# Patient Record
Sex: MALE | Race: WHITE | NOT HISPANIC OR LATINO | Employment: UNEMPLOYED | URBAN - METROPOLITAN AREA
[De-identification: names, ages, dates, MRNs, and addresses within clinical notes are randomized per-mention and may not be internally consistent; named-entity substitution may affect disease eponyms.]

---

## 2017-01-01 ENCOUNTER — TELEPHONE (OUTPATIENT)
Dept: PHARMACY | Facility: CLINIC | Age: 57
End: 2017-01-01

## 2017-01-01 ENCOUNTER — ANESTHESIA EVENT (OUTPATIENT)
Dept: SURGERY | Facility: HOSPITAL | Age: 57
DRG: 823 | End: 2017-01-01
Payer: COMMERCIAL

## 2017-01-01 ENCOUNTER — DOCUMENTATION ONLY (OUTPATIENT)
Dept: HEMATOLOGY/ONCOLOGY | Facility: CLINIC | Age: 57
End: 2017-01-01

## 2017-01-01 ENCOUNTER — ANESTHESIA (OUTPATIENT)
Dept: SURGERY | Facility: HOSPITAL | Age: 57
DRG: 823 | End: 2017-01-01
Payer: COMMERCIAL

## 2017-01-01 ENCOUNTER — TELEPHONE (OUTPATIENT)
Dept: HEMATOLOGY/ONCOLOGY | Facility: CLINIC | Age: 57
End: 2017-01-01

## 2017-01-01 ENCOUNTER — OFFICE VISIT (OUTPATIENT)
Dept: HEMATOLOGY/ONCOLOGY | Facility: CLINIC | Age: 57
End: 2017-01-01
Payer: COMMERCIAL

## 2017-01-01 ENCOUNTER — TELEPHONE (OUTPATIENT)
Dept: ELECTROPHYSIOLOGY | Facility: CLINIC | Age: 57
End: 2017-01-01

## 2017-01-01 ENCOUNTER — INFUSION (OUTPATIENT)
Dept: INFUSION THERAPY | Facility: HOSPITAL | Age: 57
End: 2017-01-01
Attending: INTERNAL MEDICINE
Payer: COMMERCIAL

## 2017-01-01 ENCOUNTER — CLINICAL SUPPORT (OUTPATIENT)
Dept: INFECTIOUS DISEASES | Facility: CLINIC | Age: 57
End: 2017-01-01
Payer: COMMERCIAL

## 2017-01-01 ENCOUNTER — LAB VISIT (OUTPATIENT)
Dept: LAB | Facility: HOSPITAL | Age: 57
End: 2017-01-01
Attending: INTERNAL MEDICINE
Payer: COMMERCIAL

## 2017-01-01 ENCOUNTER — ANESTHESIA EVENT (OUTPATIENT)
Dept: ENDOSCOPY | Facility: HOSPITAL | Age: 57
DRG: 808 | End: 2017-01-01
Payer: COMMERCIAL

## 2017-01-01 ENCOUNTER — NURSE TRIAGE (OUTPATIENT)
Dept: ADMINISTRATIVE | Facility: CLINIC | Age: 57
End: 2017-01-01

## 2017-01-01 ENCOUNTER — OFFICE VISIT (OUTPATIENT)
Dept: ELECTROPHYSIOLOGY | Facility: CLINIC | Age: 57
End: 2017-01-01
Payer: COMMERCIAL

## 2017-01-01 ENCOUNTER — TELEPHONE (OUTPATIENT)
Dept: GASTROENTEROLOGY | Facility: CLINIC | Age: 57
End: 2017-01-01

## 2017-01-01 ENCOUNTER — ANESTHESIA (OUTPATIENT)
Dept: ENDOSCOPY | Facility: HOSPITAL | Age: 57
DRG: 808 | End: 2017-01-01
Payer: COMMERCIAL

## 2017-01-01 ENCOUNTER — RESEARCH ENCOUNTER (OUTPATIENT)
Dept: RESEARCH | Facility: HOSPITAL | Age: 57
End: 2017-01-01

## 2017-01-01 ENCOUNTER — INFUSION (OUTPATIENT)
Dept: INFUSION THERAPY | Facility: HOSPITAL | Age: 57
End: 2017-01-01
Attending: NURSE PRACTITIONER
Payer: COMMERCIAL

## 2017-01-01 ENCOUNTER — ANESTHESIA EVENT (OUTPATIENT)
Dept: MEDSURG UNIT | Facility: HOSPITAL | Age: 57
DRG: 823 | End: 2017-01-01
Payer: COMMERCIAL

## 2017-01-01 ENCOUNTER — HOSPITAL ENCOUNTER (INPATIENT)
Facility: HOSPITAL | Age: 57
LOS: 43 days | Discharge: HOME | DRG: 823 | End: 2017-08-02
Attending: INTERNAL MEDICINE | Admitting: INTERNAL MEDICINE
Payer: COMMERCIAL

## 2017-01-01 ENCOUNTER — HOSPITAL ENCOUNTER (INPATIENT)
Facility: HOSPITAL | Age: 57
LOS: 20 days | DRG: 808 | End: 2017-10-03
Attending: EMERGENCY MEDICINE | Admitting: EMERGENCY MEDICINE
Payer: COMMERCIAL

## 2017-01-01 ENCOUNTER — DOCUMENTATION ONLY (OUTPATIENT)
Dept: INFUSION THERAPY | Facility: HOSPITAL | Age: 57
End: 2017-01-01

## 2017-01-01 ENCOUNTER — HOSPITAL ENCOUNTER (OUTPATIENT)
Dept: CARDIOLOGY | Facility: CLINIC | Age: 57
Discharge: HOME | End: 2017-08-07
Payer: COMMERCIAL

## 2017-01-01 ENCOUNTER — ANESTHESIA (OUTPATIENT)
Dept: MEDSURG UNIT | Facility: HOSPITAL | Age: 57
DRG: 823 | End: 2017-01-01
Payer: COMMERCIAL

## 2017-01-01 VITALS
TEMPERATURE: 98 F | HEART RATE: 84 BPM | OXYGEN SATURATION: 98 % | RESPIRATION RATE: 14 BRPM | HEIGHT: 73 IN | WEIGHT: 165.38 LBS | SYSTOLIC BLOOD PRESSURE: 117 MMHG | BODY MASS INDEX: 21.92 KG/M2 | DIASTOLIC BLOOD PRESSURE: 80 MMHG

## 2017-01-01 VITALS
HEART RATE: 117 BPM | TEMPERATURE: 99 F | SYSTOLIC BLOOD PRESSURE: 108 MMHG | RESPIRATION RATE: 16 BRPM | WEIGHT: 168.63 LBS | BODY MASS INDEX: 22.35 KG/M2 | DIASTOLIC BLOOD PRESSURE: 69 MMHG | HEIGHT: 73 IN

## 2017-01-01 VITALS
BODY MASS INDEX: 21.98 KG/M2 | SYSTOLIC BLOOD PRESSURE: 108 MMHG | HEART RATE: 72 BPM | WEIGHT: 165.81 LBS | DIASTOLIC BLOOD PRESSURE: 62 MMHG | HEIGHT: 73 IN

## 2017-01-01 VITALS
BODY MASS INDEX: 23.55 KG/M2 | HEART RATE: 98 BPM | SYSTOLIC BLOOD PRESSURE: 92 MMHG | OXYGEN SATURATION: 97 % | RESPIRATION RATE: 16 BRPM | WEIGHT: 177.69 LBS | TEMPERATURE: 98 F | DIASTOLIC BLOOD PRESSURE: 59 MMHG | HEIGHT: 73 IN

## 2017-01-01 VITALS
SYSTOLIC BLOOD PRESSURE: 142 MMHG | BODY MASS INDEX: 26.09 KG/M2 | WEIGHT: 196.88 LBS | HEIGHT: 73 IN | TEMPERATURE: 98 F | HEART RATE: 86 BPM | DIASTOLIC BLOOD PRESSURE: 88 MMHG

## 2017-01-01 VITALS
BODY MASS INDEX: 22.35 KG/M2 | HEART RATE: 118 BPM | DIASTOLIC BLOOD PRESSURE: 74 MMHG | TEMPERATURE: 99 F | SYSTOLIC BLOOD PRESSURE: 97 MMHG | WEIGHT: 168.63 LBS | HEIGHT: 73 IN

## 2017-01-01 VITALS
BODY MASS INDEX: 25.71 KG/M2 | HEART RATE: 93 BPM | DIASTOLIC BLOOD PRESSURE: 79 MMHG | TEMPERATURE: 98 F | SYSTOLIC BLOOD PRESSURE: 121 MMHG | WEIGHT: 194 LBS | HEIGHT: 73 IN

## 2017-01-01 VITALS
WEIGHT: 182.44 LBS | HEIGHT: 72 IN | SYSTOLIC BLOOD PRESSURE: 125 MMHG | OXYGEN SATURATION: 94 % | TEMPERATURE: 98 F | BODY MASS INDEX: 24.71 KG/M2 | HEART RATE: 94 BPM | RESPIRATION RATE: 18 BRPM | DIASTOLIC BLOOD PRESSURE: 70 MMHG

## 2017-01-01 VITALS
BODY MASS INDEX: 26.76 KG/M2 | DIASTOLIC BLOOD PRESSURE: 75 MMHG | HEIGHT: 73 IN | SYSTOLIC BLOOD PRESSURE: 136 MMHG | WEIGHT: 201.94 LBS | TEMPERATURE: 99 F

## 2017-01-01 VITALS
HEIGHT: 73 IN | BODY MASS INDEX: 22.57 KG/M2 | TEMPERATURE: 98 F | OXYGEN SATURATION: 99 % | WEIGHT: 170.31 LBS | HEART RATE: 96 BPM | RESPIRATION RATE: 16 BRPM | DIASTOLIC BLOOD PRESSURE: 61 MMHG | SYSTOLIC BLOOD PRESSURE: 91 MMHG

## 2017-01-01 VITALS
BODY MASS INDEX: 26.21 KG/M2 | SYSTOLIC BLOOD PRESSURE: 136 MMHG | HEART RATE: 96 BPM | DIASTOLIC BLOOD PRESSURE: 94 MMHG | WEIGHT: 198.63 LBS | TEMPERATURE: 98 F

## 2017-01-01 VITALS
WEIGHT: 177.69 LBS | SYSTOLIC BLOOD PRESSURE: 115 MMHG | HEART RATE: 104 BPM | SYSTOLIC BLOOD PRESSURE: 127 MMHG | BODY MASS INDEX: 22.19 KG/M2 | BODY MASS INDEX: 23.44 KG/M2 | DIASTOLIC BLOOD PRESSURE: 80 MMHG | HEART RATE: 80 BPM | RESPIRATION RATE: 15 BRPM | RESPIRATION RATE: 17 BRPM | TEMPERATURE: 98 F | WEIGHT: 168.19 LBS | TEMPERATURE: 99 F | DIASTOLIC BLOOD PRESSURE: 68 MMHG

## 2017-01-01 VITALS
HEIGHT: 73 IN | TEMPERATURE: 99 F | DIASTOLIC BLOOD PRESSURE: 84 MMHG | SYSTOLIC BLOOD PRESSURE: 124 MMHG | WEIGHT: 190.69 LBS | BODY MASS INDEX: 25.27 KG/M2 | HEART RATE: 99 BPM

## 2017-01-01 VITALS
HEIGHT: 73 IN | BODY MASS INDEX: 24.43 KG/M2 | HEART RATE: 88 BPM | WEIGHT: 184.31 LBS | RESPIRATION RATE: 19 BRPM | OXYGEN SATURATION: 88 % | SYSTOLIC BLOOD PRESSURE: 127 MMHG | DIASTOLIC BLOOD PRESSURE: 60 MMHG | TEMPERATURE: 99 F

## 2017-01-01 VITALS
DIASTOLIC BLOOD PRESSURE: 79 MMHG | TEMPERATURE: 98 F | SYSTOLIC BLOOD PRESSURE: 140 MMHG | RESPIRATION RATE: 16 BRPM | WEIGHT: 190.69 LBS | BODY MASS INDEX: 25.27 KG/M2 | OXYGEN SATURATION: 97 % | HEIGHT: 73 IN | HEART RATE: 93 BPM

## 2017-01-01 VITALS
SYSTOLIC BLOOD PRESSURE: 119 MMHG | WEIGHT: 194.44 LBS | HEIGHT: 73 IN | DIASTOLIC BLOOD PRESSURE: 86 MMHG | HEIGHT: 73 IN | HEART RATE: 104 BPM | WEIGHT: 192 LBS | BODY MASS INDEX: 25.45 KG/M2 | TEMPERATURE: 98 F | HEART RATE: 101 BPM | SYSTOLIC BLOOD PRESSURE: 106 MMHG | TEMPERATURE: 98 F | BODY MASS INDEX: 25.77 KG/M2 | DIASTOLIC BLOOD PRESSURE: 67 MMHG

## 2017-01-01 VITALS
DIASTOLIC BLOOD PRESSURE: 79 MMHG | HEART RATE: 109 BPM | WEIGHT: 195.56 LBS | TEMPERATURE: 98 F | SYSTOLIC BLOOD PRESSURE: 111 MMHG | BODY MASS INDEX: 25.8 KG/M2

## 2017-01-01 VITALS
HEART RATE: 84 BPM | DIASTOLIC BLOOD PRESSURE: 61 MMHG | RESPIRATION RATE: 16 BRPM | OXYGEN SATURATION: 100 % | SYSTOLIC BLOOD PRESSURE: 118 MMHG | TEMPERATURE: 97 F

## 2017-01-01 VITALS
RESPIRATION RATE: 18 BRPM | HEART RATE: 75 BPM | WEIGHT: 194.44 LBS | HEIGHT: 73 IN | SYSTOLIC BLOOD PRESSURE: 134 MMHG | OXYGEN SATURATION: 98 % | DIASTOLIC BLOOD PRESSURE: 75 MMHG | TEMPERATURE: 98 F | BODY MASS INDEX: 25.77 KG/M2

## 2017-01-01 VITALS
BODY MASS INDEX: 26.12 KG/M2 | TEMPERATURE: 98 F | HEIGHT: 73 IN | RESPIRATION RATE: 18 BRPM | DIASTOLIC BLOOD PRESSURE: 74 MMHG | OXYGEN SATURATION: 98 % | WEIGHT: 197.06 LBS | HEART RATE: 85 BPM | SYSTOLIC BLOOD PRESSURE: 129 MMHG

## 2017-01-01 VITALS
RESPIRATION RATE: 18 BRPM | WEIGHT: 197 LBS | TEMPERATURE: 98 F | DIASTOLIC BLOOD PRESSURE: 75 MMHG | BODY MASS INDEX: 26.11 KG/M2 | HEIGHT: 73 IN | SYSTOLIC BLOOD PRESSURE: 118 MMHG | HEART RATE: 95 BPM

## 2017-01-01 VITALS — HEIGHT: 73 IN | TEMPERATURE: 99 F | WEIGHT: 168.63 LBS | BODY MASS INDEX: 22.35 KG/M2

## 2017-01-01 DIAGNOSIS — B20 HIV DISEASE: ICD-10-CM

## 2017-01-01 DIAGNOSIS — C92.00 AML (ACUTE MYELOBLASTIC LEUKEMIA): ICD-10-CM

## 2017-01-01 DIAGNOSIS — C92.02 AML (ACUTE MYELOID LEUKEMIA) IN RELAPSE: Primary | ICD-10-CM

## 2017-01-01 DIAGNOSIS — C92.01 AML (ACUTE MYELOID LEUKEMIA) IN REMISSION: ICD-10-CM

## 2017-01-01 DIAGNOSIS — Z94.81 S/P ALLOGENEIC BONE MARROW TRANSPLANT: ICD-10-CM

## 2017-01-01 DIAGNOSIS — B39.4 HISTOPLASMA CAPSULATUM INFECTION: ICD-10-CM

## 2017-01-01 DIAGNOSIS — I46.9 CARDIAC ARREST: ICD-10-CM

## 2017-01-01 DIAGNOSIS — D89.810 ACUTE GVHD: ICD-10-CM

## 2017-01-01 DIAGNOSIS — Z94.81 BONE MARROW REPLACED BY TRANSPLANT: ICD-10-CM

## 2017-01-01 DIAGNOSIS — B39.2 HISTOPLASMOSIS PNEUMONIA: ICD-10-CM

## 2017-01-01 DIAGNOSIS — D64.81 ANEMIA DUE TO CHEMOTHERAPY: ICD-10-CM

## 2017-01-01 DIAGNOSIS — C95.90 LEUKEMIA, UNSPECIFIED LEUKEMIA TYPE: Primary | ICD-10-CM

## 2017-01-01 DIAGNOSIS — N40.0 PROSTATISM: Primary | ICD-10-CM

## 2017-01-01 DIAGNOSIS — I48.91 ATRIAL FIBRILLATION: ICD-10-CM

## 2017-01-01 DIAGNOSIS — F41.9 ANXIETY: Primary | ICD-10-CM

## 2017-01-01 DIAGNOSIS — I47.10 SUPRAVENTRICULAR TACHYCARDIA: Primary | ICD-10-CM

## 2017-01-01 DIAGNOSIS — D70.9 NEUTROPENIA: ICD-10-CM

## 2017-01-01 DIAGNOSIS — I49.9 CARDIAC RHYTHM DISORDER OR DISTURBANCE OR CHANGE: ICD-10-CM

## 2017-01-01 DIAGNOSIS — I48.19 PERSISTENT ATRIAL FIBRILLATION: ICD-10-CM

## 2017-01-01 DIAGNOSIS — T45.1X5A ANEMIA DUE TO CHEMOTHERAPY: ICD-10-CM

## 2017-01-01 DIAGNOSIS — C92.00 ACUTE MYELOID LEUKEMIA NOT HAVING ACHIEVED REMISSION: Primary | ICD-10-CM

## 2017-01-01 DIAGNOSIS — Z94.81 S/P ALLOGENEIC BONE MARROW TRANSPLANT: Primary | ICD-10-CM

## 2017-01-01 DIAGNOSIS — E83.42 HYPOMAGNESEMIA: ICD-10-CM

## 2017-01-01 DIAGNOSIS — R00.0 TACHYCARDIA: ICD-10-CM

## 2017-01-01 DIAGNOSIS — C92.01 ACUTE MYELOID LEUKEMIA IN REMISSION: ICD-10-CM

## 2017-01-01 DIAGNOSIS — B20 HIV (HUMAN IMMUNODEFICIENCY VIRUS INFECTION): Primary | ICD-10-CM

## 2017-01-01 DIAGNOSIS — K64.9 HEMORRHOIDS, UNSPECIFIED HEMORRHOID TYPE: Primary | ICD-10-CM

## 2017-01-01 DIAGNOSIS — I49.9 ARRHYTHMIA: ICD-10-CM

## 2017-01-01 DIAGNOSIS — I48.91 ATRIAL FIBRILLATION WITH RVR: ICD-10-CM

## 2017-01-01 DIAGNOSIS — B20 HIV INFECTION: ICD-10-CM

## 2017-01-01 DIAGNOSIS — C92.00 AML (ACUTE MYELOGENOUS LEUKEMIA): Primary | ICD-10-CM

## 2017-01-01 DIAGNOSIS — D64.9 ANEMIA: ICD-10-CM

## 2017-01-01 DIAGNOSIS — R11.2 CINV (CHEMOTHERAPY-INDUCED NAUSEA AND VOMITING): ICD-10-CM

## 2017-01-01 DIAGNOSIS — G89.3 NEOPLASM RELATED PAIN: ICD-10-CM

## 2017-01-01 DIAGNOSIS — C92.00 ACUTE MYELOID LEUKEMIA NOT HAVING ACHIEVED REMISSION: ICD-10-CM

## 2017-01-01 DIAGNOSIS — D89.813 GRAFT-VERSUS-HOST DISEASE: ICD-10-CM

## 2017-01-01 DIAGNOSIS — K59.1 FUNCTIONAL DIARRHEA: ICD-10-CM

## 2017-01-01 DIAGNOSIS — J96.01 ACUTE RESPIRATORY FAILURE WITH HYPOXIA: ICD-10-CM

## 2017-01-01 DIAGNOSIS — J18.9 HCAP (HEALTHCARE-ASSOCIATED PNEUMONIA): ICD-10-CM

## 2017-01-01 DIAGNOSIS — B25.9 CYTOMEGALOVIRUS (CMV) VIREMIA: ICD-10-CM

## 2017-01-01 DIAGNOSIS — C92.02 AML (ACUTE MYELOID LEUKEMIA) IN RELAPSE: ICD-10-CM

## 2017-01-01 DIAGNOSIS — D70.8 OTHER NEUTROPENIA: ICD-10-CM

## 2017-01-01 DIAGNOSIS — N18.4 CKD (CHRONIC KIDNEY DISEASE), STAGE IV: Chronic | ICD-10-CM

## 2017-01-01 DIAGNOSIS — T45.1X5A CINV (CHEMOTHERAPY-INDUCED NAUSEA AND VOMITING): ICD-10-CM

## 2017-01-01 DIAGNOSIS — N40.0 PROSTATE HYPERTROPHY: ICD-10-CM

## 2017-01-01 DIAGNOSIS — R91.8 PULMONARY INFILTRATES ON CXR: ICD-10-CM

## 2017-01-01 DIAGNOSIS — Z29.89 NEED FOR PNEUMOCYSTIS PROPHYLAXIS: Primary | ICD-10-CM

## 2017-01-01 DIAGNOSIS — T82.518A VASCULAR CATHETER DYSFUNCTION, INITIAL ENCOUNTER: Primary | ICD-10-CM

## 2017-01-01 DIAGNOSIS — E87.6 HYPOKALEMIA: ICD-10-CM

## 2017-01-01 DIAGNOSIS — D89.813 GRAFT-VERSUS-HOST DISEASE: Primary | ICD-10-CM

## 2017-01-01 DIAGNOSIS — D69.6 THROMBOCYTOPENIA: ICD-10-CM

## 2017-01-01 DIAGNOSIS — G47.00 INSOMNIA, UNSPECIFIED TYPE: ICD-10-CM

## 2017-01-01 DIAGNOSIS — A41.9 SEPSIS, DUE TO UNSPECIFIED ORGANISM: Primary | ICD-10-CM

## 2017-01-01 DIAGNOSIS — I48.91 ATRIAL FIBRILLATION, UNSPECIFIED TYPE: Primary | ICD-10-CM

## 2017-01-01 DIAGNOSIS — D64.9 ANEMIA, UNSPECIFIED TYPE: ICD-10-CM

## 2017-01-01 DIAGNOSIS — A41.9 SEPSIS: ICD-10-CM

## 2017-01-01 DIAGNOSIS — G89.3 CANCER ASSOCIATED PAIN: ICD-10-CM

## 2017-01-01 DIAGNOSIS — K64.9 HEMORRHOIDS, UNSPECIFIED HEMORRHOID TYPE: ICD-10-CM

## 2017-01-01 DIAGNOSIS — F41.9 ANXIETY: ICD-10-CM

## 2017-01-01 DIAGNOSIS — T45.1X5A PANCYTOPENIA DUE TO ANTINEOPLASTIC CHEMOTHERAPY: ICD-10-CM

## 2017-01-01 DIAGNOSIS — C92.00 AML (ACUTE MYELOGENOUS LEUKEMIA): ICD-10-CM

## 2017-01-01 DIAGNOSIS — R50.9 FEVER, UNSPECIFIED FEVER CAUSE: ICD-10-CM

## 2017-01-01 DIAGNOSIS — N52.9 ERECTILE DYSFUNCTION, UNSPECIFIED ERECTILE DYSFUNCTION TYPE: ICD-10-CM

## 2017-01-01 DIAGNOSIS — R31.9 HEMATURIA: ICD-10-CM

## 2017-01-01 DIAGNOSIS — B20 HIV DISEASE: Primary | ICD-10-CM

## 2017-01-01 DIAGNOSIS — N28.9 RENAL INSUFFICIENCY: ICD-10-CM

## 2017-01-01 DIAGNOSIS — R07.9 CHEST PAIN: ICD-10-CM

## 2017-01-01 DIAGNOSIS — D61.810 PANCYTOPENIA DUE TO ANTINEOPLASTIC CHEMOTHERAPY: ICD-10-CM

## 2017-01-01 DIAGNOSIS — B20 HIV (HUMAN IMMUNODEFICIENCY VIRUS INFECTION): ICD-10-CM

## 2017-01-01 DIAGNOSIS — R50.81 NEUTROPENIC FEVER: ICD-10-CM

## 2017-01-01 DIAGNOSIS — I48.92 ATRIAL FLUTTER, UNSPECIFIED TYPE: ICD-10-CM

## 2017-01-01 DIAGNOSIS — R94.31 ST SEGMENT CHANGES ON ELECTROCARDIOGRAM: ICD-10-CM

## 2017-01-01 DIAGNOSIS — I47.20 V-TACH: ICD-10-CM

## 2017-01-01 DIAGNOSIS — I44.7 LBBB (LEFT BUNDLE BRANCH BLOCK): ICD-10-CM

## 2017-01-01 DIAGNOSIS — J90 PLEURAL EFFUSION: ICD-10-CM

## 2017-01-01 DIAGNOSIS — I44.7 LEFT BUNDLE BRANCH BLOCK: ICD-10-CM

## 2017-01-01 DIAGNOSIS — Z94.81 STATUS POST BONE MARROW TRANSPLANT: ICD-10-CM

## 2017-01-01 DIAGNOSIS — C92.00 AML (ACUTE MYELOBLASTIC LEUKEMIA): Primary | ICD-10-CM

## 2017-01-01 DIAGNOSIS — I95.9 HYPOTENSION, UNSPECIFIED HYPOTENSION TYPE: ICD-10-CM

## 2017-01-01 DIAGNOSIS — I47.20 V TACH: ICD-10-CM

## 2017-01-01 DIAGNOSIS — Z92.89 HISTORY OF CARDIOVERSION: ICD-10-CM

## 2017-01-01 DIAGNOSIS — D70.9 NEUTROPENIC FEVER: ICD-10-CM

## 2017-01-01 DIAGNOSIS — N17.9 AKI (ACUTE KIDNEY INJURY): ICD-10-CM

## 2017-01-01 DIAGNOSIS — C92.02 ACUTE MYELOID LEUKEMIA IN RELAPSE: Primary | ICD-10-CM

## 2017-01-01 DIAGNOSIS — J01.00 ACUTE MAXILLARY SINUSITIS, RECURRENCE NOT SPECIFIED: ICD-10-CM

## 2017-01-01 DIAGNOSIS — R19.7 DIARRHEA, UNSPECIFIED TYPE: ICD-10-CM

## 2017-01-01 DIAGNOSIS — E83.39 HYPOPHOSPHATEMIA: ICD-10-CM

## 2017-01-01 DIAGNOSIS — D68.9 COAGULOPATHY: ICD-10-CM

## 2017-01-01 DIAGNOSIS — I63.9 STROKE OF UNKNOWN ETIOLOGY: ICD-10-CM

## 2017-01-01 DIAGNOSIS — F06.4 ANXIETY DISORDER DUE TO MEDICAL CONDITION: ICD-10-CM

## 2017-01-01 DIAGNOSIS — I48.91 ATRIAL FIBRILLATION, UNSPECIFIED TYPE: ICD-10-CM

## 2017-01-01 DIAGNOSIS — C95.90 LEUKEMIA: ICD-10-CM

## 2017-01-01 DIAGNOSIS — E78.5 HYPERLIPIDEMIA, UNSPECIFIED HYPERLIPIDEMIA TYPE: ICD-10-CM

## 2017-01-01 LAB
1,3 BETA GLUCAN SPEC-MCNC: <31 PG/ML
ABO + RH BLD: NORMAL
ACID FAST MOD KINY STN SPEC: NORMAL
ALBUMIN FLD-MCNC: 1 G/DL
ALBUMIN FLD-MCNC: 1.3 G/DL
ALBUMIN SERPL BCP-MCNC: 1 G/DL
ALBUMIN SERPL BCP-MCNC: 1.1 G/DL
ALBUMIN SERPL BCP-MCNC: 1.1 G/DL
ALBUMIN SERPL BCP-MCNC: 1.2 G/DL
ALBUMIN SERPL BCP-MCNC: 1.2 G/DL
ALBUMIN SERPL BCP-MCNC: 1.3 G/DL
ALBUMIN SERPL BCP-MCNC: 1.4 G/DL
ALBUMIN SERPL BCP-MCNC: 1.5 G/DL
ALBUMIN SERPL BCP-MCNC: 1.6 G/DL
ALBUMIN SERPL BCP-MCNC: 1.7 G/DL
ALBUMIN SERPL BCP-MCNC: 1.8 G/DL
ALBUMIN SERPL BCP-MCNC: 1.9 G/DL
ALBUMIN SERPL BCP-MCNC: 2 G/DL
ALBUMIN SERPL BCP-MCNC: 2.1 G/DL
ALBUMIN SERPL BCP-MCNC: 2.2 G/DL
ALBUMIN SERPL BCP-MCNC: 2.3 G/DL
ALBUMIN SERPL BCP-MCNC: 2.4 G/DL
ALBUMIN SERPL BCP-MCNC: 2.5 G/DL
ALBUMIN SERPL BCP-MCNC: 2.6 G/DL
ALBUMIN SERPL BCP-MCNC: 2.7 G/DL
ALBUMIN SERPL BCP-MCNC: 2.8 G/DL
ALBUMIN SERPL BCP-MCNC: 2.9 G/DL
ALBUMIN SERPL BCP-MCNC: 3 G/DL
ALBUMIN SERPL BCP-MCNC: 3.1 G/DL
ALBUMIN SERPL BCP-MCNC: 3.2 G/DL
ALBUMIN SERPL BCP-MCNC: 3.2 G/DL
ALBUMIN SERPL BCP-MCNC: 3.3 G/DL
ALLENS TEST: ABNORMAL
ALP SERPL-CCNC: 101 U/L
ALP SERPL-CCNC: 104 U/L
ALP SERPL-CCNC: 107 U/L
ALP SERPL-CCNC: 108 U/L
ALP SERPL-CCNC: 111 U/L
ALP SERPL-CCNC: 114 U/L
ALP SERPL-CCNC: 115 U/L
ALP SERPL-CCNC: 116 U/L
ALP SERPL-CCNC: 118 U/L
ALP SERPL-CCNC: 119 U/L
ALP SERPL-CCNC: 121 U/L
ALP SERPL-CCNC: 137 U/L
ALP SERPL-CCNC: 137 U/L
ALP SERPL-CCNC: 141 U/L
ALP SERPL-CCNC: 147 U/L
ALP SERPL-CCNC: 149 U/L
ALP SERPL-CCNC: 150 U/L
ALP SERPL-CCNC: 153 U/L
ALP SERPL-CCNC: 153 U/L
ALP SERPL-CCNC: 155 U/L
ALP SERPL-CCNC: 159 U/L
ALP SERPL-CCNC: 161 U/L
ALP SERPL-CCNC: 162 U/L
ALP SERPL-CCNC: 163 U/L
ALP SERPL-CCNC: 167 U/L
ALP SERPL-CCNC: 167 U/L
ALP SERPL-CCNC: 168 U/L
ALP SERPL-CCNC: 172 U/L
ALP SERPL-CCNC: 175 U/L
ALP SERPL-CCNC: 175 U/L
ALP SERPL-CCNC: 176 U/L
ALP SERPL-CCNC: 178 U/L
ALP SERPL-CCNC: 185 U/L
ALP SERPL-CCNC: 185 U/L
ALP SERPL-CCNC: 189 U/L
ALP SERPL-CCNC: 192 U/L
ALP SERPL-CCNC: 194 U/L
ALP SERPL-CCNC: 196 U/L
ALP SERPL-CCNC: 202 U/L
ALP SERPL-CCNC: 202 U/L
ALP SERPL-CCNC: 204 U/L
ALP SERPL-CCNC: 205 U/L
ALP SERPL-CCNC: 207 U/L
ALP SERPL-CCNC: 231 U/L
ALP SERPL-CCNC: 234 U/L
ALP SERPL-CCNC: 237 U/L
ALP SERPL-CCNC: 238 U/L
ALP SERPL-CCNC: 241 U/L
ALP SERPL-CCNC: 245 U/L
ALP SERPL-CCNC: 249 U/L
ALP SERPL-CCNC: 249 U/L
ALP SERPL-CCNC: 255 U/L
ALP SERPL-CCNC: 258 U/L
ALP SERPL-CCNC: 258 U/L
ALP SERPL-CCNC: 260 U/L
ALP SERPL-CCNC: 263 U/L
ALP SERPL-CCNC: 271 U/L
ALP SERPL-CCNC: 272 U/L
ALP SERPL-CCNC: 272 U/L
ALP SERPL-CCNC: 273 U/L
ALP SERPL-CCNC: 276 U/L
ALP SERPL-CCNC: 282 U/L
ALP SERPL-CCNC: 286 U/L
ALP SERPL-CCNC: 290 U/L
ALP SERPL-CCNC: 291 U/L
ALP SERPL-CCNC: 292 U/L
ALP SERPL-CCNC: 294 U/L
ALP SERPL-CCNC: 295 U/L
ALP SERPL-CCNC: 296 U/L
ALP SERPL-CCNC: 303 U/L
ALP SERPL-CCNC: 304 U/L
ALP SERPL-CCNC: 327 U/L
ALP SERPL-CCNC: 334 U/L
ALP SERPL-CCNC: 351 U/L
ALP SERPL-CCNC: 62 U/L
ALP SERPL-CCNC: 69 U/L
ALP SERPL-CCNC: 71 U/L
ALP SERPL-CCNC: 77 U/L
ALP SERPL-CCNC: 78 U/L
ALP SERPL-CCNC: 79 U/L
ALP SERPL-CCNC: 82 U/L
ALP SERPL-CCNC: 88 U/L
ALP SERPL-CCNC: 95 U/L
ALP SERPL-CCNC: 96 U/L
ALP SERPL-CCNC: 97 U/L
ALP SERPL-CCNC: 98 U/L
ALT SERPL W/O P-5'-P-CCNC: 10 U/L
ALT SERPL W/O P-5'-P-CCNC: 100 U/L
ALT SERPL W/O P-5'-P-CCNC: 11 U/L
ALT SERPL W/O P-5'-P-CCNC: 12 U/L
ALT SERPL W/O P-5'-P-CCNC: 13 U/L
ALT SERPL W/O P-5'-P-CCNC: 14 U/L
ALT SERPL W/O P-5'-P-CCNC: 15 U/L
ALT SERPL W/O P-5'-P-CCNC: 16 U/L
ALT SERPL W/O P-5'-P-CCNC: 17 U/L
ALT SERPL W/O P-5'-P-CCNC: 18 U/L
ALT SERPL W/O P-5'-P-CCNC: 18 U/L
ALT SERPL W/O P-5'-P-CCNC: 19 U/L
ALT SERPL W/O P-5'-P-CCNC: 19 U/L
ALT SERPL W/O P-5'-P-CCNC: 20 U/L
ALT SERPL W/O P-5'-P-CCNC: 21 U/L
ALT SERPL W/O P-5'-P-CCNC: 23 U/L
ALT SERPL W/O P-5'-P-CCNC: 23 U/L
ALT SERPL W/O P-5'-P-CCNC: 24 U/L
ALT SERPL W/O P-5'-P-CCNC: 25 U/L
ALT SERPL W/O P-5'-P-CCNC: 26 U/L
ALT SERPL W/O P-5'-P-CCNC: 28 U/L
ALT SERPL W/O P-5'-P-CCNC: 28 U/L
ALT SERPL W/O P-5'-P-CCNC: 30 U/L
ALT SERPL W/O P-5'-P-CCNC: 31 U/L
ALT SERPL W/O P-5'-P-CCNC: 32 U/L
ALT SERPL W/O P-5'-P-CCNC: 34 U/L
ALT SERPL W/O P-5'-P-CCNC: 35 U/L
ALT SERPL W/O P-5'-P-CCNC: 35 U/L
ALT SERPL W/O P-5'-P-CCNC: 36 U/L
ALT SERPL W/O P-5'-P-CCNC: 36 U/L
ALT SERPL W/O P-5'-P-CCNC: 37 U/L
ALT SERPL W/O P-5'-P-CCNC: 38 U/L
ALT SERPL W/O P-5'-P-CCNC: 39 U/L
ALT SERPL W/O P-5'-P-CCNC: 40 U/L
ALT SERPL W/O P-5'-P-CCNC: 41 U/L
ALT SERPL W/O P-5'-P-CCNC: 42 U/L
ALT SERPL W/O P-5'-P-CCNC: 42 U/L
ALT SERPL W/O P-5'-P-CCNC: 43 U/L
ALT SERPL W/O P-5'-P-CCNC: 44 U/L
ALT SERPL W/O P-5'-P-CCNC: 45 U/L
ALT SERPL W/O P-5'-P-CCNC: 45 U/L
ALT SERPL W/O P-5'-P-CCNC: 46 U/L
ALT SERPL W/O P-5'-P-CCNC: 46 U/L
ALT SERPL W/O P-5'-P-CCNC: 47 U/L
ALT SERPL W/O P-5'-P-CCNC: 47 U/L
ALT SERPL W/O P-5'-P-CCNC: 49 U/L
ALT SERPL W/O P-5'-P-CCNC: 5 U/L
ALT SERPL W/O P-5'-P-CCNC: 51 U/L
ALT SERPL W/O P-5'-P-CCNC: 51 U/L
ALT SERPL W/O P-5'-P-CCNC: 52 U/L
ALT SERPL W/O P-5'-P-CCNC: 53 U/L
ALT SERPL W/O P-5'-P-CCNC: 55 U/L
ALT SERPL W/O P-5'-P-CCNC: 6 U/L
ALT SERPL W/O P-5'-P-CCNC: 60 U/L
ALT SERPL W/O P-5'-P-CCNC: 61 U/L
ALT SERPL W/O P-5'-P-CCNC: 7 U/L
ALT SERPL W/O P-5'-P-CCNC: 7 U/L
ALT SERPL W/O P-5'-P-CCNC: 8 U/L
ALT SERPL W/O P-5'-P-CCNC: 8 U/L
ALT SERPL W/O P-5'-P-CCNC: 9 U/L
ALT SERPL W/O P-5'-P-CCNC: 9 U/L
AML FISH ADDITIONAL INFORMATION (BM): NORMAL
AML FISH DISCLAIMER (BM): NORMAL
AML FISH REASON FOR REFERRAL (BM): NORMAL
AML FISH RELEASED BY (BM): NORMAL
AML FISH RESULT (BM): NORMAL
AML FISH RESULT SUMMARY (BM): NORMAL
AML FISH RESULT TABLE (BM): NORMAL
AMORPH CRY UR QL COMP ASSIST: ABNORMAL
AMORPH CRY UR QL COMP ASSIST: ABNORMAL
AMYLASE, BODY FLUID: 12 U/L
AMYLASE, BODY FLUID: 14 U/L
ANION GAP SERPL CALC-SCNC: 10 MMOL/L
ANION GAP SERPL CALC-SCNC: 11 MMOL/L
ANION GAP SERPL CALC-SCNC: 12 MMOL/L
ANION GAP SERPL CALC-SCNC: 12 MMOL/L
ANION GAP SERPL CALC-SCNC: 14 MMOL/L
ANION GAP SERPL CALC-SCNC: 17 MMOL/L
ANION GAP SERPL CALC-SCNC: 3 MMOL/L
ANION GAP SERPL CALC-SCNC: 4 MMOL/L
ANION GAP SERPL CALC-SCNC: 5 MMOL/L
ANION GAP SERPL CALC-SCNC: 6 MMOL/L
ANION GAP SERPL CALC-SCNC: 7 MMOL/L
ANION GAP SERPL CALC-SCNC: 8 MMOL/L
ANION GAP SERPL CALC-SCNC: 9 MMOL/L
ANISOCYTOSIS BLD QL SMEAR: ABNORMAL
ANISOCYTOSIS BLD QL SMEAR: SLIGHT
AORTIC VALVE REGURGITATION: NORMAL
APPEARANCE FLD: NORMAL
APTT BLDCRRT: 21.4 SEC
APTT BLDCRRT: 23.8 SEC
APTT BLDCRRT: 24 SEC
APTT BLDCRRT: 24.2 SEC
APTT BLDCRRT: 24.5 SEC
APTT BLDCRRT: 24.6 SEC
APTT BLDCRRT: 25.2 SEC
APTT BLDCRRT: 25.4 SEC
APTT BLDCRRT: 25.9 SEC
APTT BLDCRRT: 26.3 SEC
APTT BLDCRRT: 26.8 SEC
APTT BLDCRRT: 27.7 SEC
APTT BLDCRRT: 28.3 SEC
APTT BLDCRRT: 28.3 SEC
APTT BLDCRRT: 29 SEC
APTT BLDCRRT: 31.2 SEC
APTT BLDCRRT: 46.4 SEC
APTT BLDCRRT: >150 SEC
AST SERPL-CCNC: 10 U/L
AST SERPL-CCNC: 10 U/L
AST SERPL-CCNC: 104 U/L
AST SERPL-CCNC: 11 U/L
AST SERPL-CCNC: 12 U/L
AST SERPL-CCNC: 13 U/L
AST SERPL-CCNC: 14 U/L
AST SERPL-CCNC: 15 U/L
AST SERPL-CCNC: 17 U/L
AST SERPL-CCNC: 18 U/L
AST SERPL-CCNC: 18 U/L
AST SERPL-CCNC: 19 U/L
AST SERPL-CCNC: 20 U/L
AST SERPL-CCNC: 20 U/L
AST SERPL-CCNC: 21 U/L
AST SERPL-CCNC: 23 U/L
AST SERPL-CCNC: 23 U/L
AST SERPL-CCNC: 24 U/L
AST SERPL-CCNC: 25 U/L
AST SERPL-CCNC: 26 U/L
AST SERPL-CCNC: 26 U/L
AST SERPL-CCNC: 28 U/L
AST SERPL-CCNC: 28 U/L
AST SERPL-CCNC: 30 U/L
AST SERPL-CCNC: 31 U/L
AST SERPL-CCNC: 33 U/L
AST SERPL-CCNC: 34 U/L
AST SERPL-CCNC: 35 U/L
AST SERPL-CCNC: 35 U/L
AST SERPL-CCNC: 37 U/L
AST SERPL-CCNC: 38 U/L
AST SERPL-CCNC: 39 U/L
AST SERPL-CCNC: 40 U/L
AST SERPL-CCNC: 41 U/L
AST SERPL-CCNC: 45 U/L
AST SERPL-CCNC: 45 U/L
AST SERPL-CCNC: 46 U/L
AST SERPL-CCNC: 46 U/L
AST SERPL-CCNC: 48 U/L
AST SERPL-CCNC: 51 U/L
AST SERPL-CCNC: 53 U/L
AST SERPL-CCNC: 53 U/L
AST SERPL-CCNC: 55 U/L
AST SERPL-CCNC: 56 U/L
AST SERPL-CCNC: 56 U/L
AST SERPL-CCNC: 57 U/L
AST SERPL-CCNC: 59 U/L
AST SERPL-CCNC: 60 U/L
AST SERPL-CCNC: 60 U/L
AST SERPL-CCNC: 61 U/L
AST SERPL-CCNC: 61 U/L
AST SERPL-CCNC: 62 U/L
AST SERPL-CCNC: 66 U/L
AST SERPL-CCNC: 66 U/L
AST SERPL-CCNC: 67 U/L
AST SERPL-CCNC: 73 U/L
AST SERPL-CCNC: 8 U/L
AST SERPL-CCNC: 81 U/L
BACTERIA #/AREA URNS AUTO: ABNORMAL /HPF
BACTERIA #/AREA URNS AUTO: ABNORMAL /HPF
BACTERIA #/AREA URNS AUTO: NORMAL /HPF
BACTERIA BLD CULT: NORMAL
BACTERIA FLD CULT: NORMAL
BACTERIA FLD CULT: NORMAL
BACTERIA SPEC AEROBE CULT: NO GROWTH
BACTERIA SPEC AEROBE CULT: NORMAL
BACTERIA STL CULT: NORMAL
BACTERIA UR CULT: NO GROWTH
BASO STIPL BLD QL SMEAR: ABNORMAL
BASO STIPL BLD QL SMEAR: ABNORMAL
BASOPHILS # BLD AUTO: 0 K/UL
BASOPHILS # BLD AUTO: 0.01 K/UL
BASOPHILS # BLD AUTO: 0.02 K/UL
BASOPHILS # BLD AUTO: 0.03 K/UL
BASOPHILS # BLD AUTO: 0.03 K/UL
BASOPHILS # BLD AUTO: ABNORMAL K/UL
BASOPHILS NFR BLD: 0 %
BASOPHILS NFR BLD: 0.1 %
BASOPHILS NFR BLD: 0.2 %
BASOPHILS NFR BLD: 0.2 %
BASOPHILS NFR BLD: 0.3 %
BASOPHILS NFR BLD: 0.4 %
BASOPHILS NFR BLD: 0.5 %
BASOPHILS NFR BLD: 0.8 %
BASOPHILS NFR BLD: 1 %
BASOPHILS NFR BLD: 1.6 %
BASOPHILS NFR BLD: 2 %
BASOPHILS NFR BLD: 2.1 %
BASOPHILS NFR BLD: 2.9 %
BASOPHILS NFR BLD: 3.2 %
BASOPHILS NFR BLD: 3.6 %
BILIRUB SERPL-MCNC: 0.2 MG/DL
BILIRUB SERPL-MCNC: 0.3 MG/DL
BILIRUB SERPL-MCNC: 0.4 MG/DL
BILIRUB SERPL-MCNC: 0.5 MG/DL
BILIRUB SERPL-MCNC: 0.6 MG/DL
BILIRUB SERPL-MCNC: 0.7 MG/DL
BILIRUB SERPL-MCNC: 0.8 MG/DL
BILIRUB SERPL-MCNC: 0.9 MG/DL
BILIRUB SERPL-MCNC: 1 MG/DL
BILIRUB SERPL-MCNC: 1.1 MG/DL
BILIRUB SERPL-MCNC: 1.1 MG/DL
BILIRUB SERPL-MCNC: 1.2 MG/DL
BILIRUB SERPL-MCNC: 1.2 MG/DL
BILIRUB SERPL-MCNC: 1.4 MG/DL
BILIRUB SERPL-MCNC: 1.4 MG/DL
BILIRUB SERPL-MCNC: 1.5 MG/DL
BILIRUB SERPL-MCNC: 1.6 MG/DL
BILIRUB SERPL-MCNC: 1.6 MG/DL
BILIRUB SERPL-MCNC: 1.7 MG/DL
BILIRUB SERPL-MCNC: 1.8 MG/DL
BILIRUB SERPL-MCNC: 1.9 MG/DL
BILIRUB SERPL-MCNC: 2 MG/DL
BILIRUB SERPL-MCNC: 2.1 MG/DL
BILIRUB SERPL-MCNC: 2.1 MG/DL
BILIRUB SERPL-MCNC: 2.3 MG/DL
BILIRUB SERPL-MCNC: 2.4 MG/DL
BILIRUB SERPL-MCNC: 2.4 MG/DL
BILIRUB SERPL-MCNC: 2.5 MG/DL
BILIRUB SERPL-MCNC: 2.5 MG/DL
BILIRUB SERPL-MCNC: 2.8 MG/DL
BILIRUB SERPL-MCNC: 3 MG/DL
BILIRUB SERPL-MCNC: 3.1 MG/DL
BILIRUB SERPL-MCNC: 3.1 MG/DL
BILIRUB SERPL-MCNC: 3.2 MG/DL
BILIRUB SERPL-MCNC: 3.3 MG/DL
BILIRUB UR QL STRIP: NEGATIVE
BLASTOMYCES AG INTERP: NEGATIVE
BLASTOMYCES AG RESULT: NORMAL NG/ML
BLASTOMYCES AG SPECIMEN: NORMAL
BLASTS NFR BLD MANUAL: 11 %
BLASTS NFR BLD MANUAL: 14 %
BLASTS NFR BLD MANUAL: 15 %
BLASTS NFR BLD MANUAL: 24 %
BLASTS NFR BLD MANUAL: 29.5 %
BLASTS NFR BLD MANUAL: 30 %
BLASTS NFR BLD MANUAL: 33 %
BLASTS NFR BLD MANUAL: 4 %
BLASTS NFR BLD MANUAL: 43 %
BLASTS NFR BLD MANUAL: 44 %
BLASTS NFR BLD MANUAL: 46 %
BLASTS NFR BLD MANUAL: 7 %
BLD GP AB SCN CELLS X3 SERPL QL: NORMAL
BLD PROD TYP BPU: NORMAL
BLOOD UNIT EXPIRATION DATE: NORMAL
BLOOD UNIT TYPE CODE: 1700
BLOOD UNIT TYPE CODE: 5100
BLOOD UNIT TYPE CODE: 600
BLOOD UNIT TYPE CODE: 600
BLOOD UNIT TYPE CODE: 6200
BLOOD UNIT TYPE CODE: 7300
BLOOD UNIT TYPE CODE: 9500
BLOOD UNIT TYPE: NORMAL
BNP SERPL-MCNC: 60 PG/ML
BODY FLD TYPE: NORMAL
BODY FLUID SOURCE AMYLASE: NORMAL
BODY FLUID SOURCE AMYLASE: NORMAL
BODY FLUID SOURCE, LDH: NORMAL
BODY FLUID SOURCE, LDH: NORMAL
BODY SITE - BONE MARROW: NORMAL
BODY SITE - BONE MARROW: NORMAL
BONE MARROW IRON STAIN COMMENT: NORMAL
BONE MARROW IRON STAIN COMMENT: NORMAL
BONE MARROW WRIGHT STAIN COMMENT: NORMAL
BONE MARROW WRIGHT STAIN COMMENT: NORMAL
BUN SERPL-MCNC: 10 MG/DL
BUN SERPL-MCNC: 12 MG/DL
BUN SERPL-MCNC: 12 MG/DL
BUN SERPL-MCNC: 13 MG/DL
BUN SERPL-MCNC: 14 MG/DL
BUN SERPL-MCNC: 15 MG/DL
BUN SERPL-MCNC: 16 MG/DL
BUN SERPL-MCNC: 17 MG/DL
BUN SERPL-MCNC: 18 MG/DL
BUN SERPL-MCNC: 19 MG/DL
BUN SERPL-MCNC: 20 MG/DL
BUN SERPL-MCNC: 21 MG/DL
BUN SERPL-MCNC: 22 MG/DL
BUN SERPL-MCNC: 23 MG/DL
BUN SERPL-MCNC: 24 MG/DL
BUN SERPL-MCNC: 25 MG/DL
BUN SERPL-MCNC: 26 MG/DL
BUN SERPL-MCNC: 27 MG/DL
BUN SERPL-MCNC: 27 MG/DL
BUN SERPL-MCNC: 28 MG/DL
BUN SERPL-MCNC: 32 MG/DL
BUN SERPL-MCNC: 33 MG/DL
BUN SERPL-MCNC: 34 MG/DL
BUN SERPL-MCNC: 35 MG/DL
BUN SERPL-MCNC: 35 MG/DL
BUN SERPL-MCNC: 36 MG/DL
BUN SERPL-MCNC: 36 MG/DL
BUN SERPL-MCNC: 37 MG/DL
BUN SERPL-MCNC: 39 MG/DL
BUN SERPL-MCNC: 39 MG/DL
BUN SERPL-MCNC: 40 MG/DL
BUN SERPL-MCNC: 41 MG/DL
BUN SERPL-MCNC: 42 MG/DL
BUN SERPL-MCNC: 43 MG/DL
BUN SERPL-MCNC: 44 MG/DL
BUN SERPL-MCNC: 45 MG/DL
BUN SERPL-MCNC: 46 MG/DL
BUN SERPL-MCNC: 48 MG/DL
BUN SERPL-MCNC: 48 MG/DL
BUN SERPL-MCNC: 49 MG/DL
BUN SERPL-MCNC: 51 MG/DL
BUN SERPL-MCNC: 51 MG/DL
BUN SERPL-MCNC: 53 MG/DL
BUN SERPL-MCNC: 54 MG/DL
BUN SERPL-MCNC: 55 MG/DL
BUN SERPL-MCNC: 59 MG/DL
BUN SERPL-MCNC: 66 MG/DL
BUN SERPL-MCNC: 9 MG/DL
BUN SERPL-MCNC: 9 MG/DL
BURR CELLS BLD QL SMEAR: ABNORMAL
C DIFF GDH STL QL: NEGATIVE
C DIFF TOX A+B STL QL IA: NEGATIVE
CALCIUM SERPL-MCNC: 6.9 MG/DL
CALCIUM SERPL-MCNC: 7 MG/DL
CALCIUM SERPL-MCNC: 7 MG/DL
CALCIUM SERPL-MCNC: 7.1 MG/DL
CALCIUM SERPL-MCNC: 7.2 MG/DL
CALCIUM SERPL-MCNC: 7.3 MG/DL
CALCIUM SERPL-MCNC: 7.4 MG/DL
CALCIUM SERPL-MCNC: 7.5 MG/DL
CALCIUM SERPL-MCNC: 7.6 MG/DL
CALCIUM SERPL-MCNC: 7.7 MG/DL
CALCIUM SERPL-MCNC: 7.8 MG/DL
CALCIUM SERPL-MCNC: 7.9 MG/DL
CALCIUM SERPL-MCNC: 7.9 MG/DL
CALCIUM SERPL-MCNC: 8 MG/DL
CALCIUM SERPL-MCNC: 8 MG/DL
CALCIUM SERPL-MCNC: 8.1 MG/DL
CALCIUM SERPL-MCNC: 8.2 MG/DL
CALCIUM SERPL-MCNC: 8.3 MG/DL
CALCIUM SERPL-MCNC: 8.4 MG/DL
CALCIUM SERPL-MCNC: 8.4 MG/DL
CALCIUM SERPL-MCNC: 8.5 MG/DL
CALCIUM SERPL-MCNC: 8.6 MG/DL
CALCIUM SERPL-MCNC: 8.7 MG/DL
CALCIUM SERPL-MCNC: 8.8 MG/DL
CALCIUM SERPL-MCNC: 8.9 MG/DL
CALCIUM SERPL-MCNC: 9 MG/DL
CALCIUM SERPL-MCNC: 9 MG/DL
CALCIUM SERPL-MCNC: 9.1 MG/DL
CALCIUM SERPL-MCNC: 9.2 MG/DL
CALCIUM SERPL-MCNC: 9.2 MG/DL
CD3+CD4+ CELLS # BLD: 469 CELLS/UL (ref 300–1400)
CD3+CD4+ CELLS NFR BLD: 11.4 % (ref 28–57)
CHLORIDE SERPL-SCNC: 101 MMOL/L
CHLORIDE SERPL-SCNC: 102 MMOL/L
CHLORIDE SERPL-SCNC: 102 MMOL/L
CHLORIDE SERPL-SCNC: 103 MMOL/L
CHLORIDE SERPL-SCNC: 104 MMOL/L
CHLORIDE SERPL-SCNC: 105 MMOL/L
CHLORIDE SERPL-SCNC: 106 MMOL/L
CHLORIDE SERPL-SCNC: 107 MMOL/L
CHLORIDE SERPL-SCNC: 108 MMOL/L
CHLORIDE SERPL-SCNC: 109 MMOL/L
CHLORIDE SERPL-SCNC: 110 MMOL/L
CHLORIDE SERPL-SCNC: 111 MMOL/L
CHLORIDE SERPL-SCNC: 112 MMOL/L
CHLORIDE SERPL-SCNC: 113 MMOL/L
CHLORIDE SERPL-SCNC: 113 MMOL/L
CHLORIDE SERPL-SCNC: 114 MMOL/L
CHLORIDE SERPL-SCNC: 114 MMOL/L
CHLORIDE SERPL-SCNC: 117 MMOL/L
CHLORIDE SERPL-SCNC: 118 MMOL/L
CHLORIDE SERPL-SCNC: 118 MMOL/L
CHLORIDE SERPL-SCNC: 99 MMOL/L
CHOLEST FLD-MCNC: 26 MG/DL
CHROM BANDING METHOD: NORMAL
CHROM BANDING METHOD: NORMAL
CHROMOSOME ANALYSIS BM ADDITIONAL INFORMATION: NORMAL
CHROMOSOME ANALYSIS BM ADDITIONAL INFORMATION: NORMAL
CHROMOSOME ANALYSIS BM RELEASED BY: NORMAL
CHROMOSOME ANALYSIS BM RELEASED BY: NORMAL
CHROMOSOME ANALYSIS BM RESULT SUMMARY: NORMAL
CHROMOSOME ANALYSIS BM RESULT SUMMARY: NORMAL
CLARITY CSF: CLEAR
CLARITY UR REFRACT.AUTO: ABNORMAL
CLARITY UR REFRACT.AUTO: CLEAR
CLARITY UR REFRACT.AUTO: CLEAR
CLINICAL CYTOGENETICIST REVIEW: NORMAL
CLINICAL DIAGNOSIS - BONE MARROW: NORMAL
CLINICAL DIAGNOSIS - BONE MARROW: NORMAL
CMV DNA SERPL NAA+PROBE-ACNC: NORMAL IU/ML
CO2 SERPL-SCNC: 11 MMOL/L
CO2 SERPL-SCNC: 11 MMOL/L
CO2 SERPL-SCNC: 12 MMOL/L
CO2 SERPL-SCNC: 13 MMOL/L
CO2 SERPL-SCNC: 14 MMOL/L
CO2 SERPL-SCNC: 15 MMOL/L
CO2 SERPL-SCNC: 17 MMOL/L
CO2 SERPL-SCNC: 18 MMOL/L
CO2 SERPL-SCNC: 18 MMOL/L
CO2 SERPL-SCNC: 19 MMOL/L
CO2 SERPL-SCNC: 19 MMOL/L
CO2 SERPL-SCNC: 20 MMOL/L
CO2 SERPL-SCNC: 21 MMOL/L
CO2 SERPL-SCNC: 22 MMOL/L
CO2 SERPL-SCNC: 23 MMOL/L
CO2 SERPL-SCNC: 24 MMOL/L
CO2 SERPL-SCNC: 25 MMOL/L
CO2 SERPL-SCNC: 26 MMOL/L
CO2 SERPL-SCNC: 27 MMOL/L
CO2 SERPL-SCNC: 27 MMOL/L
CO2 SERPL-SCNC: 28 MMOL/L
CO2 SERPL-SCNC: 28 MMOL/L
CO2 SERPL-SCNC: 29 MMOL/L
CO2 SERPL-SCNC: 29 MMOL/L
CO2 SERPL-SCNC: 30 MMOL/L
CO2 SERPL-SCNC: 31 MMOL/L
CO2 SERPL-SCNC: 9 MMOL/L
CO2 SERPL-SCNC: 9 MMOL/L
CODING SYSTEM: NORMAL
COLOR CSF: COLORLESS
COLOR FLD: NORMAL
COLOR UR AUTO: ABNORMAL
COLOR UR AUTO: ABNORMAL
COLOR UR AUTO: YELLOW
CREAT SERPL-MCNC: 0.7 MG/DL
CREAT SERPL-MCNC: 0.8 MG/DL
CREAT SERPL-MCNC: 0.9 MG/DL
CREAT SERPL-MCNC: 1 MG/DL
CREAT SERPL-MCNC: 1.1 MG/DL
CREAT SERPL-MCNC: 1.2 MG/DL
CREAT SERPL-MCNC: 1.3 MG/DL
CREAT SERPL-MCNC: 1.4 MG/DL
CREAT SERPL-MCNC: 1.5 MG/DL
CREAT SERPL-MCNC: 1.6 MG/DL
CREAT SERPL-MCNC: 1.8 MG/DL
CREAT UR-MCNC: 170 MG/DL
CRYPTOC AG SER QL LA: NEGATIVE
CYTOMEGALOVIRUS DNA: NOT DETECTED
CYTOMEGALOVIRUS LOG (IU/ML): <2.4 LOG (10) COPIES/ML
CYTOMEGALOVIRUS PCR, QUANT: <250 COPIES/ML
CYTOMEGALOVIRUS SOURCE: NORMAL
D DIMER PPP IA.FEU-MCNC: 3.22 MG/L FEU
DACRYOCYTES BLD QL SMEAR: ABNORMAL
DELSYS: ABNORMAL
DIASTOLIC DYSFUNCTION: NO
DIASTOLIC DYSFUNCTION: NO
DIFFERENTIAL METHOD: ABNORMAL
DISPENSE STATUS: NORMAL
DNA/RNA EXTRACT AND HOLD RESULT: NORMAL
DNA/RNA EXTRACTION: NORMAL
DOHLE BOD BLD QL SMEAR: PRESENT
E COLI SXT1 STL QL IA: NEGATIVE
E COLI SXT2 STL QL IA: NEGATIVE
ENTEROVIRUS: NOT DETECTED
EOSINOPHIL # BLD AUTO: 0 K/UL
EOSINOPHIL # BLD AUTO: 0.1 K/UL
EOSINOPHIL # BLD AUTO: ABNORMAL K/UL
EOSINOPHIL NFR BLD: 0 %
EOSINOPHIL NFR BLD: 0.2 %
EOSINOPHIL NFR BLD: 0.2 %
EOSINOPHIL NFR BLD: 0.5 %
EOSINOPHIL NFR BLD: 0.6 %
EOSINOPHIL NFR BLD: 0.7 %
EOSINOPHIL NFR BLD: 1 %
EOSINOPHIL NFR BLD: 1.5 %
EOSINOPHIL NFR BLD: 2 %
EOSINOPHIL NFR BLD: 3 %
EOSINOPHIL NFR BLD: 6 %
EOSINOPHIL NFR BLD: 6 %
EOSINOPHIL URNS QL WRIGHT STN: NORMAL
EP: 5
ERYTHROCYTE [DISTWIDTH] IN BLOOD BY AUTOMATED COUNT: 14.1 %
ERYTHROCYTE [DISTWIDTH] IN BLOOD BY AUTOMATED COUNT: 14.5 %
ERYTHROCYTE [DISTWIDTH] IN BLOOD BY AUTOMATED COUNT: 14.6 %
ERYTHROCYTE [DISTWIDTH] IN BLOOD BY AUTOMATED COUNT: 14.8 %
ERYTHROCYTE [DISTWIDTH] IN BLOOD BY AUTOMATED COUNT: 14.9 %
ERYTHROCYTE [DISTWIDTH] IN BLOOD BY AUTOMATED COUNT: 15 %
ERYTHROCYTE [DISTWIDTH] IN BLOOD BY AUTOMATED COUNT: 15.1 %
ERYTHROCYTE [DISTWIDTH] IN BLOOD BY AUTOMATED COUNT: 15.2 %
ERYTHROCYTE [DISTWIDTH] IN BLOOD BY AUTOMATED COUNT: 15.3 %
ERYTHROCYTE [DISTWIDTH] IN BLOOD BY AUTOMATED COUNT: 15.4 %
ERYTHROCYTE [DISTWIDTH] IN BLOOD BY AUTOMATED COUNT: 15.4 %
ERYTHROCYTE [DISTWIDTH] IN BLOOD BY AUTOMATED COUNT: 15.5 %
ERYTHROCYTE [DISTWIDTH] IN BLOOD BY AUTOMATED COUNT: 15.6 %
ERYTHROCYTE [DISTWIDTH] IN BLOOD BY AUTOMATED COUNT: 15.7 %
ERYTHROCYTE [DISTWIDTH] IN BLOOD BY AUTOMATED COUNT: 15.7 %
ERYTHROCYTE [DISTWIDTH] IN BLOOD BY AUTOMATED COUNT: 15.8 %
ERYTHROCYTE [DISTWIDTH] IN BLOOD BY AUTOMATED COUNT: 16 %
ERYTHROCYTE [DISTWIDTH] IN BLOOD BY AUTOMATED COUNT: 16.2 %
ERYTHROCYTE [DISTWIDTH] IN BLOOD BY AUTOMATED COUNT: 16.3 %
ERYTHROCYTE [DISTWIDTH] IN BLOOD BY AUTOMATED COUNT: 16.4 %
ERYTHROCYTE [DISTWIDTH] IN BLOOD BY AUTOMATED COUNT: 16.5 %
ERYTHROCYTE [DISTWIDTH] IN BLOOD BY AUTOMATED COUNT: 16.6 %
ERYTHROCYTE [DISTWIDTH] IN BLOOD BY AUTOMATED COUNT: 16.6 %
ERYTHROCYTE [DISTWIDTH] IN BLOOD BY AUTOMATED COUNT: 16.7 %
ERYTHROCYTE [DISTWIDTH] IN BLOOD BY AUTOMATED COUNT: 16.8 %
ERYTHROCYTE [DISTWIDTH] IN BLOOD BY AUTOMATED COUNT: 16.9 %
ERYTHROCYTE [DISTWIDTH] IN BLOOD BY AUTOMATED COUNT: 17.1 %
ERYTHROCYTE [DISTWIDTH] IN BLOOD BY AUTOMATED COUNT: 17.1 %
ERYTHROCYTE [DISTWIDTH] IN BLOOD BY AUTOMATED COUNT: 17.3 %
ERYTHROCYTE [DISTWIDTH] IN BLOOD BY AUTOMATED COUNT: 17.4 %
ERYTHROCYTE [DISTWIDTH] IN BLOOD BY AUTOMATED COUNT: 18.3 %
ERYTHROCYTE [DISTWIDTH] IN BLOOD BY AUTOMATED COUNT: 18.4 %
ERYTHROCYTE [DISTWIDTH] IN BLOOD BY AUTOMATED COUNT: 18.5 %
ERYTHROCYTE [DISTWIDTH] IN BLOOD BY AUTOMATED COUNT: 18.6 %
ERYTHROCYTE [DISTWIDTH] IN BLOOD BY AUTOMATED COUNT: 18.7 %
ERYTHROCYTE [DISTWIDTH] IN BLOOD BY AUTOMATED COUNT: 18.8 %
ERYTHROCYTE [DISTWIDTH] IN BLOOD BY AUTOMATED COUNT: 18.9 %
ERYTHROCYTE [DISTWIDTH] IN BLOOD BY AUTOMATED COUNT: 18.9 %
ERYTHROCYTE [DISTWIDTH] IN BLOOD BY AUTOMATED COUNT: 19 %
ERYTHROCYTE [DISTWIDTH] IN BLOOD BY AUTOMATED COUNT: 19.1 %
ERYTHROCYTE [DISTWIDTH] IN BLOOD BY AUTOMATED COUNT: 19.1 %
ERYTHROCYTE [DISTWIDTH] IN BLOOD BY AUTOMATED COUNT: 19.2 %
ERYTHROCYTE [DISTWIDTH] IN BLOOD BY AUTOMATED COUNT: 19.2 %
ERYTHROCYTE [DISTWIDTH] IN BLOOD BY AUTOMATED COUNT: 19.3 %
ERYTHROCYTE [DISTWIDTH] IN BLOOD BY AUTOMATED COUNT: 20.1 %
ERYTHROCYTE [DISTWIDTH] IN BLOOD BY AUTOMATED COUNT: 20.5 %
ERYTHROCYTE [DISTWIDTH] IN BLOOD BY AUTOMATED COUNT: 20.7 %
ERYTHROCYTE [DISTWIDTH] IN BLOOD BY AUTOMATED COUNT: 21 %
ERYTHROCYTE [DISTWIDTH] IN BLOOD BY AUTOMATED COUNT: 21.3 %
ERYTHROCYTE [DISTWIDTH] IN BLOOD BY AUTOMATED COUNT: 21.5 %
ERYTHROCYTE [DISTWIDTH] IN BLOOD BY AUTOMATED COUNT: 21.7 %
ERYTHROCYTE [DISTWIDTH] IN BLOOD BY AUTOMATED COUNT: 22 %
ERYTHROCYTE [DISTWIDTH] IN BLOOD BY AUTOMATED COUNT: 22.3 %
ERYTHROCYTE [DISTWIDTH] IN BLOOD BY AUTOMATED COUNT: 22.9 %
ERYTHROCYTE [DISTWIDTH] IN BLOOD BY AUTOMATED COUNT: 23.9 %
ERYTHROCYTE [DISTWIDTH] IN BLOOD BY AUTOMATED COUNT: 25.9 %
ERYTHROCYTE [SEDIMENTATION RATE] IN BLOOD BY WESTERGREN METHOD: 12 MM/H
ERYTHROCYTE [SEDIMENTATION RATE] IN BLOOD BY WESTERGREN METHOD: 17 MM/H
EST. GFR  (AFRICAN AMERICAN): 48.3 ML/MIN/1.73 M^2
EST. GFR  (AFRICAN AMERICAN): 55.2 ML/MIN/1.73 M^2
EST. GFR  (AFRICAN AMERICAN): 59.7 ML/MIN/1.73 M^2
EST. GFR  (AFRICAN AMERICAN): >60 ML/MIN/1.73 M^2
EST. GFR  (NON AFRICAN AMERICAN): 41.7 ML/MIN/1.73 M^2
EST. GFR  (NON AFRICAN AMERICAN): 47.8 ML/MIN/1.73 M^2
EST. GFR  (NON AFRICAN AMERICAN): 51.7 ML/MIN/1.73 M^2
EST. GFR  (NON AFRICAN AMERICAN): 52 ML/MIN/1.73 M^2
EST. GFR  (NON AFRICAN AMERICAN): 52 ML/MIN/1.73 M^2
EST. GFR  (NON AFRICAN AMERICAN): 56.2 ML/MIN/1.73 M^2
EST. GFR  (NON AFRICAN AMERICAN): 56.2 ML/MIN/1.73 M^2
EST. GFR  (NON AFRICAN AMERICAN): 56.6 ML/MIN/1.73 M^2
EST. GFR  (NON AFRICAN AMERICAN): >60 ML/MIN/1.73 M^2
ESTIMATED PA SYSTOLIC PRESSURE: 23.25
EXHR SPECIMEN TYPE: NORMAL
FACT X PPP CHRO-ACNC: <0.1 IU/ML
FACT X PPP CHRO-ACNC: <0.1 IU/ML
FAMLB SPECIMEN: NORMAL
FIBRINOGEN PPP-MCNC: 210 MG/DL
FIBRINOGEN PPP-MCNC: 284 MG/DL
FIBRINOGEN PPP-MCNC: 361 MG/DL
FINAL DIAGNOSIS - CHIMERISM  NO SORT: NORMAL
FINAL DIAGNOSIS - CHIMERISM SORT: NORMAL
FINAL DIAGNOSIS - CHIMERISM SORT: NORMAL
FIO2: 60
FLOW CYTOMETRY ANTIBODIES ANALYZED - BLOOD: NORMAL
FLOW CYTOMETRY ANTIBODIES ANALYZED - BONE MARROW: NORMAL
FLOW CYTOMETRY ANTIBODIES ANALYZED - BONE MARROW: NORMAL
FLOW CYTOMETRY ANTIBODIES ANALYZED - CSF: NORMAL
FLOW CYTOMETRY COMMENT - BLOOD: NORMAL
FLOW CYTOMETRY COMMENT - BONE MARROW: NORMAL
FLOW CYTOMETRY COMMENT - BONE MARROW: NORMAL
FLOW CYTOMETRY COMMENT - CSF: NORMAL
FLOW CYTOMETRY INTERPRETATION - BLOOD: NORMAL
FLOW CYTOMETRY INTERPRETATION - BONE MARROW: NORMAL
FLOW CYTOMETRY INTERPRETATION - BONE MARROW: NORMAL
FLOW CYTOMETRY INTERPRETATION - CSF: NORMAL
FLOW: 2
FLOW: 4
FLT3 RESULT: NORMAL
FLUAV AG SPEC QL IA: NEGATIVE
FLUBV AG SPEC QL IA: NEGATIVE
FUNGUS SPEC CULT: NORMAL
GALACTOMANNAN AG SERPL IA-ACNC: <0.5 INDEX
GALACTOMANNAN AG SPEC-ACNC: <0.5 INDEX
GIANT PLATELETS BLD QL SMEAR: PRESENT
GLUCOSE CSF-MCNC: 62 MG/DL
GLUCOSE FLD-MCNC: 102 MG/DL
GLUCOSE SERPL-MCNC: 100 MG/DL
GLUCOSE SERPL-MCNC: 100 MG/DL
GLUCOSE SERPL-MCNC: 101 MG/DL
GLUCOSE SERPL-MCNC: 101 MG/DL
GLUCOSE SERPL-MCNC: 102 MG/DL
GLUCOSE SERPL-MCNC: 103 MG/DL
GLUCOSE SERPL-MCNC: 105 MG/DL
GLUCOSE SERPL-MCNC: 105 MG/DL
GLUCOSE SERPL-MCNC: 106 MG/DL
GLUCOSE SERPL-MCNC: 106 MG/DL
GLUCOSE SERPL-MCNC: 107 MG/DL
GLUCOSE SERPL-MCNC: 108 MG/DL
GLUCOSE SERPL-MCNC: 108 MG/DL
GLUCOSE SERPL-MCNC: 109 MG/DL
GLUCOSE SERPL-MCNC: 109 MG/DL
GLUCOSE SERPL-MCNC: 110 MG/DL
GLUCOSE SERPL-MCNC: 111 MG/DL
GLUCOSE SERPL-MCNC: 112 MG/DL
GLUCOSE SERPL-MCNC: 113 MG/DL
GLUCOSE SERPL-MCNC: 113 MG/DL
GLUCOSE SERPL-MCNC: 114 MG/DL
GLUCOSE SERPL-MCNC: 115 MG/DL
GLUCOSE SERPL-MCNC: 118 MG/DL
GLUCOSE SERPL-MCNC: 119 MG/DL
GLUCOSE SERPL-MCNC: 120 MG/DL
GLUCOSE SERPL-MCNC: 120 MG/DL
GLUCOSE SERPL-MCNC: 123 MG/DL
GLUCOSE SERPL-MCNC: 123 MG/DL
GLUCOSE SERPL-MCNC: 129 MG/DL
GLUCOSE SERPL-MCNC: 131 MG/DL
GLUCOSE SERPL-MCNC: 131 MG/DL
GLUCOSE SERPL-MCNC: 132 MG/DL
GLUCOSE SERPL-MCNC: 133 MG/DL
GLUCOSE SERPL-MCNC: 136 MG/DL
GLUCOSE SERPL-MCNC: 137 MG/DL
GLUCOSE SERPL-MCNC: 143 MG/DL
GLUCOSE SERPL-MCNC: 149 MG/DL
GLUCOSE SERPL-MCNC: 154 MG/DL
GLUCOSE SERPL-MCNC: 155 MG/DL
GLUCOSE SERPL-MCNC: 155 MG/DL
GLUCOSE SERPL-MCNC: 162 MG/DL
GLUCOSE SERPL-MCNC: 162 MG/DL
GLUCOSE SERPL-MCNC: 165 MG/DL
GLUCOSE SERPL-MCNC: 165 MG/DL
GLUCOSE SERPL-MCNC: 175 MG/DL
GLUCOSE SERPL-MCNC: 175 MG/DL
GLUCOSE SERPL-MCNC: 177 MG/DL
GLUCOSE SERPL-MCNC: 180 MG/DL
GLUCOSE SERPL-MCNC: 190 MG/DL
GLUCOSE SERPL-MCNC: 226 MG/DL
GLUCOSE SERPL-MCNC: 256 MG/DL
GLUCOSE SERPL-MCNC: 269 MG/DL
GLUCOSE SERPL-MCNC: 290 MG/DL
GLUCOSE SERPL-MCNC: 310 MG/DL
GLUCOSE SERPL-MCNC: 312 MG/DL
GLUCOSE SERPL-MCNC: 417 MG/DL
GLUCOSE SERPL-MCNC: 81 MG/DL
GLUCOSE SERPL-MCNC: 82 MG/DL
GLUCOSE SERPL-MCNC: 82 MG/DL
GLUCOSE SERPL-MCNC: 84 MG/DL
GLUCOSE SERPL-MCNC: 844 MG/DL
GLUCOSE SERPL-MCNC: 863 MG/DL
GLUCOSE SERPL-MCNC: 88 MG/DL
GLUCOSE SERPL-MCNC: 89 MG/DL
GLUCOSE SERPL-MCNC: 89 MG/DL
GLUCOSE SERPL-MCNC: 91 MG/DL
GLUCOSE SERPL-MCNC: 92 MG/DL
GLUCOSE SERPL-MCNC: 94 MG/DL
GLUCOSE SERPL-MCNC: 95 MG/DL
GLUCOSE SERPL-MCNC: 96 MG/DL
GLUCOSE SERPL-MCNC: 96 MG/DL
GLUCOSE SERPL-MCNC: 97 MG/DL
GLUCOSE SERPL-MCNC: 98 MG/DL
GLUCOSE SERPL-MCNC: 99 MG/DL
GLUCOSE SERPL-MCNC: 99 MG/DL
GLUCOSE UR QL STRIP: ABNORMAL
GLUCOSE UR QL STRIP: NEGATIVE
GRAM STN SPEC: NORMAL
GRAN CASTS UR QL COMP ASSIST: 1 /LPF
HAPTOGLOB SERPL-MCNC: 244 MG/DL
HCO3 UR-SCNC: 18.1 MMOL/L (ref 24–28)
HCO3 UR-SCNC: 18.3 MMOL/L (ref 24–28)
HCO3 UR-SCNC: 22.9 MMOL/L (ref 24–28)
HCO3 UR-SCNC: 9.4 MMOL/L (ref 24–28)
HCT VFR BLD AUTO: 16.1 %
HCT VFR BLD AUTO: 16.6 %
HCT VFR BLD AUTO: 17.8 %
HCT VFR BLD AUTO: 18 %
HCT VFR BLD AUTO: 18 %
HCT VFR BLD AUTO: 18.1 %
HCT VFR BLD AUTO: 18.2 %
HCT VFR BLD AUTO: 18.3 %
HCT VFR BLD AUTO: 18.3 %
HCT VFR BLD AUTO: 18.7 %
HCT VFR BLD AUTO: 19.3 %
HCT VFR BLD AUTO: 19.4 %
HCT VFR BLD AUTO: 19.5 %
HCT VFR BLD AUTO: 19.5 %
HCT VFR BLD AUTO: 19.6 %
HCT VFR BLD AUTO: 19.7 %
HCT VFR BLD AUTO: 19.7 %
HCT VFR BLD AUTO: 20.2 %
HCT VFR BLD AUTO: 20.3 %
HCT VFR BLD AUTO: 20.4 %
HCT VFR BLD AUTO: 20.6 %
HCT VFR BLD AUTO: 20.8 %
HCT VFR BLD AUTO: 21 %
HCT VFR BLD AUTO: 21.1 %
HCT VFR BLD AUTO: 21.1 %
HCT VFR BLD AUTO: 21.3 %
HCT VFR BLD AUTO: 21.4 %
HCT VFR BLD AUTO: 21.4 %
HCT VFR BLD AUTO: 21.5 %
HCT VFR BLD AUTO: 21.5 %
HCT VFR BLD AUTO: 21.6 %
HCT VFR BLD AUTO: 21.7 %
HCT VFR BLD AUTO: 22 %
HCT VFR BLD AUTO: 22.1 %
HCT VFR BLD AUTO: 22.2 %
HCT VFR BLD AUTO: 22.7 %
HCT VFR BLD AUTO: 22.8 %
HCT VFR BLD AUTO: 23.1 %
HCT VFR BLD AUTO: 23.1 %
HCT VFR BLD AUTO: 23.3 %
HCT VFR BLD AUTO: 23.3 %
HCT VFR BLD AUTO: 23.7 %
HCT VFR BLD AUTO: 23.9 %
HCT VFR BLD AUTO: 24.1 %
HCT VFR BLD AUTO: 24.2 %
HCT VFR BLD AUTO: 24.5 %
HCT VFR BLD AUTO: 24.7 %
HCT VFR BLD AUTO: 25 %
HCT VFR BLD AUTO: 25.1 %
HCT VFR BLD AUTO: 25.2 %
HCT VFR BLD AUTO: 25.3 %
HCT VFR BLD AUTO: 25.8 %
HCT VFR BLD AUTO: 25.9 %
HCT VFR BLD AUTO: 25.9 %
HCT VFR BLD AUTO: 26 %
HCT VFR BLD AUTO: 26 %
HCT VFR BLD AUTO: 26.1 %
HCT VFR BLD AUTO: 26.5 %
HCT VFR BLD AUTO: 26.8 %
HCT VFR BLD AUTO: 28.1 %
HCT VFR BLD AUTO: 28.3 %
HCT VFR BLD AUTO: 28.5 %
HCT VFR BLD AUTO: 29.4 %
HCT VFR BLD AUTO: 30 %
HCT VFR BLD AUTO: 30.1 %
HCT VFR BLD AUTO: 30.9 %
HCT VFR BLD AUTO: 30.9 %
HCT VFR BLD AUTO: 31.1 %
HCT VFR BLD AUTO: 31.3 %
HCT VFR BLD AUTO: 31.6 %
HCT VFR BLD AUTO: 31.8 %
HCT VFR BLD AUTO: 31.9 %
HCT VFR BLD AUTO: 32.2 %
HCT VFR BLD AUTO: 34.2 %
HCT VFR BLD AUTO: 34.7 %
HCT VFR BLD AUTO: 35.1 %
HCT VFR BLD AUTO: 35.5 %
HCT VFR BLD AUTO: 36.2 %
HCT VFR BLD AUTO: 36.3 %
HCT VFR BLD AUTO: 36.6 %
HCT VFR BLD AUTO: 36.8 %
HCT VFR BLD AUTO: 36.9 %
HCT VFR BLD AUTO: 36.9 %
HCT VFR BLD AUTO: 38 %
HCT VFR BLD AUTO: 39.2 %
HCT VFR BLD AUTO: 39.8 %
HCT VFR BLD AUTO: 40.6 %
HCT VFR BLD AUTO: 42.3 %
HGB BLD-MCNC: 10 G/DL
HGB BLD-MCNC: 10.1 G/DL
HGB BLD-MCNC: 10.3 G/DL
HGB BLD-MCNC: 10.4 G/DL
HGB BLD-MCNC: 10.6 G/DL
HGB BLD-MCNC: 11.1 G/DL
HGB BLD-MCNC: 11.3 G/DL
HGB BLD-MCNC: 11.7 G/DL
HGB BLD-MCNC: 11.8 G/DL
HGB BLD-MCNC: 12.1 G/DL
HGB BLD-MCNC: 12.1 G/DL
HGB BLD-MCNC: 12.2 G/DL
HGB BLD-MCNC: 12.8 G/DL
HGB BLD-MCNC: 12.8 G/DL
HGB BLD-MCNC: 12.9 G/DL
HGB BLD-MCNC: 13.2 G/DL
HGB BLD-MCNC: 13.9 G/DL
HGB BLD-MCNC: 14.1 G/DL
HGB BLD-MCNC: 5.6 G/DL
HGB BLD-MCNC: 5.7 G/DL
HGB BLD-MCNC: 6.1 G/DL
HGB BLD-MCNC: 6.1 G/DL
HGB BLD-MCNC: 6.2 G/DL
HGB BLD-MCNC: 6.2 G/DL
HGB BLD-MCNC: 6.3 G/DL
HGB BLD-MCNC: 6.3 G/DL
HGB BLD-MCNC: 6.4 G/DL
HGB BLD-MCNC: 6.5 G/DL
HGB BLD-MCNC: 6.5 G/DL
HGB BLD-MCNC: 6.6 G/DL
HGB BLD-MCNC: 6.7 G/DL
HGB BLD-MCNC: 6.8 G/DL
HGB BLD-MCNC: 6.8 G/DL
HGB BLD-MCNC: 6.9 G/DL
HGB BLD-MCNC: 7 G/DL
HGB BLD-MCNC: 7.1 G/DL
HGB BLD-MCNC: 7.3 G/DL
HGB BLD-MCNC: 7.4 G/DL
HGB BLD-MCNC: 7.5 G/DL
HGB BLD-MCNC: 7.6 G/DL
HGB BLD-MCNC: 7.6 G/DL
HGB BLD-MCNC: 7.7 G/DL
HGB BLD-MCNC: 7.8 G/DL
HGB BLD-MCNC: 7.8 G/DL
HGB BLD-MCNC: 7.9 G/DL
HGB BLD-MCNC: 7.9 G/DL
HGB BLD-MCNC: 8 G/DL
HGB BLD-MCNC: 8 G/DL
HGB BLD-MCNC: 8.1 G/DL
HGB BLD-MCNC: 8.2 G/DL
HGB BLD-MCNC: 8.2 G/DL
HGB BLD-MCNC: 8.3 G/DL
HGB BLD-MCNC: 8.4 G/DL
HGB BLD-MCNC: 8.4 G/DL
HGB BLD-MCNC: 8.5 G/DL
HGB BLD-MCNC: 8.6 G/DL
HGB BLD-MCNC: 8.7 G/DL
HGB BLD-MCNC: 8.8 G/DL
HGB BLD-MCNC: 8.8 G/DL
HGB BLD-MCNC: 8.9 G/DL
HGB BLD-MCNC: 9.1 G/DL
HGB BLD-MCNC: 9.1 G/DL
HGB BLD-MCNC: 9.3 G/DL
HGB BLD-MCNC: 9.5 G/DL
HGB BLD-MCNC: 9.9 G/DL
HGB BLD-MCNC: 9.9 G/DL
HGB C CRY RBC QL MICRO: ABNORMAL
HGB UR QL STRIP: ABNORMAL
HISTOPLASMA AG VALUE: 0 NG/ML
HISTOPLASMA AG VALUE: 0 NG/ML
HISTOPLASMA ANTIGEN URINE: NEGATIVE
HISTOPLASMA ANTIGEN URINE: NEGATIVE
HIV UQ DATE RECEIVED: NORMAL
HIV UQ DATE REPORTED: NORMAL
HIV1 RNA # SERPL NAA+PROBE: <40 COPIES/ML
HIV1 RNA SERPL NAA+PROBE-LOG#: <1.6 LOG (10) COPIES/ML
HIV1 RNA SERPL QL NAA+PROBE: NOT DETECTED
HUMAN BOCAVIRUS: NOT DETECTED
HUMAN CORONAVIRUS, COMMON COLD VIRUS: NOT DETECTED
HYALINE CASTS UR QL AUTO: 0 /LPF
HYALINE CASTS UR QL AUTO: 1 /LPF
HYALINE CASTS UR QL AUTO: 3 /LPF
HYPOCHROMIA BLD QL SMEAR: ABNORMAL
INFLUENZA A - H1N1-09: NOT DETECTED
INR PPP: 1
INR PPP: 1.1
INR PPP: 1.2
INR PPP: 1.3
INR PPP: 1.4
INR PPP: 4.8
INR PPP: 5.1
INR PPP: 5.7
INR PPP: 7.3
INR PPP: 8.2
IP: 12
ITRACONAZ SERPL-MCNC: 0.6 MCG/ML
ITRACONAZ SERPL-MCNC: 0.6 MCG/ML
KARYOTYP MAR: NORMAL
KARYOTYP MAR: NORMAL
KETONES UR QL STRIP: ABNORMAL
KETONES UR QL STRIP: ABNORMAL
KETONES UR QL STRIP: NEGATIVE
KOH PREP SPEC: NORMAL
L PNEUMO AG UR QL IA: NOT DETECTED
LACTATE SERPL-SCNC: 0.6 MMOL/L
LACTATE SERPL-SCNC: 0.9 MMOL/L
LACTATE SERPL-SCNC: 1.4 MMOL/L
LACTATE SERPL-SCNC: 1.4 MMOL/L
LACTATE SERPL-SCNC: 1.5 MMOL/L
LACTATE SERPL-SCNC: 2.2 MMOL/L
LACTATE SERPL-SCNC: 3.4 MMOL/L
LDH FLD L TO P-CCNC: 142 U/L
LDH FLD L TO P-CCNC: 170 U/L
LDH FLD L TO P-CCNC: 29 U/L
LDH SERPL L TO P-CCNC: 1027 U/L
LDH SERPL L TO P-CCNC: 1176 U/L
LDH SERPL L TO P-CCNC: 139 U/L
LDH SERPL L TO P-CCNC: 142 U/L
LDH SERPL L TO P-CCNC: 1434 U/L
LDH SERPL L TO P-CCNC: 156 U/L
LDH SERPL L TO P-CCNC: 157 U/L
LDH SERPL L TO P-CCNC: 174 U/L
LDH SERPL L TO P-CCNC: 178 U/L
LDH SERPL L TO P-CCNC: 178 U/L
LDH SERPL L TO P-CCNC: 2.96 MMOL/L (ref 0.36–1.25)
LDH SERPL L TO P-CCNC: 2000 U/L
LDH SERPL L TO P-CCNC: 2151 U/L
LDH SERPL L TO P-CCNC: 236 U/L
LDH SERPL L TO P-CCNC: 307 U/L
LDH SERPL L TO P-CCNC: 355 U/L
LDH SERPL L TO P-CCNC: 355 U/L
LDH SERPL L TO P-CCNC: 3553 U/L
LDH SERPL L TO P-CCNC: 405 U/L
LDH SERPL L TO P-CCNC: 4291 U/L
LDH SERPL L TO P-CCNC: 462 U/L
LDH SERPL L TO P-CCNC: 514 U/L
LDH SERPL L TO P-CCNC: 684 U/L
LEUKOCYTE ESTERASE UR QL STRIP: NEGATIVE
LYMPHOCYTES # BLD AUTO: 0 K/UL
LYMPHOCYTES # BLD AUTO: 0.1 K/UL
LYMPHOCYTES # BLD AUTO: 0.2 K/UL
LYMPHOCYTES # BLD AUTO: 0.2 K/UL
LYMPHOCYTES # BLD AUTO: 0.3 K/UL
LYMPHOCYTES # BLD AUTO: 0.4 K/UL
LYMPHOCYTES # BLD AUTO: 0.4 K/UL
LYMPHOCYTES # BLD AUTO: 0.5 K/UL
LYMPHOCYTES # BLD AUTO: 0.6 K/UL
LYMPHOCYTES # BLD AUTO: 1 K/UL
LYMPHOCYTES # BLD AUTO: 1.2 K/UL
LYMPHOCYTES # BLD AUTO: 1.3 K/UL
LYMPHOCYTES # BLD AUTO: 1.7 K/UL
LYMPHOCYTES # BLD AUTO: 1.7 K/UL
LYMPHOCYTES # BLD AUTO: 1.8 K/UL
LYMPHOCYTES # BLD AUTO: 1.9 K/UL
LYMPHOCYTES # BLD AUTO: ABNORMAL K/UL
LYMPHOCYTES NFR BLD: 0 %
LYMPHOCYTES NFR BLD: 1 %
LYMPHOCYTES NFR BLD: 10 %
LYMPHOCYTES NFR BLD: 100 %
LYMPHOCYTES NFR BLD: 11.8 %
LYMPHOCYTES NFR BLD: 12 %
LYMPHOCYTES NFR BLD: 12 %
LYMPHOCYTES NFR BLD: 12.1 %
LYMPHOCYTES NFR BLD: 13 %
LYMPHOCYTES NFR BLD: 13 %
LYMPHOCYTES NFR BLD: 13.2 %
LYMPHOCYTES NFR BLD: 15.5 %
LYMPHOCYTES NFR BLD: 15.6 %
LYMPHOCYTES NFR BLD: 16.7 %
LYMPHOCYTES NFR BLD: 16.8 %
LYMPHOCYTES NFR BLD: 19.7 %
LYMPHOCYTES NFR BLD: 2 %
LYMPHOCYTES NFR BLD: 20 %
LYMPHOCYTES NFR BLD: 21.3 %
LYMPHOCYTES NFR BLD: 21.8 %
LYMPHOCYTES NFR BLD: 25 %
LYMPHOCYTES NFR BLD: 26.3 %
LYMPHOCYTES NFR BLD: 28.6 %
LYMPHOCYTES NFR BLD: 3 %
LYMPHOCYTES NFR BLD: 30 %
LYMPHOCYTES NFR BLD: 30.5 %
LYMPHOCYTES NFR BLD: 31.7 %
LYMPHOCYTES NFR BLD: 32.3 %
LYMPHOCYTES NFR BLD: 33 %
LYMPHOCYTES NFR BLD: 33.3 %
LYMPHOCYTES NFR BLD: 36 %
LYMPHOCYTES NFR BLD: 37 %
LYMPHOCYTES NFR BLD: 39 %
LYMPHOCYTES NFR BLD: 39 %
LYMPHOCYTES NFR BLD: 4 %
LYMPHOCYTES NFR BLD: 43 %
LYMPHOCYTES NFR BLD: 44 %
LYMPHOCYTES NFR BLD: 47 %
LYMPHOCYTES NFR BLD: 5 %
LYMPHOCYTES NFR BLD: 5 %
LYMPHOCYTES NFR BLD: 5.8 %
LYMPHOCYTES NFR BLD: 50 %
LYMPHOCYTES NFR BLD: 53 %
LYMPHOCYTES NFR BLD: 54 %
LYMPHOCYTES NFR BLD: 57.1 %
LYMPHOCYTES NFR BLD: 57.1 %
LYMPHOCYTES NFR BLD: 58 %
LYMPHOCYTES NFR BLD: 6 %
LYMPHOCYTES NFR BLD: 60 %
LYMPHOCYTES NFR BLD: 66 %
LYMPHOCYTES NFR BLD: 67.6 %
LYMPHOCYTES NFR BLD: 69 %
LYMPHOCYTES NFR BLD: 7 %
LYMPHOCYTES NFR BLD: 72 %
LYMPHOCYTES NFR BLD: 75 %
LYMPHOCYTES NFR BLD: 75.9 %
LYMPHOCYTES NFR BLD: 77.8 %
LYMPHOCYTES NFR BLD: 8 %
LYMPHOCYTES NFR BLD: 8.6 %
LYMPHOCYTES NFR BLD: 80 %
LYMPHOCYTES NFR BLD: 80.6 %
LYMPHOCYTES NFR BLD: 85 %
LYMPHOCYTES NFR BLD: 85 %
LYMPHOCYTES NFR BLD: 87.5 %
LYMPHOCYTES NFR BLD: 9 %
LYMPHOCYTES NFR BLD: 92.2 %
LYMPHOCYTES NFR BLD: 93.9 %
LYMPHOCYTES NFR BLD: 94.2 %
LYMPHOCYTES NFR BLD: 96.4 %
LYMPHOCYTES NFR CSF MANUAL: 96 %
LYMPHOCYTES NFR FLD MANUAL: 5 %
LYMPHOCYTES NFR FLD MANUAL: 7 %
LYMPHOCYTES NFR FLD MANUAL: 7 %
MAGNESIUM SERPL-MCNC: 1.2 MG/DL
MAGNESIUM SERPL-MCNC: 1.3 MG/DL
MAGNESIUM SERPL-MCNC: 1.4 MG/DL
MAGNESIUM SERPL-MCNC: 1.4 MG/DL
MAGNESIUM SERPL-MCNC: 1.5 MG/DL
MAGNESIUM SERPL-MCNC: 1.6 MG/DL
MAGNESIUM SERPL-MCNC: 1.7 MG/DL
MAGNESIUM SERPL-MCNC: 1.8 MG/DL
MAGNESIUM SERPL-MCNC: 1.9 MG/DL
MAGNESIUM SERPL-MCNC: 2 MG/DL
MAGNESIUM SERPL-MCNC: 2.1 MG/DL
MAGNESIUM SERPL-MCNC: 2.2 MG/DL
MAGNESIUM SERPL-MCNC: 2.5 MG/DL
MAGNESIUM SERPL-MCNC: 2.6 MG/DL
MAGNESIUM SERPL-MCNC: 2.6 MG/DL
MAGNESIUM SERPL-MCNC: 2.9 MG/DL
MAGNESIUM SERPL-MCNC: 3.2 MG/DL
MCH RBC QN AUTO: 28.8 PG
MCH RBC QN AUTO: 29 PG
MCH RBC QN AUTO: 29.2 PG
MCH RBC QN AUTO: 29.2 PG
MCH RBC QN AUTO: 29.3 PG
MCH RBC QN AUTO: 29.4 PG
MCH RBC QN AUTO: 29.4 PG
MCH RBC QN AUTO: 29.6 PG
MCH RBC QN AUTO: 29.6 PG
MCH RBC QN AUTO: 29.7 PG
MCH RBC QN AUTO: 29.8 PG
MCH RBC QN AUTO: 29.9 PG
MCH RBC QN AUTO: 29.9 PG
MCH RBC QN AUTO: 30 PG
MCH RBC QN AUTO: 30 PG
MCH RBC QN AUTO: 30.1 PG
MCH RBC QN AUTO: 30.2 PG
MCH RBC QN AUTO: 30.3 PG
MCH RBC QN AUTO: 30.3 PG
MCH RBC QN AUTO: 30.4 PG
MCH RBC QN AUTO: 30.5 PG
MCH RBC QN AUTO: 30.6 PG
MCH RBC QN AUTO: 30.7 PG
MCH RBC QN AUTO: 30.8 PG
MCH RBC QN AUTO: 30.9 PG
MCH RBC QN AUTO: 31 PG
MCH RBC QN AUTO: 31 PG
MCH RBC QN AUTO: 31.1 PG
MCH RBC QN AUTO: 31.2 PG
MCH RBC QN AUTO: 31.3 PG
MCH RBC QN AUTO: 31.4 PG
MCH RBC QN AUTO: 31.5 PG
MCH RBC QN AUTO: 31.5 PG
MCH RBC QN AUTO: 31.6 PG
MCH RBC QN AUTO: 31.7 PG
MCH RBC QN AUTO: 31.7 PG
MCH RBC QN AUTO: 31.8 PG
MCH RBC QN AUTO: 31.8 PG
MCH RBC QN AUTO: 31.9 PG
MCH RBC QN AUTO: 32 PG
MCH RBC QN AUTO: 32.1 PG
MCH RBC QN AUTO: 32.2 PG
MCH RBC QN AUTO: 32.3 PG
MCH RBC QN AUTO: 32.3 PG
MCH RBC QN AUTO: 32.4 PG
MCH RBC QN AUTO: 32.5 PG
MCH RBC QN AUTO: 32.5 PG
MCH RBC QN AUTO: 32.6 PG
MCH RBC QN AUTO: 32.7 PG
MCH RBC QN AUTO: 33.3 PG
MCHC RBC AUTO-ENTMCNC: 31.7 G/DL
MCHC RBC AUTO-ENTMCNC: 32 G/DL
MCHC RBC AUTO-ENTMCNC: 32.2 %
MCHC RBC AUTO-ENTMCNC: 32.2 G/DL
MCHC RBC AUTO-ENTMCNC: 32.3 G/DL
MCHC RBC AUTO-ENTMCNC: 32.4 G/DL
MCHC RBC AUTO-ENTMCNC: 32.4 G/DL
MCHC RBC AUTO-ENTMCNC: 32.6 G/DL
MCHC RBC AUTO-ENTMCNC: 32.6 G/DL
MCHC RBC AUTO-ENTMCNC: 32.7 %
MCHC RBC AUTO-ENTMCNC: 32.7 G/DL
MCHC RBC AUTO-ENTMCNC: 32.7 G/DL
MCHC RBC AUTO-ENTMCNC: 32.8 G/DL
MCHC RBC AUTO-ENTMCNC: 33 G/DL
MCHC RBC AUTO-ENTMCNC: 33.2 %
MCHC RBC AUTO-ENTMCNC: 33.3 %
MCHC RBC AUTO-ENTMCNC: 33.3 G/DL
MCHC RBC AUTO-ENTMCNC: 33.4 G/DL
MCHC RBC AUTO-ENTMCNC: 33.5 G/DL
MCHC RBC AUTO-ENTMCNC: 33.5 G/DL
MCHC RBC AUTO-ENTMCNC: 33.6 %
MCHC RBC AUTO-ENTMCNC: 33.6 G/DL
MCHC RBC AUTO-ENTMCNC: 33.6 G/DL
MCHC RBC AUTO-ENTMCNC: 33.7 %
MCHC RBC AUTO-ENTMCNC: 33.7 %
MCHC RBC AUTO-ENTMCNC: 33.7 G/DL
MCHC RBC AUTO-ENTMCNC: 33.7 G/DL
MCHC RBC AUTO-ENTMCNC: 33.8 %
MCHC RBC AUTO-ENTMCNC: 33.8 %
MCHC RBC AUTO-ENTMCNC: 33.8 G/DL
MCHC RBC AUTO-ENTMCNC: 33.8 G/DL
MCHC RBC AUTO-ENTMCNC: 33.9 %
MCHC RBC AUTO-ENTMCNC: 33.9 G/DL
MCHC RBC AUTO-ENTMCNC: 33.9 G/DL
MCHC RBC AUTO-ENTMCNC: 34 %
MCHC RBC AUTO-ENTMCNC: 34 G/DL
MCHC RBC AUTO-ENTMCNC: 34.1 %
MCHC RBC AUTO-ENTMCNC: 34.1 G/DL
MCHC RBC AUTO-ENTMCNC: 34.2 %
MCHC RBC AUTO-ENTMCNC: 34.3 %
MCHC RBC AUTO-ENTMCNC: 34.3 G/DL
MCHC RBC AUTO-ENTMCNC: 34.4 %
MCHC RBC AUTO-ENTMCNC: 34.4 %
MCHC RBC AUTO-ENTMCNC: 34.4 G/DL
MCHC RBC AUTO-ENTMCNC: 34.4 G/DL
MCHC RBC AUTO-ENTMCNC: 34.5 %
MCHC RBC AUTO-ENTMCNC: 34.5 G/DL
MCHC RBC AUTO-ENTMCNC: 34.6 %
MCHC RBC AUTO-ENTMCNC: 34.6 %
MCHC RBC AUTO-ENTMCNC: 34.6 G/DL
MCHC RBC AUTO-ENTMCNC: 34.7 G/DL
MCHC RBC AUTO-ENTMCNC: 34.8 %
MCHC RBC AUTO-ENTMCNC: 34.8 G/DL
MCHC RBC AUTO-ENTMCNC: 34.9 %
MCHC RBC AUTO-ENTMCNC: 35 %
MCHC RBC AUTO-ENTMCNC: 35 G/DL
MCHC RBC AUTO-ENTMCNC: 35.1 %
MCHC RBC AUTO-ENTMCNC: 35.1 %
MCHC RBC AUTO-ENTMCNC: 35.1 G/DL
MCHC RBC AUTO-ENTMCNC: 35.3 %
MCHC RBC AUTO-ENTMCNC: 35.3 G/DL
MCHC RBC AUTO-ENTMCNC: 35.5 G/DL
MCHC RBC AUTO-ENTMCNC: 35.6 G/DL
MCHC RBC AUTO-ENTMCNC: 35.8 %
MCHC RBC AUTO-ENTMCNC: 35.8 G/DL
MCHC RBC AUTO-ENTMCNC: 35.9 %
MCHC RBC AUTO-ENTMCNC: 36.3 G/DL
MCHC RBC AUTO-ENTMCNC: 37.1 G/DL
MCV RBC AUTO: 101 FL
MCV RBC AUTO: 85 FL
MCV RBC AUTO: 86 FL
MCV RBC AUTO: 87 FL
MCV RBC AUTO: 88 FL
MCV RBC AUTO: 89 FL
MCV RBC AUTO: 90 FL
MCV RBC AUTO: 91 FL
MCV RBC AUTO: 92 FL
MCV RBC AUTO: 93 FL
MCV RBC AUTO: 94 FL
MCV RBC AUTO: 95 FL
MCV RBC AUTO: 97 FL
MCV RBC AUTO: 97 FL
MCV RBC AUTO: 99 FL
MEGAKARYOCYTIC FRAGMENTS: ABNORMAL
MEGAKARYOCYTIC FRAGMENTS: ABNORMAL
MESOTHL CELL NFR FLD MANUAL: 59 %
MESOTHL CELL NFR FLD MANUAL: 60 %
METAMYELOCYTES NFR BLD MANUAL: 0.5 %
METAMYELOCYTES NFR BLD MANUAL: 1 %
METAMYELOCYTES NFR BLD MANUAL: 10 %
METAMYELOCYTES NFR BLD MANUAL: 12 %
METAMYELOCYTES NFR BLD MANUAL: 2 %
METAMYELOCYTES NFR BLD MANUAL: 22 %
METAMYELOCYTES NFR BLD MANUAL: 3 %
METAMYELOCYTES NFR BLD MANUAL: 3 %
METAMYELOCYTES NFR BLD MANUAL: 31 %
METAMYELOCYTES NFR BLD MANUAL: 4 %
METAMYELOCYTES NFR BLD MANUAL: 5 %
MICROSCOPIC COMMENT: ABNORMAL
MICROSCOPIC COMMENT: ABNORMAL
MICROSCOPIC COMMENT: NORMAL
MISCELLANEOUS TEST NAME: NORMAL
MITOGEN NIL: 0.19 IU/ML
MITRAL VALVE MOBILITY: NORMAL
MODE: ABNORMAL
MONOCYTES # BLD AUTO: 0 K/UL
MONOCYTES # BLD AUTO: 0.1 K/UL
MONOCYTES # BLD AUTO: 0.2 K/UL
MONOCYTES # BLD AUTO: 0.4 K/UL
MONOCYTES # BLD AUTO: 0.4 K/UL
MONOCYTES # BLD AUTO: 0.5 K/UL
MONOCYTES # BLD AUTO: 0.7 K/UL
MONOCYTES # BLD AUTO: 0.7 K/UL
MONOCYTES # BLD AUTO: 0.9 K/UL
MONOCYTES # BLD AUTO: 0.9 K/UL
MONOCYTES # BLD AUTO: 1.2 K/UL
MONOCYTES # BLD AUTO: 1.3 K/UL
MONOCYTES # BLD AUTO: ABNORMAL K/UL
MONOCYTES NFR BLD: 0 %
MONOCYTES NFR BLD: 1 %
MONOCYTES NFR BLD: 1.9 %
MONOCYTES NFR BLD: 10 %
MONOCYTES NFR BLD: 10.3 %
MONOCYTES NFR BLD: 10.5 %
MONOCYTES NFR BLD: 10.8 %
MONOCYTES NFR BLD: 11 %
MONOCYTES NFR BLD: 12.7 %
MONOCYTES NFR BLD: 14 %
MONOCYTES NFR BLD: 14.7 %
MONOCYTES NFR BLD: 15 %
MONOCYTES NFR BLD: 15.9 %
MONOCYTES NFR BLD: 16.7 %
MONOCYTES NFR BLD: 17 %
MONOCYTES NFR BLD: 18 %
MONOCYTES NFR BLD: 2 %
MONOCYTES NFR BLD: 2.4 %
MONOCYTES NFR BLD: 2.9 %
MONOCYTES NFR BLD: 20 %
MONOCYTES NFR BLD: 20 %
MONOCYTES NFR BLD: 22.2 %
MONOCYTES NFR BLD: 25 %
MONOCYTES NFR BLD: 3 %
MONOCYTES NFR BLD: 3.1 %
MONOCYTES NFR BLD: 33 %
MONOCYTES NFR BLD: 33.3 %
MONOCYTES NFR BLD: 4 %
MONOCYTES NFR BLD: 5 %
MONOCYTES NFR BLD: 50 %
MONOCYTES NFR BLD: 50 %
MONOCYTES NFR BLD: 6 %
MONOCYTES NFR BLD: 6.5 %
MONOCYTES NFR BLD: 6.5 %
MONOCYTES NFR BLD: 6.7 %
MONOCYTES NFR BLD: 7.4 %
MONOCYTES NFR BLD: 7.4 %
MONOCYTES NFR BLD: 7.5 %
MONOCYTES NFR BLD: 8 %
MONOCYTES NFR BLD: 8 %
MONOCYTES NFR BLD: 8.3 %
MONOCYTES NFR BLD: 8.6 %
MONOCYTES NFR BLD: 9 %
MONOCYTES NFR BLD: 9.5 %
MONOS+MACROS NFR CSF MANUAL: 1 %
MONOS+MACROS NFR FLD MANUAL: 33 %
MONOS+MACROS NFR FLD MANUAL: 35 %
MONOS+MACROS NFR FLD MANUAL: 92 %
MYCOBACTERIUM SPEC QL CULT: NORMAL
MYELOCYTES NFR BLD MANUAL: 1 %
MYELOCYTES NFR BLD MANUAL: 1.4 %
MYELOCYTES NFR BLD MANUAL: 10 %
MYELOCYTES NFR BLD MANUAL: 10 %
MYELOCYTES NFR BLD MANUAL: 15 %
MYELOCYTES NFR BLD MANUAL: 18 %
MYELOCYTES NFR BLD MANUAL: 21 %
MYELOCYTES NFR BLD MANUAL: 3 %
MYELOCYTES NFR BLD MANUAL: 4 %
MYELOCYTES NFR BLD MANUAL: 4 %
MYELOCYTES NFR BLD MANUAL: 4.5 %
MYELOCYTES NFR BLD MANUAL: 5 %
MYELOCYTES NFR BLD MANUAL: 5.7 %
MYELOCYTES NFR BLD MANUAL: 7 %
NEUTROPHILS # BLD AUTO: 0 K/UL
NEUTROPHILS # BLD AUTO: 0.1 K/UL
NEUTROPHILS # BLD AUTO: 0.9 K/UL
NEUTROPHILS # BLD AUTO: 1.6 K/UL
NEUTROPHILS # BLD AUTO: 2.4 K/UL
NEUTROPHILS # BLD AUTO: 3.6 K/UL
NEUTROPHILS # BLD AUTO: 4.1 K/UL
NEUTROPHILS # BLD AUTO: 4.4 K/UL
NEUTROPHILS # BLD AUTO: 4.8 K/UL
NEUTROPHILS # BLD AUTO: 5 K/UL
NEUTROPHILS # BLD AUTO: 7.4 K/UL
NEUTROPHILS # BLD AUTO: 9.6 K/UL
NEUTROPHILS # BLD AUTO: 9.9 K/UL
NEUTROPHILS # BLD AUTO: ABNORMAL K/UL
NEUTROPHILS NFR BLD: 0 %
NEUTROPHILS NFR BLD: 100 %
NEUTROPHILS NFR BLD: 11 %
NEUTROPHILS NFR BLD: 12 %
NEUTROPHILS NFR BLD: 13.8 %
NEUTROPHILS NFR BLD: 14 %
NEUTROPHILS NFR BLD: 14 %
NEUTROPHILS NFR BLD: 15 %
NEUTROPHILS NFR BLD: 16.7 %
NEUTROPHILS NFR BLD: 17 %
NEUTROPHILS NFR BLD: 17.1 %
NEUTROPHILS NFR BLD: 17.7 %
NEUTROPHILS NFR BLD: 18 %
NEUTROPHILS NFR BLD: 2 %
NEUTROPHILS NFR BLD: 2.1 %
NEUTROPHILS NFR BLD: 20 %
NEUTROPHILS NFR BLD: 24 %
NEUTROPHILS NFR BLD: 3 %
NEUTROPHILS NFR BLD: 3.1 %
NEUTROPHILS NFR BLD: 3.1 %
NEUTROPHILS NFR BLD: 30 %
NEUTROPHILS NFR BLD: 30 %
NEUTROPHILS NFR BLD: 33 %
NEUTROPHILS NFR BLD: 33.3 %
NEUTROPHILS NFR BLD: 34 %
NEUTROPHILS NFR BLD: 36 %
NEUTROPHILS NFR BLD: 38.5 %
NEUTROPHILS NFR BLD: 40 %
NEUTROPHILS NFR BLD: 41 %
NEUTROPHILS NFR BLD: 46 %
NEUTROPHILS NFR BLD: 50 %
NEUTROPHILS NFR BLD: 54 %
NEUTROPHILS NFR BLD: 58 %
NEUTROPHILS NFR BLD: 58.5 %
NEUTROPHILS NFR BLD: 58.8 %
NEUTROPHILS NFR BLD: 61.4 %
NEUTROPHILS NFR BLD: 61.7 %
NEUTROPHILS NFR BLD: 65 %
NEUTROPHILS NFR BLD: 71 %
NEUTROPHILS NFR BLD: 71.3 %
NEUTROPHILS NFR BLD: 71.4 %
NEUTROPHILS NFR BLD: 71.7 %
NEUTROPHILS NFR BLD: 74.4 %
NEUTROPHILS NFR BLD: 75.5 %
NEUTROPHILS NFR BLD: 76 %
NEUTROPHILS NFR BLD: 76.8 %
NEUTROPHILS NFR BLD: 77 %
NEUTROPHILS NFR BLD: 78.3 %
NEUTROPHILS NFR BLD: 79 %
NEUTROPHILS NFR BLD: 81 %
NEUTROPHILS NFR BLD: 81.9 %
NEUTROPHILS NFR BLD: 83.3 %
NEUTROPHILS NFR BLD: 85 %
NEUTROPHILS NFR BLD: 85 %
NEUTROPHILS NFR BLD: 86 %
NEUTROPHILS NFR BLD: 86 %
NEUTROPHILS NFR BLD: 86.3 %
NEUTROPHILS NFR BLD: 89 %
NEUTROPHILS NFR BLD: 89 %
NEUTROPHILS NFR BLD: 9.6 %
NEUTROPHILS NFR BLD: 9.7 %
NEUTROPHILS NFR BLD: 90 %
NEUTROPHILS NFR BLD: 90 %
NEUTROPHILS NFR BLD: 91 %
NEUTROPHILS NFR BLD: 92 %
NEUTROPHILS NFR BLD: 92.8 %
NEUTROPHILS NFR BLD: 93 %
NEUTROPHILS NFR BLD: 94.5 %
NEUTROPHILS NFR BLD: 96 %
NEUTROPHILS NFR BLD: 96 %
NEUTROPHILS NFR BLD: 97 %
NEUTROPHILS NFR BLD: 98 %
NEUTROPHILS NFR FLD MANUAL: 1 %
NEUTROPHILS NFR FLD MANUAL: 1 %
NEUTS BAND NFR BLD MANUAL: 1 %
NEUTS BAND NFR BLD MANUAL: 11 %
NEUTS BAND NFR BLD MANUAL: 2 %
NEUTS BAND NFR BLD MANUAL: 23 %
NEUTS BAND NFR BLD MANUAL: 3 %
NEUTS BAND NFR BLD MANUAL: 4 %
NEUTS BAND NFR BLD MANUAL: 5 %
NEUTS BAND NFR BLD MANUAL: 5.5 %
NEUTS BAND NFR BLD MANUAL: 6 %
NEUTS BAND NFR BLD MANUAL: 6 %
NEUTS BAND NFR BLD MANUAL: 8 %
NIL: 0.06 IU/ML
NITRITE UR QL STRIP: NEGATIVE
NON-SQ EPI CELLS #/AREA URNS AUTO: 2 /HPF
NRBC BLD-RTO: 3 /100 WBC
NRBC BLD-RTO: ABNORMAL /100 WBC
NUM UNITS TRANS PACKED RBC: NORMAL
NUM UNITS TRANS WBC-POOR PLATPHERESIS: NORMAL
O+P STL TRI STN: NORMAL
OH-ITRACONAZ SERPL-MCNC: 0.9 MCG/ML
OH-ITRACONAZ SERPL-MCNC: 1 MCG/ML
OTHER CELLS CSF: 3 %
OVALOCYTES BLD QL SMEAR: ABNORMAL
PAPPENHEIMER BOD BLD QL SMEAR: PRESENT
PAPPENHEIMER BOD BLD QL SMEAR: PRESENT
PARAINFLUENZA: NOT DETECTED
PATH INTERP FLD-IMP: NORMAL
PATH REPORT.FINAL DX SPEC: NORMAL
PATH REV BLD -IMP: NORMAL
PCO2 BLDA: 20 MMHG (ref 35–45)
PCO2 BLDA: 29.3 MMHG (ref 35–45)
PCO2 BLDA: 30.3 MMHG (ref 35–45)
PCO2 BLDA: 32.4 MMHG (ref 35–45)
PH SMN: 7.28 [PH] (ref 7.35–7.45)
PH SMN: 7.39 [PH] (ref 7.35–7.45)
PH SMN: 7.4 [PH] (ref 7.35–7.45)
PH SMN: 7.46 [PH] (ref 7.35–7.45)
PH UR STRIP: 5 [PH] (ref 5–8)
PH UR STRIP: 7 [PH] (ref 5–8)
PHOSPHATE SERPL-MCNC: 1.6 MG/DL
PHOSPHATE SERPL-MCNC: 1.7 MG/DL
PHOSPHATE SERPL-MCNC: 1.8 MG/DL
PHOSPHATE SERPL-MCNC: 1.9 MG/DL
PHOSPHATE SERPL-MCNC: 2 MG/DL
PHOSPHATE SERPL-MCNC: 2.1 MG/DL
PHOSPHATE SERPL-MCNC: 2.2 MG/DL
PHOSPHATE SERPL-MCNC: 2.3 MG/DL
PHOSPHATE SERPL-MCNC: 2.4 MG/DL
PHOSPHATE SERPL-MCNC: 2.4 MG/DL
PHOSPHATE SERPL-MCNC: 2.5 MG/DL
PHOSPHATE SERPL-MCNC: 2.6 MG/DL
PHOSPHATE SERPL-MCNC: 2.8 MG/DL
PHOSPHATE SERPL-MCNC: 2.9 MG/DL
PHOSPHATE SERPL-MCNC: 3 MG/DL
PHOSPHATE SERPL-MCNC: 3.1 MG/DL
PHOSPHATE SERPL-MCNC: 3.2 MG/DL
PHOSPHATE SERPL-MCNC: 3.3 MG/DL
PHOSPHATE SERPL-MCNC: 3.3 MG/DL
PHOSPHATE SERPL-MCNC: 3.4 MG/DL
PHOSPHATE SERPL-MCNC: 3.4 MG/DL
PHOSPHATE SERPL-MCNC: 3.5 MG/DL
PHOSPHATE SERPL-MCNC: 3.6 MG/DL
PHOSPHATE SERPL-MCNC: 3.7 MG/DL
PHOSPHATE SERPL-MCNC: 3.8 MG/DL
PHOSPHATE SERPL-MCNC: 3.8 MG/DL
PHOSPHATE SERPL-MCNC: 4 MG/DL
PHOSPHATE SERPL-MCNC: 4.1 MG/DL
PHOSPHATE SERPL-MCNC: 4.1 MG/DL
PHOSPHATE SERPL-MCNC: 4.2 MG/DL
PHOSPHATE SERPL-MCNC: 4.2 MG/DL
PHOSPHATE SERPL-MCNC: 4.4 MG/DL
PHOSPHATE SERPL-MCNC: 4.4 MG/DL
PHOSPHATE SERPL-MCNC: 4.7 MG/DL
PHOSPHATE SERPL-MCNC: 4.8 MG/DL
PHOSPHATE SERPL-MCNC: 4.9 MG/DL
PLATELET # BLD AUTO: 10 K/UL
PLATELET # BLD AUTO: 11 K/UL
PLATELET # BLD AUTO: 12 K/UL
PLATELET # BLD AUTO: 12 K/UL
PLATELET # BLD AUTO: 125 K/UL
PLATELET # BLD AUTO: 13 K/UL
PLATELET # BLD AUTO: 13 K/UL
PLATELET # BLD AUTO: 148 K/UL
PLATELET # BLD AUTO: 15 K/UL
PLATELET # BLD AUTO: 16 K/UL
PLATELET # BLD AUTO: 160 K/UL
PLATELET # BLD AUTO: 169 K/UL
PLATELET # BLD AUTO: 17 K/UL
PLATELET # BLD AUTO: 18 K/UL
PLATELET # BLD AUTO: 182 K/UL
PLATELET # BLD AUTO: 183 K/UL
PLATELET # BLD AUTO: 184 K/UL
PLATELET # BLD AUTO: 186 K/UL
PLATELET # BLD AUTO: 188 K/UL
PLATELET # BLD AUTO: 191 K/UL
PLATELET # BLD AUTO: 197 K/UL
PLATELET # BLD AUTO: 2 K/UL
PLATELET # BLD AUTO: 207 K/UL
PLATELET # BLD AUTO: 21 K/UL
PLATELET # BLD AUTO: 22 K/UL
PLATELET # BLD AUTO: 22 K/UL
PLATELET # BLD AUTO: 23 K/UL
PLATELET # BLD AUTO: 25 K/UL
PLATELET # BLD AUTO: 25 K/UL
PLATELET # BLD AUTO: 252 K/UL
PLATELET # BLD AUTO: 26 K/UL
PLATELET # BLD AUTO: 26 K/UL
PLATELET # BLD AUTO: 266 K/UL
PLATELET # BLD AUTO: 27 K/UL
PLATELET # BLD AUTO: 27 K/UL
PLATELET # BLD AUTO: 3 K/UL
PLATELET # BLD AUTO: 30 K/UL
PLATELET # BLD AUTO: 31 K/UL
PLATELET # BLD AUTO: 32 K/UL
PLATELET # BLD AUTO: 33 K/UL
PLATELET # BLD AUTO: 33 K/UL
PLATELET # BLD AUTO: 37 K/UL
PLATELET # BLD AUTO: 4 K/UL
PLATELET # BLD AUTO: 40 K/UL
PLATELET # BLD AUTO: 40 K/UL
PLATELET # BLD AUTO: 41 K/UL
PLATELET # BLD AUTO: 41 K/UL
PLATELET # BLD AUTO: 42 K/UL
PLATELET # BLD AUTO: 44 K/UL
PLATELET # BLD AUTO: 45 K/UL
PLATELET # BLD AUTO: 46 K/UL
PLATELET # BLD AUTO: 48 K/UL
PLATELET # BLD AUTO: 49 K/UL
PLATELET # BLD AUTO: 5 K/UL
PLATELET # BLD AUTO: 51 K/UL
PLATELET # BLD AUTO: 52 K/UL
PLATELET # BLD AUTO: 52 K/UL
PLATELET # BLD AUTO: 53 K/UL
PLATELET # BLD AUTO: 53 K/UL
PLATELET # BLD AUTO: 56 K/UL
PLATELET # BLD AUTO: 58 K/UL
PLATELET # BLD AUTO: 59 K/UL
PLATELET # BLD AUTO: 60 K/UL
PLATELET # BLD AUTO: 60 K/UL
PLATELET # BLD AUTO: 64 K/UL
PLATELET # BLD AUTO: 66 K/UL
PLATELET # BLD AUTO: 66 K/UL
PLATELET # BLD AUTO: 68 K/UL
PLATELET # BLD AUTO: 7 K/UL
PLATELET # BLD AUTO: 7 K/UL
PLATELET # BLD AUTO: 73 K/UL
PLATELET # BLD AUTO: 74 K/UL
PLATELET # BLD AUTO: 77 K/UL
PLATELET # BLD AUTO: 88 K/UL
PLATELET # BLD AUTO: 9 K/UL
PLATELET # BLD AUTO: ABNORMAL K/UL
PLATELET BLD QL SMEAR: ABNORMAL
PMV BLD AUTO: 10 FL
PMV BLD AUTO: 10.1 FL
PMV BLD AUTO: 10.2 FL
PMV BLD AUTO: 10.3 FL
PMV BLD AUTO: 10.3 FL
PMV BLD AUTO: 10.4 FL
PMV BLD AUTO: 10.5 FL
PMV BLD AUTO: 10.7 FL
PMV BLD AUTO: 10.9 FL
PMV BLD AUTO: 11 FL
PMV BLD AUTO: 11.1 FL
PMV BLD AUTO: 11.2 FL
PMV BLD AUTO: 11.2 FL
PMV BLD AUTO: 11.3 FL
PMV BLD AUTO: 11.4 FL
PMV BLD AUTO: 11.6 FL
PMV BLD AUTO: 11.7 FL
PMV BLD AUTO: 11.7 FL
PMV BLD AUTO: 11.8 FL
PMV BLD AUTO: 11.8 FL
PMV BLD AUTO: 12 FL
PMV BLD AUTO: 12.1 FL
PMV BLD AUTO: 12.5 FL
PMV BLD AUTO: 8.3 FL
PMV BLD AUTO: 8.7 FL
PMV BLD AUTO: 8.9 FL
PMV BLD AUTO: 9.1 FL
PMV BLD AUTO: 9.2 FL
PMV BLD AUTO: 9.2 FL
PMV BLD AUTO: 9.3 FL
PMV BLD AUTO: 9.4 FL
PMV BLD AUTO: 9.4 FL
PMV BLD AUTO: 9.5 FL
PMV BLD AUTO: 9.6 FL
PMV BLD AUTO: 9.6 FL
PMV BLD AUTO: 9.7 FL
PMV BLD AUTO: 9.8 FL
PMV BLD AUTO: ABNORMAL FL
PO2 BLDA: 101 MMHG (ref 80–100)
PO2 BLDA: 58 MMHG (ref 80–100)
PO2 BLDA: 82 MMHG (ref 80–100)
PO2 BLDA: 99 MMHG (ref 80–100)
POC BE: -1 MMOL/L
POC BE: -17 MMOL/L
POC BE: -6 MMOL/L
POC BE: -7 MMOL/L
POC SATURATED O2: 90 % (ref 95–100)
POC SATURATED O2: 96 % (ref 95–100)
POC SATURATED O2: 97 % (ref 95–100)
POC SATURATED O2: 98 % (ref 95–100)
POC TCO2: 10 MMOL/L (ref 23–27)
POC TCO2: 19 MMOL/L (ref 23–27)
POC TCO2: 19 MMOL/L (ref 23–27)
POC TCO2: 24 MMOL/L (ref 23–27)
POCT GLUCOSE: 101 MG/DL (ref 70–110)
POIKILOCYTOSIS BLD QL SMEAR: ABNORMAL
POIKILOCYTOSIS BLD QL SMEAR: ABNORMAL
POIKILOCYTOSIS BLD QL SMEAR: SLIGHT
POLYCHROMASIA BLD QL SMEAR: ABNORMAL
POTASSIUM SERPL-SCNC: 2.7 MMOL/L
POTASSIUM SERPL-SCNC: 3 MMOL/L
POTASSIUM SERPL-SCNC: 3.2 MMOL/L
POTASSIUM SERPL-SCNC: 3.3 MMOL/L
POTASSIUM SERPL-SCNC: 3.4 MMOL/L
POTASSIUM SERPL-SCNC: 3.5 MMOL/L
POTASSIUM SERPL-SCNC: 3.6 MMOL/L
POTASSIUM SERPL-SCNC: 3.7 MMOL/L
POTASSIUM SERPL-SCNC: 3.8 MMOL/L
POTASSIUM SERPL-SCNC: 3.9 MMOL/L
POTASSIUM SERPL-SCNC: 4 MMOL/L
POTASSIUM SERPL-SCNC: 4.1 MMOL/L
POTASSIUM SERPL-SCNC: 4.2 MMOL/L
POTASSIUM SERPL-SCNC: 4.3 MMOL/L
POTASSIUM SERPL-SCNC: 4.4 MMOL/L
POTASSIUM SERPL-SCNC: 4.5 MMOL/L
POTASSIUM SERPL-SCNC: 4.6 MMOL/L
POTASSIUM SERPL-SCNC: 4.7 MMOL/L
POTASSIUM SERPL-SCNC: 4.8 MMOL/L
POTASSIUM SERPL-SCNC: 4.9 MMOL/L
POTASSIUM SERPL-SCNC: 4.9 MMOL/L
POTASSIUM SERPL-SCNC: 5 MMOL/L
POTASSIUM SERPL-SCNC: 5.2 MMOL/L
POTASSIUM SERPL-SCNC: 5.3 MMOL/L
POTASSIUM SERPL-SCNC: 5.5 MMOL/L
PROCALCITONIN SERPL IA-MCNC: 1.02 NG/ML
PROCALCITONIN SERPL IA-MCNC: 1.09 NG/ML
PROMYELOCYTES NFR BLD MANUAL: 0.5 %
PROMYELOCYTES NFR BLD MANUAL: 1 %
PROMYELOCYTES NFR BLD MANUAL: 1 %
PROMYELOCYTES NFR BLD MANUAL: 10 %
PROMYELOCYTES NFR BLD MANUAL: 2 %
PROMYELOCYTES NFR BLD MANUAL: 3 %
PROMYELOCYTES NFR BLD MANUAL: 3 %
PROMYELOCYTES NFR BLD MANUAL: 6 %
PROT CSF-MCNC: 46 MG/DL
PROT FLD-MCNC: 1.8 G/DL
PROT FLD-MCNC: 2.1 G/DL
PROT SERPL-MCNC: 3.2 G/DL
PROT SERPL-MCNC: 3.3 G/DL
PROT SERPL-MCNC: 3.4 G/DL
PROT SERPL-MCNC: 3.5 G/DL
PROT SERPL-MCNC: 3.5 G/DL
PROT SERPL-MCNC: 3.6 G/DL
PROT SERPL-MCNC: 3.7 G/DL
PROT SERPL-MCNC: 3.9 G/DL
PROT SERPL-MCNC: 4 G/DL
PROT SERPL-MCNC: 4 G/DL
PROT SERPL-MCNC: 4.1 G/DL
PROT SERPL-MCNC: 4.2 G/DL
PROT SERPL-MCNC: 4.3 G/DL
PROT SERPL-MCNC: 4.4 G/DL
PROT SERPL-MCNC: 4.5 G/DL
PROT SERPL-MCNC: 4.6 G/DL
PROT SERPL-MCNC: 4.7 G/DL
PROT SERPL-MCNC: 4.8 G/DL
PROT SERPL-MCNC: 5 G/DL
PROT SERPL-MCNC: 5 G/DL
PROT SERPL-MCNC: 5.1 G/DL
PROT SERPL-MCNC: 5.1 G/DL
PROT SERPL-MCNC: 5.2 G/DL
PROT SERPL-MCNC: 5.3 G/DL
PROT SERPL-MCNC: 5.4 G/DL
PROT SERPL-MCNC: 5.5 G/DL
PROT SERPL-MCNC: 5.6 G/DL
PROT SERPL-MCNC: 5.7 G/DL
PROT SERPL-MCNC: 5.7 G/DL
PROT SERPL-MCNC: 5.8 G/DL
PROT SERPL-MCNC: 5.9 G/DL
PROT SERPL-MCNC: 6 G/DL
PROT SERPL-MCNC: 6.1 G/DL
PROT SERPL-MCNC: 6.2 G/DL
PROT SERPL-MCNC: 6.4 G/DL
PROT SERPL-MCNC: 6.6 G/DL
PROT SERPL-MCNC: 6.6 G/DL
PROT SERPL-MCNC: 6.7 G/DL
PROT SERPL-MCNC: 6.8 G/DL
PROT UR QL STRIP: ABNORMAL
PROT UR QL STRIP: NEGATIVE
PROTHROMBIN TIME: 10.6 SEC
PROTHROMBIN TIME: 10.7 SEC
PROTHROMBIN TIME: 11.1 SEC
PROTHROMBIN TIME: 11.1 SEC
PROTHROMBIN TIME: 11.2 SEC
PROTHROMBIN TIME: 11.5 SEC
PROTHROMBIN TIME: 11.6 SEC
PROTHROMBIN TIME: 11.7 SEC
PROTHROMBIN TIME: 11.7 SEC
PROTHROMBIN TIME: 11.8 SEC
PROTHROMBIN TIME: 12 SEC
PROTHROMBIN TIME: 12.1 SEC
PROTHROMBIN TIME: 12.2 SEC
PROTHROMBIN TIME: 12.4 SEC
PROTHROMBIN TIME: 12.5 SEC
PROTHROMBIN TIME: 12.8 SEC
PROTHROMBIN TIME: 13.1 SEC
PROTHROMBIN TIME: 13.4 SEC
PROTHROMBIN TIME: 13.7 SEC
PROTHROMBIN TIME: 14 SEC
PROTHROMBIN TIME: 49.4 SEC
PROTHROMBIN TIME: 52.4 SEC
PROTHROMBIN TIME: 59.6 SEC
PROTHROMBIN TIME: 77.2 SEC
PROTHROMBIN TIME: 87.7 SEC
PROVIDER CREDENTIALS: ABNORMAL
PROVIDER NOTIFIED: ABNORMAL
RBC # BLD AUTO: 1.82 M/UL
RBC # BLD AUTO: 1.87 M/UL
RBC # BLD AUTO: 2.03 M/UL
RBC # BLD AUTO: 2.05 M/UL
RBC # BLD AUTO: 2.07 M/UL
RBC # BLD AUTO: 2.08 M/UL
RBC # BLD AUTO: 2.09 M/UL
RBC # BLD AUTO: 2.09 M/UL
RBC # BLD AUTO: 2.1 M/UL
RBC # BLD AUTO: 2.11 M/UL
RBC # BLD AUTO: 2.13 M/UL
RBC # BLD AUTO: 2.17 M/UL
RBC # BLD AUTO: 2.19 M/UL
RBC # BLD AUTO: 2.2 M/UL
RBC # BLD AUTO: 2.22 M/UL
RBC # BLD AUTO: 2.23 M/UL
RBC # BLD AUTO: 2.24 M/UL
RBC # BLD AUTO: 2.3 M/UL
RBC # BLD AUTO: 2.3 M/UL
RBC # BLD AUTO: 2.31 M/UL
RBC # BLD AUTO: 2.32 M/UL
RBC # BLD AUTO: 2.32 M/UL
RBC # BLD AUTO: 2.34 M/UL
RBC # BLD AUTO: 2.35 M/UL
RBC # BLD AUTO: 2.36 M/UL
RBC # BLD AUTO: 2.36 M/UL
RBC # BLD AUTO: 2.38 M/UL
RBC # BLD AUTO: 2.39 M/UL
RBC # BLD AUTO: 2.39 M/UL
RBC # BLD AUTO: 2.42 M/UL
RBC # BLD AUTO: 2.43 M/UL
RBC # BLD AUTO: 2.48 M/UL
RBC # BLD AUTO: 2.49 M/UL
RBC # BLD AUTO: 2.52 M/UL
RBC # BLD AUTO: 2.52 M/UL
RBC # BLD AUTO: 2.53 M/UL
RBC # BLD AUTO: 2.54 M/UL
RBC # BLD AUTO: 2.55 M/UL
RBC # BLD AUTO: 2.56 M/UL
RBC # BLD AUTO: 2.57 M/UL
RBC # BLD AUTO: 2.57 M/UL
RBC # BLD AUTO: 2.58 M/UL
RBC # BLD AUTO: 2.6 M/UL
RBC # BLD AUTO: 2.62 M/UL
RBC # BLD AUTO: 2.63 M/UL
RBC # BLD AUTO: 2.64 M/UL
RBC # BLD AUTO: 2.65 M/UL
RBC # BLD AUTO: 2.66 M/UL
RBC # BLD AUTO: 2.66 M/UL
RBC # BLD AUTO: 2.68 M/UL
RBC # BLD AUTO: 2.71 M/UL
RBC # BLD AUTO: 2.72 M/UL
RBC # BLD AUTO: 2.73 M/UL
RBC # BLD AUTO: 2.74 M/UL
RBC # BLD AUTO: 2.75 M/UL
RBC # BLD AUTO: 2.79 M/UL
RBC # BLD AUTO: 2.79 M/UL
RBC # BLD AUTO: 2.8 M/UL
RBC # BLD AUTO: 2.81 M/UL
RBC # BLD AUTO: 2.83 M/UL
RBC # BLD AUTO: 2.89 M/UL
RBC # BLD AUTO: 2.9 M/UL
RBC # BLD AUTO: 2.92 M/UL
RBC # BLD AUTO: 2.93 M/UL
RBC # BLD AUTO: 3.06 M/UL
RBC # BLD AUTO: 3.07 M/UL
RBC # BLD AUTO: 3.1 M/UL
RBC # BLD AUTO: 3.2 M/UL
RBC # BLD AUTO: 3.23 M/UL
RBC # BLD AUTO: 3.25 M/UL
RBC # BLD AUTO: 3.26 M/UL
RBC # BLD AUTO: 3.26 M/UL
RBC # BLD AUTO: 3.27 M/UL
RBC # BLD AUTO: 3.27 M/UL
RBC # BLD AUTO: 3.36 M/UL
RBC # BLD AUTO: 3.4 M/UL
RBC # BLD AUTO: 3.45 M/UL
RBC # BLD AUTO: 3.46 M/UL
RBC # BLD AUTO: 3.55 M/UL
RBC # BLD AUTO: 3.65 M/UL
RBC # BLD AUTO: 3.67 M/UL
RBC # BLD AUTO: 3.68 M/UL
RBC # BLD AUTO: 3.82 M/UL
RBC # BLD AUTO: 3.88 M/UL
RBC # BLD AUTO: 3.9 M/UL
RBC # BLD AUTO: 3.91 M/UL
RBC # BLD AUTO: 4.01 M/UL
RBC # BLD AUTO: 4.02 M/UL
RBC # BLD AUTO: 4.15 M/UL
RBC # BLD AUTO: 4.2 M/UL
RBC # BLD AUTO: 4.22 M/UL
RBC # BLD AUTO: 4.36 M/UL
RBC # BLD AUTO: 4.49 M/UL
RBC # CSF: 20 /CU MM
RBC #/AREA URNS AUTO: 0 /HPF (ref 0–4)
RBC #/AREA URNS AUTO: 0 /HPF (ref 0–4)
RBC #/AREA URNS AUTO: 1 /HPF (ref 0–4)
RBC #/AREA URNS AUTO: 2 /HPF (ref 0–4)
RBC #/AREA URNS AUTO: 3 /HPF (ref 0–4)
REASON FOR REFERRAL (NARRATIVE): NORMAL
REASON FOR REFERRAL (NARRATIVE): NORMAL
REF LAB TEST METHOD: NORMAL
RETIRED EF AND QEF - SEE NOTES: 55 (ref 55–65)
RETIRED EF AND QEF - SEE NOTES: 60 (ref 55–65)
RVP - ADENOVIRUS: NOT DETECTED
RVP - HUMAN METAPNEUMOVIRUS (HMPV): NOT DETECTED
RVP - INFLUENZA A: NOT DETECTED
RVP - INFLUENZA B: NOT DETECTED
RVP - RESPIRATORY SYNCTIAL VIRUS (RSV) A: NOT DETECTED
RVP - RESPIRATORY VIRAL PANEL, SOURCE: NORMAL
RVP - RHINOVIRUS: NOT DETECTED
SAMPLE: ABNORMAL
SCHISTOCYTES BLD QL SMEAR: ABNORMAL
SCHISTOCYTES BLD QL SMEAR: PRESENT
SITE: ABNORMAL
SMUDGE CELLS BLD QL SMEAR: PRESENT
SODIUM SERPL-SCNC: 128 MMOL/L
SODIUM SERPL-SCNC: 129 MMOL/L
SODIUM SERPL-SCNC: 130 MMOL/L
SODIUM SERPL-SCNC: 131 MMOL/L
SODIUM SERPL-SCNC: 132 MMOL/L
SODIUM SERPL-SCNC: 132 MMOL/L
SODIUM SERPL-SCNC: 133 MMOL/L
SODIUM SERPL-SCNC: 134 MMOL/L
SODIUM SERPL-SCNC: 135 MMOL/L
SODIUM SERPL-SCNC: 136 MMOL/L
SODIUM SERPL-SCNC: 137 MMOL/L
SODIUM SERPL-SCNC: 138 MMOL/L
SODIUM SERPL-SCNC: 139 MMOL/L
SODIUM SERPL-SCNC: 140 MMOL/L
SODIUM SERPL-SCNC: 141 MMOL/L
SODIUM SERPL-SCNC: 142 MMOL/L
SODIUM UR-SCNC: 66 MMOL/L
SP GR UR STRIP: 1.01 (ref 1–1.03)
SP GR UR STRIP: >=1.03 (ref 1–1.03)
SP02: 100
SP02: 95
SP02: 96
SP02: 96
SPECIMEN SOURCE: 19 MG/DL
SPECIMEN SOURCE: NORMAL
SPECIMEN TYPE -CHIMERISM  NO SORT: NORMAL
SPECIMEN TYPE -CHIMERISM SORT: NORMAL
SPECIMEN TYPE -CHIMERISM SORT: NORMAL
SPECIMEN TYPE: NORMAL
SPECIMEN VOL CSF: 2 ML
SPECIMEN: NORMAL
SPECIMEN: NORMAL
SPHEROCYTES BLD QL SMEAR: ABNORMAL
SQUAMOUS #/AREA URNS AUTO: 0 /HPF
SQUAMOUS #/AREA URNS AUTO: 1 /HPF
T-SPOT TB SCREENING TEST: NORMAL
TACROLIMUS BLD-MCNC: 12 NG/ML
TACROLIMUS BLD-MCNC: 12.5 NG/ML
TACROLIMUS BLD-MCNC: 14 NG/ML
TACROLIMUS BLD-MCNC: 3.3 NG/ML
TACROLIMUS BLD-MCNC: 3.7 NG/ML
TACROLIMUS BLD-MCNC: 4.4 NG/ML
TACROLIMUS BLD-MCNC: 5.8 NG/ML
TACROLIMUS BLD-MCNC: 7.6 NG/ML
TARGETS BLD QL SMEAR: ABNORMAL
TB ANTIGEN NIL: 0.01 IU/ML
TB ANTIGEN: 0.07 IU/ML
TB GOLD: ABNORMAL
TIME NOTIFIED: 813
TRICUSPID VALVE REGURGITATION: NORMAL
TROPONIN I SERPL DL<=0.01 NG/ML-MCNC: 0.02 NG/ML
TROPONIN I SERPL DL<=0.01 NG/ML-MCNC: 0.04 NG/ML
TROPONIN I SERPL DL<=0.01 NG/ML-MCNC: 0.04 NG/ML
TROPONIN I SERPL DL<=0.01 NG/ML-MCNC: 0.05 NG/ML
TROPONIN I SERPL DL<=0.01 NG/ML-MCNC: 0.11 NG/ML
URATE SERPL-MCNC: 1.6 MG/DL
URATE SERPL-MCNC: 11.2 MG/DL
URATE SERPL-MCNC: 11.6 MG/DL
URATE SERPL-MCNC: 2 MG/DL
URATE SERPL-MCNC: 2.1 MG/DL
URATE SERPL-MCNC: 2.1 MG/DL
URATE SERPL-MCNC: 2.3 MG/DL
URATE SERPL-MCNC: 2.4 MG/DL
URATE SERPL-MCNC: 2.5 MG/DL
URATE SERPL-MCNC: 2.5 MG/DL
URATE SERPL-MCNC: 2.6 MG/DL
URATE SERPL-MCNC: 2.8 MG/DL
URATE SERPL-MCNC: 3.1 MG/DL
URATE SERPL-MCNC: 3.4 MG/DL
URATE SERPL-MCNC: 3.4 MG/DL
URATE SERPL-MCNC: 3.8 MG/DL
URATE SERPL-MCNC: 4 MG/DL
URATE SERPL-MCNC: 4.3 MG/DL
URATE SERPL-MCNC: 4.6 MG/DL
URATE SERPL-MCNC: 5.4 MG/DL
URATE SERPL-MCNC: 5.5 MG/DL
URATE SERPL-MCNC: 5.8 MG/DL
URATE SERPL-MCNC: 5.8 MG/DL
URATE SERPL-MCNC: 6.4 MG/DL
URATE SERPL-MCNC: 7 MG/DL
URATE SERPL-MCNC: 8 MG/DL
URATE SERPL-MCNC: 8.4 MG/DL
URN SPEC COLLECT METH UR: ABNORMAL
UROBILINOGEN UR STRIP-ACNC: NEGATIVE EU/DL
VANCOMYCIN SERPL-MCNC: 31.4 UG/ML
VANCOMYCIN TROUGH SERPL-MCNC: 12.7 UG/ML
VANCOMYCIN TROUGH SERPL-MCNC: 17.9 UG/ML
VANCOMYCIN TROUGH SERPL-MCNC: 19 UG/ML
VANCOMYCIN TROUGH SERPL-MCNC: 19.7 UG/ML
VANCOMYCIN TROUGH SERPL-MCNC: 19.8 UG/ML
VANCOMYCIN TROUGH SERPL-MCNC: 21.9 UG/ML
VANCOMYCIN TROUGH SERPL-MCNC: 34.3 UG/ML
VORICONAZOLE SERPL-MCNC: 1.7 MCG/ML
VORICONAZOLE SERPL-MCNC: <0.1 MCG/ML
WBC # BLD AUTO: 0.03 K/UL
WBC # BLD AUTO: 0.04 K/UL
WBC # BLD AUTO: 0.05 K/UL
WBC # BLD AUTO: 0.06 K/UL
WBC # BLD AUTO: 0.06 K/UL
WBC # BLD AUTO: 0.07 K/UL
WBC # BLD AUTO: 0.07 K/UL
WBC # BLD AUTO: 0.12 K/UL
WBC # BLD AUTO: 0.2 K/UL
WBC # BLD AUTO: 0.21 K/UL
WBC # BLD AUTO: 0.28 K/UL
WBC # BLD AUTO: 0.29 K/UL
WBC # BLD AUTO: 0.29 K/UL
WBC # BLD AUTO: 0.31 K/UL
WBC # BLD AUTO: 0.33 K/UL
WBC # BLD AUTO: 0.33 K/UL
WBC # BLD AUTO: 0.34 K/UL
WBC # BLD AUTO: 0.35 K/UL
WBC # BLD AUTO: 0.35 K/UL
WBC # BLD AUTO: 0.39 K/UL
WBC # BLD AUTO: 0.48 K/UL
WBC # BLD AUTO: 0.64 K/UL
WBC # BLD AUTO: 0.66 K/UL
WBC # BLD AUTO: 0.82 K/UL
WBC # BLD AUTO: 1.27 K/UL
WBC # BLD AUTO: 1.63 K/UL
WBC # BLD AUTO: 1.88 K/UL
WBC # BLD AUTO: 1.96 K/UL
WBC # BLD AUTO: 11.66 K/UL
WBC # BLD AUTO: 11.76 K/UL
WBC # BLD AUTO: 11.8 K/UL
WBC # BLD AUTO: 11.91 K/UL
WBC # BLD AUTO: 12.68 K/UL
WBC # BLD AUTO: 13.7 K/UL
WBC # BLD AUTO: 14.21 K/UL
WBC # BLD AUTO: 17.03 K/UL
WBC # BLD AUTO: 18.31 K/UL
WBC # BLD AUTO: 19.45 K/UL
WBC # BLD AUTO: 2.01 K/UL
WBC # BLD AUTO: 2.07 K/UL
WBC # BLD AUTO: 2.13 K/UL
WBC # BLD AUTO: 2.32 K/UL
WBC # BLD AUTO: 2.33 K/UL
WBC # BLD AUTO: 2.46 K/UL
WBC # BLD AUTO: 2.51 K/UL
WBC # BLD AUTO: 2.6 K/UL
WBC # BLD AUTO: 2.6 K/UL
WBC # BLD AUTO: 2.65 K/UL
WBC # BLD AUTO: 2.76 K/UL
WBC # BLD AUTO: 2.98 K/UL
WBC # BLD AUTO: 20.9 K/UL
WBC # BLD AUTO: 21.33 K/UL
WBC # BLD AUTO: 22.11 K/UL
WBC # BLD AUTO: 25.46 K/UL
WBC # BLD AUTO: 3.16 K/UL
WBC # BLD AUTO: 3.26 K/UL
WBC # BLD AUTO: 3.29 K/UL
WBC # BLD AUTO: 3.36 K/UL
WBC # BLD AUTO: 3.4 K/UL
WBC # BLD AUTO: 3.4 K/UL
WBC # BLD AUTO: 3.5 K/UL
WBC # BLD AUTO: 3.57 K/UL
WBC # BLD AUTO: 3.73 K/UL
WBC # BLD AUTO: 3.92 K/UL
WBC # BLD AUTO: 3.92 K/UL
WBC # BLD AUTO: 4.03 K/UL
WBC # BLD AUTO: 4.08 K/UL
WBC # BLD AUTO: 4.09 K/UL
WBC # BLD AUTO: 4.13 K/UL
WBC # BLD AUTO: 4.22 K/UL
WBC # BLD AUTO: 4.26 K/UL
WBC # BLD AUTO: 4.27 K/UL
WBC # BLD AUTO: 4.36 K/UL
WBC # BLD AUTO: 4.98 K/UL
WBC # BLD AUTO: 6.01 K/UL
WBC # BLD AUTO: 6.08 K/UL
WBC # BLD AUTO: 6.09 K/UL
WBC # BLD AUTO: 6.2 K/UL
WBC # BLD AUTO: 6.57 K/UL
WBC # BLD AUTO: 6.57 K/UL
WBC # BLD AUTO: 6.65 K/UL
WBC # BLD AUTO: 6.74 K/UL
WBC # BLD AUTO: 7.58 K/UL
WBC # BLD AUTO: 7.64 K/UL
WBC # BLD AUTO: 7.72 K/UL
WBC # BLD AUTO: 8.23 K/UL
WBC # BLD AUTO: 8.81 K/UL
WBC # BLD AUTO: 9.39 K/UL
WBC # BLD AUTO: 9.5 K/UL
WBC # BLD AUTO: 9.87 K/UL
WBC # CSF: 10 /CU MM
WBC # FLD: 105 /CU MM
WBC # FLD: 33 /CU MM
WBC # FLD: 58 /CU MM
WBC #/AREA URNS AUTO: 1 /HPF (ref 0–5)
WBC #/AREA URNS AUTO: 2 /HPF (ref 0–5)
WBC NRBC COR # BLD: 1.76 K/UL
WBC NRBC COR # BLD: 1.83 K/UL
WBC NRBC COR # BLD: 1.97 K/UL
WBC NRBC COR # BLD: 1.99 K/UL
WBC NRBC COR # BLD: 2.06 K/UL
WBC NRBC COR # BLD: 2.28 K/UL
WBC NRBC COR # BLD: 2.36 K/UL
WBC NRBC COR # BLD: 2.36 K/UL
WBC NRBC COR # BLD: 4.61 K/UL
WBC NRBC COR # BLD: 5.87 K/UL
WBC OTHER NFR BLD MANUAL: 1 %
WBC OTHER NFR BLD MANUAL: 12 %
WBC OTHER NFR BLD MANUAL: 19 %
WBC OTHER NFR BLD MANUAL: 3 %
WBC OTHER NFR BLD MANUAL: 30 %
WBC OTHER NFR BLD MANUAL: 36 %
WBC OTHER NFR BLD MANUAL: 54 %
WBC OTHER NFR BLD MANUAL: 8 %
YEAST UR QL AUTO: ABNORMAL

## 2017-01-01 PROCEDURE — 83735 ASSAY OF MAGNESIUM: CPT

## 2017-01-01 PROCEDURE — 84100 ASSAY OF PHOSPHORUS: CPT

## 2017-01-01 PROCEDURE — 99900035 HC TECH TIME PER 15 MIN (STAT)

## 2017-01-01 PROCEDURE — P9040 RBC LEUKOREDUCED IRRADIATED: HCPCS

## 2017-01-01 PROCEDURE — 84550 ASSAY OF BLOOD/URIC ACID: CPT

## 2017-01-01 PROCEDURE — 80053 COMPREHEN METABOLIC PANEL: CPT

## 2017-01-01 PROCEDURE — 25000003 PHARM REV CODE 250: Performed by: NURSE PRACTITIONER

## 2017-01-01 PROCEDURE — 63600175 PHARM REV CODE 636 W HCPCS: Performed by: STUDENT IN AN ORGANIZED HEALTH CARE EDUCATION/TRAINING PROGRAM

## 2017-01-01 PROCEDURE — 71000015 HC POSTOP RECOV 1ST HR: Performed by: SURGERY

## 2017-01-01 PROCEDURE — 88185 FLOWCYTOMETRY/TC ADD-ON: CPT | Performed by: PATHOLOGY

## 2017-01-01 PROCEDURE — 99233 SBSQ HOSP IP/OBS HIGH 50: CPT | Mod: ,,, | Performed by: INTERNAL MEDICINE

## 2017-01-01 PROCEDURE — P9037 PLATE PHERES LEUKOREDU IRRAD: HCPCS

## 2017-01-01 PROCEDURE — 25000003 PHARM REV CODE 250: Performed by: STUDENT IN AN ORGANIZED HEALTH CARE EDUCATION/TRAINING PROGRAM

## 2017-01-01 PROCEDURE — 80053 COMPREHEN METABOLIC PANEL: CPT | Mod: 91

## 2017-01-01 PROCEDURE — 25000003 PHARM REV CODE 250: Performed by: INTERNAL MEDICINE

## 2017-01-01 PROCEDURE — 85007 BL SMEAR W/DIFF WBC COUNT: CPT

## 2017-01-01 PROCEDURE — 80197 ASSAY OF TACROLIMUS: CPT

## 2017-01-01 PROCEDURE — 99999 PR PBB SHADOW E&M-EST. PATIENT-LVL III: CPT | Mod: PBBFAC,,, | Performed by: INTERNAL MEDICINE

## 2017-01-01 PROCEDURE — 89051 BODY FLUID CELL COUNT: CPT

## 2017-01-01 PROCEDURE — 93010 ELECTROCARDIOGRAM REPORT: CPT | Mod: 76,,, | Performed by: INTERNAL MEDICINE

## 2017-01-01 PROCEDURE — 99232 SBSQ HOSP IP/OBS MODERATE 35: CPT | Mod: ,,, | Performed by: INTERNAL MEDICINE

## 2017-01-01 PROCEDURE — 99214 OFFICE O/P EST MOD 30 MIN: CPT | Mod: S$GLB,,, | Performed by: INTERNAL MEDICINE

## 2017-01-01 PROCEDURE — 97110 THERAPEUTIC EXERCISES: CPT

## 2017-01-01 PROCEDURE — 86900 BLOOD TYPING SEROLOGIC ABO: CPT

## 2017-01-01 PROCEDURE — 83615 LACTATE (LD) (LDH) ENZYME: CPT

## 2017-01-01 PROCEDURE — 85027 COMPLETE CBC AUTOMATED: CPT

## 2017-01-01 PROCEDURE — 63600175 PHARM REV CODE 636 W HCPCS

## 2017-01-01 PROCEDURE — 36415 COLL VENOUS BLD VENIPUNCTURE: CPT

## 2017-01-01 PROCEDURE — 5A2204Z RESTORATION OF CARDIAC RHYTHM, SINGLE: ICD-10-PCS | Performed by: INTERNAL MEDICINE

## 2017-01-01 PROCEDURE — 63600175 PHARM REV CODE 636 W HCPCS: Performed by: NURSE PRACTITIONER

## 2017-01-01 PROCEDURE — 63600175 PHARM REV CODE 636 W HCPCS: Performed by: INTERNAL MEDICINE

## 2017-01-01 PROCEDURE — D9220A PRA ANESTHESIA: Mod: ANES,,, | Performed by: ANESTHESIOLOGY

## 2017-01-01 PROCEDURE — 85730 THROMBOPLASTIN TIME PARTIAL: CPT

## 2017-01-01 PROCEDURE — 85025 COMPLETE CBC W/AUTO DIFF WBC: CPT

## 2017-01-01 PROCEDURE — A9155 ARTIFICIAL SALIVA: HCPCS | Performed by: NURSE PRACTITIONER

## 2017-01-01 PROCEDURE — D9220A PRA ANESTHESIA: Mod: CRNA,,, | Performed by: NURSE ANESTHETIST, CERTIFIED REGISTERED

## 2017-01-01 PROCEDURE — 63600175 PHARM REV CODE 636 W HCPCS: Performed by: NURSE ANESTHETIST, CERTIFIED REGISTERED

## 2017-01-01 PROCEDURE — 88189 FLOWCYTOMETRY/READ 16 & >: CPT | Mod: ,,, | Performed by: PATHOLOGY

## 2017-01-01 PROCEDURE — 87205 SMEAR GRAM STAIN: CPT

## 2017-01-01 PROCEDURE — 86901 BLOOD TYPING SEROLOGIC RH(D): CPT

## 2017-01-01 PROCEDURE — 87102 FUNGUS ISOLATION CULTURE: CPT

## 2017-01-01 PROCEDURE — 87040 BLOOD CULTURE FOR BACTERIA: CPT

## 2017-01-01 PROCEDURE — 82945 GLUCOSE OTHER FLUID: CPT

## 2017-01-01 PROCEDURE — 0B9J8ZX DRAINAGE OF LEFT LOWER LUNG LOBE, VIA NATURAL OR ARTIFICIAL OPENING ENDOSCOPIC, DIAGNOSTIC: ICD-10-PCS | Performed by: INTERNAL MEDICINE

## 2017-01-01 PROCEDURE — 80048 BASIC METABOLIC PNL TOTAL CA: CPT

## 2017-01-01 PROCEDURE — 37000009 HC ANESTHESIA EA ADD 15 MINS: Performed by: SURGERY

## 2017-01-01 PROCEDURE — 94760 N-INVAS EAR/PLS OXIMETRY 1: CPT

## 2017-01-01 PROCEDURE — 99292 CRITICAL CARE ADDL 30 MIN: CPT | Mod: 25,,, | Performed by: INTERNAL MEDICINE

## 2017-01-01 PROCEDURE — 99291 CRITICAL CARE FIRST HOUR: CPT | Mod: 25,,, | Performed by: INTERNAL MEDICINE

## 2017-01-01 PROCEDURE — 36558 INSERT TUNNELED CV CATH: CPT | Mod: ,,, | Performed by: SURGERY

## 2017-01-01 PROCEDURE — 86644 CMV ANTIBODY: CPT

## 2017-01-01 PROCEDURE — 85060 BLOOD SMEAR INTERPRETATION: CPT | Mod: ,,, | Performed by: PATHOLOGY

## 2017-01-01 PROCEDURE — 93005 ELECTROCARDIOGRAM TRACING: CPT

## 2017-01-01 PROCEDURE — 88184 FLOWCYTOMETRY/ TC 1 MARKER: CPT | Mod: 59 | Performed by: PATHOLOGY

## 2017-01-01 PROCEDURE — 97803 MED NUTRITION INDIV SUBSEQ: CPT

## 2017-01-01 PROCEDURE — 87040 BLOOD CULTURE FOR BACTERIA: CPT | Mod: 59

## 2017-01-01 PROCEDURE — 85520 HEPARIN ASSAY: CPT

## 2017-01-01 PROCEDURE — 97803 MED NUTRITION INDIV SUBSEQ: CPT | Performed by: NUTRITIONIST

## 2017-01-01 PROCEDURE — 20600001 HC STEP DOWN PRIVATE ROOM

## 2017-01-01 PROCEDURE — 86850 RBC ANTIBODY SCREEN: CPT

## 2017-01-01 PROCEDURE — 85610 PROTHROMBIN TIME: CPT

## 2017-01-01 PROCEDURE — 81001 URINALYSIS AUTO W/SCOPE: CPT

## 2017-01-01 PROCEDURE — 97530 THERAPEUTIC ACTIVITIES: CPT

## 2017-01-01 PROCEDURE — 71000015 HC POSTOP RECOV 1ST HR: Performed by: INTERNAL MEDICINE

## 2017-01-01 PROCEDURE — 88342 IMHCHEM/IMCYTCHM 1ST ANTB: CPT | Mod: 26,59,, | Performed by: PATHOLOGY

## 2017-01-01 PROCEDURE — 88305 TISSUE EXAM BY PATHOLOGIST: CPT | Mod: 26,,, | Performed by: PATHOLOGY

## 2017-01-01 PROCEDURE — 99999 PR PBB SHADOW E&M-EST. PATIENT-LVL III: CPT | Mod: PBBFAC,,, | Performed by: NURSE PRACTITIONER

## 2017-01-01 PROCEDURE — 93307 TTE W/O DOPPLER COMPLETE: CPT | Mod: 26,,, | Performed by: INTERNAL MEDICINE

## 2017-01-01 PROCEDURE — 83605 ASSAY OF LACTIC ACID: CPT

## 2017-01-01 PROCEDURE — 85610 PROTHROMBIN TIME: CPT | Mod: 91

## 2017-01-01 PROCEDURE — 3E0R305 INTRODUCTION OF OTHER ANTINEOPLASTIC INTO SPINAL CANAL, PERCUTANEOUS APPROACH: ICD-10-PCS | Performed by: INTERNAL MEDICINE

## 2017-01-01 PROCEDURE — B4185 PARENTERAL SOL 10 GM LIPIDS: HCPCS | Performed by: INTERNAL MEDICINE

## 2017-01-01 PROCEDURE — 96401 CHEMO ANTI-NEOPL SQ/IM: CPT | Mod: PN

## 2017-01-01 PROCEDURE — 88312 SPECIAL STAINS GROUP 1: CPT | Mod: 26,,, | Performed by: PATHOLOGY

## 2017-01-01 PROCEDURE — 87209 SMEAR COMPLEX STAIN: CPT

## 2017-01-01 PROCEDURE — 93010 ELECTROCARDIOGRAM REPORT: CPT | Mod: ,,, | Performed by: INTERNAL MEDICINE

## 2017-01-01 PROCEDURE — 84478 ASSAY OF TRIGLYCERIDES: CPT

## 2017-01-01 PROCEDURE — 3008F BODY MASS INDEX DOCD: CPT | Mod: S$GLB,,, | Performed by: INTERNAL MEDICINE

## 2017-01-01 PROCEDURE — 94002 VENT MGMT INPAT INIT DAY: CPT

## 2017-01-01 PROCEDURE — 84100 ASSAY OF PHOSPHORUS: CPT | Mod: 91

## 2017-01-01 PROCEDURE — 85007 BL SMEAR W/DIFF WBC COUNT: CPT | Mod: PO

## 2017-01-01 PROCEDURE — A9585 GADOBUTROL INJECTION: HCPCS | Performed by: INTERNAL MEDICINE

## 2017-01-01 PROCEDURE — 36592 COLLECT BLOOD FROM PICC: CPT

## 2017-01-01 PROCEDURE — 1160F RVW MEDS BY RX/DR IN RCRD: CPT | Mod: S$GLB,,, | Performed by: INTERNAL MEDICINE

## 2017-01-01 PROCEDURE — 36430 TRANSFUSION BLD/BLD COMPNT: CPT

## 2017-01-01 PROCEDURE — 20000000 HC ICU ROOM

## 2017-01-01 PROCEDURE — A4217 STERILE WATER/SALINE, 500 ML: HCPCS | Performed by: INTERNAL MEDICINE

## 2017-01-01 PROCEDURE — 97802 MEDICAL NUTRITION INDIV IN: CPT

## 2017-01-01 PROCEDURE — 36593 DECLOT VASCULAR DEVICE: CPT

## 2017-01-01 PROCEDURE — 25000242 PHARM REV CODE 250 ALT 637 W/ HCPCS: Performed by: INTERNAL MEDICINE

## 2017-01-01 PROCEDURE — 3008F BODY MASS INDEX DOCD: CPT | Mod: S$GLB,,, | Performed by: NURSE PRACTITIONER

## 2017-01-01 PROCEDURE — 87086 URINE CULTURE/COLONY COUNT: CPT

## 2017-01-01 PROCEDURE — 84484 ASSAY OF TROPONIN QUANT: CPT

## 2017-01-01 PROCEDURE — 25500020 PHARM REV CODE 255: Performed by: STUDENT IN AN ORGANIZED HEALTH CARE EDUCATION/TRAINING PROGRAM

## 2017-01-01 PROCEDURE — 80202 ASSAY OF VANCOMYCIN: CPT

## 2017-01-01 PROCEDURE — 88291 CYTO/MOLECULAR REPORT: CPT

## 2017-01-01 PROCEDURE — 80299 QUANTITATIVE ASSAY DRUG: CPT

## 2017-01-01 PROCEDURE — P9047 ALBUMIN (HUMAN), 25%, 50ML: HCPCS | Performed by: INTERNAL MEDICINE

## 2017-01-01 PROCEDURE — 88313 SPECIAL STAINS GROUP 2: CPT | Mod: 26,,, | Performed by: PATHOLOGY

## 2017-01-01 PROCEDURE — 99223 1ST HOSP IP/OBS HIGH 75: CPT | Mod: ,,, | Performed by: PSYCHIATRY & NEUROLOGY

## 2017-01-01 PROCEDURE — 99252 IP/OBS CONSLTJ NEW/EST SF 35: CPT | Mod: ,,, | Performed by: INTERNAL MEDICINE

## 2017-01-01 PROCEDURE — A4216 STERILE WATER/SALINE, 10 ML: HCPCS | Mod: PN | Performed by: INTERNAL MEDICINE

## 2017-01-01 PROCEDURE — P9038 RBC IRRADIATED: HCPCS

## 2017-01-01 PROCEDURE — 97163 PT EVAL HIGH COMPLEX 45 MIN: CPT

## 2017-01-01 PROCEDURE — 87046 STOOL CULTR AEROBIC BACT EA: CPT

## 2017-01-01 PROCEDURE — 88271 CYTOGENETICS DNA PROBE: CPT | Mod: 59

## 2017-01-01 PROCEDURE — 99223 1ST HOSP IP/OBS HIGH 75: CPT | Mod: ,,, | Performed by: INTERNAL MEDICINE

## 2017-01-01 PROCEDURE — 85097 BONE MARROW INTERPRETATION: CPT | Mod: ,,, | Performed by: PATHOLOGY

## 2017-01-01 PROCEDURE — 27000221 HC OXYGEN, UP TO 24 HOURS

## 2017-01-01 PROCEDURE — 84157 ASSAY OF PROTEIN OTHER: CPT

## 2017-01-01 PROCEDURE — 43752 NASAL/OROGASTRIC W/TUBE PLMT: CPT

## 2017-01-01 PROCEDURE — 87070 CULTURE OTHR SPECIMN AEROBIC: CPT

## 2017-01-01 PROCEDURE — 87205 SMEAR GRAM STAIN: CPT | Mod: 59

## 2017-01-01 PROCEDURE — 88185 FLOWCYTOMETRY/TC ADD-ON: CPT | Mod: 59 | Performed by: PATHOLOGY

## 2017-01-01 PROCEDURE — 88305 TISSUE EXAM BY PATHOLOGIST: CPT | Performed by: PATHOLOGY

## 2017-01-01 PROCEDURE — 87116 MYCOBACTERIA CULTURE: CPT

## 2017-01-01 PROCEDURE — 87045 FECES CULTURE AEROBIC BACT: CPT

## 2017-01-01 PROCEDURE — 45331 SIGMOIDOSCOPY AND BIOPSY: CPT | Mod: ,,, | Performed by: INTERNAL MEDICINE

## 2017-01-01 PROCEDURE — 86361 T CELL ABSOLUTE COUNT: CPT

## 2017-01-01 PROCEDURE — 83735 ASSAY OF MAGNESIUM: CPT | Mod: 91

## 2017-01-01 PROCEDURE — 96366 THER/PROPH/DIAG IV INF ADDON: CPT

## 2017-01-01 PROCEDURE — 84550 ASSAY OF BLOOD/URIC ACID: CPT | Mod: 91

## 2017-01-01 PROCEDURE — 82570 ASSAY OF URINE CREATININE: CPT

## 2017-01-01 PROCEDURE — 0W993ZZ DRAINAGE OF RIGHT PLEURAL CAVITY, PERCUTANEOUS APPROACH: ICD-10-PCS | Performed by: INTERNAL MEDICINE

## 2017-01-01 PROCEDURE — 85007 BL SMEAR W/DIFF WBC COUNT: CPT | Mod: 91

## 2017-01-01 PROCEDURE — 0W9B3ZZ DRAINAGE OF LEFT PLEURAL CAVITY, PERCUTANEOUS APPROACH: ICD-10-PCS | Performed by: INTERNAL MEDICINE

## 2017-01-01 PROCEDURE — 87449 NOS EACH ORGANISM AG IA: CPT

## 2017-01-01 PROCEDURE — 32554 ASPIRATE PLEURA W/O IMAGING: CPT | Mod: RT,,, | Performed by: INTERNAL MEDICINE

## 2017-01-01 PROCEDURE — 71000044 HC DOSC ROUTINE RECOVERY FIRST HOUR: Performed by: SURGERY

## 2017-01-01 PROCEDURE — 84311 SPECTROPHOTOMETRY: CPT

## 2017-01-01 PROCEDURE — 25500020 PHARM REV CODE 255: Performed by: INTERNAL MEDICINE

## 2017-01-01 PROCEDURE — 93041 RHYTHM ECG TRACING: CPT

## 2017-01-01 PROCEDURE — 87015 SPECIMEN INFECT AGNT CONCNTJ: CPT

## 2017-01-01 PROCEDURE — 85384 FIBRINOGEN ACTIVITY: CPT

## 2017-01-01 PROCEDURE — 86920 COMPATIBILITY TEST SPIN: CPT

## 2017-01-01 PROCEDURE — 83615 LACTATE (LD) (LDH) ENZYME: CPT | Mod: 91

## 2017-01-01 PROCEDURE — 87385 HISTOPLASMA CAPSUL AG IA: CPT

## 2017-01-01 PROCEDURE — 99231 SBSQ HOSP IP/OBS SF/LOW 25: CPT | Mod: ,,, | Performed by: INTERNAL MEDICINE

## 2017-01-01 PROCEDURE — 84145 PROCALCITONIN (PCT): CPT

## 2017-01-01 PROCEDURE — 87427 SHIGA-LIKE TOXIN AG IA: CPT

## 2017-01-01 PROCEDURE — 25000003 PHARM REV CODE 250: Performed by: SURGERY

## 2017-01-01 PROCEDURE — 93010 ELECTROCARDIOGRAM REPORT: CPT | Mod: S$GLB,,, | Performed by: INTERNAL MEDICINE

## 2017-01-01 PROCEDURE — 88299 UNLISTED CYTOGENETIC STUDY: CPT

## 2017-01-01 PROCEDURE — 25000003 PHARM REV CODE 250

## 2017-01-01 PROCEDURE — 82150 ASSAY OF AMYLASE: CPT

## 2017-01-01 PROCEDURE — 87449 NOS EACH ORGANISM AG IA: CPT | Mod: 91

## 2017-01-01 PROCEDURE — 07DR3ZX EXTRACTION OF ILIAC BONE MARROW, PERCUTANEOUS APPROACH, DIAGNOSTIC: ICD-10-PCS | Performed by: INTERNAL MEDICINE

## 2017-01-01 PROCEDURE — 85049 AUTOMATED PLATELET COUNT: CPT

## 2017-01-01 PROCEDURE — 85007 BL SMEAR W/DIFF WBC COUNT: CPT | Mod: NCS

## 2017-01-01 PROCEDURE — 88184 FLOWCYTOMETRY/ TC 1 MARKER: CPT | Performed by: PATHOLOGY

## 2017-01-01 PROCEDURE — 3074F SYST BP LT 130 MM HG: CPT | Mod: S$GLB,,, | Performed by: INTERNAL MEDICINE

## 2017-01-01 PROCEDURE — 94770 HC EXHALED C02 TEST: CPT

## 2017-01-01 PROCEDURE — 37000008 HC ANESTHESIA 1ST 15 MINUTES: Performed by: INTERNAL MEDICINE

## 2017-01-01 PROCEDURE — 88112 CYTOPATH CELL ENHANCE TECH: CPT | Mod: 26,,, | Performed by: PATHOLOGY

## 2017-01-01 PROCEDURE — 38242 TRANSPLT ALLO LYMPHOCYTES: CPT

## 2017-01-01 PROCEDURE — 96360 HYDRATION IV INFUSION INIT: CPT | Mod: PN

## 2017-01-01 PROCEDURE — 87536 HIV-1 QUANT&REVRSE TRNSCRPJ: CPT

## 2017-01-01 PROCEDURE — 94761 N-INVAS EAR/PLS OXIMETRY MLT: CPT

## 2017-01-01 PROCEDURE — 88341 IMHCHEM/IMCYTCHM EA ADD ANTB: CPT | Mod: 26,,, | Performed by: PATHOLOGY

## 2017-01-01 PROCEDURE — 81268 CHIMERISM ANAL W/CELL SELECT: CPT | Mod: 91

## 2017-01-01 PROCEDURE — 27200188 HC TRANSDUCER, ART ADULT/PEDS

## 2017-01-01 PROCEDURE — 36000706: Performed by: SURGERY

## 2017-01-01 PROCEDURE — 37000009 HC ANESTHESIA EA ADD 15 MINS: Performed by: INTERNAL MEDICINE

## 2017-01-01 PROCEDURE — 83605 ASSAY OF LACTIC ACID: CPT | Mod: 91

## 2017-01-01 PROCEDURE — 25000003 PHARM REV CODE 250: Performed by: NURSE ANESTHETIST, CERTIFIED REGISTERED

## 2017-01-01 PROCEDURE — 99255 IP/OBS CONSLTJ NEW/EST HI 80: CPT | Mod: ,,, | Performed by: INTERNAL MEDICINE

## 2017-01-01 PROCEDURE — 37000008 HC ANESTHESIA 1ST 15 MINUTES: Performed by: SURGERY

## 2017-01-01 PROCEDURE — 81268 CHIMERISM ANAL W/CELL SELECT: CPT

## 2017-01-01 PROCEDURE — 02HV33Z INSERTION OF INFUSION DEVICE INTO SUPERIOR VENA CAVA, PERCUTANEOUS APPROACH: ICD-10-PCS | Performed by: SURGERY

## 2017-01-01 PROCEDURE — 88342 IMHCHEM/IMCYTCHM 1ST ANTB: CPT | Mod: 26,,, | Performed by: PATHOLOGY

## 2017-01-01 PROCEDURE — 82803 BLOOD GASES ANY COMBINATION: CPT

## 2017-01-01 PROCEDURE — 36000704 HC OR TIME LEV I 1ST 15 MIN: Performed by: INTERNAL MEDICINE

## 2017-01-01 PROCEDURE — 85027 COMPLETE CBC AUTOMATED: CPT | Mod: 91

## 2017-01-01 PROCEDURE — 88291 CYTO/MOLECULAR REPORT: CPT | Mod: 59

## 2017-01-01 PROCEDURE — 27201040 HC RC 50 FILTER

## 2017-01-01 PROCEDURE — 93307 TTE W/O DOPPLER COMPLETE: CPT

## 2017-01-01 PROCEDURE — A4216 STERILE WATER/SALINE, 10 ML: HCPCS | Performed by: INTERNAL MEDICINE

## 2017-01-01 PROCEDURE — 25000242 PHARM REV CODE 250 ALT 637 W/ HCPCS: Performed by: HOSPITALIST

## 2017-01-01 PROCEDURE — 45331 SIGMOIDOSCOPY AND BIOPSY: CPT | Performed by: INTERNAL MEDICINE

## 2017-01-01 PROCEDURE — 82042 OTHER SOURCE ALBUMIN QUAN EA: CPT

## 2017-01-01 PROCEDURE — 96365 THER/PROPH/DIAG IV INF INIT: CPT

## 2017-01-01 PROCEDURE — 96361 HYDRATE IV INFUSION ADD-ON: CPT | Mod: PN

## 2017-01-01 PROCEDURE — 99233 SBSQ HOSP IP/OBS HIGH 50: CPT | Mod: ,,, | Performed by: NURSE PRACTITIONER

## 2017-01-01 PROCEDURE — 99285 EMERGENCY DEPT VISIT HI MDM: CPT | Mod: 25

## 2017-01-01 PROCEDURE — 80202 ASSAY OF VANCOMYCIN: CPT | Mod: 91

## 2017-01-01 PROCEDURE — 36000707: Performed by: SURGERY

## 2017-01-01 PROCEDURE — 3078F DIAST BP <80 MM HG: CPT | Mod: S$GLB,,, | Performed by: INTERNAL MEDICINE

## 2017-01-01 PROCEDURE — 86480 TB TEST CELL IMMUN MEASURE: CPT

## 2017-01-01 PROCEDURE — 93306 TTE W/DOPPLER COMPLETE: CPT | Mod: 26,,, | Performed by: INTERNAL MEDICINE

## 2017-01-01 PROCEDURE — 94640 AIRWAY INHALATION TREATMENT: CPT

## 2017-01-01 PROCEDURE — 99152 MOD SED SAME PHYS/QHP 5/>YRS: CPT | Performed by: INTERNAL MEDICINE

## 2017-01-01 PROCEDURE — 96375 TX/PRO/DX INJ NEW DRUG ADDON: CPT

## 2017-01-01 PROCEDURE — 99999 PR PBB SHADOW E&M-EST. PATIENT-LVL IV: CPT | Mod: PBBFAC,,, | Performed by: INTERNAL MEDICINE

## 2017-01-01 PROCEDURE — 99253 IP/OBS CNSLTJ NEW/EST LOW 45: CPT | Mod: ,,, | Performed by: INTERNAL MEDICINE

## 2017-01-01 PROCEDURE — 25000003 PHARM REV CODE 250: Performed by: HOSPITALIST

## 2017-01-01 PROCEDURE — 83010 ASSAY OF HAPTOGLOBIN QUANT: CPT

## 2017-01-01 PROCEDURE — 99233 SBSQ HOSP IP/OBS HIGH 50: CPT | Mod: ,,, | Performed by: PSYCHIATRY & NEUROLOGY

## 2017-01-01 PROCEDURE — 38221 DX BONE MARROW BIOPSIES: CPT

## 2017-01-01 PROCEDURE — 31624 DX BRONCHOSCOPE/LAVAGE: CPT | Performed by: INTERNAL MEDICINE

## 2017-01-01 PROCEDURE — 96367 TX/PROPH/DG ADDL SEQ IV INF: CPT

## 2017-01-01 PROCEDURE — 0DBN8ZX EXCISION OF SIGMOID COLON, VIA NATURAL OR ARTIFICIAL OPENING ENDOSCOPIC, DIAGNOSTIC: ICD-10-PCS | Performed by: INTERNAL MEDICINE

## 2017-01-01 PROCEDURE — 63600175 PHARM REV CODE 636 W HCPCS: Performed by: SURGERY

## 2017-01-01 PROCEDURE — 99153 MOD SED SAME PHYS/QHP EA: CPT | Performed by: INTERNAL MEDICINE

## 2017-01-01 PROCEDURE — 63600175 PHARM REV CODE 636 W HCPCS: Mod: PN | Performed by: INTERNAL MEDICINE

## 2017-01-01 PROCEDURE — 31500 INSERT EMERGENCY AIRWAY: CPT | Mod: ,,, | Performed by: INTERNAL MEDICINE

## 2017-01-01 PROCEDURE — 36600 WITHDRAWAL OF ARTERIAL BLOOD: CPT

## 2017-01-01 PROCEDURE — 99999 PR PBB SHADOW E&M-EST. PATIENT-LVL V: CPT | Mod: PBBFAC,,, | Performed by: INTERNAL MEDICINE

## 2017-01-01 PROCEDURE — 88237 TISSUE CULTURE BONE MARROW: CPT

## 2017-01-01 PROCEDURE — C1751 CATH, INF, PER/CENT/MIDLINE: HCPCS | Performed by: SURGERY

## 2017-01-01 PROCEDURE — 25000003 PHARM REV CODE 250: Mod: PN | Performed by: INTERNAL MEDICINE

## 2017-01-01 PROCEDURE — 36415 COLL VENOUS BLD VENIPUNCTURE: CPT | Mod: PO

## 2017-01-01 PROCEDURE — 87632 RESP VIRUS 6-11 TARGETS: CPT

## 2017-01-01 PROCEDURE — 88275 CYTOGENETICS 100-300: CPT | Mod: 59

## 2017-01-01 PROCEDURE — 88313 SPECIAL STAINS GROUP 2: CPT

## 2017-01-01 PROCEDURE — 97535 SELF CARE MNGMENT TRAINING: CPT

## 2017-01-01 PROCEDURE — 85025 COMPLETE CBC W/AUTO DIFF WBC: CPT | Mod: 91

## 2017-01-01 PROCEDURE — 3079F DIAST BP 80-89 MM HG: CPT | Mod: S$GLB,,, | Performed by: INTERNAL MEDICINE

## 2017-01-01 PROCEDURE — 94642 AEROSOL INHALATION TREATMENT: CPT | Mod: S$GLB,,, | Performed by: INTERNAL MEDICINE

## 2017-01-01 PROCEDURE — 99214 OFFICE O/P EST MOD 30 MIN: CPT | Mod: S$GLB,,, | Performed by: NURSE PRACTITIONER

## 2017-01-01 PROCEDURE — 88311 DECALCIFY TISSUE: CPT | Mod: 26,,, | Performed by: PATHOLOGY

## 2017-01-01 PROCEDURE — C1894 INTRO/SHEATH, NON-LASER: HCPCS | Performed by: SURGERY

## 2017-01-01 PROCEDURE — 97166 OT EVAL MOD COMPLEX 45 MIN: CPT

## 2017-01-01 PROCEDURE — 36000705 HC OR TIME LEV I EA ADD 15 MIN: Performed by: INTERNAL MEDICINE

## 2017-01-01 PROCEDURE — 81267 CHIMERISM ANAL NO CELL SELEC: CPT

## 2017-01-01 PROCEDURE — 31622 DX BRONCHOSCOPE/WASH: CPT | Mod: ,,, | Performed by: INTERNAL MEDICINE

## 2017-01-01 PROCEDURE — 27000190 HC CPAP FULL FACE MASK W/VALVE

## 2017-01-01 PROCEDURE — 38221 DX BONE MARROW BIOPSIES: CPT | Mod: RT,,, | Performed by: NURSE PRACTITIONER

## 2017-01-01 PROCEDURE — 87400 INFLUENZA A/B EACH AG IA: CPT

## 2017-01-01 PROCEDURE — 1159F MED LIST DOCD IN RCRD: CPT | Mod: S$GLB,,, | Performed by: INTERNAL MEDICINE

## 2017-01-01 PROCEDURE — 85027 COMPLETE CBC AUTOMATED: CPT | Mod: PO

## 2017-01-01 PROCEDURE — 25000003 PHARM REV CODE 250: Performed by: CLINICAL NURSE SPECIALIST

## 2017-01-01 PROCEDURE — 90791 PSYCH DIAGNOSTIC EVALUATION: CPT | Mod: ,,, | Performed by: PSYCHOLOGIST

## 2017-01-01 PROCEDURE — 77001 FLUOROGUIDE FOR VEIN DEVICE: CPT | Mod: 26,,, | Performed by: SURGERY

## 2017-01-01 PROCEDURE — A4217 STERILE WATER/SALINE, 500 ML: HCPCS | Performed by: NURSE PRACTITIONER

## 2017-01-01 PROCEDURE — 99222 1ST HOSP IP/OBS MODERATE 55: CPT | Mod: ,,, | Performed by: PSYCHIATRY & NEUROLOGY

## 2017-01-01 PROCEDURE — 009U3ZX DRAINAGE OF SPINAL CANAL, PERCUTANEOUS APPROACH, DIAGNOSTIC: ICD-10-PCS | Performed by: RADIOLOGY

## 2017-01-01 PROCEDURE — 88264 CHROMOSOME ANALYSIS 20-25: CPT

## 2017-01-01 PROCEDURE — 99232 SBSQ HOSP IP/OBS MODERATE 35: CPT | Mod: 25,,, | Performed by: INTERNAL MEDICINE

## 2017-01-01 PROCEDURE — 84300 ASSAY OF URINE SODIUM: CPT

## 2017-01-01 PROCEDURE — 83880 ASSAY OF NATRIURETIC PEPTIDE: CPT

## 2017-01-01 PROCEDURE — 63600175 PHARM REV CODE 636 W HCPCS: Performed by: EMERGENCY MEDICINE

## 2017-01-01 PROCEDURE — 31500 INSERT EMERGENCY AIRWAY: CPT

## 2017-01-01 PROCEDURE — 94660 CPAP INITIATION&MGMT: CPT

## 2017-01-01 PROCEDURE — 27100006 CARDIOVERSION (DCCV)

## 2017-01-01 PROCEDURE — 88275 CYTOGENETICS 100-300: CPT

## 2017-01-01 PROCEDURE — 99222 1ST HOSP IP/OBS MODERATE 55: CPT | Mod: 57,,, | Performed by: SURGERY

## 2017-01-01 PROCEDURE — 63600175 PHARM REV CODE 636 W HCPCS: Performed by: HOSPITALIST

## 2017-01-01 PROCEDURE — 99999 PR PBB SHADOW E&M-EST. PATIENT-LVL II: CPT | Mod: PBBFAC,,, | Performed by: INTERNAL MEDICINE

## 2017-01-01 PROCEDURE — 87305 ASPERGILLUS AG IA: CPT

## 2017-01-01 PROCEDURE — 63600175 PHARM REV CODE 636 W HCPCS: Mod: JW,PN | Performed by: INTERNAL MEDICINE

## 2017-01-01 PROCEDURE — 85379 FIBRIN DEGRADATION QUANT: CPT

## 2017-01-01 PROCEDURE — 86481 TB AG RESPONSE T-CELL SUSP: CPT

## 2017-01-01 PROCEDURE — 93000 ELECTROCARDIOGRAM COMPLETE: CPT | Mod: S$GLB,,, | Performed by: INTERNAL MEDICINE

## 2017-01-01 PROCEDURE — 25000003 PHARM REV CODE 250: Performed by: EMERGENCY MEDICINE

## 2017-01-01 PROCEDURE — 87210 SMEAR WET MOUNT SALINE/INK: CPT

## 2017-01-01 PROCEDURE — 88341 IMHCHEM/IMCYTCHM EA ADD ANTB: CPT | Performed by: PATHOLOGY

## 2017-01-01 PROCEDURE — 88341 IMHCHEM/IMCYTCHM EA ADD ANTB: CPT | Mod: 26,59,, | Performed by: PATHOLOGY

## 2017-01-01 PROCEDURE — 0BH17EZ INSERTION OF ENDOTRACHEAL AIRWAY INTO TRACHEA, VIA NATURAL OR ARTIFICIAL OPENING: ICD-10-PCS | Performed by: INTERNAL MEDICINE

## 2017-01-01 PROCEDURE — 92960 CARDIOVERSION ELECTRIC EXT: CPT | Mod: ,,, | Performed by: INTERNAL MEDICINE

## 2017-01-01 PROCEDURE — 88189 FLOWCYTOMETRY/READ 16 & >: CPT | Mod: 91,,, | Performed by: PATHOLOGY

## 2017-01-01 PROCEDURE — 86403 PARTICLE AGGLUT ANTBDY SCRN: CPT

## 2017-01-01 PROCEDURE — 96372 THER/PROPH/DIAG INJ SC/IM: CPT

## 2017-01-01 PROCEDURE — 71000016 HC POSTOP RECOV ADDL HR: Performed by: SURGERY

## 2017-01-01 PROCEDURE — S0030 INJECTION, METRONIDAZOLE: HCPCS | Performed by: INTERNAL MEDICINE

## 2017-01-01 PROCEDURE — 27201012 HC FORCEPS, HOT/COLD, DISP: Performed by: INTERNAL MEDICINE

## 2017-01-01 PROCEDURE — 99285 EMERGENCY DEPT VISIT HI MDM: CPT | Mod: ,,, | Performed by: EMERGENCY MEDICINE

## 2017-01-01 PROCEDURE — 71000044 HC DOSC ROUTINE RECOVERY FIRST HOUR: Performed by: INTERNAL MEDICINE

## 2017-01-01 PROCEDURE — 93306 TTE W/DOPPLER COMPLETE: CPT

## 2017-01-01 PROCEDURE — 5A1935Z RESPIRATORY VENTILATION, LESS THAN 24 CONSECUTIVE HOURS: ICD-10-PCS | Performed by: INTERNAL MEDICINE

## 2017-01-01 PROCEDURE — 81479 UNLISTED MOLECULAR PATHOLOGY: CPT

## 2017-01-01 DEVICE — CATH POWERHICKMAN 9.5FR 5CM: Type: IMPLANTABLE DEVICE | Site: CHEST  WALL | Status: FUNCTIONAL

## 2017-01-01 RX ORDER — FUROSEMIDE 10 MG/ML
40 INJECTION INTRAMUSCULAR; INTRAVENOUS ONCE
Status: COMPLETED | OUTPATIENT
Start: 2017-01-01 | End: 2017-01-01

## 2017-01-01 RX ORDER — HYDROMORPHONE HYDROCHLORIDE 1 MG/ML
0.5 INJECTION, SOLUTION INTRAMUSCULAR; INTRAVENOUS; SUBCUTANEOUS EVERY 6 HOURS PRN
Status: DISCONTINUED | OUTPATIENT
Start: 2017-01-01 | End: 2017-01-01

## 2017-01-01 RX ORDER — DEXMEDETOMIDINE HYDROCHLORIDE 4 UG/ML
0.2 INJECTION, SOLUTION INTRAVENOUS CONTINUOUS
Status: DISCONTINUED | OUTPATIENT
Start: 2017-01-01 | End: 2017-01-01

## 2017-01-01 RX ORDER — FENTANYL CITRATE 50 UG/ML
INJECTION, SOLUTION INTRAMUSCULAR; INTRAVENOUS CODE/TRAUMA/SEDATION MEDICATION
Status: DISCONTINUED | OUTPATIENT
Start: 2017-01-01 | End: 2017-01-01

## 2017-01-01 RX ORDER — MORPHINE SULFATE 30 MG/1
30 TABLET, FILM COATED, EXTENDED RELEASE ORAL EVERY 12 HOURS
Qty: 60 TABLET | Refills: 0 | Status: CANCELLED | OUTPATIENT
Start: 2017-01-01 | End: 2017-01-01

## 2017-01-01 RX ORDER — POTASSIUM CHLORIDE 7.45 MG/ML
10 INJECTION INTRAVENOUS
Status: COMPLETED | OUTPATIENT
Start: 2017-01-01 | End: 2017-01-01

## 2017-01-01 RX ORDER — ACETAMINOPHEN 325 MG/1
650 TABLET ORAL ONCE
Status: COMPLETED | OUTPATIENT
Start: 2017-01-01 | End: 2017-01-01

## 2017-01-01 RX ORDER — SODIUM CHLORIDE 0.9 % (FLUSH) 0.9 %
10 SYRINGE (ML) INJECTION
Status: CANCELLED | OUTPATIENT
Start: 2017-01-01

## 2017-01-01 RX ORDER — ZOLPIDEM TARTRATE 5 MG/1
5 TABLET ORAL ONCE
Status: COMPLETED | OUTPATIENT
Start: 2017-01-01 | End: 2017-01-01

## 2017-01-01 RX ORDER — HEPARIN 100 UNIT/ML
500 SYRINGE INTRAVENOUS
Status: COMPLETED | OUTPATIENT
Start: 2017-01-01 | End: 2017-01-01

## 2017-01-01 RX ORDER — HYDROCODONE BITARTRATE AND ACETAMINOPHEN 500; 5 MG/1; MG/1
TABLET ORAL
Status: DISCONTINUED | OUTPATIENT
Start: 2017-01-01 | End: 2017-01-01

## 2017-01-01 RX ORDER — MAGNESIUM SULFATE HEPTAHYDRATE 40 MG/ML
2 INJECTION, SOLUTION INTRAVENOUS
Status: COMPLETED | OUTPATIENT
Start: 2017-01-01 | End: 2017-01-01

## 2017-01-01 RX ORDER — PROPOFOL 10 MG/ML
VIAL (ML) INTRAVENOUS CONTINUOUS PRN
Status: DISCONTINUED | OUTPATIENT
Start: 2017-01-01 | End: 2017-01-01

## 2017-01-01 RX ORDER — HYDROMORPHONE HYDROCHLORIDE 2 MG/ML
2 INJECTION, SOLUTION INTRAMUSCULAR; INTRAVENOUS; SUBCUTANEOUS EVERY 6 HOURS PRN
Status: DISCONTINUED | OUTPATIENT
Start: 2017-01-01 | End: 2017-01-01

## 2017-01-01 RX ORDER — PREDNISONE 20 MG/1
40 TABLET ORAL DAILY
Status: DISCONTINUED | OUTPATIENT
Start: 2017-01-01 | End: 2017-01-01 | Stop reason: HOSPADM

## 2017-01-01 RX ORDER — GADOBUTROL 604.72 MG/ML
10 INJECTION INTRAVENOUS
Status: COMPLETED | OUTPATIENT
Start: 2017-01-01 | End: 2017-01-01

## 2017-01-01 RX ORDER — DIPHENHYDRAMINE HYDROCHLORIDE 50 MG/ML
12.5 INJECTION INTRAMUSCULAR; INTRAVENOUS EVERY 6 HOURS PRN
Status: DISCONTINUED | OUTPATIENT
Start: 2017-01-01 | End: 2017-01-01 | Stop reason: HOSPADM

## 2017-01-01 RX ORDER — BENZONATATE 100 MG/1
100 CAPSULE ORAL 3 TIMES DAILY PRN
Status: DISCONTINUED | OUTPATIENT
Start: 2017-01-01 | End: 2017-01-01 | Stop reason: HOSPADM

## 2017-01-01 RX ORDER — ONDANSETRON 2 MG/ML
8 INJECTION INTRAMUSCULAR; INTRAVENOUS EVERY 8 HOURS PRN
Status: DISCONTINUED | OUTPATIENT
Start: 2017-01-01 | End: 2017-01-01

## 2017-01-01 RX ORDER — FINASTERIDE 5 MG/1
5 TABLET, FILM COATED ORAL DAILY
Qty: 30 TABLET | Refills: 1 | Status: ON HOLD | OUTPATIENT
Start: 2017-01-01 | End: 2017-01-01 | Stop reason: HOSPADM

## 2017-01-01 RX ORDER — ATORVASTATIN CALCIUM 20 MG/1
20 TABLET, FILM COATED ORAL DAILY
Qty: 90 TABLET | Refills: 3 | Status: CANCELLED | OUTPATIENT
Start: 2017-01-01 | End: 2018-07-28

## 2017-01-01 RX ORDER — DEXAMETHASONE 4 MG/1
12 TABLET ORAL
Status: COMPLETED | OUTPATIENT
Start: 2017-01-01 | End: 2017-01-01

## 2017-01-01 RX ORDER — PREDNISONE 20 MG/1
40 TABLET ORAL DAILY
Qty: 10 TABLET | Refills: 0 | Status: SHIPPED | OUTPATIENT
Start: 2017-01-01 | End: 2017-01-01 | Stop reason: SDUPTHER

## 2017-01-01 RX ORDER — MAGNESIUM SULFATE HEPTAHYDRATE 40 MG/ML
2 INJECTION, SOLUTION INTRAVENOUS
Status: DISCONTINUED | OUTPATIENT
Start: 2017-01-01 | End: 2017-01-01 | Stop reason: HOSPADM

## 2017-01-01 RX ORDER — PREDNISONE 20 MG/1
40 TABLET ORAL DAILY
Qty: 10 TABLET | Refills: 0 | OUTPATIENT
Start: 2017-01-01 | End: 2017-01-01

## 2017-01-01 RX ORDER — ITRACONAZOLE 10 MG/ML
SOLUTION ORAL
Refills: 0 | COMMUNITY
Start: 2017-01-01 | End: 2017-01-01 | Stop reason: ALTCHOICE

## 2017-01-01 RX ORDER — CIPROFLOXACIN 500 MG/1
500 TABLET ORAL EVERY 12 HOURS
Status: DISCONTINUED | OUTPATIENT
Start: 2017-01-01 | End: 2017-01-01 | Stop reason: SDUPTHER

## 2017-01-01 RX ORDER — METOPROLOL TARTRATE 1 MG/ML
5 INJECTION, SOLUTION INTRAVENOUS ONCE
Status: COMPLETED | OUTPATIENT
Start: 2017-01-01 | End: 2017-01-01

## 2017-01-01 RX ORDER — METHYLPREDNISOLONE 4 MG/1
4 TABLET ORAL ONCE
Status: DISCONTINUED | OUTPATIENT
Start: 2017-01-01 | End: 2017-01-01

## 2017-01-01 RX ORDER — FENTANYL CITRATE 50 UG/ML
50 INJECTION, SOLUTION INTRAMUSCULAR; INTRAVENOUS ONCE
Status: COMPLETED | OUTPATIENT
Start: 2017-01-01 | End: 2017-01-01

## 2017-01-01 RX ORDER — MIDAZOLAM HYDROCHLORIDE 1 MG/ML
INJECTION, SOLUTION INTRAMUSCULAR; INTRAVENOUS
Status: DISCONTINUED | OUTPATIENT
Start: 2017-01-01 | End: 2017-01-01

## 2017-01-01 RX ORDER — POTASSIUM CHLORIDE 29.8 MG/ML
40 INJECTION INTRAVENOUS ONCE
Status: DISCONTINUED | OUTPATIENT
Start: 2017-01-01 | End: 2017-01-01

## 2017-01-01 RX ORDER — NALOXONE HCL 0.4 MG/ML
0.02 VIAL (ML) INJECTION
Status: DISCONTINUED | OUTPATIENT
Start: 2017-01-01 | End: 2017-01-01

## 2017-01-01 RX ORDER — HYDROMORPHONE HYDROCHLORIDE 2 MG/ML
2 INJECTION, SOLUTION INTRAMUSCULAR; INTRAVENOUS; SUBCUTANEOUS ONCE
Status: COMPLETED | OUTPATIENT
Start: 2017-01-01 | End: 2017-01-01

## 2017-01-01 RX ORDER — PROCHLORPERAZINE EDISYLATE 5 MG/ML
10 INJECTION INTRAMUSCULAR; INTRAVENOUS EVERY 6 HOURS PRN
Status: DISCONTINUED | OUTPATIENT
Start: 2017-01-01 | End: 2017-01-01

## 2017-01-01 RX ORDER — MAGNESIUM SULFATE HEPTAHYDRATE 40 MG/ML
4 INJECTION, SOLUTION INTRAVENOUS
Status: DISCONTINUED | OUTPATIENT
Start: 2017-01-01 | End: 2017-01-01 | Stop reason: HOSPADM

## 2017-01-01 RX ORDER — ABACAVIR SULFATE, DOLUTEGRAVIR SODIUM, LAMIVUDINE 600; 50; 300 MG/1; MG/1; MG/1
TABLET, FILM COATED ORAL
Qty: 30 TABLET | Refills: 2 | Status: SHIPPED | OUTPATIENT
Start: 2017-01-01 | End: 2017-01-01 | Stop reason: SDUPTHER

## 2017-01-01 RX ORDER — PENTAMIDINE ISETHIONATE 300 MG/300MG
300 INHALANT RESPIRATORY (INHALATION)
Status: CANCELLED
Start: 2017-01-01

## 2017-01-01 RX ORDER — LIDOCAINE 50 MG/G
1 PATCH TOPICAL
Status: DISCONTINUED | OUTPATIENT
Start: 2017-01-01 | End: 2017-01-01

## 2017-01-01 RX ORDER — RAMELTEON 8 MG/1
8 TABLET ORAL ONCE
Status: COMPLETED | OUTPATIENT
Start: 2017-01-01 | End: 2017-01-01

## 2017-01-01 RX ORDER — AZACITIDINE 100 MG/1
75 INJECTION, POWDER, LYOPHILIZED, FOR SOLUTION INTRAVENOUS; SUBCUTANEOUS
Status: CANCELLED | OUTPATIENT
Start: 2017-01-01

## 2017-01-01 RX ORDER — DIPHENOXYLATE HYDROCHLORIDE AND ATROPINE SULFATE 2.5; .025 MG/1; MG/1
1 TABLET ORAL 4 TIMES DAILY
Status: DISCONTINUED | OUTPATIENT
Start: 2017-01-01 | End: 2017-01-01

## 2017-01-01 RX ORDER — FLECAINIDE ACETATE 100 MG/1
100 TABLET ORAL EVERY 12 HOURS
Qty: 180 TABLET | Refills: 3 | Status: ON HOLD | OUTPATIENT
Start: 2017-01-01 | End: 2017-01-01 | Stop reason: HOSPADM

## 2017-01-01 RX ORDER — MORPHINE SULFATE 30 MG/1
30 TABLET, FILM COATED, EXTENDED RELEASE ORAL EVERY 12 HOURS
Status: DISCONTINUED | OUTPATIENT
Start: 2017-01-01 | End: 2017-01-01 | Stop reason: HOSPADM

## 2017-01-01 RX ORDER — LIDOCAINE HYDROCHLORIDE 10 MG/ML
INJECTION, SOLUTION EPIDURAL; INFILTRATION; INTRACAUDAL; PERINEURAL
Status: DISCONTINUED | OUTPATIENT
Start: 2017-01-01 | End: 2017-01-01 | Stop reason: HOSPADM

## 2017-01-01 RX ORDER — FLECAINIDE ACETATE 50 MG/1
50 TABLET ORAL EVERY 12 HOURS
Status: DISCONTINUED | OUTPATIENT
Start: 2017-01-01 | End: 2017-01-01

## 2017-01-01 RX ORDER — DEXAMETHASONE SODIUM PHOSPHATE 1 MG/ML
1 SOLUTION/ DROPS OPHTHALMIC EVERY 6 HOURS
Status: COMPLETED | OUTPATIENT
Start: 2017-01-01 | End: 2017-01-01

## 2017-01-01 RX ORDER — ITRACONAZOLE 10 MG/ML
10 SOLUTION ORAL
Status: DISCONTINUED | OUTPATIENT
Start: 2017-01-01 | End: 2017-01-01

## 2017-01-01 RX ORDER — BUDESONIDE 3 MG/1
9 CAPSULE, COATED PELLETS ORAL DAILY
Status: DISCONTINUED | OUTPATIENT
Start: 2017-01-01 | End: 2017-01-01

## 2017-01-01 RX ORDER — POTASSIUM CHLORIDE 29.8 MG/ML
40 INJECTION INTRAVENOUS
Status: DISCONTINUED | OUTPATIENT
Start: 2017-01-01 | End: 2017-01-01 | Stop reason: HOSPADM

## 2017-01-01 RX ORDER — METOPROLOL TARTRATE 25 MG/1
25 TABLET, FILM COATED ORAL 2 TIMES DAILY
Qty: 180 TABLET | Refills: 3 | Status: CANCELLED | OUTPATIENT
Start: 2017-01-01 | End: 2018-07-28

## 2017-01-01 RX ORDER — DIPHENHYDRAMINE HYDROCHLORIDE 50 MG/ML
12.5 INJECTION INTRAMUSCULAR; INTRAVENOUS ONCE
Status: COMPLETED | OUTPATIENT
Start: 2017-01-01 | End: 2017-01-01

## 2017-01-01 RX ORDER — LANOLIN ALCOHOL/MO/W.PET/CERES
1200 CREAM (GRAM) TOPICAL 2 TIMES DAILY
Status: DISCONTINUED | OUTPATIENT
Start: 2017-01-01 | End: 2017-01-01

## 2017-01-01 RX ORDER — PREDNISONE 5 MG/1
5 TABLET ORAL
Status: DISCONTINUED | OUTPATIENT
Start: 2017-01-01 | End: 2017-01-01

## 2017-01-01 RX ORDER — SODIUM CHLORIDE 9 MG/ML
INJECTION, SOLUTION INTRAVENOUS CONTINUOUS
Status: DISCONTINUED | OUTPATIENT
Start: 2017-01-01 | End: 2017-01-01

## 2017-01-01 RX ORDER — ACETAMINOPHEN 325 MG/1
650 TABLET ORAL
Status: COMPLETED | OUTPATIENT
Start: 2017-01-01 | End: 2017-01-01

## 2017-01-01 RX ORDER — PROMETHAZINE HYDROCHLORIDE 12.5 MG/1
12.5 TABLET ORAL EVERY 6 HOURS PRN
Status: DISCONTINUED | OUTPATIENT
Start: 2017-01-01 | End: 2017-01-01

## 2017-01-01 RX ORDER — VANCOMYCIN HCL IN 5 % DEXTROSE 1.25 G/25
15 PLASTIC BAG, INJECTION (ML) INTRAVENOUS
Status: DISCONTINUED | OUTPATIENT
Start: 2017-01-01 | End: 2017-01-01

## 2017-01-01 RX ORDER — SODIUM CHLORIDE 0.9 % (FLUSH) 0.9 %
10 SYRINGE (ML) INJECTION
Status: COMPLETED | OUTPATIENT
Start: 2017-01-01 | End: 2017-01-01

## 2017-01-01 RX ORDER — ONDANSETRON 2 MG/ML
4 INJECTION INTRAMUSCULAR; INTRAVENOUS ONCE
Status: COMPLETED | OUTPATIENT
Start: 2017-01-01 | End: 2017-01-01

## 2017-01-01 RX ORDER — SODIUM CHLORIDE 9 MG/ML
INJECTION, SOLUTION INTRAVENOUS CONTINUOUS PRN
Status: DISCONTINUED | OUTPATIENT
Start: 2017-01-01 | End: 2017-01-01

## 2017-01-01 RX ORDER — LANOLIN ALCOHOL/MO/W.PET/CERES
800 CREAM (GRAM) TOPICAL 2 TIMES DAILY
Status: DISCONTINUED | OUTPATIENT
Start: 2017-01-01 | End: 2017-01-01

## 2017-01-01 RX ORDER — MAGNESIUM SULFATE HEPTAHYDRATE 40 MG/ML
2 INJECTION, SOLUTION INTRAVENOUS ONCE
Status: COMPLETED | OUTPATIENT
Start: 2017-01-01 | End: 2017-01-01

## 2017-01-01 RX ORDER — GUAIFENESIN 600 MG/1
600 TABLET, EXTENDED RELEASE ORAL 2 TIMES DAILY
Status: CANCELLED | COMMUNITY
Start: 2017-01-01 | End: 2017-01-01

## 2017-01-01 RX ORDER — HEPARIN 100 UNIT/ML
500 SYRINGE INTRAVENOUS
Status: CANCELLED | OUTPATIENT
Start: 2017-01-01

## 2017-01-01 RX ORDER — DIPHENHYDRAMINE HCL 25 MG
25 CAPSULE ORAL ONCE AS NEEDED
Status: COMPLETED | OUTPATIENT
Start: 2017-01-01 | End: 2017-01-01

## 2017-01-01 RX ORDER — SODIUM CHLORIDE 9 MG/ML
INJECTION, SOLUTION INTRAVENOUS CONTINUOUS
Status: ACTIVE | OUTPATIENT
Start: 2017-01-01 | End: 2017-01-01

## 2017-01-01 RX ORDER — POTASSIUM CHLORIDE 7.45 MG/ML
40 INJECTION INTRAVENOUS
Status: DISCONTINUED | OUTPATIENT
Start: 2017-01-01 | End: 2017-01-01 | Stop reason: HOSPADM

## 2017-01-01 RX ORDER — PHENYLEPHRINE HCL IN 0.9% NACL 1 MG/10 ML
SYRINGE (ML) INTRAVENOUS
Status: DISCONTINUED
Start: 2017-01-01 | End: 2017-01-01 | Stop reason: HOSPADM

## 2017-01-01 RX ORDER — ATORVASTATIN CALCIUM 20 MG/1
20 TABLET, FILM COATED ORAL DAILY
Status: DISCONTINUED | OUTPATIENT
Start: 2017-01-01 | End: 2017-01-01 | Stop reason: HOSPADM

## 2017-01-01 RX ORDER — POTASSIUM CHLORIDE 7.45 MG/ML
60 INJECTION INTRAVENOUS
Status: DISCONTINUED | OUTPATIENT
Start: 2017-01-01 | End: 2017-01-01 | Stop reason: HOSPADM

## 2017-01-01 RX ORDER — HYDROMORPHONE HYDROCHLORIDE 2 MG/ML
2 INJECTION, SOLUTION INTRAMUSCULAR; INTRAVENOUS; SUBCUTANEOUS EVERY 4 HOURS PRN
Status: DISCONTINUED | OUTPATIENT
Start: 2017-01-01 | End: 2017-01-01

## 2017-01-01 RX ORDER — HYDROMORPHONE HYDROCHLORIDE 2 MG/1
2 TABLET ORAL EVERY 6 HOURS PRN
Qty: 60 TABLET | Refills: 0 | Status: CANCELLED | OUTPATIENT
Start: 2017-01-01

## 2017-01-01 RX ORDER — LAMIVUDINE 150 MG/1
300 TABLET, FILM COATED ORAL DAILY
Status: DISCONTINUED | OUTPATIENT
Start: 2017-01-01 | End: 2017-01-01 | Stop reason: HOSPADM

## 2017-01-01 RX ORDER — DIPHENHYDRAMINE HCL 25 MG
25 CAPSULE ORAL ONCE
Status: COMPLETED | OUTPATIENT
Start: 2017-01-01 | End: 2017-01-01

## 2017-01-01 RX ORDER — AZACITIDINE 100 MG/1
75 INJECTION, POWDER, LYOPHILIZED, FOR SOLUTION INTRAVENOUS; SUBCUTANEOUS
Status: COMPLETED | OUTPATIENT
Start: 2017-01-01 | End: 2017-01-01

## 2017-01-01 RX ORDER — METOPROLOL TARTRATE 25 MG/1
25 TABLET ORAL 2 TIMES DAILY
Status: DISCONTINUED | OUTPATIENT
Start: 2017-01-01 | End: 2017-01-01

## 2017-01-01 RX ORDER — SODIUM CHLORIDE 0.9 % (FLUSH) 0.9 %
3 SYRINGE (ML) INJECTION
Status: DISCONTINUED | OUTPATIENT
Start: 2017-01-01 | End: 2017-01-01 | Stop reason: HOSPADM

## 2017-01-01 RX ORDER — METHYLPREDNISOLONE 4 MG/1
8 TABLET ORAL ONCE
Status: DISCONTINUED | OUTPATIENT
Start: 2017-01-01 | End: 2017-01-01

## 2017-01-01 RX ORDER — ALPRAZOLAM 0.5 MG/1
0.5 TABLET ORAL 2 TIMES DAILY PRN
Status: DISCONTINUED | OUTPATIENT
Start: 2017-01-01 | End: 2017-01-01

## 2017-01-01 RX ORDER — LIDOCAINE HCL/PF 100 MG/5ML
SYRINGE (ML) INTRAVENOUS
Status: DISCONTINUED | OUTPATIENT
Start: 2017-01-01 | End: 2017-01-01

## 2017-01-01 RX ORDER — LIDOCAINE HYDROCHLORIDE 10 MG/ML
INJECTION INFILTRATION; PERINEURAL CODE/TRAUMA/SEDATION MEDICATION
Status: DISCONTINUED | OUTPATIENT
Start: 2017-01-01 | End: 2017-01-01

## 2017-01-01 RX ORDER — SULFAMETHOXAZOLE AND TRIMETHOPRIM 800; 160 MG/1; MG/1
1 TABLET ORAL
Status: DISCONTINUED | OUTPATIENT
Start: 2017-01-01 | End: 2017-01-01

## 2017-01-01 RX ORDER — HYDROMORPHONE HYDROCHLORIDE 2 MG/ML
2 INJECTION, SOLUTION INTRAMUSCULAR; INTRAVENOUS; SUBCUTANEOUS
Status: DISCONTINUED | OUTPATIENT
Start: 2017-01-01 | End: 2017-01-01

## 2017-01-01 RX ORDER — EPINEPHRINE 1 MG/ML
INJECTION INTRAMUSCULAR; INTRAVENOUS; SUBCUTANEOUS CODE/TRAUMA/SEDATION MEDICATION
Status: COMPLETED | OUTPATIENT
Start: 2017-01-01 | End: 2017-01-01

## 2017-01-01 RX ORDER — ACETAMINOPHEN 325 MG/1
650 TABLET ORAL ONCE AS NEEDED
Status: ACTIVE | OUTPATIENT
Start: 2017-01-01 | End: 2017-01-01

## 2017-01-01 RX ORDER — FENTANYL CITRAT/DEXTROSE 5%/PF 100 MCG/10
25 PATIENT CONTROLLED ANALGESIA SYRINGE INTRAVENOUS CONTINUOUS
Status: DISCONTINUED | OUTPATIENT
Start: 2017-01-01 | End: 2017-01-01

## 2017-01-01 RX ORDER — LORAZEPAM 0.5 MG/1
TABLET ORAL
Refills: 0 | COMMUNITY
Start: 2017-01-01 | End: 2017-01-01

## 2017-01-01 RX ORDER — BENZONATATE 100 MG/1
100 CAPSULE ORAL 3 TIMES DAILY PRN
Qty: 30 CAPSULE | Refills: 1 | Status: SHIPPED | OUTPATIENT
Start: 2017-01-01 | End: 2017-01-01

## 2017-01-01 RX ORDER — METOPROLOL TARTRATE 25 MG/1
25 TABLET, FILM COATED ORAL 2 TIMES DAILY
Status: DISCONTINUED | OUTPATIENT
Start: 2017-01-01 | End: 2017-01-01

## 2017-01-01 RX ORDER — CEFEPIME HYDROCHLORIDE 2 G/50ML
2 INJECTION, SOLUTION INTRAVENOUS
Status: COMPLETED | OUTPATIENT
Start: 2017-01-01 | End: 2017-01-01

## 2017-01-01 RX ORDER — LIDOCAINE HYDROCHLORIDE AND EPINEPHRINE 10; 10 MG/ML; UG/ML
5 INJECTION, SOLUTION INFILTRATION; PERINEURAL ONCE
Status: COMPLETED | OUTPATIENT
Start: 2017-01-01 | End: 2017-01-01

## 2017-01-01 RX ORDER — SODIUM CHLORIDE FOR INHALATION 3 %
4 VIAL, NEBULIZER (ML) INHALATION EVERY 4 HOURS
Status: DISCONTINUED | OUTPATIENT
Start: 2017-01-01 | End: 2017-01-01

## 2017-01-01 RX ORDER — PREDNISONE 20 MG/1
60 TABLET ORAL DAILY
Status: COMPLETED | OUTPATIENT
Start: 2017-01-01 | End: 2017-01-01

## 2017-01-01 RX ORDER — PREDNISONE 20 MG/1
TABLET ORAL
Qty: 10 TABLET | Refills: 0 | Status: SHIPPED | OUTPATIENT
Start: 2017-01-01 | End: 2017-01-01

## 2017-01-01 RX ORDER — ETOMIDATE 2 MG/ML
INJECTION INTRAVENOUS
Status: COMPLETED
Start: 2017-01-01 | End: 2017-01-01

## 2017-01-01 RX ORDER — CEFEPIME HYDROCHLORIDE 2 G/50ML
2 INJECTION, SOLUTION INTRAVENOUS
Status: DISCONTINUED | OUTPATIENT
Start: 2017-01-01 | End: 2017-01-01

## 2017-01-01 RX ORDER — ATORVASTATIN CALCIUM 20 MG/1
20 TABLET, FILM COATED ORAL DAILY
Qty: 90 TABLET | Refills: 3 | Status: ON HOLD | OUTPATIENT
Start: 2017-01-01 | End: 2017-01-01 | Stop reason: HOSPADM

## 2017-01-01 RX ORDER — PHENYLEPHRINE HYDROCHLORIDE 10 MG/ML
INJECTION INTRAVENOUS
Status: DISCONTINUED | OUTPATIENT
Start: 2017-01-01 | End: 2017-01-01

## 2017-01-01 RX ORDER — BUTALBITAL, ACETAMINOPHEN AND CAFFEINE 50; 325; 40 MG/1; MG/1; MG/1
1 TABLET ORAL EVERY 6 HOURS PRN
Status: DISCONTINUED | OUTPATIENT
Start: 2017-01-01 | End: 2017-01-01 | Stop reason: HOSPADM

## 2017-01-01 RX ORDER — ALLOPURINOL 100 MG/1
100 TABLET ORAL 2 TIMES DAILY
Status: DISCONTINUED | OUTPATIENT
Start: 2017-01-01 | End: 2017-01-01

## 2017-01-01 RX ORDER — ONDANSETRON 2 MG/ML
8 INJECTION INTRAMUSCULAR; INTRAVENOUS EVERY 6 HOURS
Status: DISCONTINUED | OUTPATIENT
Start: 2017-01-01 | End: 2017-01-01

## 2017-01-01 RX ORDER — LIDOCAINE HYDROCHLORIDE 20 MG/ML
10 INJECTION, SOLUTION EPIDURAL; INFILTRATION; INTRACAUDAL; PERINEURAL ONCE
Status: COMPLETED | OUTPATIENT
Start: 2017-01-01 | End: 2017-01-01

## 2017-01-01 RX ORDER — LOPERAMIDE HYDROCHLORIDE 2 MG/1
2 CAPSULE ORAL ONCE
Status: COMPLETED | OUTPATIENT
Start: 2017-01-01 | End: 2017-01-01

## 2017-01-01 RX ORDER — LORAZEPAM 2 MG/ML
0.5 INJECTION INTRAMUSCULAR EVERY 6 HOURS PRN
Status: DISCONTINUED | OUTPATIENT
Start: 2017-01-01 | End: 2017-01-01

## 2017-01-01 RX ORDER — BECLOMETHASONE DIPROPIONATE
2 POWDER (GRAM) MISCELLANEOUS 4 TIMES DAILY
Qty: 1 BOTTLE | Refills: 2 | Status: SHIPPED | OUTPATIENT
Start: 2017-01-01

## 2017-01-01 RX ORDER — TRAMADOL HYDROCHLORIDE 50 MG/1
50 TABLET ORAL EVERY 6 HOURS PRN
Status: DISCONTINUED | OUTPATIENT
Start: 2017-01-01 | End: 2017-01-01

## 2017-01-01 RX ORDER — HYDROCORTISONE 25 MG/G
CREAM TOPICAL 2 TIMES DAILY
Qty: 30 G | Refills: 1 | Status: SHIPPED | OUTPATIENT
Start: 2017-01-01 | End: 2017-01-01

## 2017-01-01 RX ORDER — ALLOPURINOL 300 MG/1
300 TABLET ORAL DAILY
Status: DISCONTINUED | OUTPATIENT
Start: 2017-01-01 | End: 2017-01-01

## 2017-01-01 RX ORDER — ALPRAZOLAM 0.5 MG/1
0.5 TABLET ORAL 2 TIMES DAILY PRN
Qty: 60 TABLET | Refills: 0 | Status: SHIPPED | OUTPATIENT
Start: 2017-01-01 | End: 2017-01-01 | Stop reason: SDUPTHER

## 2017-01-01 RX ORDER — POTASSIUM CHLORIDE 750 MG/1
20 CAPSULE, EXTENDED RELEASE ORAL
Status: DISCONTINUED | OUTPATIENT
Start: 2017-01-01 | End: 2017-01-01

## 2017-01-01 RX ORDER — LORAZEPAM 1 MG/1
1 TABLET ORAL EVERY 8 HOURS PRN
Status: DISCONTINUED | OUTPATIENT
Start: 2017-01-01 | End: 2017-01-01

## 2017-01-01 RX ORDER — ACYCLOVIR 200 MG/1
400 CAPSULE ORAL 2 TIMES DAILY
Status: DISCONTINUED | OUTPATIENT
Start: 2017-01-01 | End: 2017-01-01 | Stop reason: HOSPADM

## 2017-01-01 RX ORDER — SODIUM CHLORIDE 0.9 % (FLUSH) 0.9 %
10 SYRINGE (ML) INJECTION
Status: DISCONTINUED | OUTPATIENT
Start: 2017-01-01 | End: 2017-01-01 | Stop reason: HOSPADM

## 2017-01-01 RX ORDER — OLANZAPINE 5 MG/1
5 TABLET, ORALLY DISINTEGRATING ORAL NIGHTLY
Status: DISCONTINUED | OUTPATIENT
Start: 2017-01-01 | End: 2017-01-01

## 2017-01-01 RX ORDER — METOPROLOL TARTRATE 25 MG/1
50 TABLET, FILM COATED ORAL 2 TIMES DAILY
Status: DISCONTINUED | OUTPATIENT
Start: 2017-01-01 | End: 2017-01-01 | Stop reason: HOSPADM

## 2017-01-01 RX ORDER — ITRACONAZOLE 10 MG/ML
10 SOLUTION ORAL 3 TIMES DAILY
Qty: 1800 ML | Refills: 0 | Status: SHIPPED | OUTPATIENT
Start: 2017-01-01 | End: 2017-01-01 | Stop reason: SDUPTHER

## 2017-01-01 RX ORDER — ACETAMINOPHEN 325 MG/1
650 TABLET ORAL EVERY 6 HOURS PRN
Status: DISCONTINUED | OUTPATIENT
Start: 2017-01-01 | End: 2017-01-01

## 2017-01-01 RX ORDER — TACROLIMUS 0.5 MG/1
0.5 CAPSULE ORAL DAILY
Qty: 120 CAPSULE | Refills: 11 | Status: ON HOLD
Start: 2017-01-01 | End: 2017-01-01

## 2017-01-01 RX ORDER — HEPARIN SODIUM,PORCINE/D5W 25000/250
17 INTRAVENOUS SOLUTION INTRAVENOUS CONTINUOUS
Status: DISCONTINUED | OUTPATIENT
Start: 2017-01-01 | End: 2017-01-01

## 2017-01-01 RX ORDER — FUROSEMIDE 10 MG/ML
20 INJECTION INTRAMUSCULAR; INTRAVENOUS ONCE
Status: COMPLETED | OUTPATIENT
Start: 2017-01-01 | End: 2017-01-01

## 2017-01-01 RX ORDER — LAMIVUDINE 150 MG/1
300 TABLET, FILM COATED ORAL DAILY
Status: DISCONTINUED | OUTPATIENT
Start: 2017-10-04 | End: 2017-01-01

## 2017-01-01 RX ORDER — LIDOCAINE HYDROCHLORIDE 10 MG/ML
INJECTION INFILTRATION; PERINEURAL
Status: COMPLETED
Start: 2017-01-01 | End: 2017-01-01

## 2017-01-01 RX ORDER — CEFAZOLIN SODIUM 1 G/3ML
INJECTION, POWDER, FOR SOLUTION INTRAMUSCULAR; INTRAVENOUS
Status: DISCONTINUED | OUTPATIENT
Start: 2017-01-01 | End: 2017-01-01

## 2017-01-01 RX ORDER — FAMOTIDINE 10 MG/ML
20 INJECTION INTRAVENOUS 2 TIMES DAILY
Status: DISCONTINUED | OUTPATIENT
Start: 2017-01-01 | End: 2017-01-01

## 2017-01-01 RX ORDER — POTASSIUM CHLORIDE 7.45 MG/ML
10 INJECTION INTRAVENOUS 2 TIMES DAILY
Status: DISCONTINUED | OUTPATIENT
Start: 2017-01-01 | End: 2017-01-01

## 2017-01-01 RX ORDER — FLECAINIDE ACETATE 50 MG/1
50 TABLET ORAL EVERY 12 HOURS
Qty: 180 TABLET | Refills: 3 | Status: CANCELLED | OUTPATIENT
Start: 2017-01-01 | End: 2018-07-28

## 2017-01-01 RX ORDER — SULFAMETHOXAZOLE AND TRIMETHOPRIM 800; 160 MG/1; MG/1
1 TABLET ORAL
Qty: 36 TABLET | Refills: 3 | Status: CANCELLED | OUTPATIENT
Start: 2017-01-01 | End: 2018-07-28

## 2017-01-01 RX ORDER — PROPOFOL 10 MG/ML
VIAL (ML) INTRAVENOUS
Status: DISCONTINUED | OUTPATIENT
Start: 2017-01-01 | End: 2017-01-01

## 2017-01-01 RX ORDER — ATORVASTATIN CALCIUM 20 MG/1
20 TABLET, FILM COATED ORAL DAILY
Status: DISCONTINUED | OUTPATIENT
Start: 2017-01-01 | End: 2017-01-01

## 2017-01-01 RX ORDER — AZACITIDINE 100 MG/1
75 INJECTION, POWDER, LYOPHILIZED, FOR SOLUTION INTRAVENOUS; SUBCUTANEOUS
Status: ON HOLD | COMMUNITY
End: 2017-01-01 | Stop reason: HOSPADM

## 2017-01-01 RX ORDER — HYDROMORPHONE HYDROCHLORIDE 2 MG/1
2 TABLET ORAL EVERY 6 HOURS PRN
Status: DISCONTINUED | OUTPATIENT
Start: 2017-01-01 | End: 2017-01-01

## 2017-01-01 RX ORDER — POTASSIUM CHLORIDE 20 MEQ/1
20 TABLET, EXTENDED RELEASE ORAL 2 TIMES DAILY
Status: COMPLETED | OUTPATIENT
Start: 2017-01-01 | End: 2017-01-01

## 2017-01-01 RX ORDER — LANOLIN ALCOHOL/MO/W.PET/CERES
CREAM (GRAM) TOPICAL
Qty: 270 TABLET | Refills: 1 | Status: SHIPPED | OUTPATIENT
Start: 2017-01-01 | End: 2017-01-01 | Stop reason: SDUPTHER

## 2017-01-01 RX ORDER — SODIUM BICARBONATE IN D5W 150/1000ML
PLASTIC BAG, INJECTION (ML) INTRAVENOUS CONTINUOUS
Status: DISCONTINUED | OUTPATIENT
Start: 2017-01-01 | End: 2017-01-01

## 2017-01-01 RX ORDER — EPINEPHRINE 1 MG/ML
INJECTION INTRAMUSCULAR; INTRAVENOUS; SUBCUTANEOUS
Status: DISCONTINUED
Start: 2017-01-01 | End: 2017-01-01 | Stop reason: HOSPADM

## 2017-01-01 RX ORDER — FENTANYL CITRATE 50 UG/ML
25 INJECTION, SOLUTION INTRAMUSCULAR; INTRAVENOUS EVERY 5 MIN PRN
Status: DISCONTINUED | OUTPATIENT
Start: 2017-01-01 | End: 2017-01-01 | Stop reason: HOSPADM

## 2017-01-01 RX ORDER — MIDAZOLAM HYDROCHLORIDE 5 MG/ML
INJECTION INTRAMUSCULAR; INTRAVENOUS CODE/TRAUMA/SEDATION MEDICATION
Status: DISCONTINUED | OUTPATIENT
Start: 2017-01-01 | End: 2017-01-01

## 2017-01-01 RX ORDER — HYDROMORPHONE HYDROCHLORIDE 2 MG/1
2 TABLET ORAL EVERY 6 HOURS PRN
Qty: 60 TABLET | Refills: 0 | Status: SHIPPED | OUTPATIENT
Start: 2017-01-01 | End: 2017-01-01

## 2017-01-01 RX ORDER — HYDROMORPHONE HYDROCHLORIDE 2 MG/ML
INJECTION, SOLUTION INTRAMUSCULAR; INTRAVENOUS; SUBCUTANEOUS
Status: DISPENSED
Start: 2017-01-01 | End: 2017-01-01

## 2017-01-01 RX ORDER — PROCHLORPERAZINE EDISYLATE 5 MG/ML
10 INJECTION INTRAMUSCULAR; INTRAVENOUS EVERY 6 HOURS PRN
Status: DISCONTINUED | OUTPATIENT
Start: 2017-01-01 | End: 2017-01-01 | Stop reason: HOSPADM

## 2017-01-01 RX ORDER — HYDROMORPHONE HYDROCHLORIDE 1 MG/ML
1 INJECTION, SOLUTION INTRAMUSCULAR; INTRAVENOUS; SUBCUTANEOUS EVERY 4 HOURS PRN
Status: DISCONTINUED | OUTPATIENT
Start: 2017-01-01 | End: 2017-01-01

## 2017-01-01 RX ORDER — AMOXICILLIN 250 MG
1 CAPSULE ORAL 2 TIMES DAILY PRN
Status: DISCONTINUED | OUTPATIENT
Start: 2017-01-01 | End: 2017-01-01 | Stop reason: HOSPADM

## 2017-01-01 RX ORDER — POTASSIUM CHLORIDE 14.9 MG/ML
20 INJECTION INTRAVENOUS
Status: COMPLETED | OUTPATIENT
Start: 2017-01-01 | End: 2017-01-01

## 2017-01-01 RX ORDER — ENOXAPARIN SODIUM 100 MG/ML
40 INJECTION SUBCUTANEOUS EVERY 24 HOURS
Status: DISCONTINUED | OUTPATIENT
Start: 2017-01-01 | End: 2017-01-01

## 2017-01-01 RX ORDER — HYDROMORPHONE HYDROCHLORIDE 1 MG/ML
1 INJECTION, SOLUTION INTRAMUSCULAR; INTRAVENOUS; SUBCUTANEOUS EVERY 6 HOURS PRN
Status: DISCONTINUED | OUTPATIENT
Start: 2017-01-01 | End: 2017-01-01 | Stop reason: HOSPADM

## 2017-01-01 RX ORDER — LOPERAMIDE HYDROCHLORIDE 2 MG/1
2 CAPSULE ORAL 4 TIMES DAILY PRN
Status: DISCONTINUED | OUTPATIENT
Start: 2017-01-01 | End: 2017-01-01 | Stop reason: HOSPADM

## 2017-01-01 RX ORDER — METOPROLOL TARTRATE 25 MG/1
12.5 TABLET ORAL 2 TIMES DAILY
Status: DISCONTINUED | OUTPATIENT
Start: 2017-01-01 | End: 2017-01-01

## 2017-01-01 RX ORDER — FLECAINIDE ACETATE 100 MG/1
100 TABLET ORAL EVERY 12 HOURS
Qty: 180 TABLET | Refills: 3 | Status: CANCELLED | OUTPATIENT
Start: 2017-01-01 | End: 2018-08-02

## 2017-01-01 RX ORDER — METOPROLOL TARTRATE 25 MG/1
25 TABLET ORAL ONCE
Status: COMPLETED | OUTPATIENT
Start: 2017-01-01 | End: 2017-01-01

## 2017-01-01 RX ORDER — DIPHENHYDRAMINE HCL 25 MG
25 CAPSULE ORAL
Status: COMPLETED | OUTPATIENT
Start: 2017-01-01 | End: 2017-01-01

## 2017-01-01 RX ORDER — HYDROMORPHONE HYDROCHLORIDE 2 MG/ML
2 INJECTION, SOLUTION INTRAMUSCULAR; INTRAVENOUS; SUBCUTANEOUS
Status: COMPLETED | OUTPATIENT
Start: 2017-01-01 | End: 2017-01-01

## 2017-01-01 RX ORDER — METHYLPREDNISOLONE 4 MG/1
4 TABLET ORAL ONCE
Status: COMPLETED | OUTPATIENT
Start: 2017-01-01 | End: 2017-01-01

## 2017-01-01 RX ORDER — MIDAZOLAM HYDROCHLORIDE 1 MG/ML
INJECTION INTRAMUSCULAR; INTRAVENOUS
Status: DISCONTINUED | OUTPATIENT
Start: 2017-01-01 | End: 2017-01-01

## 2017-01-01 RX ORDER — PROCHLORPERAZINE EDISYLATE 5 MG/ML
5 INJECTION INTRAMUSCULAR; INTRAVENOUS ONCE
Status: COMPLETED | OUTPATIENT
Start: 2017-01-01 | End: 2017-01-01

## 2017-01-01 RX ORDER — HYDROMORPHONE HYDROCHLORIDE 2 MG/1
2 TABLET ORAL EVERY 6 HOURS PRN
Qty: 60 TABLET | Refills: 0 | Status: ON HOLD | OUTPATIENT
Start: 2017-01-01 | End: 2017-01-01 | Stop reason: HOSPADM

## 2017-01-01 RX ORDER — LIDOCAINE HYDROCHLORIDE 10 MG/ML
20 INJECTION INFILTRATION; PERINEURAL ONCE
Status: COMPLETED | OUTPATIENT
Start: 2017-01-01 | End: 2017-01-01

## 2017-01-01 RX ORDER — CETIRIZINE HYDROCHLORIDE 5 MG/1
10 TABLET ORAL DAILY
Status: DISCONTINUED | OUTPATIENT
Start: 2017-01-01 | End: 2017-01-01

## 2017-01-01 RX ORDER — ONDANSETRON HYDROCHLORIDE 2 MG/ML
INJECTION, SOLUTION INTRAMUSCULAR; INTRAVENOUS
Status: DISCONTINUED | OUTPATIENT
Start: 2017-01-01 | End: 2017-01-01

## 2017-01-01 RX ORDER — HYDROMORPHONE HYDROCHLORIDE 1 MG/ML
1 INJECTION, SOLUTION INTRAMUSCULAR; INTRAVENOUS; SUBCUTANEOUS ONCE
Status: DISCONTINUED | OUTPATIENT
Start: 2017-01-01 | End: 2017-01-01

## 2017-01-01 RX ORDER — VALGANCICLOVIR 450 MG/1
900 TABLET, FILM COATED ORAL DAILY
Status: DISCONTINUED | OUTPATIENT
Start: 2017-01-01 | End: 2017-01-01

## 2017-01-01 RX ORDER — PREDNISONE 5 MG/1
10 TABLET ORAL
Status: DISCONTINUED | OUTPATIENT
Start: 2017-01-01 | End: 2017-01-01

## 2017-01-01 RX ORDER — ADENOSINE 3 MG/ML
30 INJECTION, SOLUTION INTRAVENOUS ONCE
Status: COMPLETED | OUTPATIENT
Start: 2017-01-01 | End: 2017-01-01

## 2017-01-01 RX ORDER — LIDOCAINE HYDROCHLORIDE 10 MG/ML
10 INJECTION INFILTRATION; PERINEURAL ONCE
Status: COMPLETED | OUTPATIENT
Start: 2017-01-01 | End: 2017-01-01

## 2017-01-01 RX ORDER — PREDNISONE 20 MG/1
40 TABLET ORAL DAILY
Qty: 60 TABLET | Refills: 1 | Status: SHIPPED | OUTPATIENT
Start: 2017-01-01 | End: 2017-01-01

## 2017-01-01 RX ORDER — OXYCODONE HYDROCHLORIDE 5 MG/1
10 TABLET ORAL EVERY 4 HOURS PRN
Status: DISCONTINUED | OUTPATIENT
Start: 2017-01-01 | End: 2017-01-01

## 2017-01-01 RX ORDER — ONDANSETRON 2 MG/ML
4 INJECTION INTRAMUSCULAR; INTRAVENOUS DAILY PRN
Status: DISCONTINUED | OUTPATIENT
Start: 2017-01-01 | End: 2017-01-01 | Stop reason: HOSPADM

## 2017-01-01 RX ORDER — FENTANYL CITRATE 50 UG/ML
50 INJECTION, SOLUTION INTRAMUSCULAR; INTRAVENOUS
Status: DISCONTINUED | OUTPATIENT
Start: 2017-01-01 | End: 2017-01-01

## 2017-01-01 RX ORDER — PENTAMIDINE ISETHIONATE 300 MG/300MG
300 INHALANT RESPIRATORY (INHALATION)
Status: DISCONTINUED | OUTPATIENT
Start: 2017-01-01 | End: 2017-01-01 | Stop reason: HOSPADM

## 2017-01-01 RX ORDER — PROMETHAZINE HYDROCHLORIDE 12.5 MG/1
12.5 TABLET ORAL NIGHTLY
Status: DISCONTINUED | OUTPATIENT
Start: 2017-01-01 | End: 2017-01-01 | Stop reason: HOSPADM

## 2017-01-01 RX ORDER — ALLOPURINOL 300 MG/1
300 TABLET ORAL 2 TIMES DAILY
Status: DISCONTINUED | OUTPATIENT
Start: 2017-01-01 | End: 2017-01-01

## 2017-01-01 RX ORDER — SODIUM,POTASSIUM PHOSPHATES 280-250MG
2 POWDER IN PACKET (EA) ORAL EVERY 4 HOURS PRN
Status: DISCONTINUED | OUTPATIENT
Start: 2017-01-01 | End: 2017-01-01

## 2017-01-01 RX ORDER — ZOLPIDEM TARTRATE 5 MG/1
5 TABLET ORAL NIGHTLY PRN
Status: DISCONTINUED | OUTPATIENT
Start: 2017-01-01 | End: 2017-01-01

## 2017-01-01 RX ORDER — PANTOPRAZOLE SODIUM 40 MG/1
40 TABLET, DELAYED RELEASE ORAL DAILY
Status: DISCONTINUED | OUTPATIENT
Start: 2017-01-01 | End: 2017-01-01 | Stop reason: HOSPADM

## 2017-01-01 RX ORDER — HEPARIN SODIUM 1000 [USP'U]/ML
INJECTION, SOLUTION INTRAVENOUS; SUBCUTANEOUS
Status: DISCONTINUED | OUTPATIENT
Start: 2017-01-01 | End: 2017-01-01 | Stop reason: HOSPADM

## 2017-01-01 RX ORDER — ACETAMINOPHEN 500 MG
500 TABLET ORAL
Status: DISCONTINUED | OUTPATIENT
Start: 2017-01-01 | End: 2017-01-01

## 2017-01-01 RX ORDER — ROCURONIUM BROMIDE 10 MG/ML
INJECTION, SOLUTION INTRAVENOUS
Status: DISCONTINUED
Start: 2017-01-01 | End: 2017-01-01 | Stop reason: HOSPADM

## 2017-01-01 RX ORDER — SODIUM,POTASSIUM PHOSPHATES 280-250MG
1 POWDER IN PACKET (EA) ORAL
Status: COMPLETED | OUTPATIENT
Start: 2017-01-01 | End: 2017-01-01

## 2017-01-01 RX ORDER — FLECAINIDE ACETATE 100 MG/1
100 TABLET ORAL EVERY 12 HOURS
Status: DISCONTINUED | OUTPATIENT
Start: 2017-01-01 | End: 2017-01-01

## 2017-01-01 RX ORDER — ACETAMINOPHEN 325 MG/1
650 TABLET ORAL EVERY 6 HOURS PRN
Status: DISCONTINUED | OUTPATIENT
Start: 2017-01-01 | End: 2017-01-01 | Stop reason: HOSPADM

## 2017-01-01 RX ORDER — ALPRAZOLAM 0.5 MG/1
0.5 TABLET ORAL 2 TIMES DAILY PRN
Qty: 60 TABLET | Refills: 0 | Status: ON HOLD | OUTPATIENT
Start: 2017-01-01 | End: 2017-01-01

## 2017-01-01 RX ORDER — LANOLIN ALCOHOL/MO/W.PET/CERES
CREAM (GRAM) TOPICAL
Qty: 270 TABLET | Refills: 1 | Status: ON HOLD | OUTPATIENT
Start: 2017-01-01 | End: 2017-01-01 | Stop reason: ALTCHOICE

## 2017-01-01 RX ORDER — GUAIFENESIN 600 MG/1
600 TABLET, EXTENDED RELEASE ORAL 2 TIMES DAILY
Status: DISCONTINUED | OUTPATIENT
Start: 2017-01-01 | End: 2017-01-01

## 2017-01-01 RX ORDER — FINASTERIDE 5 MG/1
5 TABLET, FILM COATED ORAL DAILY
Status: DISCONTINUED | OUTPATIENT
Start: 2017-01-01 | End: 2017-01-01 | Stop reason: HOSPADM

## 2017-01-01 RX ORDER — CIPROFLOXACIN 500 MG/1
500 TABLET ORAL EVERY 12 HOURS
Status: DISCONTINUED | OUTPATIENT
Start: 2017-01-01 | End: 2017-01-01

## 2017-01-01 RX ORDER — HEPARIN 100 UNIT/ML
300 SYRINGE INTRAVENOUS
Status: COMPLETED | OUTPATIENT
Start: 2017-01-01 | End: 2017-01-01

## 2017-01-01 RX ORDER — DIPHENHYDRAMINE HCL 25 MG
CAPSULE ORAL
Status: COMPLETED
Start: 2017-01-01 | End: 2017-01-01

## 2017-01-01 RX ORDER — PROMETHAZINE HYDROCHLORIDE 25 MG/1
25 TABLET ORAL EVERY 6 HOURS PRN
Status: DISCONTINUED | OUTPATIENT
Start: 2017-01-01 | End: 2017-01-01

## 2017-01-01 RX ORDER — ALLOPURINOL 100 MG/1
100 TABLET ORAL 3 TIMES DAILY
Status: DISCONTINUED | OUTPATIENT
Start: 2017-01-01 | End: 2017-01-01

## 2017-01-01 RX ORDER — HYDROMORPHONE HCL IN 0.9% NACL 6 MG/30 ML
PATIENT CONTROLLED ANALGESIA SYRINGE INTRAVENOUS CONTINUOUS
Status: DISCONTINUED | OUTPATIENT
Start: 2017-01-01 | End: 2017-01-01

## 2017-01-01 RX ORDER — METOPROLOL TARTRATE 50 MG/1
50 TABLET ORAL 2 TIMES DAILY
Qty: 180 TABLET | Refills: 3 | Status: SHIPPED | OUTPATIENT
Start: 2017-01-01 | End: 2017-01-01 | Stop reason: SDUPTHER

## 2017-01-01 RX ORDER — LORAZEPAM 1 MG/1
1 TABLET ORAL EVERY 6 HOURS PRN
Status: DISCONTINUED | OUTPATIENT
Start: 2017-01-01 | End: 2017-01-01

## 2017-01-01 RX ORDER — FLECAINIDE ACETATE 50 MG/1
50 TABLET ORAL DAILY
Status: DISCONTINUED | OUTPATIENT
Start: 2017-01-01 | End: 2017-01-01

## 2017-01-01 RX ORDER — FENTANYL CITRATE 50 UG/ML
INJECTION, SOLUTION INTRAMUSCULAR; INTRAVENOUS
Status: DISCONTINUED | OUTPATIENT
Start: 2017-01-01 | End: 2017-01-01

## 2017-01-01 RX ORDER — ITRACONAZOLE 10 MG/ML
20 SOLUTION ORAL 3 TIMES DAILY
Qty: 1800 ML | Refills: 0 | Status: ON HOLD | OUTPATIENT
Start: 2017-01-01 | End: 2017-01-01

## 2017-01-01 RX ORDER — BENZONATATE 100 MG/1
100 CAPSULE ORAL 3 TIMES DAILY PRN
Qty: 30 CAPSULE | Refills: 1 | Status: CANCELLED | OUTPATIENT
Start: 2017-01-01 | End: 2017-01-01

## 2017-01-01 RX ORDER — FUROSEMIDE 10 MG/ML
60 INJECTION INTRAMUSCULAR; INTRAVENOUS ONCE
Status: COMPLETED | OUTPATIENT
Start: 2017-01-01 | End: 2017-01-01

## 2017-01-01 RX ORDER — FUROSEMIDE 10 MG/ML
40 INJECTION INTRAMUSCULAR; INTRAVENOUS 2 TIMES DAILY
Status: COMPLETED | OUTPATIENT
Start: 2017-01-01 | End: 2017-01-01

## 2017-01-01 RX ORDER — VORICONAZOLE 200 MG/1
TABLET, FILM COATED ORAL
Qty: 90 TABLET | Refills: 3 | Status: CANCELLED | OUTPATIENT
Start: 2017-01-01 | End: 2017-10-31

## 2017-01-01 RX ORDER — BUPIVACAINE HYDROCHLORIDE 5 MG/ML
INJECTION, SOLUTION EPIDURAL; INTRACAUDAL
Status: DISCONTINUED | OUTPATIENT
Start: 2017-01-01 | End: 2017-01-01 | Stop reason: HOSPADM

## 2017-01-01 RX ORDER — SULFAMETHOXAZOLE AND TRIMETHOPRIM 800; 160 MG/1; MG/1
1 TABLET ORAL
Status: DISCONTINUED | OUTPATIENT
Start: 2017-01-01 | End: 2017-01-01 | Stop reason: HOSPADM

## 2017-01-01 RX ORDER — MIDAZOLAM HYDROCHLORIDE 1 MG/ML
2 INJECTION INTRAMUSCULAR; INTRAVENOUS ONCE
Status: COMPLETED | OUTPATIENT
Start: 2017-01-01 | End: 2017-01-01

## 2017-01-01 RX ORDER — VORICONAZOLE 200 MG/1
TABLET, FILM COATED ORAL
Qty: 90 TABLET | Refills: 2 | Status: ON HOLD | OUTPATIENT
Start: 2017-01-01 | End: 2017-01-01 | Stop reason: HOSPADM

## 2017-01-01 RX ORDER — TAMSULOSIN HYDROCHLORIDE 0.4 MG/1
CAPSULE ORAL
Qty: 30 CAPSULE | Refills: 11 | Status: ON HOLD | OUTPATIENT
Start: 2017-01-01 | End: 2017-01-01 | Stop reason: HOSPADM

## 2017-01-01 RX ORDER — SODIUM BICARBONATE 1 MEQ/ML
SYRINGE (ML) INTRAVENOUS CODE/TRAUMA/SEDATION MEDICATION
Status: COMPLETED | OUTPATIENT
Start: 2017-01-01 | End: 2017-01-01

## 2017-01-01 RX ORDER — HYDROMORPHONE HYDROCHLORIDE 2 MG/1
2 TABLET ORAL EVERY 4 HOURS PRN
Status: DISCONTINUED | OUTPATIENT
Start: 2017-01-01 | End: 2017-01-01

## 2017-01-01 RX ORDER — BENZONATATE 100 MG/1
100 CAPSULE ORAL 3 TIMES DAILY PRN
Qty: 90 CAPSULE | Refills: 1 | Status: CANCELLED | OUTPATIENT
Start: 2017-01-01 | End: 2017-01-01

## 2017-01-01 RX ORDER — MORPHINE SULFATE 30 MG/1
30 TABLET, FILM COATED, EXTENDED RELEASE ORAL EVERY 12 HOURS
Qty: 60 TABLET | Refills: 0 | Status: SHIPPED | OUTPATIENT
Start: 2017-01-01 | End: 2017-01-01

## 2017-01-01 RX ORDER — PREDNISONE 5 MG/1
15 TABLET ORAL
Status: DISCONTINUED | OUTPATIENT
Start: 2017-01-01 | End: 2017-01-01

## 2017-01-01 RX ORDER — FLECAINIDE ACETATE 50 MG/1
50 TABLET ORAL DAILY
Status: DISCONTINUED | OUTPATIENT
Start: 2017-10-04 | End: 2017-01-01

## 2017-01-01 RX ORDER — LORAZEPAM 1 MG/1
1 TABLET ORAL EVERY 8 HOURS PRN
Qty: 30 TABLET | Refills: 0 | Status: ON HOLD | OUTPATIENT
Start: 2017-01-01 | End: 2017-01-01 | Stop reason: HOSPADM

## 2017-01-01 RX ORDER — PANTOPRAZOLE SODIUM 40 MG/1
40 TABLET, DELAYED RELEASE ORAL DAILY
Status: DISCONTINUED | OUTPATIENT
Start: 2017-01-01 | End: 2017-01-01

## 2017-01-01 RX ORDER — CHLORHEXIDINE GLUCONATE ORAL RINSE 1.2 MG/ML
15 SOLUTION DENTAL 2 TIMES DAILY
Status: DISCONTINUED | OUTPATIENT
Start: 2017-01-01 | End: 2017-01-01

## 2017-01-01 RX ORDER — GUAIFENESIN 600 MG/1
600 TABLET, EXTENDED RELEASE ORAL 2 TIMES DAILY
COMMUNITY
Start: 2017-01-01 | End: 2017-01-01

## 2017-01-01 RX ORDER — LORAZEPAM 0.5 MG/1
1 TABLET ORAL EVERY 8 HOURS PRN
Status: DISCONTINUED | OUTPATIENT
Start: 2017-01-01 | End: 2017-01-01 | Stop reason: HOSPADM

## 2017-01-01 RX ORDER — POTASSIUM CHLORIDE 14.9 MG/ML
20 INJECTION INTRAVENOUS
Status: DISCONTINUED | OUTPATIENT
Start: 2017-01-01 | End: 2017-01-01

## 2017-01-01 RX ORDER — MORPHINE SULFATE 15 MG/1
30 TABLET, FILM COATED, EXTENDED RELEASE ORAL 2 TIMES DAILY
Status: DISCONTINUED | OUTPATIENT
Start: 2017-01-01 | End: 2017-01-01

## 2017-01-01 RX ORDER — FLECAINIDE ACETATE 100 MG/1
100 TABLET ORAL EVERY 12 HOURS
Status: DISCONTINUED | OUTPATIENT
Start: 2017-01-01 | End: 2017-01-01 | Stop reason: HOSPADM

## 2017-01-01 RX ORDER — GUAIFENESIN 600 MG/1
600 TABLET, EXTENDED RELEASE ORAL 2 TIMES DAILY
Status: DISCONTINUED | OUTPATIENT
Start: 2017-01-01 | End: 2017-01-01 | Stop reason: HOSPADM

## 2017-01-01 RX ORDER — ATORVASTATIN CALCIUM 20 MG/1
20 TABLET, FILM COATED ORAL DAILY
Qty: 90 TABLET | Refills: 3 | Status: CANCELLED | OUTPATIENT
Start: 2017-01-01 | End: 2018-08-02

## 2017-01-01 RX ORDER — ABACAVIR 300 MG/1
600 TABLET ORAL DAILY
Status: DISCONTINUED | OUTPATIENT
Start: 2017-01-01 | End: 2017-01-01 | Stop reason: HOSPADM

## 2017-01-01 RX ORDER — SODIUM CHLORIDE 9 MG/ML
INJECTION, SOLUTION INTRAVENOUS CONTINUOUS
Status: DISCONTINUED | OUTPATIENT
Start: 2017-01-01 | End: 2017-01-01 | Stop reason: HOSPADM

## 2017-01-01 RX ORDER — HYDROMORPHONE HYDROCHLORIDE 1 MG/ML
0.5 INJECTION, SOLUTION INTRAMUSCULAR; INTRAVENOUS; SUBCUTANEOUS ONCE AS NEEDED
Status: COMPLETED | OUTPATIENT
Start: 2017-01-01 | End: 2017-01-01

## 2017-01-01 RX ORDER — SODIUM CHLORIDE, SODIUM LACTATE, POTASSIUM CHLORIDE, CALCIUM CHLORIDE 600; 310; 30; 20 MG/100ML; MG/100ML; MG/100ML; MG/100ML
INJECTION, SOLUTION INTRAVENOUS CONTINUOUS
Status: DISCONTINUED | OUTPATIENT
Start: 2017-01-01 | End: 2017-01-01

## 2017-01-01 RX ORDER — PROMETHAZINE HYDROCHLORIDE 12.5 MG/1
12.5 TABLET ORAL NIGHTLY PRN
Status: DISCONTINUED | OUTPATIENT
Start: 2017-01-01 | End: 2017-01-01

## 2017-01-01 RX ORDER — LANOLIN ALCOHOL/MO/W.PET/CERES
400 CREAM (GRAM) TOPICAL EVERY 4 HOURS PRN
Status: DISCONTINUED | OUTPATIENT
Start: 2017-01-01 | End: 2017-01-01

## 2017-01-01 RX ORDER — ABACAVIR SULFATE, DOLUTEGRAVIR SODIUM, LAMIVUDINE 600; 50; 300 MG/1; MG/1; MG/1
TABLET, FILM COATED ORAL
Qty: 30 TABLET | Refills: 0 | Status: CANCELLED | OUTPATIENT
Start: 2017-01-01

## 2017-01-01 RX ORDER — LIDOCAINE HYDROCHLORIDE 10 MG/ML
1 INJECTION INFILTRATION; PERINEURAL ONCE
Status: DISCONTINUED | OUTPATIENT
Start: 2017-01-01 | End: 2017-01-01

## 2017-01-01 RX ORDER — METOPROLOL TARTRATE 1 MG/ML
INJECTION, SOLUTION INTRAVENOUS
Status: COMPLETED
Start: 2017-01-01 | End: 2017-01-01

## 2017-01-01 RX ORDER — POTASSIUM CHLORIDE 7.45 MG/ML
80 INJECTION INTRAVENOUS
Status: DISCONTINUED | OUTPATIENT
Start: 2017-01-01 | End: 2017-01-01 | Stop reason: HOSPADM

## 2017-01-01 RX ORDER — FUROSEMIDE 10 MG/ML
40 INJECTION INTRAMUSCULAR; INTRAVENOUS 2 TIMES DAILY
Status: DISCONTINUED | OUTPATIENT
Start: 2017-01-01 | End: 2017-01-01

## 2017-01-01 RX ORDER — HYDROMORPHONE HYDROCHLORIDE 1 MG/ML
1 INJECTION, SOLUTION INTRAMUSCULAR; INTRAVENOUS; SUBCUTANEOUS EVERY 6 HOURS PRN
Status: DISCONTINUED | OUTPATIENT
Start: 2017-01-01 | End: 2017-01-01

## 2017-01-01 RX ORDER — PREDNISONE 5 MG/1
5 TABLET ORAL DAILY
Qty: 20 TABLET | Refills: 0 | Status: SHIPPED | OUTPATIENT
Start: 2017-01-01 | End: 2017-01-01

## 2017-01-01 RX ORDER — ITRACONAZOLE 10 MG/ML
100 SOLUTION ORAL
Status: DISCONTINUED | OUTPATIENT
Start: 2017-01-01 | End: 2017-01-01

## 2017-01-01 RX ORDER — HYDROMORPHONE HYDROCHLORIDE 1 MG/ML
3 INJECTION, SOLUTION INTRAMUSCULAR; INTRAVENOUS; SUBCUTANEOUS
Status: DISCONTINUED | OUTPATIENT
Start: 2017-01-01 | End: 2017-01-01

## 2017-01-01 RX ORDER — PREDNISONE 20 MG/1
20 TABLET ORAL
Status: COMPLETED | OUTPATIENT
Start: 2017-01-01 | End: 2017-01-01

## 2017-01-01 RX ORDER — METRONIDAZOLE 500 MG/100ML
500 INJECTION, SOLUTION INTRAVENOUS
Status: DISCONTINUED | OUTPATIENT
Start: 2017-01-01 | End: 2017-01-01

## 2017-01-01 RX ORDER — SODIUM CHLORIDE 9 MG/ML
500 INJECTION, SOLUTION INTRAVENOUS
Status: COMPLETED | OUTPATIENT
Start: 2017-01-01 | End: 2017-01-01

## 2017-01-01 RX ORDER — PREDNISONE 20 MG/1
TABLET ORAL
Qty: 72 TABLET | Refills: 1 | Status: CANCELLED | OUTPATIENT
Start: 2017-01-01 | End: 2017-01-01

## 2017-01-01 RX ORDER — PREDNISONE 5 MG/1
5 TABLET ORAL DAILY
Qty: 7 TABLET | Refills: 0 | Status: SHIPPED | OUTPATIENT
Start: 2017-01-01 | End: 2017-01-01 | Stop reason: HOSPADM

## 2017-01-01 RX ORDER — PROPOFOL 10 MG/ML
5 INJECTION, EMULSION INTRAVENOUS CONTINUOUS
Status: DISCONTINUED | OUTPATIENT
Start: 2017-01-01 | End: 2017-01-01

## 2017-01-01 RX ORDER — ABACAVIR 300 MG/1
600 TABLET ORAL DAILY
Status: DISCONTINUED | OUTPATIENT
Start: 2017-01-01 | End: 2017-01-01

## 2017-01-01 RX ORDER — ATORVASTATIN CALCIUM 20 MG/1
20 TABLET, FILM COATED ORAL DAILY
Status: DISCONTINUED | OUTPATIENT
Start: 2017-10-04 | End: 2017-01-01

## 2017-01-01 RX ORDER — HEPARIN 100 UNIT/ML
300 SYRINGE INTRAVENOUS
Status: DISCONTINUED | OUTPATIENT
Start: 2017-01-01 | End: 2017-01-01 | Stop reason: HOSPADM

## 2017-01-01 RX ORDER — CEFEPIME HYDROCHLORIDE 1 G/50ML
1 INJECTION, SOLUTION INTRAVENOUS
Status: DISCONTINUED | OUTPATIENT
Start: 2017-01-01 | End: 2017-01-01

## 2017-01-01 RX ORDER — MORPHINE SULFATE 15 MG/1
15 TABLET, FILM COATED, EXTENDED RELEASE ORAL 2 TIMES DAILY
Qty: 40 TABLET | Refills: 0 | Status: SHIPPED | OUTPATIENT
Start: 2017-01-01 | End: 2017-01-01

## 2017-01-01 RX ORDER — LAMIVUDINE 150 MG/1
300 TABLET, FILM COATED ORAL DAILY
Status: DISCONTINUED | OUTPATIENT
Start: 2017-01-01 | End: 2017-01-01

## 2017-01-01 RX ORDER — ACETAMINOPHEN 325 MG/1
650 TABLET ORAL ONCE AS NEEDED
Status: COMPLETED | OUTPATIENT
Start: 2017-01-01 | End: 2017-01-01

## 2017-01-01 RX ORDER — PROPOFOL 10 MG/ML
INJECTION, EMULSION INTRAVENOUS
Status: DISCONTINUED
Start: 2017-01-01 | End: 2017-01-01 | Stop reason: HOSPADM

## 2017-01-01 RX ORDER — ONDANSETRON 2 MG/ML
8 INJECTION INTRAMUSCULAR; INTRAVENOUS EVERY 8 HOURS
Status: DISCONTINUED | OUTPATIENT
Start: 2017-01-01 | End: 2017-01-01

## 2017-01-01 RX ORDER — ONDANSETRON 4 MG/1
8 TABLET, FILM COATED ORAL EVERY 8 HOURS PRN
Status: DISCONTINUED | OUTPATIENT
Start: 2017-01-01 | End: 2017-01-01

## 2017-01-01 RX ORDER — VITAMIN E 268 MG
400 CAPSULE ORAL DAILY
Status: DISCONTINUED | OUTPATIENT
Start: 2017-01-01 | End: 2017-01-01

## 2017-01-01 RX ORDER — ALBUMIN HUMAN 250 G/1000ML
50 SOLUTION INTRAVENOUS ONCE
Status: COMPLETED | OUTPATIENT
Start: 2017-01-01 | End: 2017-01-01

## 2017-01-01 RX ORDER — HYDROCORTISONE ACETATE 25 MG/1
25 SUPPOSITORY RECTAL 2 TIMES DAILY
Qty: 12 SUPPOSITORY | Refills: 1 | Status: ON HOLD | OUTPATIENT
Start: 2017-01-01 | End: 2017-01-01 | Stop reason: HOSPADM

## 2017-01-01 RX ORDER — KETAMINE HYDROCHLORIDE 100 MG/ML
INJECTION, SOLUTION INTRAMUSCULAR; INTRAVENOUS
Status: DISCONTINUED | OUTPATIENT
Start: 2017-01-01 | End: 2017-01-01

## 2017-01-01 RX ORDER — SULFAMETHOXAZOLE AND TRIMETHOPRIM 800; 160 MG/1; MG/1
1 TABLET ORAL
Status: ON HOLD | COMMUNITY
Start: 2017-01-01 | End: 2017-01-01 | Stop reason: HOSPADM

## 2017-01-01 RX ORDER — LIDOCAINE HYDROCHLORIDE 20 MG/ML
INJECTION, SOLUTION INFILTRATION; PERINEURAL CODE/TRAUMA/SEDATION MEDICATION
Status: DISCONTINUED | OUTPATIENT
Start: 2017-01-01 | End: 2017-01-01

## 2017-01-01 RX ORDER — SODIUM CHLORIDE 0.9 % (FLUSH) 0.9 %
3 SYRINGE (ML) INJECTION EVERY 8 HOURS
Status: DISCONTINUED | OUTPATIENT
Start: 2017-01-01 | End: 2017-01-01

## 2017-01-01 RX ORDER — SODIUM CHLORIDE 0.9 % (FLUSH) 0.9 %
3 SYRINGE (ML) INJECTION
Status: DISCONTINUED | OUTPATIENT
Start: 2017-01-01 | End: 2017-01-01

## 2017-01-01 RX ORDER — MORPHINE SULFATE 30 MG/1
30 TABLET, FILM COATED, EXTENDED RELEASE ORAL 2 TIMES DAILY
Status: ON HOLD | COMMUNITY
End: 2017-01-01 | Stop reason: HOSPADM

## 2017-01-01 RX ORDER — DIPHENHYDRAMINE HCL 25 MG
25 CAPSULE ORAL EVERY 6 HOURS PRN
Status: DISCONTINUED | OUTPATIENT
Start: 2017-01-01 | End: 2017-01-01

## 2017-01-01 RX ORDER — BUPIVACAINE HYDROCHLORIDE AND EPINEPHRINE 2.5; 5 MG/ML; UG/ML
INJECTION, SOLUTION EPIDURAL; INFILTRATION; INTRACAUDAL; PERINEURAL
Status: DISCONTINUED | OUTPATIENT
Start: 2017-01-01 | End: 2017-01-01 | Stop reason: HOSPADM

## 2017-01-01 RX ORDER — HYDROCORTISONE 5 MG/1
TABLET ORAL
Qty: 70 TABLET | Refills: 0 | Status: ON HOLD | OUTPATIENT
Start: 2017-01-01 | End: 2017-01-01 | Stop reason: HOSPADM

## 2017-01-01 RX ORDER — PENTAMIDINE ISETHIONATE 300 MG/300MG
300 INHALANT RESPIRATORY (INHALATION)
Status: DISCONTINUED | OUTPATIENT
Start: 2017-01-01 | End: 2017-01-01

## 2017-01-01 RX ORDER — CLOTRIMAZOLE 10 MG/1
10 LOZENGE ORAL; TOPICAL
Status: DISCONTINUED | OUTPATIENT
Start: 2017-01-01 | End: 2017-01-01

## 2017-01-01 RX ORDER — DIPHENHYDRAMINE HCL 25 MG
25 CAPSULE ORAL
Status: DISCONTINUED | OUTPATIENT
Start: 2017-01-01 | End: 2017-01-01

## 2017-01-01 RX ORDER — SULFAMETHOXAZOLE AND TRIMETHOPRIM 800; 160 MG/1; MG/1
1 TABLET ORAL
Status: DISCONTINUED | OUTPATIENT
Start: 2017-10-04 | End: 2017-01-01

## 2017-01-01 RX ORDER — ABACAVIR 300 MG/1
600 TABLET ORAL DAILY
Status: DISCONTINUED | OUTPATIENT
Start: 2017-10-04 | End: 2017-01-01

## 2017-01-01 RX ORDER — PROMETHAZINE HYDROCHLORIDE 12.5 MG/1
12.5 TABLET ORAL EVERY 6 HOURS PRN
Qty: 30 TABLET | Refills: 2 | Status: CANCELLED | OUTPATIENT
Start: 2017-01-01

## 2017-01-01 RX ORDER — METOPROLOL TARTRATE 50 MG/1
50 TABLET ORAL 2 TIMES DAILY
Status: DISCONTINUED | OUTPATIENT
Start: 2017-01-01 | End: 2017-01-01

## 2017-01-01 RX ORDER — LIDOCAINE HYDROCHLORIDE AND EPINEPHRINE 10; 10 MG/ML; UG/ML
20 INJECTION, SOLUTION INFILTRATION; PERINEURAL ONCE
Status: COMPLETED | OUTPATIENT
Start: 2017-01-01 | End: 2017-01-01

## 2017-01-01 RX ORDER — POTASSIUM CHLORIDE 750 MG/1
20 CAPSULE, EXTENDED RELEASE ORAL ONCE
Status: COMPLETED | OUTPATIENT
Start: 2017-01-01 | End: 2017-01-01

## 2017-01-01 RX ORDER — VORICONAZOLE 200 MG/1
200 TABLET, FILM COATED ORAL 2 TIMES DAILY
Status: DISCONTINUED | OUTPATIENT
Start: 2017-01-01 | End: 2017-01-01

## 2017-01-01 RX ORDER — TAMSULOSIN HYDROCHLORIDE 0.4 MG/1
0.4 CAPSULE ORAL EVERY OTHER DAY
Status: DISCONTINUED | OUTPATIENT
Start: 2017-01-01 | End: 2017-01-01 | Stop reason: HOSPADM

## 2017-01-01 RX ORDER — SULFAMETHOXAZOLE AND TRIMETHOPRIM 800; 160 MG/1; MG/1
1 TABLET ORAL
Qty: 36 TABLET | Refills: 0 | Status: SHIPPED | OUTPATIENT
Start: 2017-01-01 | End: 2017-01-01 | Stop reason: ALTCHOICE

## 2017-01-01 RX ORDER — METHYLPREDNISOLONE 4 MG/1
8 TABLET ORAL ONCE
Status: COMPLETED | OUTPATIENT
Start: 2017-01-01 | End: 2017-01-01

## 2017-01-01 RX ORDER — LORAZEPAM 2 MG/ML
1 INJECTION INTRAMUSCULAR ONCE AS NEEDED
Status: COMPLETED | OUTPATIENT
Start: 2017-01-01 | End: 2017-01-01

## 2017-01-01 RX ORDER — RAMELTEON 8 MG/1
8 TABLET ORAL NIGHTLY
Status: DISCONTINUED | OUTPATIENT
Start: 2017-01-01 | End: 2017-01-01

## 2017-01-01 RX ORDER — SUCCINYLCHOLINE CHLORIDE 20 MG/ML
INJECTION INTRAMUSCULAR; INTRAVENOUS
Status: COMPLETED
Start: 2017-01-01 | End: 2017-01-01

## 2017-01-01 RX ORDER — ACYCLOVIR 800 MG/1
800 TABLET ORAL 2 TIMES DAILY
Status: DISCONTINUED | OUTPATIENT
Start: 2017-01-01 | End: 2017-01-01

## 2017-01-01 RX ORDER — PROCHLORPERAZINE EDISYLATE 5 MG/ML
5 INJECTION INTRAMUSCULAR; INTRAVENOUS EVERY 6 HOURS PRN
Status: DISCONTINUED | OUTPATIENT
Start: 2017-01-01 | End: 2017-01-01 | Stop reason: HOSPADM

## 2017-01-01 RX ORDER — IPRATROPIUM BROMIDE AND ALBUTEROL SULFATE 2.5; .5 MG/3ML; MG/3ML
3 SOLUTION RESPIRATORY (INHALATION) EVERY 4 HOURS
Status: DISCONTINUED | OUTPATIENT
Start: 2017-01-01 | End: 2017-01-01

## 2017-01-01 RX ORDER — HYDROMORPHONE HYDROCHLORIDE 1 MG/ML
0.5 INJECTION, SOLUTION INTRAMUSCULAR; INTRAVENOUS; SUBCUTANEOUS ONCE
Status: COMPLETED | OUTPATIENT
Start: 2017-01-01 | End: 2017-01-01

## 2017-01-01 RX ORDER — MORPHINE SULFATE 15 MG/1
15 TABLET, FILM COATED, EXTENDED RELEASE ORAL EVERY 12 HOURS
Status: DISCONTINUED | OUTPATIENT
Start: 2017-01-01 | End: 2017-01-01

## 2017-01-01 RX ORDER — AZACITIDINE 100 MG/1
75 INJECTION, POWDER, LYOPHILIZED, FOR SOLUTION INTRAVENOUS; SUBCUTANEOUS
Status: DISCONTINUED | OUTPATIENT
Start: 2017-01-01 | End: 2017-01-01 | Stop reason: HOSPADM

## 2017-01-01 RX ORDER — METOPROLOL TARTRATE 25 MG/1
25 TABLET, FILM COATED ORAL 2 TIMES DAILY
Qty: 180 TABLET | Refills: 3 | Status: ON HOLD | OUTPATIENT
Start: 2017-01-01 | End: 2017-01-01 | Stop reason: HOSPADM

## 2017-01-01 RX ORDER — HYDROMORPHONE HYDROCHLORIDE 1 MG/ML
2 INJECTION, SOLUTION INTRAMUSCULAR; INTRAVENOUS; SUBCUTANEOUS
Status: COMPLETED | OUTPATIENT
Start: 2017-01-01 | End: 2017-01-01

## 2017-01-01 RX ORDER — ITRACONAZOLE 10 MG/ML
20 SOLUTION ORAL 3 TIMES DAILY
Status: DISCONTINUED | OUTPATIENT
Start: 2017-01-01 | End: 2017-01-01

## 2017-01-01 RX ORDER — ONDANSETRON HYDROCHLORIDE 8 MG/1
8 TABLET, FILM COATED ORAL EVERY 8 HOURS PRN
Qty: 30 TABLET | Refills: 3 | Status: CANCELLED | OUTPATIENT
Start: 2017-01-01 | End: 2017-10-26

## 2017-01-01 RX ORDER — CYCLOBENZAPRINE HCL 5 MG
5 TABLET ORAL 3 TIMES DAILY PRN
Status: DISCONTINUED | OUTPATIENT
Start: 2017-01-01 | End: 2017-01-01

## 2017-01-01 RX ORDER — LANOLIN ALCOHOL/MO/W.PET/CERES
800 CREAM (GRAM) TOPICAL EVERY 4 HOURS PRN
Status: DISCONTINUED | OUTPATIENT
Start: 2017-01-01 | End: 2017-01-01

## 2017-01-01 RX ORDER — ONDANSETRON 4 MG/1
16 TABLET, FILM COATED ORAL
Status: COMPLETED | OUTPATIENT
Start: 2017-01-01 | End: 2017-01-01

## 2017-01-01 RX ORDER — SODIUM,POTASSIUM PHOSPHATES 280-250MG
1 POWDER IN PACKET (EA) ORAL EVERY 4 HOURS PRN
Status: DISCONTINUED | OUTPATIENT
Start: 2017-01-01 | End: 2017-01-01

## 2017-01-01 RX ADMIN — ABACAVIR 600 MG: 300 TABLET, FILM COATED ORAL at 08:07

## 2017-01-01 RX ADMIN — POTASSIUM & SODIUM PHOSPHATES POWDER PACK 280-160-250 MG 1 PACKET: 280-160-250 PACK at 07:07

## 2017-01-01 RX ADMIN — Medication 1250 MG: at 11:09

## 2017-01-01 RX ADMIN — HYDROMORPHONE HYDROCHLORIDE 1 MG: 1 INJECTION, SOLUTION INTRAMUSCULAR; INTRAVENOUS; SUBCUTANEOUS at 12:07

## 2017-01-01 RX ADMIN — DOLUTEGRAVIR SODIUM 50 MG: 50 TABLET, FILM COATED ORAL at 08:09

## 2017-01-01 RX ADMIN — CEFEPIME HYDROCHLORIDE 2 G: 2 INJECTION, SOLUTION INTRAVENOUS at 03:07

## 2017-01-01 RX ADMIN — FILGRASTIM 480 MCG: 480 INJECTION, SOLUTION INTRAVENOUS; SUBCUTANEOUS at 05:07

## 2017-01-01 RX ADMIN — HYDROMORPHONE HYDROCHLORIDE 1 MG: 1 INJECTION, SOLUTION INTRAMUSCULAR; INTRAVENOUS; SUBCUTANEOUS at 09:07

## 2017-01-01 RX ADMIN — ONDANSETRON 8 MG: 2 INJECTION INTRAMUSCULAR; INTRAVENOUS at 06:10

## 2017-01-01 RX ADMIN — MAGNESIUM OXIDE TAB 400 MG (241.3 MG ELEMENTAL MG) 800 MG: 400 (241.3 MG) TAB at 09:07

## 2017-01-01 RX ADMIN — MIDAZOLAM HYDROCHLORIDE 2 MG: 5 INJECTION, SOLUTION INTRAMUSCULAR; INTRAVENOUS at 10:07

## 2017-01-01 RX ADMIN — ACETAMINOPHEN 500 MG: 500 TABLET ORAL at 02:07

## 2017-01-01 RX ADMIN — LORAZEPAM 1 MG: 0.5 TABLET ORAL at 12:07

## 2017-01-01 RX ADMIN — HYDROMORPHONE HYDROCHLORIDE 2 MG: 2 INJECTION INTRAMUSCULAR; INTRAVENOUS; SUBCUTANEOUS at 08:07

## 2017-01-01 RX ADMIN — TAMSULOSIN HYDROCHLORIDE 0.4 MG: 0.4 CAPSULE ORAL at 08:07

## 2017-01-01 RX ADMIN — KETAMINE HYDROCHLORIDE 30 MG: 100 INJECTION, SOLUTION, CONCENTRATE INTRAMUSCULAR; INTRAVENOUS at 02:06

## 2017-01-01 RX ADMIN — FINASTERIDE 5 MG: 5 TABLET, FILM COATED ORAL at 08:07

## 2017-01-01 RX ADMIN — LAMIVUDINE 300 MG: 150 TABLET, FILM COATED ORAL at 05:06

## 2017-01-01 RX ADMIN — PANTOPRAZOLE SODIUM 40 MG: 40 TABLET, DELAYED RELEASE ORAL at 12:07

## 2017-01-01 RX ADMIN — CIPROFLOXACIN HYDROCHLORIDE 500 MG: 500 TABLET, FILM COATED ORAL at 08:07

## 2017-01-01 RX ADMIN — VANCOMYCIN HYDROCHLORIDE 1500 MG: 100 INJECTION, POWDER, LYOPHILIZED, FOR SOLUTION INTRAVENOUS at 10:07

## 2017-01-01 RX ADMIN — ITRACONAZOLE 100 MG: 10 SOLUTION ORAL at 03:06

## 2017-01-01 RX ADMIN — ABACAVIR 600 MG: 300 TABLET, FILM COATED ORAL at 08:09

## 2017-01-01 RX ADMIN — HYDROMORPHONE HYDROCHLORIDE 1 MG: 1 INJECTION, SOLUTION INTRAMUSCULAR; INTRAVENOUS; SUBCUTANEOUS at 08:09

## 2017-01-01 RX ADMIN — ACETAMINOPHEN 650 MG: 325 TABLET ORAL at 09:09

## 2017-01-01 RX ADMIN — MORPHINE SULFATE 30 MG: 30 TABLET, EXTENDED RELEASE ORAL at 08:07

## 2017-01-01 RX ADMIN — SULFAMETHOXAZOLE AND TRIMETHOPRIM 1 TABLET: 800; 160 TABLET ORAL at 04:09

## 2017-01-01 RX ADMIN — LOPERAMIDE HYDROCHLORIDE 2 MG: 2 CAPSULE ORAL at 04:09

## 2017-01-01 RX ADMIN — HYDROMORPHONE HYDROCHLORIDE 1 MG: 1 INJECTION, SOLUTION INTRAMUSCULAR; INTRAVENOUS; SUBCUTANEOUS at 07:09

## 2017-01-01 RX ADMIN — HYDROMORPHONE HYDROCHLORIDE 2 MG: 2 INJECTION INTRAMUSCULAR; INTRAVENOUS; SUBCUTANEOUS at 09:07

## 2017-01-01 RX ADMIN — DIPHENHYDRAMINE HYDROCHLORIDE 25 MG: 25 CAPSULE ORAL at 09:07

## 2017-01-01 RX ADMIN — LIDOCAINE HYDROCHLORIDE 100 MG: 20 INJECTION, SOLUTION INTRAVENOUS at 02:07

## 2017-01-01 RX ADMIN — Medication 30 ML: at 05:09

## 2017-01-01 RX ADMIN — CEFEPIME HYDROCHLORIDE 2 G: 2 INJECTION, SOLUTION INTRAVENOUS at 04:06

## 2017-01-01 RX ADMIN — SODIUM CHLORIDE: 0.9 INJECTION, SOLUTION INTRAVENOUS at 10:07

## 2017-01-01 RX ADMIN — HYDROMORPHONE HYDROCHLORIDE 2 MG: 2 INJECTION INTRAMUSCULAR; INTRAVENOUS; SUBCUTANEOUS at 05:07

## 2017-01-01 RX ADMIN — METOPROLOL TARTRATE 25 MG: 25 TABLET, FILM COATED ORAL at 08:09

## 2017-01-01 RX ADMIN — SODIUM CHLORIDE, SODIUM LACTATE, POTASSIUM CHLORIDE, AND CALCIUM CHLORIDE 1000 ML: 600; 310; 30; 20 INJECTION, SOLUTION INTRAVENOUS at 11:09

## 2017-01-01 RX ADMIN — SODIUM CHLORIDE, PRESERVATIVE FREE 10 ML: 5 INJECTION INTRAVENOUS at 07:08

## 2017-01-01 RX ADMIN — METHYLPREDNISOLONE SODIUM SUCCINATE 80 MG: 40 INJECTION, POWDER, FOR SOLUTION INTRAMUSCULAR; INTRAVENOUS at 09:09

## 2017-01-01 RX ADMIN — CYTARABINE 4260 MG: 100 INJECTION, SOLUTION INTRATHECAL; INTRAVENOUS; SUBCUTANEOUS at 12:07

## 2017-01-01 RX ADMIN — POTASSIUM CHLORIDE 10 MEQ: 10 INJECTION, SOLUTION INTRAVENOUS at 09:07

## 2017-01-01 RX ADMIN — BENZONATATE 100 MG: 100 CAPSULE, LIQUID FILLED ORAL at 12:07

## 2017-01-01 RX ADMIN — HYDROMORPHONE HYDROCHLORIDE 2 MG: 2 INJECTION INTRAMUSCULAR; INTRAVENOUS; SUBCUTANEOUS at 11:07

## 2017-01-01 RX ADMIN — SODIUM CHLORIDE: 0.9 INJECTION, SOLUTION INTRAVENOUS at 01:07

## 2017-01-01 RX ADMIN — MAGNESIUM OXIDE TAB 400 MG (241.3 MG ELEMENTAL MG) 800 MG: 400 (241.3 MG) TAB at 08:07

## 2017-01-01 RX ADMIN — ENEMA 2 ENEMA: 19; 7 ENEMA RECTAL at 09:09

## 2017-01-01 RX ADMIN — ACYCLOVIR 400 MG: 200 CAPSULE ORAL at 08:06

## 2017-01-01 RX ADMIN — ITRACONAZOLE 100 MG: 10 SOLUTION ORAL at 06:06

## 2017-01-01 RX ADMIN — CEFEPIME HYDROCHLORIDE 2 G: 2 INJECTION, SOLUTION INTRAVENOUS at 04:07

## 2017-01-01 RX ADMIN — METOPROLOL TARTRATE 25 MG: 25 TABLET, FILM COATED ORAL at 08:07

## 2017-01-01 RX ADMIN — HYDROMORPHONE HYDROCHLORIDE 2 MG: 2 INJECTION INTRAMUSCULAR; INTRAVENOUS; SUBCUTANEOUS at 02:07

## 2017-01-01 RX ADMIN — MAGNESIUM OXIDE TAB 400 MG (241.3 MG ELEMENTAL MG) 800 MG: 400 (241.3 MG) TAB at 12:07

## 2017-01-01 RX ADMIN — GUAIFENESIN 600 MG: 600 TABLET, EXTENDED RELEASE ORAL at 08:07

## 2017-01-01 RX ADMIN — CLOTRIMAZOLE 10 MG: 10 LOZENGE ORAL; TOPICAL at 01:09

## 2017-01-01 RX ADMIN — PHENYLEPHRINE HYDROCHLORIDE 200 MCG: 10 INJECTION INTRAVENOUS at 03:07

## 2017-01-01 RX ADMIN — POTASSIUM CHLORIDE 10 MEQ: 10 INJECTION, SOLUTION INTRAVENOUS at 10:07

## 2017-01-01 RX ADMIN — ONDANSETRON 8 MG: 2 INJECTION INTRAMUSCULAR; INTRAVENOUS at 12:07

## 2017-01-01 RX ADMIN — HYDROMORPHONE HYDROCHLORIDE 1 MG: 1 INJECTION, SOLUTION INTRAMUSCULAR; INTRAVENOUS; SUBCUTANEOUS at 12:09

## 2017-01-01 RX ADMIN — ALPRAZOLAM 0.5 MG: 0.5 TABLET ORAL at 09:07

## 2017-01-01 RX ADMIN — ONDANSETRON 16 MG: 4 TABLET, FILM COATED ORAL at 07:06

## 2017-01-01 RX ADMIN — HEPARIN 300 UNITS: 100 SYRINGE at 03:08

## 2017-01-01 RX ADMIN — VANCOMYCIN HYDROCHLORIDE 1250 MG: 100 INJECTION, POWDER, LYOPHILIZED, FOR SOLUTION INTRAVENOUS at 04:06

## 2017-01-01 RX ADMIN — HYDROMORPHONE HYDROCHLORIDE 2 MG: 2 INJECTION INTRAMUSCULAR; INTRAVENOUS; SUBCUTANEOUS at 03:07

## 2017-01-01 RX ADMIN — MORPHINE SULFATE 30 MG: 15 TABLET, EXTENDED RELEASE ORAL at 10:10

## 2017-01-01 RX ADMIN — METOPROLOL TARTRATE 5 MG: 1 INJECTION, SOLUTION INTRAVENOUS at 05:09

## 2017-01-01 RX ADMIN — HYDROMORPHONE HYDROCHLORIDE 0.5 MG: 1 INJECTION, SOLUTION INTRAMUSCULAR; INTRAVENOUS; SUBCUTANEOUS at 09:09

## 2017-01-01 RX ADMIN — ACYCLOVIR 400 MG: 200 CAPSULE ORAL at 08:07

## 2017-01-01 RX ADMIN — TAMSULOSIN HYDROCHLORIDE 0.4 MG: 0.4 CAPSULE ORAL at 09:07

## 2017-01-01 RX ADMIN — ONDANSETRON 8 MG: 2 INJECTION INTRAMUSCULAR; INTRAVENOUS at 06:07

## 2017-01-01 RX ADMIN — HYDROMORPHONE HYDROCHLORIDE 1 MG: 1 INJECTION, SOLUTION INTRAMUSCULAR; INTRAVENOUS; SUBCUTANEOUS at 06:09

## 2017-01-01 RX ADMIN — DIPHENHYDRAMINE HYDROCHLORIDE 25 MG: 25 CAPSULE ORAL at 05:06

## 2017-01-01 RX ADMIN — SODIUM CHLORIDE: 0.9 INJECTION, SOLUTION INTRAVENOUS at 09:06

## 2017-01-01 RX ADMIN — MAGNESIUM SULFATE HEPTAHYDRATE 2 G: 40 INJECTION, SOLUTION INTRAVENOUS at 09:07

## 2017-01-01 RX ADMIN — VORICONAZOLE 300 MG: 50 TABLET, FILM COATED ORAL at 09:07

## 2017-01-01 RX ADMIN — MAGNESIUM SULFATE HEPTAHYDRATE 2 G: 40 INJECTION, SOLUTION INTRAVENOUS at 07:07

## 2017-01-01 RX ADMIN — Medication 30 ML: at 11:09

## 2017-01-01 RX ADMIN — DOLUTEGRAVIR SODIUM 50 MG: 50 TABLET, FILM COATED ORAL at 11:07

## 2017-01-01 RX ADMIN — DIPHENOXYLATE HYDROCHLORIDE AND ATROPINE SULFATE 1 TABLET: 2.5; .025 TABLET ORAL at 01:09

## 2017-01-01 RX ADMIN — VORICONAZOLE 200 MG: 200 TABLET, FILM COATED ORAL at 08:07

## 2017-01-01 RX ADMIN — HYDROMORPHONE HYDROCHLORIDE 2 MG: 2 INJECTION INTRAMUSCULAR; INTRAVENOUS; SUBCUTANEOUS at 06:07

## 2017-01-01 RX ADMIN — PIPERACILLIN AND TAZOBACTAM 4.5 G: 4; .5 INJECTION, POWDER, FOR SOLUTION INTRAVENOUS at 04:09

## 2017-01-01 RX ADMIN — ACYCLOVIR 800 MG: 800 TABLET ORAL at 08:09

## 2017-01-01 RX ADMIN — MAGNESIUM SULFATE IN WATER 2 G: 40 INJECTION, SOLUTION INTRAVENOUS at 01:07

## 2017-01-01 RX ADMIN — CETIRIZINE HYDROCHLORIDE 10 MG: 5 TABLET, FILM COATED ORAL at 09:07

## 2017-01-01 RX ADMIN — PROCHLORPERAZINE EDISYLATE 10 MG: 5 INJECTION INTRAMUSCULAR; INTRAVENOUS at 05:09

## 2017-01-01 RX ADMIN — DIPHENHYDRAMINE HYDROCHLORIDE 25 MG: 25 CAPSULE ORAL at 12:10

## 2017-01-01 RX ADMIN — LIDOCAINE HYDROCHLORIDE 50 MG: 20 INJECTION, SOLUTION INTRAVENOUS at 04:07

## 2017-01-01 RX ADMIN — PANTOPRAZOLE SODIUM 40 MG: 40 TABLET, DELAYED RELEASE ORAL at 09:07

## 2017-01-01 RX ADMIN — VORICONAZOLE 300 MG: 200 TABLET, FILM COATED ORAL at 09:09

## 2017-01-01 RX ADMIN — CEFEPIME HYDROCHLORIDE 2 G: 2 INJECTION, SOLUTION INTRAVENOUS at 12:10

## 2017-01-01 RX ADMIN — SODIUM CHLORIDE: 0.9 INJECTION, SOLUTION INTRAVENOUS at 11:06

## 2017-01-01 RX ADMIN — HYDROMORPHONE HYDROCHLORIDE 0.5 MG: 1 INJECTION, SOLUTION INTRAMUSCULAR; INTRAVENOUS; SUBCUTANEOUS at 06:07

## 2017-01-01 RX ADMIN — CEFEPIME HYDROCHLORIDE 2 G: 2 INJECTION, SOLUTION INTRAVENOUS at 05:07

## 2017-01-01 RX ADMIN — SODIUM CHLORIDE, SODIUM LACTATE, POTASSIUM CHLORIDE, AND CALCIUM CHLORIDE 1000 ML: 600; 310; 30; 20 INJECTION, SOLUTION INTRAVENOUS at 10:09

## 2017-01-01 RX ADMIN — MAGNESIUM OXIDE TAB 400 MG (241.3 MG ELEMENTAL MG) 800 MG: 400 (241.3 MG) TAB at 10:07

## 2017-01-01 RX ADMIN — SODIUM CHLORIDE: 9 INJECTION, SOLUTION INTRAVENOUS at 02:09

## 2017-01-01 RX ADMIN — PANTOPRAZOLE SODIUM 40 MG: 40 TABLET, DELAYED RELEASE ORAL at 08:06

## 2017-01-01 RX ADMIN — ALLOPURINOL 300 MG: 300 TABLET ORAL at 08:07

## 2017-01-01 RX ADMIN — SODIUM CHLORIDE: 0.9 INJECTION, SOLUTION INTRAVENOUS at 02:07

## 2017-01-01 RX ADMIN — MAGNESIUM SULFATE IN WATER 2 G: 40 INJECTION, SOLUTION INTRAVENOUS at 09:10

## 2017-01-01 RX ADMIN — DEXAMETHASONE 1 DROP: 1 SUSPENSION OPHTHALMIC at 11:07

## 2017-01-01 RX ADMIN — IPRATROPIUM BROMIDE AND ALBUTEROL SULFATE 3 ML: .5; 3 SOLUTION RESPIRATORY (INHALATION) at 12:10

## 2017-01-01 RX ADMIN — HYDROMORPHONE HYDROCHLORIDE 3 MG: 1 INJECTION, SOLUTION INTRAMUSCULAR; INTRAVENOUS; SUBCUTANEOUS at 05:07

## 2017-01-01 RX ADMIN — CEFEPIME HYDROCHLORIDE 2 G: 2 INJECTION, SOLUTION INTRAVENOUS at 09:09

## 2017-01-01 RX ADMIN — Medication 30 ML: at 12:10

## 2017-01-01 RX ADMIN — Medication 30 ML: at 06:10

## 2017-01-01 RX ADMIN — PIPERACILLIN AND TAZOBACTAM 4.5 G: 4; .5 INJECTION, POWDER, FOR SOLUTION INTRAVENOUS at 07:09

## 2017-01-01 RX ADMIN — SODIUM CHLORIDE: 0.9 INJECTION, SOLUTION INTRAVENOUS at 09:09

## 2017-01-01 RX ADMIN — FUROSEMIDE 60 MG: 10 INJECTION, SOLUTION INTRAVENOUS at 08:10

## 2017-01-01 RX ADMIN — POTASSIUM CHLORIDE 10 MEQ: 10 INJECTION, SOLUTION INTRAVENOUS at 07:07

## 2017-01-01 RX ADMIN — DOLUTEGRAVIR SODIUM 50 MG: 50 TABLET, FILM COATED ORAL at 09:09

## 2017-01-01 RX ADMIN — AMPHOTERICIN B 400 MG: 50 INJECTION, POWDER, LYOPHILIZED, FOR SOLUTION INTRAVENOUS at 06:07

## 2017-01-01 RX ADMIN — DIPHENHYDRAMINE HYDROCHLORIDE 25 MG: 25 CAPSULE ORAL at 03:09

## 2017-01-01 RX ADMIN — MORPHINE SULFATE 30 MG: 15 TABLET, EXTENDED RELEASE ORAL at 09:09

## 2017-01-01 RX ADMIN — FLECAINIDE ACETATE 100 MG: 100 TABLET ORAL at 10:08

## 2017-01-01 RX ADMIN — POTASSIUM PHOSPHATE, MONOBASIC AND POTASSIUM PHOSPHATE, DIBASIC 30 MMOL: 224; 236 INJECTION, SOLUTION, CONCENTRATE INTRAVENOUS at 08:10

## 2017-01-01 RX ADMIN — CLOTRIMAZOLE 10 MG: 10 LOZENGE ORAL; TOPICAL at 09:09

## 2017-01-01 RX ADMIN — FLECAINIDE ACETATE 100 MG: 100 TABLET ORAL at 08:08

## 2017-01-01 RX ADMIN — HEPARIN 300 UNITS: 100 SYRINGE at 04:09

## 2017-01-01 RX ADMIN — ATORVASTATIN CALCIUM 20 MG: 20 TABLET, FILM COATED ORAL at 08:09

## 2017-01-01 RX ADMIN — BUDESONIDE 9 MG: 3 CAPSULE ORAL at 09:09

## 2017-01-01 RX ADMIN — HYDROMORPHONE HYDROCHLORIDE 2 MG: 2 INJECTION INTRAMUSCULAR; INTRAVENOUS; SUBCUTANEOUS at 07:07

## 2017-01-01 RX ADMIN — MORPHINE SULFATE 30 MG: 30 TABLET, EXTENDED RELEASE ORAL at 09:07

## 2017-01-01 RX ADMIN — INFLIXIMAB 400 MG: 100 INJECTION, POWDER, LYOPHILIZED, FOR SOLUTION INTRAVENOUS at 10:09

## 2017-01-01 RX ADMIN — HYDROMORPHONE HYDROCHLORIDE 2 MG: 2 INJECTION INTRAMUSCULAR; INTRAVENOUS; SUBCUTANEOUS at 12:07

## 2017-01-01 RX ADMIN — POTASSIUM & SODIUM PHOSPHATES POWDER PACK 280-160-250 MG 2 PACKET: 280-160-250 PACK at 09:07

## 2017-01-01 RX ADMIN — VORICONAZOLE 300 MG: 50 TABLET, FILM COATED ORAL at 08:07

## 2017-01-01 RX ADMIN — ALPRAZOLAM 0.5 MG: 0.5 TABLET ORAL at 10:07

## 2017-01-01 RX ADMIN — VANCOMYCIN HYDROCHLORIDE 1500 MG: 1 INJECTION, POWDER, LYOPHILIZED, FOR SOLUTION INTRAVENOUS at 06:07

## 2017-01-01 RX ADMIN — METOPROLOL TARTRATE 25 MG: 25 TABLET, FILM COATED ORAL at 10:09

## 2017-01-01 RX ADMIN — BENZONATATE 100 MG: 100 CAPSULE, LIQUID FILLED ORAL at 04:07

## 2017-01-01 RX ADMIN — SODIUM CHLORIDE: 0.9 INJECTION, SOLUTION INTRAVENOUS at 03:06

## 2017-01-01 RX ADMIN — ACYCLOVIR 400 MG: 200 CAPSULE ORAL at 09:07

## 2017-01-01 RX ADMIN — PANTOPRAZOLE SODIUM 40 MG: 40 TABLET, DELAYED RELEASE ORAL at 09:09

## 2017-01-01 RX ADMIN — FINASTERIDE 5 MG: 5 TABLET, FILM COATED ORAL at 08:06

## 2017-01-01 RX ADMIN — LIDOCAINE HYDROCHLORIDE 6 ML: 20 INJECTION, SOLUTION INFILTRATION; PERINEURAL at 10:07

## 2017-01-01 RX ADMIN — ITRACONAZOLE 100 MG: 10 SOLUTION ORAL at 05:07

## 2017-01-01 RX ADMIN — ONDANSETRON 8 MG: 2 INJECTION INTRAMUSCULAR; INTRAVENOUS at 11:09

## 2017-01-01 RX ADMIN — FILGRASTIM 480 MCG: 480 INJECTION, SOLUTION INTRAVENOUS; SUBCUTANEOUS at 03:01

## 2017-01-01 RX ADMIN — HYDROMORPHONE HYDROCHLORIDE 2 MG: 2 INJECTION INTRAMUSCULAR; INTRAVENOUS; SUBCUTANEOUS at 12:06

## 2017-01-01 RX ADMIN — VORICONAZOLE 300 MG: 200 TABLET, FILM COATED ORAL at 08:09

## 2017-01-01 RX ADMIN — DIPHENOXYLATE HYDROCHLORIDE AND ATROPINE SULFATE 1 TABLET: 2.5; .025 TABLET ORAL at 05:09

## 2017-01-01 RX ADMIN — ABACAVIR 600 MG: 300 TABLET, FILM COATED ORAL at 08:08

## 2017-01-01 RX ADMIN — POTASSIUM CHLORIDE 10 MEQ: 10 INJECTION, SOLUTION INTRAVENOUS at 06:07

## 2017-01-01 RX ADMIN — HYDROMORPHONE HYDROCHLORIDE 2 MG: 2 INJECTION INTRAMUSCULAR; INTRAVENOUS; SUBCUTANEOUS at 01:07

## 2017-01-01 RX ADMIN — ABACAVIR 600 MG: 300 TABLET, FILM COATED ORAL at 08:10

## 2017-01-01 RX ADMIN — ACETAMINOPHEN 650 MG: 325 TABLET ORAL at 12:10

## 2017-01-01 RX ADMIN — SODIUM PHOSPHATE, MONOBASIC, MONOHYDRATE 20.01 MMOL: 276; 142 INJECTION, SOLUTION INTRAVENOUS at 06:07

## 2017-01-01 RX ADMIN — VORICONAZOLE 200 MG: 200 TABLET, FILM COATED ORAL at 09:07

## 2017-01-01 RX ADMIN — LAMIVUDINE 300 MG: 150 TABLET, FILM COATED ORAL at 09:09

## 2017-01-01 RX ADMIN — FLECAINIDE ACETATE 100 MG: 100 TABLET ORAL at 09:09

## 2017-01-01 RX ADMIN — POTASSIUM CHLORIDE 10 MEQ: 10 INJECTION, SOLUTION INTRAVENOUS at 09:09

## 2017-01-01 RX ADMIN — CETIRIZINE HYDROCHLORIDE 10 MG: 5 TABLET, FILM COATED ORAL at 08:07

## 2017-01-01 RX ADMIN — ITRACONAZOLE 100 MG: 10 SOLUTION ORAL at 05:06

## 2017-01-01 RX ADMIN — EPINEPHRINE 1 MG: 1 INJECTION INTRAMUSCULAR; INTRAVENOUS; SUBCUTANEOUS at 12:10

## 2017-01-01 RX ADMIN — PIPERACILLIN AND TAZOBACTAM 4.5 G: 4; .5 INJECTION, POWDER, FOR SOLUTION INTRAVENOUS at 10:09

## 2017-01-01 RX ADMIN — ACYCLOVIR 400 MG: 200 CAPSULE ORAL at 10:08

## 2017-01-01 RX ADMIN — SODIUM BICARBONATE 1.24 ML/KG/HR: 84 INJECTION, SOLUTION INTRAVENOUS at 04:09

## 2017-01-01 RX ADMIN — DOLUTEGRAVIR SODIUM 50 MG: 50 TABLET, FILM COATED ORAL at 12:07

## 2017-01-01 RX ADMIN — HYDROMORPHONE HYDROCHLORIDE 2 MG: 2 INJECTION INTRAMUSCULAR; INTRAVENOUS; SUBCUTANEOUS at 03:06

## 2017-01-01 RX ADMIN — ONDANSETRON 8 MG: 2 INJECTION INTRAMUSCULAR; INTRAVENOUS at 09:09

## 2017-01-01 RX ADMIN — SODIUM CHLORIDE 500 ML: 0.9 INJECTION, SOLUTION INTRAVENOUS at 06:07

## 2017-01-01 RX ADMIN — METOPROLOL TARTRATE 25 MG: 25 TABLET, FILM COATED ORAL at 09:10

## 2017-01-01 RX ADMIN — METOPROLOL TARTRATE 12.5 MG: 25 TABLET, FILM COATED ORAL at 09:09

## 2017-01-01 RX ADMIN — HYDROMORPHONE HYDROCHLORIDE 1 MG: 1 INJECTION, SOLUTION INTRAMUSCULAR; INTRAVENOUS; SUBCUTANEOUS at 04:07

## 2017-01-01 RX ADMIN — HYDROMORPHONE HYDROCHLORIDE 1 MG: 1 INJECTION, SOLUTION INTRAMUSCULAR; INTRAVENOUS; SUBCUTANEOUS at 03:09

## 2017-01-01 RX ADMIN — Medication: at 08:10

## 2017-01-01 RX ADMIN — DIPHENHYDRAMINE HYDROCHLORIDE 12.5 MG: 50 INJECTION, SOLUTION INTRAMUSCULAR; INTRAVENOUS at 07:06

## 2017-01-01 RX ADMIN — METOPROLOL TARTRATE 25 MG: 25 TABLET, FILM COATED ORAL at 10:07

## 2017-01-01 RX ADMIN — PANTOPRAZOLE SODIUM 40 MG: 40 TABLET, DELAYED RELEASE ORAL at 08:09

## 2017-01-01 RX ADMIN — ACETAMINOPHEN 650 MG: 325 TABLET ORAL at 10:07

## 2017-01-01 RX ADMIN — HYDROMORPHONE HYDROCHLORIDE 1 MG: 1 INJECTION, SOLUTION INTRAMUSCULAR; INTRAVENOUS; SUBCUTANEOUS at 06:07

## 2017-01-01 RX ADMIN — SODIUM CHLORIDE: 0.9 INJECTION, SOLUTION INTRAVENOUS at 12:07

## 2017-01-01 RX ADMIN — PROMETHAZINE HYDROCHLORIDE 12.5 MG: 12.5 TABLET ORAL at 09:07

## 2017-01-01 RX ADMIN — GUAIFENESIN 600 MG: 600 TABLET, EXTENDED RELEASE ORAL at 09:07

## 2017-01-01 RX ADMIN — POTASSIUM CHLORIDE 20 MEQ: 750 CAPSULE, EXTENDED RELEASE ORAL at 06:07

## 2017-01-01 RX ADMIN — FLECAINIDE ACETATE 50 MG: 100 TABLET ORAL at 08:07

## 2017-01-01 RX ADMIN — LORAZEPAM 1 MG: 0.5 TABLET ORAL at 03:09

## 2017-01-01 RX ADMIN — LOPERAMIDE HYDROCHLORIDE 2 MG: 2 CAPSULE ORAL at 12:09

## 2017-01-01 RX ADMIN — LAMIVUDINE 300 MG: 150 TABLET, FILM COATED ORAL at 08:07

## 2017-01-01 RX ADMIN — ITRACONAZOLE 100 MG: 10 SOLUTION ORAL at 04:06

## 2017-01-01 RX ADMIN — CEFEPIME HYDROCHLORIDE 2 G: 2 INJECTION, SOLUTION INTRAVENOUS at 06:06

## 2017-01-01 RX ADMIN — FILGRASTIM 480 MCG: 480 INJECTION, SOLUTION INTRAVENOUS; SUBCUTANEOUS at 04:07

## 2017-01-01 RX ADMIN — POTASSIUM & SODIUM PHOSPHATES POWDER PACK 280-160-250 MG 1 PACKET: 280-160-250 PACK at 11:07

## 2017-01-01 RX ADMIN — VANCOMYCIN HYDROCHLORIDE 1250 MG: 100 INJECTION, POWDER, LYOPHILIZED, FOR SOLUTION INTRAVENOUS at 06:07

## 2017-01-01 RX ADMIN — HYDROMORPHONE HYDROCHLORIDE 2 MG: 2 INJECTION, SOLUTION INTRAMUSCULAR; INTRAVENOUS; SUBCUTANEOUS at 11:10

## 2017-01-01 RX ADMIN — LORAZEPAM 1 MG: 0.5 TABLET ORAL at 09:09

## 2017-01-01 RX ADMIN — SODIUM CHLORIDE: 0.9 INJECTION, SOLUTION INTRAVENOUS at 04:07

## 2017-01-01 RX ADMIN — HYDROMORPHONE HYDROCHLORIDE 1 MG: 1 INJECTION, SOLUTION INTRAMUSCULAR; INTRAVENOUS; SUBCUTANEOUS at 03:07

## 2017-01-01 RX ADMIN — BUDESONIDE 9 MG: 3 CAPSULE ORAL at 08:09

## 2017-01-01 RX ADMIN — SULFAMETHOXAZOLE AND TRIMETHOPRIM 1 TABLET: 800; 160 TABLET ORAL at 06:09

## 2017-01-01 RX ADMIN — SODIUM CHLORIDE: 0.9 INJECTION, SOLUTION INTRAVENOUS at 07:07

## 2017-01-01 RX ADMIN — PANTOPRAZOLE SODIUM 40 MG: 40 TABLET, DELAYED RELEASE ORAL at 01:09

## 2017-01-01 RX ADMIN — HYDROMORPHONE HYDROCHLORIDE 2 MG: 2 INJECTION INTRAMUSCULAR; INTRAVENOUS; SUBCUTANEOUS at 04:06

## 2017-01-01 RX ADMIN — PREDNISONE 25 MG: 5 TABLET ORAL at 08:06

## 2017-01-01 RX ADMIN — HYDROMORPHONE HYDROCHLORIDE 1 MG: 1 INJECTION, SOLUTION INTRAMUSCULAR; INTRAVENOUS; SUBCUTANEOUS at 04:09

## 2017-01-01 RX ADMIN — HYDROMORPHONE HYDROCHLORIDE 2 MG: 1 INJECTION, SOLUTION INTRAMUSCULAR; INTRAVENOUS; SUBCUTANEOUS at 04:09

## 2017-01-01 RX ADMIN — SODIUM CHLORIDE: 0.9 INJECTION, SOLUTION INTRAVENOUS at 10:06

## 2017-01-01 RX ADMIN — SULFAMETHOXAZOLE AND TRIMETHOPRIM 1 TABLET: 800; 160 TABLET ORAL at 12:07

## 2017-01-01 RX ADMIN — CEFEPIME HYDROCHLORIDE 2 G: 2 INJECTION, SOLUTION INTRAVENOUS at 09:07

## 2017-01-01 RX ADMIN — DEXAMETHASONE 1 DROP: 1 SUSPENSION OPHTHALMIC at 05:06

## 2017-01-01 RX ADMIN — LIDOCAINE HYDROCHLORIDE 50 MG: 20 INJECTION, SOLUTION INTRAVENOUS at 10:07

## 2017-01-01 RX ADMIN — ITRACONAZOLE 100 MG: 10 SOLUTION ORAL at 03:07

## 2017-01-01 RX ADMIN — HYDROMORPHONE HYDROCHLORIDE 2 MG: 2 INJECTION INTRAMUSCULAR; INTRAVENOUS; SUBCUTANEOUS at 07:06

## 2017-01-01 RX ADMIN — Medication 1 EACH: at 10:06

## 2017-01-01 RX ADMIN — POTASSIUM CHLORIDE 10 MEQ: 10 INJECTION, SOLUTION INTRAVENOUS at 05:07

## 2017-01-01 RX ADMIN — ONDANSETRON 16 MG: 4 TABLET, FILM COATED ORAL at 08:07

## 2017-01-01 RX ADMIN — ONDANSETRON 8 MG: 2 INJECTION INTRAMUSCULAR; INTRAVENOUS at 06:06

## 2017-01-01 RX ADMIN — BENZONATATE 100 MG: 100 CAPSULE, LIQUID FILLED ORAL at 06:07

## 2017-01-01 RX ADMIN — CEFEPIME HYDROCHLORIDE 2 G: 2 INJECTION, SOLUTION INTRAVENOUS at 05:06

## 2017-01-01 RX ADMIN — HYDROMORPHONE HYDROCHLORIDE 1 MG: 1 INJECTION, SOLUTION INTRAMUSCULAR; INTRAVENOUS; SUBCUTANEOUS at 09:09

## 2017-01-01 RX ADMIN — CEFEPIME HYDROCHLORIDE 2 G: 2 INJECTION, SOLUTION INTRAVENOUS at 11:07

## 2017-01-01 RX ADMIN — MORPHINE SULFATE 15 MG: 15 TABLET, EXTENDED RELEASE ORAL at 10:07

## 2017-01-01 RX ADMIN — POTASSIUM CHLORIDE 20 MEQ: 14.9 INJECTION, SOLUTION INTRAVENOUS at 10:10

## 2017-01-01 RX ADMIN — SULFAMETHOXAZOLE AND TRIMETHOPRIM 1 TABLET: 800; 160 TABLET ORAL at 09:07

## 2017-01-01 RX ADMIN — PIPERACILLIN AND TAZOBACTAM 4.5 G: 4; .5 INJECTION, POWDER, FOR SOLUTION INTRAVENOUS at 11:09

## 2017-01-01 RX ADMIN — SODIUM CHLORIDE 100 ML/HR: 0.9 INJECTION, SOLUTION INTRAVENOUS at 08:09

## 2017-01-01 RX ADMIN — ACETAMINOPHEN 650 MG: 325 TABLET ORAL at 06:07

## 2017-01-01 RX ADMIN — Medication 30 ML: at 12:09

## 2017-01-01 RX ADMIN — MAGNESIUM SULFATE IN WATER 2 G: 40 INJECTION, SOLUTION INTRAVENOUS at 08:09

## 2017-01-01 RX ADMIN — CEFAZOLIN 2 G: 1 INJECTION, POWDER, FOR SOLUTION INTRAVENOUS at 02:07

## 2017-01-01 RX ADMIN — DIPHENOXYLATE HYDROCHLORIDE AND ATROPINE SULFATE 1 TABLET: 2.5; .025 TABLET ORAL at 11:09

## 2017-01-01 RX ADMIN — Medication 30 ML: at 11:10

## 2017-01-01 RX ADMIN — ONDANSETRON 8 MG: 2 INJECTION INTRAMUSCULAR; INTRAVENOUS at 02:09

## 2017-01-01 RX ADMIN — HEPARIN 500 UNITS: 100 SYRINGE at 10:08

## 2017-01-01 RX ADMIN — LORAZEPAM 1 MG: 0.5 TABLET ORAL at 08:09

## 2017-01-01 RX ADMIN — FINASTERIDE 5 MG: 5 TABLET, FILM COATED ORAL at 09:07

## 2017-01-01 RX ADMIN — POTASSIUM CHLORIDE 20 MEQ: 1500 TABLET, EXTENDED RELEASE ORAL at 02:09

## 2017-01-01 RX ADMIN — LIDOCAINE HYDROCHLORIDE,EPINEPHRINE BITARTRATE 20 ML: 10; .01 INJECTION, SOLUTION INFILTRATION; PERINEURAL at 10:06

## 2017-01-01 RX ADMIN — CEFEPIME HYDROCHLORIDE 2 G: 2 INJECTION, SOLUTION INTRAVENOUS at 12:07

## 2017-01-01 RX ADMIN — SODIUM CHLORIDE: 0.9 INJECTION, SOLUTION INTRAVENOUS at 12:06

## 2017-01-01 RX ADMIN — SODIUM PHOSPHATE, MONOBASIC, MONOHYDRATE 20.01 MMOL: 276; 142 INJECTION, SOLUTION INTRAVENOUS at 09:07

## 2017-01-01 RX ADMIN — ABACAVIR 600 MG: 300 TABLET, FILM COATED ORAL at 09:07

## 2017-01-01 RX ADMIN — LAMIVUDINE 300 MG: 150 TABLET, FILM COATED ORAL at 08:06

## 2017-01-01 RX ADMIN — MAGNESIUM SULFATE IN WATER 2 G: 40 INJECTION, SOLUTION INTRAVENOUS at 08:10

## 2017-01-01 RX ADMIN — ONDANSETRON 8 MG: 2 INJECTION INTRAMUSCULAR; INTRAVENOUS at 06:09

## 2017-01-01 RX ADMIN — ONDANSETRON 8 MG: 2 INJECTION INTRAMUSCULAR; INTRAVENOUS at 05:09

## 2017-01-01 RX ADMIN — METHYLPREDNISOLONE SODIUM SUCCINATE 80 MG: 40 INJECTION, POWDER, FOR SOLUTION INTRAMUSCULAR; INTRAVENOUS at 08:09

## 2017-01-01 RX ADMIN — METOPROLOL TARTRATE 25 MG: 25 TABLET, FILM COATED ORAL at 09:07

## 2017-01-01 RX ADMIN — ACETAMINOPHEN 650 MG: 325 TABLET ORAL at 08:07

## 2017-01-01 RX ADMIN — ONDANSETRON 8 MG: 2 INJECTION INTRAMUSCULAR; INTRAVENOUS at 05:07

## 2017-01-01 RX ADMIN — POTASSIUM CHLORIDE 10 MEQ: 10 INJECTION, SOLUTION INTRAVENOUS at 04:07

## 2017-01-01 RX ADMIN — SULFAMETHOXAZOLE AND TRIMETHOPRIM 1 TABLET: 800; 160 TABLET ORAL at 06:10

## 2017-01-01 RX ADMIN — FENTANYL CITRATE 50 MCG: 50 INJECTION, SOLUTION INTRAMUSCULAR; INTRAVENOUS at 02:07

## 2017-01-01 RX ADMIN — CIPROFLOXACIN HYDROCHLORIDE 500 MG: 500 TABLET, FILM COATED ORAL at 09:07

## 2017-01-01 RX ADMIN — FILGRASTIM 480 MCG: 480 INJECTION, SOLUTION INTRAVENOUS; SUBCUTANEOUS at 08:07

## 2017-01-01 RX ADMIN — Medication 30 ML: at 06:09

## 2017-01-01 RX ADMIN — CYTARABINE 4260 MG: 100 INJECTION, SOLUTION INTRATHECAL; INTRAVENOUS; SUBCUTANEOUS at 12:06

## 2017-01-01 RX ADMIN — PANTOPRAZOLE SODIUM 40 MG: 40 TABLET, DELAYED RELEASE ORAL at 08:07

## 2017-01-01 RX ADMIN — PROMETHAZINE HYDROCHLORIDE 12.5 MG: 12.5 TABLET ORAL at 10:08

## 2017-01-01 RX ADMIN — SODIUM CHLORIDE: 0.9 INJECTION, SOLUTION INTRAVENOUS at 07:06

## 2017-01-01 RX ADMIN — FLECAINIDE ACETATE 100 MG: 100 TABLET ORAL at 11:09

## 2017-01-01 RX ADMIN — DEXAMETHASONE 1 DROP: 1 SUSPENSION OPHTHALMIC at 05:07

## 2017-01-01 RX ADMIN — ATORVASTATIN CALCIUM 20 MG: 20 TABLET, FILM COATED ORAL at 09:09

## 2017-01-01 RX ADMIN — DOLUTEGRAVIR SODIUM 50 MG: 50 TABLET, FILM COATED ORAL at 09:06

## 2017-01-01 RX ADMIN — MAGNESIUM SULFATE HEPTAHYDRATE 2 G: 40 INJECTION, SOLUTION INTRAVENOUS at 06:08

## 2017-01-01 RX ADMIN — I.V. FAT EMULSION 250 ML: 20 EMULSION INTRAVENOUS at 09:09

## 2017-01-01 RX ADMIN — PREDNISONE 60 MG: 20 TABLET ORAL at 12:07

## 2017-01-01 RX ADMIN — VORICONAZOLE 300 MG: 200 TABLET, FILM COATED ORAL at 09:10

## 2017-01-01 RX ADMIN — PROPOFOL 150 MCG/KG/MIN: 10 INJECTION, EMULSION INTRAVENOUS at 10:07

## 2017-01-01 RX ADMIN — SODIUM CHLORIDE 500 ML: 900 INJECTION, SOLUTION INTRAVENOUS at 12:09

## 2017-01-01 RX ADMIN — HYDROMORPHONE HYDROCHLORIDE 1 MG: 1 INJECTION, SOLUTION INTRAMUSCULAR; INTRAVENOUS; SUBCUTANEOUS at 05:09

## 2017-01-01 RX ADMIN — SOYBEAN OIL 250 ML: 20 INJECTION, SOLUTION INTRAVENOUS at 09:10

## 2017-01-01 RX ADMIN — ITRACONAZOLE 100 MG: 10 SOLUTION ORAL at 06:07

## 2017-01-01 RX ADMIN — CIPROFLOXACIN HYDROCHLORIDE 500 MG: 500 TABLET, FILM COATED ORAL at 09:06

## 2017-01-01 RX ADMIN — FLECAINIDE ACETATE 50 MG: 100 TABLET ORAL at 09:07

## 2017-01-01 RX ADMIN — PROCHLORPERAZINE EDISYLATE 10 MG: 5 INJECTION INTRAMUSCULAR; INTRAVENOUS at 09:09

## 2017-01-01 RX ADMIN — PENTAMIDINE ISETHIONATE 300 MG: 300 INHALANT RESPIRATORY (INHALATION) at 10:02

## 2017-01-01 RX ADMIN — MAGNESIUM OXIDE TAB 400 MG (241.3 MG ELEMENTAL MG) 800 MG: 400 (241.3 MG) TAB at 08:06

## 2017-01-01 RX ADMIN — CALCIUM GLUCONATE: 94 INJECTION, SOLUTION INTRAVENOUS at 10:10

## 2017-01-01 RX ADMIN — FLECAINIDE ACETATE 50 MG: 50 TABLET ORAL at 08:09

## 2017-01-01 RX ADMIN — HYDROMORPHONE HYDROCHLORIDE 2 MG: 2 INJECTION INTRAMUSCULAR; INTRAVENOUS; SUBCUTANEOUS at 10:07

## 2017-01-01 RX ADMIN — LAMIVUDINE 300 MG: 150 TABLET, FILM COATED ORAL at 08:09

## 2017-01-01 RX ADMIN — MORPHINE SULFATE 30 MG: 15 TABLET, EXTENDED RELEASE ORAL at 08:09

## 2017-01-01 RX ADMIN — CEFEPIME HYDROCHLORIDE 2 G: 2 INJECTION, SOLUTION INTRAVENOUS at 07:07

## 2017-01-01 RX ADMIN — FILGRASTIM 480 MCG: 480 INJECTION, SOLUTION INTRAVENOUS; SUBCUTANEOUS at 06:07

## 2017-01-01 RX ADMIN — ALLOPURINOL 300 MG: 300 TABLET ORAL at 08:06

## 2017-01-01 RX ADMIN — PROCHLORPERAZINE EDISYLATE 5 MG: 5 INJECTION INTRAMUSCULAR; INTRAVENOUS at 09:07

## 2017-01-01 RX ADMIN — PENTAMIDINE ISETHIONATE 300 MG: 300 INHALANT RESPIRATORY (INHALATION) at 11:01

## 2017-01-01 RX ADMIN — DOLUTEGRAVIR SODIUM 50 MG: 50 TABLET, FILM COATED ORAL at 08:06

## 2017-01-01 RX ADMIN — ACYCLOVIR 400 MG: 200 CAPSULE ORAL at 12:07

## 2017-01-01 RX ADMIN — CEFEPIME HYDROCHLORIDE 1 G: 1 INJECTION, SOLUTION INTRAVENOUS at 08:09

## 2017-01-01 RX ADMIN — ONDANSETRON 8 MG: 4 TABLET, FILM COATED ORAL at 10:09

## 2017-01-01 RX ADMIN — MAGNESIUM SULFATE IN WATER 2 G: 40 INJECTION, SOLUTION INTRAVENOUS at 10:09

## 2017-01-01 RX ADMIN — MAGNESIUM OXIDE TAB 400 MG (241.3 MG ELEMENTAL MG) 800 MG: 400 (241.3 MG) TAB at 11:06

## 2017-01-01 RX ADMIN — SODIUM CHLORIDE: 0.9 INJECTION, SOLUTION INTRAVENOUS at 09:07

## 2017-01-01 RX ADMIN — TRAMADOL HYDROCHLORIDE 50 MG: 50 TABLET, FILM COATED ORAL at 09:09

## 2017-01-01 RX ADMIN — ALPRAZOLAM 0.5 MG: 0.5 TABLET ORAL at 08:07

## 2017-01-01 RX ADMIN — VORICONAZOLE 300 MG: 200 TABLET, FILM COATED ORAL at 10:09

## 2017-01-01 RX ADMIN — POTASSIUM CHLORIDE 10 MEQ: 10 INJECTION, SOLUTION INTRAVENOUS at 10:09

## 2017-01-01 RX ADMIN — ONDANSETRON 8 MG: 2 INJECTION INTRAMUSCULAR; INTRAVENOUS at 01:09

## 2017-01-01 RX ADMIN — SODIUM CHLORIDE, PRESERVATIVE FREE 10 ML: 5 INJECTION INTRAVENOUS at 02:09

## 2017-01-01 RX ADMIN — AMPHOTERICIN B 400 MG: 50 INJECTION, POWDER, LYOPHILIZED, FOR SOLUTION INTRAVENOUS at 04:07

## 2017-01-01 RX ADMIN — LIDOCAINE 1 PATCH: 50 PATCH TOPICAL at 08:07

## 2017-01-01 RX ADMIN — GUAIFENESIN 600 MG: 600 TABLET, EXTENDED RELEASE ORAL at 08:08

## 2017-01-01 RX ADMIN — LIDOCAINE HYDROCHLORIDE 20 ML: 10 INJECTION, SOLUTION INFILTRATION; PERINEURAL at 03:06

## 2017-01-01 RX ADMIN — ACETAMINOPHEN 650 MG: 325 TABLET ORAL at 04:06

## 2017-01-01 RX ADMIN — POTASSIUM CHLORIDE 10 MEQ: 10 INJECTION, SOLUTION INTRAVENOUS at 12:09

## 2017-01-01 RX ADMIN — METOPROLOL TARTRATE 25 MG: 25 TABLET, FILM COATED ORAL at 10:10

## 2017-01-01 RX ADMIN — HYDROMORPHONE HYDROCHLORIDE: 2 INJECTION INTRAMUSCULAR; INTRAVENOUS; SUBCUTANEOUS at 09:06

## 2017-01-01 RX ADMIN — ABACAVIR 600 MG: 300 TABLET, FILM COATED ORAL at 09:09

## 2017-01-01 RX ADMIN — Medication 25 MG: at 11:06

## 2017-01-01 RX ADMIN — DIPHENHYDRAMINE HYDROCHLORIDE 25 MG: 25 CAPSULE ORAL at 04:06

## 2017-01-01 RX ADMIN — DIPHENOXYLATE HYDROCHLORIDE AND ATROPINE SULFATE 1 TABLET: 2.5; .025 TABLET ORAL at 01:10

## 2017-01-01 RX ADMIN — MIDAZOLAM HYDROCHLORIDE 2 MG: 1 INJECTION, SOLUTION INTRAMUSCULAR; INTRAVENOUS at 02:06

## 2017-01-01 RX ADMIN — FLECAINIDE ACETATE 100 MG: 100 TABLET ORAL at 08:07

## 2017-01-01 RX ADMIN — HYDROMORPHONE HYDROCHLORIDE 2 MG: 2 INJECTION INTRAMUSCULAR; INTRAVENOUS; SUBCUTANEOUS at 11:06

## 2017-01-01 RX ADMIN — HYDROMORPHONE HYDROCHLORIDE 2 MG: 2 TABLET ORAL at 08:06

## 2017-01-01 RX ADMIN — SODIUM PHOSPHATE, MONOBASIC, MONOHYDRATE 20.01 MMOL: 276; 142 INJECTION, SOLUTION INTRAVENOUS at 11:07

## 2017-01-01 RX ADMIN — CETIRIZINE HYDROCHLORIDE 10 MG: 5 TABLET, FILM COATED ORAL at 10:07

## 2017-01-01 RX ADMIN — HYDROMORPHONE HYDROCHLORIDE 2 MG: 2 INJECTION INTRAMUSCULAR; INTRAVENOUS; SUBCUTANEOUS at 04:07

## 2017-01-01 RX ADMIN — MAGNESIUM OXIDE TAB 400 MG (241.3 MG ELEMENTAL MG) 800 MG: 400 (241.3 MG) TAB at 05:06

## 2017-01-01 RX ADMIN — VALGANCICLOVIR 900 MG: 450 TABLET, FILM COATED ORAL at 09:09

## 2017-01-01 RX ADMIN — HYDROMORPHONE HYDROCHLORIDE 1 MG: 1 INJECTION, SOLUTION INTRAMUSCULAR; INTRAVENOUS; SUBCUTANEOUS at 11:07

## 2017-01-01 RX ADMIN — METOPROLOL TARTRATE 25 MG: 25 TABLET ORAL at 08:09

## 2017-01-01 RX ADMIN — PIPERACILLIN AND TAZOBACTAM 4.5 G: 4; .5 INJECTION, POWDER, FOR SOLUTION INTRAVENOUS at 05:09

## 2017-01-01 RX ADMIN — DEXAMETHASONE 1 DROP: 1 SUSPENSION OPHTHALMIC at 06:07

## 2017-01-01 RX ADMIN — SODIUM CHLORIDE: 0.9 INJECTION, SOLUTION INTRAVENOUS at 06:08

## 2017-01-01 RX ADMIN — CEFEPIME HYDROCHLORIDE 2 G: 2 INJECTION, SOLUTION INTRAVENOUS at 08:07

## 2017-01-01 RX ADMIN — DOLUTEGRAVIR SODIUM 50 MG: 50 TABLET, FILM COATED ORAL at 12:08

## 2017-01-01 RX ADMIN — FENTANYL CITRATE 50 MCG: 50 INJECTION, SOLUTION INTRAMUSCULAR; INTRAVENOUS at 08:09

## 2017-01-01 RX ADMIN — LAMIVUDINE 300 MG: 150 TABLET, FILM COATED ORAL at 09:07

## 2017-01-01 RX ADMIN — LOPERAMIDE HYDROCHLORIDE 2 MG: 2 CAPSULE ORAL at 01:09

## 2017-01-01 RX ADMIN — SODIUM CHLORIDE: 0.9 INJECTION, SOLUTION INTRAVENOUS at 06:10

## 2017-01-01 RX ADMIN — CYTARABINE 4260 MG: 100 INJECTION, SOLUTION INTRATHECAL; INTRAVENOUS; SUBCUTANEOUS at 11:06

## 2017-01-01 RX ADMIN — HYDROMORPHONE HYDROCHLORIDE 1 MG: 1 INJECTION, SOLUTION INTRAMUSCULAR; INTRAVENOUS; SUBCUTANEOUS at 10:08

## 2017-01-01 RX ADMIN — DIPHENHYDRAMINE HYDROCHLORIDE 25 MG: 25 CAPSULE ORAL at 05:07

## 2017-01-01 RX ADMIN — ALLOPURINOL 300 MG: 300 TABLET ORAL at 09:07

## 2017-01-01 RX ADMIN — SODIUM CHLORIDE: 0.9 INJECTION, SOLUTION INTRAVENOUS at 11:07

## 2017-01-01 RX ADMIN — POTASSIUM CHLORIDE 20 MEQ: 750 CAPSULE, EXTENDED RELEASE ORAL at 10:07

## 2017-01-01 RX ADMIN — PHYTONADIONE 10 MG: 10 INJECTION, EMULSION INTRAMUSCULAR; INTRAVENOUS; SUBCUTANEOUS at 11:09

## 2017-01-01 RX ADMIN — SODIUM CHLORIDE 500 ML: 0.9 INJECTION, SOLUTION INTRAVENOUS at 05:07

## 2017-01-01 RX ADMIN — VORICONAZOLE 300 MG: 50 TABLET, FILM COATED ORAL at 08:08

## 2017-01-01 RX ADMIN — FLECAINIDE ACETATE 50 MG: 50 TABLET ORAL at 09:09

## 2017-01-01 RX ADMIN — SULFAMETHOXAZOLE AND TRIMETHOPRIM 1 TABLET: 800; 160 TABLET ORAL at 08:07

## 2017-01-01 RX ADMIN — DEXAMETHASONE 12 MG: 4 TABLET ORAL at 07:06

## 2017-01-01 RX ADMIN — Medication 1250 MG: at 10:09

## 2017-01-01 RX ADMIN — DOLUTEGRAVIR SODIUM 50 MG: 50 TABLET, FILM COATED ORAL at 08:07

## 2017-01-01 RX ADMIN — ONDANSETRON 8 MG: 2 INJECTION INTRAMUSCULAR; INTRAVENOUS at 09:06

## 2017-01-01 RX ADMIN — ALPRAZOLAM 0.5 MG: 0.5 TABLET ORAL at 11:06

## 2017-01-01 RX ADMIN — SODIUM CHLORIDE: 0.9 INJECTION, SOLUTION INTRAVENOUS at 03:07

## 2017-01-01 RX ADMIN — IOHEXOL 75 ML: 350 INJECTION, SOLUTION INTRAVENOUS at 08:09

## 2017-01-01 RX ADMIN — METOPROLOL TARTRATE 25 MG: 25 TABLET, FILM COATED ORAL at 09:09

## 2017-01-01 RX ADMIN — Medication 1250 MG: at 08:09

## 2017-01-01 RX ADMIN — ASCORBIC ACID, VITAMIN A PALMITATE, CHOLECALCIFEROL, THIAMINE HYDROCHLORIDE, RIBOFLAVIN-5 PHOSPHATE SODIUM, PYRIDOXINE HYDROCHLORIDE, NIACINAMIDE, DEXPANTHENOL, ALPHA-TOCOPHEROL ACETATE, VITAMIN K1, FOLIC ACID, BIOTIN, CYANOCOBALAMIN: 200; 3300; 200; 6; 3.6; 6; 40; 15; 10; 150; 600; 60; 5 INJECTION, SOLUTION INTRAVENOUS at 11:09

## 2017-01-01 RX ADMIN — HYDROMORPHONE HYDROCHLORIDE 1 MG: 1 INJECTION, SOLUTION INTRAMUSCULAR; INTRAVENOUS; SUBCUTANEOUS at 01:09

## 2017-01-01 RX ADMIN — CIPROFLOXACIN HYDROCHLORIDE 500 MG: 500 TABLET, FILM COATED ORAL at 08:06

## 2017-01-01 RX ADMIN — DIPHENOXYLATE HYDROCHLORIDE AND ATROPINE SULFATE 1 TABLET: 2.5; .025 TABLET ORAL at 06:09

## 2017-01-01 RX ADMIN — MAGNESIUM SULFATE HEPTAHYDRATE 2 G: 40 INJECTION, SOLUTION INTRAVENOUS at 06:07

## 2017-01-01 RX ADMIN — HYDROMORPHONE HYDROCHLORIDE 2 MG: 2 INJECTION INTRAMUSCULAR; INTRAVENOUS; SUBCUTANEOUS at 08:06

## 2017-01-01 RX ADMIN — ONDANSETRON 8 MG: 2 INJECTION INTRAMUSCULAR; INTRAVENOUS at 04:09

## 2017-01-01 RX ADMIN — ONDANSETRON 8 MG: 4 TABLET, FILM COATED ORAL at 08:09

## 2017-01-01 RX ADMIN — ACYCLOVIR 400 MG: 200 CAPSULE ORAL at 09:06

## 2017-01-01 RX ADMIN — LOPERAMIDE HYDROCHLORIDE 2 MG: 2 CAPSULE ORAL at 06:09

## 2017-01-01 RX ADMIN — SODIUM CHLORIDE: 0.9 INJECTION, SOLUTION INTRAVENOUS at 06:07

## 2017-01-01 RX ADMIN — HYDROMORPHONE HYDROCHLORIDE: 2 INJECTION INTRAMUSCULAR; INTRAVENOUS; SUBCUTANEOUS at 01:10

## 2017-01-01 RX ADMIN — PIPERACILLIN AND TAZOBACTAM 4.5 G: 4; .5 INJECTION, POWDER, FOR SOLUTION INTRAVENOUS at 09:09

## 2017-01-01 RX ADMIN — FLECAINIDE ACETATE 50 MG: 100 TABLET ORAL at 12:07

## 2017-01-01 RX ADMIN — SODIUM CHLORIDE: 0.9 INJECTION, SOLUTION INTRAVENOUS at 04:06

## 2017-01-01 RX ADMIN — PIPERACILLIN AND TAZOBACTAM 4.5 G: 4; .5 INJECTION, POWDER, FOR SOLUTION INTRAVENOUS at 01:09

## 2017-01-01 RX ADMIN — MIDAZOLAM HYDROCHLORIDE 2 MG: 1 INJECTION, SOLUTION INTRAMUSCULAR; INTRAVENOUS at 08:09

## 2017-01-01 RX ADMIN — ALTEPLASE 2 MG: 2.2 INJECTION, POWDER, LYOPHILIZED, FOR SOLUTION INTRAVENOUS at 03:07

## 2017-01-01 RX ADMIN — ONDANSETRON 8 MG: 2 INJECTION INTRAMUSCULAR; INTRAVENOUS at 02:07

## 2017-01-01 RX ADMIN — BENZONATATE 100 MG: 100 CAPSULE, LIQUID FILLED ORAL at 05:07

## 2017-01-01 RX ADMIN — VANCOMYCIN HYDROCHLORIDE 1500 MG: 1 INJECTION, POWDER, LYOPHILIZED, FOR SOLUTION INTRAVENOUS at 05:07

## 2017-01-01 RX ADMIN — LORAZEPAM 1 MG: 0.5 TABLET ORAL at 09:07

## 2017-01-01 RX ADMIN — POTASSIUM & SODIUM PHOSPHATES POWDER PACK 280-160-250 MG 1 PACKET: 280-160-250 PACK at 04:09

## 2017-01-01 RX ADMIN — PANTOPRAZOLE SODIUM 40 MG: 40 TABLET, DELAYED RELEASE ORAL at 09:06

## 2017-01-01 RX ADMIN — DIPHENHYDRAMINE HYDROCHLORIDE 25 MG: 25 CAPSULE ORAL at 11:06

## 2017-01-01 RX ADMIN — POTASSIUM CHLORIDE 10 MEQ: 10 INJECTION, SOLUTION INTRAVENOUS at 04:09

## 2017-01-01 RX ADMIN — STANDARDIZED SENNA CONCENTRATE AND DOCUSATE SODIUM 1 TABLET: 8.6; 5 TABLET, FILM COATED ORAL at 09:07

## 2017-01-01 RX ADMIN — FLECAINIDE ACETATE 100 MG: 100 TABLET ORAL at 09:07

## 2017-01-01 RX ADMIN — ACYCLOVIR 800 MG: 800 TABLET ORAL at 09:09

## 2017-01-01 RX ADMIN — IPRATROPIUM BROMIDE AND ALBUTEROL SULFATE 3 ML: .5; 3 SOLUTION RESPIRATORY (INHALATION) at 08:10

## 2017-01-01 RX ADMIN — ONDANSETRON 8 MG: 2 INJECTION INTRAMUSCULAR; INTRAVENOUS at 11:07

## 2017-01-01 RX ADMIN — ONDANSETRON 4 MG: 2 INJECTION INTRAMUSCULAR; INTRAVENOUS at 04:09

## 2017-01-01 RX ADMIN — DIPHENOXYLATE HYDROCHLORIDE AND ATROPINE SULFATE 1 TABLET: 2.5; .025 TABLET ORAL at 12:10

## 2017-01-01 RX ADMIN — SODIUM CHLORIDE: 0.9 INJECTION, SOLUTION INTRAVENOUS at 02:06

## 2017-01-01 RX ADMIN — HYDROMORPHONE HYDROCHLORIDE: 2 INJECTION INTRAMUSCULAR; INTRAVENOUS; SUBCUTANEOUS at 02:06

## 2017-01-01 RX ADMIN — HYDROMORPHONE HYDROCHLORIDE 1 MG: 1 INJECTION, SOLUTION INTRAMUSCULAR; INTRAVENOUS; SUBCUTANEOUS at 11:09

## 2017-01-01 RX ADMIN — DIPHENOXYLATE HYDROCHLORIDE AND ATROPINE SULFATE 1 TABLET: 2.5; .025 TABLET ORAL at 12:09

## 2017-01-01 RX ADMIN — LOPERAMIDE HYDROCHLORIDE 2 MG: 2 CAPSULE ORAL at 09:09

## 2017-01-01 RX ADMIN — HYDROMORPHONE HYDROCHLORIDE 3 MG: 1 INJECTION, SOLUTION INTRAMUSCULAR; INTRAVENOUS; SUBCUTANEOUS at 08:07

## 2017-01-01 RX ADMIN — LOPERAMIDE HYDROCHLORIDE 2 MG: 2 CAPSULE ORAL at 08:09

## 2017-01-01 RX ADMIN — ACETAMINOPHEN 500 MG: 500 TABLET ORAL at 05:07

## 2017-01-01 RX ADMIN — LORAZEPAM 1 MG: 0.5 TABLET ORAL at 01:09

## 2017-01-01 RX ADMIN — DEXAMETHASONE 1 DROP: 1 SUSPENSION OPHTHALMIC at 12:07

## 2017-01-01 RX ADMIN — FLUDARABINE PHOSPHATE 65 MG: 25 INJECTION, POWDER, LYOPHILIZED, FOR SOLUTION INTRAVENOUS at 08:06

## 2017-01-01 RX ADMIN — ATORVASTATIN CALCIUM 20 MG: 20 TABLET, FILM COATED ORAL at 08:08

## 2017-01-01 RX ADMIN — IDARUBICIN HYDROCHLORIDE 21 MG: 1 INJECTION, SOLUTION INTRAVENOUS at 09:06

## 2017-01-01 RX ADMIN — CETIRIZINE HYDROCHLORIDE 10 MG: 5 TABLET, FILM COATED ORAL at 12:07

## 2017-01-01 RX ADMIN — HYDROMORPHONE HYDROCHLORIDE 1 MG: 2 INJECTION INTRAMUSCULAR; INTRAVENOUS; SUBCUTANEOUS at 10:06

## 2017-01-01 RX ADMIN — DOLUTEGRAVIR SODIUM 50 MG: 50 TABLET, FILM COATED ORAL at 11:08

## 2017-01-01 RX ADMIN — ABACAVIR 600 MG: 300 TABLET, FILM COATED ORAL at 09:10

## 2017-01-01 RX ADMIN — LOPERAMIDE HYDROCHLORIDE 2 MG: 2 CAPSULE ORAL at 05:09

## 2017-01-01 RX ADMIN — PANTOPRAZOLE SODIUM 40 MG: 40 TABLET, DELAYED RELEASE ORAL at 08:10

## 2017-01-01 RX ADMIN — IDARUBICIN HYDROCHLORIDE 21 MG: 1 INJECTION, SOLUTION INTRAVENOUS at 08:06

## 2017-01-01 RX ADMIN — POTASSIUM & SODIUM PHOSPHATES POWDER PACK 280-160-250 MG 1 PACKET: 280-160-250 PACK at 10:09

## 2017-01-01 RX ADMIN — ADENOSINE 18 MG: 3 INJECTION, SOLUTION INTRAVENOUS at 08:07

## 2017-01-01 RX ADMIN — PHENYLEPHRINE HYDROCHLORIDE 100 MCG: 10 INJECTION INTRAVENOUS at 10:07

## 2017-01-01 RX ADMIN — SODIUM CHLORIDE: 0.9 INJECTION, SOLUTION INTRAVENOUS at 08:07

## 2017-01-01 RX ADMIN — DIPHENHYDRAMINE HYDROCHLORIDE 25 MG: 25 CAPSULE ORAL at 02:07

## 2017-01-01 RX ADMIN — PROPOFOL 60 MCG/KG/MIN: 10 INJECTION, EMULSION INTRAVENOUS at 12:10

## 2017-01-01 RX ADMIN — HYDROMORPHONE HYDROCHLORIDE 1 MG: 1 INJECTION, SOLUTION INTRAMUSCULAR; INTRAVENOUS; SUBCUTANEOUS at 01:07

## 2017-01-01 RX ADMIN — CEFEPIME HYDROCHLORIDE 2 G: 2 INJECTION, SOLUTION INTRAVENOUS at 06:09

## 2017-01-01 RX ADMIN — PROCHLORPERAZINE EDISYLATE 10 MG: 5 INJECTION INTRAMUSCULAR; INTRAVENOUS at 11:09

## 2017-01-01 RX ADMIN — ACETAMINOPHEN 650 MG: 325 TABLET ORAL at 02:07

## 2017-01-01 RX ADMIN — ACETAMINOPHEN 650 MG: 325 TABLET ORAL at 08:10

## 2017-01-01 RX ADMIN — FLUDARABINE PHOSPHATE 65 MG: 25 INJECTION, POWDER, LYOPHILIZED, FOR SOLUTION INTRAVENOUS at 09:06

## 2017-01-01 RX ADMIN — POTASSIUM CHLORIDE 10 MEQ: 10 INJECTION, SOLUTION INTRAVENOUS at 01:09

## 2017-01-01 RX ADMIN — POTASSIUM CHLORIDE 10 MEQ: 10 INJECTION, SOLUTION INTRAVENOUS at 01:07

## 2017-01-01 RX ADMIN — SODIUM CHLORIDE 500 ML: 900 INJECTION, SOLUTION INTRAVENOUS at 02:09

## 2017-01-01 RX ADMIN — SODIUM CHLORIDE, PRESERVATIVE FREE 10 ML: 5 INJECTION INTRAVENOUS at 10:08

## 2017-01-01 RX ADMIN — AMPHOTERICIN B 400 MG: 50 INJECTION, POWDER, LYOPHILIZED, FOR SOLUTION INTRAVENOUS at 03:07

## 2017-01-01 RX ADMIN — POTASSIUM CHLORIDE 10 MEQ: 10 INJECTION, SOLUTION INTRAVENOUS at 06:08

## 2017-01-01 RX ADMIN — POTASSIUM CHLORIDE 20 MEQ: 750 CAPSULE, EXTENDED RELEASE ORAL at 12:07

## 2017-01-01 RX ADMIN — ZOLPIDEM TARTRATE 5 MG: 5 TABLET ORAL at 01:07

## 2017-01-01 RX ADMIN — DIPHENOXYLATE HYDROCHLORIDE AND ATROPINE SULFATE 1 TABLET: 2.5; .025 TABLET ORAL at 05:10

## 2017-01-01 RX ADMIN — DIPHENHYDRAMINE HYDROCHLORIDE 25 MG: 25 CAPSULE ORAL at 08:07

## 2017-01-01 RX ADMIN — PIPERACILLIN AND TAZOBACTAM 4.5 G: 4; .5 INJECTION, POWDER, FOR SOLUTION INTRAVENOUS at 06:09

## 2017-01-01 RX ADMIN — BENZONATATE 100 MG: 100 CAPSULE, LIQUID FILLED ORAL at 01:07

## 2017-01-01 RX ADMIN — MORPHINE SULFATE 30 MG: 15 TABLET, EXTENDED RELEASE ORAL at 08:10

## 2017-01-01 RX ADMIN — MAGNESIUM OXIDE TAB 400 MG (241.3 MG ELEMENTAL MG) 800 MG: 400 (241.3 MG) TAB at 09:06

## 2017-01-01 RX ADMIN — LOPERAMIDE HYDROCHLORIDE 2 MG: 2 CAPSULE ORAL at 02:09

## 2017-01-01 RX ADMIN — ALBUMIN (HUMAN) 50 G: 12.5 SOLUTION INTRAVENOUS at 01:09

## 2017-01-01 RX ADMIN — IOHEXOL 15 ML: 350 INJECTION, SOLUTION INTRAVENOUS at 04:07

## 2017-01-01 RX ADMIN — SULFAMETHOXAZOLE AND TRIMETHOPRIM 1 TABLET: 800; 160 TABLET ORAL at 05:09

## 2017-01-01 RX ADMIN — LORAZEPAM 1 MG: 0.5 TABLET ORAL at 07:07

## 2017-01-01 RX ADMIN — ONDANSETRON 8 MG: 2 INJECTION INTRAMUSCULAR; INTRAVENOUS at 09:07

## 2017-01-01 RX ADMIN — PROCHLORPERAZINE EDISYLATE 10 MG: 5 INJECTION INTRAMUSCULAR; INTRAVENOUS at 07:09

## 2017-01-01 RX ADMIN — METHYLPREDNISOLONE SODIUM SUCCINATE 80 MG: 40 INJECTION, POWDER, FOR SOLUTION INTRAMUSCULAR; INTRAVENOUS at 07:09

## 2017-01-01 RX ADMIN — PANTOPRAZOLE SODIUM 40 MG: 40 TABLET, DELAYED RELEASE ORAL at 09:10

## 2017-01-01 RX ADMIN — FLECAINIDE ACETATE 50 MG: 50 TABLET ORAL at 12:09

## 2017-01-01 RX ADMIN — CEFEPIME HYDROCHLORIDE 2 G: 2 INJECTION, SOLUTION INTRAVENOUS at 08:09

## 2017-01-01 RX ADMIN — DIPHENOXYLATE HYDROCHLORIDE AND ATROPINE SULFATE 1 TABLET: 2.5; .025 TABLET ORAL at 07:09

## 2017-01-01 RX ADMIN — PROMETHAZINE HYDROCHLORIDE 12.5 MG: 12.5 TABLET ORAL at 08:07

## 2017-01-01 RX ADMIN — MAGNESIUM SULFATE HEPTAHYDRATE 2 G: 40 INJECTION, SOLUTION INTRAVENOUS at 05:07

## 2017-01-01 RX ADMIN — CALCIUM GLUCONATE: 94 INJECTION, SOLUTION INTRAVENOUS at 09:09

## 2017-01-01 RX ADMIN — HYDROMORPHONE HYDROCHLORIDE 2 MG: 2 TABLET ORAL at 09:06

## 2017-01-01 RX ADMIN — CLOTRIMAZOLE 10 MG: 10 LOZENGE ORAL; TOPICAL at 02:09

## 2017-01-01 RX ADMIN — METHYLPREDNISOLONE 8 MG: 4 TABLET ORAL at 03:06

## 2017-01-01 RX ADMIN — ACYCLOVIR 400 MG: 200 CAPSULE ORAL at 10:07

## 2017-01-01 RX ADMIN — HYDROMORPHONE HYDROCHLORIDE: 2 INJECTION INTRAMUSCULAR; INTRAVENOUS; SUBCUTANEOUS at 03:06

## 2017-01-01 RX ADMIN — ALPRAZOLAM 0.5 MG: 0.5 TABLET ORAL at 06:06

## 2017-01-01 RX ADMIN — ONDANSETRON 8 MG: 2 INJECTION INTRAMUSCULAR; INTRAVENOUS at 10:09

## 2017-01-01 RX ADMIN — ONDANSETRON 8 MG: 2 INJECTION INTRAMUSCULAR; INTRAVENOUS at 07:09

## 2017-01-01 RX ADMIN — CALCIUM GLUCONATE: 94 INJECTION, SOLUTION INTRAVENOUS at 10:09

## 2017-01-01 RX ADMIN — BENZOCAINE, BUTAMBEN, AND TETRACAINE HYDROCHLORIDE 1 SPRAY: .028; .004; .004 AEROSOL, SPRAY TOPICAL at 10:07

## 2017-01-01 RX ADMIN — VORICONAZOLE 300 MG: 200 TABLET, FILM COATED ORAL at 08:10

## 2017-01-01 RX ADMIN — Medication 1250 MG: at 10:10

## 2017-01-01 RX ADMIN — HYDROMORPHONE HYDROCHLORIDE 1 MG: 1 INJECTION, SOLUTION INTRAMUSCULAR; INTRAVENOUS; SUBCUTANEOUS at 10:09

## 2017-01-01 RX ADMIN — DEXMEDETOMIDINE HYDROCHLORIDE 0.2 MCG/KG/HR: 4 INJECTION, SOLUTION INTRAVENOUS at 09:10

## 2017-01-01 RX ADMIN — ABACAVIR 600 MG: 300 TABLET, FILM COATED ORAL at 12:07

## 2017-01-01 RX ADMIN — PROCHLORPERAZINE EDISYLATE 10 MG: 5 INJECTION INTRAMUSCULAR; INTRAVENOUS at 08:09

## 2017-01-01 RX ADMIN — FINASTERIDE 5 MG: 5 TABLET, FILM COATED ORAL at 08:08

## 2017-01-01 RX ADMIN — ACETAMINOPHEN 650 MG: 325 TABLET ORAL at 01:07

## 2017-01-01 RX ADMIN — HEPARIN 500 UNITS: 100 SYRINGE at 08:08

## 2017-01-01 RX ADMIN — LAMIVUDINE 300 MG: 150 TABLET, FILM COATED ORAL at 08:08

## 2017-01-01 RX ADMIN — CEFEPIME HYDROCHLORIDE 2 G: 2 INJECTION, SOLUTION INTRAVENOUS at 12:09

## 2017-01-01 RX ADMIN — HYDROMORPHONE HYDROCHLORIDE 1 MG: 1 INJECTION, SOLUTION INTRAMUSCULAR; INTRAVENOUS; SUBCUTANEOUS at 02:10

## 2017-01-01 RX ADMIN — INFLIXIMAB 400 MG: 100 INJECTION, POWDER, LYOPHILIZED, FOR SOLUTION INTRAVENOUS at 02:09

## 2017-01-01 RX ADMIN — PANTOPRAZOLE SODIUM 40 MG: 40 TABLET, DELAYED RELEASE ORAL at 05:06

## 2017-01-01 RX ADMIN — POTASSIUM & SODIUM PHOSPHATES POWDER PACK 280-160-250 MG 1 PACKET: 280-160-250 PACK at 03:07

## 2017-01-01 RX ADMIN — HYDROMORPHONE HYDROCHLORIDE: 2 INJECTION INTRAMUSCULAR; INTRAVENOUS; SUBCUTANEOUS at 09:10

## 2017-01-01 RX ADMIN — ONDANSETRON 8 MG: 2 INJECTION INTRAMUSCULAR; INTRAVENOUS at 08:09

## 2017-01-01 RX ADMIN — VORICONAZOLE 300 MG: 200 TABLET, FILM COATED ORAL at 11:09

## 2017-01-01 RX ADMIN — DIPHENHYDRAMINE HYDROCHLORIDE 25 MG: 25 CAPSULE ORAL at 12:07

## 2017-01-01 RX ADMIN — SODIUM CHLORIDE: 0.9 INJECTION, SOLUTION INTRAVENOUS at 05:06

## 2017-01-01 RX ADMIN — ABACAVIR 600 MG: 300 TABLET, FILM COATED ORAL at 08:06

## 2017-01-01 RX ADMIN — FLECAINIDE ACETATE 100 MG: 100 TABLET ORAL at 08:09

## 2017-01-01 RX ADMIN — DEXAMETHASONE 1 DROP: 1 SUSPENSION OPHTHALMIC at 11:06

## 2017-01-01 RX ADMIN — PREDNISONE 60 MG: 20 TABLET ORAL at 08:07

## 2017-01-01 RX ADMIN — SULFAMETHOXAZOLE AND TRIMETHOPRIM 1 TABLET: 800; 160 TABLET ORAL at 08:06

## 2017-01-01 RX ADMIN — ATORVASTATIN CALCIUM 20 MG: 20 TABLET, FILM COATED ORAL at 01:09

## 2017-01-01 RX ADMIN — PROCHLORPERAZINE EDISYLATE 5 MG: 5 INJECTION INTRAMUSCULAR; INTRAVENOUS at 08:07

## 2017-01-01 RX ADMIN — HYDROMORPHONE HYDROCHLORIDE 0.5 MG: 1 INJECTION, SOLUTION INTRAMUSCULAR; INTRAVENOUS; SUBCUTANEOUS at 05:07

## 2017-01-01 RX ADMIN — FINASTERIDE 5 MG: 5 TABLET, FILM COATED ORAL at 09:06

## 2017-01-01 RX ADMIN — LOPERAMIDE HYDROCHLORIDE 2 MG: 2 CAPSULE ORAL at 03:09

## 2017-01-01 RX ADMIN — SODIUM CHLORIDE, PRESERVATIVE FREE 10 ML: 5 INJECTION INTRAVENOUS at 08:09

## 2017-01-01 RX ADMIN — POTASSIUM & SODIUM PHOSPHATES POWDER PACK 280-160-250 MG 2 PACKET: 280-160-250 PACK at 06:07

## 2017-01-01 RX ADMIN — ATORVASTATIN CALCIUM 20 MG: 20 TABLET, FILM COATED ORAL at 09:07

## 2017-01-01 RX ADMIN — MAGNESIUM SULFATE IN WATER 2 G: 40 INJECTION, SOLUTION INTRAVENOUS at 06:09

## 2017-01-01 RX ADMIN — ACETAMINOPHEN 650 MG: 325 TABLET ORAL at 09:07

## 2017-01-01 RX ADMIN — MORPHINE SULFATE 30 MG: 30 TABLET, EXTENDED RELEASE ORAL at 08:08

## 2017-01-01 RX ADMIN — CLOTRIMAZOLE 10 MG: 10 LOZENGE ORAL; TOPICAL at 09:10

## 2017-01-01 RX ADMIN — SOYBEAN OIL 250 ML: 20 INJECTION, SOLUTION INTRAVENOUS at 10:09

## 2017-01-01 RX ADMIN — ACETAMINOPHEN 650 MG: 325 TABLET ORAL at 12:07

## 2017-01-01 RX ADMIN — HYDROMORPHONE HYDROCHLORIDE 1 MG: 1 INJECTION, SOLUTION INTRAMUSCULAR; INTRAVENOUS; SUBCUTANEOUS at 07:08

## 2017-01-01 RX ADMIN — POTASSIUM & SODIUM PHOSPHATES POWDER PACK 280-160-250 MG 2 PACKET: 280-160-250 PACK at 03:07

## 2017-01-01 RX ADMIN — BENZONATATE 100 MG: 100 CAPSULE, LIQUID FILLED ORAL at 08:07

## 2017-01-01 RX ADMIN — LIDOCAINE HYDROCHLORIDE 10 ML: 10 INJECTION, SOLUTION INFILTRATION; PERINEURAL at 01:07

## 2017-01-01 RX ADMIN — BENZONATATE 100 MG: 100 CAPSULE, LIQUID FILLED ORAL at 09:07

## 2017-01-01 RX ADMIN — MORPHINE SULFATE 30 MG: 15 TABLET, EXTENDED RELEASE ORAL at 11:09

## 2017-01-01 RX ADMIN — VALGANCICLOVIR 900 MG: 450 TABLET, FILM COATED ORAL at 03:09

## 2017-01-01 RX ADMIN — SODIUM CHLORIDE: 0.9 INJECTION, SOLUTION INTRAVENOUS at 07:08

## 2017-01-01 RX ADMIN — ONDANSETRON 8 MG: 2 INJECTION INTRAMUSCULAR; INTRAVENOUS at 08:06

## 2017-01-01 RX ADMIN — CLOTRIMAZOLE 10 MG: 10 LOZENGE ORAL; TOPICAL at 05:09

## 2017-01-01 RX ADMIN — HYDROMORPHONE HYDROCHLORIDE 2 MG: 2 INJECTION INTRAMUSCULAR; INTRAVENOUS; SUBCUTANEOUS at 10:06

## 2017-01-01 RX ADMIN — POTASSIUM PHOSPHATE, MONOBASIC AND POTASSIUM PHOSPHATE, DIBASIC 20 MMOL: 224; 236 INJECTION, SOLUTION, CONCENTRATE INTRAVENOUS at 06:09

## 2017-01-01 RX ADMIN — POTASSIUM & SODIUM PHOSPHATES POWDER PACK 280-160-250 MG 1 PACKET: 280-160-250 PACK at 09:07

## 2017-01-01 RX ADMIN — LORAZEPAM 1 MG: 0.5 TABLET ORAL at 05:09

## 2017-01-01 RX ADMIN — CLOTRIMAZOLE 10 MG: 10 LOZENGE ORAL; TOPICAL at 06:10

## 2017-01-01 RX ADMIN — ABACAVIR 600 MG: 300 TABLET, FILM COATED ORAL at 09:06

## 2017-01-01 RX ADMIN — PROCHLORPERAZINE EDISYLATE 5 MG: 5 INJECTION INTRAMUSCULAR; INTRAVENOUS at 12:08

## 2017-01-01 RX ADMIN — LORAZEPAM 1 MG: 2 INJECTION, SOLUTION INTRAMUSCULAR; INTRAVENOUS at 10:06

## 2017-01-01 RX ADMIN — PROPOFOL 100 MCG/KG/MIN: 10 INJECTION, EMULSION INTRAVENOUS at 02:07

## 2017-01-01 RX ADMIN — Medication: at 02:10

## 2017-01-01 RX ADMIN — MAGNESIUM OXIDE TAB 400 MG (241.3 MG ELEMENTAL MG) 800 MG: 400 (241.3 MG) TAB at 06:07

## 2017-01-01 RX ADMIN — HYDROMORPHONE HYDROCHLORIDE 1 MG: 1 INJECTION, SOLUTION INTRAMUSCULAR; INTRAVENOUS; SUBCUTANEOUS at 10:07

## 2017-01-01 RX ADMIN — CETIRIZINE HYDROCHLORIDE 10 MG: 5 TABLET, FILM COATED ORAL at 09:06

## 2017-01-01 RX ADMIN — DEXTROSE 1000 MG: 50 INJECTION, SOLUTION INTRAVENOUS at 06:07

## 2017-01-01 RX ADMIN — HYDROMORPHONE HYDROCHLORIDE 1 MG: 1 INJECTION, SOLUTION INTRAMUSCULAR; INTRAVENOUS; SUBCUTANEOUS at 04:08

## 2017-01-01 RX ADMIN — LORAZEPAM 1 MG: 0.5 TABLET ORAL at 08:07

## 2017-01-01 RX ADMIN — HEPARIN 500 UNITS: 100 SYRINGE at 07:09

## 2017-01-01 RX ADMIN — SODIUM CHLORIDE, PRESERVATIVE FREE 10 ML: 5 INJECTION INTRAVENOUS at 07:09

## 2017-01-01 RX ADMIN — HYDROMORPHONE HYDROCHLORIDE 2 MG: 2 TABLET ORAL at 10:06

## 2017-01-01 RX ADMIN — MORPHINE SULFATE 30 MG: 15 TABLET, EXTENDED RELEASE ORAL at 09:10

## 2017-01-01 RX ADMIN — FUROSEMIDE 20 MG: 10 INJECTION, SOLUTION INTRAMUSCULAR; INTRAVENOUS at 10:07

## 2017-01-01 RX ADMIN — METOPROLOL TARTRATE 5 MG: 5 INJECTION INTRAVENOUS at 05:09

## 2017-01-01 RX ADMIN — SULFAMETHOXAZOLE AND TRIMETHOPRIM 1 TABLET: 800; 160 TABLET ORAL at 08:08

## 2017-01-01 RX ADMIN — SODIUM CHLORIDE, PRESERVATIVE FREE 10 ML: 5 INJECTION INTRAVENOUS at 08:08

## 2017-01-01 RX ADMIN — FENTANYL CITRATE 25 MCG: 50 INJECTION, SOLUTION INTRAMUSCULAR; INTRAVENOUS at 03:06

## 2017-01-01 RX ADMIN — ONDANSETRON 8 MG: 2 INJECTION INTRAMUSCULAR; INTRAVENOUS at 10:06

## 2017-01-01 RX ADMIN — POTASSIUM & SODIUM PHOSPHATES POWDER PACK 280-160-250 MG 1 PACKET: 280-160-250 PACK at 06:07

## 2017-01-01 RX ADMIN — DIPHENHYDRAMINE HYDROCHLORIDE 12.5 MG: 50 INJECTION, SOLUTION INTRAMUSCULAR; INTRAVENOUS at 09:07

## 2017-01-01 RX ADMIN — SODIUM CHLORIDE 500 ML: 0.9 INJECTION, SOLUTION INTRAVENOUS at 08:07

## 2017-01-01 RX ADMIN — LIDOCAINE HYDROCHLORIDE,EPINEPHRINE BITARTRATE 5 ML: 10; .01 INJECTION, SOLUTION INFILTRATION; PERINEURAL at 11:06

## 2017-01-01 RX ADMIN — VALGANCICLOVIR 900 MG: 450 TABLET, FILM COATED ORAL at 08:09

## 2017-01-01 RX ADMIN — SODIUM CHLORIDE 5 UNITS/HR: 9 INJECTION, SOLUTION INTRAVENOUS at 08:10

## 2017-01-01 RX ADMIN — METHYLPREDNISOLONE 8 MG: 4 TABLET ORAL at 08:06

## 2017-01-01 RX ADMIN — ACETAMINOPHEN 650 MG: 325 TABLET ORAL at 05:07

## 2017-01-01 RX ADMIN — POTASSIUM CHLORIDE 10 MEQ: 10 INJECTION, SOLUTION INTRAVENOUS at 02:07

## 2017-01-01 RX ADMIN — SODIUM CHLORIDE: 0.9 INJECTION, SOLUTION INTRAVENOUS at 03:09

## 2017-01-01 RX ADMIN — CEFEPIME HYDROCHLORIDE 2 G: 2 INJECTION, SOLUTION INTRAVENOUS at 01:07

## 2017-01-01 RX ADMIN — DOLUTEGRAVIR SODIUM 50 MG: 50 TABLET, FILM COATED ORAL at 09:07

## 2017-01-01 RX ADMIN — GADOBUTROL 10 ML: 604.72 INJECTION INTRAVENOUS at 06:07

## 2017-01-01 RX ADMIN — ETOMIDATE 20 MG: 2 INJECTION, SOLUTION INTRAVENOUS at 10:10

## 2017-01-01 RX ADMIN — SODIUM BICARBONATE 1.24 ML/KG/HR: 84 INJECTION, SOLUTION INTRAVENOUS at 05:09

## 2017-01-01 RX ADMIN — ONDANSETRON 8 MG: 2 INJECTION INTRAMUSCULAR; INTRAVENOUS at 03:09

## 2017-01-01 RX ADMIN — GUAIFENESIN 600 MG: 600 TABLET, EXTENDED RELEASE ORAL at 10:08

## 2017-01-01 RX ADMIN — FLECAINIDE ACETATE 100 MG: 100 TABLET ORAL at 10:07

## 2017-01-01 RX ADMIN — VASOPRESSIN 0.04 UNITS/MIN: 20 INJECTION INTRAVENOUS at 11:09

## 2017-01-01 RX ADMIN — MAGNESIUM SULFATE IN WATER 2 G: 40 INJECTION, SOLUTION INTRAVENOUS at 11:07

## 2017-01-01 RX ADMIN — LIDOCAINE HYDROCHLORIDE 20 ML: 10 INJECTION INFILTRATION; PERINEURAL at 03:06

## 2017-01-01 RX ADMIN — SODIUM CHLORIDE, SODIUM LACTATE, POTASSIUM CHLORIDE, AND CALCIUM CHLORIDE 1000 ML: 600; 310; 30; 20 INJECTION, SOLUTION INTRAVENOUS at 08:09

## 2017-01-01 RX ADMIN — EPINEPHRINE 1 MG: 1 INJECTION INTRAMUSCULAR; INTRAVENOUS; SUBCUTANEOUS at 11:10

## 2017-01-01 RX ADMIN — HYDROMORPHONE HYDROCHLORIDE: 2 INJECTION INTRAMUSCULAR; INTRAVENOUS; SUBCUTANEOUS at 08:06

## 2017-01-01 RX ADMIN — ALPRAZOLAM 0.5 MG: 0.5 TABLET ORAL at 03:06

## 2017-01-01 RX ADMIN — LORAZEPAM 1 MG: 0.5 TABLET ORAL at 07:08

## 2017-01-01 RX ADMIN — LAMIVUDINE 300 MG: 150 TABLET, FILM COATED ORAL at 09:10

## 2017-01-01 RX ADMIN — ACETAMINOPHEN 650 MG: 325 TABLET ORAL at 03:09

## 2017-01-01 RX ADMIN — IOHEXOL 100 ML: 350 INJECTION, SOLUTION INTRAVENOUS at 10:07

## 2017-01-01 RX ADMIN — ATORVASTATIN CALCIUM 20 MG: 20 TABLET, FILM COATED ORAL at 08:07

## 2017-01-01 RX ADMIN — DIPHENOXYLATE HYDROCHLORIDE AND ATROPINE SULFATE 1 TABLET: 2.5; .025 TABLET ORAL at 06:10

## 2017-01-01 RX ADMIN — FINASTERIDE 5 MG: 5 TABLET, FILM COATED ORAL at 12:07

## 2017-01-01 RX ADMIN — DOLUTEGRAVIR SODIUM 50 MG: 50 TABLET, FILM COATED ORAL at 02:07

## 2017-01-01 RX ADMIN — LORAZEPAM 1 MG: 0.5 TABLET ORAL at 01:07

## 2017-01-01 RX ADMIN — POTASSIUM PHOSPHATE, MONOBASIC AND POTASSIUM PHOSPHATE, DIBASIC 20 MMOL: 224; 236 INJECTION, SOLUTION, CONCENTRATE INTRAVENOUS at 08:10

## 2017-01-01 RX ADMIN — ALPRAZOLAM 0.5 MG: 0.5 TABLET ORAL at 12:07

## 2017-01-01 RX ADMIN — MAGNESIUM OXIDE TAB 400 MG (241.3 MG ELEMENTAL MG) 400 MG: 400 (241.3 MG) TAB at 03:06

## 2017-01-01 RX ADMIN — POTASSIUM CHLORIDE 10 MEQ: 10 INJECTION, SOLUTION INTRAVENOUS at 08:08

## 2017-01-01 RX ADMIN — DOLUTEGRAVIR SODIUM 50 MG: 50 TABLET, FILM COATED ORAL at 09:10

## 2017-01-01 RX ADMIN — IOHEXOL 75 ML: 350 INJECTION, SOLUTION INTRAVENOUS at 08:07

## 2017-01-01 RX ADMIN — METHYLPREDNISOLONE SODIUM SUCCINATE 80 MG: 40 INJECTION, POWDER, FOR SOLUTION INTRAMUSCULAR; INTRAVENOUS at 10:10

## 2017-01-01 RX ADMIN — HYDROMORPHONE HYDROCHLORIDE 3 MG: 1 INJECTION, SOLUTION INTRAMUSCULAR; INTRAVENOUS; SUBCUTANEOUS at 02:07

## 2017-01-01 RX ADMIN — LORAZEPAM 1 MG: 0.5 TABLET ORAL at 11:07

## 2017-01-01 RX ADMIN — METOPROLOL TARTRATE 5 MG: 5 INJECTION, SOLUTION INTRAVENOUS at 07:09

## 2017-01-01 RX ADMIN — SODIUM PHOSPHATE, MONOBASIC, MONOHYDRATE 15 MMOL: 276; 142 INJECTION, SOLUTION INTRAVENOUS at 09:07

## 2017-01-01 RX ADMIN — POTASSIUM CHLORIDE 10 MEQ: 10 INJECTION, SOLUTION INTRAVENOUS at 06:09

## 2017-01-01 RX ADMIN — VALGANCICLOVIR 900 MG: 450 TABLET, FILM COATED ORAL at 09:10

## 2017-01-01 RX ADMIN — MAGNESIUM SULFATE HEPTAHYDRATE 2 G: 40 INJECTION, SOLUTION INTRAVENOUS at 04:08

## 2017-01-01 RX ADMIN — METOPROLOL TARTRATE 25 MG: 25 TABLET, FILM COATED ORAL at 08:10

## 2017-01-01 RX ADMIN — LOPERAMIDE HYDROCHLORIDE 2 MG: 2 CAPSULE ORAL at 11:09

## 2017-01-01 RX ADMIN — PHENYLEPHRINE HYDROCHLORIDE 200 MCG: 10 INJECTION INTRAVENOUS at 10:07

## 2017-01-01 RX ADMIN — ABACAVIR 600 MG: 300 TABLET, FILM COATED ORAL at 01:09

## 2017-01-01 RX ADMIN — HYDROMORPHONE HYDROCHLORIDE 1 MG: 2 INJECTION, SOLUTION INTRAMUSCULAR; INTRAVENOUS; SUBCUTANEOUS at 02:06

## 2017-01-01 RX ADMIN — ONDANSETRON 8 MG: 2 INJECTION INTRAMUSCULAR; INTRAVENOUS at 01:07

## 2017-01-01 RX ADMIN — SODIUM CHLORIDE 500 ML: 0.9 INJECTION, SOLUTION INTRAVENOUS at 07:09

## 2017-01-01 RX ADMIN — VANCOMYCIN HYDROCHLORIDE 1250 MG: 10 INJECTION, POWDER, LYOPHILIZED, FOR SOLUTION INTRAVENOUS at 04:09

## 2017-01-01 RX ADMIN — POTASSIUM CHLORIDE 10 MEQ: 10 INJECTION, SOLUTION INTRAVENOUS at 11:09

## 2017-01-01 RX ADMIN — SODIUM PHOSPHATE, MONOBASIC, MONOHYDRATE 20.01 MMOL: 276; 142 INJECTION, SOLUTION INTRAVENOUS at 07:07

## 2017-01-01 RX ADMIN — RAMELTEON 8 MG: 8 TABLET, FILM COATED ORAL at 09:07

## 2017-01-01 RX ADMIN — HYDROMORPHONE HYDROCHLORIDE 1 MG: 1 INJECTION, SOLUTION INTRAMUSCULAR; INTRAVENOUS; SUBCUTANEOUS at 02:09

## 2017-01-01 RX ADMIN — MIDAZOLAM HYDROCHLORIDE 2 MG: 1 INJECTION, SOLUTION INTRAMUSCULAR; INTRAVENOUS at 02:07

## 2017-01-01 RX ADMIN — ALLOPURINOL 300 MG: 300 TABLET ORAL at 05:06

## 2017-01-01 RX ADMIN — SODIUM CHLORIDE 150 MG: 9 INJECTION, SOLUTION INTRAVENOUS at 07:06

## 2017-01-01 RX ADMIN — LAMIVUDINE 300 MG: 150 TABLET, FILM COATED ORAL at 09:06

## 2017-01-01 RX ADMIN — VALGANCICLOVIR 900 MG: 450 TABLET, FILM COATED ORAL at 10:09

## 2017-01-01 RX ADMIN — PROCHLORPERAZINE EDISYLATE 5 MG: 5 INJECTION INTRAMUSCULAR; INTRAVENOUS at 05:07

## 2017-01-01 RX ADMIN — PROCHLORPERAZINE EDISYLATE 10 MG: 5 INJECTION INTRAMUSCULAR; INTRAVENOUS at 07:07

## 2017-01-01 RX ADMIN — PANTOPRAZOLE SODIUM 40 MG: 40 TABLET, DELAYED RELEASE ORAL at 08:08

## 2017-01-01 RX ADMIN — AZACITIDINE 155 MG: 100 INJECTION, POWDER, LYOPHILIZED, FOR SOLUTION INTRAVENOUS; SUBCUTANEOUS at 10:09

## 2017-01-01 RX ADMIN — CALCIUM GLUCONATE: 94 INJECTION, SOLUTION INTRAVENOUS at 09:10

## 2017-01-01 RX ADMIN — POTASSIUM CHLORIDE 20 MEQ: 750 CAPSULE, EXTENDED RELEASE ORAL at 04:06

## 2017-01-01 RX ADMIN — ATORVASTATIN CALCIUM 20 MG: 20 TABLET, FILM COATED ORAL at 09:10

## 2017-01-01 RX ADMIN — DIPHENHYDRAMINE HYDROCHLORIDE 25 MG: 25 CAPSULE ORAL at 09:09

## 2017-01-01 RX ADMIN — METHYLPREDNISOLONE SODIUM SUCCINATE 40 MG: 40 INJECTION, POWDER, FOR SOLUTION INTRAMUSCULAR; INTRAVENOUS at 09:09

## 2017-01-01 RX ADMIN — SUCCINYLCHOLINE CHLORIDE 120 MG: 20 INJECTION, SOLUTION INTRAMUSCULAR; INTRAVENOUS at 10:10

## 2017-01-01 RX ADMIN — PROCHLORPERAZINE EDISYLATE 10 MG: 5 INJECTION INTRAMUSCULAR; INTRAVENOUS at 12:09

## 2017-01-01 RX ADMIN — FUROSEMIDE 40 MG: 10 INJECTION, SOLUTION INTRAMUSCULAR; INTRAVENOUS at 05:07

## 2017-01-01 RX ADMIN — DOLUTEGRAVIR SODIUM 50 MG: 50 TABLET, FILM COATED ORAL at 08:10

## 2017-01-01 RX ADMIN — TAMSULOSIN HYDROCHLORIDE 0.4 MG: 0.4 CAPSULE ORAL at 10:07

## 2017-01-01 RX ADMIN — SODIUM CHLORIDE: 0.9 INJECTION, SOLUTION INTRAVENOUS at 05:09

## 2017-01-01 RX ADMIN — FLUDARABINE PHOSPHATE 65 MG: 25 INJECTION, POWDER, LYOPHILIZED, FOR SOLUTION INTRAVENOUS at 08:07

## 2017-01-01 RX ADMIN — IOHEXOL 15 ML: 350 INJECTION, SOLUTION INTRAVENOUS at 05:07

## 2017-01-01 RX ADMIN — POTASSIUM CHLORIDE 10 MEQ: 10 INJECTION, SOLUTION INTRAVENOUS at 03:09

## 2017-01-01 RX ADMIN — METHYLPREDNISOLONE SODIUM SUCCINATE 80 MG: 40 INJECTION, POWDER, FOR SOLUTION INTRAMUSCULAR; INTRAVENOUS at 10:09

## 2017-01-01 RX ADMIN — SODIUM PHOSPHATE, MONOBASIC, MONOHYDRATE 15 MMOL: 276; 142 INJECTION, SOLUTION INTRAVENOUS at 06:08

## 2017-01-01 RX ADMIN — PROCHLORPERAZINE EDISYLATE 5 MG: 5 INJECTION INTRAMUSCULAR; INTRAVENOUS at 08:08

## 2017-01-01 RX ADMIN — ITRACONAZOLE 100 MG: 10 SOLUTION ORAL at 04:07

## 2017-01-01 RX ADMIN — MAGNESIUM OXIDE TAB 400 MG (241.3 MG ELEMENTAL MG) 400 MG: 400 (241.3 MG) TAB at 04:07

## 2017-01-01 RX ADMIN — POTASSIUM CHLORIDE 20 MEQ: 750 CAPSULE, EXTENDED RELEASE ORAL at 11:06

## 2017-01-01 RX ADMIN — LAMIVUDINE 300 MG: 150 TABLET, FILM COATED ORAL at 10:07

## 2017-01-01 RX ADMIN — MAGNESIUM OXIDE TAB 400 MG (241.3 MG ELEMENTAL MG) 400 MG: 400 (241.3 MG) TAB at 11:06

## 2017-01-01 RX ADMIN — DIPHENHYDRAMINE HYDROCHLORIDE 25 MG: 25 CAPSULE ORAL at 10:07

## 2017-01-01 RX ADMIN — VANCOMYCIN HYDROCHLORIDE 1500 MG: 100 INJECTION, POWDER, LYOPHILIZED, FOR SOLUTION INTRAVENOUS at 09:07

## 2017-01-01 RX ADMIN — POTASSIUM CHLORIDE 10 MEQ: 10 INJECTION, SOLUTION INTRAVENOUS at 09:08

## 2017-01-01 RX ADMIN — SODIUM CHLORIDE: 0.9 INJECTION, SOLUTION INTRAVENOUS at 05:07

## 2017-01-01 RX ADMIN — DIPHENHYDRAMINE HYDROCHLORIDE 25 MG: 25 CAPSULE ORAL at 12:06

## 2017-01-01 RX ADMIN — HYDROMORPHONE HYDROCHLORIDE: 2 INJECTION INTRAMUSCULAR; INTRAVENOUS; SUBCUTANEOUS at 11:06

## 2017-01-01 RX ADMIN — PROCHLORPERAZINE EDISYLATE 10 MG: 5 INJECTION INTRAMUSCULAR; INTRAVENOUS at 01:07

## 2017-01-01 RX ADMIN — LAMIVUDINE 300 MG: 150 TABLET, FILM COATED ORAL at 01:09

## 2017-01-01 RX ADMIN — POTASSIUM CHLORIDE 10 MEQ: 10 INJECTION, SOLUTION INTRAVENOUS at 02:09

## 2017-01-01 RX ADMIN — TAMSULOSIN HYDROCHLORIDE 0.4 MG: 0.4 CAPSULE ORAL at 08:06

## 2017-01-01 RX ADMIN — HYDROMORPHONE HYDROCHLORIDE 0.5 MG: 1 INJECTION, SOLUTION INTRAMUSCULAR; INTRAVENOUS; SUBCUTANEOUS at 12:07

## 2017-01-01 RX ADMIN — SODIUM CHLORIDE 500 ML: 0.9 INJECTION, SOLUTION INTRAVENOUS at 02:07

## 2017-01-01 RX ADMIN — SODIUM BICARBONATE 50 MEQ: 84 INJECTION, SOLUTION INTRAVENOUS at 12:10

## 2017-01-01 RX ADMIN — ABACAVIR 600 MG: 300 TABLET, FILM COATED ORAL at 10:07

## 2017-01-01 RX ADMIN — ALPRAZOLAM 0.5 MG: 0.5 TABLET ORAL at 02:06

## 2017-01-01 RX ADMIN — METHYLPREDNISOLONE SODIUM SUCCINATE 80 MG: 40 INJECTION, POWDER, FOR SOLUTION INTRAMUSCULAR; INTRAVENOUS at 09:10

## 2017-01-01 RX ADMIN — SODIUM CHLORIDE: 0.9 INJECTION, SOLUTION INTRAVENOUS at 06:09

## 2017-01-01 RX ADMIN — MORPHINE SULFATE 30 MG: 30 TABLET, EXTENDED RELEASE ORAL at 10:08

## 2017-01-01 RX ADMIN — POTASSIUM CHLORIDE 10 MEQ: 10 INJECTION, SOLUTION INTRAVENOUS at 08:07

## 2017-01-01 RX ADMIN — CLOTRIMAZOLE 10 MG: 10 LOZENGE ORAL; TOPICAL at 10:09

## 2017-01-01 RX ADMIN — MAGNESIUM SULFATE IN WATER 2 G: 40 INJECTION, SOLUTION INTRAVENOUS at 06:07

## 2017-01-01 RX ADMIN — SODIUM CHLORIDE 1000 ML: 0.9 INJECTION, SOLUTION INTRAVENOUS at 05:09

## 2017-01-01 RX ADMIN — LORAZEPAM 1 MG: 0.5 TABLET ORAL at 02:10

## 2017-01-01 RX ADMIN — FUROSEMIDE 20 MG: 10 INJECTION, SOLUTION INTRAMUSCULAR; INTRAVENOUS at 11:07

## 2017-01-01 RX ADMIN — SODIUM CHLORIDE: 0.9 INJECTION, SOLUTION INTRAVENOUS at 07:09

## 2017-01-01 RX ADMIN — POTASSIUM CHLORIDE 10 MEQ: 10 INJECTION, SOLUTION INTRAVENOUS at 11:07

## 2017-01-01 RX ADMIN — SODIUM BICARBONATE 1.24 ML/KG/HR: 84 INJECTION, SOLUTION INTRAVENOUS at 01:09

## 2017-01-01 RX ADMIN — DOLUTEGRAVIR SODIUM 50 MG: 50 TABLET, FILM COATED ORAL at 05:06

## 2017-01-01 RX ADMIN — VORICONAZOLE 300 MG: 50 TABLET, FILM COATED ORAL at 10:08

## 2017-01-01 RX ADMIN — ALPRAZOLAM 0.5 MG: 0.5 TABLET ORAL at 09:06

## 2017-01-01 RX ADMIN — Medication 25 MCG/HR: at 11:10

## 2017-01-01 RX ADMIN — ACETAMINOPHEN 650 MG: 325 TABLET ORAL at 04:07

## 2017-01-01 RX ADMIN — LORAZEPAM 1 MG: 0.5 TABLET ORAL at 04:07

## 2017-01-01 RX ADMIN — ALPRAZOLAM 0.5 MG: 0.5 TABLET ORAL at 07:06

## 2017-01-01 RX ADMIN — Medication 30 ML: at 05:10

## 2017-01-01 RX ADMIN — PREDNISONE 60 MG: 20 TABLET ORAL at 09:07

## 2017-01-01 RX ADMIN — PIPERACILLIN AND TAZOBACTAM 4.5 G: 4; .5 INJECTION, POWDER, FOR SOLUTION INTRAVENOUS at 08:09

## 2017-01-01 RX ADMIN — DIPHENHYDRAMINE HYDROCHLORIDE 25 MG: 25 CAPSULE ORAL at 07:07

## 2017-01-01 RX ADMIN — STANDARDIZED SENNA CONCENTRATE AND DOCUSATE SODIUM 1 TABLET: 8.6; 5 TABLET, FILM COATED ORAL at 05:06

## 2017-01-01 RX ADMIN — POTASSIUM & SODIUM PHOSPHATES POWDER PACK 280-160-250 MG 2 PACKET: 280-160-250 PACK at 11:07

## 2017-01-01 RX ADMIN — CYTARABINE 4260 MG: 100 INJECTION, SOLUTION INTRATHECAL; INTRAVENOUS; SUBCUTANEOUS at 11:07

## 2017-01-01 RX ADMIN — RAMELTEON 8 MG: 8 TABLET, FILM COATED ORAL at 10:07

## 2017-01-01 RX ADMIN — MAGNESIUM SULFATE IN WATER 2 G: 40 INJECTION, SOLUTION INTRAVENOUS at 03:07

## 2017-01-01 RX ADMIN — PROPOFOL 200 MCG/KG/MIN: 10 INJECTION, EMULSION INTRAVENOUS at 02:06

## 2017-01-01 RX ADMIN — ONDANSETRON 8 MG: 2 INJECTION INTRAMUSCULAR; INTRAVENOUS at 02:10

## 2017-01-01 RX ADMIN — ACETAMINOPHEN 650 MG: 325 TABLET ORAL at 05:06

## 2017-01-01 RX ADMIN — SODIUM CHLORIDE 1000 ML: 0.9 INJECTION, SOLUTION INTRAVENOUS at 09:09

## 2017-01-01 RX ADMIN — HYDROMORPHONE HYDROCHLORIDE 3 MG: 1 INJECTION, SOLUTION INTRAMUSCULAR; INTRAVENOUS; SUBCUTANEOUS at 11:07

## 2017-01-01 RX ADMIN — LORAZEPAM 1 MG: 0.5 TABLET ORAL at 02:09

## 2017-01-01 RX ADMIN — ALTEPLASE 2 MG: 2.2 INJECTION, POWDER, LYOPHILIZED, FOR SOLUTION INTRAVENOUS at 02:07

## 2017-01-01 RX ADMIN — SODIUM CHLORIDE, SODIUM GLUCONATE, SODIUM ACETATE, POTASSIUM CHLORIDE, MAGNESIUM CHLORIDE, SODIUM PHOSPHATE, DIBASIC, AND POTASSIUM PHOSPHATE: .53; .5; .37; .037; .03; .012; .00082 INJECTION, SOLUTION INTRAVENOUS at 02:07

## 2017-01-01 RX ADMIN — MAGNESIUM SULFATE IN WATER 2 G: 40 INJECTION, SOLUTION INTRAVENOUS at 09:09

## 2017-01-01 RX ADMIN — DEXAMETHASONE 1 DROP: 1 SUSPENSION OPHTHALMIC at 06:06

## 2017-01-01 RX ADMIN — SODIUM CHLORIDE: 0.9 INJECTION, SOLUTION INTRAVENOUS at 06:06

## 2017-01-01 RX ADMIN — LIDOCAINE 1 PATCH: 50 PATCH TOPICAL at 08:06

## 2017-01-01 RX ADMIN — PROCHLORPERAZINE EDISYLATE 10 MG: 5 INJECTION INTRAMUSCULAR; INTRAVENOUS at 10:09

## 2017-01-01 RX ADMIN — ABACAVIR 600 MG: 300 TABLET, FILM COATED ORAL at 05:06

## 2017-01-01 RX ADMIN — SODIUM CHLORIDE 1000 ML: 0.9 INJECTION, SOLUTION INTRAVENOUS at 05:07

## 2017-01-01 RX ADMIN — FLECAINIDE ACETATE 50 MG: 50 TABLET ORAL at 09:10

## 2017-01-01 RX ADMIN — FILGRASTIM 480 MCG: 480 INJECTION, SOLUTION INTRAVENOUS; SUBCUTANEOUS at 04:06

## 2017-01-01 RX ADMIN — ONDANSETRON 8 MG: 2 INJECTION INTRAMUSCULAR; INTRAVENOUS at 01:10

## 2017-01-01 RX ADMIN — OLANZAPINE 5 MG: 5 TABLET, ORALLY DISINTEGRATING ORAL at 03:07

## 2017-01-01 RX ADMIN — TRAMADOL HYDROCHLORIDE 50 MG: 50 TABLET, FILM COATED ORAL at 04:09

## 2017-01-01 RX ADMIN — ALTEPLASE 2 MG: 2.2 INJECTION, POWDER, LYOPHILIZED, FOR SOLUTION INTRAVENOUS at 05:07

## 2017-01-01 RX ADMIN — DIPHENHYDRAMINE HYDROCHLORIDE 25 MG: 25 CAPSULE ORAL at 08:10

## 2017-01-01 RX ADMIN — Medication 30 ML: at 01:09

## 2017-01-01 RX ADMIN — POTASSIUM & SODIUM PHOSPHATES POWDER PACK 280-160-250 MG 1 PACKET: 280-160-250 PACK at 04:07

## 2017-01-01 RX ADMIN — TAMSULOSIN HYDROCHLORIDE 0.4 MG: 0.4 CAPSULE ORAL at 09:06

## 2017-01-01 RX ADMIN — LAMIVUDINE 300 MG: 150 TABLET, FILM COATED ORAL at 12:07

## 2017-01-01 RX ADMIN — POTASSIUM CHLORIDE 20 MEQ: 14.9 INJECTION, SOLUTION INTRAVENOUS at 12:10

## 2017-01-01 RX ADMIN — ALPRAZOLAM 0.5 MG: 0.5 TABLET ORAL at 05:07

## 2017-01-01 RX ADMIN — BUDESONIDE 9 MG: 3 CAPSULE ORAL at 02:09

## 2017-01-01 RX ADMIN — PREDNISONE 60 MG: 20 TABLET ORAL at 08:08

## 2017-01-01 RX ADMIN — FILGRASTIM 480 MCG: 480 INJECTION, SOLUTION INTRAVENOUS; SUBCUTANEOUS at 05:06

## 2017-01-01 RX ADMIN — AZACITIDINE 155 MG: 100 INJECTION, POWDER, LYOPHILIZED, FOR SOLUTION INTRAVENOUS; SUBCUTANEOUS at 01:09

## 2017-01-01 RX ADMIN — DIPHENHYDRAMINE HYDROCHLORIDE 12.5 MG: 50 INJECTION, SOLUTION INTRAMUSCULAR; INTRAVENOUS at 04:07

## 2017-01-01 RX ADMIN — MORPHINE SULFATE 30 MG: 30 TABLET, EXTENDED RELEASE ORAL at 10:07

## 2017-01-01 RX ADMIN — DIPHENHYDRAMINE HYDROCHLORIDE 12.5 MG: 50 INJECTION, SOLUTION INTRAMUSCULAR; INTRAVENOUS at 08:07

## 2017-01-01 RX ADMIN — VASOPRESSIN 0.04 UNITS/MIN: 20 INJECTION INTRAVENOUS at 09:09

## 2017-01-01 RX ADMIN — FUROSEMIDE 40 MG: 10 INJECTION, SOLUTION INTRAMUSCULAR; INTRAVENOUS at 10:07

## 2017-01-01 RX ADMIN — ACYCLOVIR 400 MG: 200 CAPSULE ORAL at 08:08

## 2017-01-01 RX ADMIN — POTASSIUM & SODIUM PHOSPHATES POWDER PACK 280-160-250 MG 1 PACKET: 280-160-250 PACK at 09:06

## 2017-01-01 RX ADMIN — CEFEPIME HYDROCHLORIDE 2 G: 2 INJECTION, SOLUTION INTRAVENOUS at 09:10

## 2017-01-01 RX ADMIN — LIDOCAINE HYDROCHLORIDE: 10 INJECTION, SOLUTION INFILTRATION; PERINEURAL at 11:07

## 2017-01-01 RX ADMIN — PROPOFOL 20 MG: 10 INJECTION, EMULSION INTRAVENOUS at 02:07

## 2017-01-01 RX ADMIN — POTASSIUM & SODIUM PHOSPHATES POWDER PACK 280-160-250 MG 1 PACKET: 280-160-250 PACK at 12:07

## 2017-01-01 RX ADMIN — SODIUM CHLORIDE: 0.9 INJECTION, SOLUTION INTRAVENOUS at 12:09

## 2017-01-01 RX ADMIN — METHYLPREDNISOLONE 4 MG: 4 TABLET ORAL at 03:06

## 2017-01-01 RX ADMIN — FLECAINIDE ACETATE 100 MG: 100 TABLET ORAL at 10:09

## 2017-01-01 RX ADMIN — FENTANYL CITRATE 50 MCG: 50 INJECTION, SOLUTION INTRAMUSCULAR; INTRAVENOUS at 10:07

## 2017-01-01 RX ADMIN — CYCLOBENZAPRINE HYDROCHLORIDE 5 MG: 5 TABLET, FILM COATED ORAL at 09:06

## 2017-01-01 RX ADMIN — ONDANSETRON 4 MG: 2 INJECTION, SOLUTION INTRAMUSCULAR; INTRAVENOUS at 02:07

## 2017-01-01 RX ADMIN — SODIUM CHLORIDE 1000 ML: 0.9 INJECTION, SOLUTION INTRAVENOUS at 12:07

## 2017-01-01 RX ADMIN — MORPHINE SULFATE 15 MG: 15 TABLET, EXTENDED RELEASE ORAL at 09:07

## 2017-01-01 RX ADMIN — SODIUM PHOSPHATE, MONOBASIC, MONOHYDRATE 15 MMOL: 276; 142 INJECTION, SOLUTION INTRAVENOUS at 05:07

## 2017-01-01 RX ADMIN — PROCHLORPERAZINE EDISYLATE 10 MG: 5 INJECTION INTRAMUSCULAR; INTRAVENOUS at 03:09

## 2017-01-01 RX ADMIN — POTASSIUM & SODIUM PHOSPHATES POWDER PACK 280-160-250 MG 1 PACKET: 280-160-250 PACK at 08:09

## 2017-01-01 RX ADMIN — ONDANSETRON 8 MG: 2 INJECTION INTRAMUSCULAR; INTRAVENOUS at 10:10

## 2017-01-01 RX ADMIN — HEPARIN 500 UNITS: 100 SYRINGE at 07:08

## 2017-01-01 RX ADMIN — POTASSIUM CHLORIDE 20 MEQ: 750 CAPSULE, EXTENDED RELEASE ORAL at 08:07

## 2017-01-01 RX ADMIN — PROCHLORPERAZINE EDISYLATE 10 MG: 5 INJECTION INTRAMUSCULAR; INTRAVENOUS at 12:10

## 2017-01-01 RX ADMIN — MAGNESIUM OXIDE TAB 400 MG (241.3 MG ELEMENTAL MG) 400 MG: 400 (241.3 MG) TAB at 07:07

## 2017-01-01 RX ADMIN — FILGRASTIM 480 MCG: 480 INJECTION, SOLUTION INTRAVENOUS; SUBCUTANEOUS at 03:07

## 2017-01-01 RX ADMIN — ONDANSETRON 8 MG: 2 INJECTION INTRAMUSCULAR; INTRAVENOUS at 05:10

## 2017-01-01 RX ADMIN — DIPHENHYDRAMINE HYDROCHLORIDE 25 MG: 25 CAPSULE ORAL at 01:06

## 2017-01-01 RX ADMIN — MAGNESIUM SULFATE HEPTAHYDRATE 2 G: 40 INJECTION, SOLUTION INTRAVENOUS at 03:07

## 2017-01-01 RX ADMIN — DOLUTEGRAVIR SODIUM 50 MG: 50 TABLET, FILM COATED ORAL at 01:09

## 2017-01-01 RX ADMIN — Medication 1250 MG: at 01:10

## 2017-01-01 RX ADMIN — DEXAMETHASONE 1 DROP: 1 SUSPENSION OPHTHALMIC at 12:06

## 2017-01-01 RX ADMIN — TAMSULOSIN HYDROCHLORIDE 0.4 MG: 0.4 CAPSULE ORAL at 10:06

## 2017-01-01 RX ADMIN — MAGNESIUM OXIDE TAB 400 MG (241.3 MG ELEMENTAL MG) 400 MG: 400 (241.3 MG) TAB at 12:07

## 2017-01-01 RX ADMIN — POTASSIUM CHLORIDE 20 MEQ: 750 CAPSULE, EXTENDED RELEASE ORAL at 07:07

## 2017-01-01 RX ADMIN — METOPROLOL TARTRATE 25 MG: 25 TABLET, FILM COATED ORAL at 02:09

## 2017-01-01 RX ADMIN — FLUDARABINE PHOSPHATE 65 MG: 25 INJECTION, POWDER, LYOPHILIZED, FOR SOLUTION INTRAVENOUS at 09:07

## 2017-01-01 RX ADMIN — ACETAMINOPHEN 650 MG: 325 TABLET ORAL at 07:07

## 2017-01-01 RX ADMIN — LIDOCAINE HYDROCHLORIDE 200 MG: 20 INJECTION, SOLUTION EPIDURAL; INFILTRATION; INTRACAUDAL; PERINEURAL at 11:06

## 2017-01-01 RX ADMIN — ONDANSETRON 8 MG: 2 INJECTION INTRAMUSCULAR; INTRAVENOUS at 03:07

## 2017-01-01 RX ADMIN — MORPHINE SULFATE 30 MG: 30 TABLET, EXTENDED RELEASE ORAL at 07:07

## 2017-01-01 RX ADMIN — POTASSIUM & SODIUM PHOSPHATES POWDER PACK 280-160-250 MG 1 PACKET: 280-160-250 PACK at 11:06

## 2017-01-01 RX ADMIN — HYDROMORPHONE HYDROCHLORIDE 2 MG: 2 INJECTION INTRAMUSCULAR; INTRAVENOUS; SUBCUTANEOUS at 05:06

## 2017-01-01 RX ADMIN — ACETAMINOPHEN 650 MG: 325 TABLET ORAL at 09:08

## 2017-01-01 RX ADMIN — METHYLPREDNISOLONE 4 MG: 4 TABLET ORAL at 05:06

## 2017-01-01 RX ADMIN — POTASSIUM CHLORIDE 10 MEQ: 10 INJECTION, SOLUTION INTRAVENOUS at 07:08

## 2017-01-01 RX ADMIN — PROPOFOL 150 MCG/KG/MIN: 10 INJECTION, EMULSION INTRAVENOUS at 12:09

## 2017-01-01 RX ADMIN — HYDROMORPHONE HYDROCHLORIDE 1 MG: 1 INJECTION, SOLUTION INTRAMUSCULAR; INTRAVENOUS; SUBCUTANEOUS at 06:10

## 2017-01-01 RX ADMIN — LIDOCAINE 1 PATCH: 50 PATCH TOPICAL at 09:07

## 2017-01-01 RX ADMIN — ONDANSETRON 8 MG: 4 TABLET, FILM COATED ORAL at 06:09

## 2017-01-01 RX ADMIN — CEFEPIME HYDROCHLORIDE 2 G: 2 INJECTION, SOLUTION INTRAVENOUS at 04:09

## 2017-01-01 RX ADMIN — LIDOCAINE HYDROCHLORIDE 100 MG: 20 INJECTION, SOLUTION INTRAVENOUS at 02:06

## 2017-01-01 RX ADMIN — PHENYLEPHRINE HYDROCHLORIDE 150 MCG: 10 INJECTION INTRAVENOUS at 03:07

## 2017-01-01 RX ADMIN — CEFAZOLIN 2 G: 1 INJECTION, POWDER, FOR SOLUTION INTRAVENOUS at 03:06

## 2017-01-01 RX ADMIN — ACETAMINOPHEN 500 MG: 500 TABLET ORAL at 03:07

## 2017-01-01 RX ADMIN — BUTALBITAL, ACETAMINOPHEN AND CAFFEINE 1 TABLET: 50; 325; 40 TABLET ORAL at 06:06

## 2017-01-01 RX ADMIN — ONDANSETRON 8 MG: 2 INJECTION INTRAMUSCULAR; INTRAVENOUS at 05:06

## 2017-01-01 RX ADMIN — CETIRIZINE HYDROCHLORIDE 10 MG: 5 TABLET, FILM COATED ORAL at 08:06

## 2017-01-01 RX ADMIN — VITAMIN E CAP 400 UNIT 400 UNITS: 400 CAP at 08:07

## 2017-01-01 RX ADMIN — MAGNESIUM OXIDE TAB 400 MG (241.3 MG ELEMENTAL MG) 400 MG: 400 (241.3 MG) TAB at 06:07

## 2017-01-01 RX ADMIN — POTASSIUM PHOSPHATE, MONOBASIC AND POTASSIUM PHOSPHATE, DIBASIC 30 MMOL: 224; 236 INJECTION, SOLUTION, CONCENTRATE INTRAVENOUS at 09:10

## 2017-01-01 RX ADMIN — LIDOCAINE HYDROCHLORIDE 6 ML: 10 INJECTION, SOLUTION INFILTRATION; PERINEURAL at 10:07

## 2017-01-01 RX ADMIN — SODIUM CHLORIDE: 0.9 INJECTION, SOLUTION INTRAVENOUS at 08:09

## 2017-01-01 RX ADMIN — METHYLPREDNISOLONE SODIUM SUCCINATE 40 MG: 40 INJECTION, POWDER, FOR SOLUTION INTRAMUSCULAR; INTRAVENOUS at 08:09

## 2017-01-01 RX ADMIN — SODIUM CHLORIDE: 0.9 INJECTION, SOLUTION INTRAVENOUS at 09:08

## 2017-01-01 RX ADMIN — PREDNISONE 20 MG: 20 TABLET ORAL at 09:06

## 2017-01-01 RX ADMIN — Medication 1250 MG: at 06:09

## 2017-01-01 RX ADMIN — PIPERACILLIN AND TAZOBACTAM 4.5 G: 4; .5 INJECTION, POWDER, FOR SOLUTION INTRAVENOUS at 03:09

## 2017-01-01 RX ADMIN — ACETAMINOPHEN 650 MG: 325 TABLET ORAL at 07:06

## 2017-01-01 RX ADMIN — POTASSIUM CHLORIDE 20 MEQ: 1500 TABLET, EXTENDED RELEASE ORAL at 08:09

## 2017-01-01 RX ADMIN — SODIUM CHLORIDE: 0.9 INJECTION, SOLUTION INTRAVENOUS at 04:09

## 2017-01-01 RX ADMIN — PROPOFOL 100 MCG/KG/MIN: 10 INJECTION, EMULSION INTRAVENOUS at 04:07

## 2017-01-01 RX ADMIN — POTASSIUM & SODIUM PHOSPHATES POWDER PACK 280-160-250 MG 1 PACKET: 280-160-250 PACK at 08:07

## 2017-01-01 RX ADMIN — ASCORBIC ACID, VITAMIN A PALMITATE, CHOLECALCIFEROL, THIAMINE HYDROCHLORIDE, RIBOFLAVIN-5 PHOSPHATE SODIUM, PYRIDOXINE HYDROCHLORIDE, NIACINAMIDE, DEXPANTHENOL, ALPHA-TOCOPHEROL ACETATE, VITAMIN K1, FOLIC ACID, BIOTIN, CYANOCOBALAMIN: 200; 3300; 200; 6; 3.6; 6; 40; 15; 10; 150; 600; 60; 5 INJECTION, SOLUTION INTRAVENOUS at 03:09

## 2017-01-01 RX ADMIN — FLECAINIDE ACETATE 50 MG: 50 TABLET ORAL at 08:10

## 2017-01-01 RX ADMIN — ONDANSETRON 16 MG: 4 TABLET, FILM COATED ORAL at 08:06

## 2017-01-01 RX ADMIN — GUAIFENESIN 600 MG: 600 TABLET, EXTENDED RELEASE ORAL at 01:07

## 2017-01-01 RX ADMIN — BENZONATATE 100 MG: 100 CAPSULE, LIQUID FILLED ORAL at 03:07

## 2017-01-01 RX ADMIN — PIPERACILLIN AND TAZOBACTAM 4.5 G: 4; .5 INJECTION, POWDER, FOR SOLUTION INTRAVENOUS at 02:09

## 2017-01-01 RX ADMIN — PROPOFOL 50 MG: 10 INJECTION, EMULSION INTRAVENOUS at 10:07

## 2017-01-01 RX ADMIN — AZACITIDINE 155 MG: 100 INJECTION, POWDER, LYOPHILIZED, FOR SOLUTION INTRAVENOUS; SUBCUTANEOUS at 02:09

## 2017-01-01 RX ADMIN — HEPARIN SODIUM AND DEXTROSE 17 UNITS/KG/HR: 10000; 5 INJECTION INTRAVENOUS at 05:09

## 2017-01-01 RX ADMIN — DIPHENOXYLATE HYDROCHLORIDE AND ATROPINE SULFATE 1 TABLET: 2.5; .025 TABLET ORAL at 11:10

## 2017-01-01 RX ADMIN — DIPHENHYDRAMINE HYDROCHLORIDE 25 MG: 25 CAPSULE ORAL at 06:07

## 2017-01-01 RX ADMIN — SODIUM PHOSPHATE, MONOBASIC, MONOHYDRATE 20.01 MMOL: 276; 142 INJECTION, SOLUTION INTRAVENOUS at 02:07

## 2017-01-01 RX ADMIN — LAMIVUDINE 300 MG: 150 TABLET, FILM COATED ORAL at 08:10

## 2017-01-01 RX ADMIN — METHYLPREDNISOLONE SODIUM SUCCINATE 80 MG: 40 INJECTION, POWDER, FOR SOLUTION INTRAMUSCULAR; INTRAVENOUS at 11:09

## 2017-01-01 RX ADMIN — VANCOMYCIN HYDROCHLORIDE 1250 MG: 10 INJECTION, POWDER, LYOPHILIZED, FOR SOLUTION INTRAVENOUS at 03:09

## 2017-01-01 RX ADMIN — METHYLPREDNISOLONE SODIUM SUCCINATE 80 MG: 40 INJECTION, POWDER, FOR SOLUTION INTRAMUSCULAR; INTRAVENOUS at 08:10

## 2017-01-01 RX ADMIN — VORICONAZOLE 300 MG: 200 TABLET, FILM COATED ORAL at 10:10

## 2017-01-01 RX ADMIN — CEFEPIME HYDROCHLORIDE 2 G: 2 INJECTION, SOLUTION INTRAVENOUS at 10:09

## 2017-01-01 RX ADMIN — ATORVASTATIN CALCIUM 20 MG: 20 TABLET, FILM COATED ORAL at 08:10

## 2017-01-01 RX ADMIN — ENOXAPARIN SODIUM 40 MG: 100 INJECTION SUBCUTANEOUS at 04:09

## 2017-01-01 RX ADMIN — MAGNESIUM SULFATE IN WATER 2 G: 40 INJECTION, SOLUTION INTRAVENOUS at 10:10

## 2017-01-01 RX ADMIN — DIPHENHYDRAMINE HYDROCHLORIDE 25 MG: 25 CAPSULE ORAL at 03:07

## 2017-01-01 RX ADMIN — METOPROLOL TARTRATE 25 MG: 25 TABLET, FILM COATED ORAL at 12:07

## 2017-01-01 RX ADMIN — CISATRACURIUM BESYLATE 5 MCG/KG/MIN: 2 INJECTION INTRAVENOUS at 02:10

## 2017-01-01 RX ADMIN — LORAZEPAM 1 MG: 0.5 TABLET ORAL at 12:09

## 2017-01-01 RX ADMIN — HYDROMORPHONE HYDROCHLORIDE 1 MG: 1 INJECTION, SOLUTION INTRAMUSCULAR; INTRAVENOUS; SUBCUTANEOUS at 03:08

## 2017-01-01 RX ADMIN — ALLOPURINOL 100 MG: 100 TABLET ORAL at 09:06

## 2017-01-01 RX ADMIN — FINASTERIDE 5 MG: 5 TABLET, FILM COATED ORAL at 10:07

## 2017-01-01 RX ADMIN — FUROSEMIDE 20 MG: 10 INJECTION, SOLUTION INTRAMUSCULAR; INTRAVENOUS at 12:07

## 2017-01-01 RX ADMIN — BENZONATATE 100 MG: 100 CAPSULE, LIQUID FILLED ORAL at 10:07

## 2017-01-01 RX ADMIN — FUROSEMIDE 40 MG: 10 INJECTION, SOLUTION INTRAVENOUS at 01:09

## 2017-01-01 RX ADMIN — ONDANSETRON 8 MG: 2 INJECTION INTRAMUSCULAR; INTRAVENOUS at 09:10

## 2017-01-01 RX ADMIN — Medication 1250 MG: at 01:09

## 2017-01-01 RX ADMIN — HYDROMORPHONE HYDROCHLORIDE 2 MG: 2 INJECTION INTRAMUSCULAR; INTRAVENOUS; SUBCUTANEOUS at 09:06

## 2017-01-01 RX ADMIN — FINASTERIDE 5 MG: 5 TABLET, FILM COATED ORAL at 05:06

## 2017-01-01 RX ADMIN — POTASSIUM CHLORIDE 40 MEQ: 10 INJECTION, SOLUTION INTRAVENOUS at 03:07

## 2017-01-01 RX ADMIN — METOPROLOL TARTRATE 50 MG: 25 TABLET ORAL at 10:08

## 2017-01-01 RX ADMIN — SODIUM CHLORIDE: 0.9 INJECTION, SOLUTION INTRAVENOUS at 08:06

## 2017-01-01 RX ADMIN — PANTOPRAZOLE SODIUM 40 MG: 40 TABLET, DELAYED RELEASE ORAL at 10:07

## 2017-01-01 RX ADMIN — ALPRAZOLAM 0.5 MG: 0.5 TABLET ORAL at 11:07

## 2017-01-01 RX ADMIN — DIPHENHYDRAMINE HYDROCHLORIDE 12.5 MG: 50 INJECTION, SOLUTION INTRAMUSCULAR; INTRAVENOUS at 10:07

## 2017-01-01 RX ADMIN — ITRACONAZOLE 100 MG: 10 SOLUTION ORAL at 08:06

## 2017-01-01 RX ADMIN — POTASSIUM CHLORIDE 10 MEQ: 10 INJECTION, SOLUTION INTRAVENOUS at 12:07

## 2017-01-01 RX ADMIN — POTASSIUM CHLORIDE 10 MEQ: 10 INJECTION, SOLUTION INTRAVENOUS at 03:07

## 2017-01-01 RX ADMIN — DEXAMETHASONE 1 DROP: 1 SUSPENSION OPHTHALMIC at 09:06

## 2017-01-01 RX ADMIN — FLECAINIDE ACETATE 50 MG: 100 TABLET ORAL at 10:07

## 2017-01-01 RX ADMIN — Medication 1250 MG: at 09:09

## 2017-01-01 RX ADMIN — SODIUM PHOSPHATE, MONOBASIC, MONOHYDRATE 15 MMOL: 276; 142 INJECTION, SOLUTION INTRAVENOUS at 06:07

## 2017-01-01 RX ADMIN — ALPRAZOLAM 0.5 MG: 0.5 TABLET ORAL at 07:07

## 2017-01-01 RX ADMIN — TAMSULOSIN HYDROCHLORIDE 0.4 MG: 0.4 CAPSULE ORAL at 08:08

## 2017-01-01 RX ADMIN — HYDROMORPHONE HYDROCHLORIDE 2 MG: 2 INJECTION, SOLUTION INTRAMUSCULAR; INTRAVENOUS; SUBCUTANEOUS at 10:06

## 2017-01-01 RX ADMIN — MAGNESIUM OXIDE TAB 400 MG (241.3 MG ELEMENTAL MG) 400 MG: 400 (241.3 MG) TAB at 11:07

## 2017-01-01 RX ADMIN — ONDANSETRON 8 MG: 2 INJECTION INTRAMUSCULAR; INTRAVENOUS at 10:07

## 2017-01-01 RX ADMIN — POTASSIUM & SODIUM PHOSPHATES POWDER PACK 280-160-250 MG 1 PACKET: 280-160-250 PACK at 06:09

## 2017-01-01 RX ADMIN — METOPROLOL TARTRATE 25 MG: 25 TABLET, FILM COATED ORAL at 08:08

## 2017-01-01 RX ADMIN — VANCOMYCIN HYDROCHLORIDE 1250 MG: 100 INJECTION, POWDER, LYOPHILIZED, FOR SOLUTION INTRAVENOUS at 05:07

## 2017-01-01 RX ADMIN — SODIUM CHLORIDE SOLN NEBU 3% 4 ML: 3 NEBU SOLN at 12:10

## 2017-01-01 RX ADMIN — POTASSIUM & SODIUM PHOSPHATES POWDER PACK 280-160-250 MG 2 PACKET: 280-160-250 PACK at 10:07

## 2017-01-01 RX ADMIN — PIPERACILLIN AND TAZOBACTAM 4.5 G: 4; .5 INJECTION, POWDER, FOR SOLUTION INTRAVENOUS at 12:09

## 2017-01-01 RX ADMIN — LORAZEPAM 1 MG: 0.5 TABLET ORAL at 07:09

## 2017-01-01 RX ADMIN — VANCOMYCIN HYDROCHLORIDE 1500 MG: 1 INJECTION, POWDER, LYOPHILIZED, FOR SOLUTION INTRAVENOUS at 04:09

## 2017-01-01 RX ADMIN — POTASSIUM & SODIUM PHOSPHATES POWDER PACK 280-160-250 MG 1 PACKET: 280-160-250 PACK at 05:06

## 2017-01-01 RX ADMIN — VALGANCICLOVIR 900 MG: 450 TABLET, FILM COATED ORAL at 08:10

## 2017-01-01 RX ADMIN — ALPRAZOLAM 0.5 MG: 0.5 TABLET ORAL at 06:07

## 2017-01-01 RX ADMIN — CLOTRIMAZOLE 10 MG: 10 LOZENGE ORAL; TOPICAL at 11:09

## 2017-01-01 RX ADMIN — GADOBUTROL 10 ML: 604.72 INJECTION INTRAVENOUS at 04:07

## 2017-01-01 RX ADMIN — LORAZEPAM 1 MG: 0.5 TABLET ORAL at 11:09

## 2017-01-01 RX ADMIN — EPINEPHRINE 2 MCG/KG/MIN: 1 INJECTION PARENTERAL at 02:10

## 2017-01-01 RX ADMIN — METOPROLOL TARTRATE 50 MG: 25 TABLET ORAL at 08:08

## 2017-01-01 RX ADMIN — SODIUM CHLORIDE 500 ML: 0.9 INJECTION, SOLUTION INTRAVENOUS at 07:07

## 2017-01-01 RX ADMIN — SODIUM CHLORIDE 2313 ML: 0.9 INJECTION, SOLUTION INTRAVENOUS at 03:09

## 2017-01-01 RX ADMIN — SODIUM PHOSPHATE, MONOBASIC, MONOHYDRATE 20.01 MMOL: 276; 142 INJECTION, SOLUTION INTRAVENOUS at 03:07

## 2017-01-01 RX ADMIN — METRONIDAZOLE 500 MG: 500 INJECTION, SOLUTION INTRAVENOUS at 07:10

## 2017-01-01 RX ADMIN — PROPOFOL 40 MG: 10 INJECTION, EMULSION INTRAVENOUS at 12:09

## 2017-01-01 RX ADMIN — CIPROFLOXACIN HYDROCHLORIDE 500 MG: 500 TABLET, FILM COATED ORAL at 12:07

## 2017-01-01 RX ADMIN — ALLOPURINOL 300 MG: 300 TABLET ORAL at 01:06

## 2017-01-01 RX ADMIN — LIDOCAINE HYDROCHLORIDE 50 MG: 20 INJECTION, SOLUTION INTRAVENOUS at 12:09

## 2017-01-01 RX ADMIN — METOPROLOL TARTRATE 12.5 MG: 25 TABLET, FILM COATED ORAL at 08:09

## 2017-01-01 RX ADMIN — SODIUM CHLORIDE, SODIUM LACTATE, POTASSIUM CHLORIDE, AND CALCIUM CHLORIDE: 600; 310; 30; 20 INJECTION, SOLUTION INTRAVENOUS at 06:09

## 2017-01-01 RX ADMIN — VANCOMYCIN HYDROCHLORIDE 1500 MG: 1 INJECTION, POWDER, LYOPHILIZED, FOR SOLUTION INTRAVENOUS at 07:07

## 2017-01-01 RX ADMIN — ACETAMINOPHEN 650 MG: 325 TABLET ORAL at 10:06

## 2017-01-01 RX ADMIN — MAGNESIUM SULFATE IN WATER 2 G: 40 INJECTION, SOLUTION INTRAVENOUS at 08:07

## 2017-01-12 NOTE — PROGRESS NOTES
Subjective:       Patient ID: Paul E Sistrunk is a 56 y.o. male.    Chief Complaint: No chief complaint on file.    HPI  KPS: 90% Mr. 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 His weakness and fatigue have improved and he notes no bleeding or cough.  No fever or sign of infection.  His urine flow is ok on Flomax to help with known prostate enlargement but fading toward 36 not 48 hours.  Eating well without nausea.  No skin issues.  Mild dry mouth but no dry eyes.    His day 100 restaging marrow showed a 20-30% cellular marrow with trilineage hematopoiesis and no evidence of leukemia.  Cytogenetics 46,XY[13] and chimerism 90% donor.      AML History:  He originally presented in early 2/2016 with low platelets to his HIV provider and he had noted evolving fatigue and weight loss over the previous 2 months.  Bone marrow biopsy 2/8/16 showed a 90% cellular marrow with 50% blasts and background dysplasia.  His cytogenetics were complex with 18 metaphases with numeric and structural abnormalities including a 5q deletion, a 17p deletion (TP53 deletion), plus 1 to 18 double minutes, and 2 metaphases were a tetraploid subclone. FISH studies also confirmed the aforementioned deletions and indicated MYC amplification which are likely presented by double minutes.    He underwent standard induction therapy with 7&3 but his day 14 marrow done 2/22 showed a 40-80% cellular marrow with 35% blasts.  Hence, he began reinduction with MEC on 2/24/16 and repeat marrow done 3/9/16 showed only a 5% cellular marrow with only 5-6% CD34+ blasts.  He developed a rash and severe rectal pain complicating his therapy, though these have resolved now (finishing a steroid taper for his rash).    Pretransplant marrow 8/5/16 showed a 30-40%  cellular marrow dyserythropoiesis, increased storage iron and no evidence of residual leukemia.  Cytogenetics 46,XY[20].  Hence, he achieved a complete remission prior to allogeneic transplant.  His transplant course (9/5 - 10/1) was as expected with significant mucositis related to conditioning and low counts with some GI issues which all resolved on count recovery.  No unexpected complications.  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     HIV: Viral load 303K on 2/6/16 and now on Triumeq with Bactrim and acyclovir prophylaxis  Histoplasmosis: Multiple pulmonary nodules seen on chest CT during his recent hospital stay and he was found to be urine histo antigen positive.  Now on itraconazole after a course of amphotericin B.     Review of Systems   Constitutional: Negative for activity change, appetite change, fatigue, fever and unexpected weight change.   HENT: Negative for dental problem, postnasal drip, sore throat and trouble swallowing.    Respiratory: Negative for cough, choking and shortness of breath.    Cardiovascular: Negative for chest pain and leg swelling.   Gastrointestinal: Negative for abdominal pain, blood in stool, diarrhea and nausea.   Genitourinary: Negative for dysuria, frequency and hematuria.   Musculoskeletal: Negative for back pain, joint swelling and neck pain.   Skin: Negative for rash.   Neurological: Negative for tremors, weakness, light-headedness and headaches.   Hematological: Negative for adenopathy.   Psychiatric/Behavioral: Positive for sleep disturbance. Negative for confusion and dysphoric mood. The patient is nervous/anxious.        Objective:      Physical Exam   Constitutional: He is oriented to person, place, and time. He appears well-developed and well-nourished. No distress.   Line at left chest wall appears well.   HENT:   Head: Normocephalic and atraumatic.   Mouth/Throat: Oropharynx is clear and moist. No oropharyngeal exudate.   Eyes: Conjunctivae are normal.  Pupils are equal, round, and reactive to light.   Neck: Neck supple.   Cardiovascular: Normal rate and regular rhythm.    Pulmonary/Chest: Effort normal and breath sounds normal. He has no rales.   Abdominal: Soft. Bowel sounds are normal. He exhibits no mass. There is no splenomegaly. There is no tenderness.   Musculoskeletal: Normal range of motion. He exhibits no edema.   Lymphadenopathy:     He has no cervical adenopathy.     He has no axillary adenopathy.        Right: No inguinal adenopathy present.        Left: No inguinal adenopathy present.   Neurological: He is alert and oriented to person, place, and time.   Skin: Skin is warm and dry. No rash noted.   Psychiatric: He has a normal mood and affect. Thought content normal.   Vitals reviewed.      Assessment:       1. Anxiety    2. HIV disease    3. Histoplasma capsulatum infection    4. AML (acute myeloid leukemia) in remission    5. S/P allogeneic bone marrow transplant    6. Cytomegalovirus (CMV) viremia        Plan:       Graft/AML:  Full male MUD match, O+ into O+.  Day 122 post transplant with labs today showing WBC 1.8K (with very low ANC<50), hemoglobin 12.2 and platelets 169K with day 35 marrow documenting complete remission with normal cytogenetics and 95% chimerism.  He will need G-CSF tomorrow and we will check for anti-granulocyte antibodies.  No evidence of an LGL like population on recent marrow.  Will hold Bactrim.    Day 64 peripheral blood chimerism showed 95% CD3 and 100% CD33 donor chimerism.  Day 100 marrow showed a 20-30% cellular biopsy with no evidence of residual leukemia, increased storage iron and adequate megakaryocytes with normal cytogenetics and 90% donor chimerism.  Given his risk profile (p53 especially and double induction) we will need to follow closely for possible relapse.     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  There is also a 7-10% risk of serious  chronic GVHD which should be mitigated by ATG.  He is on tacrolimus 0.5 mg BID (level pending today) and will decrease to 0.5 mg QD today.  No evidence of acute or chronic GVHD today.    ID:  CMV pos into CMV pos.  CMV negative 12/28/16; back on acyclovir 11/16/16.  Afebrile today without infectious issues.  He continues on itraconazole for histo (titer negative) and Bactrim prophylaxis.  However, in light of lower WBC will stop Bactrim in favor of monthly pentamidine; will schedule.  Will continue to treat HIV therapy with Triumeq.    Renal/Prostate:  Increased urine flow on Flomax (every 36 hours on itraconazole) with creatinine 1.5.  He will continue good fluid intake.    Metab: Eating well with albumin at 3.2.  Potassium 4.6 on tacrolimus and he avoids orange juice and bananas. Magnesium 1.4 and he will continue magnesium oxide to 9 pills daily.     Anxiety:  He has ongoing anxiety about his health associated with family pressures.  I have offered visits with our psychologist but he would like to defer that for now.  Ativan refilled.    All of his questions were answered in the clinic today and he had his line removed 12/20 with good healing and will return in two weeks.

## 2017-01-12 NOTE — Clinical Note
CBC in one week in [place].  Please schedule to start monthly aerisolized pentamidine therapy.  Labs at return: CBC, CMP, tacro, CMV, Mg.  Thanks

## 2017-01-13 NOTE — TELEPHONE ENCOUNTER
----- Message from Brigida Odonnell sent at 1/13/2017  8:45 AM CST -----  On yesterday, you stated that you would like for this patient to have neupogen on today 1/13, I informed you that patient will need a treatment plan enter so that he could get approval but I still do not see the treatment plan for neupogen. Please advise

## 2017-01-13 NOTE — NURSING
Pt arrived for neupogen injection. Labs reviewed with pt.  Pt tolerated injection to abd.  Discharged to home.

## 2017-01-26 NOTE — PROGRESS NOTES
Subjective:       Patient ID: Paul E Sistrunk is a 56 y.o. male.    Chief Complaint: Follow-up and Leukemia    HPI  KPS: 90% Mr. 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 His day 100 restaging marrow showed a 20-30% cellular marrow with trilineage hematopoiesis and no evidence of leukemia.  Cytogenetics 46,XY[13] and chimerism 90% donor.      Patient presents today for 2 week follow-up. He feels well today. He denies any rashes or itching but states that his skin is sensitive and he is losing fingernails. He reports occasional nausea with eating but is eating well and drinking about 48 oz of water a day. Patient complains of occasional diarrhea, no bloody stools. His energy is improving but still fatigues easily.       AML History:  He originally presented in early 2/2016 with low platelets to his HIV provider and he had noted evolving fatigue and weight loss over the previous 2 months.  Bone marrow biopsy 2/8/16 showed a 90% cellular marrow with 50% blasts and background dysplasia.  His cytogenetics were complex with 18 metaphases with numeric and structural abnormalities including a 5q deletion, a 17p deletion (TP53 deletion), plus 1 to 18 double minutes, and 2 metaphases were a tetraploid subclone. FISH studies also confirmed the aforementioned deletions and indicated MYC amplification which are likely presented by double minutes.    He underwent standard induction therapy with 7&3 but his day 14 marrow done 2/22 showed a 40-80% cellular marrow with 35% blasts.  Hence, he began reinduction with MEC on 2/24/16 and repeat marrow done 3/9/16 showed only a 5% cellular marrow with only 5-6% CD34+ blasts.  He developed a rash and severe rectal pain complicating his therapy, though these have resolved now  (finishing a steroid taper for his rash).    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  Cytogenetics 46,XY[20].  Hence, he achieved a complete remission prior to allogeneic transplant.  His transplant course (9/5 - 10/1) was as expected with significant mucositis related to conditioning and low counts with some GI issues which all resolved on count recovery.  No unexpected complications.  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     HIV: Viral load 303K on 2/6/16 and now on Triumeq with Pentamidine and acyclovir prophylaxis  Histoplasmosis: Multiple pulmonary nodules seen on chest CT during his recent hospital stay and he was found to be urine histo antigen positive.  Now on itraconazole after a course of amphotericin B.     Review of Systems   Constitutional: Negative for activity change, appetite change, fatigue, fever and unexpected weight change.   HENT: Negative for dental problem, postnasal drip, sore throat and trouble swallowing.    Respiratory: Negative for cough, choking and shortness of breath.    Cardiovascular: Negative for chest pain and leg swelling.   Gastrointestinal: Negative for abdominal pain, blood in stool, diarrhea and nausea.   Genitourinary: Negative for dysuria, frequency and hematuria.   Musculoskeletal: Negative for back pain, joint swelling and neck pain.   Skin: Negative for rash.   Neurological: Negative for tremors, weakness, light-headedness and headaches.   Hematological: Negative for adenopathy.   Psychiatric/Behavioral: Positive for sleep disturbance. Negative for confusion and dysphoric mood. The patient is not nervous/anxious.        Objective:      Physical Exam   Constitutional: He is oriented to person, place, and time. He appears well-developed and well-nourished. No distress.   HENT:   Head: Normocephalic and atraumatic.   Mouth/Throat: Oropharynx is clear and moist. No  oropharyngeal exudate.   Eyes: Conjunctivae are normal. Pupils are equal, round, and reactive to light.   Neck: Neck supple.   Cardiovascular: Normal rate and regular rhythm.    Pulmonary/Chest: Effort normal and breath sounds normal. He has no rales.   Abdominal: Soft. Bowel sounds are normal. He exhibits no mass. There is no splenomegaly. There is no tenderness.   Musculoskeletal: Normal range of motion. He exhibits no edema.   Lymphadenopathy:     He has no cervical adenopathy.     He has no axillary adenopathy.        Right: No inguinal adenopathy present.        Left: No inguinal adenopathy present.   Neurological: He is alert and oriented to person, place, and time.   Skin: Skin is warm and dry. No rash noted.   Psychiatric: He has a normal mood and affect. Thought content normal.   Vitals reviewed.      Assessment:       1. S/P allogeneic bone marrow transplant    2. AML (acute myeloid leukemia) in remission    3. HIV disease    4. Histoplasma capsulatum infection        Plan:       Graft/AML:  Full male MUD match, O+ into O+.  Day 136 post transplant with labs today showing WBC 4.01K (with ANC 2600), hemoglobin 12.6 and platelets 184K with day 35 marrow documenting complete remission with normal cytogenetics and 95% chimerism.  Recovery of WBC and ANC with holding Bactrim.    Day 64 peripheral blood chimerism showed 95% CD3 and 100% CD33 donor chimerism.  Day 100 marrow showed a 20-30% cellular biopsy with no evidence of residual leukemia, increased storage iron and adequate megakaryocytes with normal cytogenetics and 90% donor chimerism.  Given his risk profile (p53 especially and double induction) we will need to follow closely for possible relapse.     GVHD: He is on tacrolimus 0.5 mg daily. Tacro level 9.7 today--will continue current dose. No evidence of acute or chronic GVHD today.    ID:  CMV pos into CMV pos. CMV negative 1/12/16; back on acyclovir 11/16/16.  Afebrile today without infectious issues.   He continues on itraconazole for histo (titer negative) and will start monthly Pentamidine today. Bactrim stopped 2 weeks ago due to neutropenia.  Will continue to treat HIV therapy with Triumeq.    Renal/Prostate:  Increased urine flow on Flomax (every 36 hours on itraconazole) with creatinine 1.3.  Good fluid intake.    Metab: Eating well with albumin at 3.2.  Potassium 4.6 on tacrolimus and he avoids orange juice and bananas. Magnesium 1.4 and he will continue magnesium oxide to 9 pills daily.     All of his questions were answered in the clinic today. He will return for labs and follow-up in two weeks.    Patient was seen with collaborating physician Dr. Raphael Olivares NP  Hematology/BMT    Attending: Patient seen and examined with NP and agree with assessment and plan above.  Counts recovered well off Bactrim and away from therapy for CMV.  Weight stable with good oral intake and no signs of acute or chronic GVHD.  No other new issues.  RVBE

## 2017-01-26 NOTE — LETTER
January 26, 2017        Edgar Pinon MD  [address]  [facility] [address]             [facility]  [address]  [place] [address]  Phone: [phone]   Patient: Paul E Sistrunk   MR Number: [id number]   YOB: 1960   Date of Visit: 1/26/2017       Dear Dr. Pinon:    Thank you for referring Paul Sistrunk to me for evaluation. Below are the relevant portions of my assessment and plan of care.       1. S/P allogeneic bone marrow transplant    2. AML (acute myeloid leukemia) in remission    3. HIV disease    4. Histoplasma capsulatum infection         Graft/AML:  Full male MUD match, O+ into O+.  Day 136 post transplant with labs today showing WBC 4.01K (with ANC 2600), hemoglobin 12.6 and platelets 184K with day 35 marrow documenting complete remission with normal cytogenetics and 95% chimerism.  Recovery of WBC and ANC with holding Bactrim.    Day 64 peripheral blood chimerism showed 95% CD3 and 100% CD33 donor chimerism.  Day 100 marrow showed a 20-30% cellular biopsy with no evidence of residual leukemia, increased storage iron and adequate megakaryocytes with normal cytogenetics and 90% donor chimerism.  Given his risk profile (p53 especially and double induction) we will need to follow closely for possible relapse.     GVHD: He is on tacrolimus 0.5 mg daily. Tacro level 9.7 today--will continue current dose. No evidence of acute or chronic GVHD today.    ID:  CMV pos into CMV pos. CMV negative 1/12/16; back on acyclovir 11/16/16.  Afebrile today without infectious issues.  He continues on itraconazole for histo (titer negative) and will start monthly Pentamidine today. Bactrim stopped 2 weeks ago due to neutropenia.  Will continue to treat HIV therapy with Triumeq.    Renal/Prostate:  Increased urine flow on Flomax (every 36 hours on itraconazole) with creatinine 1.3.  Good fluid intake.    Metab: Eating well with albumin at 3.2.  Potassium 4.6 on  tacrolimus and he avoids orange juice and bananas. Magnesium 1.4 and he will continue magnesium oxide to 9 pills daily.     All of his questions were answered in the clinic today. He will return for labs and follow-up in two weeks.    Patient was seen with collaborating physician Dr. Raphael Olivares NP  Hematology/BMT    Attending: Patient seen and examined with NP and agree with assessment and plan above.  Counts recovered well off Bactrim and away from therapy for CMV.  Weight stable with good oral intake and no signs of acute or chronic GVHD.  No other new issues.  RVBE    If you have questions, please do not hesitate to call me. I look forward to following Leonides along with you.    Sincerely,      Raphale Atkinson MD           CC  No Recipients

## 2017-02-08 NOTE — LETTER
February 9, 2017        Edgar Pinon MD  [address]  [facility] [address]             [facility]  [address]  [facility] [address]  Phone: [phone]   Patient: Paul E Sistrunk   MR Number: [id number]   YOB: 1960   Date of Visit: 2/8/2017       Dear Dr. Pinon:    Thank you for referring Paul Sistrunk to me for evaluation. Below are the relevant portions of my assessment and plan of care.       1. Anxiety    2. HIV disease    3. Histoplasma capsulatum infection    4. AML (acute myeloid leukemia) in remission    5. S/P allogeneic bone marrow transplant         Graft/AML:  Full male MUD match, O+ into O+.  Day 149 post transplant with labs today showing WBC 1.6K (down again from 1/26 when it was 4K), hemoglobin 11.8 and platelets 183K with day 35 marrow documenting complete remission with normal cytogenetics and 95% chimerism.  No evidence of an LGL like population on recent marrow.  Will cut itraconazole dose.    Day 64 peripheral blood chimerism showed 95% CD3 and 100% CD33 donor chimerism.  Day 100 marrow showed a 20-30% cellular biopsy with no evidence of residual leukemia, increased storage iron and adequate megakaryocytes with normal cytogenetics and 90% donor chimerism.  Given his risk profile (p53 especially and double induction) we will need to follow closely for possible relapse.     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  There is also a 7-10% risk of serious chronic GVHD which should be mitigated by ATG.  He is on tacrolimus 0.5 mg QD (level 12.5 today but expect fall on lower itraconazole dosing).  No evidence of acute or chronic GVHD today.    ID:  CMV pos into CMV pos.  CMV negative 1/26/17; back on acyclovir 11/16/16.  Afebrile today without infectious issues.  He continues on itraconazole for histo (cut dose by half to 10 ml tid) and pentamidine prophylaxis (next dose next  visit).  Will continue to treat HIV therapy with Triumeq.    Renal/Prostate:  Increased urine flow on Flomax (every 36 hours on itraconazole but may need to increase to qd given intraconazole decrease) with creatinine 1.3.  He will continue good fluid intake.    Metab: Eating variably with albumin at 3 (hope to see improvement on lower itraconazole).  Potassium 3.3 on tacrolimus. Magnesium 1.3 and he will continue magnesium oxide to 9 pills daily.  Expect improvement with greater oral intake.    Anxiety:  Stable anxiety about his health associated with family pressures.  I have offered visits with our psychologist but he would like to defer that for now.  Still using ativan PRN.    All of his questions were answered in the clinic today and he will have repeat labs in a week in [place] and return in two weeks with pentamidine.     If you have questions, please do not hesitate to call me. I look forward to following Leonides along with you.    Sincerely,      Raphael Atkinson MD           CC  No Recipients

## 2017-02-08 NOTE — PROGRESS NOTES
Subjective:       Patient ID: Paul E Sistrunk is a 56 y.o. male.    Chief Complaint: Follow-up    HPI  KPS: 90% Mr. 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     His weakness and fatigue have improved but his oral intake is still suboptimal and affected by continued use of itraconazole.  No fever or sign of infection.  His urine flow is ok on Flomax to help with known prostate enlargement but fading toward 36 not 48 hours.  Eating well without nausea.   Mild dry mouth (associated with itraconazole) but no dry eyes.    His day 100 restaging marrow showed a 20-30% cellular marrow with trilineage hematopoiesis and no evidence of leukemia.  Cytogenetics 46,XY[13] and chimerism 90% donor.      AML History:  He originally presented in early 2/2016 with low platelets to his HIV provider and he had noted evolving fatigue and weight loss over the previous 2 months.  Bone marrow biopsy 2/8/16 showed a 90% cellular marrow with 50% blasts and background dysplasia.  His cytogenetics were complex with 18 metaphases with numeric and structural abnormalities including a 5q deletion, a 17p deletion (TP53 deletion), plus 1 to 18 double minutes, and 2 metaphases were a tetraploid subclone. FISH studies also confirmed the aforementioned deletions and indicated MYC amplification which are likely presented by double minutes.    He underwent standard induction therapy with 7&3 but his day 14 marrow done 2/22 showed a 40-80% cellular marrow with 35% blasts.  Hence, he began reinduction with MEC on 2/24/16 and repeat marrow done 3/9/16 showed only a 5% cellular marrow with only 5-6% CD34+ blasts.  He developed a rash and severe rectal pain complicating his therapy, though these have resolved now (finishing a steroid taper for his  rash).    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  Cytogenetics 46,XY[20].  Hence, he achieved a complete remission prior to allogeneic transplant.  His transplant course (9/5 - 10/1) was as expected with significant mucositis related to conditioning and low counts with some GI issues which all resolved on count recovery.  No unexpected complications.  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     HIV: Viral load 303K on 2/6/16 and now on Triumeq with Bactrim and acyclovir prophylaxis  Histoplasmosis: Multiple pulmonary nodules seen on chest CT during his recent hospital stay and he was found to be urine histo antigen positive.  Now on itraconazole after a course of amphotericin B.     Review of Systems   Constitutional: Positive for appetite change. Negative for activity change, fatigue, fever and unexpected weight change.   HENT: Negative for dental problem, postnasal drip, sore throat and trouble swallowing.    Respiratory: Negative for cough, choking and shortness of breath.    Cardiovascular: Positive for leg swelling. Negative for chest pain.   Gastrointestinal: Negative for abdominal pain, blood in stool, diarrhea and nausea.   Genitourinary: Negative for dysuria, frequency and hematuria.   Musculoskeletal: Negative for back pain, joint swelling and neck pain.   Skin: Negative for rash.   Neurological: Negative for tremors, weakness, light-headedness and headaches.   Hematological: Negative for adenopathy.   Psychiatric/Behavioral: Positive for sleep disturbance. Negative for confusion and dysphoric mood. The patient is nervous/anxious.        Objective:      Physical Exam   Constitutional: He is oriented to person, place, and time. He appears well-developed and well-nourished. No distress.   HENT:   Head: Normocephalic and atraumatic.   Mouth/Throat: Oropharynx is clear and moist. No oropharyngeal exudate.    Eyes: Conjunctivae are normal. Pupils are equal, round, and reactive to light.   Neck: Neck supple.   Cardiovascular: Normal rate and regular rhythm.    Pulmonary/Chest: Effort normal and breath sounds normal. He has no rales.   Abdominal: Soft. Bowel sounds are normal. He exhibits no mass. There is no splenomegaly. There is no tenderness.   Musculoskeletal: Normal range of motion. He exhibits edema (1+ at ankles).   Lymphadenopathy:     He has no cervical adenopathy.     He has no axillary adenopathy.        Right: No inguinal adenopathy present.        Left: No inguinal adenopathy present.   Neurological: He is alert and oriented to person, place, and time.   Skin: Skin is warm and dry. No rash noted.   Psychiatric: He has a normal mood and affect. Thought content normal.   Vitals reviewed.      Assessment:       1. Anxiety    2. HIV disease    3. Histoplasma capsulatum infection    4. AML (acute myeloid leukemia) in remission    5. S/P allogeneic bone marrow transplant        Plan:       Graft/AML:  Full male MUD match, O+ into O+.  Day 149 post transplant with labs today showing WBC 1.6K (down again from 1/26 when it was 4K), hemoglobin 11.8 and platelets 183K with day 35 marrow documenting complete remission with normal cytogenetics and 95% chimerism.  No evidence of an LGL like population on recent marrow.  Will cut itraconazole dose.    Day 64 peripheral blood chimerism showed 95% CD3 and 100% CD33 donor chimerism.  Day 100 marrow showed a 20-30% cellular biopsy with no evidence of residual leukemia, increased storage iron and adequate megakaryocytes with normal cytogenetics and 90% donor chimerism.  Given his risk profile (p53 especially and double induction) we will need to follow closely for possible relapse.     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  There is also a 7-10% risk of serious chronic GVHD which should be mitigated by ATG.  He  is on tacrolimus 0.5 mg QD (level 12.5 today but expect fall on lower itraconazole dosing).  No evidence of acute or chronic GVHD today.    ID:  CMV pos into CMV pos.  CMV negative 1/26/17; back on acyclovir 11/16/16.  Afebrile today without infectious issues.  He continues on itraconazole for histo (cut dose by half to 10 ml tid) and pentamidine prophylaxis (next dose next visit).  Will continue to treat HIV therapy with Triumeq.    Renal/Prostate:  Increased urine flow on Flomax (every 36 hours on itraconazole but may need to increase to qd given intraconazole decrease) with creatinine 1.3.  He will continue good fluid intake.    Metab: Eating variably with albumin at 3 (hope to see improvement on lower itraconazole).  Potassium 3.3 on tacrolimus. Magnesium 1.3 and he will continue magnesium oxide to 9 pills daily.  Expect improvement with greater oral intake.    Anxiety:  Stable anxiety about his health associated with family pressures.  I have offered visits with our psychologist but he would like to defer that for now.  Still using ativan PRN.    All of his questions were answered in the clinic today and he will have repeat labs in a week in [place] and return in two weeks with pentamidine.

## 2017-02-22 NOTE — LETTER
February 22, 2017        No Recipients             [facility]  [address]  [facility] [address]  Phone: [phone]   Patient: Paul E Sistrunk   MR Number: [id number]   YOB: 1960   Date of Visit: 2/22/2017       Dear Dr. Coburn Recipients:    Thank you for referring Paul Sistrunk to me for evaluation. Below are the relevant portions of my assessment and plan of care.       1. Anxiety    2. Renal insufficiency    3. HIV disease    4. Histoplasma capsulatum infection    5. AML (acute myeloid leukemia) in remission    6. S/P allogeneic bone marrow transplant    7. Cytomegalovirus (CMV) viremia         Graft/AML:  Full male MUD match, O+ into O+.  Day 163 post transplant with labs today showing WBC 3.5K, hemoglobin 11.9 and platelets 184K with day 35 marrow documenting complete remission with normal cytogenetics and 95% chimerism.      Day 64 peripheral blood chimerism showed 95% CD3 and 100% CD33 donor chimerism.  Day 100 marrow showed a 20-30% cellular biopsy with no evidence of residual leukemia, increased storage iron and adequate megakaryocytes with normal cytogenetics and 90% donor chimerism.  Given his risk profile (p53 especially and double induction) we will need to follow closely for possible relapse.     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  There is also a 7-10% risk of serious chronic GVHD which should be mitigated by ATG.  He is on tacrolimus 0.5 mg QD (await level).  No evidence of acute or chronic GVHD today.    ID:  CMV pos into CMV pos.  CMV negative 2/16/17; back on acyclovir 11/16/16.  Afebrile today without infectious issues.  He continues on itraconazole for histo (10 ml tid) and pentamidine prophylaxis (continue monthly dosing).  Will continue to treat HIV therapy with Triumeq.    Renal/Prostate:  Increased urine flow on Flomax (every 36 hours on itraconazole but may need to increase to qd  given intraconazole decrease) with creatinine 1.3.  He will continue good fluid intake.    Metab: Eating variably with albumin at 3.3 (hope to see improvement on lower itraconazole).  Potassium 4 on tacrolimus. Magnesium 1.4 and he will continue magnesium oxide to 9 pills daily.  Expect improvement with greater oral intake.    Anxiety:  Stable anxiety about his health associated with family pressures.  I have offered visits with our psychologist but he would like to defer that for now.  Still using ativan PRN.    All of his questions were answered in the clinic today and he will return in two weeks.     If you have questions, please do not hesitate to call me. I look forward to following Leonides along with you.    Sincerely,      Raphael Atkinson MD           CC  No Recipients

## 2017-02-22 NOTE — PROGRESS NOTES
Subjective:       Patient ID: Paul E Sistrunk is a 56 y.o. male.    Chief Complaint: No chief complaint on file.    HPI  KPS: 90% Mr. 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     His weakness and fatigue have improved but his oral intake is still suboptimal and affected by continued use of itraconazole.  No fever or sign of infection.  His urine flow is ok on Flomax to help with known prostate enlargement but fading toward 36 not 48 hours.  Eating well without nausea.   Mild dry mouth (associated with itraconazole) but no dry eyes improved symptoms on lower itraconazole.    His day 100 restaging marrow showed a 20-30% cellular marrow with trilineage hematopoiesis and no evidence of leukemia.  Cytogenetics 46,XY[13] and chimerism 90% donor.      AML History:  He originally presented in early 2/2016 with low platelets to his HIV provider and he had noted evolving fatigue and weight loss over the previous 2 months.  Bone marrow biopsy 2/8/16 showed a 90% cellular marrow with 50% blasts and background dysplasia.  His cytogenetics were complex with 18 metaphases with numeric and structural abnormalities including a 5q deletion, a 17p deletion (TP53 deletion), plus 1 to 18 double minutes, and 2 metaphases were a tetraploid subclone. FISH studies also confirmed the aforementioned deletions and indicated MYC amplification which are likely presented by double minutes.    He underwent standard induction therapy with 7&3 but his day 14 marrow done 2/22 showed a 40-80% cellular marrow with 35% blasts.  Hence, he began reinduction with MEC on 2/24/16 and repeat marrow done 3/9/16 showed only a 5% cellular marrow with only 5-6% CD34+ blasts.  He developed a rash and severe rectal pain complicating his therapy, though these  have resolved now (finishing a steroid taper for his rash).    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  Cytogenetics 46,XY[20].  Hence, he achieved a complete remission prior to allogeneic transplant.  His transplant course (9/5 - 10/1) was as expected with significant mucositis related to conditioning and low counts with some GI issues which all resolved on count recovery.  No unexpected complications.  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     HIV: Viral load 303K on 2/6/16 and now on Triumeq with Bactrim and acyclovir prophylaxis  Histoplasmosis: Multiple pulmonary nodules seen on chest CT during his recent hospital stay and he was found to be urine histo antigen positive.  Now on itraconazole after a course of amphotericin B.     Review of Systems   Constitutional: Positive for appetite change. Negative for activity change, fatigue, fever and unexpected weight change.   HENT: Negative for dental problem, postnasal drip, sore throat and trouble swallowing.    Respiratory: Negative for cough, choking and shortness of breath.    Cardiovascular: Positive for leg swelling. Negative for chest pain.   Gastrointestinal: Negative for abdominal pain, blood in stool, diarrhea and nausea.   Genitourinary: Negative for dysuria, frequency and hematuria.   Musculoskeletal: Negative for back pain, joint swelling and neck pain.   Skin: Negative for rash.   Neurological: Negative for tremors, weakness, light-headedness and headaches.   Hematological: Negative for adenopathy.   Psychiatric/Behavioral: Positive for sleep disturbance. Negative for confusion and dysphoric mood. The patient is nervous/anxious.        Objective:      Physical Exam   Constitutional: He is oriented to person, place, and time. He appears well-developed and well-nourished. No distress.   HENT:   Head: Normocephalic and atraumatic.   Mouth/Throat:  Oropharynx is clear and moist. No oropharyngeal exudate.   Eyes: Conjunctivae are normal. Pupils are equal, round, and reactive to light.   Neck: Neck supple.   Cardiovascular: Normal rate and regular rhythm.    Pulmonary/Chest: Effort normal and breath sounds normal. He has no rales.   Abdominal: Soft. Bowel sounds are normal. He exhibits no mass. There is no splenomegaly. There is no tenderness.   Musculoskeletal: Normal range of motion. He exhibits edema (1+ at ankles).   Lymphadenopathy:     He has no cervical adenopathy.     He has no axillary adenopathy.        Right: No inguinal adenopathy present.        Left: No inguinal adenopathy present.   Neurological: He is alert and oriented to person, place, and time.   Skin: Skin is warm and dry. No rash noted.   Psychiatric: He has a normal mood and affect. Thought content normal.   Vitals reviewed.      Assessment:       1. Anxiety    2. Renal insufficiency    3. HIV disease    4. Histoplasma capsulatum infection    5. AML (acute myeloid leukemia) in remission    6. S/P allogeneic bone marrow transplant    7. Cytomegalovirus (CMV) viremia        Plan:       Graft/AML:  Full male MUD match, O+ into O+.  Day 163 post transplant with labs today showing WBC 3.5K, hemoglobin 11.9 and platelets 184K with day 35 marrow documenting complete remission with normal cytogenetics and 95% chimerism.      Day 64 peripheral blood chimerism showed 95% CD3 and 100% CD33 donor chimerism.  Day 100 marrow showed a 20-30% cellular biopsy with no evidence of residual leukemia, increased storage iron and adequate megakaryocytes with normal cytogenetics and 90% donor chimerism.  Given his risk profile (p53 especially and double induction) we will need to follow closely for possible relapse.     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  There is also a 7-10% risk of serious chronic GVHD which should be mitigated by ATG.  He is  on tacrolimus 0.5 mg QD (await level).  No evidence of acute or chronic GVHD today.    ID:  CMV pos into CMV pos.  CMV negative 2/16/17; back on acyclovir 11/16/16.  Afebrile today without infectious issues.  He continues on itraconazole for histo (10 ml tid) and pentamidine prophylaxis (continue monthly dosing).  Will continue to treat HIV therapy with Triumeq.    Renal/Prostate:  Increased urine flow on Flomax (every 36 hours on itraconazole but may need to increase to qd given intraconazole decrease) with creatinine 1.3.  He will continue good fluid intake.    Metab: Eating variably with albumin at 3.3 (hope to see improvement on lower itraconazole).  Potassium 4 on tacrolimus. Magnesium 1.4 and he will continue magnesium oxide to 9 pills daily.  Expect improvement with greater oral intake.    Anxiety:  Stable anxiety about his health associated with family pressures.  I have offered visits with our psychologist but he would like to defer that for now.  Still using ativan PRN.    All of his questions were answered in the clinic today and he will return in two weeks.

## 2017-03-10 NOTE — LETTER
March 10, 2017        Edgar Pinon MD  [address]  [facility] [address]             [facility]  [address]  [facility] [address]  Phone: [phone]   Patient: Paul E Sistrunk   MR Number: [id number]   YOB: 1960   Date of Visit: 3/10/2017       Dear Dr. Pinon:    Thank you for referring Paul Sistrunk to me for evaluation. Below are the relevant portions of my assessment and plan of care.       1. Anxiety    2. HIV disease    3. Histoplasma capsulatum infection    4. AML (acute myeloid leukemia) in remission    5. S/P allogeneic bone marrow transplant         Graft/AML:  Full male MUD match, O+ into O+.  Day 179 post transplant with labs today showing WBC 3.2K, hemoglobin 12.1 and platelets 207K with day 35 marrow documenting complete remission with normal cytogenetics and 95% chimerism.      Day 64 peripheral blood chimerism showed 95% CD3 and 100% CD33 donor chimerism.  Day 100 marrow showed a 20-30% cellular biopsy with no evidence of residual leukemia, increased storage iron and adequate megakaryocytes with normal cytogenetics and 90% donor chimerism.  Given his risk profile (p53 especially and double induction) we will need to follow closely for possible relapse.     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  There is also a 7-10% risk of serious chronic GVHD which should be mitigated by ATG.  He is on tacrolimus 0.5 mg QD and will drop to QOD today.  No evidence of acute or chronic GVHD today.    ID:  CMV pos into CMV pos.  CMV negative 2/22/17; back on acyclovir 11/16/16.  Afebrile today without infectious issues.  He continues on itraconazole for histo (10 ml tid) and pentamidine prophylaxis (continue monthly dosing).  Will continue to treat HIV therapy with Triumeq.    Renal/Prostate:  Increased urine flow on Flomax (every 36 hours on itraconazole but may need to increase to qd given  intraconazole decrease) with creatinine 1.4.  He will continue good fluid intake.    Metab: Eating variably with albumin at 3.3 (hope to see improvement on lower itraconazole).  Potassium 3.9 on tacrolimus. Magnesium 1.6 and he will continue magnesium oxide to 9 pills daily.     Anxiety:  Stable anxiety about his health associated with family pressures.  I have offered visits with our psychologist but he would like to defer that for now.  Still using ativan PRN.    All of his questions were answered in the clinic today and he will return in two weeks.       If you have questions, please do not hesitate to call me. I look forward to following Leonides along with you.    Sincerely,      Raphael Atkinson MD           CC  No Recipients

## 2017-03-10 NOTE — PROGRESS NOTES
Subjective:       Patient ID: Paul E Sistrunk is a 56 y.o. male.    Chief Complaint: No chief complaint on file.    HPI  KPS: 90% Mr. 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     His weakness and fatigue have improved but his oral intake is improved on lower itraconazole.  No fever or sign of infection.  His urine flow is ok on Flomax to help with known prostate enlargement but fading toward 36 not 48 hours.  Eating well without nausea.   Mild dry eyes and using eye drops.  Dry mouth improved on lower itraconazole.    His day 100 restaging marrow showed a 20-30% cellular marrow with trilineage hematopoiesis and no evidence of leukemia.  Cytogenetics 46,XY[13] and chimerism 90% donor.      AML History:  He originally presented in early 2/2016 with low platelets to his HIV provider and he had noted evolving fatigue and weight loss over the previous 2 months.  Bone marrow biopsy 2/8/16 showed a 90% cellular marrow with 50% blasts and background dysplasia.  His cytogenetics were complex with 18 metaphases with numeric and structural abnormalities including a 5q deletion, a 17p deletion (TP53 deletion), plus 1 to 18 double minutes, and 2 metaphases were a tetraploid subclone. FISH studies also confirmed the aforementioned deletions and indicated MYC amplification which are likely presented by double minutes.    He underwent standard induction therapy with 7&3 but his day 14 marrow done 2/22 showed a 40-80% cellular marrow with 35% blasts.  Hence, he began reinduction with MEC on 2/24/16 and repeat marrow done 3/9/16 showed only a 5% cellular marrow with only 5-6% CD34+ blasts.  He developed a rash and severe rectal pain complicating his therapy, though these have resolved now (finishing a steroid taper for his  rash).    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  Cytogenetics 46,XY[20].  Hence, he achieved a complete remission prior to allogeneic transplant.  His transplant course (9/5 - 10/1) was as expected with significant mucositis related to conditioning and low counts with some GI issues which all resolved on count recovery.  No unexpected complications.  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     HIV: Viral load 303K on 2/6/16 and now on Triumeq with Bactrim and acyclovir prophylaxis  Histoplasmosis: Multiple pulmonary nodules seen on chest CT during his recent hospital stay and he was found to be urine histo antigen positive.  Now on itraconazole after a course of amphotericin B.     Review of Systems   Constitutional: Positive for appetite change. Negative for activity change, fatigue, fever and unexpected weight change.   HENT: Negative for dental problem, postnasal drip, sore throat and trouble swallowing.    Respiratory: Negative for cough, choking and shortness of breath.    Cardiovascular: Positive for leg swelling. Negative for chest pain.   Gastrointestinal: Negative for abdominal pain, blood in stool, diarrhea and nausea.   Genitourinary: Negative for dysuria, frequency and hematuria.   Musculoskeletal: Negative for back pain, joint swelling and neck pain.   Skin: Negative for rash.   Neurological: Negative for tremors, weakness, light-headedness and headaches.   Hematological: Negative for adenopathy.   Psychiatric/Behavioral: Positive for sleep disturbance. Negative for confusion and dysphoric mood. The patient is nervous/anxious.        Objective:      Physical Exam   Constitutional: He is oriented to person, place, and time. He appears well-developed and well-nourished. No distress.   HENT:   Head: Normocephalic and atraumatic.   Mouth/Throat: Oropharynx is clear and moist. No oropharyngeal exudate.    Eyes: Conjunctivae are normal. Pupils are equal, round, and reactive to light.   Neck: Neck supple.   Cardiovascular: Normal rate and regular rhythm.    Pulmonary/Chest: Effort normal and breath sounds normal. He has no rales.   Abdominal: Soft. Bowel sounds are normal. He exhibits no mass. There is no splenomegaly. There is no tenderness.   Musculoskeletal: Normal range of motion. He exhibits edema (1+ at ankles).   Lymphadenopathy:     He has no cervical adenopathy.     He has no axillary adenopathy.        Right: No inguinal adenopathy present.        Left: No inguinal adenopathy present.   Neurological: He is alert and oriented to person, place, and time.   Skin: Skin is warm and dry. No rash noted.   Psychiatric: He has a normal mood and affect. Thought content normal.   Vitals reviewed.      Assessment:       1. Anxiety    2. HIV disease    3. Histoplasma capsulatum infection    4. AML (acute myeloid leukemia) in remission    5. S/P allogeneic bone marrow transplant        Plan:       Graft/AML:  Full male MUD match, O+ into O+.  Day 179 post transplant with labs today showing WBC 3.2K, hemoglobin 12.1 and platelets 207K with day 35 marrow documenting complete remission with normal cytogenetics and 95% chimerism.      Day 64 peripheral blood chimerism showed 95% CD3 and 100% CD33 donor chimerism.  Day 100 marrow showed a 20-30% cellular biopsy with no evidence of residual leukemia, increased storage iron and adequate megakaryocytes with normal cytogenetics and 90% donor chimerism.  Given his risk profile (p53 especially and double induction) we will need to follow closely for possible relapse.     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  There is also a 7-10% risk of serious chronic GVHD which should be mitigated by ATG.  He is on tacrolimus 0.5 mg QD and will drop to QOD today.  No evidence of acute or chronic GVHD today.    ID:  CMV pos into  CMV pos.  CMV negative 2/22/17; back on acyclovir 11/16/16.  Afebrile today without infectious issues.  He continues on itraconazole for histo (10 ml tid) and pentamidine prophylaxis (continue monthly dosing).  Will continue to treat HIV therapy with Triumeq.    Renal/Prostate:  Increased urine flow on Flomax (every 36 hours on itraconazole but may need to increase to qd given intraconazole decrease) with creatinine 1.4.  He will continue good fluid intake.    Metab: Eating variably with albumin at 3.3 (hope to see improvement on lower itraconazole).  Potassium 3.9 on tacrolimus. Magnesium 1.6 and he will continue magnesium oxide to 9 pills daily.     Anxiety:  Stable anxiety about his health associated with family pressures.  I have offered visits with our psychologist but he would like to defer that for now.  Still using ativan PRN.    All of his questions were answered in the clinic today and he will return in two weeks.

## 2017-03-16 NOTE — TELEPHONE ENCOUNTER
----- Message from Sachin Ruano sent at 3/16/2017 12:38 PM CDT -----  Contact: Chantal- Nurse case manage at [facility]  Need clarification on patient restrictions. She will fax over form also.     Call: [phone]

## 2017-03-24 PROBLEM — D89.810 ACUTE GVHD: Status: ACTIVE | Noted: 2017-01-01

## 2017-03-24 NOTE — LETTER
March 24, 2017        Edgar Pinon MD  [address]  [facility] [address]             [facility]  [address]  [place] [address]  Phone: [phone]   Patient: Paul E Sistrunk   MR Number: [id number]   YOB: 1960   Date of Visit: 3/24/2017       Dear Dr. Pinon:    Thank you for referring Paul Sistrunk to me for evaluation. Below are the relevant portions of my assessment and plan of care.       1. Anxiety    2. HIV disease    3. Histoplasma capsulatum infection    4. AML (acute myeloid leukemia) in remission    5. S/P allogeneic bone marrow transplant         Graft/AML:  Full male MUD match, O+ into O+.  Day 193 post transplant with labs today showing WBC 7.3K, hemoglobin 12.7 and platelets 179K with day 35 marrow documenting complete remission with normal cytogenetics and 95% chimerism.      Day 64 peripheral blood chimerism showed 95% CD3 and 100% CD33 donor chimerism.  Day 100 marrow showed a 20-30% cellular biopsy with no evidence of residual leukemia, increased storage iron and adequate megakaryocytes with normal cytogenetics and 90% donor chimerism.  Given his risk profile (p53 especially and double induction) we will need to follow closely for possible relapse.     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  Hence, he will increase tacrolimus 0.5 mg QOD back to QD today. Furthermore, he will do a steroid pulse: 31jma0k, 17xjf5j, 28dlo9u then off. There is also a 7-10% risk of serious chronic GVHD which should be mitigated by ATG; no chronic type GVHD today.      ID:  CMV pos into CMV pos.  CMV negative 3/10/17; back on acyclovir 11/16/16.  Afebrile today without infectious issues.  He continues on itraconazole for histo (10 ml tid) and pentamidine prophylaxis (continue monthly dosing).  Will continue to treat HIV therapy with  Triumeq.  I don't expect his brief steroid pulse to increase infectious risk too much.    Renal/Prostate:  Increased urine flow on Flomax (every 36 hours on itraconazole but may need to increase to qd given intraconazole decrease) with creatinine 1.3.  He will continue good fluid intake.    Metab: Eating variably with albumin at 3.2 (hope to see improvement on lower itraconazole).  Potassium 4.4 on tacrolimus. Magnesium 2 and he will continue magnesium oxide to 9 pills daily.     Anxiety:  Increased anxiety about his health associated with family pressures including his mother's recent open heart surgery.  I have offered visits with our psychologist but he would like to defer that for now.  Will switch from ativan to xanax.    All of his questions were answered in the clinic today and he will return in two weeks.     If you have questions, please do not hesitate to call me. I look forward to following Leonides along with you.    Sincerely,      Raphael Atkinson MD           CC  Remington Virk MD

## 2017-03-24 NOTE — PROGRESS NOTES
Subjective:       Patient ID: Paul E Sistrunk is a 56 y.o. male.    Chief Complaint: No chief complaint on file.    HPI  KPS: 80% Mr. 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     The major issue today is onset of pruritis and rash over his anterior trunk and face during the past 4 days since decreasing to every other day tacrolimus.  No anorexia or diarrhea and no abdominal pain.    His oral intake remains good on lower itraconazole.  No fever or sign of infection.  His urine flow is ok on Flomax to help with known prostate enlargement but fading toward 36 not 48 hours.  Eating well without nausea.   Mild dry eyes and using eye drops.  Dry mouth improved on lower itraconazole.    His day 100 restaging marrow showed a 20-30% cellular marrow with trilineage hematopoiesis and no evidence of leukemia.  Cytogenetics 46,XY[13] and chimerism 90% donor.      AML History:  He originally presented in early 2/2016 with low platelets to his HIV provider and he had noted evolving fatigue and weight loss over the previous 2 months.  Bone marrow biopsy 2/8/16 showed a 90% cellular marrow with 50% blasts and background dysplasia.  His cytogenetics were complex with 18 metaphases with numeric and structural abnormalities including a 5q deletion, a 17p deletion (TP53 deletion), plus 1 to 18 double minutes, and 2 metaphases were a tetraploid subclone. FISH studies also confirmed the aforementioned deletions and indicated MYC amplification which are likely presented by double minutes.    He underwent standard induction therapy with 7&3 but his day 14 marrow done 2/22 showed a 40-80% cellular marrow with 35% blasts.  Hence, he began reinduction with MEC on 2/24/16 and repeat marrow done 3/9/16 showed only a 5% cellular marrow  with only 5-6% CD34+ blasts.  He developed a rash and severe rectal pain complicating his therapy, though these have resolved now (finishing a steroid taper for his rash).    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  Cytogenetics 46,XY[20].  Hence, he achieved a complete remission prior to allogeneic transplant.  His transplant course (9/5 - 10/1) was as expected with significant mucositis related to conditioning and low counts with some GI issues which all resolved on count recovery.  No unexpected complications.  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     HIV: Viral load 303K on 2/6/16 and now on Triumeq with Bactrim and acyclovir prophylaxis  Histoplasmosis: Multiple pulmonary nodules seen on chest CT during his recent hospital stay and he was found to be urine histo antigen positive.  Now on itraconazole after a course of amphotericin B.     Review of Systems   Constitutional: Positive for appetite change. Negative for activity change, fatigue, fever and unexpected weight change.   HENT: Negative for dental problem, postnasal drip, sore throat and trouble swallowing.    Respiratory: Negative for cough, choking and shortness of breath.    Cardiovascular: Positive for leg swelling. Negative for chest pain.   Gastrointestinal: Negative for abdominal pain, blood in stool, diarrhea and nausea.   Genitourinary: Negative for dysuria, frequency and hematuria.   Musculoskeletal: Negative for back pain, joint swelling and neck pain.   Skin: Positive for rash.        Pruritic rash at face and trunk   Neurological: Negative for tremors, weakness, light-headedness and headaches.   Hematological: Negative for adenopathy.   Psychiatric/Behavioral: Positive for sleep disturbance. Negative for confusion and dysphoric mood. The patient is nervous/anxious.        Objective:      Physical Exam   Constitutional: He is oriented  to person, place, and time. He appears well-developed and well-nourished. No distress.   HENT:   Head: Normocephalic and atraumatic.   Mouth/Throat: Oropharynx is clear and moist. No oropharyngeal exudate.   Eyes: Conjunctivae are normal. Pupils are equal, round, and reactive to light.   Neck: Neck supple.   Cardiovascular: Normal rate and regular rhythm.    Pulmonary/Chest: Effort normal and breath sounds normal. He has no rales.   Abdominal: Soft. Bowel sounds are normal. He exhibits no mass. There is no splenomegaly. There is no tenderness.   Musculoskeletal: Normal range of motion. He exhibits edema (1+ at ankles).   Lymphadenopathy:     He has no cervical adenopathy.     He has no axillary adenopathy.        Right: No inguinal adenopathy present.        Left: No inguinal adenopathy present.   Neurological: He is alert and oriented to person, place, and time.   Skin: Skin is warm and dry. Rash (at face and anterior trunk with itching over whole trunk and face) noted.   Psychiatric: He has a normal mood and affect. Thought content normal.   Vitals reviewed.      Assessment:       1. Anxiety    2. HIV disease    3. Histoplasma capsulatum infection    4. AML (acute myeloid leukemia) in remission    5. S/P allogeneic bone marrow transplant        Plan:       Graft/AML:  Full male MUD match, O+ into O+.  Day 193 post transplant with labs today showing WBC 7.3K, hemoglobin 12.7 and platelets 179K with day 35 marrow documenting complete remission with normal cytogenetics and 95% chimerism.      Day 64 peripheral blood chimerism showed 95% CD3 and 100% CD33 donor chimerism.  Day 100 marrow showed a 20-30% cellular biopsy with no evidence of residual leukemia, increased storage iron and adequate megakaryocytes with normal cytogenetics and 90% donor chimerism.  Given his risk profile (p53 especially and double induction) we will need to follow closely for possible relapse.     GVHD: We expect a 20-25% incidence of severe  acute GVHD, though this may be modulated somewhat by ATG.  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  Hence, he will increase tacrolimus 0.5 mg QOD back to QD today. Furthermore, he will do a steroid pulse: 67ejf5c, 65eah6i, 52kxk3j then off. There is also a 7-10% risk of serious chronic GVHD which should be mitigated by ATG; no chronic type GVHD today.      ID:  CMV pos into CMV pos.  CMV negative 3/10/17; back on acyclovir 11/16/16.  Afebrile today without infectious issues.  He continues on itraconazole for histo (10 ml tid) and pentamidine prophylaxis (continue monthly dosing).  Will continue to treat HIV therapy with Triumeq.  I don't expect his brief steroid pulse to increase infectious risk too much.    Renal/Prostate:  Increased urine flow on Flomax (every 36 hours on itraconazole but may need to increase to qd given intraconazole decrease) with creatinine 1.3.  He will continue good fluid intake.    Metab: Eating variably with albumin at 3.2 (hope to see improvement on lower itraconazole).  Potassium 4.4 on tacrolimus. Magnesium 2 and he will continue magnesium oxide to 9 pills daily.     Anxiety:  Increased anxiety about his health associated with family pressures including his mother's recent open heart surgery.  I have offered visits with our psychologist but he would like to defer that for now.  Will switch from ativan to xanax.    All of his questions were answered in the clinic today and he will return in two weeks.

## 2017-03-31 NOTE — TELEPHONE ENCOUNTER
----- Message from Zandra Díaz RN sent at 3/31/2017 11:22 AM CDT -----  Contact: Pt  This patient would like a refill on Prednisone. He said that Dr Atkinson gave him only 20 pills of Prednisone for his  rash (he was here last week to see Dr LEON. See note) and as soon as he was done with them, his rash came back.  He would like Prednisone send to [facility]. Thanks.  ----- Message -----     From: Libby Delgado     Sent: 3/31/2017   9:52 AM       To: Demetrio Lim Staff    Pt states his rash is retuning would like another round of medication sent to [facility] [phone] (Phone)  [phone] (Fax)    Pt contact number  [phone]  Thanks

## 2017-04-07 NOTE — PROGRESS NOTES
Subjective:       Patient ID: Paul E Sistrunk is a 56 y.o. male.    Chief Complaint: No chief complaint on file.    HPI  KPS: 90% Mr. 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     His pruritis and rash over his anterior trunk and face has resolved after two pulses of steroids (finishing second pulse today) and daily tacrolimus.  No anorexia or diarrhea and no abdominal pain.     His oral intake remains good on lower itraconazole.  No fever or sign of infection.  His urine flow is ok on Flomax to help with known prostate enlargement but fading toward 36 not 48 hours.  Mild dry eyes and using eye drops.  Dry mouth improved on lower itraconazole.    His day 100 restaging marrow showed a 20-30% cellular marrow with trilineage hematopoiesis and no evidence of leukemia.  Cytogenetics 46,XY[13] and chimerism 90% donor.      AML History:  He originally presented in early 2/2016 with low platelets to his HIV provider and he had noted evolving fatigue and weight loss over the previous 2 months.  Bone marrow biopsy 2/8/16 showed a 90% cellular marrow with 50% blasts and background dysplasia.  His cytogenetics were complex with 18 metaphases with numeric and structural abnormalities including a 5q deletion, a 17p deletion (TP53 deletion), plus 1 to 18 double minutes, and 2 metaphases were a tetraploid subclone. FISH studies also confirmed the aforementioned deletions and indicated MYC amplification which are likely presented by double minutes.    He underwent standard induction therapy with 7&3 but his day 14 marrow done 2/22 showed a 40-80% cellular marrow with 35% blasts.  Hence, he began reinduction with MEC on 2/24/16 and repeat marrow done 3/9/16 showed only a 5% cellular marrow with only 5-6% CD34+ blasts.  He  developed a rash and severe rectal pain complicating his therapy, though these have resolved now (finishing a steroid taper for his rash).    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  Cytogenetics 46,XY[20].  Hence, he achieved a complete remission prior to allogeneic transplant.  His transplant course (9/5 - 10/1) was as expected with significant mucositis related to conditioning and low counts with some GI issues which all resolved on count recovery.  No unexpected complications.  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     HIV: Viral load 303K on 2/6/16 and now on Triumeq with Bactrim and acyclovir prophylaxis  Histoplasmosis: Multiple pulmonary nodules seen on chest CT during his recent hospital stay and he was found to be urine histo antigen positive.  Now on itraconazole after a course of amphotericin B.     Review of Systems   Constitutional: Negative for activity change, appetite change, fatigue, fever and unexpected weight change.   HENT: Negative for dental problem, postnasal drip, sore throat and trouble swallowing.    Respiratory: Negative for cough, choking and shortness of breath.    Cardiovascular: Negative for chest pain and leg swelling.   Gastrointestinal: Negative for abdominal pain, blood in stool, diarrhea and nausea.   Genitourinary: Negative for dysuria, frequency and hematuria.   Musculoskeletal: Negative for back pain, joint swelling and neck pain.   Skin: Negative for rash.        Resolved rash at face and trunk   Neurological: Negative for tremors, weakness, light-headedness and headaches.   Hematological: Negative for adenopathy.   Psychiatric/Behavioral: Positive for sleep disturbance. Negative for confusion and dysphoric mood. The patient is nervous/anxious (Improved).        Objective:      Physical Exam   Constitutional: He is oriented to person, place, and time. He appears  well-developed and well-nourished. No distress.   HENT:   Head: Normocephalic and atraumatic.   Mouth/Throat: Oropharynx is clear and moist. No oropharyngeal exudate.   Eyes: Conjunctivae are normal. Pupils are equal, round, and reactive to light.   Neck: Neck supple.   Cardiovascular: Normal rate and regular rhythm.    Pulmonary/Chest: Effort normal and breath sounds normal. He has no rales.   Abdominal: Soft. Bowel sounds are normal. He exhibits no mass. There is no splenomegaly. There is no tenderness.   Musculoskeletal: Normal range of motion. He exhibits no edema.   Lymphadenopathy:     He has no cervical adenopathy.     He has no axillary adenopathy.        Right: No inguinal adenopathy present.        Left: No inguinal adenopathy present.   Neurological: He is alert and oriented to person, place, and time.   Skin: Skin is warm and dry. Rash (at face and anterior trunk with itching over whole trunk and face) noted.   Psychiatric: He has a normal mood and affect. Thought content normal.   Vitals reviewed.      Assessment:       1. HIV disease    2. AML (acute myeloid leukemia) in remission    3. Histoplasma capsulatum infection    4. S/P allogeneic bone marrow transplant    5. Anxiety    6. Acute GVHD    7. Prostate hypertrophy        Plan:       Graft/AML:  Full male MUD match, O+ into O+.  Day 207 post transplant with labs today showing WBC 12.7K (steroids), hemoglobin 14.1 and platelets 197K with day 35 marrow documenting complete remission with normal cytogenetics and 95% chimerism.      Day 64 peripheral blood chimerism showed 95% CD3 and 100% CD33 donor chimerism.  Day 100 marrow showed a 20-30% cellular biopsy with no evidence of residual leukemia, increased storage iron and adequate megakaryocytes with normal cytogenetics and 90% donor chimerism.  Given his risk profile (p53 especially and double induction) we will need to follow closely for possible relapse.     GVHD: We expect a 20-25% incidence of  severe acute GVHD, though this may be modulated somewhat by ATG.  He has resolved acute skin GVHD post steroid pulse x2 and is back on tacrolimus 0.5 mg QD.  There is also a 7-10% risk of serious chronic GVHD which should be mitigated by ATG; no chronic type GVHD today.      ID:  CMV pos into CMV pos.  CMV negative 3/24/17; back on acyclovir 11/16/16.  Afebrile today without infectious issues.  He continues on itraconazole for histo (10 ml tid) and pentamidine prophylaxis (continue monthly dosing).  Will continue to treat HIV therapy with Triumeq.  I don't expect his brief steroid pulse to increase infectious risk too much.    Renal/Prostate:  Increased urine flow on Flomax (every 36 hours on itraconazole but may need to increase to qd given intraconazole decrease) with creatinine 1.2.  He will continue good fluid intake.    Metab: Eating variably with albumin at 3.3 (hope to see continued improvement on lower itraconazole).  Potassium 5 on tacrolimus. Magnesium 2.6 and he will continue magnesium oxide to 7 pills daily.     Anxiety:  Improved anxiety about his health associated with family pressures including his mother's recent open heart surgery, from which she is recovering now.  I have offered visits with our psychologist but he would like to defer that for now.  Using xanax with relief.    All of his questions were answered in the clinic today and he will return in two weeks unless his symptoms progress.

## 2017-04-07 NOTE — LETTER
April 7, 2017        Edgar Pinon MD  [address]  [facility] [address]             [facility]  [address]  [place] [address]  Phone: [phone]   Patient: Paul E Sistrunk   MR Number: [id number]   YOB: 1960   Date of Visit: 4/7/2017       Dear Dr. Pinon:    Thank you for referring Paul Sistrunk to me for evaluation. Below are the relevant portions of my assessment and plan of care.       1. HIV disease    2. AML (acute myeloid leukemia) in remission    3. Histoplasma capsulatum infection    4. S/P allogeneic bone marrow transplant    5. Anxiety    6. Acute GVHD    7. Prostate hypertrophy         Graft/AML:  Full male MUD match, O+ into O+.  Day 207 post transplant with labs today showing WBC 12.7K (steroids), hemoglobin 14.1 and platelets 197K with day 35 marrow documenting complete remission with normal cytogenetics and 95% chimerism.      Day 64 peripheral blood chimerism showed 95% CD3 and 100% CD33 donor chimerism.  Day 100 marrow showed a 20-30% cellular biopsy with no evidence of residual leukemia, increased storage iron and adequate megakaryocytes with normal cytogenetics and 90% donor chimerism.  Given his risk profile (p53 especially and double induction) we will need to follow closely for possible relapse.     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  He has resolved acute skin GVHD post steroid pulse x2 and is back on tacrolimus 0.5 mg QD.  There is also a 7-10% risk of serious chronic GVHD which should be mitigated by ATG; no chronic type GVHD today.      ID:  CMV pos into CMV pos.  CMV negative 3/24/17; back on acyclovir 11/16/16.  Afebrile today without infectious issues.  He continues on itraconazole for histo (10 ml tid) and pentamidine prophylaxis (continue monthly dosing).  Will continue to treat HIV therapy with Triumeq.  I don't expect his brief steroid pulse to  increase infectious risk too much.    Renal/Prostate:  Increased urine flow on Flomax (every 36 hours on itraconazole but may need to increase to qd given intraconazole decrease) with creatinine 1.2.  He will continue good fluid intake.    Metab: Eating variably with albumin at 3.3 (hope to see continued improvement on lower itraconazole).  Potassium 5 on tacrolimus. Magnesium 2.6 and he will continue magnesium oxide to 7 pills daily.     Anxiety:  Improved anxiety about his health associated with family pressures including his mother's recent open heart surgery, from which she is recovering now.  I have offered visits with our psychologist but he would like to defer that for now.  Using xanax with relief.    All of his questions were answered in the clinic today and he will return in two weeks unless his symptoms progress.     If you have questions, please do not hesitate to call me. I look forward to following Leonides along with you.    Sincerely,      Raphael Atkinson MD           CC  No Recipients

## 2017-04-10 NOTE — TELEPHONE ENCOUNTER
----- Message from Chantal Casillas sent at 4/10/2017  3:37 PM CDT -----  Contact: self  Pt stated that he is very itchy all over his body and needs a refill on the medication that was given to him for itching.  Pt uses [facility] in [place].      Pt can be reached at [phone]

## 2017-04-11 NOTE — TELEPHONE ENCOUNTER
----- Message from Bianca Elmer sent at 4/11/2017  9:45 AM CDT -----  Contact: pt [phone]  Pt called stating that he requested a new prescription to prednisone yesterday, and added that he had a horrible night.  He states he is itching everywhere and wants Dr Atkinson to call that in to the same pharmacy.    Please call pt to confirm when its done.  Pt wanted this message to be sent High Priority    Thanks  valentina  predniSONE (DELTASONE) 20 MG tablet  [facility], [place] - [address] [facility]  [phone]

## 2017-04-11 NOTE — TELEPHONE ENCOUNTER
----- Message from Bianca Elmer sent at 4/11/2017  9:45 AM CDT -----  Contact: pt [phone]  Pt called stating that he requested a new prescription to prednisone yesterday, and added that he had a horrible night.  He states he is itching everywhere and wants Dr Atkinson to call that in to the same pharmacy.    Please call pt to confirm when its done.  Pt wanted this message to be sent High Priority    Thanks  valentina  predniSONE (DELTASONE) 20 MG tablet  [facility], [place] - [address] [facility]  [phone]

## 2017-04-20 NOTE — PROGRESS NOTES
Subjective:       Patient ID: Paul E Sistrunk is a 56 y.o. male.    Chief Complaint: No chief complaint on file.    HPI  KPS: 90% Mr. 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     His pruritis and rash over his anterior trunk and face has resolved after two pulses of steroids (low dose steroids now) and daily tacrolimus.  No anorexia or diarrhea and no abdominal pain.     His oral intake remains good on lower itraconazole.  No fever or sign of infection.  His urine flow is ok on Flomax to help with known prostate enlargement but fading toward 36 not 48 hours.  Mild dry eyes and using eye drops.  Dry mouth improved on lower itraconazole.    His day 100 restaging marrow showed a 20-30% cellular marrow with trilineage hematopoiesis and no evidence of leukemia.  Cytogenetics 46,XY[13] and chimerism 90% donor.      AML History:  He originally presented in early 2/2016 with low platelets to his HIV provider and he had noted evolving fatigue and weight loss over the previous 2 months.  Bone marrow biopsy 2/8/16 showed a 90% cellular marrow with 50% blasts and background dysplasia.  His cytogenetics were complex with 18 metaphases with numeric and structural abnormalities including a 5q deletion, a 17p deletion (TP53 deletion), plus 1 to 18 double minutes, and 2 metaphases were a tetraploid subclone. FISH studies also confirmed the aforementioned deletions and indicated MYC amplification which are likely presented by double minutes.    He underwent standard induction therapy with 7&3 but his day 14 marrow done 2/22 showed a 40-80% cellular marrow with 35% blasts.  Hence, he began reinduction with MEC on 2/24/16 and repeat marrow done 3/9/16 showed only a 5% cellular marrow with only 5-6% CD34+ blasts.  He developed a  rash and severe rectal pain complicating his therapy, though these have resolved now (finishing a steroid taper for his rash).    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  Cytogenetics 46,XY[20].  Hence, he achieved a complete remission prior to allogeneic transplant.  His transplant course (9/5 - 10/1) was as expected with significant mucositis related to conditioning and low counts with some GI issues which all resolved on count recovery.  No unexpected complications.  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     HIV: Viral load 303K on 2/6/16 and now on Triumeq with Bactrim and acyclovir prophylaxis  Histoplasmosis: Multiple pulmonary nodules seen on chest CT during his recent hospital stay and he was found to be urine histo antigen positive.  Now on itraconazole after a course of amphotericin B.     Review of Systems   Constitutional: Negative for activity change, appetite change, fatigue, fever and unexpected weight change.   HENT: Negative for dental problem, postnasal drip, sore throat and trouble swallowing.    Respiratory: Negative for cough, choking and shortness of breath.    Cardiovascular: Negative for chest pain and leg swelling.   Gastrointestinal: Negative for abdominal pain, blood in stool, diarrhea and nausea.   Genitourinary: Negative for dysuria, frequency and hematuria.   Musculoskeletal: Negative for back pain, joint swelling and neck pain.   Skin: Negative for rash.        Resolved rash at face and trunk   Neurological: Negative for tremors, weakness, light-headedness and headaches.   Hematological: Negative for adenopathy.   Psychiatric/Behavioral: Positive for sleep disturbance. Negative for confusion and dysphoric mood. The patient is nervous/anxious (Improved).        Objective:      Physical Exam   Constitutional: He is oriented to person, place, and time. He appears well-developed and  well-nourished. No distress.   HENT:   Head: Normocephalic and atraumatic.   Mouth/Throat: Oropharynx is clear and moist. No oropharyngeal exudate.   Eyes: Conjunctivae are normal. Pupils are equal, round, and reactive to light.   Neck: Neck supple.   Cardiovascular: Normal rate and regular rhythm.    Pulmonary/Chest: Effort normal and breath sounds normal. He has no rales.   Abdominal: Soft. Bowel sounds are normal. He exhibits no mass. There is no splenomegaly. There is no tenderness.   Musculoskeletal: Normal range of motion. He exhibits no edema.   Lymphadenopathy:     He has no cervical adenopathy.     He has no axillary adenopathy.        Right: No inguinal adenopathy present.        Left: No inguinal adenopathy present.   Neurological: He is alert and oriented to person, place, and time.   Skin: Skin is warm and dry. Rash (at face and anterior trunk with itching over whole trunk and face) noted.   Psychiatric: He has a normal mood and affect. Thought content normal.   Vitals reviewed.      Assessment:       1. Anxiety    2. Prostate hypertrophy    3. HIV disease    4. Histoplasma capsulatum infection    5. AML (acute myeloid leukemia) in remission    6. S/P allogeneic bone marrow transplant    7. Acute GVHD        Plan:       Graft/AML:  Full male MUD match, O+ into O+.  Day 220 post transplant with labs today showing WBC 6.7, hemoglobin 13 and platelets 181K.  Day 35 marrow documenting complete remission with normal cytogenetics and 95% chimerism.  Day 64 peripheral blood chimerism showed 95% CD3 and 100% CD33 donor chimerism.  Day 100 marrow showed a 20-30% cellular biopsy with no evidence of residual leukemia, increased storage iron and adequate megakaryocytes with normal cytogenetics and 90% donor chimerism.  Given his risk profile (p53 especially and double induction) we will need to follow closely for possible relapse.     GVHD: We expect a 20-25% incidence of severe acute GVHD, though this may be  modulated somewhat by ATG.  He has resolved acute skin GVHD post steroid pulse x2 and is back on tacrolimus 0.5 mg QD and low dose steroids.  Tacro level 5.5 today.  There is also a 7-10% risk of serious chronic GVHD which should be mitigated by ATG; no chronic type GVHD today.      ID:  CMV pos into CMV pos.  CMV negative 4/7/17; back on acyclovir 11/16/16.  Afebrile today without infectious issues.  He continues on itraconazole for histo (10 ml tid) and pentamidine prophylaxis (continue monthly dosing).  Will continue to treat HIV therapy with Triumeq.  I don't expect his brief steroid pulse to increase infectious risk too much.    Renal/Prostate:  Increased urine flow on Flomax (every 36 hours on itraconazole but may need to increase to qd given intraconazole decrease) with creatinine 1.2.  He will continue good fluid intake.    Metab: Eating variably with albumin at 3.3 (hope to see continued improvement on lower itraconazole).  Potassium 4.1 on tacrolimus. Magnesium 1.8 and he will continue magnesium oxide to 7 pills daily.     Anxiety:  Improved anxiety about his health associated with family pressures including his mother's recent open heart surgery, from which she is recovering now.  I have offered visits with our psychologist but he would like to defer that for now.  Using xanax with relief.    All of his questions were answered in the clinic today and he will return in two weeks unless his symptoms progress.

## 2017-05-08 NOTE — PROGRESS NOTES
Subjective:       Patient ID: Paul E Sistrunk is a 56 y.o. male.    Chief Complaint: Follow-up and Leukemia    HPI  KPS: 90% Mr. 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     He states that the last week he has felt down. States that he had been feeling great before that. He was nauseated after eating a meal, but did not vomit. States he has had diarrhea for about 3 days, taking imodium, but it hs not resolved the issue. He denies fevers. Complains of dry mouth. Complains of itching - completed prednisone today.        His oral intake remains good on lower itraconazole.  No fever or sign of infection.  His urine flow is ok on Flomax to help with known prostate enlargement but fading toward 36 not 48 hours.  Mild dry eyes and using eye drops.  Dry mouth improved on lower itraconazole.    His day 100 restaging marrow showed a 20-30% cellular marrow with trilineage hematopoiesis and no evidence of leukemia.  Cytogenetics 46,XY[13] and chimerism 90% donor.      AML History:  He originally presented in early 2/2016 with low platelets to his HIV provider and he had noted evolving fatigue and weight loss over the previous 2 months.  Bone marrow biopsy 2/8/16 showed a 90% cellular marrow with 50% blasts and background dysplasia.  His cytogenetics were complex with 18 metaphases with numeric and structural abnormalities including a 5q deletion, a 17p deletion (TP53 deletion), plus 1 to 18 double minutes, and 2 metaphases were a tetraploid subclone. FISH studies also confirmed the aforementioned deletions and indicated MYC amplification which are likely presented by double minutes.    He underwent standard induction therapy with 7&3 but his day 14 marrow done 2/22 showed a 40-80% cellular marrow with 35% blasts.  Hence,  he began reinduction with MEC on 2/24/16 and repeat marrow done 3/9/16 showed only a 5% cellular marrow with only 5-6% CD34+ blasts.  He developed a rash and severe rectal pain complicating his therapy, though these have resolved now (finishing a steroid taper for his rash).    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  Cytogenetics 46,XY[20].  Hence, he achieved a complete remission prior to allogeneic transplant.  His transplant course (9/5 - 10/1) was as expected with significant mucositis related to conditioning and low counts with some GI issues which all resolved on count recovery.  No unexpected complications.  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     HIV: Viral load 303K on 2/6/16 and now on Triumeq with Bactrim and acyclovir prophylaxis  Histoplasmosis: Multiple pulmonary nodules seen on chest CT during his recent hospital stay and he was found to be urine histo antigen positive.  Now on itraconazole after a course of amphotericin B.     Review of Systems   Constitutional: Positive for fatigue. Negative for activity change, appetite change, fever and unexpected weight change.   HENT: Negative for dental problem, postnasal drip, sore throat and trouble swallowing.    Respiratory: Negative for cough, choking and shortness of breath.    Cardiovascular: Negative for chest pain and leg swelling.   Gastrointestinal: Positive for diarrhea and nausea. Negative for abdominal pain and blood in stool.   Genitourinary: Negative for dysuria, frequency and hematuria.   Musculoskeletal: Negative for back pain, joint swelling and neck pain.   Skin: Negative for rash.        Resolved rash at face and trunk; itching persists    Neurological: Negative for tremors, weakness, light-headedness and headaches.   Hematological: Negative for adenopathy.   Psychiatric/Behavioral: Positive for sleep disturbance. Negative for  confusion and dysphoric mood. The patient is nervous/anxious (Improved).        Objective:      Physical Exam   Constitutional: He is oriented to person, place, and time. He appears well-developed and well-nourished. No distress.   HENT:   Head: Normocephalic and atraumatic.   Mouth/Throat: Oropharynx is clear and moist. No oropharyngeal exudate.   Eyes: Conjunctivae are normal. Pupils are equal, round, and reactive to light.   Neck: Neck supple.   Cardiovascular: Normal rate and regular rhythm.    Pulmonary/Chest: Effort normal and breath sounds normal. He has no rales.   Abdominal: Soft. Bowel sounds are normal. He exhibits no mass. There is no splenomegaly. There is no tenderness.   Musculoskeletal: Normal range of motion. He exhibits no edema.   Lymphadenopathy:     He has no cervical adenopathy.     He has no axillary adenopathy.        Right: No inguinal adenopathy present.        Left: No inguinal adenopathy present.   Neurological: He is alert and oriented to person, place, and time.   Skin: Skin is warm and dry. Rash (continues to resolve; itching persits ) noted.   Psychiatric: He has a normal mood and affect. Thought content normal.   Vitals reviewed.      Assessment:       1. Anxiety    2. HIV disease    3. Histoplasma capsulatum infection    4. Cytomegalovirus (CMV) viremia    5. AML (acute myeloid leukemia) in remission    6. S/P allogeneic bone marrow transplant    7. Acute GVHD        Plan:       Graft/AML:  Full male MUD match, O+ into O+.  Day 238 post transplant with labs today showing WBC 6.72, hemoglobin 13.9 and platelets 186K.  Day 35 marrow documenting complete remission with normal cytogenetics and 95% chimerism.  Day 64 peripheral blood chimerism showed 95% CD3 and 100% CD33 donor chimerism.  Day 100 marrow showed a 20-30% cellular biopsy with no evidence of residual leukemia, increased storage iron and adequate megakaryocytes with normal cytogenetics and 90% donor chimerism.  Given his  risk profile (p53 especially and double induction) we will need to follow closely for possible relapse.     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  He has resolved acute skin GVHD post steroid pulse x2 and is back on tacrolimus 0.5 mg QD and low dose steroids completed 5/8.  Tacro level is pending today today.  There is also a 7-10% risk of serious chronic GVHD which should be mitigated by ATG; no chronic type GVHD today.      ID:  CMV pos into CMV pos.  CMV negative 4/7/17; back on acyclovir 11/16/16.  Afebrile today without infectious issues.  He continues on itraconazole for histo (10 ml tid) and pentamidine prophylaxis (continue monthly dosing).  Will continue to treat HIV therapy with Triumeq.  I don't expect his brief steroid pulse to increase infectious risk too much.    Renal/Prostate:  Increased urine flow on Flomax (every 36 hours on itraconazole but may need to increase to qd given intraconazole decrease) with creatinine 1.3.  He will continue good fluid intake.    Metab: Eating variably with albumin at 3.3 (hope to see continued improvement on lower itraconazole).  Potassium 4.7 on tacrolimus. Magnesium 2.0 and he will continue magnesium oxide to 4 pills daily.     Anxiety:  Improved anxiety about his health associated with family pressures including his mother's recent open heart surgery, from which she is recovering now.  I have offered visits with our psychologist but he would like to defer that for now.  Using xanax with relief.    All of his questions were answered in the clinic today and he will return in two weeks unless his symptoms progress.    Patient seen and examined with collaborating physician Dr. Atkinson.    Asia Melgar, NP  Hematology and BMT    Attending: Patient seen with NP and agree with assessment and plan noted above.  Increased social stree and anxiety recently which may affect his bowel symptoms.  Will monitor  closely and low threshold for beclamethasone or other intervention if ongoing.  RVBE

## 2017-05-15 NOTE — TELEPHONE ENCOUNTER
----- Message from Gregdangelo Elmer sent at 5/15/2017  8:10 AM CDT -----  Contact: self  Pt states he think he will need a root canal or a tooth pull, pt would like to discuss with  to get clearance   Contact number [phone]    Phone call to Sally at [facility] in regards to Mr. Sistrunk having dental work today at [facility] with Dr. Stein.  Possible restoration or extraction of #18.  Reviewed with Dr. Atkinson.  WBC 6.2 plts. 186.  On Tacro 0.5 mg qday.  Day 245 post transplant.  Not on Bactrim at this time.  Medical Clearance for dental treatment sent to [phone] with Dr. Atkinson' signature.  Asked dentist to prescribe ampicillin 500mg. By mouth every 6 hours quantity of 6 and no refills.  AURORA Flores RN, BMTCN

## 2017-05-22 NOTE — PROGRESS NOTES
Subjective:       Patient ID: Paul E Sistrunk is a 56 y.o. male.    Chief Complaint: Follow-up and Leukemia    HPI  KPS: 90% Mr. 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     Patient presents today for follow up. He states the diarrhea has resolved. He does have a new rash on both sides, appears to be a little GVHD. Patient in better spirits than two weeks ago, but still has anxiety issues. Intermittent fatigue. Eating and drinking well. Complains of dry eyes and dry mouth.     His oral intake remains good on lower itraconazole.  No fever or sign of infection.  His urine flow is ok on Flomax to help with known prostate enlargement but fading toward 36 not 48 hours.  Mild dry eyes and using eye drops.  Dry mouth improved on lower itraconazole.    His day 100 restaging marrow showed a 20-30% cellular marrow with trilineage hematopoiesis and no evidence of leukemia.  Cytogenetics 46,XY[13] and chimerism 90% donor.      AML History:  He originally presented in early 2/2016 with low platelets to his HIV provider and he had noted evolving fatigue and weight loss over the previous 2 months.  Bone marrow biopsy 2/8/16 showed a 90% cellular marrow with 50% blasts and background dysplasia.  His cytogenetics were complex with 18 metaphases with numeric and structural abnormalities including a 5q deletion, a 17p deletion (TP53 deletion), plus 1 to 18 double minutes, and 2 metaphases were a tetraploid subclone. FISH studies also confirmed the aforementioned deletions and indicated MYC amplification which are likely presented by double minutes.    He underwent standard induction therapy with 7&3 but his day 14 marrow done 2/22 showed a 40-80% cellular marrow with 35% blasts.  Hence, he began reinduction with MEC on 2/24/16  and repeat marrow done 3/9/16 showed only a 5% cellular marrow with only 5-6% CD34+ blasts.  He developed a rash and severe rectal pain complicating his therapy, though these have resolved now (finishing a steroid taper for his rash).    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  Cytogenetics 46,XY[20].  Hence, he achieved a complete remission prior to allogeneic transplant.  His transplant course (9/5 - 10/1) was as expected with significant mucositis related to conditioning and low counts with some GI issues which all resolved on count recovery.  No unexpected complications.  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     HIV: Viral load 303K on 2/6/16 and now on Triumeq with Bactrim and acyclovir prophylaxis  Histoplasmosis: Multiple pulmonary nodules seen on chest CT during his recent hospital stay and he was found to be urine histo antigen positive.  Now on itraconazole after a course of amphotericin B.     Review of Systems   Constitutional: Positive for fatigue. Negative for activity change, appetite change, fever and unexpected weight change.   HENT: Negative for dental problem, postnasal drip, sore throat and trouble swallowing.         Dry mouth   Eyes:        Dry eyes   Respiratory: Negative for cough, choking and shortness of breath.    Cardiovascular: Negative for chest pain and leg swelling.   Gastrointestinal: Negative for abdominal pain, blood in stool, diarrhea and nausea.   Genitourinary: Negative for dysuria, frequency and hematuria.   Musculoskeletal: Negative for back pain, joint swelling and neck pain.   Skin: Rash: Bilateral sides, small reddened rash.        Resolved rash at face and trunk; itching persists    Neurological: Negative for tremors, weakness, light-headedness and headaches.   Hematological: Negative for adenopathy.   Psychiatric/Behavioral: Positive for sleep disturbance. Negative  for confusion and dysphoric mood. The patient is nervous/anxious (Improved).        Objective:      Physical Exam   Constitutional: He is oriented to person, place, and time. He appears well-developed and well-nourished. No distress.   HENT:   Head: Normocephalic and atraumatic.   Mouth/Throat: Oropharynx is clear and moist. No oropharyngeal exudate.   Eyes: Conjunctivae are normal. Pupils are equal, round, and reactive to light.   Neck: Neck supple.   Cardiovascular: Normal rate and regular rhythm.    Pulmonary/Chest: Effort normal and breath sounds normal. He has no rales.   Abdominal: Soft. Bowel sounds are normal. He exhibits no mass. There is no splenomegaly. There is no tenderness.   Musculoskeletal: Normal range of motion. He exhibits no edema.   Lymphadenopathy:     He has no cervical adenopathy.     He has no axillary adenopathy.        Right: No inguinal adenopathy present.        Left: No inguinal adenopathy present.   Neurological: He is alert and oriented to person, place, and time.   Skin: Skin is warm and dry. Rash (bilateral sides; itching) noted.   Psychiatric: He has a normal mood and affect. Thought content normal.   Vitals reviewed.      Assessment:       1. S/P allogeneic bone marrow transplant    2. AML (acute myeloid leukemia) in remission    3. HIV disease    4. Anxiety    5. Anemia, unspecified type        Plan:       Graft/AML:  Full male MUD match, O+ into O+.  Day 252 post transplant with labs today showing WBC 6.08, hemoglobin 12.8 and platelets 191K.  Day 35 marrow documenting complete remission with normal cytogenetics and 95% chimerism.  Day 64 peripheral blood chimerism showed 95% CD3 and 100% CD33 donor chimerism.  Day 100 marrow showed a 20-30% cellular biopsy with no evidence of residual leukemia, increased storage iron and adequate megakaryocytes with normal cytogenetics and 90% donor chimerism.  Given his risk profile (p53 especially and double induction) we will need to follow  closely for possible relapse.     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  He has resolved acute skin GVHD post steroid pulse x2 and is back on tacrolimus 0.5 mg QD and low dose steroids completed 5/8.  Tacro level is pending today There is also a 7-10% risk of serious chronic GVHD which should be mitigated by ATG; no chronic type GVHD today. Small rash on bilateral sides, monitor for now.    ID:  CMV pos into CMV pos.  CMV negative 4/7/17; back on acyclovir 11/16/16.  Afebrile today without infectious issues.  He continues on itraconazole for histo (10 ml tid) and pentamidine prophylaxis (continue monthly dosing).  Will continue to treat HIV therapy with Triumeq.     Renal/Prostate:  Increased urine flow on Flomax (every 36 hours on itraconazole but may need to increase to qd given intraconazole decrease) with creatinine 1.4.  He will increase fluid intake.    Metab: Eating variably with albumin at 3.3 (hope to see continued improvement on lower itraconazole).  Potassium 4.6 on tacrolimus. Magnesium 2.0 and he will continue magnesium oxide to 4 pills daily.     Anxiety:  Improved anxiety about his health associated with family pressures including his mother's recent open heart surgery, from which she is recovering now.  I have offered visits with our psychologist but he would like to defer that for now.  Using xanax with relief.    All of his questions were answered in the clinic today and he will return in two weeks unless his symptoms progress.    Patient seen and examined with collaborating physician Dr. Atkinson.    Asia Melgar, NP  Hematology and BMT    Attending: Patient seen and examined with NP and agree with assessment and plan above.  He is doing well at day 252 aside from some residual itching at his flanks and some mouth dryness.  No sign of any new infectious issues.  RVBE

## 2017-06-06 NOTE — LETTER
June 6, 2017        Edgar Pinon MD  [address]  [facility] [address]             [facility]  [address]  [facility] [address]  Phone: [phone]   Patient: Paul E Sistrunk   MR Number: [id number]   YOB: 1960   Date of Visit: 6/6/2017       Dear Dr. Pinon:    Thank you for referring Paul Sistrunk to me for evaluation. Below are the relevant portions of my assessment and plan of care.        1. Anxiety    2. Prostate hypertrophy    3. HIV disease    4. Histoplasma capsulatum infection    5. AML (acute myeloid leukemia) in remission    6. S/P allogeneic bone marrow transplant    7. Anemia, unspecified type          Graft/AML:  Full male MUD match, O+ into O+.  Day 267 post transplant with labs today showing WBC 11K, hemoglobin 13.2 and platelets 191K.  Day 35 marrow documenting complete remission with normal cytogenetics and 95% chimerism.  Day 64 peripheral blood chimerism showed 95% CD3 and 100% CD33 donor chimerism.  Day 100 marrow showed a 20-30% cellular biopsy with no evidence of residual leukemia, increased storage iron and adequate megakaryocytes with normal cytogenetics and 90% donor chimerism.  Given his risk profile (p53 especially and double induction) we will need to follow closely for possible relapse. He will have his one year restaging in 100 days.    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  He has resolved acute skin GVHD post steroid pulse x2 and is back on tacrolimus 0.5 mg QD.  Tacro level 3.3 on 5/22/17.  There is also a 7-10% risk of serious chronic GVHD which should be mitigated by ATG in his conditioning regimen; no chronic type GVHD today. Small rash resolved at bilateral sides.  Will consider stopping tacrolimus at next visit.    ID:  CMV pos into CMV pos.  CMV negative 4/20/17.  He continues on itraconazole for histo ( drop to 10 ml bid), acyclovir and pentamidine  prophylaxis (continue monthly dosing).  Will continue to treat HIV therapy with Triumeq (refilled today).     Renal/Prostate:  Increased urine flow on Flomax (every 36 hours on itraconazole but may need to increase to qd given intraconazole decrease) with creatinine 1.3.  He will continue good fluid intake.    Metab: Eating variably with albumin at 3.3 (hope to see continued improvement on lower itraconazole).  Potassium 4.6 on tacrolimus. Magnesium 1.9 and he will continue magnesium oxide to 4 pills daily. Likely slight gout given great toe issues and he will cut back on richer foods.    Anxiety:  Improved anxiety about his health associated with family pressures.  I have offered visits with our psychologist but he would like to defer that for now.  Using xanax with relief.    All of his questions were answered in the clinic today and he will return in two weeks.     If you have questions, please do not hesitate to call me. I look forward to following Leonides along with you.    Sincerely,      Raphael Atkinson MD           CC  Remington Virk MD

## 2017-06-06 NOTE — TELEPHONE ENCOUNTER
Spoke with patient, informed him that rx clarified with pharmacy,  All previous prescriptions written for 20 mls (200mg) by mouth 3(three) times daily dispense 1800 ml with no refills, this was called into Maria Del Rosario the pharmacist at the [facility] pharmacy.

## 2017-06-06 NOTE — TELEPHONE ENCOUNTER
----- Message from Felipe iLng sent at 6/6/2017 11:49 AM CDT -----  Contact: [facility]  [facility] would like a call back in ref to pt rx for itraconazole (SPORANOX) 10 mg/mL Soln and dosage    [facility] can be [phone]

## 2017-06-06 NOTE — TELEPHONE ENCOUNTER
----- Message from Crystal Cano sent at 6/6/2017  3:20 PM CDT -----  Contact: Self   Patient needs someone to call him as soon as possible regarding medication (itraconazole (SPORANOX) 10 mg/mL Soln)     He can be reached at:  [phone]

## 2017-06-06 NOTE — TELEPHONE ENCOUNTER
Returned call, to [facility] .  Spoke with Maria Del Rosario the pharmacist  Clarified with Maria Del Rosario patient is taking 20 mg in 2ml three times daily for a total of 6 0mg a day, per MD RX.

## 2017-06-06 NOTE — PROGRESS NOTES
Subjective:       Patient ID: Paul E Sistrunk is a 56 y.o. male.    Chief Complaint: No chief complaint on file.    HPI  KPS: 90% Mr. 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     Patient presents today for follow up. Rash on both sides resolved. Patient in better spirits than two weeks ago, but still has anxiety issues. Intermittent fatigue and he is trying to get back to regular exercise. Eating and drinking well. Complains of dry eyes and dry mouth.  Some pain at his great toes and he has been eating richer food recently.    No fever or sign of infection.  His urine flow is ok on Flomax to help with known prostate enlargement but fading toward 36 not 48 hours.  Mild dry eyes and using eye drops.  Dry mouth improved on lower itraconazole.    His day 100 restaging marrow showed a 20-30% cellular marrow with trilineage hematopoiesis and no evidence of leukemia.  Cytogenetics 46,XY[13] and chimerism 90% donor.      AML History:  He originally presented in early 2/2016 with low platelets to his HIV provider and he had noted evolving fatigue and weight loss over the previous 2 months.  Bone marrow biopsy 2/8/16 showed a 90% cellular marrow with 50% blasts and background dysplasia.  His cytogenetics were complex with 18 metaphases with numeric and structural abnormalities including a 5q deletion, a 17p deletion (TP53 deletion), plus 1 to 18 double minutes, and 2 metaphases were a tetraploid subclone. FISH studies also confirmed the aforementioned deletions and indicated MYC amplification which are likely presented by double minutes.    He underwent standard induction therapy with 7&3 but his day 14 marrow done 2/22 showed a 40-80% cellular marrow with 35% blasts.  Hence, he began reinduction with MEC on 2/24/16  and repeat marrow done 3/9/16 showed only a 5% cellular marrow with only 5-6% CD34+ blasts.  He developed a rash and severe rectal pain complicating his therapy, though these have resolved now (finishing a steroid taper for his rash).    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  Cytogenetics 46,XY[20].  Hence, he achieved a complete remission prior to allogeneic transplant.  His transplant course (9/5 - 10/1) was as expected with significant mucositis related to conditioning and low counts with some GI issues which all resolved on count recovery.  No unexpected complications.  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     HIV: Viral load 303K on 2/6/16 and now on Triumeq with Bactrim and acyclovir prophylaxis  Histoplasmosis: Multiple pulmonary nodules seen on chest CT during his recent hospital stay and he was found to be urine histo antigen positive.  Now on itraconazole after a course of amphotericin B.     Review of Systems   Constitutional: Positive for fatigue. Negative for activity change, appetite change, fever and unexpected weight change.   HENT: Negative for dental problem, postnasal drip, sore throat and trouble swallowing.         Dry mouth   Eyes:        Dry eyes   Respiratory: Negative for cough, choking and shortness of breath.    Cardiovascular: Negative for chest pain and leg swelling.   Gastrointestinal: Negative for abdominal pain, blood in stool, diarrhea and nausea.   Genitourinary: Negative for dysuria, frequency and hematuria.   Musculoskeletal: Negative for back pain, joint swelling and neck pain.   Skin: Rash: Bilateral sides, small reddened rash.        Resolved rash at face and trunk; itching persists    Neurological: Negative for tremors, weakness, light-headedness and headaches.   Hematological: Negative for adenopathy.   Psychiatric/Behavioral: Positive for sleep disturbance. Negative  for confusion and dysphoric mood. The patient is nervous/anxious (Improved).        Objective:      Physical Exam   Constitutional: He is oriented to person, place, and time. He appears well-developed and well-nourished. No distress.   HENT:   Head: Normocephalic and atraumatic.   Mouth/Throat: Oropharynx is clear and moist. No oropharyngeal exudate.   Eyes: Conjunctivae are normal. Pupils are equal, round, and reactive to light.   Neck: Neck supple.   Cardiovascular: Normal rate and regular rhythm.    Pulmonary/Chest: Effort normal and breath sounds normal. He has no rales.   Abdominal: Soft. Bowel sounds are normal. He exhibits no mass. There is no splenomegaly. There is no tenderness.   Musculoskeletal: Normal range of motion. He exhibits no edema.   Lymphadenopathy:     He has no cervical adenopathy.     He has no axillary adenopathy.        Right: No inguinal adenopathy present.        Left: No inguinal adenopathy present.   Neurological: He is alert and oriented to person, place, and time.   Skin: Skin is warm and dry. Rash (bilateral sides; itching) noted.   Psychiatric: He has a normal mood and affect. Thought content normal.   Vitals reviewed.      Assessment:       1. Anxiety    2. Prostate hypertrophy    3. HIV disease    4. Histoplasma capsulatum infection    5. AML (acute myeloid leukemia) in remission    6. S/P allogeneic bone marrow transplant    7. Anemia, unspecified type        Plan:       Graft/AML:  Full male MUD match, O+ into O+.  Day 267 post transplant with labs today showing WBC 11K, hemoglobin 13.2 and platelets 191K.  Day 35 marrow documenting complete remission with normal cytogenetics and 95% chimerism.  Day 64 peripheral blood chimerism showed 95% CD3 and 100% CD33 donor chimerism.  Day 100 marrow showed a 20-30% cellular biopsy with no evidence of residual leukemia, increased storage iron and adequate megakaryocytes with normal cytogenetics and 90% donor chimerism.  Given his risk  profile (p53 especially and double induction) we will need to follow closely for possible relapse. He will have his one year restaging in 100 days.    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  He has resolved acute skin GVHD post steroid pulse x2 and is back on tacrolimus 0.5 mg QD.  Tacro level 3.3 on 5/22/17.  There is also a 7-10% risk of serious chronic GVHD which should be mitigated by ATG in his conditioning regimen; no chronic type GVHD today. Small rash resolved at bilateral sides.  Will consider stopping tacrolimus at next visit.    ID:  CMV pos into CMV pos.  CMV negative 4/20/17.  He continues on itraconazole for histo ( drop to 10 ml bid), acyclovir and pentamidine prophylaxis (continue monthly dosing).  Will continue to treat HIV therapy with Triumeq (refilled today).     Renal/Prostate:  Increased urine flow on Flomax (every 36 hours on itraconazole but may need to increase to qd given intraconazole decrease) with creatinine 1.3.  He will continue good fluid intake.    Metab: Eating variably with albumin at 3.3 (hope to see continued improvement on lower itraconazole).  Potassium 4.6 on tacrolimus. Magnesium 1.9 and he will continue magnesium oxide to 4 pills daily. Likely slight gout given great toe issues and he will cut back on richer foods.    Anxiety:  Improved anxiety about his health associated with family pressures.  I have offered visits with our psychologist but he would like to defer that for now.  Using xanax with relief.    All of his questions were answered in the clinic today and he will return in two weeks.

## 2017-06-13 NOTE — TELEPHONE ENCOUNTER
"Patient phoned yesterday to state he went to [place] over the weekend.  He was experiencing right knee and leg pain with pain radiating down his leg and felt "warm to touch".  He spoke with Dr. Howe after hours and she encouraged him to go to the ER for ultrasound and xrays   Patient states visited [facility] in [place] and all tests were negative.  I asked him to stay off of his leg and to take Advil for pain and try ice packs.  This morning he states the pain was relieved with Advil, but as soon as he walks it starts hurting.  Dr. Atkinson aware and wants him to see someone in ortho.  Will have  make an appt for him in ortho and contact the patient.  AURORA Flores RN, BMTCN     Faxed information  release to [facility] in [place], [place] to send over records from 6/10 related to incident above.  Requesting them to fax over records to us in [facility] clinic at [phone]. AURORA Flores RN, BMTCN   "

## 2017-06-20 PROBLEM — C92.00 AML (ACUTE MYELOBLASTIC LEUKEMIA): Status: ACTIVE | Noted: 2017-01-01

## 2017-06-20 PROBLEM — N17.9 AKI (ACUTE KIDNEY INJURY): Status: ACTIVE | Noted: 2017-01-01

## 2017-06-20 PROBLEM — D89.810 ACUTE GVHD: Status: RESOLVED | Noted: 2017-01-01 | Resolved: 2017-01-01

## 2017-06-20 PROBLEM — C92.02 AML (ACUTE MYELOID LEUKEMIA) IN RELAPSE: Status: ACTIVE | Noted: 2017-01-01

## 2017-06-20 NOTE — LETTER
June 20, 2017        Edgar Pinon MD  [address]  [facility] [address]             [facility]  [address]  [facility] [address]  Phone: [phone]   Patient: Paul E Sistrunk   MR Number: [id number]   YOB: 1960   Date of Visit: 6/20/2017       Dear Dr. Pinon:    Thank you for referring Paul Sistrunk to me for evaluation. Below are the relevant portions of my assessment and plan of care.        1. Anxiety    2. HIV disease    3. Histoplasma capsulatum infection    4. AML (acute myeloid leukemia) in remission    5. S/P allogeneic bone marrow transplant    6. AML (acute myeloblastic leukemia)    7. AML (acute myeloid leukemia) in relapse          Graft/AML:  Full male MUD match, O+ into O+.  Relapsing AML at day 281 post transplant with bone pain and chin numbness related to marrow expansion and WBC 24K (51% blasts), hemoglobin 12.2 and platelets 59K.  Day 35 marrow documenting complete remission with normal cytogenetics and 95% chimerism.  Day 64 peripheral blood chimerism showed 95% CD3 and 100% CD33 donor chimerism.  Day 100 marrow showed a 20-30% cellular biopsy with no evidence of residual leukemia, increased storage iron and adequate megakaryocytes with normal cytogenetics and 90% donor chimerism.  Given his risk profile (p53 especially and double induction) his relapse is not unanticipated. He will be admitted today for IVF overnight and allopurinol (Cr 1.9 and uric acid 10.6).  Would obtain marrow in AM with molecular studies for Flt3 and plan for cardiac echo and double lumen line placement.  Narcotics for bony pain for now.    Given his previous regimens I would favor another round of MEC if his ejection fraction is normal, FLAG if not and check for remission at day 14 ahead of likely DLI.    GVHD: No GVHD on tacrolimus 0.5 mg QD which should be discontinued.     ID:  CMV pos into CMV pos.  CMV negative 4/20/17.  He continues on itraconazole for  histo (10 ml bid), acyclovir and pentamidine prophylaxis (continue monthly dosing).  Will continue to treat HIV therapy with Triumeq.     Renal/Prostate:  Increased urine flow on Flomax (every 36 hours on itraconazole but may need to increase to qd given intraconazole decrease) with creatinine 1.9.     Metab: Eating variably with albumin at 3.4 (hope to see continued improvement on lower itraconazole).  Potassium 5 on tacrolimus. Magnesium 12.2.    Anxiety:  Increased anxiety about his health associated with family pressures.  He will need to follow up with Dr. Eli  Using xanax with relief.    He will be admitted later today.     If you have questions, please do not hesitate to call me. I look forward to following Leonides along with you.    Sincerely,      Raphael Atkinson MD           CC  Remington Virk MD

## 2017-06-20 NOTE — PROGRESS NOTES
C/C  Severe leg pain, numbness in chin area, patient stated he went to the ER in [place].  Subjective:       Patient ID: Paul E Sistrunk is a 56 y.o. male.    Chief Complaint: No chief complaint on file.    HPI  KPS: 90% Mr. 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     Since his last visit he has developed severe leg pain and chin numbness over the weekend along with rising WBC, anemia, thrombocytopenia and uric acid of 10.6 with blasts in his blood today.  He clearly has relapsing AML.  He had some pain at his great toes at his last visit which may have been related to increasing uric acid even then.    No fever or sign of infection.  His urine flow is ok on Flomax to help with known prostate enlargement but fading toward 36 not 48 hours.  Mild dry eyes and using eye drops.  Dry mouth improved on lower itraconazole.    His day 100 restaging marrow showed a 20-30% cellular marrow with trilineage hematopoiesis and no evidence of leukemia.  Cytogenetics 46,XY[13] and chimerism 90% donor.      AML History:  He originally presented in early 2/2016 with low platelets to his HIV provider and he had noted evolving fatigue and weight loss over the previous 2 months.  Bone marrow biopsy 2/8/16 showed a 90% cellular marrow with 50% blasts and background dysplasia.  His cytogenetics were complex with 18 metaphases with numeric and structural abnormalities including a 5q deletion, a 17p deletion (TP53 deletion), plus 1 to 18 double minutes, and 2 metaphases were a tetraploid subclone. FISH studies also confirmed the aforementioned deletions and indicated MYC amplification which are likely presented by double minutes.    He underwent standard induction therapy with 7&3 but his day 14 marrow done 2/22 showed a 40-80%  cellular marrow with 35% blasts.  Hence, he began reinduction with MEC on 2/24/16 and repeat marrow done 3/9/16 showed only a 5% cellular marrow with only 5-6% CD34+ blasts.  He developed a rash and severe rectal pain complicating his therapy, though these have resolved now (finishing a steroid taper for his rash).    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  Cytogenetics 46,XY[20].  Hence, he achieved a complete remission prior to allogeneic transplant.  His transplant course (9/5 - 10/1) was as expected with significant mucositis related to conditioning and low counts with some GI issues which all resolved on count recovery.  No unexpected complications.  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     HIV: Viral load 303K on 2/6/16 and now on Triumeq with Bactrim and acyclovir prophylaxis  Histoplasmosis: Multiple pulmonary nodules seen on chest CT during his recent hospital stay and he was found to be urine histo antigen positive.  Now on itraconazole after a course of amphotericin B.     Review of Systems   Constitutional: Positive for fatigue. Negative for activity change, appetite change, fever and unexpected weight change.   HENT: Negative for dental problem, postnasal drip, sore throat and trouble swallowing.  Numb chin       Dry mouth   Eyes:        Dry eyes   Respiratory: Negative for cough, choking and shortness of breath.    Cardiovascular: Negative for chest pain and leg swelling.   Gastrointestinal: Negative for abdominal pain, blood in stool, diarrhea and nausea.   Genitourinary: Negative for dysuria, frequency and hematuria.   Musculoskeletal: Increased bony pain at lower extremities and low back.   Skin: Rash: Bilateral sides, small reddened rash.        Resolved rash at face and trunk; itching persists    Neurological: Negative for tremors, weakness, light-headedness and headaches.   Hematological:  Negative for adenopathy.   Psychiatric/Behavioral: Positive for sleep disturbance. Negative for confusion and dysphoric mood. The patient is nervous/anxious (Improved).        Objective:      Physical Exam   Constitutional: He is oriented to person, place, and time. He appears well-developed and well-nourished. No distress.   HENT:   Head: Normocephalic and atraumatic.   Mouth/Throat: Oropharynx is clear and moist. No oropharyngeal exudate.   Eyes: Conjunctivae are normal. Pupils are equal, round, and reactive to light.   Neck: Neck supple.   Cardiovascular: Normal rate and regular rhythm.    Pulmonary/Chest: Effort normal and breath sounds normal. He has no rales.   Abdominal: Soft. Bowel sounds are normal. He exhibits no mass. There is no splenomegaly. There is no tenderness.   Musculoskeletal: Normal range of motion. He exhibits no edema.   Lymphadenopathy:     He has no cervical adenopathy.     He has no axillary adenopathy.        Right: No inguinal adenopathy present.        Left: No inguinal adenopathy present.   Neurological: He is alert and oriented to person, place, and time.   Skin: Skin is warm and dry. Rash (bilateral sides; itching) noted.   Psychiatric: He has a normal mood and affect. Thought content normal.   Vitals reviewed.      Assessment:       1. Anxiety    2. HIV disease    3. Histoplasma capsulatum infection    4. AML (acute myeloid leukemia) in remission    5. S/P allogeneic bone marrow transplant    6. AML (acute myeloblastic leukemia)    7. AML (acute myeloid leukemia) in relapse        Plan:       Graft/AML:  Full male MUD match, O+ into O+.  Relapsing AML at day 281 post transplant with bone pain and chin numbness related to marrow expansion and WBC 24K (51% blasts), hemoglobin 12.2 and platelets 59K.  Day 35 marrow documenting complete remission with normal cytogenetics and 95% chimerism.  Day 64 peripheral blood chimerism showed 95% CD3 and 100% CD33 donor chimerism.  Day 100 marrow  showed a 20-30% cellular biopsy with no evidence of residual leukemia, increased storage iron and adequate megakaryocytes with normal cytogenetics and 90% donor chimerism.  Given his risk profile (p53 especially and double induction) his relapse is not unanticipated. He will be admitted today for IVF overnight and allopurinol (Cr 1.9 and uric acid 10.6).  Would obtain marrow in AM with molecular studies for Flt3 and plan for cardiac echo and double lumen line placement.  Narcotics for bony pain for now.    Given his previous regimens I would favor another round of MEC if his ejection fraction is normal, FLAG if not and check for remission at day 14 ahead of likely DLI.    GVHD: No GVHD on tacrolimus 0.5 mg QD which should be discontinued.     ID:  CMV pos into CMV pos.  CMV negative 4/20/17.  He continues on itraconazole for histo (10 ml bid), acyclovir and pentamidine prophylaxis (continue monthly dosing).  Will continue to treat HIV therapy with Triumeq.     Renal/Prostate:  Increased urine flow on Flomax (every 36 hours on itraconazole but may need to increase to qd given intraconazole decrease) with creatinine 1.9.     Metab: Eating variably with albumin at 3.4 (hope to see continued improvement on lower itraconazole).  Potassium 5 on tacrolimus. Magnesium 12.2.    Anxiety:  Increased anxiety about his health associated with family pressures.  He will need to follow up with Dr. Eli  Using xanax with relief.    He will be admitted later today.

## 2017-06-20 NOTE — Clinical Note
Relapsing AML.  He will be admitted today.  We need to determine whether we have cells in storage from his MUD.  Thanks

## 2017-06-21 NOTE — OP NOTE
DATE: 06/21/2017    PREOPERATIVE DIAGNOSIS: AML    POSTOPERATIVE DIAGNOSIS: AML    PROCEDURE PERFORMED: Left subclavian tunneled venous (Mazariegos) catheter placement.     ATTENDING SURGEON: Joshua Goldberg, M.D.     HOUSESTAFF SURGEON: Rudolph San M.D. (RES)     ANESTHESIA: Monitored anesthesia care plus local.     ESTIMATED BLOOD LOSS: 10 mL     FINDINGS: An 9.5-Bulgarian double-lumen Power Mazariegos catheter placed with tip at superior right atrium    SPECIMEN: None.     DRAINS: None.     COMPLICATIONS: None.     INDICATIONS: Mr Paul E Sistrunk is a 56 y.o. y/o male referred to my General Surgery clinic for Mazariegos catheter placement for chemotherapy administration. I recommended a left subclavian catheter and the patient agreed to proceed. The patient did sign informed consent and expressed understanding of the risks and benefits of surgery.     OPERATIVE PROCEDURE: The patient was identified in preoperative holding and transported directly to the Operating Room. He was placed supine on the operating table and padded appropriately. Monitors were applied. Monitored anesthesia care was initiated. The left neck and chest were prepped and draped in the standard sterile surgical fashion. A timeout was performed and all team members present agreed this was the correct procedure on the correct patient. We also confirmed administration of appropriate preoperative antibiotics.     After administering local anesthesia, the left subclavian vein was cannulated with a hollow bore needle. A guidewire was placed and confirmed to be in correct position using fluoroscopy. A small skin incision was made around the guidewire. An appropriately sized catheter was chosen. A counter incision inferolateral on the left chest was made after administering additional lidocaine. A subcutaneous tunnel between the lower chest incision and the cannulation site was made with a hemostat after administration of additional local. The catheter was  loaded onto the tunneling device and tunneled from the lower chest incision to the upper one, and then cut to appropriate length. Under fluoroscopic guidance, the split sheath and dilator were placed over the guidewire, and the guidewire and dilator were then removed. The catheter was placed down the split sheath and the sheath was split and peeled away. Fluoroscopy confirmed appropriate position of the catheter, which aspirated and flushed easily. Heparinized saline was instilled in the catheter. The catheter was secured at it's exit site using 2-0 Prolene suture. The skin incision at the cannulation site was closed with subcuticular 4-0 Monocryl. A sterile dressing was applied. The patient was transported to the Recovery Room in stable condition. All sponge, instrument and needle counts were correct at the end of the procedure. I was present and scrubbed for the entire case.

## 2017-06-21 NOTE — PLAN OF CARE
MDR's with Dr Saleem.  Patient is s/p MUD transplant for AML.  Now readmitted with relapse and here for induction chemo.  A BMB, echo and oquendo placement was performed today.  D/c pending induction, day 14 BMB and count recovery.  ELOS 3-4 weeks.  Will continue to follow.

## 2017-06-21 NOTE — NURSING TRANSFER
Nursing Transfer Note      6/21/2017     Transfer To: Hospital, Oncology, 8th Floor, Room 845A from [facility] .    Transfer Via: Stretcher.    Transfer With: Nothing required.    Transported By: MALENA Tinoco.    Medicines Sent: None.    Chart Sent with Patient: Yes.    Notified: MINE Winters RN.    Patient Reassessed at: 1645 on 06/21/2017.    Upon arrival to floor: Patient oriented to room, bed in lowest position, and call bell within reach.

## 2017-06-21 NOTE — ASSESSMENT & PLAN NOTE
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   - 2D echo pending  - Mazariegos placement pending  - BM biopsy today  - KPS 90%

## 2017-06-21 NOTE — ANESTHESIA POSTPROCEDURE EVALUATION
"Anesthesia Post Evaluation    Patient: Paul E Sistrunk    Procedure(s) Performed: Procedure(s) (LRB):  INSERTION-CATHETER-HARRISON-DUAL-LUMEN (Left)    Final Anesthesia Type: general  Patient location during evaluation: PACU  Patient participation: Yes- Able to Participate  Level of consciousness: awake and alert and oriented  Post-procedure vital signs: reviewed and stable  Pain management: adequate  Airway patency: patent  PONV status at discharge: No PONV  Anesthetic complications: no      Cardiovascular status: stable  Respiratory status: unassisted, spontaneous ventilation and room air  Hydration status: euvolemic  Follow-up not needed.        Visit Vitals  BP 97/67 (BP Location: Right arm, Patient Position: Lying, BP Method: cNIBP)   Pulse 90   Temp 36.6 °C (97.9 °F) (Temporal)   Resp 11   Ht 6' 1" (1.854 m)   Wt 88.2 kg (194 lb 7.1 oz)   SpO2 99%   BMI 25.65 kg/m²       Pain/Arik Score: Pain Assessment Performed: Yes (6/21/2017 12:29 PM)  Presence of Pain: complains of pain/discomfort (6/21/2017 12:29 PM)  Pain Rating Prior to Med Admin: 8 (6/21/2017 11:49 AM)  Pain Rating Post Med Admin: 2 (6/21/2017 12:19 PM)      "

## 2017-06-21 NOTE — ASSESSMENT & PLAN NOTE
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 s/p 7+3 induction with residual leukemia, successful MEC reinduction 3/2016 and IDAC consolidation x 2, most recently 6/20-6/25/2016, pulmonary histoplasmosis (positive urine antigen of 2.35 and pulmonary micronodules on CT chest 2/2016; s/p ambisome induction and on itraconazole maintenance with negative repeat antigens), CKD, klebsiella septicemia (suspected transient GI source while neutropenic 2016) ultimately underwent MUD-SCT (CMV D+/R+, Flu/Bu/ATG day +282, ACV/pentamidine ppx, tacrolimus) now presenting with relapsing AML  - for now would continue triumeq and itraconazole, and may cont itraconazole through neutropenia still with aspergillus coverage  - other management of ppx per onc protocol  - call back if infectious issues arise

## 2017-06-21 NOTE — PROGRESS NOTES
[facility]  Hematology  Bone Marrow Transplant  Progress Note    Patient Name: Paul E Sistrunk  Admission Date: 6/20/2017  Hospital Length of Stay: 1 days  Code Status: Full Code    Subjective:     Interval History:   Denies any current issues  No change in his chin numbness  No new focal deficits.    Objective:     Vital Signs (Most Recent):  Temp: 98.1 °F (36.7 °C) (06/21/17 1302)  Pulse: 103 (06/21/17 1302)  Resp: 16 (06/21/17 1302)  BP: 138/78 (06/21/17 1302)  SpO2: 95 % (06/21/17 1302) Vital Signs (24h Range):  Temp:  [97.9 °F (36.6 °C)-98.2 °F (36.8 °C)] 98.1 °F (36.7 °C)  Pulse:  [] 103  Resp:  [16-18] 16  SpO2:  [94 %-96 %] 95 %  BP: (107-146)/(71-83) 138/78     Weight: 88.2 kg (194 lb 7.1 oz)  Body mass index is 25.65 kg/m².  Body surface area is 2.13 meters squared.    ECOG SCORE         [unfilled]    Intake/Output - Last 3 Shifts       06/19 0700 - 06/20 0659 06/20 0700 - 06/21 0659 06/21 0700 - 06/22 0659    P.O.  480     I.V. (mL/kg)  1002.5 (11.4)     Total Intake(mL/kg)  1482.5 (16.8)     Urine (mL/kg/hr)  500 1025 (1.5)    Stool   0 (0)    Total Output   500 1025    Net   +982.5 -1025           Stool Occurrence   0 x          Physical Exam   Constitutional: He is oriented to person, place, and time. He appears well-developed and well-nourished.   HENT:   Head: Normocephalic and atraumatic.   Eyes: EOM are normal. Pupils are equal, round, and reactive to light.   Cardiovascular: Normal rate and regular rhythm.    Pulmonary/Chest: Effort normal and breath sounds normal.   Abdominal: Soft. Bowel sounds are normal.   Musculoskeletal: Normal range of motion.   Neurological: He is alert and oriented to person, place, and time.   Skin: Skin is warm and dry.       Significant Labs:   CBC:   Recent Labs  Lab 06/20/17  1234 06/21/17  0507   WBC 23.85* 25.46*   HGB 12.2* 10.6*   HCT 35.7* 31.8*   PLT 59* 40*    and CMP:   Recent Labs  Lab 06/20/17  1234 06/21/17  0507 06/21/17  1159   NA  139 139 138   K 5.0 4.5 4.4    109 109   CO2 24 22* 23   GLU 99 89 97   BUN 27* 28* 23*   CREATININE 1.9* 1.8* 1.5*   CALCIUM 9.3 8.9 8.7   PROT 7.2 6.0 6.0   ALBUMIN 3.4* 3.1* 3.0*   BILITOT 0.5 0.5 0.5   ALKPHOS 189* 150* 147*   AST 65* 53* 61*   ALT 45* 37 44   ANIONGAP 8 8 6*   EGFRNONAA 39.1* 41.7* 52.0*     Assessment/Plan:     * AML (acute myeloid leukemia) in relapse    -282 days post Flu/Bu/ATG MUD allogeneic transplant for high risk AML after his second IDAC (6/20 - 6/24) after a prolonged hospitalization (2/5 - 3/21) for induction with 7&3 and reinduction with MEC to remission and IDAC (4/4 - 4/9).   - 2D echo pending  - Mazariegos placement pending  - BM biopsy today  - KPS 90%        HIV disease    - ID following  - ordered CD4 and HIV RNA  - Continue HAART        Prostate hypertrophy    - Continue home flomax            VTE Risk Mitigation         Ordered     Place sequential compression device  Until discontinued      06/20/17 2005     High Risk of VTE  Once      06/20/17 1901          Disposition: pending echo, line, and re-induction.    Santiago Cottrell, PGY-V on 6/21/2017 3:20 PM  U Hematology/Oncology Fellow  Pager: [phone]  [email or web address]

## 2017-06-21 NOTE — SUBJECTIVE & OBJECTIVE
Interval History: afebrile, dental pain, feeling otherwise well    Review of Systems   Constitutional: Positive for activity change. Negative for appetite change, chills, fatigue and fever.   HENT: Negative for congestion, dental problem, mouth sores and sinus pressure.    Eyes: Negative for pain, redness and visual disturbance.   Respiratory: Negative for cough, shortness of breath and wheezing.    Cardiovascular: Negative for chest pain and leg swelling.   Gastrointestinal: Negative for abdominal distention, abdominal pain, diarrhea, nausea and vomiting.   Endocrine: Negative for polyuria.   Genitourinary: Negative for decreased urine volume, dysuria and frequency.   Musculoskeletal: Negative for joint swelling.   Skin: Negative for color change.   Allergic/Immunologic: Negative for food allergies.   Neurological: Negative for dizziness, weakness and headaches.   Hematological: Negative for adenopathy.   Psychiatric/Behavioral: Negative for agitation and confusion. The patient is not nervous/anxious.      Objective:     Vital Signs (Most Recent):  Temp: 97.9 °F (36.6 °C) (06/21/17 0754)  Pulse: 99 (06/21/17 0754)  Resp: 17 (06/21/17 0754)  BP: (!) 145/83 (06/21/17 0754)  SpO2: 95 % (06/21/17 0754) Vital Signs (24h Range):  Temp:  [97.9 °F (36.6 °C)-98.4 °F (36.9 °C)] 97.9 °F (36.6 °C)  Pulse:  [] 99  Resp:  [17-18] 17  SpO2:  [94 %-96 %] 95 %  BP: (107-145)/(71-83) 145/83     Weight: 88.2 kg (194 lb 7.1 oz)  Body mass index is 25.65 kg/m².    Estimated Creatinine Clearance: 53 mL/min (based on Cr of 1.8).    Physical Exam   Constitutional: He is oriented to person, place, and time. He appears well-developed and well-nourished.   HENT:   Head: Normocephalic and atraumatic.   Mouth/Throat: Oropharynx is clear and moist.   Eyes: Conjunctivae are normal.   Neck: Neck supple.   Cardiovascular: Normal rate, regular rhythm and normal heart sounds.    No murmur heard.  Pulmonary/Chest: Effort normal and breath  sounds normal. No respiratory distress. He has no wheezes.   Abdominal: Soft. Bowel sounds are normal. He exhibits no distension. There is no tenderness.   Musculoskeletal: Normal range of motion. He exhibits no edema or tenderness.   Lymphadenopathy:     He has no cervical adenopathy.   Neurological: He is alert and oriented to person, place, and time. Coordination normal.   Skin: Skin is warm and dry. No rash noted.   Psychiatric: He has a normal mood and affect. His behavior is normal.       Significant Labs:   CBC:   Recent Labs  Lab 06/20/17  1234 06/21/17  0507   WBC 23.85*  --    HGB 12.2* 10.6*   HCT 35.7* 31.8*   PLT 59*  --      CMP:   Recent Labs  Lab 06/20/17  1234 06/21/17  0507    139   K 5.0 4.5    109   CO2 24 22*   GLU 99 89   BUN 27* 28*   CREATININE 1.9* 1.8*   CALCIUM 9.3 8.9   PROT 7.2 6.0   ALBUMIN 3.4* 3.1*   BILITOT 0.5 0.5   ALKPHOS 189* 150*   AST 65* 53*   ALT 45* 37   ANIONGAP 8 8   EGFRNONAA 39.1* 41.7*       Significant Imaging: I have reviewed all pertinent imaging results/findings within the past 24 hours.     None    Micro:  None this admit

## 2017-06-21 NOTE — CONSULTS
[facility]  Infectious Disease  Consult Note    Patient Name: Paul E Sistrunk  MRN: [id number]  Admission Date: 6/20/2017  Hospital Length of Stay: 1 days  Attending Physician: Enriqueta Saleem MD  Primary Care Provider: Edgar Pinon MD     Isolation Status: No active isolations        Inpatient consult to Infectious Diseases  Consult performed by: VAISHALI BERGMAN  Consult ordered by: LAWRENCE MARTINEZ  Reason for consult: HIV/histo/AML  Assessment/Recommendations: Consult received, full note and eval to follow

## 2017-06-21 NOTE — CONSULTS
[facility]  General Surgery  Consult Note    Inpatient consult to General Surgery  Consult performed by: WILLIS NELSON  Consult ordered by: ELSA COSBY  Reason for consult: oquendo placement        Subjective:     Chief Complaint/Reason for Admission: AML (acute myeloid leukemia) in relapse    History of Present Illness:   Patient is a 55 yo male w AML (in relapse), HIV, anxiety, here for induction of relapsing AML. He had previously been in good health and overall feeling well until recently when he noticed leg pain and chin numbness. Work up at OSH ruled out DVT. Symptoms continued and worsened. Work up here resulted in relapse of AML. General surgery has been consulted for oquendo placement. He has previous had tunneled line placement on left. He had line for about 1 year or so.     Current Facility-Administered Medications on File Prior to Encounter   Medication    sodium chloride 0.9% flush 10 mL     Current Outpatient Prescriptions on File Prior to Encounter   Medication Sig    abacavir-dolutegravir-lamivud (TRIUMEQ) 600- mg Tab Take 1 tablet by mouth once daily.    acyclovir (ZOVIRAX) 800 MG Tab Take 1 tablet (800 mg total) by mouth 2 (two) times daily.    alprazolam (XANAX) 0.5 MG tablet Take 1 tablet (0.5 mg total) by mouth 2 (two) times daily as needed for Insomnia or Anxiety.    butalbital-acetaminophen-caffeine -40 mg (FIORICET, ESGIC) -40 mg per tablet     finasteride (PROSCAR) 5 mg tablet Take 1 tablet (5 mg total) by mouth once daily.    HYDROmorphone (DILAUDID) 2 MG tablet Take 1 tablet (2 mg total) by mouth every 4 (four) hours as needed for Pain.    itraconazole (SPORANOX) 10 mg/mL Soln Take 2 mLs (20 mg total) by mouth 3 (three) times daily.    loperamide (IMODIUM) 2 mg capsule Take 2 mg by mouth daily as needed for Diarrhea.    magnesium oxide (MAG-OX) 400 mg tablet TAKE THREE TABLETS BY MOUTH THREE TIMES A DAY    ondansetron (ZOFRAN) 8  MG tablet Take 1 tablet (8 mg total) by mouth every 8 (eight) hours as needed for Nausea.    pantoprazole (PROTONIX) 40 MG tablet Take 1 tablet (40 mg total) by mouth once daily.    promethazine (PHENERGAN) 12.5 MG Tab Take 1 tablet (12.5 mg total) by mouth every 6 (six) hours as needed (nausea).    tacrolimus (PROGRAF) 0.5 MG Cap Take 1 capsule (0.5 mg total) by mouth once daily.    tamsulosin (FLOMAX) 0.4 mg Cp24 Take 1 capsule (0.4 mg total) by mouth every other day.    zolpidem (AMBIEN) 5 MG Tab Take 1 tablet (5 mg total) by mouth nightly as needed.       Review of patient's allergies indicates:   Allergen Reactions    Etoposide Rash       Past Medical History:   Diagnosis Date    Cancer 2/2016    ALL    HIV infection     Pancytopenia due to antineoplastic chemotherapy 7/4/2016     Past Surgical History:   Procedure Laterality Date    CYSTOSCOPY       Family History     Problem Relation (Age of Onset)    Cancer Father        Social History Main Topics    Smoking status: Never Smoker    Smokeless tobacco: Not on file    Alcohol use No    Drug use: No    Sexual activity: Not on file     Review of Systems   Constitutional: Negative for chills and fever.   HENT: Negative for congestion and rhinorrhea.    Eyes: Negative for photophobia and visual disturbance.   Respiratory: Negative for cough and shortness of breath.    Cardiovascular: Negative for chest pain and palpitations.   Gastrointestinal: Negative for nausea and vomiting.   Endocrine: Negative for cold intolerance and heat intolerance.   Musculoskeletal: Negative for arthralgias and myalgias.        +leg pain   Skin: Negative for rash and wound.   Neurological: Negative for tremors and weakness.     Objective:     Vital Signs (Most Recent):  Temp: 97.9 °F (36.6 °C) (06/21/17 0406)  Pulse: 93 (06/21/17 0406)  Resp: 18 (06/21/17 0406)  BP: 134/83 (06/21/17 0406)  SpO2: 96 % (06/21/17 0406) Vital Signs (24h Range):  Temp:  [97.9 °F (36.6 °C)-98.4  °F (36.9 °C)] 97.9 °F (36.6 °C)  Pulse:  [] 93  Resp:  [18] 18  SpO2:  [94 %-96 %] 96 %  BP: (107-134)/(71-83) 134/83     Weight: 88.2 kg (194 lb 7.1 oz)  Body mass index is 25.65 kg/m².      Intake/Output Summary (Last 24 hours) at 06/21/17 0794  Last data filed at 06/21/17 0355   Gross per 24 hour   Intake           1482.5 ml   Output              500 ml   Net            982.5 ml       Physical Exam   Constitutional: He is oriented to person, place, and time. He appears well-developed and well-nourished. No distress.   HENT:   Head: Normocephalic and atraumatic.   Eyes: No scleral icterus.   Neck: Normal range of motion. Neck supple.   Cardiovascular: Normal rate and regular rhythm.    Pulmonary/Chest: Effort normal. No respiratory distress.   Previous scar noted to left chest   Abdominal: Soft. He exhibits no distension. There is no tenderness.   Neurological: He is alert and oriented to person, place, and time.   Skin: Skin is warm and dry.   Psychiatric: He has a normal mood and affect.       Significant Labs:  BMP:   Recent Labs  Lab 06/21/17  0507   GLU 89      K 4.5      CO2 22*   BUN 28*   CREATININE 1.8*   CALCIUM 8.9   MG 1.9       CBC:   Recent Labs  Lab 06/20/17  1234 06/21/17  0507   WBC 23.85*  --    RBC 3.88* 3.45*   HGB 12.2* 10.6*   HCT 35.7* 31.8*   PLT 59*  --    MCV 92 92   MCH 31.4* 30.7   MCHC 34.2 33.3     CMP:   Recent Labs  Lab 06/21/17  0507   GLU 89   CALCIUM 8.9   ALBUMIN 3.1*   PROT 6.0      K 4.5   CO2 22*      BUN 28*   CREATININE 1.8*   ALKPHOS 150*   ALT 37   AST 53*   BILITOT 0.5     Coagulation:   Recent Labs  Lab 06/20/17  1920   INR 1.0   APTT 24.2     LFTs:   Recent Labs  Lab 06/21/17  0507   ALT 37   AST 53*   ALKPHOS 150*   BILITOT 0.5   PROT 6.0   ALBUMIN 3.1*     All pertinent labs from the last 24 hours have been reviewed.    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 I will follow-up with patient. Please contact us if you have any additional questions.    Camila Freeman PA-C   [id number]  General Surgery  [facility]

## 2017-06-21 NOTE — PROGRESS NOTES
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 s/p 7+3 induction with residual leukemia, successful MEC reinduction 3/2016 and IDAC consolidation x 2, most recently 6/20-6/25/2016, pulmonary histoplasmosis (positive urine antigen of 2.35 and pulmonary micronodules on CT chest 2/2016; s/p ambisome induction and on itraconazole maintenance with negative repeat antigens), CKD, klebsiella septicemia (suspected transient GI source while neutropenic 2016) ultimately underwent MUD-SCT (CMV D+/R+, Flu/Bu/ATG day +282, ACV/pentamidine ppx, tacrolimus) now presenting with relapsing AML  - for now would continue triumeq and itraconazole, and may cont itraconazole through neutropenia still with aspergillus coverage  - other management of ppx per onc protocol  - call back if infectious issues arise            Anticipated Disposition: pending    Thank you for your consult. I will sign off. Please contact us if you have any additional questions.    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 s/p 7+3 induction with residual leukemia, successful MEC reinduction 3/2016 and IDAC consolidation x 2, most recently 6/20-6/25/2016, pulmonary histoplasmosis (positive urine antigen of 2.35 and pulmonary micronodules on CT chest 2/2016; s/p ambisome induction and on itraconazole maintenance with negative repeat antigens), CKD, klebsiella septicemia  (suspected transient GI source while neutropenic 2016) ultimately underwent MUD-SCT (CMV D+/R+, Flu/Bu/ATG day +282, ACV/pentamidine ppx, tacrolimus) now presenting with relapsing AML  Interval History: afebrile, dental pain, feeling otherwise well    Review of Systems   Constitutional: Positive for activity change. Negative for appetite change, chills, fatigue and fever.   HENT: Negative for congestion, dental problem, mouth sores and sinus pressure.    Eyes: Negative for pain, redness and visual disturbance.   Respiratory: Negative for cough, shortness of breath and wheezing.    Cardiovascular: Negative for chest pain and leg swelling.   Gastrointestinal: Negative for abdominal distention, abdominal pain, diarrhea, nausea and vomiting.   Endocrine: Negative for polyuria.   Genitourinary: Negative for decreased urine volume, dysuria and frequency.   Musculoskeletal: Negative for joint swelling.   Skin: Negative for color change.   Allergic/Immunologic: Negative for food allergies.   Neurological: Negative for dizziness, weakness and headaches.   Hematological: Negative for adenopathy.   Psychiatric/Behavioral: Negative for agitation and confusion. The patient is not nervous/anxious.      Objective:     Vital Signs (Most Recent):  Temp: 97.9 °F (36.6 °C) (06/21/17 0754)  Pulse: 99 (06/21/17 0754)  Resp: 17 (06/21/17 0754)  BP: (!) 145/83 (06/21/17 0754)  SpO2: 95 % (06/21/17 0754) Vital Signs (24h Range):  Temp:  [97.9 °F (36.6 °C)-98.4 °F (36.9 °C)] 97.9 °F (36.6 °C)  Pulse:  [] 99  Resp:  [17-18] 17  SpO2:  [94 %-96 %] 95 %  BP: (107-145)/(71-83) 145/83     Weight: 88.2 kg (194 lb 7.1 oz)  Body mass index is 25.65 kg/m².    Estimated Creatinine Clearance: 53 mL/min (based on Cr of 1.8).    Physical Exam   Constitutional: He is oriented to person, place, and time. He appears well-developed and well-nourished.   HENT:   Head: Normocephalic and atraumatic.   Mouth/Throat: Oropharynx is clear and moist.   Eyes:  Conjunctivae are normal.   Neck: Neck supple.   Cardiovascular: Normal rate, regular rhythm and normal heart sounds.    No murmur heard.  Pulmonary/Chest: Effort normal and breath sounds normal. No respiratory distress. He has no wheezes.   Abdominal: Soft. Bowel sounds are normal. He exhibits no distension. There is no tenderness.   Musculoskeletal: Normal range of motion. He exhibits no edema or tenderness.   Lymphadenopathy:     He has no cervical adenopathy.   Neurological: He is alert and oriented to person, place, and time. Coordination normal.   Skin: Skin is warm and dry. No rash noted.   Psychiatric: He has a normal mood and affect. His behavior is normal.       Significant Labs:   CBC:   Recent Labs  Lab 06/20/17  1234 06/21/17  0507   WBC 23.85*  --    HGB 12.2* 10.6*   HCT 35.7* 31.8*   PLT 59*  --      CMP:   Recent Labs  Lab 06/20/17  1234 06/21/17  0507    139   K 5.0 4.5    109   CO2 24 22*   GLU 99 89   BUN 27* 28*   CREATININE 1.9* 1.8*   CALCIUM 9.3 8.9   PROT 7.2 6.0   ALBUMIN 3.4* 3.1*   BILITOT 0.5 0.5   ALKPHOS 189* 150*   AST 65* 53*   ALT 45* 37   ANIONGAP 8 8   EGFRNONAA 39.1* 41.7*       Significant Imaging: I have reviewed all pertinent imaging results/findings within the past 24 hours.     None    Micro:  None this admit

## 2017-06-21 NOTE — ANESTHESIA RELEASE NOTE
"Anesthesia Release from PACU Note    Patient: Paul E Sistrunk    Procedure(s) Performed: Procedure(s) (LRB):  INSERTION-CATHETER-HARRISON-DUAL-LUMEN (Left)    Anesthesia type: GEN    Post pain: Adequate analgesia reported    Post assessment: no apparent anesthetic complications, tolerated procedure well and no evidence of recall    Post vital signs: BP 97/67 (BP Location: Right arm, Patient Position: Lying, BP Method: cNIBP)   Pulse 90   Temp 36.6 °C (97.9 °F) (Temporal)   Resp 11   Ht 6' 1" (1.854 m)   Wt 88.2 kg (194 lb 7.1 oz)   SpO2 99%   BMI 25.65 kg/m²     Level of consciousness: awake, alert and oriented    Nausea/Vomiting: no nausea/no vomiting    Complications: none    Airway Patency: patent    Respiratory: unassisted, spontaneous ventilation, room air    Cardiovascular: stable and blood pressure at baseline    Hydration: euvolemic    "

## 2017-06-21 NOTE — PLAN OF CARE
Patient arrived from OR with DON Donaldson CRNA and Shar OCHOA RN.  Patient stable.  Report received at this time.  Assumed care of patient at this time.

## 2017-06-21 NOTE — ANESTHESIA PREPROCEDURE EVALUATION
06/21/2017  Paul E Sistrunk is a 56 y.o., male   Pre-operative evaluation for Procedure(s) (LRB):  INSERTION-CATHETER-HARRISON (N/A)    Paul E Sistrunk is a 56 y.o. male     Patient Active Problem List   Diagnosis    Prostate hypertrophy    HIV disease    Erectile dysfunction    Anemia    Histoplasma capsulatum infection    S/P allogeneic bone marrow transplant    Anxiety    AML (acute myeloid leukemia) in relapse    AML (acute myeloblastic leukemia)    NICOLE (acute kidney injury)       Review of patient's allergies indicates:   Allergen Reactions    Etoposide Rash       Current Facility-Administered Medications on File Prior to Encounter   Medication Dose Route Frequency Provider Last Rate Last Dose    sodium chloride 0.9% flush 10 mL  10 mL Intravenous 1 time in Clinic/HOD Raphael Atkinson MD         Current Outpatient Prescriptions on File Prior to Encounter   Medication Sig Dispense Refill    abacavir-dolutegravir-lamivud (TRIUMEQ) 600- mg Tab Take 1 tablet by mouth once daily. 30 tablet 2    acyclovir (ZOVIRAX) 800 MG Tab Take 1 tablet (800 mg total) by mouth 2 (two) times daily. 60 tablet 11    alprazolam (XANAX) 0.5 MG tablet Take 1 tablet (0.5 mg total) by mouth 2 (two) times daily as needed for Insomnia or Anxiety. 60 tablet 0    butalbital-acetaminophen-caffeine -40 mg (FIORICET, ESGIC) -40 mg per tablet   0    finasteride (PROSCAR) 5 mg tablet Take 1 tablet (5 mg total) by mouth once daily. 30 tablet 1    HYDROmorphone (DILAUDID) 2 MG tablet Take 1 tablet (2 mg total) by mouth every 4 (four) hours as needed for Pain. 60 tablet 0    itraconazole (SPORANOX) 10 mg/mL Soln Take 2 mLs (20 mg total) by mouth 3 (three) times daily. 1800 mL 0    loperamide (IMODIUM) 2 mg capsule Take 2 mg by mouth daily as needed for Diarrhea.      magnesium oxide (MAG-OX) 400 mg tablet  TAKE THREE TABLETS BY MOUTH THREE TIMES A  tablet 1    ondansetron (ZOFRAN) 8 MG tablet Take 1 tablet (8 mg total) by mouth every 8 (eight) hours as needed for Nausea. 30 tablet 1    pantoprazole (PROTONIX) 40 MG tablet Take 1 tablet (40 mg total) by mouth once daily. 30 tablet 11    promethazine (PHENERGAN) 12.5 MG Tab Take 1 tablet (12.5 mg total) by mouth every 6 (six) hours as needed (nausea). 30 tablet 2    tacrolimus (PROGRAF) 0.5 MG Cap Take 1 capsule (0.5 mg total) by mouth once daily. 120 capsule 11    tamsulosin (FLOMAX) 0.4 mg Cp24 Take 1 capsule (0.4 mg total) by mouth every other day. 15 capsule 1    zolpidem (AMBIEN) 5 MG Tab Take 1 tablet (5 mg total) by mouth nightly as needed. 30 tablet 1       Past Surgical History:   Procedure Laterality Date    CYSTOSCOPY         Social History     Social History    Marital status: Single     Spouse name: N/A    Number of children: 1    Years of education: N/A     Occupational History    Not on file.     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; Father  of AML 2016         Vital Signs Range (Last 24H):  Temp:  [36.6 °C (97.9 °F)-36.9 °C (98.4 °F)]   Pulse:  []   Resp:  [16-18]   BP: (107-146)/(71-83)   SpO2:  [94 %-96 %]       CBC:   Recent Labs      17   1234  17   0507   WBC  23.85*  25.46*   RBC  3.88*  3.45*   HGB  12.2*  10.6*   HCT  35.7*  31.8*   PLT  59*  40*   MCV  92  92   MCH  31.4*  30.7   MCHC  34.2  33.3       CMP:   Recent Labs      17   1234  17   0507  17   1151    NA  139  139  138   K  5.0  4.5  4.4   CL  107  109  109   CO2  24  22*  23   BUN  27*  28*  23*   CREATININE  1.9*  1.8*  1.5*   GLU  99  89  97   MG  2.2  1.9   --    PHOS   --   4.9*   --    CALCIUM  9.3  8.9  8.7   ALBUMIN  3.4*  3.1*  3.0*   PROT  7.2  6.0  6.0   ALKPHOS  189*  150*  147*   ALT  45*  37  44   AST  65*  53*  61*   BILITOT  0.5  0.5  0.5       INR  Recent Labs      17   1920   INR  1.0   APTT  24.2           Diagnostic Studies:      EKD Echo:    .    Anesthesia Evaluation         Review of Systems      Physical Exam  General:  Well nourished    Airway/Jaw/Neck:  Airway Findings: Mouth Opening: Normal Tongue: Normal  Mallampati: II  Improves to II with phonation.  TM Distance: Normal, at least 6 cm            Mental Status:  Mental Status Findings:  Cooperative, Alert and Oriented         Anesthesia Plan  Type of Anesthesia, risks & benefits discussed:  Anesthesia Type:  general, MAC  Patient's Preference:   Intra-op Monitoring Plan:   Intra-op Monitoring Plan Comments:   Post Op Pain Control Plan:   Post Op Pain Control Plan Comments:   Induction:   IV  Beta Blocker:  Patient is not currently on a Beta-Blocker (No further documentation required).       Informed Consent: Patient understands risks and agrees with Anesthesia plan.  Questions answered. Anesthesia consent signed with patient.  ASA Score: 4     Day of Surgery Review of History & Physical:    H&P update referred to the surgeon.         Ready For Surgery From Anesthesia Perspective.

## 2017-06-21 NOTE — H&P
HISTORY & PHYSICAL  BMT    Team: [facility] HEMATOLOGY BMT    PRESENTING HISTORY     Chief Complaint/Reason for Admission:  AML, re-induction    History of Present Illness:  Mr. Paul E Sistrunk is a 56 year old gentleman with PMH of AML (now in relapse), HIV, anxiety, here for induction of relapsing AML. He had been in good health and overall feeling well until the weekend when he went traveling to [place] with some friends and noticed his legs were hurting and his chin had gone numb. Under the advice of [facility] oncology he went to a local hospital to be evaluated for DVT and was found not to have any. Since then he has continued having this leg pain and numbness though it had been waxing and waning and he was feeling well enough that he almost did not attend his scheduled clinic visit on the day of admission.     In clinic labs were drawn and resulted c/w relapse of AML.        Oncologic history:  (from Dr. Atkinson note, 6/20/17)     KPS: 90% Mr. 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      Since his last visit he has developed severe leg pain and chin numbness over the weekend along with rising WBC, anemia, thrombocytopenia and uric acid of 10.6 with blasts in his blood today.  He clearly has relapsing AML.  He had some pain at his great toes at his last visit which may have been related to increasing uric acid even then.     No fever or sign of infection.  His urine flow is ok on Flomax to help with known prostate enlargement but fading toward 36 not 48 hours.  Mild dry eyes and using eye drops.  Dry mouth improved on lower itraconazole.     His day 100 restaging marrow showed a 20-30% cellular marrow with trilineage hematopoiesis and no evidence of leukemia.  Cytogenetics 46,XY[13] and chimerism 90% donor.       AML  History:  He originally presented in early 2/2016 with low platelets to his HIV provider and he had noted evolving fatigue and weight loss over the previous 2 months.  Bone marrow biopsy 2/8/16 showed a 90% cellular marrow with 50% blasts and background dysplasia.  His cytogenetics were complex with 18 metaphases with numeric and structural abnormalities including a 5q deletion, a 17p deletion (TP53 deletion), plus 1 to 18 double minutes, and 2 metaphases were a tetraploid subclone. FISH studies also confirmed the aforementioned deletions and indicated MYC amplification which are likely presented by double minutes.     He underwent standard induction therapy with 7&3 but his day 14 marrow done 2/22 showed a 40-80% cellular marrow with 35% blasts.  Hence, he began reinduction with MEC on 2/24/16 and repeat marrow done 3/9/16 showed only a 5% cellular marrow with only 5-6% CD34+ blasts.  He developed a rash and severe rectal pain complicating his therapy, though these have resolved now (finishing a steroid taper for his rash).     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  Cytogenetics 46,XY[20].  Hence, he achieved a complete remission prior to allogeneic transplant.  His transplant course (9/5 - 10/1) was as expected with significant mucositis related to conditioning and low counts with some GI issues which all resolved on count recovery.  No unexpected complications.  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      HIV: Viral load 303K on 2/6/16 and now on Triumeq with Bactrim and acyclovir prophylaxis  Histoplasmosis: Multiple pulmonary nodules seen on chest CT during his recent hospital stay and he was found to be urine histo antigen positive.  Now on itraconazole after a course of amphotericin B.     Review of Systems:  Constitutional: no fever or chills  Eyes: no visual changes  ENT: no nasal congestion or sore  throat  Respiratory: no cough or shortness of breath  Cardiovascular: no chest pain or palpitations  Gastrointestinal: no nausea or vomiting, no abdominal pain or change in bowel habits  Hematologic/Lymphatic: no easy bruising or lymphadenopathy  Musculoskeletal: positive for leg pain, negative for back pain and neck pain  Neurological: no seizures or tremors  Behavioral/Psych: no auditory or visual hallucinations    PAST HISTORY:     Past Medical History:   Diagnosis Date    Cancer 2016    ALL    HIV infection     Pancytopenia due to antineoplastic chemotherapy 2016       Past Surgical History:   Procedure Laterality Date    CYSTOSCOPY         Family History   Problem Relation Age of Onset    Cancer Father        Social History     Social History    Marital status: Single     Spouse name: N/A    Number of children: 1    Years of education: N/A     Social History Main Topics    Smoking status: Never Smoker    Smokeless tobacco: Not on file    Alcohol use No    Drug use: No    Sexual activity: Not on file     Other Topics Concern    Not on file     Social History Narrative    , 1 child, son lives in [place] with his family, strong social support from mother, maternal aunts (Shannan, Bernadine), friend group, best friend; 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 30 tablet 2    acyclovir (ZOVIRAX) 800 MG Tab Take 1 tablet (800 mg total) by mouth 2 (two) times daily. 60  tablet 11    alprazolam (XANAX) 0.5 MG tablet Take 1 tablet (0.5 mg total) by mouth 2 (two) times daily as needed for Insomnia or Anxiety. 60 tablet 0    butalbital-acetaminophen-caffeine -40 mg (FIORICET, ESGIC) -40 mg per tablet   0    finasteride (PROSCAR) 5 mg tablet Take 1 tablet (5 mg total) by mouth once daily. 30 tablet 1    HYDROmorphone (DILAUDID) 2 MG tablet Take 1 tablet (2 mg total) by mouth every 4 (four) hours as needed for Pain. 60 tablet 0    itraconazole (SPORANOX) 10 mg/mL Soln Take 2 mLs (20 mg total) by mouth 3 (three) times daily. 1800 mL 0    loperamide (IMODIUM) 2 mg capsule Take 2 mg by mouth daily as needed for Diarrhea.      magnesium oxide (MAG-OX) 400 mg tablet TAKE THREE TABLETS BY MOUTH THREE TIMES A  tablet 1    ondansetron (ZOFRAN) 8 MG tablet Take 1 tablet (8 mg total) by mouth every 8 (eight) hours as needed for Nausea. 30 tablet 1    pantoprazole (PROTONIX) 40 MG tablet Take 1 tablet (40 mg total) by mouth once daily. 30 tablet 11    promethazine (PHENERGAN) 12.5 MG Tab Take 1 tablet (12.5 mg total) by mouth every 6 (six) hours as needed (nausea). 30 tablet 2    tacrolimus (PROGRAF) 0.5 MG Cap Take 1 capsule (0.5 mg total) by mouth once daily. 120 capsule 11    tamsulosin (FLOMAX) 0.4 mg Cp24 Take 1 capsule (0.4 mg total) by mouth every other day. 15 capsule 1    zolpidem (AMBIEN) 5 MG Tab Take 1 tablet (5 mg total) by mouth nightly as needed. 30 tablet 1        Review of patient's allergies indicates:   Allergen Reactions    Etoposide Rash       OBJECTIVE:     Vital Signs:  Temp:  [98.4 °F (36.9 °C)] 98.4 °F (36.9 °C)  Pulse:  [109] 109  BP: (111)/(79) 111/79  There is no height or weight on file to calculate BMI.     Physical Exam:  General: well developed, well nourished, appears stated age, no distress  Eyes: conjunctivae/corneas clear. PERRL.  ENT: MMM, nasal turbinates not inflamed, no oropharyngeal edema or erythema  Neck: supple,  symmetrical, trachea midline, no JVD and thyroid not enlarged, symmetric, no tenderness/mass/nodules  Lungs:  clear to auscultation bilaterally and normal respiratory effort  Cardiovascular: Heart: regular rate and rhythm, S1, S2 normal, no murmur, click, rub or gallop. Chest Wall: no tenderness. Extremities: no cyanosis or clubbing. No edema. Pulses: 2+ and symmetric.  Abdomen: soft, non-tender non-distended; bowel sounds normal; no masses,  no organomegaly.  Musculoskeletal: moves all extremities spontaneously  Lymph Nodes: No cervical or supraclavicular adenopathy      Laboratory  Lab Results   Component Value Date    WBC 23.85 (H) 06/20/2017    HGB 12.2 (L) 06/20/2017    HCT 35.7 (L) 06/20/2017    MCV 92 06/20/2017    PLT 59 (L) 06/20/2017       Recent Labs  Lab 06/20/17  1234   GLU 99      K 5.0      CO2 24   BUN 27*   CREATININE 1.9*   CALCIUM 9.3   MG 2.2     Lab Results   Component Value Date    INR 1.0 09/05/2016    INR 0.9 08/15/2016    INR 1.0 07/01/2016         ASSESSMENT & PLAN:     Current Problems List:  Active Hospital Problems    Diagnosis  POA    *AML (acute myeloid leukemia) in relapse [C92.02]  Yes    AML (acute myeloblastic leukemia) [C92.00]  Yes    NICOLE (acute kidney injury) [N17.9]  Yes    Anxiety [F41.9]  Yes    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]  Not Applicable    Histoplasma capsulatum infection [B39.4]  Yes    Anemia [D64.9]  Yes    Prostate hypertrophy [N40.0]  Yes    HIV disease [B20]  Yes      Resolved Hospital Problems    Diagnosis Date Resolved POA   No resolved problems to display.         Problem Assessment & Treatment Plan:    AML in relapse  -- per Dr. Atkinson note:  -- continue ppx acyclovir  -- hold tacro  -- allopurinol for elevated uric acid (consider rasburicase tomorrow if uric acid level remains > 7.5)  -- NPO past midnight for oquendo placement  -- ECHO: pending  -- PRN analgesia    NICOLE  -- urine studies: pending  -- IVF's  -- strict  I&O    HIV  -- continue HIV medications (normally takes Tiumeq combo pill which is not on formulary, each of three component meds ordered separately)    Histoplasmosis infection  -- continue itraconazole 10mg PO BID    BPH  -- continue finasteride and tamsulosin    Anxiety  -- continue home xanax PRN    Anemia  -- chronic disease, mild, stable    Thrombocytopenia  -- due to AML relapse    Diet: regular, NPO past midnight  PPx: lovenox      Signing Physician:  Ulices Howard MD

## 2017-06-21 NOTE — TRANSFER OF CARE
"Anesthesia Transfer of Care Note    Patient: Paul E Sistrunk    Procedure(s) Performed: Procedure(s) (LRB):  INSERTION-CATHETER-HARRISON-DUAL-LUMEN (Left)    Patient location: [facility]    Anesthesia Type: general    Transport from OR: Transported from OR on 6-10 L/min O2 by face mask with adequate spontaneous ventilation    Post pain: adequate analgesia    Post assessment: no apparent anesthetic complications    Post vital signs: stable    Level of consciousness: sedated    Nausea/Vomiting: no nausea/vomiting    Complications: none    Transfer of care protocol was followed      Last vitals:   Visit Vitals  /78 (BP Location: Right arm, Patient Position: Lying, BP Method: Automatic)   Pulse 103   Temp 36.7 °C (98.1 °F) (Oral)   Resp 16   Ht 6' 1" (1.854 m)   Wt 88.2 kg (194 lb 7.1 oz)   SpO2 95%   BMI 25.65 kg/m²     "

## 2017-06-21 NOTE — SUBJECTIVE & OBJECTIVE
Subjective:     Interval History:   Denies any current issues  No change in his chin numbness  No new focal deficits.    Objective:     Vital Signs (Most Recent):  Temp: 98.1 °F (36.7 °C) (06/21/17 1302)  Pulse: 103 (06/21/17 1302)  Resp: 16 (06/21/17 1302)  BP: 138/78 (06/21/17 1302)  SpO2: 95 % (06/21/17 1302) Vital Signs (24h Range):  Temp:  [97.9 °F (36.6 °C)-98.2 °F (36.8 °C)] 98.1 °F (36.7 °C)  Pulse:  [] 103  Resp:  [16-18] 16  SpO2:  [94 %-96 %] 95 %  BP: (107-146)/(71-83) 138/78     Weight: 88.2 kg (194 lb 7.1 oz)  Body mass index is 25.65 kg/m².  Body surface area is 2.13 meters squared.    ECOG SCORE         [unfilled]    Intake/Output - Last 3 Shifts       06/19 0700 - 06/20 0659 06/20 0700 - 06/21 0659 06/21 0700 - 06/22 0659    P.O.  480     I.V. (mL/kg)  1002.5 (11.4)     Total Intake(mL/kg)  1482.5 (16.8)     Urine (mL/kg/hr)  500 1025 (1.5)    Stool   0 (0)    Total Output   500 1025    Net   +982.5 -1025           Stool Occurrence   0 x          Physical Exam   Constitutional: He is oriented to person, place, and time. He appears well-developed and well-nourished.   HENT:   Head: Normocephalic and atraumatic.   Eyes: EOM are normal. Pupils are equal, round, and reactive to light.   Cardiovascular: Normal rate and regular rhythm.    Pulmonary/Chest: Effort normal and breath sounds normal.   Abdominal: Soft. Bowel sounds are normal.   Musculoskeletal: Normal range of motion.   Neurological: He is alert and oriented to person, place, and time.   Skin: Skin is warm and dry.       Significant Labs:   CBC:   Recent Labs  Lab 06/20/17  1234 06/21/17  0507   WBC 23.85* 25.46*   HGB 12.2* 10.6*   HCT 35.7* 31.8*   PLT 59* 40*    and CMP:   Recent Labs  Lab 06/20/17  1234 06/21/17  0507 06/21/17  1159    139 138   K 5.0 4.5 4.4    109 109   CO2 24 22* 23   GLU 99 89 97   BUN 27* 28* 23*   CREATININE 1.9* 1.8* 1.5*   CALCIUM 9.3 8.9 8.7   PROT 7.2 6.0 6.0   ALBUMIN 3.4* 3.1* 3.0*   BILITOT  0.5 0.5 0.5   ALKPHOS 189* 150* 147*   AST 65* 53* 61*   ALT 45* 37 44   ANIONGAP 8 8 6*   EGFRNONAA 39.1* 41.7* 52.0*

## 2017-06-21 NOTE — PLAN OF CARE
Problem: Patient Care Overview  Goal: Plan of Care Review  Outcome: Ongoing (interventions implemented as appropriate)  Patient AAOX4 and up independently; instructed to use caution after pain medication. 2D echo completed at beside today. PRN pain and anti-anxiety medication given, as ordered. Infectious disease consulted. Bone marrow biopsy completed to left hip today, dressing in place. Patient seen for [place] placement today; upon return, site was oozing blood. Sandbag placed to promote clotting. Dressing changed and gelsorb applied. Patient stable, will continue to monitor.

## 2017-06-21 NOTE — PLAN OF CARE
Problem: Patient Care Overview  Goal: Plan of Care Review  Outcome: Ongoing (interventions implemented as appropriate)  Pt remains free from injury. Pt independent of care. Pt started on IVFs. Pt NPO since midnight for line placement. Pt outstanding for 2D echo. Pt pain controlled with PRN pain medication. No acute events overnight, will continue to monitor

## 2017-06-21 NOTE — HPI
Mr. 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 ID has been consulted today for neutropenic fevers that have persisted the last few days despite empiric vanc, cefepime, chronic itraconazole, and acyclovir prophylaxis. Patient notes that prior to diagnosis of relapse in clinic, he was not having F/C/S or other localizing infectious symptoms -- only malaise and acute onset BLE body aches just prior to diagnosis. Since admission he notes neutropenic F/C/S and dry cough but denies HA, URI symptoms, sore throat, dysphagia, sputum production, SOB, N/V, abdominal pain, diarrhea, dysuria, genital lesions, line pain/drainage, or rash. Exposure history is largely unremarkable: he is currently single, lives alone, recently visited [place] but denies other travel (including international), no pets, no new sexual contacts, and no new hobbies (does garden).

## 2017-06-21 NOTE — BRIEF OP NOTE
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

## 2017-06-22 NOTE — PROGRESS NOTES
Health Maintenance Summary     Topic Due On Due Status Completed On    MAMMOGRAM - BREAST CANCER SCREENING Apr 12, 2019 Not Due Apr 12, 2017    Colorectal Cancer Screening - Colonoscopy Sep 1, 2026 Not Due Sep 1, 2016    Diabetes Eye Exam Dec 28, 2017 Not Due Dec 28, 2016    Glycohemoglobin A1C  (Diabetes Sugar)  Aug 9, 2017 Not Due Feb 9, 2017    Microalbumin  (Diabetes Urine Test)  Feb 9, 2018 Not Due Feb 9, 2017    GFR  (Kidney Function Test)  Feb 9, 2018 Not Due Feb 9, 2017    Immunization - TDAP Pregnancy  Hidden     Diabetes Foot Exam  May 24, 2017 Due Soon May 24, 2016    IMMUNIZATION - DTaP/Tdap/Td Jun 20, 2021 Not Due Jun 20, 2011    Immunization-Influenza Sep 1, 2017 Not Due Dec 12, 2007          Patient is up to date, no discussion needed .     [facility]  Hematology  Bone Marrow Transplant  Progress Note    Patient Name: Paul E Sistrunk  Admission Date: 6/20/2017  Hospital Length of Stay: 2 days  Code Status: Full Code    Subjective:     Interval History:   - Oquendo placed yesterday, continues to bleed/ooze with dressing changes every 2-3 hours  - Denies any other issues    Objective:     Vital Signs (Most Recent):  Temp: 98.8 °F (37.1 °C) (06/22/17 1116)  Pulse: 102 (06/22/17 1116)  Resp: 18 (06/22/17 1116)  BP: 130/74 (06/22/17 1116)  SpO2: (!) 94 % (06/22/17 1116) Vital Signs (24h Range):  Temp:  [97.9 °F (36.6 °C)-98.8 °F (37.1 °C)] 98.8 °F (37.1 °C)  Pulse:  [] 102  Resp:  [6-20] 18  SpO2:  [92 %-100 %] 94 %  BP: ()/(63-84) 130/74     Weight: 88.2 kg (194 lb 7.1 oz)  Body mass index is 25.65 kg/m².  Body surface area is 2.13 meters squared.    ECOG SCORE         [unfilled]    Intake/Output - Last 3 Shifts       06/20 0700 - 06/21 0659 06/21 0700 - 06/22 0659 06/22 0700 - 06/23 0659    P.O. 480 450 240    I.V. (mL/kg) 1002.5 (11.4) 2259.5 (25.6) 1007.5 (11.4)    Blood  122 232    Other  0     Total Intake(mL/kg) 1482.5 (16.8) 2831.5 (32.1) 1479.5 (16.8)    Urine (mL/kg/hr) 500 1025 (0.5) 1100 (2.3)    Stool  0 (0)     Total Output 500 1025 1100    Net +982.5 +1806.5 +379.5           Urine Occurrence  2050 x 650 x    Stool Occurrence  0 x           Physical Exam   Constitutional: He is oriented to person, place, and time. He appears well-developed and well-nourished.   Eyes: EOM are normal. Pupils are equal, round, and reactive to light.   Neck: Normal range of motion.   Cardiovascular: Normal rate and normal heart sounds.    Left chest wall oquendo with noted bleeding   Pulmonary/Chest: Effort normal and breath sounds normal.   Abdominal: Soft.   Musculoskeletal: Normal range of motion.   Neurological: He is alert and oriented to person, place, and time.   Skin: Skin is warm.       Significant Labs:   CBC:   Recent  Labs  Lab 06/20/17  1234 06/21/17  0507 06/22/17  0322   WBC 23.85* 25.46* 22.11*   HGB 12.2* 10.6* 10.4*   HCT 35.7* 31.8* 31.3*   PLT 59* 40* 25*    and CMP:   Recent Labs  Lab 06/21/17  0507 06/21/17  1159 06/22/17  0322    138 138   K 4.5 4.4 4.3    109 108   CO2 22* 23 23   GLU 89 97 91   BUN 28* 23* 18   CREATININE 1.8* 1.5* 1.4   CALCIUM 8.9 8.7 8.5*   PROT 6.0 6.0 5.8*   ALBUMIN 3.1* 3.0* 2.9*   BILITOT 0.5 0.5 0.4   ALKPHOS 150* 147* 141*   AST 53* 61* 56*   ALT 37 44 36   ANIONGAP 8 6* 7*   EGFRNONAA 41.7* 52.0* 56.6*       Diagnostic Results:  U/S: EF 60%    Assessment/Plan:     * 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   - 2D echo showws 60% EF  - Mazariegos placed, local measures to control bleeding  - BM biopsy resutls pending  - KPS 90%        HIV disease    - ID following  - ordered CD4 and HIV RNA  - Continue triumeq and itraconazole        Prostate hypertrophy    - Continue home flomax            VTE Risk Mitigation         Ordered     Place sequential compression device  Until discontinued      06/20/17 2005     High Risk of VTE  Once      06/20/17 1901          Disposition: Pending re-induction, likely MEC    Santiago Cottrell, PGY-V on 6/22/2017 12:30 PM  [facility] Hematology/Oncology Fellow  Pager: [phone]  [email or web address]

## 2017-06-22 NOTE — PHYSICIAN QUERY
PT Name: Paul E Sistrunk  MR #: [id number]     Physician Query Form - Documentation Clarification      CDS/: Darshana Salmeron RN, CCDS               Contact information:  [email or web address]     This form is a permanent document in the medical record.     Query Date: June 22, 2017    By submitting this query, we are merely seeking further clarification of documentation. Please utilize your independent clinical judgment when addressing the question(s) below.    The Medical record reflects the following:    Supporting Clinical Findings Location in Medical Record   Suspect relapsed AML.     Oncologic history:  (from Dr. Atkinson note, 6/20/17)    KPS: 90% Mr. 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  Cytogenetics 46,XY[20].  Hence, he achieved a complete remission prior to allogeneic transplant.     AML (acute myeloid leukemia) in relapse     AML (acute myeloblastic leukemia)     AML in relapse  -- per Dr. Atkinson note:  -- continue ppx acyclovir  -- hold tacro  -- allopurinol for elevated uric acid (consider rasburicase tomorrow if uric acid level remains > 7.5)  -- NPO past midnight for oquendo placement  -- ECHO: pending  -- PRN analgesia    h/o well controlled HIV (CD4 339, VL 49 5/2016, on triumeq), non-M3 AmL (dx 2/2016 high risk s/p 7+3 induction with residual leukemia, successful MEC reinduction 3/2016  H & P    H & P            H & P        H & P    H & P    H & P                  Infectious Diseased Progress note 06/21                                                                                      Doctor, Please specify diagnosis or diagnoses associated with  above clinical findings.  Due to conflicting documentation, please clarify the type of acute leukemia.    Provider Use Only      [   ] AML (1/ETO) (M0) (M1) (M2) (without a LOWELL classification) leukemia  [   ] AML (M5) (M5a) (M5b)  [   ] AML M3 leukemia  [   ] AML M4 (Eo with inv(16) or t(16;16) leukemia  [   ] AML M6(a)(b)  [   ] AML M7  [   ] AML Me with  t(15;17) and variants leukemia  [   ] Myelomonocytic  [   ] Promyelocytic  [   ] Acute myeloid leukemia:  Specify type _________________  [  x ] Acute myeloid leukemia:  Unspecified  [   ] Acute myeloblastic leukemia  [   ] Other (specify) __________  [   ] Clinically undetermined                                                                                                                         [  ] Clinically undetermined

## 2017-06-22 NOTE — PROGRESS NOTES
Dr. Boyer notified of continued oozing of blood from oquendo line. Second unit of platelets will be given.

## 2017-06-22 NOTE — SUBJECTIVE & OBJECTIVE
Subjective:     Interval History:   - Oquendo placed yesterday, continues to bleed/ooze with dressing changes every 2-3 hours  - Denies any other issues    Objective:     Vital Signs (Most Recent):  Temp: 98.8 °F (37.1 °C) (06/22/17 1116)  Pulse: 102 (06/22/17 1116)  Resp: 18 (06/22/17 1116)  BP: 130/74 (06/22/17 1116)  SpO2: (!) 94 % (06/22/17 1116) Vital Signs (24h Range):  Temp:  [97.9 °F (36.6 °C)-98.8 °F (37.1 °C)] 98.8 °F (37.1 °C)  Pulse:  [] 102  Resp:  [6-20] 18  SpO2:  [92 %-100 %] 94 %  BP: ()/(63-84) 130/74     Weight: 88.2 kg (194 lb 7.1 oz)  Body mass index is 25.65 kg/m².  Body surface area is 2.13 meters squared.    ECOG SCORE         [unfilled]    Intake/Output - Last 3 Shifts       06/20 0700 - 06/21 0659 06/21 0700 - 06/22 0659 06/22 0700 - 06/23 0659    P.O. 480 450 240    I.V. (mL/kg) 1002.5 (11.4) 2259.5 (25.6) 1007.5 (11.4)    Blood  122 232    Other  0     Total Intake(mL/kg) 1482.5 (16.8) 2831.5 (32.1) 1479.5 (16.8)    Urine (mL/kg/hr) 500 1025 (0.5) 1100 (2.3)    Stool  0 (0)     Total Output 500 1025 1100    Net +982.5 +1806.5 +379.5           Urine Occurrence  2050 x 650 x    Stool Occurrence  0 x           Physical Exam   Constitutional: He is oriented to person, place, and time. He appears well-developed and well-nourished.   Eyes: EOM are normal. Pupils are equal, round, and reactive to light.   Neck: Normal range of motion.   Cardiovascular: Normal rate and normal heart sounds.    Left chest wall oquendo with noted bleeding   Pulmonary/Chest: Effort normal and breath sounds normal.   Abdominal: Soft.   Musculoskeletal: Normal range of motion.   Neurological: He is alert and oriented to person, place, and time.   Skin: Skin is warm.       Significant Labs:   CBC:   Recent Labs  Lab 06/20/17  1234 06/21/17  0507 06/22/17  0322   WBC 23.85* 25.46* 22.11*   HGB 12.2* 10.6* 10.4*   HCT 35.7* 31.8* 31.3*   PLT 59* 40* 25*    and CMP:   Recent Labs  Lab 06/21/17  0507  06/21/17  1159 06/22/17  0322    138 138   K 4.5 4.4 4.3    109 108   CO2 22* 23 23   GLU 89 97 91   BUN 28* 23* 18   CREATININE 1.8* 1.5* 1.4   CALCIUM 8.9 8.7 8.5*   PROT 6.0 6.0 5.8*   ALBUMIN 3.1* 3.0* 2.9*   BILITOT 0.5 0.5 0.4   ALKPHOS 150* 147* 141*   AST 53* 61* 56*   ALT 37 44 36   ANIONGAP 8 6* 7*   EGFRNONAA 41.7* 52.0* 56.6*       Diagnostic Results:  U/S: EF 60%

## 2017-06-22 NOTE — ASSESSMENT & PLAN NOTE
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   - 2D echo showws 60% EF  - Mazariegos placed, local measures to control bleeding  - BM biopsy resutls pending  - KPS 90%

## 2017-06-22 NOTE — PROGRESS NOTES
Pt seen and examined.  Bleeding from Mazariegos site, no swelling    Unlikely to stop until platelets >50. Continue to transfuse    Will check back this afternoon.    Bianca Hannon PGY3  [phone]

## 2017-06-22 NOTE — PHYSICIAN QUERY
PT Name: Paul E Sistrunk  MR #: [id number]     Physician Query Form - Documentation Clarification      CDS/: Darshana Salmeron RN, CCDS               Contact information:  [email or web address]     This form is a permanent document in the medical record.     Query Date: June 22, 2017    By submitting this query, we are merely seeking further clarification of documentation. Please utilize your independent clinical judgment when addressing the question(s) below.    The Medical record reflects the following:    Supporting Clinical Findings Location in Medical Record   S/P allogeneic bone marrow transplant     AML in relapse  -- per Dr. Atkinson note:  -- continue ppx acyclovir  -- hold tacro  -- allopurinol for elevated uric acid (consider rasburicase tomorrow if uric acid level remains > 7.5)  -- NPO past midnight for oquendo placement  -- ECHO: pending  -- PRN analgesia    ultimately underwent MUD-SCT (CMV D+/R+, Flu/Bu/ATG day +282, ACV/pentamidine ppx, tacrolimus) now presenting with relapsing AML   H & P    H & P                    Infectious Diseases progress note 06/21                                                                                      Doctor, Please specify diagnosis or diagnoses associated with above clinical findings.  Due to conflicting documentation, please clarify the type of transplant.    Provider Use Only    [x   ] Allogeneic bone marrow transplant     [   ] SCT    [   ] Other (specify) __________                                                                                                                       [  ] Clinically undetermined

## 2017-06-22 NOTE — PHYSICIAN QUERY
PT Name: Paul E Sistrunk  MR #: [id number]  Physician Query Form - CKD Clarification     CDS/: Darshana Salmeron RN, CCDS               Contact information:  [email or web address]  This form is a permanent document in the medical record.     Query Date: June 22, 2017    By submitting this query, we are merely seeking further clarification of documentation. Please utilize your independent clinical judgment when addressing the question(s) below.    The Medical record contains the following:     Indicators   Supporting Clinical Findings   Location in Medical Record   X CKD or Chronic Kidney (Renal) Failure / Disease h/o well controlled HIV (CD4 339, VL 49 5/2016, on triumeq), non-M3 AmL (dx 2/2016 high risk s/p 7+3 induction with residual leukemia, successful MEC reinduction 3/2016 and IDAC consolidation x 2, most recently 6/20-6/25/2016, pulmonary histoplasmosis (positive urine antigen of 2.35 and pulmonary micronodules on CT chest 2/2016; s/p ambisome induction and on itraconazole maintenance with negative repeat antigens), CKD Infectious Diseased progress note 06/21   X BUN/Creatinine                          GFR BUN:  18-28    Cr:  1.4-1.9       GFR:  39.1-56.6 Lab 06/20/17 - 06/22/17    Dehydration      Nausea / Vomiting      Dialysis / CRRT      Medication      Treatment     X Other Chronic Conditions Suspect relapsed AML.  H & P    Other NICOLE (acute kidney injury) H & P     Provider, please further specify the stage of CKD.      [  ] Chronic Kidney Disease (CKD) (please specify stage* below)       [employer] Definitions  Stage Description  eGFR (mL/min)   [ x ]     III Moderately reduced kidney function 30-59     [  ] Other (please specify): ________________________  [  ] Clinically Undetermined    Please document in your progress notes daily for the duration of treatment until resolved and include in your discharge summary.

## 2017-06-22 NOTE — PHYSICIAN QUERY
PT Name: Paul E Sistrunk  MR #: [id number]    Physician Query Form - HIV Clarification     CDS/: Darshana Salmeron RN, CCDS               Contact information: [email or web address]     This form is a permanent document in the medical record.     Query Date: June 22, 2017      By submitting this query, we are merely seeking further clarification of documentation. Please utilize your independent clinical judgment when addressing the question(s) below.    The Medical record contains the following:   Indicators   Supporting Clinical Findings Location in Medical Record   X HIV or AIDS HIV    HIV disease    h/o well controlled HIV (CD4 339, VL 49 5/2016, on triumeq), non-M3 AmL (dx 2/2016 high risk s/p 7+3 induction with residual leukemia, successful MEC reinduction 3/2016 and IDAC consolidation x 2, most recently 6/20-6/25/2016, pulmonary histoplasmosis (positive urine antigen of 2.35 and pulmonary micronodules on CT chest 2/2016; s/p ambisome induction and on itraconazole maintenance with negative repeat antigens),  H & P    Bone Marrow Transplant progress note 06/21    Infectious Diseases progress note 06/21    CD4 Count          CD4%        Viral Load     X History of Opportunistic Infection HIV: Viral load 303K on 2/6/16 and now on Triumeq with Bactrim and acyclovir prophylaxis  Histoplasmosis: Multiple pulmonary nodules seen on chest CT during his recent hospital stay and he was found to be urine histo antigen positive.  Now on itraconazole after a course of amphotericin B.     Histoplasma capsulatum infection  H & P              H & P    Cancer  Pneumocystis Pneumonia (PCP)  Cytomegalovirus (CMV)  Kaposi Sarcoma      HIV-Related Conditions (e.g. Dementia, Encephalopathy) documented     X Medications -- continue HIV medications (normally takes Tiumeq combo pill which is not on formulary, each of three component meds ordered separately)  -- continue itraconazole 10mg PO BID    - Continue HAART    - for now would  continue triumeq and itraconazole, and may cont itraconazole through neutropenia still with aspergillus coverage H & P          Bone Marrow Transplant progress note 06/21    Infectious Diseased progress note 06/21   X Other S/P allogeneic bone marrow transplant     AML in relapse H & P    H & P     Please clarify the patients HIV status  Per CDC publication Vol 60 RR-17 https://www.cdc.gov/mmwr/preview/mmwrhtml/oj6241z0.htmRelating to the classification HIV Infection, once a patient is diagnosed with AIDS the diagnosis still stands even if, after treatment, the CD4+ T cell count rises to above 200 per ìL of blood or other AIDS-defining illnesses are cured.       The following are AIDS-Defining Illnesses or HIV-Related Diseases.  Bacterial infections, multiple or recurrent only in children aged <6 years Kaposi sarcoma   Candidiasis of bronchi, trachea, or lungs Lymphoma, Burkitt (or equivalent term)   Candidiasis of esophagus Lymphoma, immunoblastic (or equivalent term)   Cervical cancer, invasive Only among adults, adolescents, and children aged > 6 years. Lymphoma, primary, of brain   Coccidioidomycosis, disseminated or extrapulmonary Mycobacterium avium complex or Mycobacterium kansasii, disseminated or extrapulmonary   Cryptococcosis, extrapulmonary Mycobacterium tuberculosis of any site, pulmonary, disseminated, or extrapulmonary   Cryptosporidiosis, chronic intestinal (>1 months duration) Mycobacterium, other species or unidentified species, disseminated or extrapulmonary   Cytomegalovirus disease (other than liver, spleen, or nodes), onset at age >1 month Pneumocystis jirovecii (previously known as Pneumocystis carinii) pneumonia   Cytomegalovirus retinitis (with loss of vision) Pneumonia, recurrent Only among adults, adolescents, and children aged .6 years.   Encephalopathy attributed to HIV Progressive multifocal leukoencephalopathy   Herpes simplex: chronic ulcers (>1 months duration) or bronchitis,  pneumonitis, or esophagitis (onset at age >1 month) Salmonella septicemia, recurrent   Histoplasmosis, disseminated or extrapulmonary Toxoplasmosis of brain, onset at age >1 month   Isosporiasis, chronic intestinal (>1 months duration) Wasting syndrome attributed to HIV     [ x] Asymptomatic HIV Infection - Positive Status Only - without any history of (or current) AIDS-Defining Illness or HIV-Related Illness    [ ] HIV Disease / AIDS - Meets the current CDC Definition of AIDS: HIV-infected persons who have less than 200 CD4+ T-lymphocytes/uL, or CD4+ T-lymphocyte percentage of total lymphocytes of less than 14, and/or an AIDS-Defining Illness or HIV-Related Disease (see above).    [ ] Patient is HIV Negative  [ ] Other (please specify): ____________________________________  [ ] Clinically Undetermined    Please document in your progress notes daily for the duration of treatment until resolved and include in your discharge summary.

## 2017-06-22 NOTE — PLAN OF CARE
Problem: Patient Care Overview  Goal: Plan of Care Review  Outcome: Ongoing (interventions implemented as appropriate)  Pt remains free from injury. Pt independent of care. Bleeding and swelling from oquendo site, dressing changed and bleeding controlled. Pain controlled with PRN pain medication. IVFs infusing to PIV. No acute events overnight, will continue to monitor.

## 2017-06-22 NOTE — PLAN OF CARE
Problem: Patient Care Overview  Goal: Plan of Care Review  Outcome: Ongoing (interventions implemented as appropriate)  Fall, infection, and thrombocytopenic precautions continued. MD aware of continued scant bleeding from oquendo. One unit of plts given with premedication of tylenol and benadryl. Surgery at bedside, sutures placed around oquendo insertion site, bleeding stopped. Patient stable, will continue to monitor.

## 2017-06-22 NOTE — PROGRESS NOTES
Pt still bleeding from oquendo site, swelling slightly increased. Dr. Howard notified. MD at bedside, will transfuse 1 unit of plts

## 2017-06-23 NOTE — ASSESSMENT & PLAN NOTE
- relapsed AML - peripheral blood shows 30% blasts today  - 2D echo showws 60% EF  - Mazariegos placed, local measures to control bleeding  - BM biopsy results pending  - continue ppx allopurinol   - KPS 90%  - IT with labs and chemotherapy today

## 2017-06-23 NOTE — H&P
Inpatient Radiology Pre-procedure Note    History of Present Illness:  Paul E Sistrunk is a 56 y.o. male who presents for intrathecal chemo injection.    Admission H&P reviewed.    Past Medical History:   Diagnosis Date    Cancer 2/2016    ALL    HIV infection     Pancytopenia due to antineoplastic chemotherapy 7/4/2016     Past Surgical History:   Procedure Laterality Date    CYSTOSCOPY         Review of Systems:   As documented in primary team H&P    Home Meds:   Prior to Admission medications    Medication Sig Start Date End Date Taking? Authorizing Provider   abacavir-dolutegravir-lamivud (TRIUMEQ) 600- mg Tab Take 1 tablet by mouth once daily. 6/6/17   Raphael Atkinson MD   acyclovir (ZOVIRAX) 800 MG Tab Take 1 tablet (800 mg total) by mouth 2 (two) times daily. 9/27/16 9/27/17  Raphael Atkinson MD   alprazolam (XANAX) 0.5 MG tablet Take 1 tablet (0.5 mg total) by mouth 2 (two) times daily as needed for Insomnia or Anxiety. 5/22/17 5/22/18  Raphael Atkinson MD   butalbital-acetaminophen-caffeine -40 mg (FIORICET, ESGIC) -40 mg per tablet  8/17/16   Historical Provider, MD   finasteride (PROSCAR) 5 mg tablet Take 1 tablet (5 mg total) by mouth once daily. 6/6/17   Raphael Atkinson MD   HYDROmorphone (DILAUDID) 2 MG tablet Take 1 tablet (2 mg total) by mouth every 4 (four) hours as needed for Pain. 9/27/16   Raphael Atkinson MD   itraconazole (SPORANOX) 10 mg/mL Soln Take 2 mLs (20 mg total) by mouth 3 (three) times daily. 6/6/17   Raphael Atkinson MD   loperamide (IMODIUM) 2 mg capsule Take 2 mg by mouth daily as needed for Diarrhea.    Historical Provider, MD   magnesium oxide (MAG-OX) 400 mg tablet TAKE THREE TABLETS BY MOUTH THREE TIMES A DAY 4/7/17   Raphael Atkinson MD   ondansetron (ZOFRAN) 8 MG tablet Take 1 tablet (8 mg total) by mouth every 8 (eight) hours as needed for Nausea. 9/27/16   Raphael Atkinson MD   pantoprazole (PROTONIX) 40 MG tablet Take 1 tablet (40 mg total)  by mouth once daily. 9/27/16 9/27/17  Raphael Atkinson MD   promethazine (PHENERGAN) 12.5 MG Tab Take 1 tablet (12.5 mg total) by mouth every 6 (six) hours as needed (nausea). 9/27/16   Raphael Atkinson MD   tacrolimus (PROGRAF) 0.5 MG Cap Take 1 capsule (0.5 mg total) by mouth once daily. 1/26/17 1/26/18  Arminda Olivares, NP   tamsulosin (FLOMAX) 0.4 mg Cp24 Take 1 capsule (0.4 mg total) by mouth every other day. 10/17/16 10/17/17  Raphael Atkinson MD   zolpidem (AMBIEN) 5 MG Tab Take 1 tablet (5 mg total) by mouth nightly as needed. 11/30/16 5/31/17  Raphael Atkinson MD     Scheduled Meds:    abacavir  600 mg Oral Daily    acyclovir  400 mg Oral BID    allopurinol  300 mg Oral Daily    cytarabine with hydrocortisone and methotrexate INTRATHECAL chemo injection   Intrathecal Once    dolutegravir  50 mg Oral Daily    finasteride  5 mg Oral Daily    itraconazole  100 mg Oral BID AC    lamivudine  300 mg Oral Daily    magnesium oxide  800 mg Oral BID    pantoprazole  40 mg Oral Daily    ramelteon  8 mg Oral QHS    tamsulosin  0.4 mg Oral Every other day     Continuous Infusions:    sodium chloride 0.9% Stopped (06/23/17 1400)     PRN Meds:acetaminophen, alprazolam, butalbital-acetaminophen-caffeine -40 mg, gelatin adsorbable 100cm top sponge, HYDROmorphone, HYDROmorphone, ondansetron, promethazine, senna-docusate 8.6-50 mg, sodium chloride 0.9%, zolpidem  Anticoagulants/Antiplatelets: no anticoagulation    Allergies:   Review of patient's allergies indicates:   Allergen Reactions    Etoposide Rash     Sedation Hx: have not been any systemic reactions    Labs:    Recent Labs  Lab 06/22/17  0322   INR 1.1       Recent Labs  Lab 06/23/17  0423   WBC 17.03*   HGB 9.5*   HCT 28.1*   MCV 91   PLT 52*      Recent Labs  Lab 06/23/17  0423         K 3.9      CO2 23   BUN 14   CREATININE 1.3   CALCIUM 8.9   MG 1.7   ALT 28   AST 45*   ALBUMIN 2.9*   BILITOT 0.7         Vitals:  Temp:  99 °F (37.2 °C) (06/23/17 1057)  Pulse: 106 (06/23/17 1057)  Resp: 20 (06/23/17 1057)  BP: (!) 149/78 (06/23/17 1057)  SpO2: 95 % (06/23/17 1057)     Physical Exam:  ASA: 3  Mallampati: 2    General: no acute distress  Mental Status: alert and oriented to person, place and time  HEENT: normocephalic, atraumatic  Chest: unlabored breathing  Abdomen: nondistended  Extremity: moves all extremities    Plan: Intrathecal chemo injection  Sedation Plan: local    Ramin Rojas MD  PGY4 Radiology  Pager: [phone]

## 2017-06-23 NOTE — PROGRESS NOTES
[facility]  Hematology  Bone Marrow Transplant  Progress Note    Patient Name: Paul E Sistrunk  Admission Date: 6/20/2017  Hospital Length of Stay: 3 days  Code Status: Full Code    Subjective:     Interval History:   - No acute issues overnight.   - VSS Remains afebrile. Complains of sore throat, headache and leg pain - all relieved by PRN medications.   - Intermittently complains of numbness on the right side of the face and on the chin. More frequently on the chin with radiation to the right side.      Objective:     Vital Signs (Most Recent):  Temp: 98.3 °F (36.8 °C) (06/23/17 0816)  Pulse: (!) 111 (06/23/17 0816)  Resp: 20 (06/23/17 0816)  BP: (!) 147/76 (06/23/17 0816)  SpO2: (!) 94 % (06/23/17 0816) Vital Signs (24h Range):  Temp:  [98.3 °F (36.8 °C)-100.2 °F (37.9 °C)] 98.3 °F (36.8 °C)  Pulse:  [101-122] 111  Resp:  [16-20] 20  SpO2:  [90 %-97 %] 94 %  BP: (130-154)/(74-83) 147/76     Weight: 88.2 kg (194 lb 7.1 oz)  Body mass index is 25.65 kg/m².  Body surface area is 2.13 meters squared.    ECOG SCORE         [unfilled]    Intake/Output - Last 3 Shifts       06/21 0700 - 06/22 0659 06/22 0700 - 06/23 0659 06/23 0700 - 06/24 0659    P.O. 450 980     I.V. (mL/kg) 2259.5 (25.6) 3672.5 (41.6)     Blood 122 232     Other 0      Total Intake(mL/kg) 2831.5 (32.1) 4884.5 (55.4)     Urine (mL/kg/hr) 1025 (0.5) 4250 (2)     Stool 0 (0) 0 (0)     Total Output 1025 4250      Net +1806.5 +634.5             Urine Occurrence 2050 x 650 x     Stool Occurrence 0 x 0 x           Physical Exam   Constitutional: He is oriented to person, place, and time. He appears well-developed and well-nourished. No distress.   HENT:   Head: Normocephalic.   Right Ear: External ear normal.   Left Ear: External ear normal.   Nose: Nose normal.   Mouth/Throat: Oropharynx is clear and moist and mucous membranes are normal. No oropharyngeal exudate.   Eyes: Conjunctivae and EOM are normal. Pupils are equal, round, and  reactive to light. No scleral icterus.   Neck: Normal range of motion. Neck supple.   Cardiovascular: Normal rate, regular rhythm and normal heart sounds.    Pulmonary/Chest: Effort normal and breath sounds normal.   Abdominal: Soft. Normal appearance and bowel sounds are normal. He exhibits no distension. There is no tenderness.   Musculoskeletal: Normal range of motion. He exhibits no edema.   Lymphadenopathy:     He has no cervical adenopathy.   Neurological: He is alert and oriented to person, place, and time.   Skin: Skin is warm, dry and intact. No cyanosis. Nails show no clubbing.   Psychiatric: He has a normal mood and affect. His behavior is normal. Thought content normal. His mood appears not anxious.   Vitals reviewed.      Significant Labs:   CBC:   Recent Labs  Lab 06/22/17  0322 06/22/17  1654 06/23/17  0423   WBC 22.11* 21.33* 17.03*   HGB 10.4* 9.9* 9.5*   HCT 31.3* 29.4* 28.1*   PLT 25* 68* 52*    and CMP:   Recent Labs  Lab 06/21/17  1159 06/22/17  0322 06/23/17  0423    138 138   K 4.4 4.3 3.9    108 106   CO2 23 23 23   GLU 97 91 105   BUN 23* 18 14   CREATININE 1.5* 1.4 1.3   CALCIUM 8.7 8.5* 8.9   PROT 6.0 5.8* 5.9*   ALBUMIN 3.0* 2.9* 2.9*   BILITOT 0.5 0.4 0.7   ALKPHOS 147* 141* 167*   AST 61* 56* 45*   ALT 44 36 28   ANIONGAP 6* 7* 9   EGFRNONAA 52.0* 56.6* >60.0       Diagnostic Results:  I have reviewed all pertinent imaging results/findings within the past 24 hours.    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   - continue ppx acyclovir, itraconazole           HIV disease    -CD4 low at 11.4%, Absolute CD4 469 and HIV RNA pending   - As per ID: continue triumeq and itraconazole        Prostate hypertrophy    - Continue home flomax        Anemia    - hemoglobin 9.5 grams  - transfuse for hemoglobin < 7            VTE Risk Mitigation         Ordered     Place sequential compression device  Until discontinued      06/20/17 2005     High Risk of VTE  Once      06/20/17 1901          Disposition: Pending confirmed diagnosis and treatment.     Asia Melgar, NP  Bone Marrow Transplant  [facility]

## 2017-06-23 NOTE — PLAN OF CARE
Problem: Patient Care Overview  Goal: Plan of Care Review  Outcome: Ongoing (interventions implemented as appropriate)  Patient remained free from falls throughout shift, call bell within reach. Patient complained of sore throat overnight - lozenges ordered and given. Also complained of a headache and leg pain - prn IV dilaudid given. Vitals stable, will continue to monitor.

## 2017-06-23 NOTE — PROGRESS NOTES
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   is involved was referred to the Social Work Department via routine referral.  Patient presents as a 56 y.o. year old single male.    Persons interviewed: patient    Living Situation:  Pt. Resides at home alone. He states that his mother is his emergency contact (Latoya Sistrunk-[phone]). Pts. brother also lives nearby and can assist as needed.      Resides at [address] [place] [address] [facility] [address], phone: [phone] (home).           Current or Past Agencies and Description of Services/Supplies    DME  Agency Name: none  Agency Phone Number: n/a        Home Health  Agency Name: none  Agency Phone Number: n/a  Services: n/a    IV Infusion  Agency Name: [facility]   Agency Phone Number: [phone]    Nutrition: oral    Outpatient Pharmacy:     [facility] [place] [address]   [facility] [address]  Phone: [phone] Fax: [phone]    [facility] [address] [address]  Phone: [phone] Fax: [phone]    [facility] [address]  [address] [facility] [address]  Phone: [phone] Fax: [phone]      Patient Preference of agencies include: [facility]    Patient/Caregiver informed of right to choose providers or agencies.  Patient provides  "permission to release any necessary information to [facility] and to [facility] agencies as needed to facilitate patient care, treatment planning, and patient discharge planning.  Written and verbal resources provided.      Coping: pt. Very teary during visit as he recently learned that he has relapsed AML. He states that he is frustrated having just recently gone thru BMT and now his disease is back. He states "I feel defeated". Support provided          Adjustment to Diagnosis and Treatment: appropriate      Emotional/Behavioral/Cognitive Issues: none noted            History/Current Symptoms of Anxiety/Depression: Yes  History/Current Substance Use:   Social History     Social History Main Topics    Smoking status: Never Smoker    Smokeless tobacco: Not on file    Alcohol use No    Drug use: No    Sexual activity: Not on file       Indications of Abuse/Neglect: No:   Abuse Screen  Do You Feel Unsafe at Home, Work or School?: no    Financial:  Payor/Plan Subscr  Sex Relation Sub. Ins. ID Effective Group Num                            Other identified concerns/needs: emotional support      Plan: to return home with help from family as needed. Pt. States that his mother will be able to assist as needed.    Interventions/Referrals: none at the present     Patient/caregiver engaged in treatment planning process.     providing psychosocial and supportive counseling, resources, education, assistance and discharge planning as appropriate.  Patient/caregiver state understanding of  available resources,  following, remains available. Provided pt. With [email or web address][employer][email or web address] phone # and has been encouraged to call if needed. Will follow.                            "

## 2017-06-23 NOTE — SUBJECTIVE & OBJECTIVE
Subjective:     Interval History:   - No acute issues overnight.   - VSS Remains afebrile. Complains of sore throat, headache and leg pain - all relieved by PRN medications.   - Intermittently complains of numbness on the right side of the face and on the chin. More frequently on the chin with radiation to the right side.      Objective:     Vital Signs (Most Recent):  Temp: 98.3 °F (36.8 °C) (06/23/17 0816)  Pulse: (!) 111 (06/23/17 0816)  Resp: 20 (06/23/17 0816)  BP: (!) 147/76 (06/23/17 0816)  SpO2: (!) 94 % (06/23/17 0816) Vital Signs (24h Range):  Temp:  [98.3 °F (36.8 °C)-100.2 °F (37.9 °C)] 98.3 °F (36.8 °C)  Pulse:  [101-122] 111  Resp:  [16-20] 20  SpO2:  [90 %-97 %] 94 %  BP: (130-154)/(74-83) 147/76     Weight: 88.2 kg (194 lb 7.1 oz)  Body mass index is 25.65 kg/m².  Body surface area is 2.13 meters squared.    ECOG SCORE         [unfilled]    Intake/Output - Last 3 Shifts       06/21 0700 - 06/22 0659 06/22 0700 - 06/23 0659 06/23 0700 - 06/24 0659    P.O. 450 980     I.V. (mL/kg) 2259.5 (25.6) 3672.5 (41.6)     Blood 122 232     Other 0      Total Intake(mL/kg) 2831.5 (32.1) 4884.5 (55.4)     Urine (mL/kg/hr) 1025 (0.5) 4250 (2)     Stool 0 (0) 0 (0)     Total Output 1025 4250      Net +1806.5 +634.5             Urine Occurrence 2050 x 650 x     Stool Occurrence 0 x 0 x           Physical Exam   Constitutional: He is oriented to person, place, and time. He appears well-developed and well-nourished. No distress.   HENT:   Head: Normocephalic.   Right Ear: External ear normal.   Left Ear: External ear normal.   Nose: Nose normal.   Mouth/Throat: Oropharynx is clear and moist and mucous membranes are normal. No oropharyngeal exudate.   Eyes: Conjunctivae and EOM are normal. Pupils are equal, round, and reactive to light. No scleral icterus.   Neck: Normal range of motion. Neck supple.   Cardiovascular: Normal rate, regular rhythm and normal heart sounds.    Pulmonary/Chest: Effort normal and breath  sounds normal.   Abdominal: Soft. Normal appearance and bowel sounds are normal. He exhibits no distension. There is no tenderness.   Musculoskeletal: Normal range of motion. He exhibits no edema.   Lymphadenopathy:     He has no cervical adenopathy.   Neurological: He is alert and oriented to person, place, and time.   Skin: Skin is warm, dry and intact. No cyanosis. Nails show no clubbing.   Psychiatric: He has a normal mood and affect. His behavior is normal. Thought content normal. His mood appears not anxious.   Vitals reviewed.      Significant Labs:   CBC:   Recent Labs  Lab 06/22/17  0322 06/22/17  1654 06/23/17  0423   WBC 22.11* 21.33* 17.03*   HGB 10.4* 9.9* 9.5*   HCT 31.3* 29.4* 28.1*   PLT 25* 68* 52*    and CMP:   Recent Labs  Lab 06/21/17  1159 06/22/17  0322 06/23/17  0423    138 138   K 4.4 4.3 3.9    108 106   CO2 23 23 23   GLU 97 91 105   BUN 23* 18 14   CREATININE 1.5* 1.4 1.3   CALCIUM 8.7 8.5* 8.9   PROT 6.0 5.8* 5.9*   ALBUMIN 3.0* 2.9* 2.9*   BILITOT 0.5 0.4 0.7   ALKPHOS 147* 141* 167*   AST 61* 56* 45*   ALT 44 36 28   ANIONGAP 6* 7* 9   EGFRNONAA 52.0* 56.6* >60.0       Diagnostic Results:  I have reviewed all pertinent imaging results/findings within the past 24 hours.

## 2017-06-23 NOTE — PROGRESS NOTES
Patient's oquendo started bleeding from site again. Dr. Howard notified, stated he would transfuse more platelets depending on what they are and to place more gelatin absorbable on site. Will continue to monitor.

## 2017-06-23 NOTE — PLAN OF CARE
MDR's with Dr Howe.  Patient BMB on 6/21 is still pending.  A LP with IT will be performed today.  Mazariegos in place.  Plan to start induction once the marrow results.  Will continue to follow for d/c needs.

## 2017-06-23 NOTE — HPI
Mr. Paul E Sistrunk is a 56 year old gentleman with PMH of AML (now in relapse), HIV, anxiety, here for induction of relapsing AML. He had been in good health and overall feeling well until the weekend when he went traveling to [place] with some friends and noticed his legs were hurting and his chin had gone numb. Under the advice of [facility] he went to a local hospital to be evaluated for DVT and was found not to have any. Since then he has continued having this leg pain and numbness though it had been waxing and waning and he was feeling well enough that he almost did not attend his scheduled clinic visit on the day of admission.      In clinic labs were drawn and resulted c/w relapse of AML.           Oncologic history:  (from Dr. Atkinson note, 6/20/17)    KPS: 90% Mr. 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      Since his last visit he has developed severe leg pain and chin numbness over the weekend along with rising WBC, anemia, thrombocytopenia and uric acid of 10.6 with blasts in his blood today.  He clearly has relapsing AML.  He had some pain at his great toes at his last visit which may have been related to increasing uric acid even then.     No fever or sign of infection.  His urine flow is ok on Flomax to help with known prostate enlargement but fading toward 36 not 48 hours.  Mild dry eyes and using eye drops.  Dry mouth improved on lower itraconazole.     His day 100 restaging marrow showed a 20-30% cellular marrow with trilineage hematopoiesis and no evidence of leukemia.  Cytogenetics 46,XY[13] and chimerism 90% donor.       AML History:  He originally presented in early 2/2016 with low platelets to his HIV provider and he had noted evolving fatigue and weight loss over the previous 2  months.  Bone marrow biopsy 2/8/16 showed a 90% cellular marrow with 50% blasts and background dysplasia.  His cytogenetics were complex with 18 metaphases with numeric and structural abnormalities including a 5q deletion, a 17p deletion (TP53 deletion), plus 1 to 18 double minutes, and 2 metaphases were a tetraploid subclone. FISH studies also confirmed the aforementioned deletions and indicated MYC amplification which are likely presented by double minutes.     He underwent standard induction therapy with 7&3 but his day 14 marrow done 2/22 showed a 40-80% cellular marrow with 35% blasts.  Hence, he began reinduction with MEC on 2/24/16 and repeat marrow done 3/9/16 showed only a 5% cellular marrow with only 5-6% CD34+ blasts.  He developed a rash and severe rectal pain complicating his therapy, though these have resolved now (finishing a steroid taper for his rash).     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  Cytogenetics 46,XY[20].  Hence, he achieved a complete remission prior to allogeneic transplant.  His transplant course (9/5 - 10/1) was as expected with significant mucositis related to conditioning and low counts with some GI issues which all resolved on count recovery.  No unexpected complications.  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

## 2017-06-23 NOTE — ASSESSMENT & PLAN NOTE
-284 days post Flu/Bu/ATG MUD allogeneic transplant for high risk AML after his second IDAC (6/20/16 - 6/24/16) after a prolonged hospitalization (2/5/16 - 3/21/16) for induction with 7&3 and reinduction with MEC to remission and IDAC (4/4/16 - 4/9/16).   - continue ppx acyclovir, itraconazole

## 2017-06-23 NOTE — PLAN OF CARE
Problem: Patient Care Overview  Goal: Plan of Care Review  Outcome: Ongoing (interventions implemented as appropriate)  Fall, infection, and thrombocytopenic precautions continued. Scant bloody drainage noted under Mazariegos dressing, bleeding controlled. Patient taken to procedure room for LP and IT chemotherapy given by Dr. Boyer. Pain managed with PRN medication. Nausea managed with PRN medication. Patient stable, will continue to monitor.

## 2017-06-23 NOTE — ASSESSMENT & PLAN NOTE
-CD4 low at 11.4%, Absolute CD4 469 and HIV RNA pending   - As per ID: continue triumeq and itraconazole

## 2017-06-23 NOTE — PROCEDURE NOTE ADDENDUM
Intrathecal Chemotherapy Procedure Note    Patient greeted and identified in IR suite.    Lumbar puncture performed by IR.    6 mL of Intrathecal chemotherapy (cytarabine 100mg, hydrocortisone 50mg, methotrexate 12mg) administered over 30 seconds.    Patient tolerated procedure well.    Followed up with patient in hospital room, currently doing well.    Santiago Cottrell, PGY-V on 6/23/2017 5:58 PM  [facility] Hematology/Oncology Fellow  Pager: [phone]  [email or web address]

## 2017-06-23 NOTE — PROCEDURES
Radiology Post-Procedure Note    Pre Op Diagnosis: ALL    Post Op Diagnosis: Same    Procedure: lumbar puncture    Procedure performed by: Ramin Ramos MD    Written Informed Consent Obtained: Yes    Specimen Removed: 9 mL CSF    Estimated Blood Loss: Minimal    Findings: Following written informed consent and sterile prep and drape, a 22 gauge spinal needle was inserted at L4 - L5 intralaminar space under fluoroscopic surveillance.  7 mL clear CSF removed and sent to the lab for further analysis.  There were no complications.    Patient tolerated procedure well.    Ramin Rojas MD  PGY4 Radiology  Pager: [phone]

## 2017-06-24 PROBLEM — L76.82 BLEEDING AT INSERTION SITE: Status: ACTIVE | Noted: 2017-01-01

## 2017-06-24 NOTE — PLAN OF CARE
Problem: Patient Care Overview  Goal: Plan of Care Review  Remained safe. Rested in bed throughout shift. Mazariegos site continued to bleed. Platelets transfused with no relief from bleeding noted. Pre-meds administered prior to platelet administration. Tolerated well. Contacted general surgery regarding bleeding post platelet administration. Dressing removed by surgeon and epinephrine instilled around site. Pressure dressing applied and site wrapped with coban. Site continued to bleed. Bleeding due to low platelet count per surgeon. Reported to MD. Dr. Craft stated that we will likely need to keep platelet above 75,000. Awaiting result of CBC at time of this documentation. Pain well-controlled with IV dilaudid. Xanax administered for anxiety. Pt noted to be shivering this afternoon. Checked temp and temp 100.5. Tylenol ordered for temp > 101. Reported to Dr. Estrella and will recheck. Fall precautions maintained and callbell and personal items kept within reach. Ambulated in room with steady gait. No apparent distress.

## 2017-06-24 NOTE — PROGRESS NOTES
[facility]  Hematology  Bone Marrow Transplant  Progress Note    Patient Name: Paul E Sistrunk  Admission Date: 6/20/2017  Hospital Length of Stay: 4 days  Code Status: Full Code    Subjective:     Interval History:   - received IT chemotherapy yesterday.   - VSS Remains afebrile.   - denies any headaches.   - Intermittently complains of numbness on the right side of the face and on the chin. More frequently on the chin with radiation to the right side. However, denies this today.   - still has oozing from the oquendo catheter despite multiple dressing changes.      Objective:     Vital Signs (Most Recent):  Temp: 98.3 °F (36.8 °C) (06/24/17 0858)  Pulse: 91 (06/24/17 0858)  Resp: 16 (06/24/17 0858)  BP: 124/72 (06/24/17 0858)  SpO2: (!) 94 % (06/24/17 0858) Vital Signs (24h Range):  Temp:  [97.9 °F (36.6 °C)-99.9 °F (37.7 °C)] 98.3 °F (36.8 °C)  Pulse:  [] 91  Resp:  [16-20] 16  SpO2:  [93 %-96 %] 94 %  BP: (123-152)/(67-85) 124/72     Weight: 88.6 kg (195 lb 5.2 oz)  Body mass index is 25.77 kg/m².  Body surface area is 2.14 meters squared.    ECOG SCORE         [unfilled]    Intake/Output - Last 3 Shifts       06/22 0700 - 06/23 0659 06/23 0700 - 06/24 0659 06/24 0700 - 06/25 0659    P.O. 980 2340     I.V. (mL/kg) 3672.5 (41.6) 3040 (34.3)     Blood 232  327    Other       Total Intake(mL/kg) 4884.5 (55.4) 5380 (60.7) 327 (3.7)    Urine (mL/kg/hr) 4250 (2) 3500 (1.6) 425 (1.6)    Stool 0 (0)      Total Output 4250 3500 425    Net +634.5 +1880 -98           Urine Occurrence 650 x      Stool Occurrence 0 x            Physical Exam   Constitutional: He is oriented to person, place, and time. He appears well-developed and well-nourished. No distress.   HENT:   Head: Normocephalic.   Right Ear: External ear normal.   Left Ear: External ear normal.   Nose: Nose normal.   Mouth/Throat: Oropharynx is clear and moist and mucous membranes are normal. No oropharyngeal exudate.   Eyes: Conjunctivae  and EOM are normal. Pupils are equal, round, and reactive to light. No scleral icterus.   Neck: Normal range of motion. Neck supple.   Cardiovascular: Normal rate, regular rhythm and normal heart sounds.    Pulmonary/Chest: Effort normal and breath sounds normal.   Abdominal: Soft. Normal appearance and bowel sounds are normal. He exhibits no distension. There is no tenderness.   Musculoskeletal: Normal range of motion. He exhibits no edema.   Lymphadenopathy:     He has no cervical adenopathy.   Neurological: He is alert and oriented to person, place, and time.   Skin: Skin is warm, dry and intact. No cyanosis. Nails show no clubbing.   Left subclavian chest port - dressing saturated with blood.    Psychiatric: He has a normal mood and affect. His behavior is normal. Thought content normal. His mood appears not anxious.   Vitals reviewed.      Significant Labs:   CBC:     Recent Labs  Lab 06/22/17  1654 06/23/17  0423 06/24/17  0422   WBC 21.33* 17.03* 20.90*   HGB 9.9* 9.5* 8.8*   HCT 29.4* 28.1* 26.0*   PLT 68* 52* 37*    and CMP:     Recent Labs  Lab 06/23/17  0423 06/24/17  0422    138   K 3.9 4.0    107   CO2 23 24    97   BUN 14 13   CREATININE 1.3 1.1   CALCIUM 8.9 8.6*   PROT 5.9* 5.6*   ALBUMIN 2.9* 2.6*   BILITOT 0.7 0.4   ALKPHOS 167* 153*   AST 45* 38   ALT 28 24   ANIONGAP 9 7*   EGFRNONAA >60.0 >60.0       Diagnostic Results:  I have reviewed all pertinent imaging results/findings within the past 24 hours.    Assessment/Plan:     Bleeding at insertion site    - bleeding noted from oquendo catheter site   - despite platelet transfusion, patient continues to have oozing from insertion site.   - will try to maintain plt count > 50,000 given active bleeding.   - will have general surgery evaluate the patient to help control bleeding.         NICOLE (acute kidney injury)    - resolved now.   - encourage gentle hydration, avoid nephrotoxic medications.         Anxiety    - PRN Xanax.          S/P allogeneic bone marrow transplant    -285 days post Flu/Bu/ATG MUD allogeneic transplant for high risk AML after his second IDAC (6/20/16 - 6/24/16) after a prolonged hospitalization (2/5/16 - 3/21/16) for induction with 7&3 and reinduction with MEC to remission and IDAC (4/4/16 - 4/9/16).   - continue ppx acyclovir, itraconazole           Anemia    - hemoglobin 8.8 grams, likely combination of bleeding from port site and bone marrow infiltration/suppression from relapsed disease.   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  - awaiting mutation analysis and cytogenetics  - continue ppx allopurinol   - KPS 90%  - underwent IT chemotherapy on 6/23. Awaiting flow cytometry and cytology for confirmation of disease involvement and to decide treatment regimen.   - Jaw pain and pain in right cranial nerve 7 distribution with persistent, severe headaches concerning for CNS involvement.            VTE Risk Mitigation         Ordered     Place sequential compression device  Until discontinued      06/20/17 2005     High Risk of VTE  Once      06/20/17 1901          Disposition: awaiting treatment for relapsed AML.     Andrade Estrella MD  Bone Marrow Transplant  [facility]

## 2017-06-24 NOTE — SUBJECTIVE & OBJECTIVE
Subjective:     Interval History:   - received IT chemotherapy yesterday.   - VSS Remains afebrile.   - denies any headaches.   - Intermittently complains of numbness on the right side of the face and on the chin. More frequently on the chin with radiation to the right side. However, denies this today.   - still has oozing from the oquendo catheter despite multiple dressing changes.      Objective:     Vital Signs (Most Recent):  Temp: 98.3 °F (36.8 °C) (06/24/17 0858)  Pulse: 91 (06/24/17 0858)  Resp: 16 (06/24/17 0858)  BP: 124/72 (06/24/17 0858)  SpO2: (!) 94 % (06/24/17 0858) Vital Signs (24h Range):  Temp:  [97.9 °F (36.6 °C)-99.9 °F (37.7 °C)] 98.3 °F (36.8 °C)  Pulse:  [] 91  Resp:  [16-20] 16  SpO2:  [93 %-96 %] 94 %  BP: (123-152)/(67-85) 124/72     Weight: 88.6 kg (195 lb 5.2 oz)  Body mass index is 25.77 kg/m².  Body surface area is 2.14 meters squared.    ECOG SCORE         [unfilled]    Intake/Output - Last 3 Shifts       06/22 0700 - 06/23 0659 06/23 0700 - 06/24 0659 06/24 0700 - 06/25 0659    P.O. 980 2340     I.V. (mL/kg) 3672.5 (41.6) 3040 (34.3)     Blood 232  327    Other       Total Intake(mL/kg) 4884.5 (55.4) 5380 (60.7) 327 (3.7)    Urine (mL/kg/hr) 4250 (2) 3500 (1.6) 425 (1.6)    Stool 0 (0)      Total Output 4250 3500 425    Net +634.5 +1880 -98           Urine Occurrence 650 x      Stool Occurrence 0 x            Physical Exam   Constitutional: He is oriented to person, place, and time. He appears well-developed and well-nourished. No distress.   HENT:   Head: Normocephalic.   Right Ear: External ear normal.   Left Ear: External ear normal.   Nose: Nose normal.   Mouth/Throat: Oropharynx is clear and moist and mucous membranes are normal. No oropharyngeal exudate.   Eyes: Conjunctivae and EOM are normal. Pupils are equal, round, and reactive to light. No scleral icterus.   Neck: Normal range of motion. Neck supple.   Cardiovascular: Normal rate, regular rhythm and normal heart  sounds.    Pulmonary/Chest: Effort normal and breath sounds normal.   Abdominal: Soft. Normal appearance and bowel sounds are normal. He exhibits no distension. There is no tenderness.   Musculoskeletal: Normal range of motion. He exhibits no edema.   Lymphadenopathy:     He has no cervical adenopathy.   Neurological: He is alert and oriented to person, place, and time.   Skin: Skin is warm, dry and intact. No cyanosis. Nails show no clubbing.   Left subclavian chest port - dressing saturated with blood.    Psychiatric: He has a normal mood and affect. His behavior is normal. Thought content normal. His mood appears not anxious.   Vitals reviewed.      Significant Labs:   CBC:     Recent Labs  Lab 06/22/17  1654 06/23/17  0423 06/24/17  0422   WBC 21.33* 17.03* 20.90*   HGB 9.9* 9.5* 8.8*   HCT 29.4* 28.1* 26.0*   PLT 68* 52* 37*    and CMP:     Recent Labs  Lab 06/23/17  0423 06/24/17  0422    138   K 3.9 4.0    107   CO2 23 24    97   BUN 14 13   CREATININE 1.3 1.1   CALCIUM 8.9 8.6*   PROT 5.9* 5.6*   ALBUMIN 2.9* 2.6*   BILITOT 0.7 0.4   ALKPHOS 167* 153*   AST 45* 38   ALT 28 24   ANIONGAP 9 7*   EGFRNONAA >60.0 >60.0       Diagnostic Results:  I have reviewed all pertinent imaging results/findings within the past 24 hours.

## 2017-06-24 NOTE — ASSESSMENT & PLAN NOTE
- bleeding noted from oquendo catheter site   - despite platelet transfusion, patient continues to have oozing from insertion site.   - will try to maintain plt count > 50,000 given active bleeding.   - will have general surgery evaluate the patient to help control bleeding.

## 2017-06-24 NOTE — ASSESSMENT & PLAN NOTE
- hemoglobin 8.8 grams, likely combination of bleeding from port site and bone marrow infiltration/suppression from relapsed disease.   - transfuse for hemoglobin < 7

## 2017-06-24 NOTE — PLAN OF CARE
Problem: Patient Care Overview  Goal: Plan of Care Review  Outcome: Ongoing (interventions implemented as appropriate)  Patient remained free from falls throughout shift, call bell within reach. Patient's oquendo dressing needed to be changed twice overnight. (currently the dressing has new drainage, but patient requesting to wait until plts result.) IT chemo given yesterday. Complains of back pain - prn dilaudid given. Vitals stable, will continue to monitor.

## 2017-06-24 NOTE — ASSESSMENT & PLAN NOTE
-285 days post Flu/Bu/ATG MUD allogeneic transplant for high risk AML after his second IDAC (6/20/16 - 6/24/16) after a prolonged hospitalization (2/5/16 - 3/21/16) for induction with 7&3 and reinduction with MEC to remission and IDAC (4/4/16 - 4/9/16).   - continue ppx acyclovir, itraconazole

## 2017-06-24 NOTE — PROGRESS NOTES
Patient's oquendo started bleeding from site again. Dr. Howard notified, stated he would transfuse more platelets depending on what they are and to place more gelatin absorbable on site. Patient requesting another stitch to be put in by gen surgery. Will continue to monitor.

## 2017-06-24 NOTE — PROGRESS NOTES
Called into patient's room for pain medicine. Mazariegos site bleeding again. Dressing saturated (dressing was changed in beginning of shift at 2030). Notified Dr. Howard, ordered to change dressing and draw labs at 0400. Will continue to monitor.

## 2017-06-25 PROBLEM — R50.9 FEVER: Status: ACTIVE | Noted: 2017-01-01

## 2017-06-25 NOTE — PROGRESS NOTES
"Left chest Mazariegos dressing was changed this evening around 1915. Upon assessment @ 2030 dressing was completely saturated w/ blood and bleeding around dressing. Sand bag is in place. Dr. Fagan notified and at the beside to assess. MD to call surgery and ordered to repeat CBC in an hour. Pt denies pain to site, but notes it is "uncomfortable." Will continue to monitor.  "

## 2017-06-25 NOTE — NURSING
Second unit of platelets transfused. Pre meds administered. Pt tolerated well. Dressing to oquendo site changed post transfusion, and sand bag placed.

## 2017-06-25 NOTE — PLAN OF CARE
Problem: Patient Care Overview  Goal: Plan of Care Review  Outcome: Ongoing (interventions implemented as appropriate)  Pt has remained free from injury this shift. T max 99.7F overnight. Cefepime continued as ordered. Mazariegos site continued to bleed after dressing was changed @ 1915, MD assessed site and placed a call to surgery. Surgery MD placed another suture to Mazariegos site and a new dressing overnight; dressing has remained dry and intact since MDs interventions. Dilaudid administered PRN as ordered. Pt denies nausea. Chemo and thrombocytopenic precautions maintained. Bed in lowest, locked position w/ side rails up x2. Call light within reach. Will continue to monitor.

## 2017-06-25 NOTE — PLAN OF CARE
Problem: Patient Care Overview  Goal: Plan of Care Review  Remained safe. No bleeding from oquendo site by time of this documentation. Vitals stable. Afebrile. Complained of numbness to jaw and pain to right face. Localized swelling noted to right face. X-ray to TMJ ordered. Received call from x-ray tech who stated that x-rays were no longer done to TMJ and that CT would need to be ordered instead. Reported to Dr. Estrella, and x-ray d/c'd. Pain to right face well-controlled with IV dilaudid. Fall precautions maintained and callbell and personal items kept within reach. Ambulated in hallway with steady gait. No apparent distress.

## 2017-06-26 PROBLEM — D69.6 THROMBOCYTOPENIA: Status: ACTIVE | Noted: 2017-01-01

## 2017-06-26 PROBLEM — E87.8 ELECTROLYTE ABNORMALITY: Status: ACTIVE | Noted: 2017-01-01

## 2017-06-26 PROBLEM — R50.9 FEVER: Status: ACTIVE | Noted: 2017-01-01

## 2017-06-26 NOTE — ASSESSMENT & PLAN NOTE
- bleeding noted from oquendo catheter site   - despite platelet transfusion, patient continues to have oozing from insertion site.  - new suture placed overnight and bleeding as subsided now.

## 2017-06-26 NOTE — PLAN OF CARE
Problem: Patient Care Overview  Goal: Plan of Care Review  Outcome: Ongoing (interventions implemented as appropriate)  Pt has remained free from injury this shift. Afebrile. Cefepime continued as ordered. Mazariegos site dressing has remained dry and intact overnight. Dilaudid administered PRN as ordered. Pt denies nausea. Thrombocytopenic precautions maintained. Bed in lowest, locked position w/ side rails up x2. Call light within reach. Will continue to monitor.

## 2017-06-26 NOTE — ASSESSMENT & PLAN NOTE
- hemoglobin 8.1 grams, likely combination of bleeding from port site and bone marrow infiltration/suppression from relapsed disease.   - transfuse for hemoglobin < 7

## 2017-06-26 NOTE — ASSESSMENT & PLAN NOTE
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  - awaiting mutation analysis and cytogenetics  - continue ppx allopurinol   - KPS 90%  - underwent IT chemotherapy on 6/23. Awaiting flow cytometry and cytology for confirmation of disease involvement and to decide treatment regimen.   - Jaw pain and pain in right cranial nerve 7 distribution with persistent, severe headaches concerning for CNS involvement.

## 2017-06-26 NOTE — ASSESSMENT & PLAN NOTE
-286 days post Flu/Bu/ATG MUD allogeneic transplant for high risk AML after his second IDAC (6/20/16 - 6/24/16) after a prolonged hospitalization (2/5/16 - 3/21/16) for induction with 7&3 and reinduction with MEC to remission and IDAC (4/4/16 - 4/9/16).   - continue ppx acyclovir, itraconazole

## 2017-06-26 NOTE — SUBJECTIVE & OBJECTIVE
Subjective:     Interval History:   - continued to bleed overnight from oquedno, evaluated by surgery and placed new suture and bleeding subsided afterwards.   - spiked fever yesterday at 102.7 F - pancultured and started antibiotics.   - VSS and remains afebrile now.   - denies any headaches.   - Intermittently complains of numbness on the right side of the face and on the chin. More frequently on the chin with radiation to the right side. However, denies this today.  - episode of hypoxic, placed on oxygen and saturation improved. Denies any shortness of breath this morning.        Objective:     Vital Signs (Most Recent):  Temp: 99 °F (37.2 °C) (06/25/17 1542)  Pulse: 109 (06/25/17 1542)  Resp: 18 (06/25/17 1542)  BP: 129/67 (06/25/17 1542)  SpO2: 95 % (06/25/17 1542) Vital Signs (24h Range):  Temp:  [98.6 °F (37 °C)-99.7 °F (37.6 °C)] 99 °F (37.2 °C)  Pulse:  [102-120] 109  Resp:  [17-19] 18  SpO2:  [92 %-96 %] 95 %  BP: (114-167)/(56-83) 129/67     Weight: 90.7 kg (199 lb 15.3 oz)  Body mass index is 26.38 kg/m².  Body surface area is 2.16 meters squared.    ECOG SCORE         [unfilled]    Intake/Output - Last 3 Shifts       06/23 0700 - 06/24 0659 06/24 0700 - 06/25 0659 06/25 0700 - 06/26 0659    P.O. 2340 1100 440    I.V. (mL/kg) 3040 (34.3) 2902.5 (32) 925 (10.2)    Blood  617     IV Piggyback  350 50    Total Intake(mL/kg) 5380 (60.7) 4969.5 (54.8) 1415 (15.6)    Urine (mL/kg/hr) 3500 (1.6) 2900 (1.3) 1200 (1.1)    Stool   0 (0)    Total Output 3500 2900 1200    Net +1880 +2069.5 +215           Urine Occurrence   1 x    Stool Occurrence   1 x          Physical Exam   Constitutional: He is oriented to person, place, and time. He appears well-developed and well-nourished. No distress.   HENT:   Head: Normocephalic.   Right Ear: External ear normal.   Left Ear: External ear normal.   Nose: Nose normal.   Mouth/Throat: Oropharynx is clear and moist and mucous membranes are normal. No oropharyngeal exudate.    Eyes: Conjunctivae and EOM are normal. Pupils are equal, round, and reactive to light. No scleral icterus.   Neck: Normal range of motion. Neck supple.   Cardiovascular: Normal rate, regular rhythm and normal heart sounds.    Pulmonary/Chest: Effort normal and breath sounds normal.   Abdominal: Soft. Normal appearance and bowel sounds are normal. He exhibits no distension. There is no tenderness.   Musculoskeletal: Normal range of motion. He exhibits no edema.   Lymphadenopathy:     He has no cervical adenopathy.   Neurological: He is alert and oriented to person, place, and time.   Skin: Skin is warm, dry and intact. No cyanosis. Nails show no clubbing.   Left subclavian chest port - dressing clean/dry/intact.    Psychiatric: He has a normal mood and affect. His behavior is normal. Thought content normal. His mood appears not anxious.   Vitals reviewed.      Significant Labs:   CBC:     Recent Labs  Lab 06/24/17  0903 06/24/17  1450 06/25/17  0455   WBC 11.80 19.45* 8.81   HGB 8.2* 8.9* 8.3*   HCT 23.7* 25.9* 24.2*   PLT 77* 64* 66*    and CMP:     Recent Labs  Lab 06/24/17  0422 06/25/17  0455    138   K 4.0 4.2    106   CO2 24 24   GLU 97 101   BUN 13 18   CREATININE 1.1 1.2   CALCIUM 8.6* 8.4*   PROT 5.6* 5.8*   ALBUMIN 2.6* 2.6*   BILITOT 0.4 0.5   ALKPHOS 153* 167*   AST 38 48*   ALT 24 31   ANIONGAP 7* 8   EGFRNONAA >60.0 >60.0       Diagnostic Results:  I have reviewed all pertinent imaging results/findings within the past 24 hours.

## 2017-06-26 NOTE — PLAN OF CARE
"Problem: Patient Care Overview  Goal: Plan of Care Review  Remained safe. Continued to complain of jaw numbness and right facial pain. Stated relief with IV dilaudid administration. Episode of emesis this am after pt began eating breakfast. zofran administered and no complaints of nausea since. Prn electrolytes ordered and mag and K+ replaced. Complained of itching "all over". Red rash developing to ble. Pt stated that the same thing had previously happened when he was taken off of prograf, and that steroid was prescribed. Stated that he had recently been taken off of prograf. Administered benadryl and reported to NP. Solu-medrol tapering dose ordered and first dose administered. Afebrile. No sign of infection noted. Ambulated in hallway with steady gait. Fall precautions maintained and callbell and personal items kept within reach. No apparent distress.       "

## 2017-06-26 NOTE — ASSESSMENT & PLAN NOTE
- bleeding noted from oquendo catheter site   - despite platelet transfusion, patient continued to have oozing from insertion site  - new suture placed over the weekend and bleeding has now stopped  - will continue to monitor  - plt threshold can now be 10K again

## 2017-06-26 NOTE — ASSESSMENT & PLAN NOTE
- ?infection or disease related  - blood cultures no growth thus far, CXR unremarkable.   - on Cefepime - Day 2.   - received dose of Vancomycin.   - if spikes fever again, then consider adding Vancomycin.   - recommend deescalating therapy tomorrow if remains afebrile and cultures show no growth.

## 2017-06-26 NOTE — PROGRESS NOTES
Pt with reports of itching since before admission. Pt states this happens from time to time and he usually starts a steroid taper. Pt with redness noted to RLE, possible rash v. Reaction from itching. Spoke with staff attending and ordered medrol dose pack taper to start today and end 7/1/17. Pt was given benadryl also. Will continue to monitor.    Cheryl Carrera, ANA, NP  Hematology/Oncology

## 2017-06-26 NOTE — ASSESSMENT & PLAN NOTE
- CD4 low at 11.4%, Absolute CD4 469 and HIV RNA pending still  - As per ID: continue triumeq and itraconazole

## 2017-06-26 NOTE — ASSESSMENT & PLAN NOTE
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 Blast of 62%  - awaiting mutation analysis and cytogenetics  - continue ppx allopurinol   - KPS 90%  - underwent IT chemotherapy on 6/23. Still awaiting flow cytometry and cytology for confirmation of disease involvement and to decide treatment regimen. Does show 3% "other" likely blasts. CSF LDH was 29. All other results pending  - Jaw pain and pain in right cranial nerve 7 distribution with persistent, severe headaches concerning for CNS involvement  - treatment pending after full CSF results. Had previous reaction to MEC (etoposide caused full body rash). May be more likely to choose FLAG   "

## 2017-06-26 NOTE — SUBJECTIVE & OBJECTIVE
Subjective:     Interval History:   No acute issues overnight, pt has no questions this AM  BMBx shows relapsed disease with 62% blasts.  Flow/cytology pending from CSF still     Objective:     Vital Signs (Most Recent):  Temp: 98.4 °F (36.9 °C) (06/26/17 1207)  Pulse: 96 (06/26/17 1207)  Resp: 18 (06/26/17 1207)  BP: 119/71 (06/26/17 0725)  SpO2: 95 % (06/26/17 1207) Vital Signs (24h Range):  Temp:  [98.4 °F (36.9 °C)-99 °F (37.2 °C)] 98.4 °F (36.9 °C)  Pulse:  [] 96  Resp:  [16-18] 18  SpO2:  [94 %-96 %] 95 %  BP: (119-133)/(67-76) 119/71     Weight: 89.2 kg (196 lb 10.4 oz)  Body mass index is 25.94 kg/m².  Body surface area is 2.14 meters squared.    ECOG SCORE         [unfilled]    Intake/Output - Last 3 Shifts       06/24 0700 - 06/25 0659 06/25 0700 - 06/26 0659 06/26 0700 - 06/27 0659    P.O. 1100 680 250    I.V. (mL/kg) 2902.5 (32) 1208.8 (13.6)     Blood 617      IV Piggyback 350 100     Total Intake(mL/kg) 4969.5 (54.8) 1988.8 (22.3) 250 (2.8)    Urine (mL/kg/hr) 2900 (1.3) 2350 (1.1) 400 (0.8)    Emesis/NG output   0 (0)    Stool  0 (0)     Total Output 2900 2350 400    Net +2069.5 -361.3 -150           Urine Occurrence  1 x     Stool Occurrence  2 x     Emesis Occurrence   1 x          Physical Exam   Constitutional: He is oriented to person, place, and time. Vital signs are normal. He appears well-nourished. He is cooperative.   HENT:   Head: Normocephalic and atraumatic.   Mouth/Throat: No oral lesions.   Eyes: Lids are normal.   Neck: Trachea normal and normal range of motion. Carotid bruit is not present.   Cardiovascular: Regular rhythm and normal heart sounds.  Tachycardia present.    No swelling of BLE  Occasional tachycardia   Pulmonary/Chest: Effort normal and breath sounds normal. No accessory muscle usage. No respiratory distress.   Left chest central line now C/D/I, no longer oozing blood   Abdominal: Normal appearance and bowel sounds are normal. There is no hepatosplenomegaly. There  is no rigidity and no guarding.   Musculoskeletal: Normal range of motion.   Neurological: He is alert and oriented to person, place, and time. Coordination and gait normal.   Skin: Skin is dry and intact. He is not diaphoretic. No pallor.   Psychiatric: He has a normal mood and affect. His speech is normal and behavior is normal. Judgment and thought content normal. Cognition and memory are normal.       Significant Labs:   CBC:   Recent Labs  Lab 06/24/17  1450 06/25/17 0455 06/26/17 0423   WBC 19.45* 8.81 4.03   HGB 8.9* 8.3* 8.1*   HCT 25.9* 24.2* 23.7*   PLT 64* 66* 42*   , CMP:   Recent Labs  Lab 06/25/17 0455 06/26/17 0423    138   K 4.2 3.5    106   CO2 24 23    89   BUN 18 19   CREATININE 1.2 1.1   CALCIUM 8.4* 8.5*   PROT 5.8* 5.8*   ALBUMIN 2.6* 2.6*   BILITOT 0.5 0.7   ALKPHOS 167* 149*   AST 48* 35   ALT 31 25   ANIONGAP 8 9   EGFRNONAA >60.0 >60.0   , Coagulation:   Recent Labs  Lab 06/25/17 0455 06/26/17 0423   INR 1.3* 1.0   APTT 28.3 25.9    and Uric Acid   Recent Labs  Lab 06/25/17 0455 06/26/17 0423   URICACID 5.8 5.5       Diagnostic Results:  I have reviewed all pertinent imaging results/findings within the past 24 hours.

## 2017-06-26 NOTE — PROGRESS NOTES
"  Monday, June 26th, 2017    Protocol:  TPI-AGUSTINA-201:  A Phase 2, Randomized, Biomarker-driven, Clinical Study in Patients with Relapsed or Refractory AML with a nExploratory Arm in Patients with Newly Diagnosed High-Risk AML  Sponsor:  [facility]  IRB#: [id number]  Investigator:  Demetrio Rossi Initials:  LAKEISHA VELEZ    Research RN met with patient this AM to follow up in regards to clinical trial information presented to patient on Friday at Dr. Atkinson' request.  Patient states he received information and read through "a good bit of it."  He understands that we are currently awaiting flow on CSF to determine CNS disease status.  He states he understands that once we receive those results we will know whether he is eligible for the trial, and if so, he will need to repeat bone marrow aspirate to submit for NOXA priming centrally to further determine eligibility.  He states understanding of this.      He states he currently has no questions as it relates to the trial.  He was encouraged to contact me with questions or concerns as it relates to the study and states having my contact information.  Will follow.    "

## 2017-06-26 NOTE — PROGRESS NOTES
Pt's cytology resulted on CSF and is negative. Staff attending spoke with pt about his options and he has decided to try the clinical trial. Research RN made aware. He will need a repeat bone marrow bx tomorrow. His questions were answered by staff attending.    Cheryl Carrera DNP, NP  Hematology/Oncology

## 2017-06-26 NOTE — PROGRESS NOTES
[facility] [address]  Hematology  Bone Marrow Transplant  Progress Note    Patient Name: Paul E Sistrunk  Admission Date: 6/20/2017  Hospital Length of Stay: 5 days  Code Status: Full Code    Subjective:     Interval History:   - continued to bleed overnight from oquendo, evaluated by surgery and placed new suture and bleeding subsided afterwards.   - spiked fever yesterday at 102.7 F - pancultured and started antibiotics.   - VSS and remains afebrile now.   - denies any headaches.   - Intermittently complains of numbness on the right side of the face and on the chin. More frequently on the chin with radiation to the right side. However, denies this today.  - episode of hypoxic, placed on oxygen and saturation improved. Denies any shortness of breath this morning.        Objective:     Vital Signs (Most Recent):  Temp: 99 °F (37.2 °C) (06/25/17 1542)  Pulse: 109 (06/25/17 1542)  Resp: 18 (06/25/17 1542)  BP: 129/67 (06/25/17 1542)  SpO2: 95 % (06/25/17 1542) Vital Signs (24h Range):  Temp:  [98.6 °F (37 °C)-99.7 °F (37.6 °C)] 99 °F (37.2 °C)  Pulse:  [102-120] 109  Resp:  [17-19] 18  SpO2:  [92 %-96 %] 95 %  BP: (114-167)/(56-83) 129/67     Weight: 90.7 kg (199 lb 15.3 oz)  Body mass index is 26.38 kg/m².  Body surface area is 2.16 meters squared.    ECOG SCORE         [unfilled]    Intake/Output - Last 3 Shifts       06/23 0700 - 06/24 0659 06/24 0700 - 06/25 0659 06/25 0700 - 06/26 0659    P.O. 2340 1100 440    I.V. (mL/kg) 3040 (34.3) 2902.5 (32) 925 (10.2)    Blood  617     IV Piggyback  350 50    Total Intake(mL/kg) 5380 (60.7) 4969.5 (54.8) 1415 (15.6)    Urine (mL/kg/hr) 3500 (1.6) 2900 (1.3) 1200 (1.1)    Stool   0 (0)    Total Output 3500 2900 1200    Net +1880 +2069.5 +215           Urine Occurrence   1 x    Stool Occurrence   1 x          Physical Exam   Constitutional: He is oriented to person, place, and time. He appears well-developed and well-nourished. No distress.   HENT:   Head:  Normocephalic.   Right Ear: External ear normal.   Left Ear: External ear normal.   Nose: Nose normal.   Mouth/Throat: Oropharynx is clear and moist and mucous membranes are normal. No oropharyngeal exudate.   Eyes: Conjunctivae and EOM are normal. Pupils are equal, round, and reactive to light. No scleral icterus.   Neck: Normal range of motion. Neck supple.   Cardiovascular: Normal rate, regular rhythm and normal heart sounds.    Pulmonary/Chest: Effort normal and breath sounds normal.   Abdominal: Soft. Normal appearance and bowel sounds are normal. He exhibits no distension. There is no tenderness.   Musculoskeletal: Normal range of motion. He exhibits no edema.   Lymphadenopathy:     He has no cervical adenopathy.   Neurological: He is alert and oriented to person, place, and time.   Skin: Skin is warm, dry and intact. No cyanosis. Nails show no clubbing.   Left subclavian chest port - dressing clean/dry/intact.    Psychiatric: He has a normal mood and affect. His behavior is normal. Thought content normal. His mood appears not anxious.   Vitals reviewed.      Significant Labs:   CBC:     Recent Labs  Lab 06/24/17  0903 06/24/17  1450 06/25/17  0455   WBC 11.80 19.45* 8.81   HGB 8.2* 8.9* 8.3*   HCT 23.7* 25.9* 24.2*   PLT 77* 64* 66*    and CMP:     Recent Labs  Lab 06/24/17  0422 06/25/17  0455    138   K 4.0 4.2    106   CO2 24 24   GLU 97 101   BUN 13 18   CREATININE 1.1 1.2   CALCIUM 8.6* 8.4*   PROT 5.6* 5.8*   ALBUMIN 2.6* 2.6*   BILITOT 0.4 0.5   ALKPHOS 153* 167*   AST 38 48*   ALT 24 31   ANIONGAP 7* 8   EGFRNONAA >60.0 >60.0       Diagnostic Results:  I have reviewed all pertinent imaging results/findings within the past 24 hours.    Assessment/Plan:     Fever    - ?infection or disease related  - blood cultures no growth thus far, CXR unremarkable.   - on Cefepime - Day 2.   - received dose of Vancomycin.   - if spikes fever again, then consider adding Vancomycin.   - recommend  deescalating therapy tomorrow if remains afebrile and cultures show no growth.         Bleeding at insertion site    - bleeding noted from mazariegos catheter site   - despite platelet transfusion, patient continues to have oozing from insertion site.  - new suture placed overnight and bleeding as subsided now.             NICOLE (acute kidney injury)    - resolved now.   - encourage gentle hydration, avoid nephrotoxic medications.         Anxiety    - PRN Xanax.         S/P allogeneic bone marrow transplant    -286 days post Flu/Bu/ATG MUD allogeneic transplant for high risk AML after his second IDAC (6/20/16 - 6/24/16) after a prolonged hospitalization (2/5/16 - 3/21/16) for induction with 7&3 and reinduction with MEC to remission and IDAC (4/4/16 - 4/9/16).   - continue ppx acyclovir, itraconazole           Anemia    - hemoglobin 8.3 grams, likely combination of bleeding from port site and bone marrow infiltration/suppression from relapsed disease.   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  - awaiting mutation analysis and cytogenetics  - continue ppx allopurinol   - KPS 90%  - underwent IT chemotherapy on 6/23. Awaiting flow cytometry and cytology for confirmation of disease involvement and to decide treatment regimen.   - Jaw pain and pain in right cranial nerve 7 distribution with persistent, severe headaches concerning for CNS involvement.            VTE Risk Mitigation         Ordered     Place sequential compression device  Until discontinued       06/20/17 2005     High Risk of VTE  Once      06/20/17 1901          Disposition: awaiting management of relapsed disease.     Andrade Estrella MD  Bone Marrow Transplant  [facility]

## 2017-06-26 NOTE — ASSESSMENT & PLAN NOTE
- ?infection or disease related  - blood cultures no growth thus far from 6/24/17, CXR unremarkable.   - on Cefepime, continue at this time  - received dose of Vancomycin  - if spikes fever again, then consider adding Vancomycin  - will possibly deescalate therapy if remains afebrile and cultures show no growth  - afebrile since 6/24/17 at 1724

## 2017-06-26 NOTE — ASSESSMENT & PLAN NOTE
-287 days post Flu/Bu/ATG MUD allogeneic transplant for high risk AML after his second IDAC (6/20/16 - 6/24/16) after a prolonged hospitalization (2/5/16 - 3/21/16) for induction with 7&3 and reinduction with MEC to remission and IDAC (4/4/16 - 4/9/16).   - continue ppx acyclovir, itraconazole

## 2017-06-26 NOTE — ASSESSMENT & PLAN NOTE
- hemoglobin 8.3 grams, likely combination of bleeding from port site and bone marrow infiltration/suppression from relapsed disease.   - transfuse for hemoglobin < 7

## 2017-06-27 NOTE — PLAN OF CARE
MDR's with Dr Howe.  Chemo has been delayed until marrow and CSF path has resulted.  BMB confirms relapse and no disease seen in CSF.  Plan to start induction today without the Tolero trial.  Patient aware of the extended LOS.  Will continue to follow for d/c needs.

## 2017-06-27 NOTE — ASSESSMENT & PLAN NOTE
- CD4 low at 11.4%, Absolute CD4 469 and HIV RNA not detected from 6/21  - As per ID: continue triumeq and itraconazole

## 2017-06-27 NOTE — PLAN OF CARE
Problem: Patient Care Overview  Goal: Plan of Care Review  Outcome: Ongoing (interventions implemented as appropriate)  Pt has remained free from injury this shift. Afebrile. Cefepime continued as ordered. Mazariegos site dressing has remained dry and intact overnight. Dilaudid administered PRN as ordered. Benadryl x1 overnight for itching, mild relief noted. Pt denies nausea. Thrombocytopenic precautions maintained. Bed in lowest, locked position w/ side rails up x2. Call light within reach. Will continue to monitor.

## 2017-06-27 NOTE — PROGRESS NOTES
Tuesday, June 27th, 2017    Protocol:  TPI-AGUSTINA-201:  A Phase 2, Randomized, Biomarker-driven, Clinical Study in Patients with Relapsed or Refractory AML with a nExploratory Arm in Patients with Newly Diagnosed High-Risk AML  Sponsor:  [employer]  IRB#: [id number]  Investigator:  Demetrio Rossi Initials:  S, P    Research RN visited patient on inpatient unit today after after several lengthy discussions with sponsor regarding his case.  At most recent discussion, it was said to patient that he would not be eligible due to having received intrathecal chemo on 6/23 for suspected CNS disease.  It was decided per Dr. Barajas ([employer] medical monitor) and Dr. Atkinson that patient is okay to proceed with NOXA screening if patient is amenable to repeating bone marrow biopsy.  However, even if patient qualifies, a waiver is not guaranteed.      RN had candid discussion with patient to inform that he would only have 20% chance of having required NOXA priming, and even if he meets this, he is not guaranteed entry into study.  Given this information, he states he does not wish to proceed with trial and wants to proceed with standard of care per inpatient team.  This was relayed to study representatives, inpatient staff, and Dr. Atkinson.

## 2017-06-27 NOTE — PROGRESS NOTES
[facility]  Hematology  Bone Marrow Transplant  Progress Note    Patient Name: Paul E Sistrunk  Admission Date: 6/20/2017  Hospital Length of Stay: 7 days  Code Status: Full Code    Subjective:     Interval History:   - No acute issues overnight. 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 Complains of intermittent numbness on the chin and cheek.   - Continues on cefepime.     Objective:     Vital Signs (Most Recent):  Temp: 98.2 °F (36.8 °C) (06/27/17 0744)  Pulse: 88 (06/27/17 0744)  Resp: 16 (06/27/17 0744)  BP: 125/72 (06/27/17 0744)  SpO2: 96 % (06/27/17 0744) Vital Signs (24h Range):  Temp:  [97.9 °F (36.6 °C)-98.5 °F (36.9 °C)] 98.2 °F (36.8 °C)  Pulse:  [84-96] 88  Resp:  [16-18] 16  SpO2:  [93 %-96 %] 96 %  BP: (122-133)/(68-79) 125/72     Weight: 88 kg (194 lb 0.1 oz)  Body mass index is 25.6 kg/m².  Body surface area is 2.13 meters squared.    ECOG SCORE         [unfilled]    Intake/Output - Last 3 Shifts       06/25 0700 - 06/26 0659 06/26 0700 - 06/27 0659 06/27 0700 - 06/28 0659    P.O. 680 1230     I.V. (mL/kg) 1208.8 (13.6) 2293.8 (26.1)     Blood       IV Piggyback 100 50     Total Intake(mL/kg) 1988.8 (22.3) 3573.8 (40.6)     Urine (mL/kg/hr) 2350 (1.1) 3725 (1.8)     Emesis/NG output  0 (0)     Stool 0 (0)      Total Output 2350 3725      Net -361.3 -151.3             Urine Occurrence 1 x      Stool Occurrence 2 x      Emesis Occurrence  1 x           Physical Exam   Constitutional: He is oriented to person, place, and time. He appears well-developed and well-nourished. No distress.   HENT:   Head: Normocephalic.   Right Ear: External ear normal.   Left Ear: External ear normal.   Nose: Nose normal.   Mouth/Throat: Oropharynx is clear and moist and mucous membranes are normal. No oropharyngeal exudate.   Eyes: Conjunctivae and EOM are normal. Pupils are equal, round, and reactive to light. No scleral icterus.    Neck: Normal range of motion. Neck supple.   Cardiovascular: Normal rate, regular rhythm and normal heart sounds.    Pulmonary/Chest: Effort normal and breath sounds normal.   Abdominal: Soft. Normal appearance and bowel sounds are normal. He exhibits no distension. There is no tenderness.   Musculoskeletal: Normal range of motion. He exhibits no edema.   Lymphadenopathy:     He has no cervical adenopathy.   Neurological: He is alert and oriented to person, place, and time.   Skin: Skin is warm, dry and intact. No cyanosis. Nails show no clubbing.   Psychiatric: He has a normal mood and affect. His behavior is normal. Thought content normal. His mood appears not anxious.   Vitals reviewed.      Significant Labs:   CBC:   Recent Labs  Lab 06/26/17 0423 06/27/17 0425   WBC 4.03 3.36*   HGB 8.1* 7.9*   HCT 23.7* 23.1*   PLT 42*  --     and CMP:   Recent Labs  Lab 06/26/17 0423 06/27/17 0425    139   K 3.5 4.3    108   CO2 23 26   GLU 89 180*   BUN 19 17   CREATININE 1.1 1.1   CALCIUM 8.5* 8.7   PROT 5.8* 6.0   ALBUMIN 2.6* 2.6*   BILITOT 0.7 0.4   ALKPHOS 149* 147*   AST 35 21   ALT 25 23   ANIONGAP 9 5*   EGFRNONAA >60.0 >60.0       Diagnostic Results:  I have reviewed all pertinent imaging results/findings within the past 24 hours.    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 Blast of 62%  - awaiting mutation analysis and cytogenetics  - continue ppx allopurinol   - KPS 90%  - underwent IT chemotherapy on 6/23. Flow negative for disease. CSF LDH was 29. All other results pending  - Jaw pain and pain in right cranial nerve 7 distribution with persistent, severe headaches concerning  for CNS involvement  - Had previous reaction to MEC (etoposide caused full body rash). May be more likely to choose FLAG. Will Marrow today for possible Tolero Trial.          S/P allogeneic bone marrow transplant    - 288 days post Flu/Bu/ATG MUD allogeneic transplant for high risk AML after his second IDAC (6/20/16 - 6/24/16) after a prolonged hospitalization (2/5/16 - 3/21/16) for induction with 7&3 and reinduction with MEC to remission and IDAC (4/4/16 - 4/9/16).   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   - transfuse for hemoglobin < 7        Thrombocytopenia    Likely 2/2 relapsed disease  - transfuse for plt <10K        Electrolyte abnormality    - ordered prn electrolyte replacement per protocol         Fever    - ?infection or disease related  - blood cultures no growth thus far from 6/24/17, CXR unremarkable.   - on Cefepime (started 6/24), continue at this time  - received dose of Vancomycin  - if spikes fever again, then consider adding Vancomycin  - will possibly deescalate therapy if remains afebrile and cultures show no growth  - afebrile since 6/24/17 at 1724        Bleeding at insertion site    - bleeding noted from oquendo catheter site   - despite platelet transfusion, patient continued to have oozing from insertion site  - new suture placed over the weekend and bleeding has now stopped  - will continue to monitor  - plt threshold can now be 10K again        NICOLE (acute kidney injury)    - resolved now, creatine 1.1  - encourage gentle hydration, avoid nephrotoxic medications         Anxiety    - PRN Xanax            VTE Risk Mitigation         Ordered     Place sequential compression device  Until discontinued      06/20/17 2005     High Risk of VTE  Once      06/20/17 1901          Disposition: Pending decision of chemotherapy     Asia Melgar NP  Bone Marrow Transplant  [facility]

## 2017-06-27 NOTE — ASSESSMENT & PLAN NOTE
- hemoglobin 7.9  grams, likely combination of bleeding from port site and bone marrow infiltration/suppression from relapsed disease.   - transfuse for hemoglobin < 7

## 2017-06-27 NOTE — ASSESSMENT & PLAN NOTE
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 Blast of 62%  - awaiting mutation analysis and cytogenetics  - continue ppx allopurinol   - KPS 90%  - underwent IT chemotherapy on 6/23. Flow negative for disease. CSF LDH was 29. All other results pending  - Jaw pain and pain in right cranial nerve 7 distribution with persistent, severe headaches concerning for CNS involvement  - Had previous reaction to MEC (etoposide caused full body rash). May be more likely to choose FLAG. Will Marrow today for possible Tolero Trial.

## 2017-06-27 NOTE — ASSESSMENT & PLAN NOTE
- 288 days post Flu/Bu/ATG MUD allogeneic transplant for high risk AML after his second IDAC (6/20/16 - 6/24/16) after a prolonged hospitalization (2/5/16 - 3/21/16) for induction with 7&3 and reinduction with MEC to remission and IDAC (4/4/16 - 4/9/16).   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

## 2017-06-27 NOTE — PLAN OF CARE
Problem: Patient Care Overview  Goal: Plan of Care Review  Outcome: Ongoing (interventions implemented as appropriate)  Fall, and infection preventions continued. Pain managed with PRN medications. Chemotherapy consent signed for FLAG regimen, regimen released. Neupogen initiated. Solu-medrol discontinued and Prednisone initiated. Cefepime continued. Patient stable, will continue to monitor.

## 2017-06-27 NOTE — ASSESSMENT & PLAN NOTE
- ?infection or disease related  - blood cultures no growth thus far from 6/24/17, CXR unremarkable.   - on Cefepime (started 6/24), continue at this time  - received dose of Vancomycin  - if spikes fever again, then consider adding Vancomycin  - will possibly deescalate therapy if remains afebrile and cultures show no growth  - afebrile since 6/24/17 at 1724

## 2017-06-28 PROBLEM — G89.29 CHRONIC BILATERAL LOW BACK PAIN WITHOUT SCIATICA: Status: ACTIVE | Noted: 2017-01-01

## 2017-06-28 PROBLEM — M54.50 CHRONIC BILATERAL LOW BACK PAIN WITHOUT SCIATICA: Status: ACTIVE | Noted: 2017-01-01

## 2017-06-28 NOTE — PROGRESS NOTES
Asia Melgar, NP notified of patient's increased and uncontrolled pain. Patient unable to find relief with positioning, PRN IVP Dilaudid, Flexeril, and Lidocaine patch. PCA to be initiated pending pharmacy verification and pump delivery. Will continue to monitor.

## 2017-06-28 NOTE — PLAN OF CARE
Problem: Patient Care Overview  Goal: Plan of Care Review  Outcome: Ongoing (interventions implemented as appropriate)  Pt remained free of falls and injury. Bed in low locked position side rails up x2 with call light and belongings in reach. Non skid socks in place. Dilaudid prn given. Cefepime given as scheduled. No acute events thus far. All VS stable. Will continue to monitor.

## 2017-06-28 NOTE — SUBJECTIVE & OBJECTIVE
Subjective:     Interval History:   - No acute issues overnight. VSS Afebrile.  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 Only comfortable position trendelenburg. Will order Egg crate for bed and adjust pain medication.   - Itching better controlled with the start of steroids.    Objective:     Vital Signs (Most Recent):  Temp: 98.2 °F (36.8 °C) (06/28/17 0445)  Pulse: 101 (06/28/17 0445)  Resp: 18 (06/28/17 0445)  BP: 130/67 (06/28/17 0445)  SpO2: (!) 94 % (06/28/17 0445) Vital Signs (24h Range):  Temp:  [97.7 °F (36.5 °C)-98.2 °F (36.8 °C)] 98.2 °F (36.8 °C)  Pulse:  [] 101  Resp:  [16-20] 18  SpO2:  [94 %-97 %] 94 %  BP: (125-149)/(67-76) 130/67     Weight: 89.3 kg (196 lb 13.9 oz)  Body mass index is 25.97 kg/m².  Body surface area is 2.14 meters squared.    ECOG SCORE         [unfilled]    Intake/Output - Last 3 Shifts       06/26 0700 - 06/27 0659 06/27 0700 - 06/28 0659 06/28 0700 - 06/29 0659    P.O. 1230 2045     I.V. (mL/kg) 2293.8 (26.1) 1825 (20.4)     IV Piggyback 100 100     Total Intake(mL/kg) 3623.8 (41.2) 3970 (44.5)     Urine (mL/kg/hr) 3725 (1.8) 2575 (1.2)     Emesis/NG output 0 (0)      Stool  0 (0)     Total Output 3725 2575      Net -101.3 +1395             Stool Occurrence  0 x     Emesis Occurrence 1 x            Physical Exam   Constitutional: He is oriented to person, place, and time. He appears well-developed and well-nourished. No distress.   HENT:   Head: Normocephalic.   Right Ear: External ear normal.   Left Ear: External ear normal.   Nose: Nose normal.   Mouth/Throat: Oropharynx is clear and moist and mucous membranes are normal. No oropharyngeal exudate.   Eyes: Conjunctivae and EOM are normal. Pupils are equal, round, and reactive to light. No scleral icterus.   Neck: Normal range of motion. Neck supple.   Cardiovascular: Normal rate, regular rhythm and normal heart  sounds.    Pulmonary/Chest: Effort normal and breath sounds normal.   Abdominal: Soft. Normal appearance and bowel sounds are normal. He exhibits no distension. There is no tenderness.   Musculoskeletal: Normal range of motion. He exhibits no edema.   Neurological: He is alert and oriented to person, place, and time.   Skin: Skin is warm, dry and intact. No cyanosis. Nails show no clubbing.   Psychiatric: He has a normal mood and affect. His behavior is normal. Thought content normal. His mood appears not anxious.   Nursing note and vitals reviewed.      Significant Labs:   CBC:   Recent Labs  Lab 06/27/17 0425 06/28/17  0455   WBC 3.36* 11.76   HGB 7.9* 7.9*   HCT 23.1* 22.8*   PLT 33*  --     and CMP:   Recent Labs  Lab 06/27/17 0425 06/28/17  0455    141   K 4.3 4.0    108   CO2 26 23   * 118*   BUN 17 21*   CREATININE 1.1 1.2   CALCIUM 8.7 9.1   PROT 6.0 6.4   ALBUMIN 2.6* 2.8*   BILITOT 0.4 0.6   ALKPHOS 147* 172*   AST 21 60*   ALT 23 53*   ANIONGAP 5* 10   EGFRNONAA >60.0 >60.0       Diagnostic Results:  I have reviewed all pertinent imaging results/findings within the past 24 hours.

## 2017-06-28 NOTE — ASSESSMENT & PLAN NOTE
- 289 days post Flu/Bu/ATG MUD allogeneic transplant for high risk AML after his second IDAC (6/20/16 - 6/24/16) after a prolonged hospitalization (2/5/16 - 3/21/16) for induction with 7&3 and reinduction with MEC to remission and IDAC (4/4/16 - 4/9/16).   - continue ppx acyclovir, itraconazole   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   - chimerics from marrow done 6/21 show CD3 of 90% donor and 10% recipient, but CD34 of 5% donor and 95% recipient

## 2017-06-28 NOTE — PLAN OF CARE
Problem: Patient Care Overview  Goal: Plan of Care Review  Outcome: Ongoing (interventions implemented as appropriate)  Fall, and infection preventions continued. Chemotherapy regimen FLAG day 2/6 to start this afternoon. Will monitor for neurological symptoms with chemotherapy. Phosphorus replaced as ordered. Prednisone discontinued due to Decadron initiation with chemotherapy regimen. Cefepime discontinued, PO Ciprofloxacin initiated. Pain uncontrolled, Lidocaine patch applied to low back, Flexiril given, and Dilaudid PCA pump initiated and CT ordered for low back pain. Patient stable, will continue to monitor.

## 2017-06-28 NOTE — ASSESSMENT & PLAN NOTE
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 Blast of 62%  - awaiting mutation analysis and cytogenetics; FLT-3 negative   - discontinued ppx allopurinol    - Jaw pain and pain in right cranial nerve 7 distribution with persistent, severe headaches concerning for CNS involvement - underwent IT chemotherapy on 6/23. Flow negative for disease. CSF LDH was 29.  - Had previous reaction to MEC (etoposide caused full body rash). Did not qualify for the Tolero trial due to receiving IT chemotherapy    - Day 2 of FLAG-BETZY

## 2017-06-28 NOTE — PROGRESS NOTES
Asia Melgar, NP notified of patient's increased pain and patient yelling out in pain. Asia at bedside assessing patient. PCA pump acquired and PCA initiated. One time bolus dose given of 1mg Dilaudid IV. Will continue to monitor.

## 2017-06-28 NOTE — PROGRESS NOTES
[facility]  Hematology  Bone Marrow Transplant  Progress Note    Patient Name: Paul E Sistrunk  Admission Date: 6/20/2017  Hospital Length of Stay: 8 days  Code Status: Full Code    Subjective:     Interval History:   - No acute issues overnight. VSS Afebrile.  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  Body surface area is 2.14 meters squared.    ECOG SCORE         [unfilled]    Intake/Output - Last 3 Shifts       06/26 0700 - 06/27 0659 06/27 0700 - 06/28 0659 06/28 0700 - 06/29 0659    P.O. 1230 2045     I.V. (mL/kg) 2293.8 (26.1) 1825 (20.4)     IV Piggyback 100 100     Total Intake(mL/kg) 3623.8 (41.2) 3970 (44.5)     Urine (mL/kg/hr) 3725 (1.8) 2575 (1.2)     Emesis/NG output 0 (0)      Stool  0 (0)     Total Output 3725 2575      Net -101.3 +1395             Stool Occurrence  0 x     Emesis Occurrence 1 x            Physical Exam   Constitutional: He is oriented to person, place, and time. He appears well-developed and well-nourished. No distress.   HENT:   Head: Normocephalic.   Right Ear: External ear normal.   Left Ear: External ear normal.   Nose: Nose normal.   Mouth/Throat: Oropharynx is clear and moist and mucous membranes are normal. No oropharyngeal exudate.   Eyes: Conjunctivae and EOM are normal. Pupils are equal, round, and reactive to light. No scleral icterus.   Neck: Normal range of motion. Neck supple.   Cardiovascular: Normal rate,  regular rhythm and normal heart sounds.    Pulmonary/Chest: Effort normal and breath sounds normal.   Abdominal: Soft. Normal appearance and bowel sounds are normal. He exhibits no distension. There is no tenderness.   Musculoskeletal: Normal range of motion. He exhibits no edema.   Neurological: He is alert and oriented to person, place, and time.   Skin: Skin is warm, dry and intact. No cyanosis. Nails show no clubbing.   Psychiatric: He has a normal mood and affect. His behavior is normal. Thought content normal. His mood appears not anxious.   Nursing note and vitals reviewed.      Significant Labs:   CBC:   Recent Labs  Lab 06/27/17 0425 06/28/17 0455   WBC 3.36* 11.76   HGB 7.9* 7.9*   HCT 23.1* 22.8*   PLT 33*  --     and CMP:   Recent Labs  Lab 06/27/17 0425 06/28/17 0455    141   K 4.3 4.0    108   CO2 26 23   * 118*   BUN 17 21*   CREATININE 1.1 1.2   CALCIUM 8.7 9.1   PROT 6.0 6.4   ALBUMIN 2.6* 2.8*   BILITOT 0.4 0.6   ALKPHOS 147* 172*   AST 21 60*   ALT 23 53*   ANIONGAP 5* 10   EGFRNONAA >60.0 >60.0       Diagnostic Results:  I have reviewed all pertinent imaging results/findings within the past 24 hours.    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 Blast of 62%  - awaiting mutation analysis and cytogenetics; FLT-3 negative   - discontinued ppx allopurinol    - Jaw pain and pain in right cranial nerve 7 distribution with persistent, severe headaches concerning for CNS involvement - underwent IT chemotherapy on 6/23. Flow negative for disease. CSF LDH was 29.  - Had previous reaction to MEC (etoposide caused full body rash). Did not qualify for the Tolero trial  due to receiving IT chemotherapy    - Day 2 of FLAG-BETZY           S/P allogeneic bone marrow transplant    - 289 days post Flu/Bu/ATG MUD allogeneic transplant for high risk AML after his second IDAC (6/20/16 - 6/24/16) after a prolonged hospitalization (2/5/16 - 3/21/16) for induction with 7&3 and reinduction with MEC to remission and IDAC (4/4/16 - 4/9/16).   - continue ppx acyclovir, itraconazole   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   - chimerics from marrow done 6/21 show CD3 of 90% donor and 10% recipient, but CD34 of 5% donor and 95% recipient         HIV disease    - CD4 low at 11.4%, Absolute CD4 469 and HIV RNA not detected from 6/21  - As per ID: continue triumeq and itraconazole        Prostate hypertrophy    - Continue home flomax        Anemia    - hemoglobin 7.9  grams, likely combination of bleeding from port site and bone marrow infiltration/suppression from relapsed disease.   - transfuse for hemoglobin < 7        Chronic bilateral low back pain without sciatica    - related to Neupogen  - started on zyrtec and lidocaine patch  - decreased interval of IV pain medicine        Thrombocytopenia    Likely 2/2 relapsed disease  - transfuse for plt <10K        Electrolyte abnormality    - ordered prn electrolyte replacement per protocol         Fever    - ?infection or disease related  - blood cultures no growth thus far from 6/24/17, CXR unremarkable.   - on Cefepime (started 6/24 - discontinued 6/28), continue at this time  - received dose of Vancomycin  - if spikes fever again, then consider adding Vancomycin  - will possibly deescalate therapy if remains afebrile and cultures show no growth  - afebrile since 6/24/17 at 1724        Bleeding at insertion site    - bleeding noted from oquendo catheter site   - despite platelet transfusion, patient continued to have oozing from insertion site  - new suture placed over the weekend and bleeding has now stopped  - will  continue to monitor  - plt threshold can now be 10K again        NICOLE (acute kidney injury)    - resolved now, creatine 1.2  - encourage gentle hydration, avoid nephrotoxic medications        Anxiety    - PRN Xanax            VTE Risk Mitigation         Ordered     Place sequential compression device  Until discontinued      06/20/17 2005     High Risk of VTE  Once      06/20/17 1901          Disposition: Pending completion of chemotherapy and count recovery.     Asia Melgar, NP  Bone Marrow Transplant  [facility]

## 2017-06-28 NOTE — ASSESSMENT & PLAN NOTE
- ?infection or disease related  - blood cultures no growth thus far from 6/24/17, CXR unremarkable.   - on Cefepime (started 6/24 - discontinued 6/28), continue at this time  - received dose of Vancomycin  - if spikes fever again, then consider adding Vancomycin  - will possibly deescalate therapy if remains afebrile and cultures show no growth  - afebrile since 6/24/17 at 1724

## 2017-06-29 PROBLEM — L76.82 BLEEDING AT INSERTION SITE: Status: RESOLVED | Noted: 2017-01-01 | Resolved: 2017-01-01

## 2017-06-29 NOTE — ASSESSMENT & PLAN NOTE
- hemoglobin 7.3  Grams/dl, likely from relapsed disease and recent bleeding   - transfuse for hemoglobin < 7

## 2017-06-29 NOTE — SUBJECTIVE & OBJECTIVE
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    Objective:     Vital Signs (Most Recent):  Temp: 98.3 °F (36.8 °C) (06/29/17 1530)  Pulse: 92 (06/29/17 1530)  Resp: 16 (06/29/17 1530)  BP: 129/61 (06/29/17 1530)  SpO2: 95 % (06/29/17 1530) Vital Signs (24h Range):  Temp:  [98.2 °F (36.8 °C)-98.5 °F (36.9 °C)] 98.3 °F (36.8 °C)  Pulse:  [] 92  Resp:  [13-24] 16  SpO2:  [92 %-96 %] 95 %  BP: (110-145)/(56-84) 129/61     Weight: 90.9 kg (200 lb 4.6 oz)  Body mass index is 26.42 kg/m².  Body surface area is 2.16 meters squared.    ECOG SCORE           Intake/Output - Last 3 Shifts       06/27 0700 - 06/28 0659 06/28 0700 - 06/29 0659 06/29 0700 - 06/30 0659    P.O. 2045 1385 960    I.V. (mL/kg) 1825 (20.4) 1579 (17.4) 708.8 (7.8)    Blood  285     IV Piggyback 100 400     Total Intake(mL/kg) 3970 (44.5) 3649 (40.2) 1668.8 (18.4)    Urine (mL/kg/hr) 2575 (1.2) 2000 (0.9) 1100 (1.4)    Emesis/NG output       Stool 0 (0)      Total Output 2575 2000 1100    Net +1395 +1649 +568.8           Stool Occurrence 0 x            Physical Exam   Constitutional: He is oriented to person, place, and time. He appears well-developed and well-nourished. No distress.   HENT:   Head: Normocephalic.   Mouth/Throat: Oropharynx is clear and moist. No oropharyngeal exudate.   Eyes: Conjunctivae are normal. Pupils are equal, round, and reactive to light. No scleral icterus.   Neck: Normal range of motion. Neck supple. Normal range of motion present. No thyromegaly present.   Cardiovascular: Normal rate, regular rhythm, normal heart sounds and intact distal pulses.    Pulmonary/Chest: Effort normal and breath sounds normal. No respiratory distress.   Abdominal: Soft. Bowel sounds are normal. He exhibits no distension. There is no tenderness.    Musculoskeletal: Normal range of motion. He exhibits no edema or tenderness.   Lymphadenopathy:        Head (right side): No submandibular, no preauricular, no posterior auricular and no occipital adenopathy present.        Head (left side): No submandibular, no preauricular, no posterior auricular and no occipital adenopathy present.     He has no cervical adenopathy.     He has no axillary adenopathy.   Neurological: He is alert and oriented to person, place, and time. No cranial nerve deficit. Coordination normal.   Skin: Skin is warm and dry. No petechiae and no rash noted. No pallor.   Psychiatric: He has a normal mood and affect. Thought content normal.   Vitals reviewed.      Significant Labs:   CBC:   Recent Labs  Lab 06/28/17 0455 06/29/17  0442   WBC 11.76 13.70*   HGB 7.9* 7.3*   HCT 22.8* 21.3*   PLT 27* 49*   , CMP:   Recent Labs  Lab 06/28/17 0455 06/29/17  0442    140   K 4.0 4.5    107   CO2 23 23   * 114*   BUN 21* 26*   CREATININE 1.2 1.2   CALCIUM 9.1 8.9   PROT 6.4 6.1   ALBUMIN 2.8* 2.8*   BILITOT 0.6 0.5   ALKPHOS 172* 207*   AST 60* 59*   ALT 53* 45*   ANIONGAP 10 10   EGFRNONAA >60.0 >60.0    and Coagulation: No results for input(s): INR, APTT in the last 48 hours.    Invalid input(s): PT    Diagnostic Results:  I have reviewed all pertinent imaging results/findings within the past 24 hours.

## 2017-06-29 NOTE — PROGRESS NOTES
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    Objective:     Vital Signs (Most Recent):  Temp: 98.3 °F (36.8 °C) (06/29/17 1530)  Pulse: 92 (06/29/17 1530)  Resp: 16 (06/29/17 1530)  BP: 129/61 (06/29/17 1530)  SpO2: 95 % (06/29/17 1530) Vital Signs (24h Range):  Temp:  [98.2 °F (36.8 °C)-98.5 °F (36.9 °C)] 98.3 °F (36.8 °C)  Pulse:  [] 92  Resp:  [13-24] 16  SpO2:  [92 %-96 %] 95 %  BP: (110-145)/(56-84) 129/61     Weight: 90.9 kg (200 lb 4.6 oz)  Body mass index is 26.42 kg/m².  Body surface area is 2.16 meters squared.    ECOG SCORE           Intake/Output - Last 3 Shifts       06/27 0700 - 06/28 0659 06/28 0700 - 06/29 0659 06/29 0700 - 06/30 0659    P.O. 2045 1385 960    I.V. (mL/kg) 1825 (20.4) 1579 (17.4) 708.8 (7.8)    Blood  285     IV Piggyback 100 400     Total Intake(mL/kg) 3970 (44.5) 3649 (40.2) 1668.8 (18.4)    Urine (mL/kg/hr) 2575 (1.2) 2000 (0.9) 1100 (1.4)    Emesis/NG output       Stool 0 (0)      Total Output 2575 2000 1100    Net +1395 +1649 +568.8           Stool Occurrence 0 x            Physical Exam   Constitutional: He is oriented to person, place, and time. He appears well-developed and well-nourished. No distress.   HENT:   Head: Normocephalic.   Mouth/Throat: Oropharynx is clear and moist. No oropharyngeal exudate.   Eyes: Conjunctivae are normal. Pupils are equal, round, and reactive to light. No scleral icterus.   Neck: Normal range of motion. Neck supple. Normal range of motion present. No thyromegaly present.   Cardiovascular: Normal rate, regular rhythm, normal heart sounds and intact distal  pulses.    Pulmonary/Chest: Effort normal and breath sounds normal. No respiratory distress.   Abdominal: Soft. Bowel sounds are normal. He exhibits no distension. There is no tenderness.   Musculoskeletal: Normal range of motion. He exhibits no edema or tenderness.   Lymphadenopathy:        Head (right side): No submandibular, no preauricular, no posterior auricular and no occipital adenopathy present.        Head (left side): No submandibular, no preauricular, no posterior auricular and no occipital adenopathy present.     He has no cervical adenopathy.     He has no axillary adenopathy.   Neurological: He is alert and oriented to person, place, and time. No cranial nerve deficit. Coordination normal.   Skin: Skin is warm and dry. No petechiae and no rash noted. No pallor.   Psychiatric: He has a normal mood and affect. Thought content normal.   Vitals reviewed.      Significant Labs:   CBC:   Recent Labs  Lab 06/28/17 0455 06/29/17  0442   WBC 11.76 13.70*   HGB 7.9* 7.3*   HCT 22.8* 21.3*   PLT 27* 49*   , CMP:   Recent Labs  Lab 06/28/17 0455 06/29/17  0442    140   K 4.0 4.5    107   CO2 23 23   * 114*   BUN 21* 26*   CREATININE 1.2 1.2   CALCIUM 9.1 8.9   PROT 6.4 6.1   ALBUMIN 2.8* 2.8*   BILITOT 0.6 0.5   ALKPHOS 172* 207*   AST 60* 59*   ALT 53* 45*   ANIONGAP 10 10   EGFRNONAA >60.0 >60.0    and Coagulation: No results for input(s): INR, APTT in the last 48 hours.    Invalid input(s): PT    Diagnostic Results:  I have reviewed all pertinent imaging results/findings within the past 24 hours.    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 Blast of 62%  - awaiting  mutation analysis and cytogenetics; FLT-3 negative   - discontinued ppx allopurinol    - Jaw pain and pain in right cranial nerve 7 distribution with persistent, severe headaches concerning for CNS involvement - underwent IT chemotherapy on 6/23. Flow negative for disease. CSF LDH was 29.  - Had previous reaction to MEC (etoposide caused full body rash). Did not qualify for the Tolero trial due to receiving IT chemotherapy    - Day 3 of FLAG-BETZY           S/P allogeneic bone marrow transplant    - 289 days post Flu/Bu/ATG MUD allogeneic transplant for high risk AML after his second IDAC (6/20/16 - 6/24/16) after a prolonged hospitalization (2/5/16 - 3/21/16) for induction with 7&3 and reinduction with MEC to remission and IDAC (4/4/16 - 4/9/16).   - continue ppx acyclovir, itraconazole   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   - chimerics from marrow done 6/21 show CD3 of 90% donor and 10% recipient, but CD34 of 5% donor and 95% recipient         NICOLE (acute kidney injury)    - resolved now, creatine 1.2  - encourage gentle hydration, avoid nephrotoxic medications        Chronic bilateral low back pain without sciatica    - related to Neupogen  - started on zyrtec, lidocaine patch and Flexeril  - improved with Dilaudid PCA  - CT spine did not show any acute findings, revealed chronic degenerative changes, spinal cord stenosis and narrowing        Anemia    - hemoglobin 7.3  Grams/dl, likely from relapsed disease and recent bleeding   - transfuse for hemoglobin < 7        Thrombocytopenia    Likely 2/2 relapsed disease  - platelets count 48,000  - transfuse for plt <10K        HIV disease    - CD4 low at 11.4%, Absolute CD4 469 and HIV RNA not detected from 6/21  - As per ID: continue triumeq and itraconazole        Fever    - ?infection or disease related  - blood cultures no growth thus far from 6/24/17, CXR unremarkable.   - on Cefepime (started 6/24 - discontinued 6/28)  - received  dose of Vancomycin  - afebrile since 6/24/17 at 1724        Anxiety    - PRN Xanax        Prostate hypertrophy    - Continue home flomax        Electrolyte abnormality    - ordered prn electrolyte replacement per protocol             VTE Risk Mitigation         Ordered     Place sequential compression device  Until discontinued      06/20/17 2005     High Risk of VTE  Once      06/20/17 1901          Disposition: pending completion of chemo and count recovery    Arminda Olivares NP  Bone Marrow Transplant  [facility]

## 2017-06-29 NOTE — PROGRESS NOTES
Cytarabine (PF) (CYTOSAR) 2,000 mg/m2 = 4,260 mg in sodium chloride 0.9% 250 mL chemo infusion started through left chest oquendo, noted for having positive blood return to infuse over 30 minutes. Dosage and BSA checked by two chemotherapy certified nurses, Susi Francis RN and Shahnaz Saenz RN. Premedications given prior to starting infusion. Order and consent in chart/EPIC. Chemotherapy education completed at this time. Chemotherapeutic precautions in place throughout therapy. Will continue to monitor

## 2017-06-29 NOTE — PLAN OF CARE
Problem: Patient Care Overview  Goal: Plan of Care Review  Outcome: Ongoing (interventions implemented as appropriate)  Patient remains free from falls and injury this shift. Bed in low, locked position with call bell in reach. Family at bedside. Bed alarm active. Patient encouraged to call for assistance when getting out of bed. Patient verbalized understanding. All belongings within reach. Pain remains a key problem associated with spinal stenosis and disc bulges of L2, L3, L4 and L5. Pain managed with  dilaudid PCA pump. Pain has remained at an acceptable level for the patient throughout the shift. Continued treatment with neupogen. Chemotherapy scheduled for tonight. Patient's respiratory rate and Co2 output remain WDL. Patient is able to ambulate independently, and reports adequate appetite with no N/V or diarrhea. Will continue to monitor.

## 2017-06-29 NOTE — ASSESSMENT & PLAN NOTE
- ?infection or disease related  - blood cultures no growth thus far from 6/24/17, CXR unremarkable.   - on Cefepime (started 6/24 - discontinued 6/28)  - received dose of Vancomycin  - afebrile since 6/24/17 at 1724

## 2017-06-29 NOTE — PLAN OF CARE
Problem: Patient Care Overview  Goal: Plan of Care Review  Outcome: Ongoing (interventions implemented as appropriate)  Pt remained free of falls and injury. Bed in low locked position side rails up x2 with call light and belongings in reach. Non skid socks in place. CT of lumbar spine complete. Dilaudid PCA continued. Pt received Idarubicin, Fludarabine and Cytarabine. Pt tolerated well. No acute events thus far. All VS stable. Will continue to monitor.

## 2017-06-29 NOTE — PROGRESS NOTES
idarubicin (IDAMYCIN) 21 mg in sodium chloride 0.9% 50 mL chemo infusion started through left chest oquendo, noted for having positive blood return to infuse over 15 minutes. Dosage and BSA checked by two chemotherapy certified nurses, Susi Francis, JIN and Magalis Mann RN. Premedications given prior to starting infusion. Order and consent in chart/EPIC. Chemotherapy education completed at this time. Chemotherapeutic precautions in place throughout therapy. Will continue to monitor.

## 2017-06-29 NOTE — ASSESSMENT & PLAN NOTE
- related to Neupogen  - started on zyrtec, lidocaine patch and Flexeril  - improved with Dilaudid PCA  - CT spine did not show any acute findings, revealed chronic degenerative changes, spinal cord stenosis and narrowing

## 2017-06-29 NOTE — PROGRESS NOTES
Fludarabine (FLUDARA) 65 mg in sodium chloride 0.9% 100 mL chemo infusion started through left chest oquendo, noted for having positive blood return to infuse over 30 minutes. Dosage and BSA checked by two chemotherapy certified nurses, Susi Francis, JIN and Magalis Mann RN. Premedications given prior to starting infusion. Order and consent in chart/EPIC. Chemotherapy education completed at this time. Chemotherapeutic precautions in place throughout therapy. Will continue to monitor

## 2017-06-29 NOTE — ASSESSMENT & PLAN NOTE
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 Blast of 62%  - awaiting mutation analysis and cytogenetics; FLT-3 negative   - discontinued ppx allopurinol    - Jaw pain and pain in right cranial nerve 7 distribution with persistent, severe headaches concerning for CNS involvement - underwent IT chemotherapy on 6/23. Flow negative for disease. CSF LDH was 29.  - Had previous reaction to MEC (etoposide caused full body rash). Did not qualify for the Tolero trial due to receiving IT chemotherapy    - Day 3 of FLAG-BETZY

## 2017-06-30 PROBLEM — T45.1X5A TUMOR LYSIS SYNDROME FOLLOWING ANTINEOPLASTIC DRUG THERAPY: Status: ACTIVE | Noted: 2017-01-01

## 2017-06-30 PROBLEM — E88.3 TUMOR LYSIS SYNDROME FOLLOWING ANTINEOPLASTIC DRUG THERAPY: Status: ACTIVE | Noted: 2017-01-01

## 2017-06-30 NOTE — PHYSICIAN QUERY
PT Name: Paul E Sistrunk  MR #: [id number]    Physician Query Form - Cause and Effect Relationship Clarification      CDS/: Darshana Salmeron RN, CCDS               Contact information:  [email or web address]    This form is a permanent document in the medical record.     Query Date: June 30, 2017    By submitting this query, we are merely seeking further clarification of documentation. Please utilize your independent clinical judgment when addressing the question(s) below.    The Medical record contains the following:  Supporting Clinical Findings   Location in record   Day +4 from FLAG-Lynette reinduction for AML relapsing following alloSCT.               Previous CSF studies no suggestive of overt CNS leukemia and receiving [id number].                                                                                                                                  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           Are the conditions:     [ x ] Due to or associated with each other     [  ] Unrelated to each other     [  ] Other (Please Specify): _________________________     [  ] Clinically Undetermined

## 2017-06-30 NOTE — ASSESSMENT & PLAN NOTE
- 291 days post Flu/Bu/ATG MUD allogeneic transplant for high risk AML after his second IDAC (6/20/16 - 6/24/16) after a prolonged hospitalization (2/5/16 - 3/21/16) for induction with 7&3 and reinduction with MEC to remission and IDAC (4/4/16 - 4/9/16).   - continue ppx acyclovir, itraconazole   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   - chimerics from marrow done 6/21 show CD3 of 90% donor and 10% recipient, but CD34 of 5% donor and 95% recipient

## 2017-06-30 NOTE — ASSESSMENT & PLAN NOTE
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 Blast of 62%  - awaiting mutation analysis and cytogenetics; FLT-3 negative   - discontinued ppx allopurinol    - Jaw pain and pain in right cranial nerve 7 distribution with persistent, severe headaches concerning for CNS involvement - underwent IT chemotherapy on 6/23. Flow negative for disease. CSF LDH was 29.  - Had previous reaction to MEC (etoposide caused full body rash). Did not qualify for the Tolero trial due to receiving IT chemotherapy    - Day 4 of FLAG-BETZY

## 2017-06-30 NOTE — PROGRESS NOTES
Idarubicin (IDAMYCIN) 21 mg in sodium chloride 0.9% 50 mL chemo infusion started through left chest oquendo, noted for having positive blood return to infuse over 15 minutes. Dosage and BSA checked by two chemotherapy certified nurses, Susi Francis, JIN and Shahnaz Saenz RN. Premedications given prior to starting infusion. Order and consent in chart/EPIC. Chemotherapy education completed at this time. Chemotherapeutic precautions in place throughout therapy. Will continue to monitor

## 2017-06-30 NOTE — ASSESSMENT & PLAN NOTE
- creatinine up to 1.4, LDH >4000 and uric acid 8.4  - IVF increased  - will start allopurinol 300 mg bid

## 2017-06-30 NOTE — ASSESSMENT & PLAN NOTE
- creatinine rising again, up to 1.4 today  - likely due to TLS, LDH >4000, uric acid 8.4 this am  - will increase IVF to 125 cc/hr and start allopurinol  - avoid nephrotoxic medications

## 2017-06-30 NOTE — PROGRESS NOTES
Fludarabine (FLUDARA) 65 mg in sodium chloride 0.9% 100 mL chemo infusion started through left chest oquendo, noted for having positive blood return to infuse over 30 minutes. Dosage and BSA checked by two chemotherapy certified nurses, Susi Francis RN and Shahnaz Saenz RN. Premedications given prior to starting infusion. Order and consent in chart/EPIC. Chemotherapy education completed at this time. Chemotherapeutic precautions in place throughout therapy. Will continue to monitor

## 2017-06-30 NOTE — PROGRESS NOTES
[facility]  Hematology  Bone Marrow Transplant  Progress Note    Patient Name: Paul E Sistrunk  Admission Date: 6/20/2017  Hospital Length of Stay: 10 days  Code Status: Full Code    Subjective:     Interval History:   - Day 4 of FLAG-BETZY for relapsed AML after a MUD Allogenic stem cell transplant  - back pain much improved on PCA pump, patient requests to keep PCA pump for now  - patient complains of numbness to left side of the face (reports has moved from the right side)  - VSS Afebrile.    Objective:     Vital Signs (Most Recent):  Temp: 98.6 °F (37 °C) (06/30/17 1131)  Pulse: 94 (06/30/17 1131)  Resp: 16 (06/30/17 1131)  BP: 131/71 (06/30/17 1131)  SpO2: 96 % (06/30/17 1131) Vital Signs (24h Range):  Temp:  [98.1 °F (36.7 °C)-98.9 °F (37.2 °C)] 98.6 °F (37 °C)  Pulse:  [] 94  Resp:  [16-17] 16  SpO2:  [95 %-96 %] 96 %  BP: (115-139)/(59-71) 131/71     Weight: 92.4 kg (203 lb 11.3 oz)  Body mass index is 26.88 kg/m².  Body surface area is 2.18 meters squared.    ECOG SCORE           Intake/Output - Last 3 Shifts       06/28 0700 - 06/29 0659 06/29 0700 - 06/30 0659 06/30 0700 - 07/01 0659    P.O. 1385 1560 360    I.V. (mL/kg) 1579 (17.4) 1768.8 (19.1) 481.3 (5.2)    Blood 285      IV Piggyback 400 400     Total Intake(mL/kg) 3649 (40.2) 3728.8 (40.4) 841.3 (9.1)    Urine (mL/kg/hr) 2000 (0.9) 3000 (1.4) 625 (1.1)    Stool       Total Output 2000 3000 625    Net +1649 +728.8 +216.3                 Physical Exam   Constitutional: He is oriented to person, place, and time. He appears well-developed and well-nourished. No distress.   HENT:   Head: Normocephalic.   Mouth/Throat: Oropharynx is clear and moist. No oropharyngeal exudate.   Eyes: Conjunctivae are normal. Pupils are equal, round, and reactive to light. No scleral icterus.   Neck: Normal range of motion. Neck supple. Normal range of motion present. No thyromegaly present.   Cardiovascular: Normal rate, regular rhythm, normal heart  sounds and intact distal pulses.    Pulmonary/Chest: Effort normal and breath sounds normal. No respiratory distress.   Abdominal: Soft. Bowel sounds are normal. He exhibits no distension. There is no tenderness.   Musculoskeletal: Normal range of motion. He exhibits no edema or tenderness.   Lymphadenopathy:        Head (right side): No submandibular, no preauricular, no posterior auricular and no occipital adenopathy present.        Head (left side): No submandibular, no preauricular, no posterior auricular and no occipital adenopathy present.     He has no cervical adenopathy.     He has no axillary adenopathy.   Neurological: He is alert and oriented to person, place, and time. No cranial nerve deficit. Coordination normal.   Skin: Skin is warm and dry. No petechiae and no rash noted. No pallor.   Psychiatric: He has a normal mood and affect. Thought content normal.   Vitals reviewed.      Significant Labs:   CBC:   Recent Labs  Lab 06/29/17  0442 06/30/17  0335   WBC 13.70* 18.31*   HGB 7.3* 6.7*   HCT 21.3* 19.7*   PLT 49* 32*    and CMP:   Recent Labs  Lab 06/29/17  0442 06/30/17  0335    139   K 4.5 4.3    107   CO2 23 23   * 131*   BUN 26* 33*   CREATININE 1.2 1.4   CALCIUM 8.9 8.9   PROT 6.1 5.7*   ALBUMIN 2.8* 2.6*   BILITOT 0.5 0.4   ALKPHOS 207* 189*   AST 59* 55*   ALT 45* 36   ANIONGAP 10 9   EGFRNONAA >60.0 56.6*       Diagnostic Results:  None    Assessment/Plan:     * AML (acute myeloid leukemia) in relapse    - relapsed AML - peripheral blood shows 30% blasts on admit  - 2D echo shows 60% EF  - Mazariegos placed, local measures to control bleeding, gen surg placed more sutures, now stopped bleeding  - BM biopsy results - consistent with 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 Blast of 62%  - awaiting mutation analysis and cytogenetics; FLT-3 negative   - discontinued ppx allopurinol    - Jaw pain and pain in right cranial nerve 7 distribution with  persistent, severe headaches concerning for CNS involvement - underwent IT chemotherapy on 6/23. Flow negative for disease. CSF LDH was 29.  - Had previous reaction to MEC (etoposide caused full body rash). Did not qualify for the Tolero trial due to receiving IT chemotherapy    - Day 4 of FLAG-BETZY           S/P allogeneic bone marrow transplant    - 291 days post Flu/Bu/ATG MUD allogeneic transplant for high risk AML after his second IDAC (6/20/16 - 6/24/16) after a prolonged hospitalization (2/5/16 - 3/21/16) for induction with 7&3 and reinduction with MEC to remission and IDAC (4/4/16 - 4/9/16).   - continue ppx acyclovir, itraconazole   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   - chimerics from marrow done 6/21 show CD3 of 90% donor and 10% recipient, but CD34 of 5% donor and 95% recipient         NICOLE (acute kidney injury)    - creatinine rising again, up to 1.4 today  - likely due to TLS, LDH >4000, uric acid 8.4 this am  - will increase IVF to 125 cc/hr and start allopurinol  - avoid nephrotoxic medications        Chronic bilateral low back pain without sciatica    - related to Neupogen  - started on zyrtec, lidocaine patch and Flexeril  - improved with Dilaudid PCA, patient requests to keep PCA pump for now  - CT spine did not show any acute findings, revealed chronic degenerative changes, spinal cord stenosis and narrowing        Anemia in neoplastic disease    - hemoglobin 6.7  grams/dl, likely from relapsed disease and recent bleeding   - transfuse for hemoglobin < 7  - will transfuse 1 unit PRBC this am        Thrombocytopenia    Likely 2/2 relapsed disease  - platelets count 32,000  - transfuse for plt <10K        HIV disease    - CD4 low at 11.4%, Absolute CD4 469 and HIV RNA not detected from 6/21  - As per ID: continue triumeq and itraconazole        Fever    - ?infection or disease related  - blood cultures no growth thus far from 6/24/17, CXR unremarkable.   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

## 2017-06-30 NOTE — ASSESSMENT & PLAN NOTE
- related to Neupogen  - started on zyrtec, lidocaine patch and Flexeril  - improved with Dilaudid PCA, patient requests to keep PCA pump for now  - CT spine did not show any acute findings, revealed chronic degenerative changes, spinal cord stenosis and narrowing

## 2017-06-30 NOTE — PLAN OF CARE
MDR's with Dr Shin.  Patient is day 4 of his FLAG Lynette induction.  His back pain has improved.  On a Dilaudid PCA.  D/c pending completion of chemo and count recovery.  Will continue to follow.

## 2017-06-30 NOTE — PLAN OF CARE
Problem: Patient Care Overview  Goal: Plan of Care Review  Outcome: Ongoing (interventions implemented as appropriate)  Patient remains free from falls and injury this shift. Bed in low, locked position with call bell in reach. Family at bedside. Patient encouraged to call for assistance when getting out of bed. Patient verbalized understanding. All belongings within reach. Pain remains a key problem associated with spinal stenosis and disc bulges of L2, L3, L4 and L5. Pain managed with  dilaudid PCA pump. Pain less controlled with PCA, patient encouraged to use pain button more frequently. Continued treatment with neupogen. Chemotherapy scheduled for tonight. Patient's respiratory rate and Co2 output remain WDL. Patient is able to ambulate independently, and reports adequate appetite with no N/V or diarrhea. Will continue to monitor.

## 2017-06-30 NOTE — PLAN OF CARE
Problem: Patient Care Overview  Goal: Plan of Care Review  Outcome: Ongoing (interventions implemented as appropriate)  Pt remained free of falls and injury. Bed in low locked position side rails up x2 with call light and belongings in reach. Non skid socks in place. Dilaudid PCA continued. Pt received Idarubicin, Fludarabine and Cytarabine. Pt tolerated well. Pt to receive 1 U PRBC. No acute events thus far. All VS stable. Will continue to monitor.

## 2017-06-30 NOTE — SUBJECTIVE & OBJECTIVE
Subjective:     Interval History:   - Day 4 of FLAG-BETZY for relapsed AML after a MUD Allogenic stem cell transplant  - back pain much improved on PCA pump, patient requests to keep PCA pump for now  - patient complains of numbness to left side of the face (reports has moved from the right side)  - VSS Afebrile.    Objective:     Vital Signs (Most Recent):  Temp: 98.6 °F (37 °C) (06/30/17 1131)  Pulse: 94 (06/30/17 1131)  Resp: 16 (06/30/17 1131)  BP: 131/71 (06/30/17 1131)  SpO2: 96 % (06/30/17 1131) Vital Signs (24h Range):  Temp:  [98.1 °F (36.7 °C)-98.9 °F (37.2 °C)] 98.6 °F (37 °C)  Pulse:  [] 94  Resp:  [16-17] 16  SpO2:  [95 %-96 %] 96 %  BP: (115-139)/(59-71) 131/71     Weight: 92.4 kg (203 lb 11.3 oz)  Body mass index is 26.88 kg/m².  Body surface area is 2.18 meters squared.    ECOG SCORE           Intake/Output - Last 3 Shifts       06/28 0700 - 06/29 0659 06/29 0700 - 06/30 0659 06/30 0700 - 07/01 0659    P.O. 1385 1560 360    I.V. (mL/kg) 1579 (17.4) 1768.8 (19.1) 481.3 (5.2)    Blood 285      IV Piggyback 400 400     Total Intake(mL/kg) 3649 (40.2) 3728.8 (40.4) 841.3 (9.1)    Urine (mL/kg/hr) 2000 (0.9) 3000 (1.4) 625 (1.1)    Stool       Total Output 2000 3000 625    Net +1649 +728.8 +216.3                 Physical Exam   Constitutional: He is oriented to person, place, and time. He appears well-developed and well-nourished. No distress.   HENT:   Head: Normocephalic.   Mouth/Throat: Oropharynx is clear and moist. No oropharyngeal exudate.   Eyes: Conjunctivae are normal. Pupils are equal, round, and reactive to light. No scleral icterus.   Neck: Normal range of motion. Neck supple. Normal range of motion present. No thyromegaly present.   Cardiovascular: Normal rate, regular rhythm, normal heart sounds and intact distal pulses.    Pulmonary/Chest: Effort normal and breath sounds normal. No respiratory distress.   Abdominal: Soft. Bowel sounds are normal. He exhibits no distension. There is no  tenderness.   Musculoskeletal: Normal range of motion. He exhibits no edema or tenderness.   Lymphadenopathy:        Head (right side): No submandibular, no preauricular, no posterior auricular and no occipital adenopathy present.        Head (left side): No submandibular, no preauricular, no posterior auricular and no occipital adenopathy present.     He has no cervical adenopathy.     He has no axillary adenopathy.   Neurological: He is alert and oriented to person, place, and time. No cranial nerve deficit. Coordination normal.   Skin: Skin is warm and dry. No petechiae and no rash noted. No pallor.   Psychiatric: He has a normal mood and affect. Thought content normal.   Vitals reviewed.      Significant Labs:   CBC:   Recent Labs  Lab 06/29/17  0442 06/30/17  0335   WBC 13.70* 18.31*   HGB 7.3* 6.7*   HCT 21.3* 19.7*   PLT 49* 32*    and CMP:   Recent Labs  Lab 06/29/17  0442 06/30/17  0335    139   K 4.5 4.3    107   CO2 23 23   * 131*   BUN 26* 33*   CREATININE 1.2 1.4   CALCIUM 8.9 8.9   PROT 6.1 5.7*   ALBUMIN 2.8* 2.6*   BILITOT 0.5 0.4   ALKPHOS 207* 189*   AST 59* 55*   ALT 45* 36   ANIONGAP 10 9   EGFRNONAA >60.0 56.6*       Diagnostic Results:  None

## 2017-06-30 NOTE — PROGRESS NOTES
Cytarabine (PF) (CYTOSAR) 2,000 mg/m2 = 4,260 mg in sodium chloride 0.9% 250 mL chemo infusion started through left chest oquendo, noted for having positive blood return to infuse over 3 hours. Dosage and BSA checked by two chemotherapy certified nurses, Susi Francis RN and Isac Thakkar RN. Premedications given prior to starting infusion. Order and consent in chart/EPIC. Chemotherapy education completed at this time. Chemotherapeutic precautions in place throughout therapy. Will continue to monitor

## 2017-06-30 NOTE — ASSESSMENT & PLAN NOTE
- hemoglobin 6.7  grams/dl, likely from relapsed disease and recent bleeding   - transfuse for hemoglobin < 7  - will transfuse 1 unit PRBC this am

## 2017-06-30 NOTE — PHYSICIAN QUERY
PT Name: Paul E Sistrunk  MR #: [id number]     Physician Query Form - Diagnosis Clarification      CDS/: Darshana Salmeron RN, CCDS               Contact information:  [email or web address]    This form is a permanent document in the medical record.     Query Date: June 30, 2017    By submitting this query, we are merely seeking further clarification of documentation.  Please utilize your independent clinical judgment when addressing the question(s) below.     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 Complains of intermittent numbness on the chin and cheek.   - Continues on cefepime.     S/P allogeneic bone marrow transplant  - 288 days post Flu/Bu/ATG MUD allogeneic transplant for high risk AML after his second IDAC (6/20/16 - 6/24/16) after a prolonged hospitalization (2/5/16 - 3/21/16) for induction with 7&3 and reinduction with MEC to remission and IDAC (4/4/16 - 4/9/16).   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   - chimerics from marrow done 6/21 show CD3 of 90% donor and 10% recipient, but CD34 of 5% donor and 95% recipient     Bone Marrow Transplant progress note 06/27    Bone Marrow Transplant progress note 06/27        Bone Marrow Transplant progress note 06/27                Bone Marrow Transplant progress note 06/30 1:39 PM    Bone Marrow Transplant progress note 06/30 1:39 PM     Please clarify if the GVHD diagnosis has been:    [  ] Ruled In  [  ] Ruled In, Now Resolved  [  ] Resolved Prior to My Assessment  [ x ]  Ruled Out  [  ] Clinically undetermined  [  ] Other/Clarification of findings (please specify)_______________________________    Please document in your progress notes daily for the duration of treatment, until resolved, and include in your discharge summary.

## 2017-07-01 NOTE — ASSESSMENT & PLAN NOTE
- creatinine dopwn to 1.2 today  - likely due to TLS, LDH >4000, uric acid 7.0 this am  - will increase IVF to 125 cc/hr and started allopurinol  - avoid nephrotoxic medications

## 2017-07-01 NOTE — PLAN OF CARE
Problem: Patient Care Overview  Goal: Plan of Care Review  Outcome: Ongoing (interventions implemented as appropriate)  Pt completed day 4/6 of FLAG regimen. Afebrile. Educated pt on fall risk. Non skid socks applied. Bed low and locked. Call light in reach. Encouraged calling nurse for assistance. No acute events over night. Pt's sats 89% on room air, so pt placed on oxygen 2L via nasal cannula.  Continued on dilaudid pca 0.4 every 12 min with a 0.5 continuous rate.   Plan of care reviewed with pt. Progressing towards discharge

## 2017-07-01 NOTE — NURSING
Chemotherapy consent obtained. Chemotherapy administration record (CAR) calculations done and checked by 2 RNs. Positive blood return noted from pt's oquendo.  cytarabine checked against pt's armband and order by 2 RNs. Cytarabine hung on an infusion pump over 3 hours per order. Dexamethasone eye drops given every 6 hours.

## 2017-07-01 NOTE — SUBJECTIVE & OBJECTIVE
Subjective:     Interval History: Pt complains of cough and soreness on sides.  Pt denies CP, SOB, fever, chills.    Review of Systems   Constitutional: Negative for chills and fever.   HENT: Negative for congestion.    Respiratory: Positive for cough. Negative for sputum production and shortness of breath.    Cardiovascular: Negative for chest pain and leg swelling.   Gastrointestinal: Negative for abdominal pain, constipation, diarrhea, nausea and vomiting.   Musculoskeletal:        Bilateral side pain   Neurological: Negative for headaches.     Objective:     Vital Signs (Most Recent):  Temp: 98.6 °F (37 °C) (07/01/17 0816)  Pulse: 102 (07/01/17 0817)  Resp: 20 (07/01/17 0816)  BP: (!) 142/70 (07/01/17 0816)  SpO2: (!) 92 % (07/01/17 0817) Vital Signs (24h Range):  Temp:  [98.4 °F (36.9 °C)-98.7 °F (37.1 °C)] 98.6 °F (37 °C)  Pulse:  [] 102  Resp:  [13-20] 20  SpO2:  [85 %-96 %] 92 %  BP: (131-154)/(65-81) 142/70     Weight: 91.1 kg (200 lb 13.4 oz)  Body mass index is 26.5 kg/m².  Body surface area is 2.17 meters squared.    ECOG SCORE         [unfilled]    Intake/Output - Last 3 Shifts       06/29 0700 - 06/30 0659 06/30 0700 - 07/01 0659 07/01 0700 - 07/02 0659    P.O. 1560 1020     I.V. (mL/kg) 1768.8 (19.1) 2503.1 (27.5)     Blood       IV Piggyback 400 400     Total Intake(mL/kg) 3728.8 (40.4) 3923.1 (43.1)     Urine (mL/kg/hr) 3000 (1.4) 4050 (1.9)     Total Output 3000 4050      Net +728.8 -126.9                   Physical Exam   Constitutional: He is oriented to person, place, and time. He appears well-developed and well-nourished. No distress.   HENT:   Head: Normocephalic and atraumatic.   Cardiovascular: Normal rate, regular rhythm and normal heart sounds.  Exam reveals no gallop and no friction rub.    No murmur heard.  Pulmonary/Chest: Effort normal and breath sounds normal. No respiratory distress. He has no wheezes. He has no rales. He exhibits no tenderness.   Abdominal: Soft. Bowel sounds  are normal. He exhibits no distension. There is no tenderness.   Neurological: He is alert and oriented to person, place, and time.   Skin: He is not diaphoretic.       Significant Labs:   Recent Results (from the past 24 hour(s))   Uric acid    Collection Time: 06/30/17 10:05 AM   Result Value Ref Range    Uric Acid 8.4 (H) 3.4 - 7.0 mg/dL   Lactate dehydrogenase    Collection Time: 06/30/17 10:05 AM   Result Value Ref Range    LD 4,291 (H) 110 - 260 U/L   Protime-INR    Collection Time: 06/30/17 10:05 AM   Result Value Ref Range    Prothrombin Time 14.0 (H) 9.0 - 12.5 sec    INR 1.4 (H) 0.8 - 1.2   APTT    Collection Time: 06/30/17 10:05 AM   Result Value Ref Range    aPTT 21.4 21.0 - 32.0 sec   Magnesium    Collection Time: 07/01/17  4:48 AM   Result Value Ref Range    Magnesium 2.1 1.6 - 2.6 mg/dL   Comprehensive metabolic panel    Collection Time: 07/01/17  4:48 AM   Result Value Ref Range    Sodium 139 136 - 145 mmol/L    Potassium 4.5 3.5 - 5.1 mmol/L    Chloride 106 95 - 110 mmol/L    CO2 23 23 - 29 mmol/L    Glucose 97 70 - 110 mg/dL    BUN, Bld 35 (H) 6 - 20 mg/dL    Creatinine 1.2 0.5 - 1.4 mg/dL    Calcium 9.0 8.7 - 10.5 mg/dL    Total Protein 5.9 (L) 6.0 - 8.4 g/dL    Albumin 2.8 (L) 3.5 - 5.2 g/dL    Total Bilirubin 0.8 0.1 - 1.0 mg/dL    Alkaline Phosphatase 205 (H) 55 - 135 U/L    AST 51 (H) 10 - 40 U/L    ALT 39 10 - 44 U/L    Anion Gap 10 8 - 16 mmol/L    eGFR if African American >60.0 >60 mL/min/1.73 m^2    eGFR if non African American >60.0 >60 mL/min/1.73 m^2   Phosphorus    Collection Time: 07/01/17  4:48 AM   Result Value Ref Range    Phosphorus 4.8 (H) 2.7 - 4.5 mg/dL   CBC auto differential    Collection Time: 07/01/17  4:48 AM   Result Value Ref Range    WBC 2.98 (L) 3.90 - 12.70 K/uL    RBC 2.42 (L) 4.60 - 6.20 M/uL    Hemoglobin 7.4 (L) 14.0 - 18.0 g/dL    Hematocrit 21.5 (L) 40.0 - 54.0 %    MCV 89 82 - 98 fL    MCH 30.6 27.0 - 31.0 pg    MCHC 34.4 32.0 - 36.0 %    RDW 16.2 (H) 11.5 -  14.5 %    MPV SEE COMMENT 9.2 - 12.9 fL   Uric acid    Collection Time: 07/01/17  4:48 AM   Result Value Ref Range    Uric Acid 7.0 3.4 - 7.0 mg/dL   Lactate dehydrogenase    Collection Time: 07/01/17  4:48 AM   Result Value Ref Range    LD 3,553 (H) 110 - 260 U/L         Diagnostic Results:  Imaging reviewed

## 2017-07-01 NOTE — PROGRESS NOTES
[facility]  Hematology  Bone Marrow Transplant  Progress Note    Patient Name: Paul E Sistrunk  Admission Date: 6/20/2017  Hospital Length of Stay: 11 days  Code Status: Full Code    Subjective:     Interval History: Pt complains of cough and soreness on sides.  Pt denies CP, SOB, fever, chills.    Review of Systems   Constitutional: Negative for chills and fever.   HENT: Negative for congestion.    Respiratory: Positive for cough. Negative for sputum production and shortness of breath.    Cardiovascular: Negative for chest pain and leg swelling.   Gastrointestinal: Negative for abdominal pain, constipation, diarrhea, nausea and vomiting.   Musculoskeletal:        Bilateral side pain   Neurological: Negative for headaches.     Objective:     Vital Signs (Most Recent):  Temp: 98.6 °F (37 °C) (07/01/17 0816)  Pulse: 102 (07/01/17 0817)  Resp: 20 (07/01/17 0816)  BP: (!) 142/70 (07/01/17 0816)  SpO2: (!) 92 % (07/01/17 0817) Vital Signs (24h Range):  Temp:  [98.4 °F (36.9 °C)-98.7 °F (37.1 °C)] 98.6 °F (37 °C)  Pulse:  [] 102  Resp:  [13-20] 20  SpO2:  [85 %-96 %] 92 %  BP: (131-154)/(65-81) 142/70     Weight: 91.1 kg (200 lb 13.4 oz)  Body mass index is 26.5 kg/m².  Body surface area is 2.17 meters squared.    ECOG SCORE         [unfilled]    Intake/Output - Last 3 Shifts       06/29 0700 - 06/30 0659 06/30 0700 - 07/01 0659 07/01 0700 - 07/02 0659    P.O. 1560 1020     I.V. (mL/kg) 1768.8 (19.1) 2503.1 (27.5)     Blood       IV Piggyback 400 400     Total Intake(mL/kg) 3728.8 (40.4) 3923.1 (43.1)     Urine (mL/kg/hr) 3000 (1.4) 4050 (1.9)     Total Output 3000 4050      Net +728.8 -126.9                   Physical Exam   Constitutional: He is oriented to person, place, and time. He appears well-developed and well-nourished. No distress.   HENT:   Head: Normocephalic and atraumatic.   Cardiovascular: Normal rate, regular rhythm and normal heart sounds.  Exam reveals no gallop and no friction  rub.    No murmur heard.  Pulmonary/Chest: Effort normal and breath sounds normal. No respiratory distress. He has no wheezes. He has no rales. He exhibits no tenderness.   Abdominal: Soft. Bowel sounds are normal. He exhibits no distension. There is no tenderness.   Neurological: He is alert and oriented to person, place, and time.   Skin: He is not diaphoretic.       Significant Labs:   Recent Results (from the past 24 hour(s))   Uric acid    Collection Time: 06/30/17 10:05 AM   Result Value Ref Range    Uric Acid 8.4 (H) 3.4 - 7.0 mg/dL   Lactate dehydrogenase    Collection Time: 06/30/17 10:05 AM   Result Value Ref Range    LD 4,291 (H) 110 - 260 U/L   Protime-INR    Collection Time: 06/30/17 10:05 AM   Result Value Ref Range    Prothrombin Time 14.0 (H) 9.0 - 12.5 sec    INR 1.4 (H) 0.8 - 1.2   APTT    Collection Time: 06/30/17 10:05 AM   Result Value Ref Range    aPTT 21.4 21.0 - 32.0 sec   Magnesium    Collection Time: 07/01/17  4:48 AM   Result Value Ref Range    Magnesium 2.1 1.6 - 2.6 mg/dL   Comprehensive metabolic panel    Collection Time: 07/01/17  4:48 AM   Result Value Ref Range    Sodium 139 136 - 145 mmol/L    Potassium 4.5 3.5 - 5.1 mmol/L    Chloride 106 95 - 110 mmol/L    CO2 23 23 - 29 mmol/L    Glucose 97 70 - 110 mg/dL    BUN, Bld 35 (H) 6 - 20 mg/dL    Creatinine 1.2 0.5 - 1.4 mg/dL    Calcium 9.0 8.7 - 10.5 mg/dL    Total Protein 5.9 (L) 6.0 - 8.4 g/dL    Albumin 2.8 (L) 3.5 - 5.2 g/dL    Total Bilirubin 0.8 0.1 - 1.0 mg/dL    Alkaline Phosphatase 205 (H) 55 - 135 U/L    AST 51 (H) 10 - 40 U/L    ALT 39 10 - 44 U/L    Anion Gap 10 8 - 16 mmol/L    eGFR if African American >60.0 >60 mL/min/1.73 m^2    eGFR if non African American >60.0 >60 mL/min/1.73 m^2   Phosphorus    Collection Time: 07/01/17  4:48 AM   Result Value Ref Range    Phosphorus 4.8 (H) 2.7 - 4.5 mg/dL   CBC auto differential    Collection Time: 07/01/17  4:48 AM   Result Value Ref Range    WBC 2.98 (L) 3.90 - 12.70 K/uL    RBC  2.42 (L) 4.60 - 6.20 M/uL    Hemoglobin 7.4 (L) 14.0 - 18.0 g/dL    Hematocrit 21.5 (L) 40.0 - 54.0 %    MCV 89 82 - 98 fL    MCH 30.6 27.0 - 31.0 pg    MCHC 34.4 32.0 - 36.0 %    RDW 16.2 (H) 11.5 - 14.5 %    MPV SEE COMMENT 9.2 - 12.9 fL   Uric acid    Collection Time: 07/01/17  4:48 AM   Result Value Ref Range    Uric Acid 7.0 3.4 - 7.0 mg/dL   Lactate dehydrogenase    Collection Time: 07/01/17  4:48 AM   Result Value Ref Range    LD 3,553 (H) 110 - 260 U/L         Diagnostic Results:  Imaging reviewed      Assessment/Plan:     S/P allogeneic bone marrow transplant    - 292 days post Flu/Bu/ATG MUD allogeneic transplant for high risk AML after his second IDAC (6/20/16 - 6/24/16) after a prolonged hospitalization (2/5/16 - 3/21/16) for induction with 7&3 and reinduction with MEC to remission and IDAC (4/4/16 - 4/9/16).   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  - CT spine did not show any acute findings, revealed chronic degenerative changes, spinal cord stenosis and narrowing        Thrombocytopenia    Likely 2/2 relapsed disease  - platelets count 16,000  - transfuse for plt <10K        Electrolyte abnormality    - ordered prn electrolyte replacement per protocol         Fever    - ?infection or disease related  - blood cultures no growth thus far from 6/24/17, CXR unremarkable.   - on Cefepime (started 6/24 - discontinued 6/28)  - received dose of Vancomycin  -  afebrile since 6/24/17 at 1724  -Pt currently only on prophylactic abx        NICOLE (acute kidney injury)    - creatinine dopwn to 1.2 today  - likely due to TLS, LDH >4000, uric acid 7.0 this am  - will increase IVF to 125 cc/hr and started allopurinol  - avoid nephrotoxic medications        Anxiety    - PRN Xanax        Anemia in neoplastic disease    - hemoglobin 7.4  grams/dl, likely from relapsed disease and recent bleeding   - transfuse for hemoglobin < 7        HIV disease    - CD4 low at 11.4%, Absolute CD4 469 and HIV RNA not detected from 6/21  - As per ID: continue triumeq and itraconazole        Prostate hypertrophy    - Continue home flomax        * AML (acute myeloid leukemia) in relapse    - relapsed AML - peripheral blood shows 30% blasts on admit  - 2D echo shows 60% EF  - Mazariegos placed, local measures to control bleeding, gen surg placed more sutures, now stopped bleeding  - BM biopsy results - consistent with 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 Blast of 62%  - awaiting mutation analysis and cytogenetics; FLT-3 negative   - discontinued ppx allopurinol    - Jaw pain and pain in right cranial nerve 7 distribution with persistent, severe headaches concerning for CNS involvement - underwent IT chemotherapy on 6/23. Flow negative for disease. CSF LDH was 29.  - Had previous reaction to MEC (etoposide caused full body rash). Did not qualify for the Tolero trial due to receiving IT chemotherapy    - Day 5 of FLAG-BETZY  -Will get MRI of the brain today given neurologic complaints            VTE Risk Mitigation         Ordered     Place sequential compression device  Until discontinued      06/20/17 2005     High Risk of VTE  Once      06/20/17 1901        Diet - regular  Prophylaxis - lovenox held while plts <50k  Dispo - pending completion of chemo and improvement in counts       Mike Segal MD  Bone Marrow Transplant  [facility]

## 2017-07-01 NOTE — ASSESSMENT & PLAN NOTE
- hemoglobin 7.4  grams/dl, likely from relapsed disease and recent bleeding   - transfuse for hemoglobin < 7

## 2017-07-01 NOTE — ASSESSMENT & PLAN NOTE
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   - continue ppx acyclovir, itraconazole   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   - chimerics from marrow done 6/21 show CD3 of 90% donor and 10% recipient, but CD34 of 5% donor and 95% recipient

## 2017-07-01 NOTE — PROGRESS NOTES
Pt sent downstairs for MRI head at this time per wheelchair via transport.  He ambulated from bed to wheelchair without difficulty.  Transported with oxygen 2 LPM per nasal cannula.

## 2017-07-01 NOTE — ASSESSMENT & PLAN NOTE
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 Blast of 62%  - awaiting mutation analysis and cytogenetics; FLT-3 negative   - discontinued ppx allopurinol    - Jaw pain and pain in right cranial nerve 7 distribution with persistent, severe headaches concerning for CNS involvement - underwent IT chemotherapy on 6/23. Flow negative for disease. CSF LDH was 29.  - Had previous reaction to MEC (etoposide caused full body rash). Did not qualify for the Tolero trial due to receiving IT chemotherapy    - Day 5 of FLAG-BETZY  -Will get MRI of the brain today given neurologic complaints

## 2017-07-01 NOTE — ASSESSMENT & PLAN NOTE
- creatinine up to 1.2, LDH 3,553 and uric acid 7.0  - IVF increased  - will continue allopurinol 300 mg bid

## 2017-07-01 NOTE — ASSESSMENT & PLAN NOTE
- ?infection or disease related  - blood cultures no growth thus far from 6/24/17, CXR unremarkable.   - on Cefepime (started 6/24 - discontinued 6/28)  - received dose of Vancomycin  - afebrile since 6/24/17 at 1724  -Pt currently only on prophylactic abx

## 2017-07-01 NOTE — NURSING
Chemotherapy consent obtained. Chemotherapy administration record (CAR) calculations done and checked by 2 RNs. Positive blood return noted from pt's oquendo.  idarubicin checked against pt's armband and order by 2 RNs. Idarubicin injection pushed on a free flowing IVF over 15 minutes per order.

## 2017-07-01 NOTE — NURSING
Chemotherapy consent obtained. Chemotherapy administration record (CAR) calculations done and checked by 2 RNs. Positive blood return noted from pt's oquendo. fludarabine checked against pt's armband and order by 2 RNs. Fludarabine hung on an infusion pump over 30 minutes per order.

## 2017-07-01 NOTE — ASSESSMENT & PLAN NOTE
- related to Neupogen  - started on zyrtec, lidocaine patch and Flexeril  -Will stop PCA pump today and transition to IV dilaudid.  - CT spine did not show any acute findings, revealed chronic degenerative changes, spinal cord stenosis and narrowing

## 2017-07-02 PROBLEM — R09.02 HYPOXIA: Status: ACTIVE | Noted: 2017-01-01

## 2017-07-02 PROBLEM — R20.0 FACIAL NUMBNESS: Status: ACTIVE | Noted: 2017-01-01

## 2017-07-02 NOTE — ASSESSMENT & PLAN NOTE
- hemoglobin 6.7  grams/dl, likely from relapsed disease and recent bleeding   - transfuse for hemoglobin < 7  - Will transfuse one unit of PRBC's today.

## 2017-07-02 NOTE — SUBJECTIVE & OBJECTIVE
Past Medical History:   Diagnosis Date    Cancer 2/2016    ALL    HIV infection     Pancytopenia due to antineoplastic chemotherapy 7/4/2016       Past Surgical History:   Procedure Laterality Date    CYSTOSCOPY         Review of patient's allergies indicates:   Allergen Reactions    Etoposide Rash       Current Neurological Medications:     No current facility-administered medications on file prior to encounter.      Current Outpatient Prescriptions on File Prior to Encounter   Medication Sig    abacavir-dolutegravir-lamivud (TRIUMEQ) 600- mg Tab Take 1 tablet by mouth once daily.    acyclovir (ZOVIRAX) 800 MG Tab Take 1 tablet (800 mg total) by mouth 2 (two) times daily.    alprazolam (XANAX) 0.5 MG tablet Take 1 tablet (0.5 mg total) by mouth 2 (two) times daily as needed for Insomnia or Anxiety.    butalbital-acetaminophen-caffeine -40 mg (FIORICET, ESGIC) -40 mg per tablet     finasteride (PROSCAR) 5 mg tablet Take 1 tablet (5 mg total) by mouth once daily.    HYDROmorphone (DILAUDID) 2 MG tablet Take 1 tablet (2 mg total) by mouth every 4 (four) hours as needed for Pain.    itraconazole (SPORANOX) 10 mg/mL Soln Take 2 mLs (20 mg total) by mouth 3 (three) times daily.    loperamide (IMODIUM) 2 mg capsule Take 2 mg by mouth daily as needed for Diarrhea.    magnesium oxide (MAG-OX) 400 mg tablet TAKE THREE TABLETS BY MOUTH THREE TIMES A DAY    ondansetron (ZOFRAN) 8 MG tablet Take 1 tablet (8 mg total) by mouth every 8 (eight) hours as needed for Nausea.    pantoprazole (PROTONIX) 40 MG tablet Take 1 tablet (40 mg total) by mouth once daily.    promethazine (PHENERGAN) 12.5 MG Tab Take 1 tablet (12.5 mg total) by mouth every 6 (six) hours as needed (nausea).    tacrolimus (PROGRAF) 0.5 MG Cap Take 1 capsule (0.5 mg total) by mouth once daily.    tamsulosin (FLOMAX) 0.4 mg Cp24 Take 1 capsule (0.4 mg total) by mouth every other day.    zolpidem (AMBIEN) 5 MG Tab Take 1 tablet  (5 mg total) by mouth nightly as needed.     Family History     Problem Relation (Age of Onset)    Cancer Father        Social History Main Topics    Smoking status: Never Smoker    Smokeless tobacco: Not on file    Alcohol use No    Drug use: No    Sexual activity: Not on file     Review of Systems   Constitutional: Negative for chills and fever.   HENT: Negative for trouble swallowing.    Eyes: Negative for visual disturbance.   Respiratory: Negative for stridor.    Cardiovascular: Negative for chest pain.   Gastrointestinal: Negative for abdominal pain.   Genitourinary: Negative for dysuria.   Musculoskeletal: Negative for arthralgias and joint swelling.   Neurological: Positive for numbness. Negative for dizziness, tremors, seizures, facial asymmetry, speech difficulty, weakness, light-headedness and headaches.   Psychiatric/Behavioral: Negative for agitation, behavioral problems and confusion.     Objective:     Vital Signs (Most Recent):  Temp: 98.3 °F (36.8 °C) (07/02/17 1326)  Pulse: 95 (07/02/17 1326)  Resp: 18 (07/02/17 1326)  BP: 130/61 (07/02/17 1326)  SpO2: 96 % (07/02/17 1326) Vital Signs (24h Range):  Temp:  [98.1 °F (36.7 °C)-100.5 °F (38.1 °C)] 98.3 °F (36.8 °C)  Pulse:  [] 95  Resp:  [16-20] 18  SpO2:  [88 %-97 %] 96 %  BP: (104-159)/(54-76) 130/61     Weight: 88.9 kg (195 lb 14.1 oz)  Body mass index is 25.84 kg/m².    Physical Exam   Constitutional: He is oriented to person, place, and time. He appears well-developed. No distress.   HENT:   Head: Normocephalic and atraumatic.   Eyes: EOM are normal. Pupils are equal, round, and reactive to light.   Neck: Normal range of motion. Neck supple.   Cardiovascular: Normal rate and regular rhythm.    Pulmonary/Chest: Effort normal.   Abdominal: Soft.   Musculoskeletal: Normal range of motion.   Neurological: He is oriented to person, place, and time. He has normal strength. He has a normal Finger-Nose-Finger Test and a normal Heel to Shin  Test. Gait normal.   Reflex Scores:       Tricep reflexes are 2+ on the right side and 2+ on the left side.       Bicep reflexes are 2+ on the right side and 2+ on the left side.       Brachioradialis reflexes are 2+ on the right side and 2+ on the left side.       Patellar reflexes are 2+ on the right side and 2+ on the left side.       Achilles reflexes are 2+ on the right side and 2+ on the left side.  Psychiatric: He has a normal mood and affect. His speech is normal.       NEUROLOGICAL EXAMINATION:     MENTAL STATUS   Oriented to person, place, and time.   Registration: recalls 3 of 3 objects. Recall at 5 minutes: recalls 3 of 3 objects. Follows 3 step commands.   Attention: normal. Concentration: decreased.   Speech: speech is normal   Level of consciousness: alert  Knowledge: good.   Normal comprehension. Praxis: normal     CRANIAL NERVES     CN II   Visual fields full to confrontation.     CN III, IV, VI   Pupils are equal, round, and reactive to light.  Extraocular motions are normal.   CN III: no CN III palsy  CN VI: no CN VI palsy  Nystagmus: none   Ophthalmoparesis: none    CN V   Right facial sensation deficit: Numbness below lower lips / with affect in temperature, pain, pressure.  Jaw jerk: normal    CN VII   Facial expression full, symmetric.   Right facial weakness: none  Left facial weakness: none  Right taste: normal  Left taste: normal    CN VIII   CN VIII normal.     CN IX, X   CN IX normal.   CN X normal.     CN XI   CN XI normal.     CN XII   CN XII normal.     MOTOR EXAM   Muscle bulk: normal  Overall muscle tone: normal    Strength   Strength 5/5 throughout.     REFLEXES     Reflexes   Right brachioradialis: 2+  Left brachioradialis: 2+  Right biceps: 2+  Left biceps: 2+  Right triceps: 2+  Left triceps: 2+  Right patellar: 2+  Left patellar: 2+  Right achilles: 2+  Left achilles: 2+  Right : 2+  Left : 2+    SENSORY EXAM   Light touch normal.   Vibration normal.   Proprioception  normal.     GAIT AND COORDINATION     Gait  Gait: normal     Coordination   Finger to nose coordination: normal  Heel to shin coordination: normal    Tremor   Resting tremor: absent  Intention tremor: absent  Action tremor: absent      Significant Labs:   CBC:   Recent Labs  Lab 07/01/17 0448 07/02/17 0415   WBC 2.98* 0.66*   HGB 7.4* 6.7*   HCT 21.5* 19.7*   PLT 16* 5*     CMP:   Recent Labs  Lab 07/01/17 0448 07/02/17 0415   GLU 97 88    136   K 4.5 4.0    102   CO2 23 24   BUN 35* 28*   CREATININE 1.2 1.2   CALCIUM 9.0 8.5*   MG 2.1 1.8   PROT 5.9* 5.6*   ALBUMIN 2.8* 2.7*   BILITOT 0.8 0.9   ALKPHOS 205* 168*   AST 51* 24   ALT 39 25   ANIONGAP 10 10   EGFRNONAA >60.0 >60.0     All pertinent lab results from the past 24 hours have been reviewed.    Significant Imaging: I have reviewed and interpreted all pertinent imaging results/findings within the past 24 hours.     MRI Brain: Contrast enhanced MRI demonstrates no evidence of  acute intracranial pathology.

## 2017-07-02 NOTE — ASSESSMENT & PLAN NOTE
- related to Neupogen  - started on zyrtec, lidocaine patch and Flexeril  -PCA stopped yesterday  -Pt received a total of 15mg of IV dilaudid after pump stopped yesterday.  - CT spine did not show any acute findings, revealed chronic degenerative changes, spinal cord stenosis and narrowing

## 2017-07-02 NOTE — PLAN OF CARE
Problem: Patient Care Overview  Goal: Plan of Care Review  Outcome: Ongoing (interventions implemented as appropriate)  Pt afebrile. Vss. Educated pt on fall risk. Non skid socks applied. Bed low and locked. Call light in reach. Encouraged calling nurse for assistance. Pt's sats are 88-89% on room air, and pt became tachycardic with c/o heart palpitations. Notified md, and orders for chest xray,12 lead ekg, and incentive spirometry done. Pt did not sleep much over night, and was somewhat anxious and erratic. Xanax given.  tmax 100.0. Back pain managed with dilaudid 3mg ivp. Plan fo care reviewed with pt.

## 2017-07-02 NOTE — PROGRESS NOTES
Blood transfusion unit 1/1 started at this time through left chest oquendo catheter at 75 mL/hr, infusing w/o difficulty.  VSS.  Premeds Tylenol PO and Benadryl PO administered prior to transfusion.  Blood product consent signed and on chart.  Blood product double checked and verified by 2 nurses at bedside Sandra Briones RN and Remington Hernandez RN. Performed education on s/s of transfusion reaction (SOB, rash, chills, back pain, etc.) and pt verbalized understanding.  Will monitor pt at bedside for 15 mins.

## 2017-07-02 NOTE — ASSESSMENT & PLAN NOTE
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 Blast of 62%  - awaiting mutation analysis and cytogenetics; FLT-3 negative   - discontinued ppx allopurinol    - Jaw pain and pain in right cranial nerve 7 distribution with persistent, severe headaches concerning for CNS involvement - underwent IT chemotherapy on 6/23. Flow negative for disease. CSF LDH was 29.  - Had previous reaction to MEC (etoposide caused full body rash). Did not qualify for the Tolero trial due to receiving IT chemotherapy    - Day 6 of FLAG-BETZY  -MRI performed yesterday showed no acute abnormalities.

## 2017-07-02 NOTE — PROGRESS NOTES
Plt transfusion bag 1/1 started at this time through left chest Mazariegos catheter to gravity, infusing w/o difficulty.  VSS.  Premeds Tylenol PO and Benadryl PO administered prior to transfusion. Blood product consent signed and on chart. Blood product double checked and verified by 2 nurses at bedside Sandra Briones RN and Sonia Reyes RN. Performed education on s/s of transfusion reaction (SOB, rash, chills, back pain, etc.) and pt verbalized understanding.  Will monitor pt at bedside for 15 mins.

## 2017-07-02 NOTE — PROGRESS NOTES
[facility]  Hematology  Bone Marrow Transplant  Progress Note    Patient Name: Paul E Sistrunk  Admission Date: 6/20/2017  Hospital Length of Stay: 12 days  Code Status: Full Code    Subjective:     Interval History: Overnight patient with episodes of confusion.  Patient states it would take him a while to orient himself to the people entering the room and figure out what they were doing.  The patient also became tachycardic overnight with some desaturations.  CXR was ordered.  Pt complains of back pain 3-4/10 but better than yesterday.    Review of Systems   Constitutional: Negative for chills and fever.   HENT: Negative for congestion.    Respiratory: Negative for cough, sputum production and shortness of breath.    Cardiovascular: Negative for chest pain and leg swelling.   Gastrointestinal: Negative for abdominal pain, constipation, diarrhea, nausea and vomiting.   Musculoskeletal: Positive for back pain.   Neurological: Negative for headaches.        Episodic confusion     Objective:     Vital Signs (Most Recent):  Temp: 99.9 °F (37.7 °C) (07/02/17 0715)  Pulse: (!) 113 (07/02/17 0715)  Resp: 18 (07/02/17 0715)  BP: 129/61 (07/02/17 0715)  SpO2: 96 % (07/02/17 0715) Vital Signs (24h Range):  Temp:  [98.1 °F (36.7 °C)-100 °F (37.8 °C)] 99.9 °F (37.7 °C)  Pulse:  [101-120] 113  Resp:  [18-20] 18  SpO2:  [85 %-97 %] 96 %  BP: (129-159)/(61-76) 129/61     Weight: 88.9 kg (195 lb 14.1 oz)  Body mass index is 25.84 kg/m².  Body surface area is 2.14 meters squared.    ECOG SCORE         [unfilled]    Intake/Output - Last 3 Shifts       06/30 0700 - 07/01 0659 07/01 0700 - 07/02 0659 07/02 0700 - 07/03 0659    P.O. 1020 780     I.V. (mL/kg) 2503.1 (27.5) 2658.7 (29.9)     IV Piggyback 400 350     Total Intake(mL/kg) 3923.1 (43.1) 3788.7 (42.7)     Urine (mL/kg/hr) 4050 (1.9) 4175 (2) 200 (2.1)    Stool  0 (0)     Total Output 4050 4175 200    Net -126.9 -386.3 -200           Stool Occurrence  1 x            Physical Exam   Constitutional: He is oriented to person, place, and time. He appears well-developed and well-nourished. No distress.   HENT:   Head: Normocephalic and atraumatic.   Cardiovascular: Normal rate, regular rhythm and normal heart sounds.  Exam reveals no gallop and no friction rub.    No murmur heard.  Pulmonary/Chest: Effort normal and breath sounds normal. No respiratory distress. He has no wheezes. He has no rales. He exhibits no tenderness.   Abdominal: Soft. Bowel sounds are normal. He exhibits no distension. There is no tenderness.   Neurological: He is alert and oriented to person, place, and time.   Skin: He is not diaphoretic.       Significant Labs:   Recent Results (from the past 24 hour(s))   Magnesium    Collection Time: 07/02/17  4:15 AM   Result Value Ref Range    Magnesium 1.8 1.6 - 2.6 mg/dL   Comprehensive metabolic panel    Collection Time: 07/02/17  4:15 AM   Result Value Ref Range    Sodium 136 136 - 145 mmol/L    Potassium 4.0 3.5 - 5.1 mmol/L    Chloride 102 95 - 110 mmol/L    CO2 24 23 - 29 mmol/L    Glucose 88 70 - 110 mg/dL    BUN, Bld 28 (H) 6 - 20 mg/dL    Creatinine 1.2 0.5 - 1.4 mg/dL    Calcium 8.5 (L) 8.7 - 10.5 mg/dL    Total Protein 5.6 (L) 6.0 - 8.4 g/dL    Albumin 2.7 (L) 3.5 - 5.2 g/dL    Total Bilirubin 0.9 0.1 - 1.0 mg/dL    Alkaline Phosphatase 168 (H) 55 - 135 U/L    AST 24 10 - 40 U/L    ALT 25 10 - 44 U/L    Anion Gap 10 8 - 16 mmol/L    eGFR if African American >60.0 >60 mL/min/1.73 m^2    eGFR if non African American >60.0 >60 mL/min/1.73 m^2   Phosphorus    Collection Time: 07/02/17  4:15 AM   Result Value Ref Range    Phosphorus 4.2 2.7 - 4.5 mg/dL   CBC auto differential    Collection Time: 07/02/17  4:15 AM   Result Value Ref Range    WBC 0.66 (LL) 3.90 - 12.70 K/uL    RBC 2.20 (L) 4.60 - 6.20 M/uL    Hemoglobin 6.7 (L) 14.0 - 18.0 g/dL    Hematocrit 19.7 (LL) 40.0 - 54.0 %    MCV 90 82 - 98 fL    MCH 30.5 27.0 - 31.0 pg    MCHC 34.0 32.0 - 36.0 %     RDW 15.8 (H) 11.5 - 14.5 %    MPV SEE COMMENT 9.2 - 12.9 fL   Uric acid    Collection Time: 07/02/17  4:15 AM   Result Value Ref Range    Uric Acid 5.8 3.4 - 7.0 mg/dL   Lactate dehydrogenase    Collection Time: 07/02/17  4:15 AM   Result Value Ref Range    LD 2,151 (H) 110 - 260 U/L         Diagnostic Results:  CXR:  Mild accentuation of interstitial markings within the medial upper lung zones concerning for edema or an infectious or inflammatory process.        Assessment/Plan:     S/P allogeneic bone marrow transplant    - 292 days post Flu/Bu/ATG MUD allogeneic transplant for high risk AML after his second IDAC (6/20/16 - 6/24/16) after a prolonged hospitalization (2/5/16 - 3/21/16) for induction with 7&3 and reinduction with MEC to remission and IDAC (4/4/16 - 4/9/16).   - continue ppx acyclovir, itraconazole   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. Pt now off steroids.  - chimerics from marrow done 6/21 show CD3 of 90% donor and 10% recipient, but CD34 of 5% donor and 95% recipient         Hypoxia    -Pt de-satted overnight into the 80's  -Pt currently on 3L NC.  -Pt's chest xray shows increased interstitial markings in the middle and upper lung zones  -no signs of volume overload  -Will consider starting pt on Bactrim for PCP coverage.  WIll discuss with staff.        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  - CT spine did not show any acute findings, revealed chronic degenerative changes, spinal cord stenosis and narrowing        Thrombocytopenia    Likely 2/2 relapsed disease  - platelets count pending this AM  - transfuse for plt <10K         Electrolyte abnormality    - ordered prn electrolyte replacement per protocol         Fever    - ?infection or disease related  - blood cultures no growth thus far from 6/24/17, CXR unremarkable.   - on Cefepime (started 6/24 - discontinued 6/28)  - received dose of Vancomycin  - afebrile since 6/24/17 at 1724  -Pt currently only on prophylactic abx        NICOLE (acute kidney injury)    - creatinine 1.2 today  - NICOLE Likely form TLS, LDH 2,151 today, uric acid 5.8 this am  - Continue IVF to 125 cc/hr and allopurinol  - avoid nephrotoxic medications        Anxiety    - PRN Xanax        Anemia in neoplastic disease    - hemoglobin 6.7  grams/dl, likely from relapsed disease and recent bleeding   - transfuse for hemoglobin < 7  - Will transfuse one unit of PRBC's today.        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 Blast of 62%  - awaiting mutation analysis and cytogenetics; FLT-3 negative   - discontinued ppx allopurinol    - Jaw pain and pain in right cranial nerve 7 distribution with persistent, severe headaches concerning for CNS involvement - underwent IT chemotherapy on 6/23. Flow negative for disease. CSF LDH was 29.  - Had previous reaction to MEC (etoposide caused full body rash). Did not qualify for the Tolero trial due to receiving IT chemotherapy    - Day 6 of FLAG-BETZY  -MRI performed yesterday showed no acute abnormalities.            VTE Risk Mitigation         Ordered     Place sequential compression device   Until discontinued      06/20/17 2005     High Risk of VTE  Once      06/20/17 1901          Diet - regular  Prophylaxis - lovenox held while plts <50k  Dispo - pending completion of chemo and improvement in counts     Mike Segal MD  Bone Marrow Transplant  [facility]

## 2017-07-02 NOTE — HPI
Mr. 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     Ongoing concerns include facial numbness, hence General neurology has been consulted for the same. Symptoms described as 3-4 weeks of intermittent lower facial numbness, more so confined to areas below lower lip / chin. Also described as alternating patterns with regards to right / left localization, with intermittent associated symptoms of tingling sensations. Denied any pain, or facial weakness. Denied any local etiologies like tooth pain, decays, parotid enlargement or TMJ pathologies. Denied any recent medications change or h/o diabetes.

## 2017-07-02 NOTE — SUBJECTIVE & OBJECTIVE
Subjective:     Interval History: Overnight patient with episodes of confusion.  Patient states it would take him a while to orient himself to the people entering the room and figure out what they were doing.  The patient also became tachycardic overnight with some desaturations.  CXR was ordered.  Pt complains of back pain 3-4/10 but better than yesterday.    Review of Systems   Constitutional: Negative for chills and fever.   HENT: Negative for congestion.    Respiratory: Negative for cough, sputum production and shortness of breath.    Cardiovascular: Negative for chest pain and leg swelling.   Gastrointestinal: Negative for abdominal pain, constipation, diarrhea, nausea and vomiting.   Musculoskeletal: Positive for back pain.   Neurological: Negative for headaches.        Episodic confusion     Objective:     Vital Signs (Most Recent):  Temp: 99.9 °F (37.7 °C) (07/02/17 0715)  Pulse: (!) 113 (07/02/17 0715)  Resp: 18 (07/02/17 0715)  BP: 129/61 (07/02/17 0715)  SpO2: 96 % (07/02/17 0715) Vital Signs (24h Range):  Temp:  [98.1 °F (36.7 °C)-100 °F (37.8 °C)] 99.9 °F (37.7 °C)  Pulse:  [101-120] 113  Resp:  [18-20] 18  SpO2:  [85 %-97 %] 96 %  BP: (129-159)/(61-76) 129/61     Weight: 88.9 kg (195 lb 14.1 oz)  Body mass index is 25.84 kg/m².  Body surface area is 2.14 meters squared.    ECOG SCORE         [unfilled]    Intake/Output - Last 3 Shifts       06/30 0700 - 07/01 0659 07/01 0700 - 07/02 0659 07/02 0700 - 07/03 0659    P.O. 1020 780     I.V. (mL/kg) 2503.1 (27.5) 2658.7 (29.9)     IV Piggyback 400 350     Total Intake(mL/kg) 3923.1 (43.1) 3788.7 (42.7)     Urine (mL/kg/hr) 4050 (1.9) 4175 (2) 200 (2.1)    Stool  0 (0)     Total Output 4050 4175 200    Net -126.9 -386.3 -200           Stool Occurrence  1 x           Physical Exam   Constitutional: He is oriented to person, place, and time. He appears well-developed and well-nourished. No distress.   HENT:   Head: Normocephalic and atraumatic.    Cardiovascular: Normal rate, regular rhythm and normal heart sounds.  Exam reveals no gallop and no friction rub.    No murmur heard.  Pulmonary/Chest: Effort normal and breath sounds normal. No respiratory distress. He has no wheezes. He has no rales. He exhibits no tenderness.   Abdominal: Soft. Bowel sounds are normal. He exhibits no distension. There is no tenderness.   Neurological: He is alert and oriented to person, place, and time.   Skin: He is not diaphoretic.       Significant Labs:   Recent Results (from the past 24 hour(s))   Magnesium    Collection Time: 07/02/17  4:15 AM   Result Value Ref Range    Magnesium 1.8 1.6 - 2.6 mg/dL   Comprehensive metabolic panel    Collection Time: 07/02/17  4:15 AM   Result Value Ref Range    Sodium 136 136 - 145 mmol/L    Potassium 4.0 3.5 - 5.1 mmol/L    Chloride 102 95 - 110 mmol/L    CO2 24 23 - 29 mmol/L    Glucose 88 70 - 110 mg/dL    BUN, Bld 28 (H) 6 - 20 mg/dL    Creatinine 1.2 0.5 - 1.4 mg/dL    Calcium 8.5 (L) 8.7 - 10.5 mg/dL    Total Protein 5.6 (L) 6.0 - 8.4 g/dL    Albumin 2.7 (L) 3.5 - 5.2 g/dL    Total Bilirubin 0.9 0.1 - 1.0 mg/dL    Alkaline Phosphatase 168 (H) 55 - 135 U/L    AST 24 10 - 40 U/L    ALT 25 10 - 44 U/L    Anion Gap 10 8 - 16 mmol/L    eGFR if African American >60.0 >60 mL/min/1.73 m^2    eGFR if non African American >60.0 >60 mL/min/1.73 m^2   Phosphorus    Collection Time: 07/02/17  4:15 AM   Result Value Ref Range    Phosphorus 4.2 2.7 - 4.5 mg/dL   CBC auto differential    Collection Time: 07/02/17  4:15 AM   Result Value Ref Range    WBC 0.66 (LL) 3.90 - 12.70 K/uL    RBC 2.20 (L) 4.60 - 6.20 M/uL    Hemoglobin 6.7 (L) 14.0 - 18.0 g/dL    Hematocrit 19.7 (LL) 40.0 - 54.0 %    MCV 90 82 - 98 fL    MCH 30.5 27.0 - 31.0 pg    MCHC 34.0 32.0 - 36.0 %    RDW 15.8 (H) 11.5 - 14.5 %    MPV SEE COMMENT 9.2 - 12.9 fL   Uric acid    Collection Time: 07/02/17  4:15 AM   Result Value Ref Range    Uric Acid 5.8 3.4 - 7.0 mg/dL   Lactate  dehydrogenase    Collection Time: 07/02/17  4:15 AM   Result Value Ref Range    LD 2,151 (H) 110 - 260 U/L         Diagnostic Results:  CXR:  Mild accentuation of interstitial markings within the medial upper lung zones concerning for edema or an infectious or inflammatory process.

## 2017-07-02 NOTE — ASSESSMENT & PLAN NOTE
- Symptoms  of intermittent lower facial numbness x 3-4 weeks, more so confined to areas below lower lip / chin    - No facial muscle weakness / Negative evidence for local etiologies like tooth pain, decays, parotid enlargement or TMJ pathologies  - MRI brain : negative for central etiologies of 5th nerve involvement     - Unclear reasons for numbness. ?chemo induced, however no onset correlations.   - Distribution more so for sensory component involving B/L mental nerves  - patient reports of less severe lifestyle discomfort from symptoms    Plan:   - No further investigations or interventions warranted at this moment, for symptoms of paraesthesia described as numbness and no concerns for pain

## 2017-07-02 NOTE — ASSESSMENT & PLAN NOTE
- creatinine 1.2 today  - NICOLE Likely form TLS, LDH 2,151 today, uric acid 5.8 this am  - Continue IVF to 125 cc/hr and allopurinol  - avoid nephrotoxic medications

## 2017-07-02 NOTE — ASSESSMENT & PLAN NOTE
-Pt de-satted overnight into the 80's  -Pt currently on 3L NC.  -Pt's chest xray shows increased interstitial markings in the middle and upper lung zones  -no signs of volume overload  -Will consider starting pt on Bactrim for PCP coverage.  WIll discuss with staff.

## 2017-07-02 NOTE — CONSULTS
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     Ongoing concerns include facial numbness, hence General neurology has been consulted for the same. Symptoms described as 3-4 weeks of intermittent lower facial numbness, more so confined to areas below lower lip / chin. Also described as alternating patterns with regards to right / left localization, with intermittent associated symptoms of tingling sensations. Denied any pain, or facial weakness. Denied any local etiologies like tooth pain, decays, parotid enlargement or TMJ pathologies. Denied any recent medications change or h/o diabetes.                        Past Medical History:   Diagnosis Date    Cancer 2/2016    ALL    HIV infection     Pancytopenia due to antineoplastic chemotherapy 7/4/2016       Past Surgical History:   Procedure Laterality Date    CYSTOSCOPY         Review of patient's allergies indicates:   Allergen Reactions    Etoposide Rash       Current Neurological Medications:     No current facility-administered medications on file prior to encounter.      Current Outpatient Prescriptions on File Prior to Encounter   Medication Sig     abacavir-dolutegravir-lamivud (TRIUMEQ) 600- mg Tab Take 1 tablet by mouth once daily.    acyclovir (ZOVIRAX) 800 MG Tab Take 1 tablet (800 mg total) by mouth 2 (two) times daily.    alprazolam (XANAX) 0.5 MG tablet Take 1 tablet (0.5 mg total) by mouth 2 (two) times daily as needed for Insomnia or Anxiety.    butalbital-acetaminophen-caffeine -40 mg (FIORICET, ESGIC) -40 mg per tablet     finasteride (PROSCAR) 5 mg tablet Take 1 tablet (5 mg total) by mouth once daily.    HYDROmorphone (DILAUDID) 2 MG tablet Take 1 tablet (2 mg total) by mouth every 4 (four) hours as needed for Pain.    itraconazole (SPORANOX) 10 mg/mL Soln Take 2 mLs (20 mg total) by mouth 3 (three) times daily.    loperamide (IMODIUM) 2 mg capsule Take 2 mg by mouth daily as needed for Diarrhea.    magnesium oxide (MAG-OX) 400 mg tablet TAKE THREE TABLETS BY MOUTH THREE TIMES A DAY    ondansetron (ZOFRAN) 8 MG tablet Take 1 tablet (8 mg total) by mouth every 8 (eight) hours as needed for Nausea.    pantoprazole (PROTONIX) 40 MG tablet Take 1 tablet (40 mg total) by mouth once daily.    promethazine (PHENERGAN) 12.5 MG Tab Take 1 tablet (12.5 mg total) by mouth every 6 (six) hours as needed (nausea).    tacrolimus (PROGRAF) 0.5 MG Cap Take 1 capsule (0.5 mg total) by mouth once daily.    tamsulosin (FLOMAX) 0.4 mg Cp24 Take 1 capsule (0.4 mg total) by mouth every other day.    zolpidem (AMBIEN) 5 MG Tab Take 1 tablet (5 mg total) by mouth nightly as needed.     Family History     Problem Relation (Age of Onset)    Cancer Father        Social History Main Topics    Smoking status: Never Smoker    Smokeless tobacco: Not on file    Alcohol use No    Drug use: No    Sexual activity: Not on file     Review of Systems   Constitutional: Negative for chills and fever.   HENT: Negative for trouble swallowing.    Eyes: Negative for visual disturbance.   Respiratory: Negative for stridor.    Cardiovascular: Negative for  chest pain.   Gastrointestinal: Negative for abdominal pain.   Genitourinary: Negative for dysuria.   Musculoskeletal: Negative for arthralgias and joint swelling.   Neurological: Positive for numbness. Negative for dizziness, tremors, seizures, facial asymmetry, speech difficulty, weakness, light-headedness and headaches.   Psychiatric/Behavioral: Negative for agitation, behavioral problems and confusion.     Objective:     Vital Signs (Most Recent):  Temp: 98.3 °F (36.8 °C) (07/02/17 1326)  Pulse: 95 (07/02/17 1326)  Resp: 18 (07/02/17 1326)  BP: 130/61 (07/02/17 1326)  SpO2: 96 % (07/02/17 1326) Vital Signs (24h Range):  Temp:  [98.1 °F (36.7 °C)-100.5 °F (38.1 °C)] 98.3 °F (36.8 °C)  Pulse:  [] 95  Resp:  [16-20] 18  SpO2:  [88 %-97 %] 96 %  BP: (104-159)/(54-76) 130/61     Weight: 88.9 kg (195 lb 14.1 oz)  Body mass index is 25.84 kg/m².    Physical Exam   Constitutional: He is oriented to person, place, and time. He appears well-developed. No distress.   HENT:   Head: Normocephalic and atraumatic.   Eyes: EOM are normal. Pupils are equal, round, and reactive to light.   Neck: Normal range of motion. Neck supple.   Cardiovascular: Normal rate and regular rhythm.    Pulmonary/Chest: Effort normal.   Abdominal: Soft.   Musculoskeletal: Normal range of motion.   Neurological: He is oriented to person, place, and time. He has normal strength. He has a normal Finger-Nose-Finger Test and a normal Heel to Shin Test. Gait normal.   Reflex Scores:       Tricep reflexes are 2+ on the right side and 2+ on the left side.       Bicep reflexes are 2+ on the right side and 2+ on the left side.       Brachioradialis reflexes are 2+ on the right side and 2+ on the left side.       Patellar reflexes are 2+ on the right side and 2+ on the left side.       Achilles reflexes are 2+ on the right side and 2+ on the left side.  Psychiatric: He has a normal mood and affect. His speech is normal.       NEUROLOGICAL EXAMINATION:      MENTAL STATUS   Oriented to person, place, and time.   Registration: recalls 3 of 3 objects. Recall at 5 minutes: recalls 3 of 3 objects. Follows 3 step commands.   Attention: normal. Concentration: decreased.   Speech: speech is normal   Level of consciousness: alert  Knowledge: good.   Normal comprehension. Praxis: normal     CRANIAL NERVES     CN II   Visual fields full to confrontation.     CN III, IV, VI   Pupils are equal, round, and reactive to light.  Extraocular motions are normal.   CN III: no CN III palsy  CN VI: no CN VI palsy  Nystagmus: none   Ophthalmoparesis: none    CN V   Right facial sensation deficit: Numbness below lower lips / with affect in temperature, pain, pressure.  Jaw jerk: normal    CN VII   Facial expression full, symmetric.   Right facial weakness: none  Left facial weakness: none  Right taste: normal  Left taste: normal    CN VIII   CN VIII normal.     CN IX, X   CN IX normal.   CN X normal.     CN XI   CN XI normal.     CN XII   CN XII normal.     MOTOR EXAM   Muscle bulk: normal  Overall muscle tone: normal    Strength   Strength 5/5 throughout.     REFLEXES     Reflexes   Right brachioradialis: 2+  Left brachioradialis: 2+  Right biceps: 2+  Left biceps: 2+  Right triceps: 2+  Left triceps: 2+  Right patellar: 2+  Left patellar: 2+  Right achilles: 2+  Left achilles: 2+  Right : 2+  Left : 2+    SENSORY EXAM   Light touch normal.   Vibration normal.   Proprioception normal.     GAIT AND COORDINATION     Gait  Gait: normal     Coordination   Finger to nose coordination: normal  Heel to shin coordination: normal    Tremor   Resting tremor: absent  Intention tremor: absent  Action tremor: absent      Significant Labs:   CBC:   Recent Labs  Lab 07/01/17 0448 07/02/17 0415   WBC 2.98* 0.66*   HGB 7.4* 6.7*   HCT 21.5* 19.7*   PLT 16* 5*     CMP:   Recent Labs  Lab 07/01/17 0448 07/02/17  0415   GLU 97 88    136   K 4.5 4.0    102   CO2 23 24   BUN 35* 28*    CREATININE 1.2 1.2   CALCIUM 9.0 8.5*   MG 2.1 1.8   PROT 5.9* 5.6*   ALBUMIN 2.8* 2.7*   BILITOT 0.8 0.9   ALKPHOS 205* 168*   AST 51* 24   ALT 39 25   ANIONGAP 10 10   EGFRNONAA >60.0 >60.0     All pertinent lab results from the past 24 hours have been reviewed.    Significant Imaging: I have reviewed and interpreted all pertinent imaging results/findings within the past 24 hours.     MRI Brain: Contrast enhanced MRI demonstrates no evidence of  acute intracranial pathology.    Assessment and Plan:     Facial numbness    - Symptoms  of intermittent lower facial numbness x 3-4 weeks, more so confined to areas below lower lip / chin    - No facial muscle weakness / Negative evidence for local etiologies like tooth pain, decays, parotid enlargement or TMJ pathologies  - MRI brain : negative for central etiologies of 5th nerve involvement     - Unclear reasons for numbness. ?chemo induced, however no onset correlations.   - Distribution more so for sensory component involving B/L mental branches of the trigeminal nerve which does not localize well but may represent a peripheral neuropathy, possibly drug induced  - patient reports of less severe lifestyle discomfort from symptoms    Plan:   - No further investigations or interventions warranted at this moment, for symptoms of paraesthesia described as numbness and no concerns for pain             VTE Risk Mitigation         Ordered     Place sequential compression device  Until discontinued      06/20/17 2005     High Risk of VTE  Once      06/20/17 1901          Thank you for your consult. I will sign off. Please contact us if you have any additional questions.    Luis A Trejo MD  Neurology  [facility]    Patient interviewed and examined by me.  I agree with the history, exam, assessment and plan within the resident's note as stated above.  Note has been edited by me to reflect my work and changes. If he develops neuropathic pain in the  face then this may warrant treatment but no management for now as numbness and tingling are notoriously difficult to manage and not worth putting him at risk for side effects from potential treatment. Consider Vitamin E therapy for neuropathy prophylaxis.     Will sign off on this patient's care. Please call if any questions, clarifications or reassessment is needed.     Chava Frankel MD  Associate Staff  [facility]

## 2017-07-02 NOTE — ASSESSMENT & PLAN NOTE
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   - continue ppx acyclovir, itraconazole   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. Pt now off steroids.  - chimerics from marrow done 6/21 show CD3 of 90% donor and 10% recipient, but CD34 of 5% donor and 95% recipient

## 2017-07-02 NOTE — PROGRESS NOTES
Went to hang blood, did pre-transfusion VS and pt's temperature was 100.5 axillary and his skin was hot and diaphoretic.  Called Dr. Segal and he said to hold blood transfusion for now and recheck temperature in 30 minutes and to call him with result to see how to proceed.

## 2017-07-03 NOTE — ASSESSMENT & PLAN NOTE
-Pt de-satted overnight into the 80's over the weekend an was placed on O2  -On RA this am  -chest xray shows increased interstitial markings in the middle and upper lung zones  -no signs of volume overload  -Bactrim for PCP coverage.

## 2017-07-03 NOTE — ASSESSMENT & PLAN NOTE
- hemoglobin 6.5  grams/dl, likely from relapsed disease and chemotherapy   - transfuse for hemoglobin < 7  - Will transfuse one unit of PRBC's today.

## 2017-07-03 NOTE — PLAN OF CARE
Problem: Patient Care Overview  Goal: Plan of Care Review  Outcome: Ongoing (interventions implemented as appropriate)  Pt. Day 7 of FLAG-Lynette.  Pt. with nonskid footwear on with bed in lowest position and locked with bed rails up x2.  Pt. ambulates independently and instructed to call if assistance is needed.  Pt. with call light within reach.  Pt. received 1 units of blood today.   Pt. with c/o lower back and jaw pain with dilaudid being given PRN.  Will continue to monitor pt.

## 2017-07-03 NOTE — ASSESSMENT & PLAN NOTE
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   - continue ppx acyclovir, itraconazole   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. Pt now off steroids.  - chimerics from marrow done 6/21 show CD3 of 90% donor and 10% recipient, but CD34 of 5% donor and 95% recipient

## 2017-07-03 NOTE — ASSESSMENT & PLAN NOTE
- evaluated by Neurology  - MRI with no evidence of  acute intracranial pathology.  - thought to be secondary to drug induced peripheral neuropathy?  - Neurology rec Vitamin E, will hold for now given side effects of thrombocytopenia and bleeding

## 2017-07-03 NOTE — SUBJECTIVE & OBJECTIVE
Subjective:     Interval History:   - NEON  - patient states back/face pain is controlled with IVP Dilaudid.   - patient evaluated by Neurology with MRI for facial numbness, no acute findings  - VSS, On RA this am, T.max 100.1 overnight    Objective:     Vital Signs (Most Recent):  Temp: 99.4 °F (37.4 °C) (07/03/17 0802)  Pulse: 107 (07/03/17 0802)  Resp: 18 (07/03/17 0802)  BP: 130/60 (07/03/17 0802)  SpO2: (!) 94 % (07/03/17 0802) Vital Signs (24h Range):  Temp:  [98.3 °F (36.8 °C)-100.1 °F (37.8 °C)] 99.4 °F (37.4 °C)  Pulse:  [] 107  Resp:  [16-18] 18  SpO2:  [94 %-97 %] 94 %  BP: (130-147)/(58-77) 130/60     Weight: 86.7 kg (191 lb 2.2 oz)  Body mass index is 25.22 kg/m².  Body surface area is 2.11 meters squared.    ECOG SCORE           Intake/Output - Last 3 Shifts       07/01 0700 - 07/02 0659 07/02 0700 - 07/03 0659 07/03 0700 - 07/04 0659    P.O. 780 1080     I.V. (mL/kg) 2658.7 (29.9) 2282.4 (26.3)     Blood  563     IV Piggyback 350 350     Total Intake(mL/kg) 3788.7 (42.7) 4275.4 (49.3)     Urine (mL/kg/hr) 4175 (2) 4950 (2.4)     Stool 0 (0) 0 (0)     Total Output 4175 4950      Net -386.3 -674.6             Urine Occurrence  1 x     Stool Occurrence 1 x 2 x 1 x          Physical Exam   Constitutional: He is oriented to person, place, and time. He appears well-developed and well-nourished. No distress.   HENT:   Head: Normocephalic.   Mouth/Throat: Oropharynx is clear and moist. No oropharyngeal exudate.   Eyes: Conjunctivae are normal. Pupils are equal, round, and reactive to light. No scleral icterus.   Neck: Normal range of motion. Neck supple. Normal range of motion present. No thyromegaly present.   Cardiovascular: Normal rate, regular rhythm, normal heart sounds and intact distal pulses.    Pulmonary/Chest: Effort normal and breath sounds normal. No respiratory distress.   Abdominal: Soft. Bowel sounds are normal. He exhibits no distension. There is no tenderness.   Musculoskeletal:  Normal range of motion. He exhibits no edema or tenderness.   Lymphadenopathy:        Head (right side): No submandibular, no preauricular, no posterior auricular and no occipital adenopathy present.        Head (left side): No submandibular, no preauricular, no posterior auricular and no occipital adenopathy present.     He has no cervical adenopathy.     He has no axillary adenopathy.   Neurological: He is alert and oriented to person, place, and time. No cranial nerve deficit. Coordination normal.   Skin: Skin is warm and dry. No petechiae and no rash noted. No pallor.   Psychiatric: He has a normal mood and affect. Thought content normal.   Vitals reviewed.      Significant Labs:   CBC:   Recent Labs  Lab 07/02/17 0415 07/03/17  0314   WBC 0.66* 0.20*   HGB 6.7* 6.5*   HCT 19.7* 18.7*   PLT 5* 10*    and CMP:   Recent Labs  Lab 07/02/17 0415 07/03/17 0314    135*   K 4.0 3.9    104   CO2 24 24   GLU 88 111*   BUN 28* 23*   CREATININE 1.2 1.1   CALCIUM 8.5* 8.1*   PROT 5.6* 5.0*   ALBUMIN 2.7* 2.3*   BILITOT 0.9 0.8   ALKPHOS 168* 159*   AST 24 18   ALT 25 19   ANIONGAP 10 7*   EGFRNONAA >60.0 >60.0       Diagnostic Results:  I have reviewed all pertinent imaging results/findings within the past 24 hours.

## 2017-07-03 NOTE — ASSESSMENT & PLAN NOTE
Nutrition Problem  increased nutrient needs    Related to (etiology):   Physiological Needs    Signs and Symptoms (as evidenced by):   AML (acute myeloid leukemia)     Interventions/Recommendations (treatment strategy):  1.Addition of ONS  2. If PO intake does not improve by next visit, consider appetite stimulant    Nutrition Diagnosis Status:   Continues

## 2017-07-03 NOTE — ASSESSMENT & PLAN NOTE
Likely 2/2 relapsed disease  - platelet count 10,000 this am, no evidence of bleeding  - transfuse for plt <10K

## 2017-07-03 NOTE — ASSESSMENT & PLAN NOTE
- ?infection or disease related  - blood cultures no growth thus far from 6/24/17, CXR unremarkable.   - on Cefepime (started 6/24 - discontinued 6/28)  - received dose of Vancomycin  - afebrile since 6/24/17 at 1724  - Pt currently only on prophylactic abx--cipro, Bactrim, acyclovir and itraconazole

## 2017-07-03 NOTE — ASSESSMENT & PLAN NOTE
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 Blast of 62%  - awaiting mutation analysis and cytogenetics; FLT-3 negative     - Jaw pain and pain in right cranial nerve 7 distribution with persistent, severe headaches concerning for CNS involvement - underwent IT chemotherapy on 6/23. Flow negative for disease. CSF LDH was 29.  - Had previous reaction to MEC (etoposide caused full body rash). Did not qualify for the Tolero trial due to receiving IT chemotherapy    - Day 7 of FLAG-BETZY (chemo complete late last night)  -MRI performed yesterday showed no acute abnormalities.

## 2017-07-03 NOTE — ASSESSMENT & PLAN NOTE
- related to Neupogen  - started on zyrtec, lidocaine patch and Flexeril  - PCA stopped 7/1  - pain controlled with PRN IVP Dilaudid  - CT spine did not show any acute findings, revealed chronic degenerative changes, spinal cord stenosis and narrowing

## 2017-07-03 NOTE — PLAN OF CARE
Problem: Patient Care Overview  Goal: Plan of Care Review  Outcome: Ongoing (interventions implemented as appropriate)  Plan of care reviewed with pt at beginning of shift and updated throughout the day. Pt is 6 FLAG-Lynette reinduction

## 2017-07-03 NOTE — ASSESSMENT & PLAN NOTE
- creatinine improved to 1.1 today  - NICOLE Likely form TLS, LDH 1400 today, uric acid 4.6 this am  - Continue IVF to 125 cc/hr and allopurinol  - avoid nephrotoxic medications

## 2017-07-03 NOTE — PLAN OF CARE
Pt afebrile. Vss. Educated pt on fall risk. Non skid socks applied. Bed low and locked. Call light in reach. Encouraged calling nurse for assistance. Pt's sats are 88-89% on room air, and pt remains tachycardic. tmax 100.1. Back pain and jaw pain managed with dilaudid 2mg ivp. day 6/6 of FLAG regimen completed. Plan of care reviewed with pt.

## 2017-07-03 NOTE — PROGRESS NOTES
[facility]  Hematology  Bone Marrow Transplant  Progress Note    Patient Name: Paul E Sistrunk  Admission Date: 6/20/2017  Hospital Length of Stay: 13 days  Code Status: Full Code    Subjective:     Interval History:   - NEON  - patient states back/face pain is controlled with IVP Dilaudid.   - patient evaluated by Neurology with MRI for facial numbness, no acute findings  - VSS, On RA this am, T.max 100.1 overnight    Objective:     Vital Signs (Most Recent):  Temp: 99.4 °F (37.4 °C) (07/03/17 0802)  Pulse: 107 (07/03/17 0802)  Resp: 18 (07/03/17 0802)  BP: 130/60 (07/03/17 0802)  SpO2: (!) 94 % (07/03/17 0802) Vital Signs (24h Range):  Temp:  [98.3 °F (36.8 °C)-100.1 °F (37.8 °C)] 99.4 °F (37.4 °C)  Pulse:  [] 107  Resp:  [16-18] 18  SpO2:  [94 %-97 %] 94 %  BP: (130-147)/(58-77) 130/60     Weight: 86.7 kg (191 lb 2.2 oz)  Body mass index is 25.22 kg/m².  Body surface area is 2.11 meters squared.    ECOG SCORE           Intake/Output - Last 3 Shifts       07/01 0700 - 07/02 0659 07/02 0700 - 07/03 0659 07/03 0700 - 07/04 0659    P.O. 780 1080     I.V. (mL/kg) 2658.7 (29.9) 2282.4 (26.3)     Blood  563     IV Piggyback 350 350     Total Intake(mL/kg) 3788.7 (42.7) 4275.4 (49.3)     Urine (mL/kg/hr) 4175 (2) 4950 (2.4)     Stool 0 (0) 0 (0)     Total Output 4175 4950      Net -386.3 -674.6             Urine Occurrence  1 x     Stool Occurrence 1 x 2 x 1 x          Physical Exam   Constitutional: He is oriented to person, place, and time. He appears well-developed and well-nourished. No distress.   HENT:   Head: Normocephalic.   Mouth/Throat: Oropharynx is clear and moist. No oropharyngeal exudate.   Eyes: Conjunctivae are normal. Pupils are equal, round, and reactive to light. No scleral icterus.   Neck: Normal range of motion. Neck supple. Normal range of motion present. No thyromegaly present.   Cardiovascular: Normal rate, regular rhythm, normal heart sounds and intact distal pulses.     Pulmonary/Chest: Effort normal and breath sounds normal. No respiratory distress.   Abdominal: Soft. Bowel sounds are normal. He exhibits no distension. There is no tenderness.   Musculoskeletal: Normal range of motion. He exhibits no edema or tenderness.   Lymphadenopathy:        Head (right side): No submandibular, no preauricular, no posterior auricular and no occipital adenopathy present.        Head (left side): No submandibular, no preauricular, no posterior auricular and no occipital adenopathy present.     He has no cervical adenopathy.     He has no axillary adenopathy.   Neurological: He is alert and oriented to person, place, and time. No cranial nerve deficit. Coordination normal.   Skin: Skin is warm and dry. No petechiae and no rash noted. No pallor.   Psychiatric: He has a normal mood and affect. Thought content normal.   Vitals reviewed.      Significant Labs:   CBC:   Recent Labs  Lab 07/02/17 0415 07/03/17  0314   WBC 0.66* 0.20*   HGB 6.7* 6.5*   HCT 19.7* 18.7*   PLT 5* 10*    and CMP:   Recent Labs  Lab 07/02/17 0415 07/03/17  0314    135*   K 4.0 3.9    104   CO2 24 24   GLU 88 111*   BUN 28* 23*   CREATININE 1.2 1.1   CALCIUM 8.5* 8.1*   PROT 5.6* 5.0*   ALBUMIN 2.7* 2.3*   BILITOT 0.9 0.8   ALKPHOS 168* 159*   AST 24 18   ALT 25 19   ANIONGAP 10 7*   EGFRNONAA >60.0 >60.0       Diagnostic Results:  I have reviewed all pertinent imaging results/findings within the past 24 hours.    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 Blast of 62%  - awaiting mutation analysis and cytogenetics; FLT-3 negative     - Jaw pain and pain in right cranial nerve 7 distribution  with persistent, severe headaches concerning for CNS involvement - underwent IT chemotherapy on 6/23. Flow negative for disease. CSF LDH was 29.  - Had previous reaction to MEC (etoposide caused full body rash). Did not qualify for the Tolero trial due to receiving IT chemotherapy    - Day 7 of FLAG-BETZY (chemo complete late last night)  -MRI performed yesterday showed no acute abnormalities.        S/P allogeneic bone marrow transplant    - 294 days post Flu/Bu/ATG MUD allogeneic transplant for high risk AML after his second IDAC (6/20/16 - 6/24/16) after a prolonged hospitalization (2/5/16 - 3/21/16) for induction with 7&3 and reinduction with MEC to remission and IDAC (4/4/16 - 4/9/16).   - continue ppx acyclovir, itraconazole   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. Pt now off steroids.  - chimerics from marrow done 6/21 show CD3 of 90% donor and 10% recipient, but CD34 of 5% donor and 95% recipient         NICOLE (acute kidney injury)    - creatinine improved to 1.1 today  - NICOLE Likely form TLS, LDH 1400 today, uric acid 4.6 this am  - Continue IVF to 125 cc/hr and allopurinol  - avoid nephrotoxic medications        Chronic bilateral low back pain without sciatica    - related to Neupogen  - started on zyrtec, lidocaine patch and Flexeril  - PCA stopped 7/1  - pain controlled with PRN IVP Dilaudid  - CT spine did not show any acute findings, revealed chronic degenerative changes, spinal cord stenosis and narrowing        Anemia in neoplastic disease    - hemoglobin 6.5  grams/dl, likely from relapsed disease and chemotherapy   - transfuse for hemoglobin < 7  - Will transfuse one unit of PRBC's today.        Thrombocytopenia    Likely 2/2 relapsed disease  - platelet count 10,000 this am, no evidence of bleeding  - transfuse for plt <10K        HIV disease    - CD4 low at 11.4%, Absolute CD4 469 and HIV RNA not detected from 6/21  - As per ID: continue triumeq and itraconazole         Fever    - ?infection or disease related  - blood cultures no growth thus far from 6/24/17, CXR unremarkable.   - on Cefepime (started 6/24 - discontinued 6/28)  - received dose of Vancomycin  - afebrile since 6/24/17 at 1724  - Pt currently only on prophylactic abx--cipro, Bactrim, acyclovir and itraconazole         Anxiety    - PRN Xanax        Prostate hypertrophy    - Continue home flomax        Facial numbness    - evaluated by Neurology  - MRI with no evidence of  acute intracranial pathology.  - thought to be secondary to drug induced peripheral neuropathy?  - Neurology rec Vitamin E, will hold for now given side effects of thrombocytopenia and bleeding          Hypoxia    -Pt de-satted overnight into the 80's over the weekend an was placed on O2  -On RA this am  -chest xray shows increased interstitial markings in the middle and upper lung zones  -no signs of volume overload  -Bactrim for PCP coverage.          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

## 2017-07-04 NOTE — ASSESSMENT & PLAN NOTE
- creatinine 1.0 today  - NICOLE Likely form TLS, LDH 1176 today, uric acid 3.8 this am  - Continue IVF to 125 cc/hr and allopurinol  - avoid nephrotoxic medications

## 2017-07-04 NOTE — ASSESSMENT & PLAN NOTE
- ?infection or disease related  - blood cultures no growth thus far from 6/24/17, CXR unremarkable.   - on Cefepime (started 6/24 - discontinued 6/28)  - received dose of Vancomycin  - afebrile since 6/24/17 at 1724  - Pt currently only on prophylactic abx--cipro, Bactrim, acyclovir and itraconazole   -Pt febrile overnight 7/04/17.  Pt was taken off of cipro and started on cefepime.  -patient pan cultured with no clear source.  Will f/u cultures

## 2017-07-04 NOTE — PROGRESS NOTES
Pt temp 101.3F. Tylenol given. Dr Russell notified. MD ordered blood cultures, UA and urine culture.

## 2017-07-04 NOTE — PLAN OF CARE
Problem: Patient Care Overview  Goal: Plan of Care Review  Outcome: Ongoing (interventions implemented as appropriate)  Pt. Day 8 of FLAG-Lynette.  Pt. with nonskid footwear on with bed in lowest position and locked with bed rails up x2.  Pt. ambulates independently and instructed to call if assistance is needed.  Pt. with call light within reach.  Pt. received 1 units of platelets this morning.   Pt. with c/o face,  jaw pain with dilaudid being given PRN.  Will continue to monitor pt.

## 2017-07-04 NOTE — PLAN OF CARE
Problem: Patient Care Overview  Goal: Plan of Care Review  Outcome: Ongoing (interventions implemented as appropriate)  Pt remained free of falls and injury. Bed in low locked position side rails up x2 with call light and belongs in reach. Non skid socks in place. Tmax 101.3F oral. Tylenol given, blood cultures drawn, cefepime started, UA and urine culture sent. Will continue to monitor.

## 2017-07-04 NOTE — PROGRESS NOTES
[facility]  Hematology  Bone Marrow Transplant  Progress Note    Patient Name: Paul E Sistrunk  Admission Date: 6/20/2017  Hospital Length of Stay: 14 days  Code Status: Full Code    Subjective:     Interval History: Pt is a 55 yo M with h/o allo transplant for AML currently with relapsed disease.      Overnight the patient developed a fever up to 101.3 at 1:31AM.  The patient was started on Cefepime with cultures drawn.  The patient vahe cough, SOB, CP, dysuria, N/V, constipation diarrhea.  The patient continues to complain of facial numbness.      Review of Systems   Constitutional: Positive for fever. Negative for chills.   HENT: Negative for congestion.         Numbness of chin   Respiratory: Negative for cough, sputum production and shortness of breath.    Cardiovascular: Negative for chest pain and leg swelling.   Gastrointestinal: Negative for abdominal pain, constipation, diarrhea, nausea and vomiting.   Neurological: Positive for headaches.     Objective:     Vital Signs (Most Recent):  Temp: 99 °F (37.2 °C) (07/04/17 0811)  Pulse: 98 (07/04/17 0811)  Resp: 18 (07/04/17 0811)  BP: 121/69 (07/04/17 0811)  SpO2: 95 % (07/04/17 0811) Vital Signs (24h Range):  Temp:  [98.3 °F (36.8 °C)-101.3 °F (38.5 °C)] 99 °F (37.2 °C)  Pulse:  [] 98  Resp:  [16-18] 18  SpO2:  [92 %-99 %] 95 %  BP: (105-137)/(55-72) 121/69     Weight: 86.7 kg (191 lb 2.2 oz)  Body mass index is 25.22 kg/m².  Body surface area is 2.11 meters squared.    ECOG SCORE         [unfilled]    Intake/Output - Last 3 Shifts       07/02 0700 - 07/03 0659 07/03 0700 - 07/04 0659 07/04 0700 - 07/05 0659    P.O. 1080 1320 240    I.V. (mL/kg) 2282.4 (26.3) 2525.3 (29.1) 645.8 (7.4)    Blood 563 475     IV Piggyback 350 50     Total Intake(mL/kg) 4275.4 (49.3) 4370.3 (50.4) 885.8 (10.2)    Urine (mL/kg/hr) 4950 (2.4) 2800 (1.3) 1175 (5)    Stool 0 (0) 0 (0)     Total Output 4950 2800 1175    Net -674.6 +1570.3 -289.2           Urine  Occurrence 1 x      Stool Occurrence 2 x 2 x           Physical Exam   Constitutional: He is oriented to person, place, and time. He appears well-developed and well-nourished. No distress.   HENT:   Head: Normocephalic and atraumatic.   Cardiovascular: Normal rate, regular rhythm and normal heart sounds.  Exam reveals no gallop and no friction rub.    No murmur heard.  Pulmonary/Chest: Effort normal and breath sounds normal. No respiratory distress. He has no wheezes. He has no rales. He exhibits no tenderness.   Abdominal: Soft. Bowel sounds are normal. He exhibits no distension. There is no tenderness.   Neurological: He is alert and oriented to person, place, and time.   Skin: He is not diaphoretic.       Significant Labs:   Recent Results (from the past 24 hour(s))   Urinalysis    Collection Time: 07/04/17  1:43 AM   Result Value Ref Range    Specimen UA Urine, Clean Catch     Color, UA Yellow Yellow, Straw, Vanita    Appearance, UA Clear Clear    pH, UA 7.0 5.0 - 8.0    Specific Gravity, UA 1.010 1.005 - 1.030    Protein, UA Negative Negative    Glucose, UA Negative Negative    Ketones, UA Negative Negative    Bilirubin (UA) Negative Negative    Occult Blood UA 1+ (A) Negative    Nitrite, UA Negative Negative    Urobilinogen, UA Negative <2.0 EU/dL    Leukocytes, UA Negative Negative   Urinalysis Microscopic    Collection Time: 07/04/17  1:43 AM   Result Value Ref Range    RBC, UA 2 0 - 4 /hpf    WBC, UA 1 0 - 5 /hpf    Microscopic Comment SEE COMMENT    Blood culture    Collection Time: 07/04/17  1:51 AM   Result Value Ref Range    Blood Culture, Routine No Growth to date    Blood culture    Collection Time: 07/04/17  1:51 AM   Result Value Ref Range    Blood Culture, Routine No Growth to date    Magnesium    Collection Time: 07/04/17  3:20 AM   Result Value Ref Range    Magnesium 2.0 1.6 - 2.6 mg/dL   Comprehensive metabolic panel    Collection Time: 07/04/17  3:20 AM   Result Value Ref Range    Sodium 135 (L)  136 - 145 mmol/L    Potassium 4.6 3.5 - 5.1 mmol/L    Chloride 107 95 - 110 mmol/L    CO2 21 (L) 23 - 29 mmol/L    Glucose 95 70 - 110 mg/dL    BUN, Bld 21 (H) 6 - 20 mg/dL    Creatinine 1.0 0.5 - 1.4 mg/dL    Calcium 7.9 (L) 8.7 - 10.5 mg/dL    Total Protein 5.4 (L) 6.0 - 8.4 g/dL    Albumin 2.4 (L) 3.5 - 5.2 g/dL    Total Bilirubin 0.7 0.1 - 1.0 mg/dL    Alkaline Phosphatase 175 (H) 55 - 135 U/L    AST 20 10 - 40 U/L    ALT 14 10 - 44 U/L    Anion Gap 7 (L) 8 - 16 mmol/L    eGFR if African American >60.0 >60 mL/min/1.73 m^2    eGFR if non African American >60.0 >60 mL/min/1.73 m^2   Phosphorus    Collection Time: 07/04/17  3:20 AM   Result Value Ref Range    Phosphorus 3.1 2.7 - 4.5 mg/dL   CBC auto differential    Collection Time: 07/04/17  3:20 AM   Result Value Ref Range    WBC 0.12 (LL) 3.90 - 12.70 K/uL    RBC 2.39 (L) 4.60 - 6.20 M/uL    Hemoglobin 7.4 (L) 14.0 - 18.0 g/dL    Hematocrit 21.1 (L) 40.0 - 54.0 %    MCV 88 82 - 98 fL    MCH 31.0 27.0 - 31.0 pg    MCHC 35.1 32.0 - 36.0 %    RDW 15.1 (H) 11.5 - 14.5 %    Platelets 7 (LL) 150 - 350 K/uL    MPV SEE COMMENT 9.2 - 12.9 fL    Gran # 0.0 (L) 1.8 - 7.7 K/uL    Lymph # 0.0 (L) 1.0 - 4.8 K/uL    Mono # 0.1 (L) 0.3 - 1.0 K/uL    Eos # 0.0 0.0 - 0.5 K/uL    Baso # 0.00 0.00 - 0.20 K/uL    Gran% 16.7 (L) 38.0 - 73.0 %    Lymph% 33.3 18.0 - 48.0 %    Mono% 50.0 (H) 4.0 - 15.0 %    Eosinophil% 0.0 0.0 - 8.0 %    Basophil% 0.0 0.0 - 1.9 %    Differential Method Automated    Uric acid    Collection Time: 07/04/17  3:20 AM   Result Value Ref Range    Uric Acid 3.8 3.4 - 7.0 mg/dL   Lactate dehydrogenase    Collection Time: 07/04/17  5:06 AM   Result Value Ref Range    LD 1,176 (H) 110 - 260 U/L   Protime-INR    Collection Time: 07/04/17  5:06 AM   Result Value Ref Range    Prothrombin Time 10.6 9.0 - 12.5 sec    INR 1.0 0.8 - 1.2   APTT    Collection Time: 07/04/17  5:06 AM   Result Value Ref Range    aPTT 25.4 21.0 - 32.0 sec   Type & Screen    Collection Time:  07/04/17  5:06 AM   Result Value Ref Range    Group & Rh O POS     Indirect [place] NEG    Prepare Platelets 1 Dose    Collection Time: 07/04/17  5:06 AM   Result Value Ref Range    UNIT NUMBER [id number]     PRODUCT CODE S3872H24     DISPENSE STATUS ISSUED     CODING SYSTEM [id number]     Unit Blood Type Code 6200     Unit Blood Type A POS     Unit Expiration [id number]          Diagnostic Results:  I have reviewed all pertinent imaging results/findings within the past 24 hours.      Assessment/Plan:     Fever    - ?infection or disease related  - blood cultures no growth thus far from 6/24/17, CXR unremarkable.   - on Cefepime (started 6/24 - discontinued 6/28)  - received dose of Vancomycin  - afebrile since 6/24/17 at 1724  - Pt currently only on prophylactic abx--cipro, Bactrim, acyclovir and itraconazole   -Pt febrile overnight 7/04/17.  Pt was taken off of cipro and started on cefepime.  -patient pan cultured with no clear source.  Will f/u cultures        S/P allogeneic bone marrow transplant    - 295 days post Flu/Bu/ATG MUD allogeneic transplant for high risk AML after his second IDAC (6/20/16 - 6/24/16) after a prolonged hospitalization (2/5/16 - 3/21/16) for induction with 7&3 and reinduction with MEC to remission and IDAC (4/4/16 - 4/9/16).   - continue ppx acyclovir, itraconazole   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. Pt now off steroids.  - chimerics from marrow done 6/21 show CD3 of 90% donor and 10% recipient, but CD34 of 5% donor and 95% recipient         Facial numbness    - evaluated by Neurology  - MRI with no evidence of  acute intracranial pathology.  - thought to be secondary to drug induced peripheral neuropathy?  - Neurology rec Vitamin E, will hold for now given side effects of thrombocytopenia and bleeding          Hypoxia    -On RA  -chest xray shows increased interstitial markings in the middle and upper lung zones  -no signs of volume overload  -Bactrim  for PCP coverage.          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  - transfuse for plt <10K        Electrolyte abnormality    - ordered prn electrolyte replacement per protocol         NICOLE (acute kidney injury)    - creatinine 1.0 today  - NICOLE Likely form TLS, LDH 1176 today, uric acid 3.8 this am  - Continue IVF to 125 cc/hr and allopurinol  - avoid nephrotoxic medications        Anxiety    - PRN Xanax        Anemia in neoplastic disease    - hemoglobin 7.4  grams/dl, likely from relapsed disease and chemotherapy   - Platelets are 7 today will transfuse one unit of platelets.  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 Blast of 62%  - awaiting mutation analysis and cytogenetics; FLT-3 negative     - Jaw pain and pain in right cranial nerve 7 distribution with persistent, severe headaches concerning for CNS involvement -  underwent IT chemotherapy on 6/23. Flow negative for disease. CSF LDH was 29.  - Had previous reaction to MEC (etoposide caused full body rash). Did not qualify for the Tolero trial due to receiving IT chemotherapy    - Day 8 of FLAG-BETZY (chemo complete late last night)  -MRI performed yesterday showed no acute abnormalities.            VTE Risk Mitigation         Ordered     Place sequential compression device  Until discontinued      06/20/17 2005     High Risk of VTE  Once      06/20/17 1901        Diet - regular  Prophylaxis - lovenox held while plts <50k  Dispo - pending completion of chemo and improvement in counts     Mike Segal MD  Bone Marrow Transplant  [facility]

## 2017-07-04 NOTE — ASSESSMENT & PLAN NOTE
- hemoglobin 7.4  grams/dl, likely from relapsed disease and chemotherapy   - Platelets are 7 today will transfuse one unit of platelets.  - transfuse for hemoglobin < 7

## 2017-07-04 NOTE — ASSESSMENT & PLAN NOTE
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 Blast of 62%  - awaiting mutation analysis and cytogenetics; FLT-3 negative     - Jaw pain and pain in right cranial nerve 7 distribution with persistent, severe headaches concerning for CNS involvement - underwent IT chemotherapy on 6/23. Flow negative for disease. CSF LDH was 29.  - Had previous reaction to MEC (etoposide caused full body rash). Did not qualify for the Tolero trial due to receiving IT chemotherapy    - Day 8 of FLAG-BETZY (chemo complete late last night)  -MRI performed yesterday showed no acute abnormalities.

## 2017-07-04 NOTE — SUBJECTIVE & OBJECTIVE
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      Overnight the patient developed a fever up to 101.3 at 1:31AM.  The patient was started on Cefepime with cultures drawn.  The patient vahe cough, SOB, CP, dysuria, N/V, constipation diarrhea.  The patient continues to complain of facial numbness.      Review of Systems   Constitutional: Positive for fever. Negative for chills.   HENT: Negative for congestion.         Numbness of chin   Respiratory: Negative for cough, sputum production and shortness of breath.    Cardiovascular: Negative for chest pain and leg swelling.   Gastrointestinal: Negative for abdominal pain, constipation, diarrhea, nausea and vomiting.   Neurological: Positive for headaches.     Objective:     Vital Signs (Most Recent):  Temp: 99 °F (37.2 °C) (07/04/17 0811)  Pulse: 98 (07/04/17 0811)  Resp: 18 (07/04/17 0811)  BP: 121/69 (07/04/17 0811)  SpO2: 95 % (07/04/17 0811) Vital Signs (24h Range):  Temp:  [98.3 °F (36.8 °C)-101.3 °F (38.5 °C)] 99 °F (37.2 °C)  Pulse:  [] 98  Resp:  [16-18] 18  SpO2:  [92 %-99 %] 95 %  BP: (105-137)/(55-72) 121/69     Weight: 86.7 kg (191 lb 2.2 oz)  Body mass index is 25.22 kg/m².  Body surface area is 2.11 meters squared.    ECOG SCORE         [unfilled]    Intake/Output - Last 3 Shifts       07/02 0700 - 07/03 0659 07/03 0700 - 07/04 0659 07/04 0700 - 07/05 0659    P.O. 1080 1320 240    I.V. (mL/kg) 2282.4 (26.3) 2525.3 (29.1) 645.8 (7.4)    Blood 563 475     IV Piggyback 350 50     Total Intake(mL/kg) 4275.4 (49.3) 4370.3 (50.4) 885.8 (10.2)    Urine (mL/kg/hr) 4950 (2.4) 2800 (1.3) 1175 (5)    Stool 0 (0) 0 (0)     Total Output 4950 2800 1175    Net -674.6 +1570.3 -289.2           Urine Occurrence 1 x      Stool Occurrence 2 x 2 x           Physical Exam   Constitutional: He is oriented to person, place, and time. He appears well-developed and well-nourished. No distress.   HENT:    Head: Normocephalic and atraumatic.   Cardiovascular: Normal rate, regular rhythm and normal heart sounds.  Exam reveals no gallop and no friction rub.    No murmur heard.  Pulmonary/Chest: Effort normal and breath sounds normal. No respiratory distress. He has no wheezes. He has no rales. He exhibits no tenderness.   Abdominal: Soft. Bowel sounds are normal. He exhibits no distension. There is no tenderness.   Neurological: He is alert and oriented to person, place, and time.   Skin: He is not diaphoretic.       Significant Labs:   Recent Results (from the past 24 hour(s))   Urinalysis    Collection Time: 07/04/17  1:43 AM   Result Value Ref Range    Specimen UA Urine, Clean Catch     Color, UA Yellow Yellow, Straw, Vanita    Appearance, UA Clear Clear    pH, UA 7.0 5.0 - 8.0    Specific Gravity, UA 1.010 1.005 - 1.030    Protein, UA Negative Negative    Glucose, UA Negative Negative    Ketones, UA Negative Negative    Bilirubin (UA) Negative Negative    Occult Blood UA 1+ (A) Negative    Nitrite, UA Negative Negative    Urobilinogen, UA Negative <2.0 EU/dL    Leukocytes, UA Negative Negative   Urinalysis Microscopic    Collection Time: 07/04/17  1:43 AM   Result Value Ref Range    RBC, UA 2 0 - 4 /hpf    WBC, UA 1 0 - 5 /hpf    Microscopic Comment SEE COMMENT    Blood culture    Collection Time: 07/04/17  1:51 AM   Result Value Ref Range    Blood Culture, Routine No Growth to date    Blood culture    Collection Time: 07/04/17  1:51 AM   Result Value Ref Range    Blood Culture, Routine No Growth to date    Magnesium    Collection Time: 07/04/17  3:20 AM   Result Value Ref Range    Magnesium 2.0 1.6 - 2.6 mg/dL   Comprehensive metabolic panel    Collection Time: 07/04/17  3:20 AM   Result Value Ref Range    Sodium 135 (L) 136 - 145 mmol/L    Potassium 4.6 3.5 - 5.1 mmol/L    Chloride 107 95 - 110 mmol/L    CO2 21 (L) 23 - 29 mmol/L    Glucose 95 70 - 110 mg/dL    BUN, Bld 21 (H) 6 - 20 mg/dL    Creatinine 1.0 0.5 -  1.4 mg/dL    Calcium 7.9 (L) 8.7 - 10.5 mg/dL    Total Protein 5.4 (L) 6.0 - 8.4 g/dL    Albumin 2.4 (L) 3.5 - 5.2 g/dL    Total Bilirubin 0.7 0.1 - 1.0 mg/dL    Alkaline Phosphatase 175 (H) 55 - 135 U/L    AST 20 10 - 40 U/L    ALT 14 10 - 44 U/L    Anion Gap 7 (L) 8 - 16 mmol/L    eGFR if African American >60.0 >60 mL/min/1.73 m^2    eGFR if non African American >60.0 >60 mL/min/1.73 m^2   Phosphorus    Collection Time: 07/04/17  3:20 AM   Result Value Ref Range    Phosphorus 3.1 2.7 - 4.5 mg/dL   CBC auto differential    Collection Time: 07/04/17  3:20 AM   Result Value Ref Range    WBC 0.12 (LL) 3.90 - 12.70 K/uL    RBC 2.39 (L) 4.60 - 6.20 M/uL    Hemoglobin 7.4 (L) 14.0 - 18.0 g/dL    Hematocrit 21.1 (L) 40.0 - 54.0 %    MCV 88 82 - 98 fL    MCH 31.0 27.0 - 31.0 pg    MCHC 35.1 32.0 - 36.0 %    RDW 15.1 (H) 11.5 - 14.5 %    Platelets 7 (LL) 150 - 350 K/uL    MPV SEE COMMENT 9.2 - 12.9 fL    Gran # 0.0 (L) 1.8 - 7.7 K/uL    Lymph # 0.0 (L) 1.0 - 4.8 K/uL    Mono # 0.1 (L) 0.3 - 1.0 K/uL    Eos # 0.0 0.0 - 0.5 K/uL    Baso # 0.00 0.00 - 0.20 K/uL    Gran% 16.7 (L) 38.0 - 73.0 %    Lymph% 33.3 18.0 - 48.0 %    Mono% 50.0 (H) 4.0 - 15.0 %    Eosinophil% 0.0 0.0 - 8.0 %    Basophil% 0.0 0.0 - 1.9 %    Differential Method Automated    Uric acid    Collection Time: 07/04/17  3:20 AM   Result Value Ref Range    Uric Acid 3.8 3.4 - 7.0 mg/dL   Lactate dehydrogenase    Collection Time: 07/04/17  5:06 AM   Result Value Ref Range    LD 1,176 (H) 110 - 260 U/L   Protime-INR    Collection Time: 07/04/17  5:06 AM   Result Value Ref Range    Prothrombin Time 10.6 9.0 - 12.5 sec    INR 1.0 0.8 - 1.2   APTT    Collection Time: 07/04/17  5:06 AM   Result Value Ref Range    aPTT 25.4 21.0 - 32.0 sec   Type & Screen    Collection Time: 07/04/17  5:06 AM   Result Value Ref Range    Group & Rh O POS     Indirect [place] NEG    Prepare Platelets 1 Dose    Collection Time: 07/04/17  5:06 AM   Result Value Ref Range    UNIT NUMBER  [id number]     PRODUCT CODE [id number]     DISPENSE STATUS ISSUED     CODING SYSTEM [id number]     Unit Blood Type Code 6200     Unit Blood Type A POS     Unit Expiration [id number]          Diagnostic Results:  I have reviewed all pertinent imaging results/findings within the past 24 hours.

## 2017-07-04 NOTE — ASSESSMENT & PLAN NOTE
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   - continue ppx acyclovir, itraconazole   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. Pt now off steroids.  - chimerics from marrow done 6/21 show CD3 of 90% donor and 10% recipient, but CD34 of 5% donor and 95% recipient

## 2017-07-04 NOTE — ASSESSMENT & PLAN NOTE
Likely 2/2 relapsed disease  - platelet count 7,000 this am, no evidence of bleeding  -Will transfuse one unit of paltelets.  - transfuse for plt <10K

## 2017-07-05 PROBLEM — R50.81 NEUTROPENIC FEVER: Status: ACTIVE | Noted: 2017-01-01

## 2017-07-05 PROBLEM — D70.9 NEUTROPENIC FEVER: Status: ACTIVE | Noted: 2017-01-01

## 2017-07-05 PROBLEM — D69.6 THROMBOCYTOPENIA: Status: RESOLVED | Noted: 2017-01-01 | Resolved: 2017-01-01

## 2017-07-05 NOTE — PLAN OF CARE
Problem: Patient Care Overview  Goal: Plan of Care Review  Outcome: Ongoing (interventions implemented as appropriate)  Prn pain analgesia was requested and administered q2h as prescribed.  Pt complains of numbness and pain on both sides of his face, his lips/mouth and consistently reports the pain 7.5-8. Day 9 of FLAG/BETZY.

## 2017-07-05 NOTE — ASSESSMENT & PLAN NOTE
- hemoglobin 7.3  grams/dl, likely from relapsed disease and chemotherapy   - Platelets are 33 today will transfuse one unit of platelets.  - transfuse for hemoglobin < 7

## 2017-07-05 NOTE — ASSESSMENT & PLAN NOTE
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   - continue ppx acyclovir, itraconazole   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. Pt now off steroids.  - chimerics from marrow done 6/21 show CD3 of 90% donor and 10% recipient, but CD34 of 5% donor and 95% recipient

## 2017-07-05 NOTE — PROGRESS NOTES
07/05/17 0525   Vital Signs   Temp (!) 100.7 °F (38.2 °C)   Temp src Oral   Pulse (!) 120   Heart Rate Source Monitor   Resp 17   SpO2 (!) 94 %   O2 Device (Oxygen Therapy) room air   /66   BP Location Right arm   BP Method Automatic   Patient Position Lying     Pt oral temp 100.7F . Dr. Russell notified. Tylenol given. Pt on cefepime q8 hours. Blood cultures drawn on 7/4/17. Will continue to monitor.

## 2017-07-05 NOTE — PROGRESS NOTES
[facility]  Hematology  Bone Marrow Transplant  Progress Note    Patient Name: Paul E Sistrunk  Admission Date: 6/20/2017  Hospital Length of Stay: 15 days  Code Status: Full Code    Subjective:     Interval History:   - T.max 100.7 in the past 24 hours.   - Cultures NGTD and continues on Cefepime.   - The patient continues to complain of facial numbness.    - pain controlled on IV Dilaudid    Objective:     Vital Signs (Most Recent):  Temp: 98.5 °F (36.9 °C) (07/05/17 1124)  Pulse: 98 (07/05/17 1124)  Resp: 18 (07/05/17 1124)  BP: 122/67 (07/05/17 1124)  SpO2: (!) 94 % (07/05/17 1124) Vital Signs (24h Range):  Temp:  [98.5 °F (36.9 °C)-100.7 °F (38.2 °C)] 98.5 °F (36.9 °C)  Pulse:  [] 98  Resp:  [17-18] 18  SpO2:  [94 %-97 %] 94 %  BP: (112-127)/(60-85) 122/67     Weight: 85.2 kg (187 lb 11.6 oz)  Body mass index is 24.77 kg/m².  Body surface area is 2.09 meters squared.    ECOG SCORE           Intake/Output - Last 3 Shifts       07/03 0700 - 07/04 0659 07/04 0700 - 07/05 0659 07/05 0700 - 07/06 0659    P.O. 1320 905 480    I.V. (mL/kg) 2525.3 (29.1) 3137.6 (36.9)     Blood 475      IV Piggyback 50 150 50    Total Intake(mL/kg) 4370.3 (50.4) 4192.6 (49.3) 530 (6.2)    Urine (mL/kg/hr) 2800 (1.3) 5825 (2.9) 1100 (2.3)    Stool 0 (0)      Total Output 2800 5825 1100    Net +1570.3 -1632.4 -570           Urine Occurrence  1 x     Stool Occurrence 2 x            Physical Exam   Constitutional: He is oriented to person, place, and time. He appears well-developed and well-nourished. No distress.   HENT:   Head: Normocephalic.   Mouth/Throat: Oropharynx is clear and moist. No oropharyngeal exudate.   Eyes: Conjunctivae are normal. Pupils are equal, round, and reactive to light. No scleral icterus.   Neck: Normal range of motion. Neck supple. Normal range of motion present. No thyromegaly present.   Cardiovascular: Normal rate, regular rhythm, normal heart sounds and intact distal pulses.     Pulmonary/Chest: Effort normal and breath sounds normal. No respiratory distress.   Abdominal: Soft. Bowel sounds are normal. He exhibits no distension. There is no tenderness.   Musculoskeletal: Normal range of motion. He exhibits no edema or tenderness.   Lymphadenopathy:        Head (right side): No submandibular, no preauricular, no posterior auricular and no occipital adenopathy present.        Head (left side): No submandibular, no preauricular, no posterior auricular and no occipital adenopathy present.     He has no cervical adenopathy.     He has no axillary adenopathy.   Neurological: He is alert and oriented to person, place, and time. No cranial nerve deficit. Coordination normal.   Skin: Skin is warm and dry. No petechiae and no rash noted. No pallor.   Psychiatric: He has a normal mood and affect. Thought content normal.   Vitals reviewed.      Significant Labs:   CBC:   Recent Labs  Lab 07/04/17 0320 07/05/17  0447   WBC 0.12* 0.07*   HGB 7.4* 7.3*   HCT 21.1* 20.8*   PLT 7* 33*    and CMP:   Recent Labs  Lab 07/04/17 0320 07/05/17  0447   * 130*   K 4.6 5.3*    103   CO2 21* 21*   GLU 95 106   BUN 21* 22*   CREATININE 1.0 1.1   CALCIUM 7.9* 8.0*   PROT 5.4* 5.8*   ALBUMIN 2.4* 2.4*   BILITOT 0.7 0.8   ALKPHOS 175* 202*   AST 20 26   ALT 14 18   ANIONGAP 7* 6*   EGFRNONAA >60.0 >60.0       Diagnostic Results:  None    Assessment/Plan:     * AML (acute myeloid leukemia) in relapse    - relapsed AML - peripheral blood shows 30% blasts on admit  - 2D echo shows 60% EF  - Mazariegos placed, local measures to control bleeding, gen surg placed more sutures, now stopped bleeding  - BM biopsy results - consistent with 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 Blast of 62%  - awaiting mutation analysis and cytogenetics; FLT-3 negative     - Jaw pain and pain in right cranial nerve 7 distribution with persistent, severe headaches concerning for CNS involvement -  underwent IT chemotherapy on 6/23. Flow negative for disease. CSF LDH was 29.  - Had previous reaction to MEC (etoposide caused full body rash). 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   - continue ppx acyclovir, itraconazole   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. Pt now off steroids.  - chimerics from marrow done 6/21 show CD3 of 90% donor and 10% recipient, but CD34 of 5% donor and 95% recipient         NICOLE (acute kidney injury)    - creatinine 1.1 today  - NICOLE Likely form TLS, LDH 1021 today, uric acid 3.1 this am  - Continue IVF to 125 cc/hr and allopurinol  - avoid nephrotoxic medications        Chronic bilateral low back pain without sciatica    - related to Neupogen  - started on zyrtec, lidocaine patch and Flexeril  - PCA stopped 7/1  - pain controlled with PRN IVP Dilaudid  - CT spine did not show any acute findings, revealed chronic degenerative changes, spinal cord stenosis and narrowing        Pancytopenia due to antineoplastic chemotherapy    - hemoglobin 7.3  grams/dl, likely from relapsed disease and chemotherapy   - Platelets are 33 today will transfuse one unit of platelets.  - transfuse for hemoglobin < 7          HIV disease    - CD4 low at 11.4%, Absolute CD4 469 and HIV RNA not detected from 6/21  - As per ID: continue triumeq and itraconazole        Neutropenic fever    - blood cultures no growth thus far from 6/24/17, CXR unremarkable.   - on Cefepime (started 6/24 - discontinued 6/28)  - received dose of Vancomycin  - Pt currently only on prophylactic  abx--Bactrim, acyclovir and itraconazole   - Pt febrile overnight 7/04/17.  Pt was taken off of cipro and started on cefepime (day 2).  - BC and urine cx NGTD  -T.max 100.7 overnight        Anxiety    - PRN Xanax        Prostate hypertrophy    - Continue home flomax        Facial numbness    - evaluated by Neurology  - MRI with no evidence of  acute intracranial pathology.  - thought to be secondary to drug induced peripheral neuropathy?  - Neurology rec Vitamin E, will hold for now given side effects of thrombocytopenia and bleeding          Hypoxia    - resolved  -On RA  -chest xray shows increased interstitial markings in the middle and upper lung zones  -no signs of volume overload  -Bactrim for PCP coverage.          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

## 2017-07-05 NOTE — ASSESSMENT & PLAN NOTE
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 Blast of 62%  - awaiting mutation analysis and cytogenetics; FLT-3 negative     - Jaw pain and pain in right cranial nerve 7 distribution with persistent, severe headaches concerning for CNS involvement - underwent IT chemotherapy on 6/23. Flow negative for disease. CSF LDH was 29.  - Had previous reaction to MEC (etoposide caused full body rash). Did not qualify for the Tolero trial due to receiving IT chemotherapy    - Day 9 of FLAG-BETZY   - Day 14 bone marrow biopsy scheduled in the OR on 7/10

## 2017-07-05 NOTE — ASSESSMENT & PLAN NOTE
- blood cultures no growth thus far from 6/24/17, CXR unremarkable.   - on Cefepime (started 6/24 - discontinued 6/28)  - received dose of Vancomycin  - Pt currently only on prophylactic abx--Bactrim, acyclovir and itraconazole   - Pt febrile overnight 7/04/17.  Pt was taken off of cipro and started on cefepime (day 2).  - BC and urine cx NGTD  -T.max 100.7 overnight

## 2017-07-05 NOTE — ASSESSMENT & PLAN NOTE
- resolved  -On RA  -chest xray shows increased interstitial markings in the middle and upper lung zones  -no signs of volume overload  -Bactrim for PCP coverage.

## 2017-07-05 NOTE — PROGRESS NOTES
[facility]  Adult Nutrition  Consult Note    SUMMARY     Recommendations    Recommendation/Intervention:   1. Continue with current regular diet and encourage HBV protein at each meal.   2. Order salt/soda rinses to help relieve dry mouth. RD provided guidance for dry mouth remedy. Will monitor  3. Change Boost Order to 2x times daily    4.Encourage po intake >/= 75%   RD will continue to follow    Goals: PO intake =/> 75% EEN/EPN   Nutrition Goal Status: progressing towards goal     Reason for Assessment  Reason for Assessment: RD follow-up  Diagnosis: cancer diagnosis/related complications (AML (acute myeloid leukemia) in relapse )  Relevent Medical History: Non-M3 AML   Interdisciplinary Rounds: did not attend     General Information Comments: Pt is day 8 of FLAG. Weight loss 7 lbs since admit noted. C/o nausea and his appetite continues to decline; c/o numbness of chin and inability to open entire mouth. Pt also c/o severe dry mouth. RD provided counseling and guidance to help promote intake. Cytarabine infusion noted.  Will monitor     Nutrition Discharge Planning: Optimal PO intake on regular diet, high biological value protein foods with ONS as needed to help meet needs. Food safety provisions accordingly    Nutrition Prescription Ordered  Current Diet Order: Regular      Oral Nutrition Supplement: Boost Plus TID     Evaluation of Received Nutrients/Fluid Intake  Oral Fluid (mL): 480   IV Fluid (mL): 530       % Intake of Estimated Energy Needs: 25 - 50 %  % Meal Intake: 25%     Nutrition Risk Screen  Nutrition Risk Screen:  (d/c on regular diet w/ PO intake meeting =/> 75% EEN/EPN)    Nutrition/Diet History  Patient Reported Diet/Restrictions/Preferences: general  Typical Food/Fluid Intake: Pt has been tolerating smoothies brought in from family. Ate 75% salad, apple sauce yesterday.  Food Preferences: No cultural or [employer] preferences     Supplemental Drinks or Food Habits: Boost  "Plus  Factors Affecting Nutritional Intake:  (none)     Labs/Tests/Procedures/Meds  Pertinent Labs Reviewed: reviewed  Pertinent Labs Comments: Na 130, K 5.3, CO2 21, BUN 22, Cr 1.1, Glu 106  Pertinent Medications Reviewed: reviewed  Pertinent Medications Comments: Acyclovir, IVF@ 125,ml/hr, Fludarabine, heparin,     Physical Findings  Overall Physical Appearance: generalized wasting  Tubes:  (central line)  Oral/Mouth Cavity: all teeth present (age appropriate)  Skin: intact    Anthropometrics  Temp: 98.5 °F (36.9 °C)     Height: 6' 1" (185.4 cm)  Weight Method: Standard Scale  Weight: 85.2 kg (187 lb 11.6 oz)  Ideal Body Weight (IBW), Male: 184 lb     % Ideal Body Weight, Male (lb): 105.44 lb     BMI (Calculated): 25.6  BMI Grade: 25 - 29.9 - overweight  Weight Loss: unintentional     Estimated/Assessed Needs  Weight Used For Calorie Calculations: 88.9 kg (195 lb 15.8 oz)   Height (cm): 185.4 cm  Energy Calorie Requirements (kcal): 5382-2254  Energy Need Method: Kcal/kg (30-35kcal/kg (2667-3112kcal/day))     RMR (Taney-St. Jeor Equation): 1782.88     Weight Used For Protein Calculations: 88.9 kg (195 lb 15.8 oz)  Protein Requirements: 106-133 (1.2-1.5g/kg)     Fluid Need Method: RDA Method (Or per MD)      RDA Method (mL): 2667     Assessment and Plan    * AML (acute myeloid leukemia) in relapse    Nutrition Problem  increased nutrient needs    Related to (etiology):   Physiological Needs    Signs and Symptoms (as evidenced by):   AML (acute myeloid leukemia)     Interventions/Recommendations (treatment strategy):  1.Addition of ONS  2. If PO intake does not improve by next visit, consider appetite stimulant    Nutrition Diagnosis Status:   Continues                  Monitor and Evaluation    Food and Nutrient Intake: energy intake, food and beverage intake  Food and Nutrient Adminstration: diet order     Physical Activity and Function: nutrition-related ADLs and IADLs  Anthropometric Measurements: weight, " weight change  Biochemical Data, Medical Tests and Procedures:  (All  labs)  Nutrition-Focused Physical Findings: overall appearance, extremities, muscles and bones, skin    Nutrition Risk    Level of Risk:  (f/u 2x week)    Nutrition Follow-Up    RD Follow-up?: Yes

## 2017-07-05 NOTE — SUBJECTIVE & OBJECTIVE
Subjective:     Interval History:   - T.max 100.7 in the past 24 hours.   - Cultures NGTD and continues on Cefepime.   - The patient continues to complain of facial numbness.    - pain controlled on IV Dilaudid    Objective:     Vital Signs (Most Recent):  Temp: 98.5 °F (36.9 °C) (07/05/17 1124)  Pulse: 98 (07/05/17 1124)  Resp: 18 (07/05/17 1124)  BP: 122/67 (07/05/17 1124)  SpO2: (!) 94 % (07/05/17 1124) Vital Signs (24h Range):  Temp:  [98.5 °F (36.9 °C)-100.7 °F (38.2 °C)] 98.5 °F (36.9 °C)  Pulse:  [] 98  Resp:  [17-18] 18  SpO2:  [94 %-97 %] 94 %  BP: (112-127)/(60-85) 122/67     Weight: 85.2 kg (187 lb 11.6 oz)  Body mass index is 24.77 kg/m².  Body surface area is 2.09 meters squared.    ECOG SCORE           Intake/Output - Last 3 Shifts       07/03 0700 - 07/04 0659 07/04 0700 - 07/05 0659 07/05 0700 - 07/06 0659    P.O. 1320 905 480    I.V. (mL/kg) 2525.3 (29.1) 3137.6 (36.9)     Blood 475      IV Piggyback 50 150 50    Total Intake(mL/kg) 4370.3 (50.4) 4192.6 (49.3) 530 (6.2)    Urine (mL/kg/hr) 2800 (1.3) 5825 (2.9) 1100 (2.3)    Stool 0 (0)      Total Output 2800 5825 1100    Net +1570.3 -1632.4 -570           Urine Occurrence  1 x     Stool Occurrence 2 x            Physical Exam   Constitutional: He is oriented to person, place, and time. He appears well-developed and well-nourished. No distress.   HENT:   Head: Normocephalic.   Mouth/Throat: Oropharynx is clear and moist. No oropharyngeal exudate.   Eyes: Conjunctivae are normal. Pupils are equal, round, and reactive to light. No scleral icterus.   Neck: Normal range of motion. Neck supple. Normal range of motion present. No thyromegaly present.   Cardiovascular: Normal rate, regular rhythm, normal heart sounds and intact distal pulses.    Pulmonary/Chest: Effort normal and breath sounds normal. No respiratory distress.   Abdominal: Soft. Bowel sounds are normal. He exhibits no distension. There is no tenderness.   Musculoskeletal: Normal  range of motion. He exhibits no edema or tenderness.   Lymphadenopathy:        Head (right side): No submandibular, no preauricular, no posterior auricular and no occipital adenopathy present.        Head (left side): No submandibular, no preauricular, no posterior auricular and no occipital adenopathy present.     He has no cervical adenopathy.     He has no axillary adenopathy.   Neurological: He is alert and oriented to person, place, and time. No cranial nerve deficit. Coordination normal.   Skin: Skin is warm and dry. No petechiae and no rash noted. No pallor.   Psychiatric: He has a normal mood and affect. Thought content normal.   Vitals reviewed.      Significant Labs:   CBC:   Recent Labs  Lab 07/04/17 0320 07/05/17 0447   WBC 0.12* 0.07*   HGB 7.4* 7.3*   HCT 21.1* 20.8*   PLT 7* 33*    and CMP:   Recent Labs  Lab 07/04/17 0320 07/05/17 0447   * 130*   K 4.6 5.3*    103   CO2 21* 21*   GLU 95 106   BUN 21* 22*   CREATININE 1.0 1.1   CALCIUM 7.9* 8.0*   PROT 5.4* 5.8*   ALBUMIN 2.4* 2.4*   BILITOT 0.7 0.8   ALKPHOS 175* 202*   AST 20 26   ALT 14 18   ANIONGAP 7* 6*   EGFRNONAA >60.0 >60.0       Diagnostic Results:  None

## 2017-07-05 NOTE — ASSESSMENT & PLAN NOTE
- creatinine 1.1 today  - NICOLE Likely form TLS, LDH 1021 today, uric acid 3.1 this am  - Continue IVF to 125 cc/hr and allopurinol  - avoid nephrotoxic medications

## 2017-07-06 PROBLEM — E87.0 ACUTE HYPERNATREMIA: Status: ACTIVE | Noted: 2017-01-01

## 2017-07-06 PROBLEM — R74.8 ELEVATED LIVER ENZYMES: Status: ACTIVE | Noted: 2017-01-01

## 2017-07-06 NOTE — PROGRESS NOTES
[facility]  Hematology  Bone Marrow Transplant  Progress Note    Patient Name: Paul E Sistrunk  Admission Date: 6/20/2017  Hospital Length of Stay: 16 days  Code Status: Full Code    Subjective:     Interval History:   - No acute issues overnight. VSS  - Afebrile x 24 hours; down 10 lbs since admit  - Continues to complain of jaw pain and intermittent facial numbness    Objective:     Vital Signs (Most Recent):  Temp: 99.9 °F (37.7 °C) (07/06/17 0315)  Pulse: (!) 113 (07/06/17 0315)  Resp: 18 (07/06/17 0315)  BP: 132/68 (07/06/17 0315)  SpO2: 96 % (07/06/17 0315) Vital Signs (24h Range):  Temp:  [98.5 °F (36.9 °C)-99.9 °F (37.7 °C)] 99.9 °F (37.7 °C)  Pulse:  [] 113  Resp:  [17-18] 18  SpO2:  [94 %-98 %] 96 %  BP: (122-132)/(62-69) 132/68     Weight: 83.6 kg (184 lb 4.9 oz)  Body mass index is 24.32 kg/m².  Body surface area is 2.08 meters squared.    ECOG SCORE         [unfilled]    Intake/Output - Last 3 Shifts       07/04 0700 - 07/05 0659 07/05 0700 - 07/06 0659 07/06 0700 - 07/07 0659    P.O. 905 1080     I.V. (mL/kg) 3137.6 (36.9) 2795.8 (33.4)     Blood       IV Piggyback 150 150     Total Intake(mL/kg) 4192.6 (49.3) 4025.8 (48.2)     Urine (mL/kg/hr) 5825 (2.9) 2800 (1.4) 650 (4.3)    Stool       Total Output 5825 2800 650    Net -1632.4 +1225.8 -650           Urine Occurrence 1 x 1 x           Physical Exam   Constitutional: He is oriented to person, place, and time. He appears well-developed and well-nourished. No distress.   HENT:   Head: Normocephalic.   Right Ear: External ear normal.   Left Ear: External ear normal.   Nose: Nose normal.   Mouth/Throat: Oropharynx is clear and moist and mucous membranes are normal. No oropharyngeal exudate.   Eyes: Conjunctivae and EOM are normal. Pupils are equal, round, and reactive to light. No scleral icterus.   Neck: Normal range of motion. Neck supple.   Cardiovascular: Normal rate, regular rhythm and normal heart sounds.     Pulmonary/Chest: Effort normal and breath sounds normal.   Abdominal: Soft. Normal appearance and bowel sounds are normal. He exhibits no distension. There is no tenderness.   Musculoskeletal: Normal range of motion. He exhibits no edema.   Neurological: He is alert and oriented to person, place, and time.   Skin: Skin is warm, dry and intact. No cyanosis. Nails show no clubbing.   Central line dressing clean, dry and intact    Psychiatric: He has a normal mood and affect. His behavior is normal. Thought content normal. His mood appears not anxious.   Vitals reviewed.      Significant Labs:   CBC:   Recent Labs  Lab 07/05/17  0447 07/06/17  0458 07/06/17  0542   WBC 0.07* 0.05* 0.05*   HGB 7.3* 7.1* 7.4*   HCT 20.8* 20.6* 21.6*   PLT 33* 52* 10*   , CMP:   Recent Labs  Lab 07/05/17  0447   *   K 5.3*      CO2 21*      BUN 22*   CREATININE 1.1   CALCIUM 8.0*   PROT 5.8*   ALBUMIN 2.4*   BILITOT 0.8   ALKPHOS 202*   AST 26   ALT 18   ANIONGAP 6*   EGFRNONAA >60.0    and All pertinent labs from the last 24 hours have been reviewed.    Diagnostic Results:  I have reviewed all pertinent imaging results/findings within the past 24 hours.    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 Blast of 62%  - awaiting mutation analysis and cytogenetics; FLT-3 negative     - Jaw pain and pain in right cranial nerve 7 distribution with persistent, severe headaches concerning for CNS involvement - underwent IT chemotherapy on 6/23. Flow negative for disease. CSF LDH was 29.  - Had previous reaction to MEC (etoposide caused full body rash). Did not qualify for the Tolero trial due to receiving IT  chemotherapy    - Day 10 of FLAG-BETZY   - Day 14 bone marrow biopsy scheduled in the OR on 7/10        S/P allogeneic bone marrow transplant    - 297 days post Flu/Bu/ATG MUD allogeneic transplant for high risk AML after his second IDAC (6/20/16 - 6/24/16) after a prolonged hospitalization (2/5/16 - 3/21/16) for induction with 7&3 and reinduction with MEC to remission and IDAC (4/4/16 - 4/9/16).   - continue ppx acyclovir, itraconazole   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. Pt now off steroids.  - chimerics from marrow done 6/21 show CD3 of 90% donor and 10% recipient, but CD34 of 5% donor and 95% recipient         HIV disease    - CD4 low at 11.4%, Absolute CD4 469 and HIV RNA not detected from 6/21  - As per ID: continue triumeq and itraconazole        Neutropenic fever    - blood cultures no growth from 6/24/17 and 7/4/17, CXR unremarkable.   - on Cefepime (started 6/24 - discontinued 6/28) restarted on 7/4/17   - Pt currently only on prophylactic abx--Bactrim, acyclovir and itraconazole   - Pt febrile overnight 7/04/17.  Pt was taken off of cipro and started on cefepime (day 2).  - BC and urine cx NGTD from 7/4/17  - Afebrile x 24 hours        Pancytopenia due to antineoplastic chemotherapy    - hemoglobin 7.4  grams/dl, likely from relapsed disease and chemotherapy   - Platelets are 10,000  - transfuse for hemoglobin < 7 and platelets < 10,000          Prostate hypertrophy    - Continue home flomax        Acute hypernatremia    - slowly improving  - sodium 132   - monitor for now        Elevated liver enzymes    - Alk Phos elevated at 202; stable  - T. Bili up to 1.2  - Continue to monitor         Facial numbness    - evaluated by Neurology  - MRI with no evidence of  acute intracranial pathology.  - thought to be secondary to drug induced peripheral neuropathy?  - Neurology rec Vitamin E, will hold for now given side effects of thrombocytopenia and bleeding          Hypoxia    -  resolved  -On RA  -chest xray shows increased interstitial markings in the middle and upper lung zones  -no signs of volume overload  -Bactrim for PCP coverage.          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    - creatinine pending,  and uric acid 3.1  - will continue allopurinol 300 mg bid        Chronic bilateral low back pain without sciatica    - related to Neupogen  - started on zyrtec, lidocaine patch and Flexeril  - PCA stopped 7/1  - pain controlled with PRN IVP Dilaudid  - CT spine did not show any acute findings, revealed chronic degenerative changes, spinal cord stenosis and narrowing        Electrolyte abnormality    - ordered prn electrolyte replacement per protocol         NICOLE (acute kidney injury)    - creatinine 1.2   - NICOLE Likely form TLS,  today, uric acid 3.1 this am  - Continue IVF to 125 cc/hr and allopurinol 300 mg BID  - avoid nephrotoxic medications        Anxiety    - PRN Xanax            VTE Risk Mitigation         Ordered     Place sequential compression device  Until discontinued      06/20/17 2005     High Risk of VTE  Once      06/20/17 1901          Disposition: Pending day 14 marrow and count recovery.     Asia Melgar, NP  Bone Marrow Transplant  [facility]

## 2017-07-06 NOTE — ASSESSMENT & PLAN NOTE
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 Blast of 62%  - awaiting mutation analysis and cytogenetics; FLT-3 negative     - Jaw pain and pain in right cranial nerve 7 distribution with persistent, severe headaches concerning for CNS involvement - underwent IT chemotherapy on 6/23. Flow negative for disease. CSF LDH was 29.  - Had previous reaction to MEC (etoposide caused full body rash). Did not qualify for the Tolero trial due to receiving IT chemotherapy    - Day 10 of FLAG-BETZY   - Day 14 bone marrow biopsy scheduled in the OR on 7/10

## 2017-07-06 NOTE — SUBJECTIVE & OBJECTIVE
Subjective:     Interval History:   - No acute issues overnight. VSS  - Afebrile x 24 hours; down 10 lbs since admit  - Continues to complain of jaw pain and intermittent facial numbness    Objective:     Vital Signs (Most Recent):  Temp: 99.9 °F (37.7 °C) (07/06/17 0315)  Pulse: (!) 113 (07/06/17 0315)  Resp: 18 (07/06/17 0315)  BP: 132/68 (07/06/17 0315)  SpO2: 96 % (07/06/17 0315) Vital Signs (24h Range):  Temp:  [98.5 °F (36.9 °C)-99.9 °F (37.7 °C)] 99.9 °F (37.7 °C)  Pulse:  [] 113  Resp:  [17-18] 18  SpO2:  [94 %-98 %] 96 %  BP: (122-132)/(62-69) 132/68     Weight: 83.6 kg (184 lb 4.9 oz)  Body mass index is 24.32 kg/m².  Body surface area is 2.08 meters squared.    ECOG SCORE         [unfilled]    Intake/Output - Last 3 Shifts       07/04 0700 - 07/05 0659 07/05 0700 - 07/06 0659 07/06 0700 - 07/07 0659    P.O. 905 1080     I.V. (mL/kg) 3137.6 (36.9) 2795.8 (33.4)     Blood       IV Piggyback 150 150     Total Intake(mL/kg) 4192.6 (49.3) 4025.8 (48.2)     Urine (mL/kg/hr) 5825 (2.9) 2800 (1.4) 650 (4.3)    Stool       Total Output 5825 2800 650    Net -1632.4 +1225.8 -650           Urine Occurrence 1 x 1 x           Physical Exam   Constitutional: He is oriented to person, place, and time. He appears well-developed and well-nourished. No distress.   HENT:   Head: Normocephalic.   Right Ear: External ear normal.   Left Ear: External ear normal.   Nose: Nose normal.   Mouth/Throat: Oropharynx is clear and moist and mucous membranes are normal. No oropharyngeal exudate.   Eyes: Conjunctivae and EOM are normal. Pupils are equal, round, and reactive to light. No scleral icterus.   Neck: Normal range of motion. Neck supple.   Cardiovascular: Normal rate, regular rhythm and normal heart sounds.    Pulmonary/Chest: Effort normal and breath sounds normal.   Abdominal: Soft. Normal appearance and bowel sounds are normal. He exhibits no distension. There is no tenderness.   Musculoskeletal: Normal range of motion.  He exhibits no edema.   Neurological: He is alert and oriented to person, place, and time.   Skin: Skin is warm, dry and intact. No cyanosis. Nails show no clubbing.   Central line dressing clean, dry and intact    Psychiatric: He has a normal mood and affect. His behavior is normal. Thought content normal. His mood appears not anxious.   Vitals reviewed.      Significant Labs:   CBC:   Recent Labs  Lab 07/05/17  0447 07/06/17  0458 07/06/17  0542   WBC 0.07* 0.05* 0.05*   HGB 7.3* 7.1* 7.4*   HCT 20.8* 20.6* 21.6*   PLT 33* 52* 10*   , CMP:   Recent Labs  Lab 07/05/17  0447   *   K 5.3*      CO2 21*      BUN 22*   CREATININE 1.1   CALCIUM 8.0*   PROT 5.8*   ALBUMIN 2.4*   BILITOT 0.8   ALKPHOS 202*   AST 26   ALT 18   ANIONGAP 6*   EGFRNONAA >60.0    and All pertinent labs from the last 24 hours have been reviewed.    Diagnostic Results:  I have reviewed all pertinent imaging results/findings within the past 24 hours.

## 2017-07-06 NOTE — ASSESSMENT & PLAN NOTE
- blood cultures no growth from 6/24/17 and 7/4/17, CXR unremarkable.   - on Cefepime (started 6/24 - discontinued 6/28) restarted on 7/4/17   - Pt currently only on prophylactic abx--Bactrim, acyclovir and itraconazole   - Pt febrile overnight 7/04/17.  Pt was taken off of cipro and started on cefepime (day 2).  - BC and urine cx NGTD from 7/4/17  - Afebrile x 24 hours

## 2017-07-06 NOTE — ASSESSMENT & PLAN NOTE
- 297 days post Flu/Bu/ATG MUD allogeneic transplant for high risk AML after his second IDAC (6/20/16 - 6/24/16) after a prolonged hospitalization (2/5/16 - 3/21/16) for induction with 7&3 and reinduction with MEC to remission and IDAC (4/4/16 - 4/9/16).   - continue ppx acyclovir, itraconazole   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. Pt now off steroids.  - chimerics from marrow done 6/21 show CD3 of 90% donor and 10% recipient, but CD34 of 5% donor and 95% recipient

## 2017-07-06 NOTE — ASSESSMENT & PLAN NOTE
- creatinine 1.2   - NICOLE Likely form TLS,  today, uric acid 3.1 this am  - Continue IVF to 125 cc/hr and allopurinol 300 mg BID  - avoid nephrotoxic medications

## 2017-07-06 NOTE — PLAN OF CARE
MDR's with Dr Shin.  Patient is day 10 of FLAG Lynette induction.  ANC 0 and awaiting count recovery.  Next BMB is planned for 7/10.  Will continue to follow for d/c needs.

## 2017-07-06 NOTE — PLAN OF CARE
Problem: Patient Care Overview  Goal: Plan of Care Review  Outcome: Ongoing (interventions implemented as appropriate)  Pt remained free of falls and injury. Bed in low locked position side rails up x2 with call light and belongings in reach. Non skid socks in place. IVFs continued. Dilaudid prn given for jaw pain. Cefepime given as scheduled. Pt remained afebrile. All VS stable. Will continue to monitor.

## 2017-07-06 NOTE — ASSESSMENT & PLAN NOTE
- hemoglobin 7.4  grams/dl, likely from relapsed disease and chemotherapy   - Platelets are 10,000  - transfuse for hemoglobin < 7 and platelets < 10,000

## 2017-07-07 NOTE — ASSESSMENT & PLAN NOTE
- hemoglobin 5.7  grams/dl, likely from relapsed disease and chemotherapy   - Platelets are 3,000  - transfuse for hemoglobin < 7 and platelets < 10,000  - 2 units of blood and 1 unit of platelets

## 2017-07-07 NOTE — PROGRESS NOTES
07/07/17 1340   Vital Signs   Temp 99.3 °F (37.4 °C)   Temp src Oral   Pulse (!) 116   Heart Rate Source Monitor   Resp (!) 24   SpO2 (!) 88 %   O2 Device (Oxygen Therapy) room air   /63   MAP (mmHg) 91   BP Location Right arm   BP Method Automatic   Patient Position Sitting   Patient ambulated from the bathroom. Patient has been on room air for a couple of hours. Instructed patient to wear his oxygen at this time.

## 2017-07-07 NOTE — ASSESSMENT & PLAN NOTE
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 Blast of 62%  - awaiting mutation analysis and cytogenetics; FLT-3 negative     - Jaw pain and pain in right cranial nerve 7 distribution with persistent, severe headaches concerning for CNS involvement - underwent IT chemotherapy on 6/23. Flow negative for disease. CSF LDH was 29.  - Had previous reaction to MEC (etoposide caused full body rash). Did not qualify for the Tolero trial due to receiving IT chemotherapy    - Day 11 of FLAG-BETZY   - Day 14 bone marrow biopsy scheduled in the OR on 7/10

## 2017-07-07 NOTE — ASSESSMENT & PLAN NOTE
- blood cultures no growth from 6/24/17 and 7/4/17, CXR unremarkable.   - on Cefepime (started 6/24 - discontinued 6/28) restarted on 7/4/17   - Pt currently only on prophylactic abx--Bactrim, acyclovir and itraconazole   - Cefepime day 3; vancomycin day 1   - BC and urine cx NGTD from 7/4/17  - Repeat blood culture and urine culture in process from 7/7/17 - CXR no acute process   - T Max 101.6 overnight

## 2017-07-07 NOTE — PROGRESS NOTES
Pt with temp 101.6, remains tachycardic. 94% on 2L NC, 88% on room air.  notified. Garcia culture ordered, CXR, and vanc ordered. Will monitor patient.

## 2017-07-07 NOTE — ANESTHESIA PREPROCEDURE EVALUATION
07/07/2017  Pre-operative evaluation for Procedure(s) (LRB):  BIOPSY-BONE MARROW (N/A)    Paul E Sistrunk is a 56 y.o. male is a 57 yo M with PMH of HIV, anxiety,  AML (now in relapse), NICOLE, neutropenic fever, pancytopenic fever (cefepime and vanc) , anemia s/p 3U pRBCs, and currently on intrathecal chemo injections who presents for above procedure.     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      Current Outpatient Prescriptions on File Prior to Encounter   Medication Sig Dispense Refill    abacavir-dolutegravir-lamivud (TRIUMEQ) 600- mg Tab Take 1 tablet by mouth once daily. 30 tablet 2    acyclovir (ZOVIRAX) 800 MG Tab Take 1 tablet (800 mg total) by mouth 2 (two) times daily. 60 tablet 11    alprazolam (XANAX) 0.5 MG tablet Take 1 tablet (0.5 mg total) by mouth 2 (two) times daily as needed for Insomnia or Anxiety. 60 tablet 0    butalbital-acetaminophen-caffeine -40 mg (FIORICET, ESGIC) -40 mg per tablet   0    finasteride (PROSCAR) 5 mg tablet  Take 1 tablet (5 mg total) by mouth once daily. 30 tablet 1    HYDROmorphone (DILAUDID) 2 MG tablet Take 1 tablet (2 mg total) by mouth every 4 (four) hours as needed for Pain. 60 tablet 0    itraconazole (SPORANOX) 10 mg/mL Soln Take 2 mLs (20 mg total) by mouth 3 (three) times daily. 1800 mL 0    loperamide (IMODIUM) 2 mg capsule Take 2 mg by mouth daily as needed for Diarrhea.      magnesium oxide (MAG-OX) 400 mg tablet TAKE THREE TABLETS BY MOUTH THREE TIMES A  tablet 1    ondansetron (ZOFRAN) 8 MG tablet Take 1 tablet (8 mg total) by mouth every 8 (eight) hours as needed for Nausea. 30 tablet 1    pantoprazole (PROTONIX) 40 MG tablet Take 1 tablet (40 mg total) by mouth once daily. 30 tablet 11    promethazine (PHENERGAN) 12.5 MG Tab Take 1 tablet (12.5 mg total) by mouth every 6 (six) hours as needed (nausea). 30 tablet 2    tacrolimus (PROGRAF) 0.5 MG Cap Take 1 capsule (0.5 mg total) by mouth once daily. 120 capsule 11    tamsulosin (FLOMAX) 0.4 mg Cp24 Take 1 capsule (0.4 mg total) by mouth every other day. 15 capsule 1    zolpidem (AMBIEN) 5 MG Tab Take 1 tablet (5 mg total) by mouth nightly as needed. 30 tablet 1       Past Surgical History:   Procedure Laterality Date    CYSTOSCOPY         Social History     Social History    Marital status: Single     Spouse name: N/A    Number of children: 1    Years of education: N/A     Occupational History    Not on file.     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1   CALCIUM  8.1*  7.6*   ALBUMIN  2.5*  2.1*   PROT  5.7*  5.1*   ALKPHOS  202*  137*   ALT  15  12   AST  17  13   BILITOT  1.2*  1.7*       INR  Recent Labs      07/07/17   0601   INR  1.1   APTT  28.3           Diagnostic Studies:    MRI brain (07/01/17):   Contrast enhanced MRI demonstrates no evidence of  acute intracranial pathology.  Right maxillary sinus retention cysts.    CXR 07/07/17:   Central line in SVC.  Heart size is normal.  There is moderate edema and no change    EKG:  EKG 07/02/17:   Sinus tachycardia  Possible Left atrial enlargement  Incomplete right bundle branch block  When compared with ECG of 20-JUN-2017 19:52,  Incomplete right bundle branch block is now Present      2D Echo:    06/21/17:   1 - Normal left ventricular systolic function (EF 60-65%).     2 - Concentric remodeling.     3 - No wall motion abnormalities.     4 - Normal left ventricular diastolic function.     5 - Right atrial enlargement.     6 - Normal right ventricular systolic function .     7 - Trivial aortic regurgitation.     8 - Trivial tricuspid regurgitation.     9 - The estimated PA systolic pressure is 23 mmHg.       Anesthesia Evaluation    I have reviewed the Patient Summary Reports.     I have reviewed the Medications.     Review of Systems  Anesthesia Hx:  No problems with previous Anesthesia Denies Hx of Anesthetic complications  History of prior surgery of  interest to airway management or planning: Previous anesthesia: MAC, General Denies Family Hx of Anesthesia complications.   Denies Personal Hx of Anesthesia complications.   Social:  Non-Smoker    Hematology/Oncology:         -- Anemia: Current/Recent Cancer. chemotherapy   EENT/Dental:EENT/Dental Normal   Cardiovascular:   Exercise tolerance: good Denies Hypertension.  Denies MI.  Denies CAD.           Pulmonary:   Denies COPD. Shortness of breath    Renal/:   Chronic Renal Disease    Hepatic/GI:   Denies GERD.  Denies Hepatitis.    Musculoskeletal:  Musculoskeletal Normal    Neurological:   Denies CVA.  Denies Headaches. Denies Seizures.    Endocrine:  Endocrine Normal    Dermatological:  Skin Normal    Psych:   anxiety          Physical Exam  General:  Well nourished    Airway/Jaw/Neck:  Airway Findings: Mouth Opening: Normal Tongue: Normal  General Airway Assessment: Adult  Mallampati: I  TM Distance: Normal, at least 6 cm  Jaw/Neck Findings:     Neck ROM: Normal ROM     Eyes/Ears/Nose:  EYES/EARS/NOSE FINDINGS: Normal   Dental:  DENTAL FINDINGS: Normal   Chest/Lungs:  Chest/Lungs Findings: Clear to auscultation, Normal Respiratory Rate     Heart/Vascular:  Heart Findings: Rate: Normal  Rhythm: Regular Rhythm  Sounds: Normal     Abdomen:  Abdomen Findings: Normal    Musculoskeletal:  Musculoskeletal Findings: Normal   Skin:  Skin Findings: Normal    Mental Status:  Mental Status Findings:  Cooperative, Alert and Oriented         Anesthesia Plan  Type of Anesthesia, risks & benefits discussed:  Anesthesia Type:  MAC, general  Patient's Preference:   Intra-op Monitoring Plan: standard ASA monitors  Intra-op Monitoring Plan Comments:   Post Op Pain Control Plan: multimodal analgesia and per primary service following discharge from PACU  Post Op Pain Control Plan Comments:   Induction:   IV  Beta Blocker:  Patient is not currently on a Beta-Blocker (No further documentation required).       Informed Consent:  Patient understands risks and agrees with Anesthesia plan.  Questions answered. Anesthesia consent signed with patient.  ASA Score: 4     Day of Surgery Review of History & Physical: I have interviewed and examined the patient. I have reviewed the patient's H&P dated:    H&P update referred to the surgeon.         Ready For Surgery From Anesthesia Perspective.

## 2017-07-07 NOTE — PLAN OF CARE
"Problem: Patient Care Overview  Goal: Plan of Care Review  Outcome: Ongoing (interventions implemented as appropriate)  Patient reports a new pain when he takes a deep breath or turns to the left flank area. Pain is still continued to his jaw line. Patient is requesting pain meds atc q 2 hrs. See pain management. Patient awake, ambulatory. Patient looks very tired. States he did not sleep and he takes "mini naps" and awakens from "nightmares" per the patient. Patient tmax 99.3 oral. Patient received 2 u PRBC, 1 u of Plts today. No falls, no skin breakdown. Will cont to monitor. Patient remains on neutropenic and thrombocytopenic precautions.       "

## 2017-07-07 NOTE — ASSESSMENT & PLAN NOTE
- creatinine 1.2; stable   - NICOLE Likely form TLS,  today, uric acid 3.1 this am  - Continue IVF to 125 cc/hr and allopurinol 300 mg BID (stopped 7/7)  - avoid nephrotoxic medications

## 2017-07-07 NOTE — SUBJECTIVE & OBJECTIVE
Subjective:     Interval History:   - T Max 101.6 overnight. Was on cefepime, vancomycin was ordered. All other VSS stable  - Blood culture and Urine culture ordered  - Day 11 of FLAG-BETZY  - Pain stable with use of IV dilaudid - facial numbness and pain continues     Objective:     Vital Signs (Most Recent):  Temp: 98.3 °F (36.8 °C) (07/07/17 0749)  Pulse: 100 (07/07/17 0749)  Resp: 16 (07/07/17 0749)  BP: 113/64 (07/07/17 0749)  SpO2: 96 % (07/07/17 0749) Vital Signs (24h Range):  Temp:  [98.3 °F (36.8 °C)-101.6 °F (38.7 °C)] 98.3 °F (36.8 °C)  Pulse:  [100-116] 100  Resp:  [16-20] 16  SpO2:  [94 %-96 %] 96 %  BP: (113-127)/(52-64) 113/64     Weight: 83.4 kg (183 lb 15.6 oz)  Body mass index is 24.27 kg/m².  Body surface area is 2.07 meters squared.    ECOG SCORE         [unfilled]    Intake/Output - Last 3 Shifts       07/05 0700 - 07/06 0659 07/06 0700 - 07/07 0659 07/07 0700 - 07/08 0659    P.O. 1080 840     I.V. (mL/kg) 2795.8 (33.4) 3141.2 (37.7)     Blood   166.5    IV Piggyback 150 100     Total Intake(mL/kg) 4025.8 (48.2) 4081.2 (48.9) 166.5 (2)    Urine (mL/kg/hr) 2800 (1.4) 3550 (1.8)     Stool  0 (0)     Total Output 2800 3550      Net +1225.8 +531.2 +166.5           Urine Occurrence 1 x      Stool Occurrence  0 x           Physical Exam   Constitutional: He is oriented to person, place, and time. He appears well-developed and well-nourished. No distress.   HENT:   Head: Normocephalic.   Right Ear: External ear normal.   Left Ear: External ear normal.   Nose: Nose normal.   Mouth/Throat: Oropharynx is clear and moist and mucous membranes are normal. No oropharyngeal exudate.   Eyes: Conjunctivae and EOM are normal. Pupils are equal, round, and reactive to light. No scleral icterus.   Neck: Normal range of motion. Neck supple.   Cardiovascular: Normal rate, regular rhythm and normal heart sounds.    Pulmonary/Chest: Effort normal and breath sounds normal.   Abdominal: Soft. Normal appearance and bowel  sounds are normal. He exhibits no distension. There is no tenderness.   Musculoskeletal: Normal range of motion. He exhibits no edema.   Neurological: He is alert and oriented to person, place, and time.   Skin: Skin is warm, dry and intact. No cyanosis. Nails show no clubbing.   Psychiatric: He has a normal mood and affect. His behavior is normal. Thought content normal. His mood appears not anxious.   Vitals reviewed.      Significant Labs:   CBC:   Recent Labs  Lab 07/06/17  0458 07/06/17  0542 07/07/17  0601   WBC 0.05* 0.05* 0.04*   HGB 7.1* 7.4* 5.7*   HCT 20.6* 21.6* 16.6*   PLT 52* 10* 3*   , CMP:   Recent Labs  Lab 07/06/17  0542 07/07/17  0601   * 133*   K 4.9 4.5    108   CO2 20* 18*    100   BUN 25* 27*   CREATININE 1.2 1.2   CALCIUM 8.1* 7.6*   PROT 5.7* 5.1*   ALBUMIN 2.5* 2.1*   BILITOT 1.2* 1.7*   ALKPHOS 202* 137*   AST 17 13   ALT 15 12   ANIONGAP 8 7*   EGFRNONAA >60.0 >60.0    and All pertinent labs from the last 24 hours have been reviewed.    Diagnostic Results:  I have reviewed all pertinent imaging results/findings within the past 24 hours.

## 2017-07-07 NOTE — ASSESSMENT & PLAN NOTE
- 298 days post Flu/Bu/ATG MUD allogeneic transplant for high risk AML after his second IDAC (6/20/16 - 6/24/16) after a prolonged hospitalization (2/5/16 - 3/21/16) for induction with 7&3 and reinduction with MEC to remission and IDAC (4/4/16 - 4/9/16).   - continue ppx acyclovir, itraconazole   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. Pt now off steroids.  - chimerics from marrow done 6/21 show CD3 of 90% donor and 10% recipient, but CD34 of 5% donor and 95% recipient

## 2017-07-07 NOTE — PLAN OF CARE
Problem: Patient Care Overview  Goal: Plan of Care Review  Outcome: Ongoing (interventions implemented as appropriate)  Patient afebrile overnight, dilaudid 2 mg given every 2 hours for pain. IVF continued, cefepime continued. Neutropenic precautions maintained. Encouraged oral intake, poor appetite. Instructed patient to call for assistance, bed low and locked, call bell within reach, nonskid socks on, patient verbalized understanding.

## 2017-07-07 NOTE — PROGRESS NOTES
[facility]  Hematology  Bone Marrow Transplant  Progress Note    Patient Name: Paul E Sistrunk  Admission Date: 6/20/2017  Hospital Length of Stay: 17 days  Code Status: Full Code    Subjective:     Interval History:   - T Max 101.6 overnight. Was on cefepime, vancomycin was ordered. All other VSS stable  - Blood culture and Urine culture ordered  - Day 11 of FLAG-BETZY  - Pain stable with use of IV dilaudid - facial numbness and pain continues     Objective:     Vital Signs (Most Recent):  Temp: 98.3 °F (36.8 °C) (07/07/17 0749)  Pulse: 100 (07/07/17 0749)  Resp: 16 (07/07/17 0749)  BP: 113/64 (07/07/17 0749)  SpO2: 96 % (07/07/17 0749) Vital Signs (24h Range):  Temp:  [98.3 °F (36.8 °C)-101.6 °F (38.7 °C)] 98.3 °F (36.8 °C)  Pulse:  [100-116] 100  Resp:  [16-20] 16  SpO2:  [94 %-96 %] 96 %  BP: (113-127)/(52-64) 113/64     Weight: 83.4 kg (183 lb 15.6 oz)  Body mass index is 24.27 kg/m².  Body surface area is 2.07 meters squared.    ECOG SCORE         [unfilled]    Intake/Output - Last 3 Shifts       07/05 0700 - 07/06 0659 07/06 0700 - 07/07 0659 07/07 0700 - 07/08 0659    P.O. 1080 840     I.V. (mL/kg) 2795.8 (33.4) 3141.2 (37.7)     Blood   166.5    IV Piggyback 150 100     Total Intake(mL/kg) 4025.8 (48.2) 4081.2 (48.9) 166.5 (2)    Urine (mL/kg/hr) 2800 (1.4) 3550 (1.8)     Stool  0 (0)     Total Output 2800 3550      Net +1225.8 +531.2 +166.5           Urine Occurrence 1 x      Stool Occurrence  0 x           Physical Exam   Constitutional: He is oriented to person, place, and time. He appears well-developed and well-nourished. No distress.   HENT:   Head: Normocephalic.   Right Ear: External ear normal.   Left Ear: External ear normal.   Nose: Nose normal.   Mouth/Throat: Oropharynx is clear and moist and mucous membranes are normal. No oropharyngeal exudate.   Eyes: Conjunctivae and EOM are normal. Pupils are equal, round, and reactive to light. No scleral icterus.   Neck: Normal range of  motion. Neck supple.   Cardiovascular: Normal rate, regular rhythm and normal heart sounds.    Pulmonary/Chest: Effort normal and breath sounds normal.   Abdominal: Soft. Normal appearance and bowel sounds are normal. He exhibits no distension. There is no tenderness.   Musculoskeletal: Normal range of motion. He exhibits no edema.   Neurological: He is alert and oriented to person, place, and time.   Skin: Skin is warm, dry and intact. No cyanosis. Nails show no clubbing.   Psychiatric: He has a normal mood and affect. His behavior is normal. Thought content normal. His mood appears not anxious.   Vitals reviewed.      Significant Labs:   CBC:   Recent Labs  Lab 07/06/17  0458 07/06/17  0542 07/07/17  0601   WBC 0.05* 0.05* 0.04*   HGB 7.1* 7.4* 5.7*   HCT 20.6* 21.6* 16.6*   PLT 52* 10* 3*   , CMP:   Recent Labs  Lab 07/06/17  0542 07/07/17  0601   * 133*   K 4.9 4.5    108   CO2 20* 18*    100   BUN 25* 27*   CREATININE 1.2 1.2   CALCIUM 8.1* 7.6*   PROT 5.7* 5.1*   ALBUMIN 2.5* 2.1*   BILITOT 1.2* 1.7*   ALKPHOS 202* 137*   AST 17 13   ALT 15 12   ANIONGAP 8 7*   EGFRNONAA >60.0 >60.0    and All pertinent labs from the last 24 hours have been reviewed.    Diagnostic Results:  I have reviewed all pertinent imaging results/findings within the past 24 hours.    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 Blast of 62%  - awaiting mutation analysis and cytogenetics; FLT-3 negative     - Jaw pain and pain in right cranial nerve 7 distribution with persistent, severe headaches concerning for CNS involvement - underwent IT chemotherapy on 6/23. Flow negative for disease. CSF LDH was  29.  - Had previous reaction to MEC (etoposide caused full body rash). Did not qualify for the Tolero trial due to receiving IT chemotherapy    - Day 11 of FLAG-BETZY   - Day 14 bone marrow biopsy scheduled in the OR on 7/10        S/P allogeneic bone marrow transplant    - 298 days post Flu/Bu/ATG MUD allogeneic transplant for high risk AML after his second IDAC (6/20/16 - 6/24/16) after a prolonged hospitalization (2/5/16 - 3/21/16) for induction with 7&3 and reinduction with MEC to remission and IDAC (4/4/16 - 4/9/16).   - continue ppx acyclovir, itraconazole   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. Pt now off steroids.  - chimerics from marrow done 6/21 show CD3 of 90% donor and 10% recipient, but CD34 of 5% donor and 95% recipient         HIV disease    - CD4 low at 11.4%, Absolute CD4 469 and HIV RNA not detected from 6/21  - As per ID: continue triumeq and itraconazole        Neutropenic fever    - blood cultures no growth from 6/24/17 and 7/4/17, CXR unremarkable.   - on Cefepime (started 6/24 - discontinued 6/28) restarted on 7/4/17   - Pt currently only on prophylactic abx--Bactrim, acyclovir and itraconazole   - Cefepime day 3; vancomycin day 1   - BC and urine cx NGTD from 7/4/17  - Repeat blood culture and urine culture in process from 7/7/17 - CXR no acute process   - T Max 101.6 overnight         Pancytopenia due to antineoplastic chemotherapy    - hemoglobin 5.7  grams/dl, likely from relapsed disease and chemotherapy   - Platelets are 3,000  - transfuse for hemoglobin < 7 and platelets < 10,000  - 2 units of blood and 1 unit of platelets           Prostate hypertrophy    - Continue home flomax        Acute hypernatremia    - slowly improving  - sodium 133   - monitor for now        Elevated liver enzymes    - Alk Phos elevated at 137; stable  - T. Bili up to 1.7  - Continue to monitor         Facial numbness    - evaluated by Neurology  - MRI with no evidence of  acute  intracranial pathology.  - thought to be secondary to drug induced peripheral neuropathy?  - Neurology rec Vitamin E, will hold for now given side effects of thrombocytopenia and bleeding          Hypoxia    - resolved  -On RA  -chest xray shows increased interstitial markings in the middle and upper lung zones  -no signs of volume overload  -Bactrim for PCP coverage.          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    - creatinine 1.2, LDH 514and uric acid 3.1  - discontinued allopurinol 300 mg bid on 7/7        Chronic bilateral low back pain without sciatica    - related to Neupogen  - started on zyrtec, lidocaine patch and Flexeril  - PCA stopped 7/1  - pain controlled with PRN IVP Dilaudid  - CT spine did not show any acute findings, revealed chronic degenerative changes, spinal cord stenosis and narrowing        Electrolyte abnormality    - ordered prn electrolyte replacement per protocol         NICOLE (acute kidney injury)    - creatinine 1.2; stable   - NICOLE Likely form TLS,  today, uric acid 3.1 this am  - Continue IVF to 125 cc/hr and allopurinol 300 mg BID (stopped 7/7)  - avoid nephrotoxic medications        Anxiety    - PRN Xanax            VTE Risk Mitigation         Ordered     Place sequential compression device  Until discontinued      06/20/17 2005     High Risk of VTE  Once      06/20/17 1901          Disposition: Pending BMBx on 7/10 and count recovery.    Asia Melgar, NP  Bone Marrow Transplant  [facility]

## 2017-07-08 PROBLEM — E88.3 TUMOR LYSIS SYNDROME FOLLOWING ANTINEOPLASTIC DRUG THERAPY: Status: RESOLVED | Noted: 2017-01-01 | Resolved: 2017-01-01

## 2017-07-08 PROBLEM — E87.0 ACUTE HYPERNATREMIA: Status: RESOLVED | Noted: 2017-01-01 | Resolved: 2017-01-01

## 2017-07-08 PROBLEM — N17.9 AKI (ACUTE KIDNEY INJURY): Status: RESOLVED | Noted: 2017-01-01 | Resolved: 2017-01-01

## 2017-07-08 PROBLEM — T45.1X5A TUMOR LYSIS SYNDROME FOLLOWING ANTINEOPLASTIC DRUG THERAPY: Status: RESOLVED | Noted: 2017-01-01 | Resolved: 2017-01-01

## 2017-07-08 PROBLEM — R09.02 HYPOXIA: Status: RESOLVED | Noted: 2017-01-01 | Resolved: 2017-01-01

## 2017-07-08 PROBLEM — R74.8 ELEVATED LIVER ENZYMES: Status: RESOLVED | Noted: 2017-01-01 | Resolved: 2017-01-01

## 2017-07-08 NOTE — PROGRESS NOTES
Unable to get blood return from line. Cathflow instilled. Re-ordered labs for this morning as a lab draw stat.

## 2017-07-08 NOTE — PLAN OF CARE
Problem: Patient Care Overview  Goal: Plan of Care Review  Outcome: Ongoing (interventions implemented as appropriate)  Plan of care reviewed with the patient at the beginning of the shift. Pt is day 12 of FLAG-Lynette for relapsed AML s/p allo transplant. BM bx planned for Monday. Pt with complaints of pain to right flank and lateral back and jaw pain. Pt has Dilaudid for pain management. Given several times overnight. Pt seems to have a better understanding of prn order. He does have very brief episodes of confusion that seems to be narcotic related. Pt is quickly reoriented. Neutropenic precautions maintained. PPX abx continued. Currently on Neupogen. ANC 0. Pt remained afebrile. IV Cefepime and Vanc continued. Vanc trough to be done later today. Vitals stable. O2 on room air has been 92-94%. Pt wears oxygen at 2L nasal canula prn for dyspnea with exertion. Pt complains that his cough is becoming more frequent. No sputum production. Pt denies n/v/d. Pt reports normal bowel movements. He reports having a good appetite. Oral intake encouraged. Mucous membranes are intact. Fall precautions maintained. Pt remained free from falls and injury this shift. Bed locked in lowest position, side rails up x2, call light within reach. Instructed pt to call for assistance as needed. Pt verbalized understanding. Electrolytes replaced daily as needed. Both lumens of oquendo are clotted without blood return. Cathflow instilled to red lumen. Awaiting results. No acute issues overnight. Will continue to monitor.

## 2017-07-08 NOTE — SUBJECTIVE & OBJECTIVE
Subjective:     Interval History:   - last febrile episode was 101.6 at 0400 hours on 7/7/17  - he complains of back pain, thoracic/rib pain, and teeth pain.    Objective:     Vital Signs (Most Recent):  Temp: 97.9 °F (36.6 °C) (07/08/17 1128)  Pulse: 96 (07/08/17 1128)  Resp: 18 (07/08/17 1128)  BP: (!) 113/59 (07/08/17 1128)  SpO2: 96 % (07/08/17 1128) Vital Signs (24h Range):  Temp:  [97.9 °F (36.6 °C)-101 °F (38.3 °C)] 97.9 °F (36.6 °C)  Pulse:  [] 96  Resp:  [18-24] 18  SpO2:  [88 %-96 %] 96 %  BP: (106-147)/(55-71) 113/59     Weight: 84.8 kg (186 lb 15.2 oz)  Body mass index is 24.67 kg/m².  Body surface area is 2.09 meters squared.    ECOG SCORE         [unfilled]    Intake/Output - Last 3 Shifts       07/06 0700 - 07/07 0659 07/07 0700 - 07/08 0659 07/08 0700 - 07/09 0659    P.O. 840 2340 240    I.V. (mL/kg) 3141.2 (37.7) 2958.3 (34.9)     Blood  651 350    IV Piggyback 100 650     Total Intake(mL/kg) 4081.2 (48.9) 6599.3 (77.8) 590 (7)    Urine (mL/kg/hr) 3550 (1.8) 3275 (1.6) 850 (1.8)    Stool 0 (0) 0 (0)     Total Output 3550 3275 850    Net +531.2 +3324.3 -260           Urine Occurrence  1 x     Stool Occurrence 0 x 2 x         Physical Exam   Constitutional: He is oriented to person, place, and time. He appears well-developed.   fatigued   HENT:   Head: Normocephalic.   Mouth/Throat: Mucous membranes are normal. No oropharyngeal exudate.   Eyes: Conjunctivae and EOM are normal. Pupils are equal, round, and reactive to light. No scleral icterus.   Neck: Normal range of motion. Neck supple.   Cardiovascular: Regular rhythm and normal heart sounds.    Tachycardia   Pulmonary/Chest: Effort normal and breath sounds normal.   Abdominal: Soft. Normal appearance and bowel sounds are normal. He exhibits no distension. There is no tenderness.   Musculoskeletal: Normal range of motion. He exhibits no edema.   Neurological: He is alert and oriented to person, place, and time.   Skin: Skin is warm, dry and  intact. No cyanosis. Nails show no clubbing.   Psychiatric: His mood appears anxious.   Vitals reviewed.    Significant Labs:   Labs have been reviewed.

## 2017-07-08 NOTE — PLAN OF CARE
Problem: Patient Care Overview  Goal: Individualization & Mutuality  Left chest mazariegos catheter dsg change q Monday and PRN   Pt has remained free from injury this shift. Bed in low locked position. Call light and personal belongings within reach. Side rails up x2. Nonskid socks in place. All questions and comments addressed at this time. Blood return obtain via purple lumen from Mazariegos. Pt temp max this am was 101 F; resolved post tylenol.  Pt will be going to CT scan this afternoon. IV vanc and cefepime given as ordered. Vac trough drawn this shift. Will continue to monitor.

## 2017-07-08 NOTE — PROGRESS NOTES
Pt presented with temperature of 101 F at 0828 this am. Held Benadryl and blood per MD. Tylenol given. Pt temperature decreased at 0908 and is now 99 F. Will release blood for transfusion. Will continue to monitor.        07/08/17 0828   Vital Signs   Temp (!) 101 °F (38.3 °C)   Temp src Oral   Pulse (!) 112   SpO2 (!) 90 %   BP (!) 117/57   MAP (mmHg) 80

## 2017-07-08 NOTE — PROGRESS NOTES
"Within just a few minutes of walking in the patient's room for the first time this shift, the patient became very upset and began raising his voice and continued to yell at the nurse for 20-25 minutes. Pt was angry because he feels like nursing staff is withholding his pain medication and not giving him Dilaudid as it is ordered. Attempted several times to explain to the patient that his pain medication is not a scheduled medication and that it is ordered PRN which is on a "as needed" basis and that he will have to call and request the medication. The patient seemed to have a hard time understanding this concept. Attempted to explain it to him repeatedly. The patient became belligerent accusing nursing staff of not bringing his medication and following orders. The patient was under the impression that it had been 5 hours since he received his pain medication. Reviewed all administered times with him during the past 12 hours and it had only been 3 hours since he received his medication. Explained to him that it was available at approximately 8pm if he needed it, but he had not called for it and that is why it was not brought in to him. Reviewed the call log at the  and the last time the patient called the  was 0940. It was repeatedly stated to the patient that if he has not called and requested this medication on a PRN schedule, then nursing can not bring it in to him on a scheduled basis as this is outside of our scope of practice to administer a medication differently than it is ordered. Pt continued to show frustration and anger. Encouraged him to discuss pain management with his physician when they come here in the morning. Pt states that he would like to have the PCA pump back because "nurses aren't doing their job". Pt refused to take any sedative or anxiolytic. Pt states that he does not want to sleep because he does not want to miss a dose of pain medication. Encouraged him to take this in " addition to pain medication as he appears restless, agitated, and sleep deprived. Pt observed doing repetitive and obsessive gestures. Pt asked three times if he was going to receive blood tonight. Explained to him each time that he received both blood and platelets today and we will draw labs in the morning to see if he needs any blood products. White board in the room updated with next available time for pain medication. Charge nurse Shahnaz aware of situation.

## 2017-07-08 NOTE — PROGRESS NOTES
Pt presented with temperature of 101 F at 0828 this am. Held Benadryl and blood per MD. Tylenol given. Pt temperature decreased at 0908 and is now 99 F. Will release blood for transfusion. Will continue to monitor.        07/08/17 0828 07/08/17 0908 07/08/17 0958   Vital Signs   Temp (!) 101 °F (38.3 °C) 99 °F (37.2 °C) 99 °F (37.2 °C)   Temp src Oral Oral Oral

## 2017-07-08 NOTE — ASSESSMENT & PLAN NOTE
- white blood cell count is 0.04 k/uL; absolute neutrophil count is 0 cells/uL  - hemoglobin 6.9 g/dL; platelet count is 11 k/uL  - transfuse for hemoglobin < 7 g/dL, platelets < 10 k/uL

## 2017-07-08 NOTE — PROGRESS NOTES
[facility]  Hematology  Bone Marrow Transplant  Progress Note    Patient Name: Paul E Sistrunk  Admission Date: 6/20/2017  Hospital Length of Stay: 18 days  Code Status: Full Code    Subjective:     Interval History:   - last febrile episode was 101.6 at 0400 hours on 7/7/17  - he complains of back pain, thoracic/rib pain, and teeth pain.    Objective:     Vital Signs (Most Recent):  Temp: 97.9 °F (36.6 °C) (07/08/17 1128)  Pulse: 96 (07/08/17 1128)  Resp: 18 (07/08/17 1128)  BP: (!) 113/59 (07/08/17 1128)  SpO2: 96 % (07/08/17 1128) Vital Signs (24h Range):  Temp:  [97.9 °F (36.6 °C)-101 °F (38.3 °C)] 97.9 °F (36.6 °C)  Pulse:  [] 96  Resp:  [18-24] 18  SpO2:  [88 %-96 %] 96 %  BP: (106-147)/(55-71) 113/59     Weight: 84.8 kg (186 lb 15.2 oz)  Body mass index is 24.67 kg/m².  Body surface area is 2.09 meters squared.    ECOG SCORE         [unfilled]    Intake/Output - Last 3 Shifts       07/06 0700 - 07/07 0659 07/07 0700 - 07/08 0659 07/08 0700 - 07/09 0659    P.O. 840 2340 240    I.V. (mL/kg) 3141.2 (37.7) 2958.3 (34.9)     Blood  651 350    IV Piggyback 100 650     Total Intake(mL/kg) 4081.2 (48.9) 6599.3 (77.8) 590 (7)    Urine (mL/kg/hr) 3550 (1.8) 3275 (1.6) 850 (1.8)    Stool 0 (0) 0 (0)     Total Output 3550 3275 850    Net +531.2 +3324.3 -260           Urine Occurrence  1 x     Stool Occurrence 0 x 2 x         Physical Exam   Constitutional: He is oriented to person, place, and time. He appears well-developed.   fatigued   HENT:   Head: Normocephalic.   Mouth/Throat: Mucous membranes are normal. No oropharyngeal exudate.   Eyes: Conjunctivae and EOM are normal. Pupils are equal, round, and reactive to light. No scleral icterus.   Neck: Normal range of motion. Neck supple.   Cardiovascular: Regular rhythm and normal heart sounds.    Tachycardia   Pulmonary/Chest: Effort normal and breath sounds normal.   Abdominal: Soft. Normal appearance and bowel sounds are normal. He exhibits no  distension. There is no tenderness.   Musculoskeletal: Normal range of motion. He exhibits no edema.   Neurological: He is alert and oriented to person, place, and time.   Skin: Skin is warm, dry and intact. No cyanosis. Nails show no clubbing.   Psychiatric: His mood appears anxious.   Vitals reviewed.    Significant Labs:   Labs have been reviewed.    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 Blast of 62%  - awaiting mutation analysis and cytogenetics; FLT-3 negative     - Jaw pain and pain in right cranial nerve 7 distribution with persistent, severe headaches concerning for CNS involvement - underwent IT chemotherapy on 6/23. Flow negative for disease. CSF LDH was 29.  - Had previous reaction to MEC (etoposide caused full body rash). Did not qualify for the Tolero trial due to receiving IT chemotherapy    - Day 12 of FLAG-BETZY   - Day 14 bone marrow biopsy scheduled in the OR on 7/10        S/P allogeneic bone marrow transplant    - 298 days post Flu/Bu/ATG MUD allogeneic transplant for high risk AML after his second IDAC (6/20/16 - 6/24/16) after a prolonged hospitalization (2/5/16 - 3/21/16) for induction with 7&3 and reinduction with MEC to remission and IDAC (4/4/16 - 4/9/16).   - continue ppx acyclovir, itraconazole   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. Pt now off steroids.  - chimerics from marrow done 6/21 show CD3 of 90% donor and 10% recipient, but CD34 of 5% donor and 95% recipient         Neutropenic fever    - blood cultures no growth from 6/24/17 and 7/4/17, CXR unremarkable.   - on Cefepime (started 6/24 - discontinued 6/28) restarted on 7/4/17   -  Pt currently only on prophylactic abx--Bactrim, acyclovir and itraconazole   - Cefepime day 3; vancomycin day 1   - BC and urine cx NGTD from 7/4/17  - Repeat blood culture and urine culture in process from 7/7/17 - CXR no acute process   - we have ordered a CT chest/abdomen/pelvis to look for abscess / fungal infection        Pancytopenia due to antineoplastic chemotherapy    - white blood cell count is 0.04 k/uL; absolute neutrophil count is 0 cells/uL  - hemoglobin 6.9 g/dL; platelet count is 11 k/uL  - transfuse for hemoglobin < 7 g/dL, platelets < 10 k/uL            HIV disease    - CD4 low at 11.4%, Absolute CD4 469 and HIV RNA not detected from 6/21  - As per ID: continue triumeq and itraconazole        Chronic bilateral low back pain without sciatica    - related to Neupogen  - started on zyrtec, lidocaine patch and Flexeril  - PCA stopped 7/1  - pain controlled with PRN IVP Dilaudid  - CT spine did not show any acute findings, revealed chronic degenerative changes, spinal cord stenosis and narrowing        Anxiety    - PRN Xanax        Prostate hypertrophy    - Continue home flomax        Electrolyte abnormality    - ordered prn electrolyte replacement per protocol         Facial numbness    - evaluated by Neurology  - MRI with no evidence of  acute intracranial pathology.  - thought to be secondary to drug induced peripheral neuropathy?  - Neurology rec Vitamin E, will hold for now given side effects of thrombocytopenia and bleeding              VTE Risk Mitigation         Ordered     Place sequential compression device  Until discontinued      06/20/17 2005     High Risk of VTE  Once      06/20/17 1901        Disposition:   - continue cefepime and vancomycin. CT chest/abdomen/pelvis.    Edilson Crockett MD  Bone Marrow Transplant  [facility]

## 2017-07-08 NOTE — ASSESSMENT & PLAN NOTE
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 Blast of 62%  - awaiting mutation analysis and cytogenetics; FLT-3 negative     - Jaw pain and pain in right cranial nerve 7 distribution with persistent, severe headaches concerning for CNS involvement - underwent IT chemotherapy on 6/23. Flow negative for disease. CSF LDH was 29.  - Had previous reaction to MEC (etoposide caused full body rash). Did not qualify for the Tolero trial due to receiving IT chemotherapy    - Day 12 of FLAG-BETZY   - Day 14 bone marrow biopsy scheduled in the OR on 7/10

## 2017-07-08 NOTE — ASSESSMENT & PLAN NOTE
- blood cultures no growth from 6/24/17 and 7/4/17, CXR unremarkable.   - on Cefepime (started 6/24 - discontinued 6/28) restarted on 7/4/17   - Pt currently only on prophylactic abx--Bactrim, acyclovir and itraconazole   - Cefepime day 3; vancomycin day 1   - BC and urine cx NGTD from 7/4/17  - Repeat blood culture and urine culture in process from 7/7/17 - CXR no acute process   - we have ordered a CT chest/abdomen/pelvis to look for abscess / fungal infection

## 2017-07-09 NOTE — ASSESSMENT & PLAN NOTE
- 300 days post Flu/Bu/ATG MUD allogeneic transplant for high risk AML after his second IDAC (6/20/16 - 6/24/16) after a prolonged hospitalization (2/5/16 - 3/21/16) for induction with 7&3 and reinduction with MEC to remission and IDAC (4/4/16 - 4/9/16).   - continue ppx acyclovir. We have discussed antifungal coverage with infectious disease. We will discontinue itraconzaole and start amphotericin.   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. Pt now off steroids.  - chimerics from marrow done 6/21 show CD3 of 90% donor and 10% recipient, but CD34 of 5% donor and 95% recipient

## 2017-07-09 NOTE — ASSESSMENT & PLAN NOTE
- white blood cell count is 0.04 k/uL; absolute neutrophil count is 0 cells/uL  - hemoglobin 6.7 g/dL; platelet count is 3 k/uL  - I will transfuse one unit of PRBCs and one dose of platelets.  - transfuse for hemoglobin < 7 g/dL, platelets < 10 k/uL

## 2017-07-09 NOTE — ASSESSMENT & PLAN NOTE
56yo man w/a history of well-controlled HIV (CD4 469/11.4%, VL <49 in 6/2017, on triumeq), high-risk non-M3 AmL (dx 2/2016, s/p 7+3 induction with residual leukemia, successful MEC reinduction 3/2016 and IDAC consolidation x 2 through 6/2016 and subsequent MUD allo-SCT 9/5/2016; CMV D+/R+, Flu/Bu/ATG conditioning, ACV and maintenance tacro ppx; c/b neutropenic fever due to pulmonary histoplasmosis with positive urine antigen 2.35 and pulmonary micronodules on CT chest 2/2016; s/p ambisome induction and on itraconazole maintenance with serial negative repeat antigens; and Klebsiella septicemia 2016 while neutropenic), and CKD-3 who was admitted on 6/20/2017 with relapsed AML (s/p repeat induction with FLAG-BETZY starting 6/28) with neutropenic fevers despite empiric vanc/cefepime/itra/ACV with multifocal pneumonia noted on CT CAP as a likely source. Differential for pneumonia includes: IFI (including breakthrough Aspergillosis along with other molds and previously diagnosed histoplasmosis), bacterial pneumonia (including atypical pathogens), community-acquired viral infections, and PCP (prophylaxis stopped in 1/2017 but his CD4 percentage was quite low recently despite a high absolute count). He is stable despite these findings.    - would continue empiric vanc/cefepime for now for bacterial coverage -- have sent urine legionella antigen  - would broaden itraconazole to ambisome 5mg/kg IV q24h (with associated timed saline boluses and premedications) for possible IFI and closely monitor electrolytes (K, Mg) and CrCl on therapy; have sent aspergillus antigen, fungitell, urine histo/blasto (crypto ag already negative)  - will also add RVP and quantiferon gold tomorrow for pulmonary infiltrates/fevers  - would ask pulmonary for BAL for lavage cultures and cytology for GMS to exclude PCP since he is not producing sputum tomorrow (and would make NPO tonight in event this study can be done tomorrow)  - will f/u pending  cultures with you

## 2017-07-09 NOTE — PROGRESS NOTES
[facility]  Hematology  Bone Marrow Transplant  Progress Note    Patient Name: Paul E Sistrunk  Admission Date: 6/20/2017  Hospital Length of Stay: 19 days  Code Status: Full Code    Subjective:     Interval History:   - patient underwent CT chest/abdomen/pelvis on 7/8/17.  - patient had a febrile episode this morning at 0500 hrs: 101.3  - today, he states he is feeling better. His diffuse pain has improved. He denies shortness of breath, chest pain, nausea, vomiting, diarrhea, constipation.    Objective:     Vital Signs (Most Recent):  Temp: 98.5 °F (36.9 °C) (07/09/17 1047)  Pulse: 95 (07/09/17 1047)  Resp: 18 (07/09/17 1047)  BP: 118/69 (07/09/17 1047)  SpO2: 97 % (07/09/17 1047) Vital Signs (24h Range):  Temp:  [97.8 °F (36.6 °C)-101.3 °F (38.5 °C)] 98.5 °F (36.9 °C)  Pulse:  [] 95  Resp:  [18-20] 18  SpO2:  [92 %-97 %] 97 %  BP: (107-152)/(62-70) 118/69     Weight: 84 kg (185 lb 1.2 oz)  Body mass index is 24.42 kg/m².  Body surface area is 2.08 meters squared.    ECOG SCORE         [unfilled]    Intake/Output - Last 3 Shifts       07/07 0700 - 07/08 0659 07/08 0700 - 07/09 0659 07/09 0700 - 07/10 0659    P.O. 2340 1800     I.V. (mL/kg) 2958.3 (34.9) 2982.8 (35.6)     Blood 651 650 812    IV Piggyback 650 900     Total Intake(mL/kg) 6599.3 (77.8) 6332.8 (75.5) 812 (9.7)    Urine (mL/kg/hr) 3275 (1.6) 4800 (2.4) 325 (0.6)    Stool 0 (0) 0 (0)     Total Output 3275 4800 325    Net +3324.3 +1532.8 +487           Urine Occurrence 1 x      Stool Occurrence 2 x 1 x           Physical Exam   Constitutional: He is oriented to person, place, and time. He appears well-developed.   HENT:   Head: Normocephalic.   Mouth/Throat: Mucous membranes are normal. No oropharyngeal exudate.   Eyes: Conjunctivae and EOM are normal. Pupils are equal, round, and reactive to light. No scleral icterus.   Neck: Normal range of motion. Neck supple.   Cardiovascular: Regular rhythm and normal heart sounds.     Tachycardia   Pulmonary/Chest: Effort normal.   Coarse breath sounds in left lower lobe   Abdominal: Soft. Normal appearance and bowel sounds are normal. He exhibits no distension. There is no tenderness.   Musculoskeletal: Normal range of motion. He exhibits no edema.   Neurological: He is alert and oriented to person, place, and time.   Skin: Skin is warm, dry and intact. No cyanosis. Nails show no clubbing.   Psychiatric: His mood appears not anxious.   Vitals reviewed.      Significant Labs:   Labs have been reviewed.    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 Blast of 62%  - awaiting mutation analysis and cytogenetics; FLT-3 negative     - Jaw pain and pain in right cranial nerve 7 distribution with persistent, severe headaches concerning for CNS involvement - underwent IT chemotherapy on 6/23. Flow negative for disease. CSF LDH was 29.  - Had previous reaction to MEC (etoposide caused full body rash). Did not qualify for the Tolero trial due to receiving IT chemotherapy    - Day 13 of FLAG-BETZY   - Day 14 bone marrow biopsy scheduled in the OR on 7/10        S/P allogeneic bone marrow transplant    - 300 days post Flu/Bu/ATG MUD allogeneic transplant for high risk AML after his second IDAC (6/20/16 - 6/24/16) after a prolonged hospitalization (2/5/16 - 3/21/16) for induction with 7&3 and reinduction with MEC to remission and IDAC (4/4/16 - 4/9/16).   - continue ppx acyclovir. We have discussed antifungal coverage with infectious disease. We will discontinue itraconzaole and start amphotericin.   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. Pt now off steroids.  -  chimerics from marrow done 6/21 show CD3 of 90% donor and 10% recipient, but CD34 of 5% donor and 95% recipient         Neutropenic fever    - blood cultures no growth from 6/24/17 and 7/4/17, CXR unremarkable.   - on Cefepime (started 6/24 - discontinued 6/28) restarted on 7/4/17   - Pt currently only on prophylactic abx--Bactrim, acyclovir.   - Cefepime day 6; vancomycin day 3   - we repeated blood and urine cultures today (7/9/17)  - CT chest/abdomen/pelvis reveals bilateral groundglass opacities as well as a left lower-lobe consolidation. We have discussed antifungal coverage with infectious disease. We will discontinue itraconzaole and start amphotericin. We will consult pulmonology tomorrow for bronchoscopy, as the patient is not producing sputum to sample.        Pancytopenia due to antineoplastic chemotherapy    - white blood cell count is 0.04 k/uL; absolute neutrophil count is 0 cells/uL  - hemoglobin 6.7 g/dL; platelet count is 3 k/uL  - I will transfuse one unit of PRBCs and one dose of platelets.  - transfuse for hemoglobin < 7 g/dL, platelets < 10 k/uL            HIV disease    - CD4 low at 11.4%, Absolute CD4 469 and HIV RNA not detected from 6/21  - As per ID: continue triumeq        Chronic bilateral low back pain without sciatica    - related to Neupogen  - started on zyrtec, lidocaine patch and Flexeril  - pain has improved. Continue to monitor.        Anxiety    - PRN Xanax        Prostate hypertrophy    - Continue home flomax        Electrolyte abnormality    - ordered prn electrolyte replacement per protocol         Facial numbness    - evaluated by Neurology  - MRI with no evidence of  acute intracranial pathology.  - thought to be secondary to drug induced peripheral neuropathy?  - Neurology rec Vitamin E, will hold for now given side effects of thrombocytopenia and bleeding              VTE Risk Mitigation         Ordered     Place sequential compression device  Until discontinued       06/20/17 2005     High Risk of VTE  Once      06/20/17 1901          Disposition: discontinue itraconazole and start amphotericin. Consult pulmonology in morning regarding possible bronchoscopy. Continue treatment of neutropenic fever.    Edilson Crockett MD  Bone Marrow Transplant  [facility]

## 2017-07-09 NOTE — PLAN OF CARE
Problem: Patient Care Overview  Goal: Plan of Care Review  Outcome: Ongoing (interventions implemented as appropriate)  Plan of care reviewed with the patient at the beginning of the shift. Pt is day 13 of FLAG-Lynette for relapsed AML s/p allo transplant. BM bx planned for Monday in OR. Pt with complaints of pain to right flank and lateral back and jaw pain. Pt has PRN Dilaudid for pain management. Given several times overnight. Pt seems to have a better understanding of prn order and calls for pain medication rather than expecting nursing staff to bring it to him automatically. Neutropenic precautions maintained. PPX abx continued. Currently on Neupogen. ANC 0. Pt remained afebrile but did have a temp during the day. IV Cefepime and Vanc continued. Vanc trough 12.7. Dose increased. CT abdomen completed yesterday. Vitals stable. O2 on room air has been 92-95%. Pt wears oxygen at 2L nasal canula prn for dyspnea with exertion. Pt complains that his cough is becoming more frequent. No sputum production. Pt denies n/v/d. Pt reports normal bowel movements. He reports having a good appetite. Oral intake encouraged. Mucous membranes are intact. Fall precautions maintained. Pt remained free from falls and injury this shift. Bed locked in lowest position, side rails up x2, call light within reach. Instructed pt to call for assistance as needed. Pt verbalized understanding. Electrolytes replaced daily as needed. Pt received one unit of blood yesterday. No acute issues overnight. Will continue to monitor.

## 2017-07-09 NOTE — ASSESSMENT & PLAN NOTE
- related to Neupogen  - started on zyrtec, lidocaine patch and Flexeril  - pain has improved. Continue to monitor.

## 2017-07-09 NOTE — SUBJECTIVE & OBJECTIVE
Past Medical History:   Diagnosis Date    Cancer 2/2016    ALL    HIV infection     Pancytopenia due to antineoplastic chemotherapy 7/4/2016       Past Surgical History:   Procedure Laterality Date    CYSTOSCOPY         Review of patient's allergies indicates:   Allergen Reactions    Etoposide Rash       Medications:  Facility-Administered Medications Prior to Admission   Medication    pentamidine inhalation solution 300 mg     Prescriptions Prior to Admission   Medication Sig    abacavir-dolutegravir-lamivud (TRIUMEQ) 600- mg Tab Take 1 tablet by mouth once daily.    acyclovir (ZOVIRAX) 800 MG Tab Take 1 tablet (800 mg total) by mouth 2 (two) times daily.    alprazolam (XANAX) 0.5 MG tablet Take 1 tablet (0.5 mg total) by mouth 2 (two) times daily as needed for Insomnia or Anxiety.    butalbital-acetaminophen-caffeine -40 mg (FIORICET, ESGIC) -40 mg per tablet     finasteride (PROSCAR) 5 mg tablet Take 1 tablet (5 mg total) by mouth once daily.    HYDROmorphone (DILAUDID) 2 MG tablet Take 1 tablet (2 mg total) by mouth every 4 (four) hours as needed for Pain.    itraconazole (SPORANOX) 10 mg/mL Soln Take 2 mLs (20 mg total) by mouth 3 (three) times daily.    loperamide (IMODIUM) 2 mg capsule Take 2 mg by mouth daily as needed for Diarrhea.    magnesium oxide (MAG-OX) 400 mg tablet TAKE THREE TABLETS BY MOUTH THREE TIMES A DAY    ondansetron (ZOFRAN) 8 MG tablet Take 1 tablet (8 mg total) by mouth every 8 (eight) hours as needed for Nausea.    pantoprazole (PROTONIX) 40 MG tablet Take 1 tablet (40 mg total) by mouth once daily.    promethazine (PHENERGAN) 12.5 MG Tab Take 1 tablet (12.5 mg total) by mouth every 6 (six) hours as needed (nausea).    tacrolimus (PROGRAF) 0.5 MG Cap Take 1 capsule (0.5 mg total) by mouth once daily.    tamsulosin (FLOMAX) 0.4 mg Cp24 Take 1 capsule (0.4 mg total) by mouth every other day.    zolpidem (AMBIEN) 5 MG Tab Take 1 tablet (5 mg total) by  mouth nightly as needed.     Antibiotics     Start     Stop Route Frequency Ordered    17 0545  vancomycin (VANCOCIN) 1,500 mg in dextrose 5 % 250 mL IVPB  (Vancomycin IVPB with levels panel)      -- IV Every 12 hours (non-standard times) 17 0304    17 0400  ceFEPIme in dextrose 5% 2 gram/50 mL IVPB 2 g      -- IV Every 8 hours (non-standard times) 17 0245    17 0900  sulfamethoxazole-trimethoprim 800-160mg per tablet 1 tablet      -- Oral Every Mon, Wed, Fri 17 1609        Antifungals     Start     Stop Route Frequency Ordered    17 1530  amphotericin B liposome (AMBISOME) 400 mg in dextrose 5 % 400 mL IVPB  (amphotericin B liposome (AMBISOME) IVPB panel)      -- IV Every 24 hours (non-standard times) 17 1330        Antivirals         Stop Route Frequency     abacavir      -- Oral Daily     dolutegravir      -- Oral Daily     lamivudine      -- Oral Daily     acyclovir      -- Oral 2 times daily             There is no immunization history on file for this patient.    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 Negative for appetite change, chills, fatigue and fever.   HENT: Negative for congestion, dental problem, mouth sores and sinus pressure.    Eyes:  Negative for pain, redness and visual disturbance.   Respiratory: Positive for cough. Negative for shortness of breath and wheezing.    Cardiovascular: Negative for chest pain and leg swelling.   Gastrointestinal: Negative for abdominal distention, abdominal pain, diarrhea, nausea and vomiting.   Endocrine: Negative for polyuria.   Genitourinary: Negative for decreased urine volume, dysuria and frequency.   Musculoskeletal: Negative for joint swelling.   Skin: Negative for color change.   Allergic/Immunologic: Negative for food allergies.   Neurological: Negative for dizziness, weakness and headaches.   Hematological: Negative for adenopathy.   Psychiatric/Behavioral: Negative for agitation and confusion. The patient is not nervous/anxious.      Objective:     Vital Signs (Most Recent):  Temp: 98.6 °F (37 °C) (07/09/17 1605)  Pulse: 106 (07/09/17 1605)  Resp: 16 (07/09/17 1605)  BP: (!) 112/55 (07/09/17 1605)  SpO2: 95 % (07/09/17 1605) Vital Signs (24h Range):  Temp:  [97.9 °F (36.6 °C)-101.3 °F (38.5 °C)] 98.6 °F (37 °C)  Pulse:  [] 106  Resp:  [16-20] 16  SpO2:  [92 %-97 %] 95 %  BP: (107-152)/(55-71) 112/55     Weight: 84 kg (185 lb 1.2 oz)  Body mass index is 24.42 kg/m².    Estimated Creatinine Clearance: 95.4 mL/min (based on Cr of 1).    Physical Exam   Constitutional: He is oriented to person, place, and time. He appears well-developed and well-nourished.   HENT:   Head: Normocephalic and atraumatic.   Mouth/Throat: Oropharynx is clear and moist.   Eyes: Conjunctivae are normal.   Neck: Neck supple.   Cardiovascular: Normal rate, regular rhythm and normal heart sounds.    No murmur heard.  Pulmonary/Chest: Effort normal and breath sounds normal. No respiratory distress. He has no wheezes.   Abdominal: Soft. Bowel sounds are normal. He exhibits no distension. There is no tenderness.   Musculoskeletal: Normal range of motion. He exhibits no edema or tenderness.   Lymphadenopathy:     He has no cervical  adenopathy.   Neurological: He is alert and oriented to person, place, and time. Coordination normal.   Skin: Skin is warm and dry. No rash noted.   Psychiatric: He has a normal mood and affect. His behavior is normal.       Significant Labs:   CBC:   Recent Labs  Lab 07/08/17  0639 07/09/17  0524   WBC 0.04* 0.04*   HGB 6.9* 6.7*   HCT 19.3* 19.4*   PLT 11* 3*     CMP:   Recent Labs  Lab 07/08/17  0639 07/09/17  0524   * 136   K 4.0 3.9    109   CO2 19* 20*    94   BUN 25* 19   CREATININE 1.1 1.0   CALCIUM 7.6* 7.8*   PROT 5.2* 5.1*   ALBUMIN 2.0* 1.9*   BILITOT 2.0* 2.0*   ALKPHOS 137* 116   AST 20 17   ALT 12 13   ANIONGAP 8 7*   EGFRNONAA >60.0 >60.0       Significant Imaging: I have reviewed all pertinent imaging results/findings within the past 24 hours.     CT CAP:  1.  Diffuse bilateral patchy groundglass opacities, with more confluent regions of groundglass opacity in the upper lobes with peripheral sparing as well as superimposed regions of more consolidative change.  Overall, findings highly concerning for underlying infectious/atypical infectious process.  Additional differential consideration including possible noninfectious inflammatory etiology or pulmonary edema.  Clinical correlation recommended.    2.  Slight colonic wall thickening of the sigmoid and descending colon, likely relating to nondistention.  However clinical correlation for symptoms of colitis recommended.    3.  Subcentimeter hepatic hypodensities too small to definitively characterize.  Please note the patient's previously identified enhancing left hepatic lobe lesion is not definitively visualized on today's examination, which could relate to differences in contrast bolus timing.    4.  Calcified left hilar lymph nodes likely reflecting sequela of prior granulomatous disease.    5.  Mild nonspecific perinephric fat stranding.  Correlation with urinalysis recommended.     6. Multiple additional findings as above.      Microbiology:  6/24 blood cx: negative  6/24 urine cx: negative  7/4 blood cx: negative  7/4 urine cx: negative  7/7 blood cx: negative  7/7 urine cx: negative  7/9 blood cx: negative  7/9 urine cx: negative

## 2017-07-09 NOTE — ASSESSMENT & PLAN NOTE
- blood cultures no growth from 6/24/17 and 7/4/17, CXR unremarkable.   - on Cefepime (started 6/24 - discontinued 6/28) restarted on 7/4/17   - Pt currently only on prophylactic abx--Bactrim, acyclovir.   - Cefepime day 6; vancomycin day 3   - we repeated blood and urine cultures today (7/9/17)  - CT chest/abdomen/pelvis reveals bilateral groundglass opacities as well as a left lower-lobe consolidation. We have discussed antifungal coverage with infectious disease. We will discontinue itraconzaole and start amphotericin. We will consult pulmonology tomorrow for bronchoscopy, as the patient is not producing sputum to sample.

## 2017-07-09 NOTE — ASSESSMENT & PLAN NOTE
- CD4 low at 11.4%, Absolute CD4 469 and HIV RNA not detected from 6/21  - As per ID: continue triumeq

## 2017-07-09 NOTE — ASSESSMENT & PLAN NOTE
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 Blast of 62%  - awaiting mutation analysis and cytogenetics; FLT-3 negative     - Jaw pain and pain in right cranial nerve 7 distribution with persistent, severe headaches concerning for CNS involvement - underwent IT chemotherapy on 6/23. Flow negative for disease. CSF LDH was 29.  - Had previous reaction to MEC (etoposide caused full body rash). Did not qualify for the Tolero trial due to receiving IT chemotherapy    - Day 13 of FLAG-BETZY   - Day 14 bone marrow biopsy scheduled in the OR on 7/10

## 2017-07-10 NOTE — TRANSFER OF CARE
"Anesthesia Transfer of Care Note    Patient: Paul E Sistrunk    Procedure(s) Performed: Procedure(s) (LRB):  BIOPSY-BONE MARROW (Right)    Patient location: [facility]    Anesthesia Type: general    Transport from OR: Transported from OR on room air with adequate spontaneous ventilation    Post pain: adequate analgesia    Post assessment: no apparent anesthetic complications and tolerated procedure well    Post vital signs: stable    Level of consciousness: awake and responds to stimulation    Nausea/Vomiting: no nausea/vomiting    Complications: none    Transfer of care protocol was followed      Last vitals:   Visit Vitals  /70 (BP Location: Right arm, Patient Position: Lying, BP Method: Automatic)   Pulse 96   Temp 36.8 °C (98.2 °F) (Oral)   Resp 16   Ht 6' 1" (1.854 m)   Wt 85.2 kg (187 lb 13.3 oz)   SpO2 99%   BMI 24.78 kg/m²     "

## 2017-07-10 NOTE — ASSESSMENT & PLAN NOTE
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 Blast of 62%  - awaiting mutation analysis and cytogenetics; FLT-3 negative     - Jaw pain and pain in right cranial nerve 7 distribution with persistent, severe headaches concerning for CNS involvement - underwent IT chemotherapy on 6/23. Flow negative for disease. CSF LDH was 29.  - Had previous reaction to MEC (etoposide caused full body rash). Did not qualify for the Tolero trial due to receiving IT chemotherapy    - Day 14 of FLAG-BETZY   - Day 14 bone marrow biopsy today in OR

## 2017-07-10 NOTE — PROGRESS NOTES
[facility]  Infectious Disease  Progress Note    Patient Name: Paul E Sistrunk  MRN: [id number]  Admission Date: 6/20/2017  Length of Stay: 20 days  Attending Physician: Enriqueta Saleem MD  Primary Care Provider: Edgar Pinon MD    Isolation Status: No active isolations  Assessment/Plan:      Neutropenic fever    56yo man w/a history of well-controlled HIV (CD4 469/11.4%, VL <49 in 6/2017, on triumeq), high-risk non-M3 AmL (dx 2/2016, s/p 7+3 induction with residual leukemia, successful MEC reinduction 3/2016 and IDAC consolidation x 2 through 6/2016 and subsequent MUD allo-SCT 9/5/2016; CMV D+/R+, Flu/Bu/ATG conditioning, ACV and maintenance tacro ppx; c/b neutropenic fever due to pulmonary histoplasmosis with positive urine antigen 2.35 and pulmonary micronodules on CT chest 2/2016; s/p ambisome induction and on itraconazole maintenance with serial negative repeat antigens; and Klebsiella septicemia 2016 while neutropenic), and CKD-3 who was admitted on 6/20/2017 with relapsed AML (s/p repeat induction with FLAG-BETZY starting 6/28) with neutropenic fevers despite empiric vanc/cefepime/itra/ACV with multifocal pneumonia noted on CT CAP as a likely source. Differential for pneumonia includes: IFI (including breakthrough Aspergillosis along with other molds and previously diagnosed histoplasmosis), bacterial pneumonia (including atypical pathogens), community-acquired viral infections, and PCP (prophylaxis stopped in 1/2017 but his CD4 percentage was quite low recently despite a high absolute count). He is stable despite these findings.    - continue empiric vanc/cefepime for now for bacterial coverage (urine legionella antigen pending)  - on ambisome 5mg/kg IV q24h (with associated timed saline boluses and premedications) for possible IFI and. Continue to closely monitor electrolytes (K, Mg) and CrCl on therapy (aspergillus antigen, fungitell, urine histo/blasto, RVP and quantiferon gold pending)    - recommend BAL for lavage cultures and cytology for GMS to exclude PCP. Unable to tailor antimicrobial therapy without cultures at this point. Cultures would help guide therapy since patient was further broadened in the hospital.   - will f/u pending cultures with you          Thank you for your consult. I will follow-up with patient. Please contact us if you have any additional questions.    Kadeem Whiteside,   Infectious Disease  [facility]    Subjective:     Principal Problem:AML (acute myeloid leukemia) in relapse    HPI: Mr. 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 ID has been consulted today for neutropenic fevers that have persisted the last few days despite empiric vanc, cefepime, chronic itraconazole, and acyclovir prophylaxis. Patient notes that prior to diagnosis of relapse in clinic, he was not having F/C/S or other localizing infectious symptoms -- only malaise and acute onset BLE body aches just prior to diagnosis. Since admission he notes neutropenic F/C/S and dry cough but denies HA, URI symptoms, sore throat, dysphagia, sputum production, SOB, N/V, abdominal pain, diarrhea, dysuria, genital lesions, line pain/drainage, or rash. Exposure history is largely unremarkable: he is  currently single, lives alone, recently visited [place] but denies other travel (including international), no pets, no new sexual contacts, and no new hobbies (does garden). Workup today is notable for negative blood cultures but multifocal pneumonia on CT CAP as a likely source.  Interval History:   -14 day bone marrow biopsy today    -ANC 0   -Last fever 7/9 @ 0500 101.3 F    No new complaints today. No improvement with fatigue and lack of appetite    Review of Systems   Constitutional: Positive for fatigue. Negative for chills and fever.   HENT: Positive for mouth sores. Negative for rhinorrhea and sore throat.    Eyes: Negative for photophobia and visual disturbance.   Respiratory: Positive for cough. Negative for shortness of breath and wheezing.    Gastrointestinal: Negative for abdominal pain and diarrhea.   Genitourinary: Negative for dysuria.   Musculoskeletal: Negative for arthralgias and myalgias.   Skin: Negative for rash.   Allergic/Immunologic: Positive for immunocompromised state.   Neurological: Negative for headaches.   Hematological: Bruises/bleeds easily.   Psychiatric/Behavioral: Negative for confusion.     Objective:     Vital Signs (Most Recent):  Temp: 98 °F (36.7 °C) (07/10/17 1143)  Pulse: 96 (07/10/17 1143)  Resp: 18 (07/10/17 1143)  BP: 115/61 (07/10/17 1143)  SpO2: 95 % (07/10/17 1143) Vital Signs (24h Range):  Temp:  [97.9 °F (36.6 °C)-99.2 °F (37.3 °C)] 98 °F (36.7 °C)  Pulse:  [] 96  Resp:  [16-20] 18  SpO2:  [88 %-99 %] 95 %  BP: ()/(53-71) 115/61     Weight: 85.2 kg (187 lb 13.3 oz)  Body mass index is 24.78 kg/m².    Estimated Creatinine Clearance: 106 mL/min (based on Cr of 0.9).    Physical Exam   Constitutional: He appears well-developed. He appears cachectic. He has a sickly appearance.   HENT:   Head: Normocephalic and atraumatic.   Eyes: EOM are normal. Pupils are equal, round, and reactive to light.   Neck: Normal range of motion. Neck supple.   Cardiovascular:  Regular rhythm.  Tachycardia present.    No murmur heard.  Pulmonary/Chest: Effort normal and breath sounds normal. He has no wheezes. He has no rales.   Abdominal: Soft. Bowel sounds are normal.   Musculoskeletal: Normal range of motion.   Neurological: He is alert.   Skin: Skin is warm and dry.       Significant Labs:   CBC:   Recent Labs  Lab 07/09/17 0524 07/10/17  0425   WBC 0.04* 0.05*  0.05*   HGB 6.7* 6.3*  6.3*   HCT 19.4* 18.0*  18.0*   PLT 3* 12*  12*     CMP:   Recent Labs  Lab 07/09/17  0524 07/10/17  0425    140  140   K 3.9 3.4*  3.4*    112*  112*   CO2 20* 21*  21*   GLU 94 94  94   BUN 19 17  17   CREATININE 1.0 0.9  0.9   CALCIUM 7.8* 7.6*  7.6*   PROT 5.1* 4.8*  4.8*   ALBUMIN 1.9* 1.8*  1.8*   BILITOT 2.0* 1.6*  1.6*   ALKPHOS 116 121  121   AST 17 19  19   ALT 13 12  12   ANIONGAP 7* 7*  7*   EGFRNONAA >60.0 >60.0  >60.0     Microbiology Results (last 7 days)     Procedure Component Value Units Date/Time    Urine culture [[id number]] Collected:  07/09/17 0618    Order Status:  Completed Specimen:  Urine from Urine, Clean Catch Updated:  07/10/17 1251     Urine Culture, Routine No growth    Respiratory Viral Panel by PCR Nasal Swab [[id number]] Collected:  07/10/17 1231    Order Status:  Sent Specimen:  Respiratory Updated:  07/10/17 1246    Blood culture [[id number]] Collected:  07/09/17 0640    Order Status:  Completed Specimen:  Blood Updated:  07/10/17 1012     Blood Culture, Routine No Growth to date     Blood Culture, Routine No Growth to date    Narrative:       peripheral    Blood culture [[id number]] Collected:  07/09/17 0638    Order Status:  Completed Specimen:  Blood Updated:  07/10/17 1012     Blood Culture, Routine No Growth to date     Blood Culture, Routine No Growth to date    Narrative:       peripheral    Blood culture [[id number]] Collected:  07/07/17 0601    Order Status:  Completed Specimen:  Blood Updated:  07/10/17 0612     Blood Culture,  Routine No Growth to date     Blood Culture, Routine No Growth to date     Blood Culture, Routine No Growth to date    Blood culture [[id number]] Collected:  07/07/17 0601    Order Status:  Completed Specimen:  Blood Updated:  07/10/17 0612     Blood Culture, Routine No Growth to date     Blood Culture, Routine No Growth to date     Blood Culture, Routine No Growth to date    Blood culture [[id number]] Collected:  07/04/17 0151    Order Status:  Completed Specimen:  Blood Updated:  07/09/17 0612     Blood Culture, Routine No growth after 5 days.    Blood culture [[id number]] Collected:  07/04/17 0151    Order Status:  Completed Specimen:  Blood Updated:  07/09/17 0612     Blood Culture, Routine No growth after 5 days.    Urine culture [[id number]] Collected:  07/07/17 0648    Order Status:  Completed Specimen:  Urine from Urine, Clean Catch Updated:  07/08/17 1145     Urine Culture, Routine No growth    Cryptococcal antigen [[id number]] Collected:  07/07/17 1047    Order Status:  Completed Specimen:  Blood from Blood Updated:  07/07/17 1333     Cryptococcal Ag, Blood Negative    Urine culture [[id number]] Collected:  07/04/17 0146    Order Status:  Completed Specimen:  Urine from Urine, Clean Catch Updated:  07/05/17 0748     Urine Culture, Routine No growth

## 2017-07-10 NOTE — ASSESSMENT & PLAN NOTE
- white blood cell count is 0.05 k/uL; absolute neutrophil count is 0 cells/uL  - hemoglobin 6.3 g/dL; platelet count is 12 k/uL  - transfuse for hemoglobin < 7 g/dL, platelets < 10 k/uL

## 2017-07-10 NOTE — PROGRESS NOTES
[facility]  Hematology  Bone Marrow Transplant  Progress Note    Patient Name: Paul E Sistrunk  Admission Date: 6/20/2017  Hospital Length of Stay: 20 days  Code Status: Full Code    Subjective:     Interval History:  - No acute issues overnight. Afebrile x 24 hours. VSS  - Per ID continue cefepime, vancomycin, transitioned from itraconazole to ambisome yesterday.  - BMBx today in OR    Objective:     Vital Signs (Most Recent):  Temp: 98.9 °F (37.2 °C) (07/10/17 0740)  Pulse: 102 (07/10/17 0740)  Resp: 19 (07/10/17 0406)  BP: 130/69 (07/10/17 0740)  SpO2: (!) 94 % (07/10/17 0740) Vital Signs (24h Range):  Temp:  [97.9 °F (36.6 °C)-99.2 °F (37.3 °C)] 98.9 °F (37.2 °C)  Pulse:  [] 102  Resp:  [16-19] 19  SpO2:  [88 %-97 %] 94 %  BP: (112-133)/(55-71) 130/69     Weight: 85.2 kg (187 lb 13.3 oz)  Body mass index is 24.78 kg/m².  Body surface area is 2.09 meters squared.    ECOG SCORE         [unfilled]    Intake/Output - Last 3 Shifts       07/08 0700 - 07/09 0659 07/09 0700 - 07/10 0659 07/10 0700 - 07/11 0659    P.O. 1800 1760     I.V. (mL/kg) 2982.8 (35.6) 2772.9 (32.5)     Blood 650 812     IV Piggyback 900 2050     Total Intake(mL/kg) 6332.8 (75.5) 7394.9 (86.8)     Urine (mL/kg/hr) 4800 (2.4) 3475 (1.7)     Stool 0 (0)      Total Output 4800 3475      Net +1532.8 +3919.9             Stool Occurrence 1 x            Physical Exam   Constitutional: He is oriented to person, place, and time. He appears well-developed and well-nourished. No distress.   HENT:   Head: Normocephalic.   Right Ear: External ear normal.   Left Ear: External ear normal.   Nose: Nose normal.   Mouth/Throat: Oropharynx is clear and moist and mucous membranes are normal. No oropharyngeal exudate.   Eyes: Conjunctivae and EOM are normal. Pupils are equal, round, and reactive to light. No scleral icterus.   Neck: Normal range of motion. Neck supple.   Cardiovascular: Normal rate, regular rhythm and normal heart sounds.     Pulmonary/Chest: Effort normal.   Coarse breath sounds left lower lobe    Abdominal: Soft. Normal appearance and bowel sounds are normal. He exhibits no distension. There is no tenderness.   Musculoskeletal: Normal range of motion. He exhibits no edema.   Neurological: He is alert and oriented to person, place, and time.   Skin: Skin is warm, dry and intact. No cyanosis. Nails show no clubbing.   Psychiatric: He has a normal mood and affect. His behavior is normal. Thought content normal. His mood appears not anxious.   Vitals reviewed.      Significant Labs:   CBC:   Recent Labs  Lab 07/09/17  0524 07/10/17  0425   WBC 0.04* 0.05*  0.05*   HGB 6.7* 6.3*  6.3*   HCT 19.4* 18.0*  18.0*   PLT 3* 12*  12*   , CMP:   Recent Labs  Lab 07/09/17 0524 07/10/17  0425    140  140   K 3.9 3.4*  3.4*    112*  112*   CO2 20* 21*  21*   GLU 94 94  94   BUN 19 17  17   CREATININE 1.0 0.9  0.9   CALCIUM 7.8* 7.6*  7.6*   PROT 5.1* 4.8*  4.8*   ALBUMIN 1.9* 1.8*  1.8*   BILITOT 2.0* 1.6*  1.6*   ALKPHOS 116 121  121   AST 17 19  19   ALT 13 12  12   ANIONGAP 7* 7*  7*   EGFRNONAA >60.0 >60.0  >60.0    and All pertinent labs from the last 24 hours have been reviewed.    Diagnostic Results:  I have reviewed all pertinent imaging results/findings within the past 24 hours.    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 Blast of 62%  - awaiting mutation analysis and cytogenetics; FLT-3 negative     - Jaw pain and pain in right cranial nerve 7 distribution with persistent, severe headaches concerning for CNS involvement - underwent IT chemotherapy on 6/23. Flow negative for disease. CSF  LDH was 29.  - Had previous reaction to MEC (etoposide caused full body rash). Did not qualify for the Tolero trial due to receiving IT chemotherapy    - Day 14 of FLAG-BETZY   - Day 14 bone marrow biopsy today in OR        S/P allogeneic bone marrow transplant    - 301 days post Flu/Bu/ATG MUD allogeneic transplant for high risk AML after his second IDAC (6/20/16 - 6/24/16) after a prolonged hospitalization (2/5/16 - 3/21/16) for induction with 7&3 and reinduction with MEC to remission and IDAC (4/4/16 - 4/9/16).   - continue ppx acyclovir, renally dosed. We have discussed antifungal coverage with infectious disease. Discontinued itraconzaole (7/09) and started amphotericin.   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. Pt now off steroids.  - chimerics from marrow done 6/21 show CD3 of 90% donor and 10% recipient, but CD34 of 5% donor and 95% recipient         HIV disease    - CD4 low at 11.4%, Absolute CD4 469 and HIV RNA not detected from 6/21  - As per ID: continue triumeq        Neutropenic fever    - blood cultures no growth from 6/24/17 and 7/4/17, CXR unremarkable.   - on Cefepime (started 6/24 - discontinued 6/28) restarted on 7/4/17   - Pt currently only on prophylactic abx--Bactrim, acyclovir.   - Cefepime day 7; vancomycin day 4   - repeated blood and urine cultures today (7/9/17)  - CT chest/abdomen/pelvis reveals bilateral groundglass opacities as well as a left lower-lobe consolidation. We have discussed antifungal coverage with infectious disease. We will discontinue itraconzaole and start amphotericin. We will consult pulmonology tomorrow for bronchoscopy, as the patient is not producing sputum to sample.  - started on ambisome 7/9 per ID recommendations  - will hold on pulm consult as patient has been afebrile x 24 hours; if spikes again will consult pulm for BAL        Pancytopenia due to antineoplastic chemotherapy    - white blood cell count is 0.05 k/uL; absolute  neutrophil count is 0 cells/uL  - hemoglobin 6.3 g/dL; platelet count is 12 k/uL  - transfuse for hemoglobin < 7 g/dL, platelets < 10 k/uL            Prostate hypertrophy    - Continue home flomax        Facial numbness    - evaluated by Neurology  - MRI with no evidence of  acute intracranial pathology.  - thought to be secondary to drug induced peripheral neuropathy?  - Neurology rec Vitamin E, will hold for now given side effects of thrombocytopenia and bleeding          Chronic bilateral low back pain without sciatica    - related to Neupogen  - started on zyrtec, lidocaine patch and Flexeril  - pain has improved. Continue to monitor.        Electrolyte abnormality    - ordered prn electrolyte replacement per protocol   - hypokalemia and hypophosphatemia on 7/10         Anxiety    - PRN Xanax            VTE Risk Mitigation         Ordered     Place sequential compression device  Until discontinued      06/20/17 2005     High Risk of VTE  Once      06/20/17 1901          Disposition: Pending BMBx results and count recovery.     Asia Melgar, NP  Bone Marrow Transplant  [facility]

## 2017-07-10 NOTE — INTERVAL H&P NOTE
The patient has been examined and the H&P has been reviewed:    I concur with the findings and no changes have occurred since H&P was written. Pt day 14 of induction Flag Lynette for relapsed AML. Currently admitted to BMT service. Okay to proceed with bone marrow bx.    Anesthesia/Surgery risks, benefits and alternative options discussed and understood by patient/family.          Active Hospital Problems    Diagnosis  POA    *AML (acute myeloid leukemia) in relapse [C92.02]  Yes    Facial numbness [R20.0]  No    Chronic bilateral low back pain without sciatica [M54.5, G89.29]  No    Neutropenic fever [D70.9, R50.81]  No    Electrolyte abnormality [E87.8]  Yes    Anxiety [F41.9]  Yes    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]  Not Applicable    Pancytopenia due to antineoplastic chemotherapy [D61.810, T45.1X5A]  Yes    Prostate hypertrophy [N40.0]  Yes    HIV disease [B20]  Yes      Resolved Hospital Problems    Diagnosis Date Resolved POA    Elevated liver enzymes [R74.8] 07/08/2017 No    Acute hypernatremia [E87.0] 07/08/2017 Yes    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 [E88.3] 07/08/2017 No    Thrombocytopenia [D69.6] 07/05/2017 Yes    Bleeding at insertion site [L76.82] 06/29/2017 No    NICOLE (acute kidney injury) [N17.9] 07/08/2017 Yes

## 2017-07-10 NOTE — ASSESSMENT & PLAN NOTE
- blood cultures no growth from 6/24/17 and 7/4/17, CXR unremarkable.   - on Cefepime (started 6/24 - discontinued 6/28) restarted on 7/4/17   - Pt currently only on prophylactic abx--Bactrim, acyclovir.   - Cefepime day 7; vancomycin day 4   - repeated blood and urine cultures today (7/9/17)  - CT chest/abdomen/pelvis reveals bilateral groundglass opacities as well as a left lower-lobe consolidation. We have discussed antifungal coverage with infectious disease. We will discontinue itraconzaole and start amphotericin. We will consult pulmonology tomorrow for bronchoscopy, as the patient is not producing sputum to sample.  - started on ambisome 7/9 per ID recommendations  - will hold on pulm consult as patient has been afebrile x 24 hours; if spikes again will consult pulm for BAL

## 2017-07-10 NOTE — ASSESSMENT & PLAN NOTE
- 301 days post Flu/Bu/ATG MUD allogeneic transplant for high risk AML after his second IDAC (6/20/16 - 6/24/16) after a prolonged hospitalization (2/5/16 - 3/21/16) for induction with 7&3 and reinduction with MEC to remission and IDAC (4/4/16 - 4/9/16).   - continue ppx acyclovir, renally dosed. We have discussed antifungal coverage with infectious disease. Discontinued itraconzaole (7/09) and started amphotericin.   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. Pt now off steroids.  - chimerics from marrow done 6/21 show CD3 of 90% donor and 10% recipient, but CD34 of 5% donor and 95% recipient

## 2017-07-10 NOTE — PROCEDURES
PROCEDURE NOTE:  Date of Procedure: 7/10/17  Bone Marrow Biopsy and Aspiration  Indication: Day 14 of FLAG BETZY induction for relapsed AML  Consent: Informed consent was obtained from patient.  Timeout: Done and documented.  Position: Left Lateral.  Site: Right posterior illiac crest.  Prep: Betadine.  Needle used: 11 gauge Jamshidi needle.  Anesthetic: 1% lidocaine 5 cc.  Biopsy: The biopsy needle was introduced into the marrow cavity twice, first aspirate obtained without complications but was apsicular, 2nd aspirate was also aspicular. This was sent for flow cytometry, chimerisms, and cytogenetics. Two core biopsies obtained without difficulty and sent for routine histologic examination.  Complications: None. Pt was noted to have large bruises on abdomen as well as lower mid back (lower mid back bruises are likely from recent spinal tap).   Disposition: The patient was transferred back to inpatient BMT service when appropriate per anesthesia protocol.  Blood loss: Minimal.     Cheryl Carrera, DNP, NP  Hematology/Oncology

## 2017-07-10 NOTE — SUBJECTIVE & OBJECTIVE
Interval History:   -14 day bone marrow biopsy today    -ANC 0   -Last fever 7/9 @ 0500 101.3 F    No new complaints today. No improvement with fatigue and lack of appetite    Review of Systems   Constitutional: Positive for fatigue. Negative for chills and fever.   HENT: Positive for mouth sores. Negative for rhinorrhea and sore throat.    Eyes: Negative for photophobia and visual disturbance.   Respiratory: Positive for cough. Negative for shortness of breath and wheezing.    Gastrointestinal: Negative for abdominal pain and diarrhea.   Genitourinary: Negative for dysuria.   Musculoskeletal: Negative for arthralgias and myalgias.   Skin: Negative for rash.   Allergic/Immunologic: Positive for immunocompromised state.   Neurological: Negative for headaches.   Hematological: Bruises/bleeds easily.   Psychiatric/Behavioral: Negative for confusion.     Objective:     Vital Signs (Most Recent):  Temp: 98 °F (36.7 °C) (07/10/17 1143)  Pulse: 96 (07/10/17 1143)  Resp: 18 (07/10/17 1143)  BP: 115/61 (07/10/17 1143)  SpO2: 95 % (07/10/17 1143) Vital Signs (24h Range):  Temp:  [97.9 °F (36.6 °C)-99.2 °F (37.3 °C)] 98 °F (36.7 °C)  Pulse:  [] 96  Resp:  [16-20] 18  SpO2:  [88 %-99 %] 95 %  BP: ()/(53-71) 115/61     Weight: 85.2 kg (187 lb 13.3 oz)  Body mass index is 24.78 kg/m².    Estimated Creatinine Clearance: 106 mL/min (based on Cr of 0.9).    Physical Exam   Constitutional: He appears well-developed. He appears cachectic. He has a sickly appearance.   HENT:   Head: Normocephalic and atraumatic.   Eyes: EOM are normal. Pupils are equal, round, and reactive to light.   Neck: Normal range of motion. Neck supple.   Cardiovascular: Regular rhythm.  Tachycardia present.    No murmur heard.  Pulmonary/Chest: Effort normal and breath sounds normal. He has no wheezes. He has no rales.   Abdominal: Soft. Bowel sounds are normal.   Musculoskeletal: Normal range of motion.   Neurological: He is alert.   Skin: Skin is  warm and dry.       Significant Labs:   CBC:   Recent Labs  Lab 07/09/17  0524 07/10/17  0425   WBC 0.04* 0.05*  0.05*   HGB 6.7* 6.3*  6.3*   HCT 19.4* 18.0*  18.0*   PLT 3* 12*  12*     CMP:   Recent Labs  Lab 07/09/17  0524 07/10/17  0425    140  140   K 3.9 3.4*  3.4*    112*  112*   CO2 20* 21*  21*   GLU 94 94  94   BUN 19 17  17   CREATININE 1.0 0.9  0.9   CALCIUM 7.8* 7.6*  7.6*   PROT 5.1* 4.8*  4.8*   ALBUMIN 1.9* 1.8*  1.8*   BILITOT 2.0* 1.6*  1.6*   ALKPHOS 116 121  121   AST 17 19  19   ALT 13 12  12   ANIONGAP 7* 7*  7*   EGFRNONAA >60.0 >60.0  >60.0     Microbiology Results (last 7 days)     Procedure Component Value Units Date/Time    Urine culture [[id number]] Collected:  07/09/17 0618    Order Status:  Completed Specimen:  Urine from Urine, Clean Catch Updated:  07/10/17 1251     Urine Culture, Routine No growth    Respiratory Viral Panel by PCR Nasal Swab [[id number]] Collected:  07/10/17 1231    Order Status:  Sent Specimen:  Respiratory Updated:  07/10/17 1246    Blood culture [[id number]] Collected:  07/09/17 0640    Order Status:  Completed Specimen:  Blood Updated:  07/10/17 1012     Blood Culture, Routine No Growth to date     Blood Culture, Routine No Growth to date    Narrative:       peripheral    Blood culture [[id number]] Collected:  07/09/17 0638    Order Status:  Completed Specimen:  Blood Updated:  07/10/17 1012     Blood Culture, Routine No Growth to date     Blood Culture, Routine No Growth to date    Narrative:       peripheral    Blood culture [[id number]] Collected:  07/07/17 0601    Order Status:  Completed Specimen:  Blood Updated:  07/10/17 0612     Blood Culture, Routine No Growth to date     Blood Culture, Routine No Growth to date     Blood Culture, Routine No Growth to date    Blood culture [[id number]] Collected:  07/07/17 0601    Order Status:  Completed Specimen:  Blood Updated:  07/10/17 0612     Blood Culture, Routine No Growth to date      Blood Culture, Routine No Growth to date     Blood Culture, Routine No Growth to date    Blood culture [[id number]] Collected:  07/04/17 0151    Order Status:  Completed Specimen:  Blood Updated:  07/09/17 0612     Blood Culture, Routine No growth after 5 days.    Blood culture [[id number]] Collected:  07/04/17 0151    Order Status:  Completed Specimen:  Blood Updated:  07/09/17 0612     Blood Culture, Routine No growth after 5 days.    Urine culture [[id number]] Collected:  07/07/17 0648    Order Status:  Completed Specimen:  Urine from Urine, Clean Catch Updated:  07/08/17 1145     Urine Culture, Routine No growth    Cryptococcal antigen [[id number]] Collected:  07/07/17 1047    Order Status:  Completed Specimen:  Blood from Blood Updated:  07/07/17 1333     Cryptococcal Ag, Blood Negative    Urine culture [[id number]] Collected:  07/04/17 0146    Order Status:  Completed Specimen:  Urine from Urine, Clean Catch Updated:  07/05/17 0748     Urine Culture, Routine No growth

## 2017-07-10 NOTE — PROGRESS NOTES
Pt transferred from 8th floor to pre op 48 via stretcher by registered nurse. Aaox4. PRBC infusing (unit [id number]) @ 125 cc/hr w/ complications

## 2017-07-10 NOTE — ASSESSMENT & PLAN NOTE
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 Differential for pneumonia includes: IFI (including breakthrough Aspergillosis along with other molds and previously diagnosed histoplasmosis), bacterial pneumonia (including atypical pathogens), community-acquired viral infections, and PCP (prophylaxis stopped in 1/2017 but his CD4 percentage was quite low recently despite a high absolute count). He is stable despite these findings.    - continue empiric vanc/cefepime for now for bacterial coverage (urine legionella antigen pending)  - on ambisome 5mg/kg IV q24h (with associated timed saline boluses and premedications) for possible IFI and. Continue to closely monitor electrolytes (K, Mg) and CrCl on therapy (aspergillus antigen, fungitell, urine histo/blasto, RVP and quantiferon gold pending)   - recommend BAL for lavage cultures and cytology for GMS to exclude PCP. Unable to tailor antimicrobial therapy without cultures at this point. Cultures would help guide therapy since patient was further broadened in the hospital.   - will f/u pending cultures with you

## 2017-07-10 NOTE — PROGRESS NOTES
Pt back from OR. L hip bandaid dry and intact. Vital signs as charted. Notified Md pt back to floor. Okay to resume diet and administer am meds now.

## 2017-07-10 NOTE — PROGRESS NOTES
Bone marrow bx and aspiration done to right iliac crest. See procedure note. Pt to be transferred back to his hospital room, inpatient BMT service when appropriate per anesthesia.     Cheryl Carrera DNP, NP  Hematology/Oncology

## 2017-07-10 NOTE — PLAN OF CARE
Patient VSS.  Patient Arik score is 10/10.  Patient tolerating ice chips well.  Patient denies any pain to site. PADSS score is 9/10.  Patient ready to be transported back to room 845.   Hand off report given to Kaial ABDI.  Patient to be transported via stretcher per nurse.  Chart to accompany patient.

## 2017-07-10 NOTE — PLAN OF CARE
Problem: Patient Care Overview  Goal: Plan of Care Review  Outcome: Ongoing (interventions implemented as appropriate)  Pt has remained free from injury this shift. Afebrile. Cefepime and vanc continued as ordered. Dilaudid administered PRN as ordered. Pt denies nausea. Thrombocytopenic and neutropenic precautions maintained. Bed in lowest, locked position w/ side rails up x2. Call light within reach. Will continue to monitor.

## 2017-07-10 NOTE — SUBJECTIVE & OBJECTIVE
Subjective:     Interval History:  - No acute issues overnight. Afebrile x 24 hours. VSS  - Per ID continue cefepime, vancomycin, transitioned from itraconazole to ambisome yesterday.  - BMBx today in OR    Objective:     Vital Signs (Most Recent):  Temp: 98.9 °F (37.2 °C) (07/10/17 0740)  Pulse: 102 (07/10/17 0740)  Resp: 19 (07/10/17 0406)  BP: 130/69 (07/10/17 0740)  SpO2: (!) 94 % (07/10/17 0740) Vital Signs (24h Range):  Temp:  [97.9 °F (36.6 °C)-99.2 °F (37.3 °C)] 98.9 °F (37.2 °C)  Pulse:  [] 102  Resp:  [16-19] 19  SpO2:  [88 %-97 %] 94 %  BP: (112-133)/(55-71) 130/69     Weight: 85.2 kg (187 lb 13.3 oz)  Body mass index is 24.78 kg/m².  Body surface area is 2.09 meters squared.    ECOG SCORE         [unfilled]    Intake/Output - Last 3 Shifts       07/08 0700 - 07/09 0659 07/09 0700 - 07/10 0659 07/10 0700 - 07/11 0659    P.O. 1800 1760     I.V. (mL/kg) 2982.8 (35.6) 2772.9 (32.5)     Blood 650 812     IV Piggyback 900 2050     Total Intake(mL/kg) 6332.8 (75.5) 7394.9 (86.8)     Urine (mL/kg/hr) 4800 (2.4) 3475 (1.7)     Stool 0 (0)      Total Output 4800 3475      Net +1532.8 +3919.9             Stool Occurrence 1 x            Physical Exam   Constitutional: He is oriented to person, place, and time. He appears well-developed and well-nourished. No distress.   HENT:   Head: Normocephalic.   Right Ear: External ear normal.   Left Ear: External ear normal.   Nose: Nose normal.   Mouth/Throat: Oropharynx is clear and moist and mucous membranes are normal. No oropharyngeal exudate.   Eyes: Conjunctivae and EOM are normal. Pupils are equal, round, and reactive to light. No scleral icterus.   Neck: Normal range of motion. Neck supple.   Cardiovascular: Normal rate, regular rhythm and normal heart sounds.    Pulmonary/Chest: Effort normal.   Coarse breath sounds left lower lobe    Abdominal: Soft. Normal appearance and bowel sounds are normal. He exhibits no distension. There is no tenderness.    Musculoskeletal: Normal range of motion. He exhibits no edema.   Neurological: He is alert and oriented to person, place, and time.   Skin: Skin is warm, dry and intact. No cyanosis. Nails show no clubbing.   Psychiatric: He has a normal mood and affect. His behavior is normal. Thought content normal. His mood appears not anxious.   Vitals reviewed.      Significant Labs:   CBC:   Recent Labs  Lab 07/09/17  0524 07/10/17  0425   WBC 0.04* 0.05*  0.05*   HGB 6.7* 6.3*  6.3*   HCT 19.4* 18.0*  18.0*   PLT 3* 12*  12*   , CMP:   Recent Labs  Lab 07/09/17  0524 07/10/17  0425    140  140   K 3.9 3.4*  3.4*    112*  112*   CO2 20* 21*  21*   GLU 94 94  94   BUN 19 17  17   CREATININE 1.0 0.9  0.9   CALCIUM 7.8* 7.6*  7.6*   PROT 5.1* 4.8*  4.8*   ALBUMIN 1.9* 1.8*  1.8*   BILITOT 2.0* 1.6*  1.6*   ALKPHOS 116 121  121   AST 17 19  19   ALT 13 12  12   ANIONGAP 7* 7*  7*   EGFRNONAA >60.0 >60.0  >60.0    and All pertinent labs from the last 24 hours have been reviewed.    Diagnostic Results:  I have reviewed all pertinent imaging results/findings within the past 24 hours.

## 2017-07-11 NOTE — ASSESSMENT & PLAN NOTE
- 302 days post Flu/Bu/ATG MUD allogeneic transplant for high risk AML after his second IDAC (6/20/16 - 6/24/16) after a prolonged hospitalization (2/5/16 - 3/21/16) for induction with 7&3 and reinduction with MEC to remission and IDAC (4/4/16 - 4/9/16).   - continue ppx acyclovir, renally dosed. We have discussed antifungal coverage with infectious disease. Discontinued itraconzaole (7/09) and started amphotericin.   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. Pt now off steroids.  - chimerics from marrow done 6/21 show CD3 of 90% donor and 10% recipient, but CD34 of 5% donor and 95% recipient   - repeat chimerics pending from marrow done 7/10

## 2017-07-11 NOTE — ASSESSMENT & PLAN NOTE
Patient spiked fever on 7/8/2017.  He has since been afebrile.  This would not rule out infection since he is immunocompromised he would likely not be able to mount a robust immune response.  Currently blood and urine cultures have been not growth to date.  Cryptococcus antigen has been negative. Respiratory viral panel pending.  Will plan for bronchoscopy with BAL this week to rule out respiratory pathogens.  Continue empiric antibiotic coverage.

## 2017-07-11 NOTE — SUBJECTIVE & OBJECTIVE
Subjective:     Interval History:   - No acute issues overnight. Afebrile x 48 hours. VSS  - Remains with lower back pain that is controlled with current pain regimen. Also notes intermittent numbness to the left jaw as well.   - Remains on vancomycin and cefepime per ID will continue and wean if he remains afebrile.  - Pulmonology consulted for BAL     Objective:     Vital Signs (Most Recent):  Temp: 99.3 °F (37.4 °C) (07/11/17 0758)  Pulse: (!) 114 (07/11/17 0737)  Resp: 16 (07/11/17 0737)  BP: 124/74 (07/11/17 0737)  SpO2: (!) 94 % (07/11/17 0737) Vital Signs (24h Range):  Temp:  [97.9 °F (36.6 °C)-99.3 °F (37.4 °C)] 99.3 °F (37.4 °C)  Pulse:  [] 114  Resp:  [16-20] 16  SpO2:  [93 %-99 %] 94 %  BP: ()/(53-74) 124/74     Weight: 86.3 kg (190 lb 2.4 oz)  Body mass index is 25.09 kg/m².  Body surface area is 2.11 meters squared.    ECOG SCORE         [unfilled]    Intake/Output - Last 3 Shifts       07/09 0700 - 07/10 0659 07/10 0700 - 07/11 0659 07/11 0700 - 07/12 0659    P.O. 1760 1200     I.V. (mL/kg) 2772.9 (32.5) 2299.2 (26.7)     Blood 812 365 437.8    IV Piggyback 2050 2050     Total Intake(mL/kg) 7394.9 (86.8) 5914.2 (68.6) 437.8 (5.1)    Urine (mL/kg/hr) 3475 (1.7) 5025 (2.4)     Stool       Total Output 3475 5025      Net +3919.9 +889.2 +437.8           Stool Occurrence   1 x          Physical Exam   Constitutional: He is oriented to person, place, and time. He appears well-developed and well-nourished. No distress.   HENT:   Head: Normocephalic.   Right Ear: External ear normal.   Left Ear: External ear normal.   Nose: Nose normal.   Mouth/Throat: Oropharynx is clear and moist and mucous membranes are normal. No oropharyngeal exudate.   Eyes: Conjunctivae and EOM are normal. Pupils are equal, round, and reactive to light.   Cardiovascular: Normal rate, regular rhythm and normal heart sounds.    Pulmonary/Chest: Effort normal and breath sounds normal.   Abdominal: Soft. Normal appearance and  bowel sounds are normal. He exhibits no distension. There is no tenderness.   Musculoskeletal: He exhibits no edema.   Neurological: He is alert and oriented to person, place, and time.   Skin: Skin is warm, dry and intact. No cyanosis. Nails show no clubbing.   Central line in tact dressing dry and clean    Psychiatric: He has a normal mood and affect. His behavior is normal. Thought content normal. His mood appears not anxious.   Vitals reviewed.      Significant Labs:   CBC:   Recent Labs  Lab 07/10/17  0425 07/11/17  0420   WBC 0.05*  0.05* 0.04*   HGB 6.3*  6.3* 7.0*   HCT 18.0*  18.0* 19.5*   PLT 12*  12* 4*   , CMP:   Recent Labs  Lab 07/10/17  0425 07/11/17  0420     140 139   K 3.4*  3.4* 3.0*   *  112* 111*   CO2 21*  21* 22*   GLU 94  94 101   BUN 17  17 16   CREATININE 0.9  0.9 0.9   CALCIUM 7.6*  7.6* 7.4*   PROT 4.8*  4.8* 4.8*   ALBUMIN 1.8*  1.8* 1.9*   BILITOT 1.6*  1.6* 1.4*   ALKPHOS 121  121 153*   AST 19  19 17   ALT 12  12 13   ANIONGAP 7*  7* 6*   EGFRNONAA >60.0  >60.0 >60.0    and All pertinent labs from the last 24 hours have been reviewed.    Diagnostic Results:  I have reviewed all pertinent imaging results/findings within the past 24 hours.

## 2017-07-11 NOTE — ASSESSMENT & PLAN NOTE
Patient has required multiple transfusions of pRBC and platelets.  Current platelet count is 4 and will likely need transfusion to mitigate the risk of bleeding during bronchoscopy.

## 2017-07-11 NOTE — PLAN OF CARE
Problem: Patient Care Overview  Goal: Plan of Care Review  Outcome: Ongoing (interventions implemented as appropriate)  Pt has remained free from injury this shift. Afebrile. Cefepime and vanc continued as ordered. Dilaudid administered PRN as ordered. Zofran x1. Thrombocytopenic and neutropenic precautions maintained. Bed in lowest, locked position w/ side rails up x2. Call light within reach. Will continue to monitor.

## 2017-07-11 NOTE — SUBJECTIVE & OBJECTIVE
Past Medical History:   Diagnosis Date    Cancer 2/2016    ALL    HIV infection     Pancytopenia due to antineoplastic chemotherapy 7/4/2016       Past Surgical History:   Procedure Laterality Date    CYSTOSCOPY         Review of patient's allergies indicates:   Allergen Reactions    Etoposide Rash       Family History     Problem Relation (Age of Onset)    Cancer Father        Social History Main Topics    Smoking status: Never Smoker    Smokeless tobacco: Not on file    Alcohol use No    Drug use: No    Sexual activity: Not on file         Review of Systems   Constitutional: Positive for fever. Negative for activity change, appetite change, chills and fatigue.   HENT: Negative for congestion, hearing loss and sore throat.    Eyes: Negative for visual disturbance.   Respiratory: Positive for cough. Negative for chest tightness, shortness of breath and wheezing.    Cardiovascular: Negative for chest pain, palpitations and leg swelling.   Gastrointestinal: Negative for abdominal distention, constipation, nausea and vomiting.   Genitourinary: Negative for dysuria and hematuria.   Musculoskeletal: Negative for arthralgias and back pain.   Skin: Negative for color change and rash.   Neurological: Negative for dizziness, weakness, light-headedness and numbness.   Psychiatric/Behavioral: Negative for agitation, behavioral problems and confusion.     Objective:     Vital Signs (Most Recent):  Temp: 99.3 °F (37.4 °C) (07/11/17 0758)  Pulse: (!) 114 (07/11/17 0737)  Resp: 16 (07/11/17 0737)  BP: 124/74 (07/11/17 0737)  SpO2: (!) 94 % (07/11/17 0737) Vital Signs (24h Range):  Temp:  [97.9 °F (36.6 °C)-99.3 °F (37.4 °C)] 99.3 °F (37.4 °C)  Pulse:  [] 114  Resp:  [16-20] 16  SpO2:  [93 %-99 %] 94 %  BP: ()/(53-74) 124/74     Weight: 86.3 kg (190 lb 2.4 oz)  Body mass index is 25.09 kg/m².      Intake/Output Summary (Last 24 hours) at 07/11/17 0855  Last data filed at 07/11/17 0758   Gross per 24 hour    Intake          6351.96 ml   Output             5025 ml   Net          1326.96 ml       Physical Exam   Constitutional: He is oriented to person, place, and time. He appears well-developed and well-nourished. No distress.   HENT:   Head: Normocephalic and atraumatic.   Eyes: EOM are normal. Pupils are equal, round, and reactive to light.   Neck: Normal range of motion. Neck supple. No JVD present.   Cardiovascular: Regular rhythm, normal heart sounds and intact distal pulses.  Exam reveals no gallop and no friction rub.    No murmur heard.  Pulmonary/Chest: Effort normal. No stridor. No respiratory distress. He has no wheezes. He has no rales.   Breath sounds CTA  bilaterally   Abdominal: Soft. He exhibits no distension and no mass. There is no tenderness.   Musculoskeletal: Normal range of motion. He exhibits no edema.   Neurological: He is alert and oriented to person, place, and time. No cranial nerve deficit. Coordination normal.   Skin: Skin is warm and dry. Capillary refill takes less than 2 seconds.   Psychiatric: He has a normal mood and affect. His behavior is normal. Judgment and thought content normal.       Vents:       Lines/Drains/Airways     Central Venous Catheter Line                 Tunneled Central Line Insertion/Assessment - Double Lumen  06/21/17 1519 left subclavian 19 days                Significant Labs:    CBC/Anemia Profile:    Recent Labs  Lab 07/10/17  0425 07/11/17  0420   WBC 0.05*  0.05* 0.04*   HGB 6.3*  6.3* 7.0*   HCT 18.0*  18.0* 19.5*   PLT 12*  12* 4*   MCV 86  86 87   RDW 15.1*  15.1* 14.9*        Chemistries:    Recent Labs  Lab 07/10/17  0425 07/11/17  0420     140 139   K 3.4*  3.4* 3.0*   *  112* 111*   CO2 21*  21* 22*   BUN 17  17 16   CREATININE 0.9  0.9 0.9   CALCIUM 7.6*  7.6* 7.4*   ALBUMIN 1.8*  1.8* 1.9*   PROT 4.8*  4.8* 4.8*   BILITOT 1.6*  1.6* 1.4*   ALKPHOS 121  121 153*   ALT 12  12 13   AST 19  19 17   MG 1.7  1.7 1.4*   PHOS  2.3*  2.3* 1.6*       All pertinent labs within the past 24 hours have been reviewed.    Significant Imaging:   I have reviewed all pertinent imaging results/findings within the past 24 hours.

## 2017-07-11 NOTE — ASSESSMENT & PLAN NOTE
- ordered prn electrolyte replacement per protocol   - hypokalemia, hypomagnesemia and hypophosphatemia on 7/11

## 2017-07-11 NOTE — HPI
Mr. Paul E Sistrunk is a 56 year old male with relapsing AML, HIV, anxiety, that presented to the hospital on 6/20/2017 for induction for relapsing AML.  7/8/2017 patient became febrile to 101.3 which was documented in the medical record.  He has been on vancomycin, cefepime and amphotericin. He reports that he has had a dry cough that has resulted in chest wall soreness.  He has since been afebrile with intermittent tachycardia but otherwise normal vital signs.  Blood cultures and urine cultures have been no growth to date.  Cryptococcus has been negative. Pulmonology was consulted on 7/12 for bronchoscopy for further evaluation of possible infectious etiology of his fever.    Patient requiring 4.0L of O2 and has tachypnea when removed, has SVT; primary team to ordered CTA PE protocol. Upon completion patient was found to have moderate bilateral pleural effusions. The study was limited 2/2 suboptimal contrast bolus opacification of the pulmonary arteries resulting in limited assessment; no acute central pulmonary thromboembolism identified the level of the proximal segmental arteries; however the assessment was limited so peripheral pulmonary emboli could not be excluded; interval progression of bilateral airspace disease with more extensive areas of consolidation bilaterally per radiology read.

## 2017-07-11 NOTE — PLAN OF CARE
Problem: Patient Care Overview  Goal: Plan of Care Review  Outcome: Ongoing (interventions implemented as appropriate)  Plan of care reviewed with pt at the beginning of shift. Verbalized understanding. Did not have any questions or concerns at this time. Pt remained free of falls today. Fall precautions maintained. S/p day 14 bone marrow biopsy - awaiting results. S/p 1U PRBC completed. Pain controlled with dilaudid IV Q2hrs. Extremely low PO intake - encouraged intake of boost. Good urine output.

## 2017-07-11 NOTE — PROGRESS NOTES
[facility]  Hematology  Bone Marrow Transplant  Progress Note    Patient Name: Paul E Sistrunk  Admission Date: 6/20/2017  Hospital Length of Stay: 21 days  Code Status: Full Code    Subjective:     Interval History:   - No acute issues overnight. Afebrile x 48 hours. VSS  - Remains with lower back pain that is controlled with current pain regimen. Also notes intermittent numbness to the left jaw as well.   - Remains on vancomycin and cefepime per ID will continue and wean if he remains afebrile.  - Pulmonology consulted for BAL     Objective:     Vital Signs (Most Recent):  Temp: 99.3 °F (37.4 °C) (07/11/17 0758)  Pulse: (!) 114 (07/11/17 0737)  Resp: 16 (07/11/17 0737)  BP: 124/74 (07/11/17 0737)  SpO2: (!) 94 % (07/11/17 0737) Vital Signs (24h Range):  Temp:  [97.9 °F (36.6 °C)-99.3 °F (37.4 °C)] 99.3 °F (37.4 °C)  Pulse:  [] 114  Resp:  [16-20] 16  SpO2:  [93 %-99 %] 94 %  BP: ()/(53-74) 124/74     Weight: 86.3 kg (190 lb 2.4 oz)  Body mass index is 25.09 kg/m².  Body surface area is 2.11 meters squared.    ECOG SCORE         [unfilled]    Intake/Output - Last 3 Shifts       07/09 0700 - 07/10 0659 07/10 0700 - 07/11 0659 07/11 0700 - 07/12 0659    P.O. 1760 1200     I.V. (mL/kg) 2772.9 (32.5) 2299.2 (26.7)     Blood 812 365 437.8    IV Piggyback 2050 2050     Total Intake(mL/kg) 7394.9 (86.8) 5914.2 (68.6) 437.8 (5.1)    Urine (mL/kg/hr) 3475 (1.7) 5025 (2.4)     Stool       Total Output 3475 5025      Net +3919.9 +889.2 +437.8           Stool Occurrence   1 x          Physical Exam   Constitutional: He is oriented to person, place, and time. He appears well-developed and well-nourished. No distress.   HENT:   Head: Normocephalic.   Right Ear: External ear normal.   Left Ear: External ear normal.   Nose: Nose normal.   Mouth/Throat: Oropharynx is clear and moist and mucous membranes are normal. No oropharyngeal exudate.   Eyes: Conjunctivae and EOM are normal. Pupils are equal,  round, and reactive to light.   Cardiovascular: Normal rate, regular rhythm and normal heart sounds.    Pulmonary/Chest: Effort normal and breath sounds normal.   Abdominal: Soft. Normal appearance and bowel sounds are normal. He exhibits no distension. There is no tenderness.   Musculoskeletal: He exhibits no edema.   Neurological: He is alert and oriented to person, place, and time.   Skin: Skin is warm, dry and intact. No cyanosis. Nails show no clubbing.   Central line in tact dressing dry and clean    Psychiatric: He has a normal mood and affect. His behavior is normal. Thought content normal. His mood appears not anxious.   Vitals reviewed.      Significant Labs:   CBC:   Recent Labs  Lab 07/10/17  0425 07/11/17  0420   WBC 0.05*  0.05* 0.04*   HGB 6.3*  6.3* 7.0*   HCT 18.0*  18.0* 19.5*   PLT 12*  12* 4*   , CMP:   Recent Labs  Lab 07/10/17  0425 07/11/17  0420     140 139   K 3.4*  3.4* 3.0*   *  112* 111*   CO2 21*  21* 22*   GLU 94  94 101   BUN 17  17 16   CREATININE 0.9  0.9 0.9   CALCIUM 7.6*  7.6* 7.4*   PROT 4.8*  4.8* 4.8*   ALBUMIN 1.8*  1.8* 1.9*   BILITOT 1.6*  1.6* 1.4*   ALKPHOS 121  121 153*   AST 19  19 17   ALT 12  12 13   ANIONGAP 7*  7* 6*   EGFRNONAA >60.0  >60.0 >60.0    and All pertinent labs from the last 24 hours have been reviewed.    Diagnostic Results:  I have reviewed all pertinent imaging results/findings within the past 24 hours.    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 Blast of 62%  - awaiting mutation analysis and cytogenetics; FLT-3 negative     - Jaw pain and pain in right cranial nerve 7 distribution with persistent,  severe headaches concerning for CNS involvement - underwent IT chemotherapy on 6/23. Flow negative for disease. CSF LDH was 29.  - Had previous reaction to MEC (etoposide caused full body rash). Did not qualify for the Tolero trial due to receiving IT chemotherapy    - Day 15 of FLAG-BETZY   - Day 14 bone marrow biopsy done 7/10 - results pending         S/P allogeneic bone marrow transplant    - 302 days post Flu/Bu/ATG MUD allogeneic transplant for high risk AML after his second IDAC (6/20/16 - 6/24/16) after a prolonged hospitalization (2/5/16 - 3/21/16) for induction with 7&3 and reinduction with MEC to remission and IDAC (4/4/16 - 4/9/16).   - continue ppx acyclovir, renally dosed. We have discussed antifungal coverage with infectious disease. Discontinued itraconzaole (7/09) and started amphotericin.   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. Pt now off steroids.  - chimerics from marrow done 6/21 show CD3 of 90% donor and 10% recipient, but CD34 of 5% donor and 95% recipient   - repeat chimerics pending from marrow done 7/10         HIV disease    - CD4 low at 11.4%, Absolute CD4 469 and HIV RNA not detected from 6/21  - As per ID: continue triumeq        Neutropenic fever    - blood cultures no growth from 6/24/17 and 7/4/17, CXR unremarkable.   - on Cefepime (started 6/24 - discontinued 6/28) restarted on 7/4/17   - Pt currently only on prophylactic abx--Bactrim, acyclovir.   - Cefepime day 8; vancomycin day 3   - repeated blood and urine cultures 7/9/17 - NGTD  - CT chest/abdomen/pelvis reveals bilateral groundglass opacities as well as a left lower-lobe consolidation.   - started on ambisome 7/9 per ID recommendations  - consulted pulm for a BAL will be done 7/12 or 7/13 - platelets need to be transfused before procedure   - fungitell, aspergillus and quantiferon gold pending   - per ID will consider transitioning to cipro if patient remains afebrile         Pancytopenia due to  antineoplastic chemotherapy    - white blood cell count is 0.04 k/uL; absolute neutrophil count is 0 cells/uL  - hemoglobin 7 g/dL; platelet count is 4 k/uL  - transfuse for hemoglobin < 7 g/dL, platelets < 10 k/uL            Prostate hypertrophy    - Continue home flomax        Facial numbness    - evaluated by Neurology  - MRI with no evidence of  acute intracranial pathology.  - thought to be secondary to drug induced peripheral neuropathy?  - Neurology rec Vitamin E, will hold for now given side effects of thrombocytopenia and bleeding          Chronic bilateral low back pain without sciatica    - related to Neupogen  - started on zyrtec, lidocaine patch and Flexeril  - pain has improved. Continue to monitor.        Electrolyte abnormality    - ordered prn electrolyte replacement per protocol   - hypokalemia, hypomagnesemia and hypophosphatemia on 7/11         Anxiety    - PRN Xanax            VTE Risk Mitigation         Ordered     Place sequential compression device  Until discontinued      06/20/17 2005     High Risk of VTE  Once      06/20/17 1901          Disposition: Pending count recovery and marrow results.     Asia Melgar NP  Bone Marrow Transplant  [facility]

## 2017-07-11 NOTE — ASSESSMENT & PLAN NOTE
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 Differential for pneumonia includes: IFI (including breakthrough Aspergillosis along with other molds and previously diagnosed histoplasmosis), bacterial pneumonia (including atypical pathogens), community-acquired viral infections, and PCP (prophylaxis stopped in 1/2017 but his CD4 percentage was quite low recently despite a high absolute count). He is stable despite these findings.    - continue empiric vanc/cefepime for now for bacterial coverage (urine legionella antigen pending). Awaiting BAL culture.   - continue ambisome 5mg/kg IV q24h (with associated timed saline boluses and premedications) for possible IFI and continue to closely monitor electrolytes (K, Mg) and CrCl on therapy (aspergillus antigen, fungitell, urine histo/blasto, RVP and quantiferon gold pending)   - recommend BAL for lavage cultures and cytology for GMS to exclude PCP. Unable to tailor antimicrobial therapy without cultures at this point. Cultures would help guide therapy since patient was further broadened in the hospital.

## 2017-07-11 NOTE — PLAN OF CARE
"Problem: Patient Care Overview  Goal: Plan of Care Review  Outcome: Ongoing (interventions implemented as appropriate)  Patient reports feeling "tired". Patient pale, ambulating. Has a poor appetite. Patient electrolytes are being replaced. Received 1 unit of platelets this am. Patient with pain management. States pain ranging from 7-9 to his right/left jaw and his right rib cage/flank area. Afebrile, no falls , no skin breakdown. Patient with scattered bruising noted.Will cont to monitor.       "

## 2017-07-11 NOTE — ANESTHESIA POSTPROCEDURE EVALUATION
"Anesthesia Post Evaluation    Patient: Paul E Sistrunk    Procedure(s) Performed: Procedure(s) (LRB):  BIOPSY-BONE MARROW (Right)    Final Anesthesia Type: general  Patient location during evaluation: PACU  Patient participation: Yes- Able to Participate  Level of consciousness: awake and alert  Post-procedure vital signs: reviewed and stable  Pain management: adequate  Airway patency: patent  PONV status at discharge: No PONV  Anesthetic complications: no      Cardiovascular status: hemodynamically stable and blood pressure returned to baseline  Respiratory status: unassisted and spontaneous ventilation  Hydration status: euvolemic  Follow-up not needed.        Visit Vitals  BP (!) 117/59 (BP Location: Right arm, Patient Position: Lying, BP Method: Automatic)   Pulse 102   Temp 36.8 °C (98.3 °F) (Oral)   Resp 16   Ht 6' 1" (1.854 m)   Wt 86.3 kg (190 lb 2.4 oz)   SpO2 (!) 94%   BMI 25.09 kg/m²       Pain/Arik Score: Pain Assessment Performed: Yes (7/11/2017  7:05 AM)  Presence of Pain: complains of pain/discomfort (7/11/2017 11:18 AM)  Pain Rating Prior to Med Admin: 8 (7/11/2017 10:20 AM)  Pain Rating Post Med Admin: 7 (7/11/2017 10:50 AM)  Arik Score: 10 (7/10/2017 11:10 AM)      "

## 2017-07-11 NOTE — PROGRESS NOTES
Pt. Seen for follow up, pt. Doing well and is hopeful for full recovery from recent re-induction chemo. Support provided. Will follow.

## 2017-07-11 NOTE — ASSESSMENT & PLAN NOTE
- blood cultures no growth from 6/24/17 and 7/4/17, CXR unremarkable.   - on Cefepime (started 6/24 - discontinued 6/28) restarted on 7/4/17   - Pt currently only on prophylactic abx--Bactrim, acyclovir.   - Cefepime day 8; vancomycin day 3   - repeated blood and urine cultures 7/9/17 - NGTD  - CT chest/abdomen/pelvis reveals bilateral groundglass opacities as well as a left lower-lobe consolidation.   - started on ambisome 7/9 per ID recommendations  - consulted pulm for a BAL will be done 7/12 or 7/13 - platelets need to be transfused before procedure   - fungitell, aspergillus and quantiferon gold pending   - per ID will consider transitioning to cipro if patient remains afebrile

## 2017-07-11 NOTE — SUBJECTIVE & OBJECTIVE
Interval History:   -Afebrile   -Last fever 7/9 @ 0500 101.3 F  -ANC 0, w/ hemoglobin of 7 and platelet count of 4,000.     Review of Systems   Constitutional: Positive for fatigue. Negative for chills and fever.   HENT: Positive for mouth sores. Negative for rhinorrhea and sore throat.    Eyes: Negative for photophobia and visual disturbance.   Respiratory: Positive for cough. Negative for shortness of breath and wheezing.    Gastrointestinal: Negative for abdominal pain and diarrhea.   Genitourinary: Negative for dysuria.   Musculoskeletal: Negative for arthralgias and myalgias.   Skin: Negative for rash.   Allergic/Immunologic: Positive for immunocompromised state.   Neurological: Negative for headaches.   Hematological: Bruises/bleeds easily.   Psychiatric/Behavioral: Negative for confusion.     Objective:     Vital Signs (Most Recent):  Temp: 98.3 °F (36.8 °C) (07/11/17 1137)  Pulse: 102 (07/11/17 1137)  Resp: 16 (07/11/17 1137)  BP: (!) 117/59 (07/11/17 1137)  SpO2: (!) 94 % (07/11/17 1137) Vital Signs (24h Range):  Temp:  [98.3 °F (36.8 °C)-99.3 °F (37.4 °C)] 98.3 °F (36.8 °C)  Pulse:  [] 102  Resp:  [16-20] 16  SpO2:  [93 %-96 %] 94 %  BP: (116-130)/(59-74) 117/59     Weight: 86.3 kg (190 lb 2.4 oz)  Body mass index is 25.09 kg/m².    Estimated Creatinine Clearance: 106 mL/min (based on Cr of 0.9).    Physical Exam   Constitutional: He appears well-developed. He appears cachectic.   Up and walking in room   HENT:   Head: Normocephalic and atraumatic.   Eyes: EOM are normal. Pupils are equal, round, and reactive to light.   Neck: Normal range of motion. Neck supple.   Cardiovascular: Regular rhythm.  Tachycardia present.    No murmur heard.  Pulmonary/Chest: Effort normal and breath sounds normal. He has no wheezes. He has no rales.   Abdominal: Soft. Bowel sounds are normal.   Musculoskeletal: Normal range of motion.   Neurological: He is alert.   Skin: Skin is warm and dry.   Left subclavian port w/o  tenderness or erythema     Significant Labs:   CBC:     Recent Labs  Lab 07/10/17  0425 07/11/17  0420   WBC 0.05*  0.05* 0.04*   HGB 6.3*  6.3* 7.0*   HCT 18.0*  18.0* 19.5*   PLT 12*  12* 4*     CMP:     Recent Labs  Lab 07/10/17  0425 07/11/17  0420     140 139   K 3.4*  3.4* 3.0*   *  112* 111*   CO2 21*  21* 22*   GLU 94  94 101   BUN 17  17 16   CREATININE 0.9  0.9 0.9   CALCIUM 7.6*  7.6* 7.4*   PROT 4.8*  4.8* 4.8*   ALBUMIN 1.8*  1.8* 1.9*   BILITOT 1.6*  1.6* 1.4*   ALKPHOS 121  121 153*   AST 19  19 17   ALT 12  12 13   ANIONGAP 7*  7* 6*   EGFRNONAA >60.0  >60.0 >60.0     Microbiology Results (last 7 days)     Procedure Component Value Units Date/Time    Blood culture [[id number]] Collected:  07/09/17 0640    Order Status:  Completed Specimen:  Blood Updated:  07/11/17 1012     Blood Culture, Routine No Growth to date     Blood Culture, Routine No Growth to date     Blood Culture, Routine No Growth to date    Narrative:       peripheral    Blood culture [[id number]] Collected:  07/09/17 0638    Order Status:  Completed Specimen:  Blood Updated:  07/11/17 1012     Blood Culture, Routine No Growth to date     Blood Culture, Routine No Growth to date     Blood Culture, Routine No Growth to date    Narrative:       peripheral    Blood culture [[id number]] Collected:  07/07/17 0601    Order Status:  Completed Specimen:  Blood Updated:  07/11/17 0612     Blood Culture, Routine No Growth to date     Blood Culture, Routine No Growth to date     Blood Culture, Routine No Growth to date     Blood Culture, Routine No Growth to date    Blood culture [[id number]] Collected:  07/07/17 0601    Order Status:  Completed Specimen:  Blood Updated:  07/11/17 0612     Blood Culture, Routine No Growth to date     Blood Culture, Routine No Growth to date     Blood Culture, Routine No Growth to date     Blood Culture, Routine No Growth to date    Urine culture [[id number]] Collected:  07/09/17 0618     Order Status:  Completed Specimen:  Urine from Urine, Clean Catch Updated:  07/10/17 1251     Urine Culture, Routine No growth    Respiratory Viral Panel by PCR Nasal Swab [[id number]] Collected:  07/10/17 1231    Order Status:  Sent Specimen:  Respiratory Updated:  07/10/17 1246    Blood culture [[id number]] Collected:  07/04/17 0151    Order Status:  Completed Specimen:  Blood Updated:  07/09/17 0612     Blood Culture, Routine No growth after 5 days.    Blood culture [[id number]] Collected:  07/04/17 0151    Order Status:  Completed Specimen:  Blood Updated:  07/09/17 0612     Blood Culture, Routine No growth after 5 days.    Urine culture [[id number]] Collected:  07/07/17 0648    Order Status:  Completed Specimen:  Urine from Urine, Clean Catch Updated:  07/08/17 1145     Urine Culture, Routine No growth    Cryptococcal antigen [[id number]] Collected:  07/07/17 1047    Order Status:  Completed Specimen:  Blood from Blood Updated:  07/07/17 1333     Cryptococcal Ag, Blood Negative    Urine culture [[id number]] Collected:  07/04/17 0146    Order Status:  Completed Specimen:  Urine from Urine, Clean Catch Updated:  07/05/17 0748     Urine Culture, Routine No growth

## 2017-07-11 NOTE — PLAN OF CARE
MDR's with Dr Saleem.  Patient is day 15 of FLAG Lynette.  BMB done yesterday is pending.  ID following.  Pulmonary consulted for possible bronch.  D/c pending marrow results and count recovery.

## 2017-07-11 NOTE — ASSESSMENT & PLAN NOTE
- white blood cell count is 0.04 k/uL; absolute neutrophil count is 0 cells/uL  - hemoglobin 7 g/dL; platelet count is 4 k/uL  - transfuse for hemoglobin < 7 g/dL, platelets < 10 k/uL

## 2017-07-11 NOTE — CONSULTS
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 Paul E Sistrunk is a 56 year old male with relapsing AML, HIV, anxiety, that presented to the hospital on 6/20/2017 for induction for relapsing AML.  7/8/2017 patient became febrile to 101.3 which was documented in the medical record.  He has been on vancomycin, cefepime and amphotericin. He reports that he has had a dry cough that has resulted in chest wall soreness.  He has since been afebrile with intermittent tachycardia but otherwise normal vital signs.  Blood cultures and urine cultures have been no growth to date.  Cryptococcus has been negative. Pulmonology was consulted for bronchoscopy for further evaluation of possible infectious etiology of he fever.         Past Medical History:   Diagnosis Date    Cancer 2/2016    ALL    HIV infection     Pancytopenia due to antineoplastic chemotherapy 7/4/2016       Past Surgical History:   Procedure Laterality Date    CYSTOSCOPY         Review of patient's allergies indicates:   Allergen Reactions    Etoposide Rash       Family History     Problem Relation (Age of Onset)    Cancer Father        Social History Main Topics    Smoking status: Never Smoker    Smokeless tobacco: Not on file    Alcohol use No    Drug use: No    Sexual activity: Not on file         Review of Systems   Constitutional: Positive for fever. Negative for activity change, appetite change, chills and fatigue.   HENT:  Negative for congestion, hearing loss and sore throat.    Eyes: Negative for visual disturbance.   Respiratory: Positive for cough. Negative for chest tightness, shortness of breath and wheezing.    Cardiovascular: Negative for chest pain, palpitations and leg swelling.   Gastrointestinal: Negative for abdominal distention, constipation, nausea and vomiting.   Genitourinary: Negative for dysuria and hematuria.   Musculoskeletal: Negative for arthralgias and back pain.   Skin: Negative for color change and rash.   Neurological: Negative for dizziness, weakness, light-headedness and numbness.   Psychiatric/Behavioral: Negative for agitation, behavioral problems and confusion.     Objective:     Vital Signs (Most Recent):  Temp: 99.3 °F (37.4 °C) (07/11/17 0758)  Pulse: (!) 114 (07/11/17 0737)  Resp: 16 (07/11/17 0737)  BP: 124/74 (07/11/17 0737)  SpO2: (!) 94 % (07/11/17 0737) Vital Signs (24h Range):  Temp:  [97.9 °F (36.6 °C)-99.3 °F (37.4 °C)] 99.3 °F (37.4 °C)  Pulse:  [] 114  Resp:  [16-20] 16  SpO2:  [93 %-99 %] 94 %  BP: ()/(53-74) 124/74     Weight: 86.3 kg (190 lb 2.4 oz)  Body mass index is 25.09 kg/m².      Intake/Output Summary (Last 24 hours) at 07/11/17 0855  Last data filed at 07/11/17 0758   Gross per 24 hour   Intake          6351.96 ml   Output             5025 ml   Net          1326.96 ml       Physical Exam   Constitutional: He is oriented to person, place, and time. He appears well-developed and well-nourished. No distress.   HENT:   Head: Normocephalic and atraumatic.   Eyes: EOM are normal. Pupils are equal, round, and reactive to light.   Neck: Normal range of motion. Neck supple. No JVD present.   Cardiovascular: Regular rhythm, normal heart sounds and intact distal pulses.  Exam reveals no gallop and no friction rub.    No murmur heard.  Pulmonary/Chest: Effort normal. No stridor. No respiratory distress. He has no wheezes. He has no rales.   Breath sounds CTA  bilaterally    Abdominal: Soft. He exhibits no distension and no mass. There is no tenderness.   Musculoskeletal: Normal range of motion. He exhibits no edema.   Neurological: He is alert and oriented to person, place, and time. No cranial nerve deficit. Coordination normal.   Skin: Skin is warm and dry. Capillary refill takes less than 2 seconds.   Psychiatric: He has a normal mood and affect. His behavior is normal. Judgment and thought content normal.       Vents:       Lines/Drains/Airways     Central Venous Catheter Line                 Tunneled Central Line Insertion/Assessment - Double Lumen  06/21/17 1519 left subclavian 19 days                Significant Labs:    CBC/Anemia Profile:    Recent Labs  Lab 07/10/17  0425 07/11/17  0420   WBC 0.05*  0.05* 0.04*   HGB 6.3*  6.3* 7.0*   HCT 18.0*  18.0* 19.5*   PLT 12*  12* 4*   MCV 86  86 87   RDW 15.1*  15.1* 14.9*        Chemistries:    Recent Labs  Lab 07/10/17  0425 07/11/17  0420     140 139   K 3.4*  3.4* 3.0*   *  112* 111*   CO2 21*  21* 22*   BUN 17  17 16   CREATININE 0.9  0.9 0.9   CALCIUM 7.6*  7.6* 7.4*   ALBUMIN 1.8*  1.8* 1.9*   PROT 4.8*  4.8* 4.8*   BILITOT 1.6*  1.6* 1.4*   ALKPHOS 121  121 153*   ALT 12  12 13   AST 19  19 17   MG 1.7  1.7 1.4*   PHOS 2.3*  2.3* 1.6*       All pertinent labs within the past 24 hours have been reviewed.    Significant Imaging:   I have reviewed all pertinent imaging results/findings within the past 24 hours.    Assessment/Plan:     Neutropenic fever    Patient spiked fever on 7/8/2017.  He has since been afebrile.  This would not rule out infection since he is immunocompromised he would likely not be able to mount a robust immune response.  Currently blood and urine cultures have been not growth to date.  Cryptococcus antigen has been negative. Respiratory viral panel pending.  Will plan for bronchoscopy with BAL this week to rule out respiratory pathogens.  Continue empiric antibiotic  coverage.        Pancytopenia due to antineoplastic chemotherapy    Patient has required multiple transfusions of pRBC and platelets.  Current platelet count is 4 and will likely need transfusion to mitigate the risk of bleeding during bronchoscopy.          * AML (acute myeloid leukemia) in relapse    Continue treatment per primary team.        HIV disease    Continue antiretroviral therapy.              Thank you for your consult. I will follow-up with patient. Please contact us if you have any additional questions.     Darell Jones MD   PGY 1  Pulmonology  [facility]

## 2017-07-11 NOTE — ASSESSMENT & PLAN NOTE
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 Blast of 62%  - awaiting mutation analysis and cytogenetics; FLT-3 negative     - Jaw pain and pain in right cranial nerve 7 distribution with persistent, severe headaches concerning for CNS involvement - underwent IT chemotherapy on 6/23. Flow negative for disease. CSF LDH was 29.  - Had previous reaction to MEC (etoposide caused full body rash). Did not qualify for the Tolero trial due to receiving IT chemotherapy    - Day 15 of FLAG-BETZY   - Day 14 bone marrow biopsy done 7/10 - results pending

## 2017-07-11 NOTE — PROGRESS NOTES
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 Differential for pneumonia includes: IFI (including breakthrough Aspergillosis along with other molds and previously diagnosed histoplasmosis), bacterial pneumonia (including atypical pathogens), community-acquired viral infections, and PCP (prophylaxis stopped in 1/2017 but his CD4 percentage was quite low recently despite a high absolute count). He is stable despite these findings.    - continue empiric vanc/cefepime for now for bacterial coverage (urine legionella antigen pending). Awaiting BAL culture.   - continue ambisome 5mg/kg IV q24h (with associated timed saline boluses and premedications) for possible IFI and continue to closely monitor electrolytes (K, Mg) and CrCl on therapy (aspergillus antigen, fungitell, urine histo/blasto, RVP  and quantiferon gold pending)   - recommend BAL for lavage cultures and cytology for GMS to exclude PCP. Unable to tailor antimicrobial therapy without cultures at this point. Cultures would help guide therapy since patient was further broadened in the hospital.           Thank you for your consult. I will follow-up with patient. Please contact us if you have any additional questions.    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 ID has been consulted today for neutropenic fevers that have persisted the last few days despite empiric vanc, cefepime, chronic itraconazole, and acyclovir prophylaxis. Patient notes that prior to diagnosis of relapse in clinic, he was not having F/C/S or other localizing infectious symptoms -- only malaise and acute onset BLE body aches just prior to diagnosis. Since admission he notes neutropenic F/C/S and dry cough but denies HA, URI symptoms, sore throat, dysphagia, sputum production, SOB, N/V, abdominal pain, diarrhea, dysuria, genital lesions, line pain/drainage, or rash. Exposure history is largely unremarkable: he is currently  single, lives alone, recently visited [place] but denies other travel (including international), no pets, no new sexual contacts, and no new hobbies (does garden).   Interval History:   -Afebrile   -Last fever 7/9 @ 0500 101.3 F  -ANC 0, w/ hemoglobin of 7 and platelet count of 4,000.     Review of Systems   Constitutional: Positive for fatigue. Negative for chills and fever.   HENT: Positive for mouth sores. Negative for rhinorrhea and sore throat.    Eyes: Negative for photophobia and visual disturbance.   Respiratory: Positive for cough. Negative for shortness of breath and wheezing.    Gastrointestinal: Negative for abdominal pain and diarrhea.   Genitourinary: Negative for dysuria.   Musculoskeletal: Negative for arthralgias and myalgias.   Skin: Negative for rash.   Allergic/Immunologic: Positive for immunocompromised state.   Neurological: Negative for headaches.   Hematological: Bruises/bleeds easily.   Psychiatric/Behavioral: Negative for confusion.     Objective:     Vital Signs (Most Recent):  Temp: 98.3 °F (36.8 °C) (07/11/17 1137)  Pulse: 102 (07/11/17 1137)  Resp: 16 (07/11/17 1137)  BP: (!) 117/59 (07/11/17 1137)  SpO2: (!) 94 % (07/11/17 1137) Vital Signs (24h Range):  Temp:  [98.3 °F (36.8 °C)-99.3 °F (37.4 °C)] 98.3 °F (36.8 °C)  Pulse:  [] 102  Resp:  [16-20] 16  SpO2:  [93 %-96 %] 94 %  BP: (116-130)/(59-74) 117/59     Weight: 86.3 kg (190 lb 2.4 oz)  Body mass index is 25.09 kg/m².    Estimated Creatinine Clearance: 106 mL/min (based on Cr of 0.9).    Physical Exam   Constitutional: He appears well-developed. He appears cachectic.   Up and walking in room   HENT:   Head: Normocephalic and atraumatic.   Eyes: EOM are normal. Pupils are equal, round, and reactive to light.   Neck: Normal range of motion. Neck supple.   Cardiovascular: Regular rhythm.  Tachycardia present.    No murmur heard.  Pulmonary/Chest: Effort normal and breath sounds normal. He has no wheezes. He has no rales.    Abdominal: Soft. Bowel sounds are normal.   Musculoskeletal: Normal range of motion.   Neurological: He is alert.   Skin: Skin is warm and dry.   Left subclavian port w/o tenderness or erythema     Significant Labs:   CBC:     Recent Labs  Lab 07/10/17  0425 07/11/17  0420   WBC 0.05*  0.05* 0.04*   HGB 6.3*  6.3* 7.0*   HCT 18.0*  18.0* 19.5*   PLT 12*  12* 4*     CMP:     Recent Labs  Lab 07/10/17  0425 07/11/17  0420     140 139   K 3.4*  3.4* 3.0*   *  112* 111*   CO2 21*  21* 22*   GLU 94  94 101   BUN 17  17 16   CREATININE 0.9  0.9 0.9   CALCIUM 7.6*  7.6* 7.4*   PROT 4.8*  4.8* 4.8*   ALBUMIN 1.8*  1.8* 1.9*   BILITOT 1.6*  1.6* 1.4*   ALKPHOS 121  121 153*   AST 19  19 17   ALT 12  12 13   ANIONGAP 7*  7* 6*   EGFRNONAA >60.0  >60.0 >60.0     Microbiology Results (last 7 days)     Procedure Component Value Units Date/Time    Blood culture [[id number]] Collected:  07/09/17 0640    Order Status:  Completed Specimen:  Blood Updated:  07/11/17 1012     Blood Culture, Routine No Growth to date     Blood Culture, Routine No Growth to date     Blood Culture, Routine No Growth to date    Narrative:       peripheral    Blood culture [[id number]] Collected:  07/09/17 0638    Order Status:  Completed Specimen:  Blood Updated:  07/11/17 1012     Blood Culture, Routine No Growth to date     Blood Culture, Routine No Growth to date     Blood Culture, Routine No Growth to date    Narrative:       peripheral    Blood culture [[id number]] Collected:  07/07/17 0601    Order Status:  Completed Specimen:  Blood Updated:  07/11/17 0612     Blood Culture, Routine No Growth to date     Blood Culture, Routine No Growth to date     Blood Culture, Routine No Growth to date     Blood Culture, Routine No Growth to date    Blood culture [[id number]] Collected:  07/07/17 0601    Order Status:  Completed Specimen:  Blood Updated:  07/11/17 0612     Blood Culture, Routine No Growth to date     Blood Culture,  Routine No Growth to date     Blood Culture, Routine No Growth to date     Blood Culture, Routine No Growth to date    Urine culture [[id number]] Collected:  07/09/17 0618    Order Status:  Completed Specimen:  Urine from Urine, Clean Catch Updated:  07/10/17 1251     Urine Culture, Routine No growth    Respiratory Viral Panel by PCR Nasal Swab [[id number]] Collected:  07/10/17 1231    Order Status:  Sent Specimen:  Respiratory Updated:  07/10/17 1246    Blood culture [[id number]] Collected:  07/04/17 0151    Order Status:  Completed Specimen:  Blood Updated:  07/09/17 0612     Blood Culture, Routine No growth after 5 days.    Blood culture [[id number]] Collected:  07/04/17 0151    Order Status:  Completed Specimen:  Blood Updated:  07/09/17 0612     Blood Culture, Routine No growth after 5 days.    Urine culture [[id number]] Collected:  07/07/17 0648    Order Status:  Completed Specimen:  Urine from Urine, Clean Catch Updated:  07/08/17 1145     Urine Culture, Routine No growth    Cryptococcal antigen [[id number]] Collected:  07/07/17 1047    Order Status:  Completed Specimen:  Blood from Blood Updated:  07/07/17 1333     Cryptococcal Ag, Blood Negative    Urine culture [[id number]] Collected:  07/04/17 0146    Order Status:  Completed Specimen:  Urine from Urine, Clean Catch Updated:  07/05/17 0748     Urine Culture, Routine No growth

## 2017-07-12 NOTE — PROGRESS NOTES
[facility]  Hematology  Bone Marrow Transplant  Progress Note    Patient Name: Paul E Sistrunk  Admission Date: 6/20/2017  Hospital Length of Stay: 22 days  Code Status: Full Code    Subjective:     Interval History:  - No acute issues overnight. VSS Afebrile x 72 hours  - Complains of left jaw pain and right rib pain relieved by current pain regimen  - NPO for BAL today; continue vanc, cefepime and ambisome until after BAL per ID    Objective:     Vital Signs (Most Recent):  Temp: 98.4 °F (36.9 °C) (07/12/17 0806)  Pulse: 109 (07/12/17 0745)  Resp: 17 (07/12/17 0742)  BP: (!) 124/56 (07/12/17 0806)  SpO2: (!) 90 % (07/12/17 0745) Vital Signs (24h Range):  Temp:  [98.3 °F (36.8 °C)-99.5 °F (37.5 °C)] 98.4 °F (36.9 °C)  Pulse:  [] 109  Resp:  [16-20] 17  SpO2:  [90 %-96 %] 90 %  BP: (117-149)/(56-80) 124/56     Weight: 85.2 kg (187 lb 14.4 oz)  Body mass index is 24.79 kg/m².  Body surface area is 2.09 meters squared.    ECOG SCORE         [unfilled]    Intake/Output - Last 3 Shifts       07/10 0700 - 07/11 0659 07/11 0700 - 07/12 0659 07/12 0700 - 07/13 0659    P.O. 1200 880     I.V. (mL/kg) 2299.2 (26.7) 2602.1 (30.5)     Blood 365 437.8 250    IV Piggyback 2050 1800     Total Intake(mL/kg) 5914.2 (68.6) 5719.9 (67.1) 250 (2.9)    Urine (mL/kg/hr) 5025 (2.4) 5100 (2.5)     Stool  0 (0)     Total Output 5025 5100      Net +889.2 +619.9 +250           Stool Occurrence  2 x           Physical Exam   Constitutional: He is oriented to person, place, and time. He appears well-developed and well-nourished. No distress.   HENT:   Head: Normocephalic.   Right Ear: External ear normal.   Left Ear: External ear normal.   Nose: Nose normal.   Mouth/Throat: Oropharynx is clear and moist and mucous membranes are normal. No oropharyngeal exudate.   Eyes: Conjunctivae and EOM are normal. Pupils are equal, round, and reactive to light. No scleral icterus.   Cardiovascular: Normal rate, regular rhythm and  normal heart sounds.    Pulmonary/Chest: Effort normal and breath sounds normal.   Abdominal: Soft. Normal appearance and bowel sounds are normal. He exhibits no distension. There is no tenderness.   Musculoskeletal: He exhibits no edema.   Neurological: He is alert and oriented to person, place, and time.   Skin: Skin is warm, dry and intact. No cyanosis. Nails show no clubbing.   Central line and dressing clean, dry and intact   Psychiatric: He has a normal mood and affect. His behavior is normal. Thought content normal. His mood appears not anxious.   Vitals reviewed.      Significant Labs:   CBC:   Recent Labs  Lab 07/11/17 0420 07/12/17  0416 07/12/17  0627 07/12/17  0717   WBC 0.04* 0.07* 0.04* 0.06*   HGB 7.0* 6.4* 6.1* 5.6*   HCT 19.5* 18.3* 17.8* 16.1*   PLT 4* 27*  --   --    , CMP:   Recent Labs  Lab 07/11/17 0420 07/11/17  1245 07/12/17  0625    140 138   K 3.0* 3.2* 3.6   * 113* 110   CO2 22* 22* 23    102 111*   BUN 16 15 12   CREATININE 0.9 0.9 0.8   CALCIUM 7.4* 7.6* 7.1*   PROT 4.8*  --  4.5*   ALBUMIN 1.9*  --  1.7*   BILITOT 1.4*  --  1.0   ALKPHOS 153*  --  185*   AST 17  --  13   ALT 13  --  12   ANIONGAP 6* 5* 5*   EGFRNONAA >60.0 >60.0 >60.0   ,   Recent Lab Results       07/12/17 0717 07/12/17  0627 07/12/17  0625 07/12/17  0416 07/11/17  1245      Unit Blood Type Code    9500[P]          5100[P]          9500[P]      Unit Expiration    [id number][P]          [id number][P]          [id number][P]      Unit Blood Type    O NEG[P]          O POS[P]          O NEG[P]      Albumin   1.7(L)       Alkaline Phosphatase   185(H)       ALT   12       Anion Gap   5(L)  5(L)     AST   13       Baso #    Test Not Performed  Comment:  Corrected result; previously reported as NO READ on 07/12/2017 at   05:25.  [C]      Basophil%    0.0  Comment:  Corrected result; previously reported as NO READ on 07/12/2017 at   05:25.  [C]      Total Bilirubin   1.0  Comment:  For infants and  newborns, interpretation of results should be based  on gestational age, weight and in agreement with clinical  observations.  Premature Infant recommended reference ranges:  Up to 24 hours.............<8.0 mg/dL  Up to 48 hours............<12.0 mg/dL  3-5 days..................<15.0 mg/dL  6-29 days.................<15.0 mg/dL         BUN, Bld   12  15     Calcium   7.1(L)  7.6(L)     Chloride   110  113(H)     CO2   23  22(L)     CODING SYSTEM    PLHA452[P]          PAWK288[P]          XXYB176[P]      Creatinine   0.8  0.9     Differential Method    Manual  Comment:  Corrected result; previously reported as Automated on 07/12/2017 at   05:25.  [C]      DISPENSE STATUS    ISSUED[P]          ISSUED[P]          ISSUED[P]      eGFR if    >60.0  >60.0     eGFR if non    >60.0  Comment:  Calculation used to obtain the estimated glomerular filtration  rate (eGFR) is the CKD-EPI equation. Since race is unknown   in our information system, the eGFR values for   -American and Non--American patients are given   for each creatinine result.    >60.0  Comment:  Calculation used to obtain the estimated glomerular filtration  rate (eGFR) is the CKD-EPI equation. Since race is unknown   in our information system, the eGFR values for   -American and Non--American patients are given   for each creatinine result.       Eos #    Test Not Performed  Comment:  Corrected result; previously reported as NO READ on 07/12/2017 at   05:25.  [C]      Eosinophil%    0.0  Comment:  Corrected result; previously reported as NO READ on 07/12/2017 at   05:25.  [C]      Glucose   111(H)  102     Gran #    Test Not Performed  Comment:  Corrected result; previously reported as NO READ on 07/12/2017 at   05:25.  [C]      Gran%    50.0  Comment:  Corrected result; previously reported as NO READ on 07/12/2017 at   05:25.  [C]      Group & Rh    O POS      Hematocrit 16.1  Comment:  wbc hgb and  hct critical result(s) called and verbal readback   obtained from monserrat ramos rn, 07/12/2017 09:36  (LL) 17.8  Comment:  wbc hgb and hct critical result(s) called and verbal readback   obtained from monserrat ramos rn, 07/12/2017 09:38  (LL)  18.3  Comment:  WBC HGB HCT & PRELIM PLT critical result(s) called and verbal   readback obtained from BRANDIE CHÁVEZ RN, 07/12/2017 05:25  (LL)      Hemoglobin 5.6  Comment:  wbc hgb and hct critical result(s) called and verbal readback   obtained from monserrat ramos rn, 07/12/2017 09:36  (LL) 6.1  Comment:  wbc hgb and hct critical result(s) called and verbal readback   obtained from monserrat ramos rn, 07/12/2017 09:38  (L)  6.4(L)      INDIRECT ANDREA    NEG      Lymph #    Test Not Performed  Comment:  Corrected result; previously reported as NO READ on 07/12/2017 at   05:25.  [C]      Lymph%    50.0  Comment:  Corrected result; previously reported as NO READ on 07/12/2017 at   05:25.  (H)[C]      Magnesium   1.7  1.5(L)     MCH 30.8 30.0  30.9      MCHC 34.8 34.3  35.0      MCV 89 88  88      Mono #    Test Not Performed  Comment:  Corrected result; previously reported as NO READ on 07/12/2017 at   05:25.  [C]      Mono%    0.0  Comment:  Corrected result; previously reported as NO READ on 07/12/2017 at   05:25.  (L)[C]      MPV 10.0 9.8  11.7      Phosphorus   2.0(L)  1.9(L)     Platelet Estimate    Decreased(A)      Platelets    27  Comment:  critical result(s) called and verbal readback obtained from   PLATELET COUNT CALLED TO BRANDIE CHÁVEZ RN, 07/12/2017 07:43  (LL)      Potassium   3.6  3.2(L)     PRODUCT CODE    L5670D37[P]          Q7350H50[P]          [id number][P]      Total Protein   4.5(L)       RBC 1.82(L) 2.03(L)  2.07(L)      RDW 14.8(H) 14.9(H)  14.9(H)      Sodium   138  140     UNIT NUMBER    [id number][P]          [id number][P]          [id number][P]      WBC 0.06  Comment:  wbc hgb and hct critical result(s) called and verbal readback   obtained  from monserrat ramos rn, 07/12/2017 09:36  (LL) 0.04  Comment:  wbc hgb and hct critical result(s) called and verbal readback   obtained from monserrat ramos rn, 07/12/2017 09:38  (LL)  0.07  Comment:  WBC Previously reported results for this analyte were similarly   abnormal.  Call not needed.  Documented, 07/12/2017 05:24  (LL)                   and All pertinent labs from the last 24 hours have been reviewed.    Diagnostic Results:  I have reviewed all pertinent imaging results/findings within the past 24 hours.    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 Blast of 62%  - awaiting mutation analysis and cytogenetics; FLT-3 negative     - Jaw pain and pain in right cranial nerve 7 distribution with persistent, severe headaches concerning for CNS involvement - underwent IT chemotherapy on 6/23. Flow negative for disease. CSF LDH was 29.  - Had previous reaction to MEC (etoposide caused full body rash). Did not qualify for the Tolero trial due to receiving IT chemotherapy    - Day 16 of FLAG-BETZY   - Day 14 bone marrow biopsy done 7/10 - results pending         S/P allogeneic bone marrow transplant    - 303 days post Flu/Bu/ATG MUD allogeneic transplant for high risk AML after his second IDAC (6/20/16 - 6/24/16) after a prolonged hospitalization (2/5/16 - 3/21/16) for induction with 7&3 and reinduction with MEC to remission and IDAC (4/4/16 - 4/9/16).   - continue ppx acyclovir, renally dosed. We have discussed antifungal coverage with infectious disease. Discontinued itraconzaole (7/09) and started amphotericin.   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. Pt now off  steroids.  - chimerics from marrow done 6/21 show CD3 of 90% donor and 10% recipient, but CD34 of 5% donor and 95% recipient   - repeat chimerics pending from marrow done 7/10         HIV disease    - CD4 low at 11.4%, Absolute CD4 469 and HIV RNA not detected from 6/21  - As per ID: continue triumeq        Neutropenic fever    - blood cultures no growth from 6/24/17 and 7/4/17, CXR unremarkable.   - on Cefepime (started 6/24 - discontinued 6/28) restarted on 7/4/17   - Pt currently only on prophylactic abx--Bactrim, acyclovir.   - Cefepime day 9; vancomycin day 4   - repeated blood and urine cultures 7/9/17 - NGTD  - CT chest/abdomen/pelvis reveals bilateral groundglass opacities as well as a left lower-lobe consolidation.   - started on ambisome 7/9 per ID recommendations  - consulted pulm for a BAL will be done 7/12 or 7/13 - platelets need to be transfused before procedure   - fungitell pending, aspergillus negative and quantiferon gold results show indeterminate   - per ID will consider transitioning to cipro if patient remains afebrile         Pancytopenia due to antineoplastic chemotherapy    - white blood cell count is 0.07 k/uL; absolute neutrophil count is 35 cells/uL  - hemoglobin 6.4 g/dL; platelet count is 27 k/uL  - transfuse for hemoglobin < 7 g/dL, platelets < 10 k/uL  - 1 unit of platelets before BAL            Prostate hypertrophy    - Continue home flomax        Facial numbness    - evaluated by Neurology  - MRI with no evidence of  acute intracranial pathology.  - thought to be secondary to drug induced peripheral neuropathy?  - Neurology rec Vitamin E, will hold for now given side effects of thrombocytopenia and bleeding          Chronic bilateral low back pain without sciatica    - related to Neupogen  - started on zyrtec, lidocaine patch and Flexeril  - pain has improved. Continue to monitor.        Electrolyte abnormality    - ordered prn electrolyte replacement per protocol   -   hypophosphatemia on 7/12  - monitor closely due to ambisome         Anxiety    - PRN Xanax            VTE Risk Mitigation         Ordered     Place sequential compression device  Until discontinued      06/20/17 2005     High Risk of VTE  Once      06/20/17 1901          Disposition: Pending results of BMBx and count recovery.     Asia Melgar, NP  Bone Marrow Transplant  [facility]

## 2017-07-12 NOTE — ASSESSMENT & PLAN NOTE
Will perform bronchoscopy with BAL today pending platelet count.  Will send fluid for culture.  Continue antibiotics.

## 2017-07-12 NOTE — SUBJECTIVE & OBJECTIVE
Subjective:   HPI:  Mr. Paul E Sistrunk is a 56 year old male with relapsing AML, HIV, anxiety, that presented to the hospital on 6/20/2017 for induction for relapsing AML.  7/8/2017 patient became febrile to 101.3 which was documented in the medical record.  He has been on vancomycin, cefepime and amphotericin. He reports that he has had a dry cough that has resulted in chest wall soreness.  He has since been afebrile with intermittent tachycardia but otherwise normal vital signs.  Blood cultures and urine cultures have been no growth to date.  Cryptococcus has been negative. Pulmonology was consulted for bronchoscopy for further evaluation of possible infectious etiology of he fever.    Interval History:   Patient complained of pain overnight in back and jaw.  NPO at midnight for bronchoscopy.  Received 1 pheresis of platelets this AM as well as having a unit of pRBCs hanging.      Objective:     Vital Signs (Most Recent):  Temp: 98.4 °F (36.9 °C) (07/12/17 0806)  Pulse: 109 (07/12/17 0745)  Resp: 17 (07/12/17 0742)  BP: (!) 124/56 (07/12/17 0806)  SpO2: (!) 90 % (07/12/17 0745) Vital Signs (24h Range):  Temp:  [98.3 °F (36.8 °C)-99.5 °F (37.5 °C)] 98.4 °F (36.9 °C)  Pulse:  [] 109  Resp:  [16-20] 17  SpO2:  [90 %-96 %] 90 %  BP: (117-149)/(56-80) 124/56     Weight: 85.2 kg (187 lb 14.4 oz)  Body mass index is 24.79 kg/m².      Intake/Output Summary (Last 24 hours) at 07/12/17 0820  Last data filed at 07/12/17 0738   Gross per 24 hour   Intake          5532.08 ml   Output             5100 ml   Net           432.08 ml       Physical Exam   Constitutional: He is oriented to person, place, and time. He appears well-developed and well-nourished. No distress.   HENT:   Head: Normocephalic and atraumatic.   Eyes: EOM are normal. Pupils are equal, round, and reactive to light.   Neck: Normal range of motion. Neck supple. No JVD present.   Cardiovascular: Regular rhythm, normal heart sounds and intact distal  pulses.  Exam reveals no gallop and no friction rub.    No murmur heard.  Pulmonary/Chest: Effort normal. No stridor. No respiratory distress. He has no wheezes. He has no rales.   Breath sounds CTA  Bilaterally, desatting to 88% while in recumbent position, did increase to 93% with deep inspiration   Abdominal: Soft. He exhibits no distension and no mass. There is no tenderness.   Musculoskeletal: Normal range of motion. He exhibits no edema.   Neurological: He is alert and oriented to person, place, and time. No cranial nerve deficit. Coordination normal.   Skin: Skin is warm and dry. Capillary refill takes less than 2 seconds.   Psychiatric: He has a normal mood and affect. His behavior is normal. Judgment and thought content normal.       Lines/Drains/Airways     Central Venous Catheter Line                 Tunneled Central Line Insertion/Assessment - Double Lumen  06/21/17 1519 left subclavian 20 days                Significant Labs:    CBC/Anemia Profile:    Recent Labs  Lab 07/11/17  0420 07/12/17  0416   WBC 0.04* 0.07*   HGB 7.0* 6.4*   HCT 19.5* 18.3*   PLT 4* 27*   MCV 87 88   RDW 14.9* 14.9*        Chemistries:    Recent Labs  Lab 07/11/17  0420 07/11/17  1245 07/12/17  0625    140 138   K 3.0* 3.2* 3.6   * 113* 110   CO2 22* 22* 23   BUN 16 15 12   CREATININE 0.9 0.9 0.8   CALCIUM 7.4* 7.6* 7.1*   ALBUMIN 1.9*  --  1.7*   PROT 4.8*  --  4.5*   BILITOT 1.4*  --  1.0   ALKPHOS 153*  --  185*   ALT 13  --  12   AST 17  --  13   MG 1.4* 1.5* 1.7   PHOS 1.6* 1.9* 2.0*       All pertinent labs within the past 24 hours have been reviewed.    Significant Imaging:  I have reviewed and interpreted all pertinent imaging results/findings within the past 24 hours.

## 2017-07-12 NOTE — PROGRESS NOTES
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 Differential for pneumonia includes: IFI (including breakthrough Aspergillosis along with other molds and previously diagnosed histoplasmosis), bacterial pneumonia (including atypical pathogens), community-acquired viral infections, and PCP (prophylaxis stopped in 1/2017 but his CD4 percentage was quite low recently despite a high absolute count). He is stable despite these findings.    - continue empiric vanc/cefepime for now for bacterial coverage (urine legionella antigen pending). Awaiting BAL culture and cytology for GMS to exclude PCP.  - continue ambisome 5mg/kg IV q24h (with associated timed saline boluses and premedications) for possible IFI and continue to closely monitor electrolytes (K, Mg) and CrCl on therapy (fungitell, urine  histo/blasto, RVP and quantiferon gold pending)           Thank you for your consult. I will follow-up with patient. Please contact us if you have any additional questions.    Kadeem Whiteside,   Infectious Disease  [facility]    Subjective:     Principal Problem:AML (acute myeloid leukemia) in relapse    HPI: Mr. 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 ID has been consulted today for neutropenic fevers that have persisted the last few days despite empiric vanc, cefepime, chronic itraconazole, and acyclovir prophylaxis. Patient notes that prior to diagnosis of relapse in clinic, he was not having F/C/S or other localizing infectious symptoms -- only malaise and acute onset BLE body aches just prior to diagnosis. Since admission he notes neutropenic F/C/S and dry cough but denies HA, URI symptoms, sore throat, dysphagia, sputum production, SOB, N/V, abdominal pain, diarrhea, dysuria, genital lesions, line pain/drainage, or rash. Exposure history is largely unremarkable: he is currently single, lives alone, recently visited [place] but denies other travel (including international), no pets, no new sexual contacts, and no new hobbies (does garden).   Interval History:   -Afebrile   -Last fever 7/9 @  0500 101.3 F  -ANC 0, w/ hemoglobin of 7 and platelet count of 31,000.   -Bronchoscopy this AM w/ BAL  -Both aspergillus and cryptococcus negative    Review of Systems   Constitutional: Positive for fatigue. Negative for chills and fever.   HENT: Positive for mouth sores. Negative for rhinorrhea and sore throat.    Eyes: Negative for photophobia and visual disturbance.   Respiratory: Positive for cough. Negative for shortness of breath and wheezing.    Gastrointestinal: Negative for abdominal pain and diarrhea.   Genitourinary: Negative for dysuria.   Musculoskeletal: Negative for arthralgias and myalgias.   Skin: Negative for rash.   Allergic/Immunologic: Positive for immunocompromised state.   Neurological: Negative for headaches.   Hematological: Bruises/bleeds easily.   Psychiatric/Behavioral: Negative for confusion.     Objective:     Vital Signs (Most Recent):  Temp: 98.3 °F (36.8 °C) (07/12/17 1135)  Pulse: 109 (07/12/17 1137)  Resp: 20 (07/12/17 1135)  BP: 133/64 (07/12/17 1136)  SpO2: (!) 94 % (07/12/17 1428) Vital Signs (24h Range):  Temp:  [98.3 °F (36.8 °C)-99.5 °F (37.5 °C)] 98.3 °F (36.8 °C)  Pulse:  [] 109  Resp:  [14-21] 20  SpO2:  [73 %-97 %] 94 %  BP: (119-166)/(56-80) 133/64     Weight: 85.2 kg (187 lb 13.3 oz)  Body mass index is 24.79 kg/m².    Estimated Creatinine Clearance: 119.3 mL/min (based on Cr of 0.8).    Physical Exam   Constitutional: He appears well-developed. He appears cachectic.   Up and walking in room   HENT:   Head: Normocephalic and atraumatic.   Eyes: EOM are normal. Pupils are equal, round, and reactive to light.   Neck: Normal range of motion. Neck supple.   Cardiovascular: Regular rhythm.  Tachycardia present.    No murmur heard.  Pulmonary/Chest: Effort normal and breath sounds normal. He has no wheezes. He has no rales.   Abdominal: Soft. Bowel sounds are normal.   Musculoskeletal: Normal range of motion.   Neurological: He is alert.   Skin: Skin is warm and dry.    Left subclavian port w/o tenderness or erythema     Significant Labs:   CBC:     Recent Labs  Lab 07/12/17  0416 07/12/17  0627 07/12/17  0717   WBC 0.07* 0.04* 0.06*   HGB 6.4* 6.1* 5.6*   HCT 18.3* 17.8* 16.1*   PLT 27* 15* 31*     CMP:     Recent Labs  Lab 07/11/17  0420 07/11/17  1245 07/12/17  0625    140 138   K 3.0* 3.2* 3.6   * 113* 110   CO2 22* 22* 23    102 111*   BUN 16 15 12   CREATININE 0.9 0.9 0.8   CALCIUM 7.4* 7.6* 7.1*   PROT 4.8*  --  4.5*   ALBUMIN 1.9*  --  1.7*   BILITOT 1.4*  --  1.0   ALKPHOS 153*  --  185*   AST 17  --  13   ALT 13  --  12   ANIONGAP 6* 5* 5*   EGFRNONAA >60.0 >60.0 >60.0     Microbiology Results (last 7 days)     Procedure Component Value Units Date/Time    Culture, Respiratory [[id number]] Collected:  07/12/17 1057    Order Status:  Completed Specimen:  Respiratory from Bronchial Wash Updated:  07/12/17 1226     Gram Stain (Respiratory) No organisms seen     Gram Stain (Respiratory) No WBC's    Narrative:       Bronchial Wash    NENA prep [[id number]] Collected:  07/12/17 1058    Order Status:  Completed Specimen:  Bronchial Alveolar Lavage from Lung, LLL Updated:  07/12/17 1210     KOH Prep No yeast or fungal elements seen    Narrative:       Bronchial Wash    Gram stain [[id number]] Collected:  07/12/17 1058    Order Status:  Canceled Specimen:  Bronchial Alveolar Lavage from Lung, LLL Updated:  07/12/17 1138    AFB Culture & Smear [[id number]] Collected:  07/12/17 1058    Order Status:  Sent Specimen:  Bronchial Alveolar Lavage from Lung, LLL Updated:  07/12/17 1138    Fungus culture [[id number]] Collected:  07/12/17 1058    Order Status:  Sent Specimen:  Bronchial Alveolar Lavage from Lung, LLL Updated:  07/12/17 1137    Blood culture [[id number]] Collected:  07/09/17 0640    Order Status:  Completed Specimen:  Blood Updated:  07/12/17 1012     Blood Culture, Routine No Growth to date     Blood Culture, Routine No Growth to date     Blood Culture, Routine  No Growth to date     Blood Culture, Routine No Growth to date    Narrative:       peripheral    Blood culture [[id number]] Collected:  07/09/17 0638    Order Status:  Completed Specimen:  Blood Updated:  07/12/17 1012     Blood Culture, Routine No Growth to date     Blood Culture, Routine No Growth to date     Blood Culture, Routine No Growth to date     Blood Culture, Routine No Growth to date    Narrative:       peripheral    Blood culture [[id number]] Collected:  07/07/17 0601    Order Status:  Completed Specimen:  Blood Updated:  07/12/17 0612     Blood Culture, Routine No Growth to date     Blood Culture, Routine No Growth to date     Blood Culture, Routine No Growth to date     Blood Culture, Routine No Growth to date     Blood Culture, Routine No Growth to date    Blood culture [[id number]] Collected:  07/07/17 0601    Order Status:  Completed Specimen:  Blood Updated:  07/12/17 0612     Blood Culture, Routine No Growth to date     Blood Culture, Routine No Growth to date     Blood Culture, Routine No Growth to date     Blood Culture, Routine No Growth to date     Blood Culture, Routine No Growth to date    Urine culture [[id number]] Collected:  07/09/17 0618    Order Status:  Completed Specimen:  Urine from Urine, Clean Catch Updated:  07/10/17 1251     Urine Culture, Routine No growth    Respiratory Viral Panel by PCR Nasal Swab [[id number]] Collected:  07/10/17 1231    Order Status:  Sent Specimen:  Respiratory Updated:  07/10/17 1246    Blood culture [[id number]] Collected:  07/04/17 0151    Order Status:  Completed Specimen:  Blood Updated:  07/09/17 0612     Blood Culture, Routine No growth after 5 days.    Blood culture [[id number]] Collected:  07/04/17 0151    Order Status:  Completed Specimen:  Blood Updated:  07/09/17 0612     Blood Culture, Routine No growth after 5 days.    Urine culture [[id number]] Collected:  07/07/17 0648    Order Status:  Completed Specimen:  Urine from Urine, Clean Catch Updated:   07/08/17 1145     Urine Culture, Routine No growth    Cryptococcal antigen [[id number]] Collected:  07/07/17 1047    Order Status:  Completed Specimen:  Blood from Blood Updated:  07/07/17 1333     Cryptococcal Ag, Blood Negative

## 2017-07-12 NOTE — SUBJECTIVE & OBJECTIVE
Interval History:   -Afebrile   -Last fever 7/9 @ 0500 101.3 F  -ANC 0, w/ hemoglobin of 7 and platelet count of 31,000.   -Bronchoscopy this AM w/ BAL  -Both aspergillus and cryptococcus negative    Review of Systems   Constitutional: Positive for fatigue. Negative for chills and fever.   HENT: Positive for mouth sores. Negative for rhinorrhea and sore throat.    Eyes: Negative for photophobia and visual disturbance.   Respiratory: Positive for cough. Negative for shortness of breath and wheezing.    Gastrointestinal: Negative for abdominal pain and diarrhea.   Genitourinary: Negative for dysuria.   Musculoskeletal: Negative for arthralgias and myalgias.   Skin: Negative for rash.   Allergic/Immunologic: Positive for immunocompromised state.   Neurological: Negative for headaches.   Hematological: Bruises/bleeds easily.   Psychiatric/Behavioral: Negative for confusion.     Objective:     Vital Signs (Most Recent):  Temp: 98.3 °F (36.8 °C) (07/12/17 1135)  Pulse: 109 (07/12/17 1137)  Resp: 20 (07/12/17 1135)  BP: 133/64 (07/12/17 1136)  SpO2: (!) 94 % (07/12/17 1428) Vital Signs (24h Range):  Temp:  [98.3 °F (36.8 °C)-99.5 °F (37.5 °C)] 98.3 °F (36.8 °C)  Pulse:  [] 109  Resp:  [14-21] 20  SpO2:  [73 %-97 %] 94 %  BP: (119-166)/(56-80) 133/64     Weight: 85.2 kg (187 lb 13.3 oz)  Body mass index is 24.79 kg/m².    Estimated Creatinine Clearance: 119.3 mL/min (based on Cr of 0.8).    Physical Exam   Constitutional: He appears well-developed. He appears cachectic.   Up and walking in room   HENT:   Head: Normocephalic and atraumatic.   Eyes: EOM are normal. Pupils are equal, round, and reactive to light.   Neck: Normal range of motion. Neck supple.   Cardiovascular: Regular rhythm.  Tachycardia present.    No murmur heard.  Pulmonary/Chest: Effort normal and breath sounds normal. He has no wheezes. He has no rales.   Abdominal: Soft. Bowel sounds are normal.   Musculoskeletal: Normal range of motion.    Neurological: He is alert.   Skin: Skin is warm and dry.   Left subclavian port w/o tenderness or erythema     Significant Labs:   CBC:     Recent Labs  Lab 07/12/17  0416 07/12/17  0627 07/12/17  0717   WBC 0.07* 0.04* 0.06*   HGB 6.4* 6.1* 5.6*   HCT 18.3* 17.8* 16.1*   PLT 27* 15* 31*     CMP:     Recent Labs  Lab 07/11/17  0420 07/11/17  1245 07/12/17  0625    140 138   K 3.0* 3.2* 3.6   * 113* 110   CO2 22* 22* 23    102 111*   BUN 16 15 12   CREATININE 0.9 0.9 0.8   CALCIUM 7.4* 7.6* 7.1*   PROT 4.8*  --  4.5*   ALBUMIN 1.9*  --  1.7*   BILITOT 1.4*  --  1.0   ALKPHOS 153*  --  185*   AST 17  --  13   ALT 13  --  12   ANIONGAP 6* 5* 5*   EGFRNONAA >60.0 >60.0 >60.0     Microbiology Results (last 7 days)     Procedure Component Value Units Date/Time    Culture, Respiratory [[id number]] Collected:  07/12/17 1057    Order Status:  Completed Specimen:  Respiratory from Bronchial Wash Updated:  07/12/17 1226     Gram Stain (Respiratory) No organisms seen     Gram Stain (Respiratory) No WBC's    Narrative:       Bronchial Wash    NENA prep [[id number]] Collected:  07/12/17 1058    Order Status:  Completed Specimen:  Bronchial Alveolar Lavage from Lung, LLL Updated:  07/12/17 1210     KOH Prep No yeast or fungal elements seen    Narrative:       Bronchial Wash    Gram stain [[id number]] Collected:  07/12/17 1058    Order Status:  Canceled Specimen:  Bronchial Alveolar Lavage from Lung, LLL Updated:  07/12/17 1138    AFB Culture & Smear [[id number]] Collected:  07/12/17 1058    Order Status:  Sent Specimen:  Bronchial Alveolar Lavage from Lung, LLL Updated:  07/12/17 1138    Fungus culture [[id number]] Collected:  07/12/17 1058    Order Status:  Sent Specimen:  Bronchial Alveolar Lavage from Lung, LLL Updated:  07/12/17 1137    Blood culture [[id number]] Collected:  07/09/17 0640    Order Status:  Completed Specimen:  Blood Updated:  07/12/17 1012     Blood Culture, Routine No Growth to date     Blood  Culture, Routine No Growth to date     Blood Culture, Routine No Growth to date     Blood Culture, Routine No Growth to date    Narrative:       peripheral    Blood culture [[id number]] Collected:  07/09/17 0638    Order Status:  Completed Specimen:  Blood Updated:  07/12/17 1012     Blood Culture, Routine No Growth to date     Blood Culture, Routine No Growth to date     Blood Culture, Routine No Growth to date     Blood Culture, Routine No Growth to date    Narrative:       peripheral    Blood culture [[id number]] Collected:  07/07/17 0601    Order Status:  Completed Specimen:  Blood Updated:  07/12/17 0612     Blood Culture, Routine No Growth to date     Blood Culture, Routine No Growth to date     Blood Culture, Routine No Growth to date     Blood Culture, Routine No Growth to date     Blood Culture, Routine No Growth to date    Blood culture [[id number]] Collected:  07/07/17 0601    Order Status:  Completed Specimen:  Blood Updated:  07/12/17 0612     Blood Culture, Routine No Growth to date     Blood Culture, Routine No Growth to date     Blood Culture, Routine No Growth to date     Blood Culture, Routine No Growth to date     Blood Culture, Routine No Growth to date    Urine culture [[id number]] Collected:  07/09/17 0618    Order Status:  Completed Specimen:  Urine from Urine, Clean Catch Updated:  07/10/17 1251     Urine Culture, Routine No growth    Respiratory Viral Panel by PCR Nasal Swab [[id number]] Collected:  07/10/17 1231    Order Status:  Sent Specimen:  Respiratory Updated:  07/10/17 1246    Blood culture [[id number]] Collected:  07/04/17 0151    Order Status:  Completed Specimen:  Blood Updated:  07/09/17 0612     Blood Culture, Routine No growth after 5 days.    Blood culture [[id number]] Collected:  07/04/17 0151    Order Status:  Completed Specimen:  Blood Updated:  07/09/17 0612     Blood Culture, Routine No growth after 5 days.    Urine culture [[id number]] Collected:  07/07/17 0648    Order Status:   Completed Specimen:  Urine from Urine, Clean Catch Updated:  07/08/17 1145     Urine Culture, Routine No growth    Cryptococcal antigen [[id number]] Collected:  07/07/17 1047    Order Status:  Completed Specimen:  Blood from Blood Updated:  07/07/17 1333     Cryptococcal Ag, Blood Negative

## 2017-07-12 NOTE — SUBJECTIVE & OBJECTIVE
Subjective:     Interval History:  - No acute issues overnight. VSS Afebrile x 72 hours  - Complains of left jaw pain and right rib pain relieved by current pain regimen  - NPO for BAL today; continue vanc, cefepime and ambisome until after BAL per ID    Objective:     Vital Signs (Most Recent):  Temp: 98.4 °F (36.9 °C) (07/12/17 0806)  Pulse: 109 (07/12/17 0745)  Resp: 17 (07/12/17 0742)  BP: (!) 124/56 (07/12/17 0806)  SpO2: (!) 90 % (07/12/17 0745) Vital Signs (24h Range):  Temp:  [98.3 °F (36.8 °C)-99.5 °F (37.5 °C)] 98.4 °F (36.9 °C)  Pulse:  [] 109  Resp:  [16-20] 17  SpO2:  [90 %-96 %] 90 %  BP: (117-149)/(56-80) 124/56     Weight: 85.2 kg (187 lb 14.4 oz)  Body mass index is 24.79 kg/m².  Body surface area is 2.09 meters squared.    ECOG SCORE         [unfilled]    Intake/Output - Last 3 Shifts       07/10 0700 - 07/11 0659 07/11 0700 - 07/12 0659 07/12 0700 - 07/13 0659    P.O. 1200 880     I.V. (mL/kg) 2299.2 (26.7) 2602.1 (30.5)     Blood 365 437.8 250    IV Piggyback 2050 1800     Total Intake(mL/kg) 5914.2 (68.6) 5719.9 (67.1) 250 (2.9)    Urine (mL/kg/hr) 5025 (2.4) 5100 (2.5)     Stool  0 (0)     Total Output 5025 5100      Net +889.2 +619.9 +250           Stool Occurrence  2 x           Physical Exam   Constitutional: He is oriented to person, place, and time. He appears well-developed and well-nourished. No distress.   HENT:   Head: Normocephalic.   Right Ear: External ear normal.   Left Ear: External ear normal.   Nose: Nose normal.   Mouth/Throat: Oropharynx is clear and moist and mucous membranes are normal. No oropharyngeal exudate.   Eyes: Conjunctivae and EOM are normal. Pupils are equal, round, and reactive to light. No scleral icterus.   Cardiovascular: Normal rate, regular rhythm and normal heart sounds.    Pulmonary/Chest: Effort normal and breath sounds normal.   Abdominal: Soft. Normal appearance and bowel sounds are normal. He exhibits no distension. There is no tenderness.    Musculoskeletal: He exhibits no edema.   Neurological: He is alert and oriented to person, place, and time.   Skin: Skin is warm, dry and intact. No cyanosis. Nails show no clubbing.   Central line and dressing clean, dry and intact   Psychiatric: He has a normal mood and affect. His behavior is normal. Thought content normal. His mood appears not anxious.   Vitals reviewed.      Significant Labs:   CBC:   Recent Labs  Lab 07/11/17 0420 07/12/17 0416 07/12/17  0627 07/12/17  0717   WBC 0.04* 0.07* 0.04* 0.06*   HGB 7.0* 6.4* 6.1* 5.6*   HCT 19.5* 18.3* 17.8* 16.1*   PLT 4* 27*  --   --    , CMP:   Recent Labs  Lab 07/11/17 0420 07/11/17  1245 07/12/17  0625    140 138   K 3.0* 3.2* 3.6   * 113* 110   CO2 22* 22* 23    102 111*   BUN 16 15 12   CREATININE 0.9 0.9 0.8   CALCIUM 7.4* 7.6* 7.1*   PROT 4.8*  --  4.5*   ALBUMIN 1.9*  --  1.7*   BILITOT 1.4*  --  1.0   ALKPHOS 153*  --  185*   AST 17  --  13   ALT 13  --  12   ANIONGAP 6* 5* 5*   EGFRNONAA >60.0 >60.0 >60.0   ,   Recent Lab Results       07/12/17 0717 07/12/17 0627 07/12/17  0625 07/12/17 0416 07/11/17  1245      Unit Blood Type Code    9500[P]          5100[P]          9500[P]      Unit Expiration    [id number][P]          [id number][P]          [id number][P]      Unit Blood Type    O NEG[P]          O POS[P]          O NEG[P]      Albumin   1.7(L)       Alkaline Phosphatase   185(H)       ALT   12       Anion Gap   5(L)  5(L)     AST   13       Baso #    Test Not Performed  Comment:  Corrected result; previously reported as NO READ on 07/12/2017 at   05:25.  [C]      Basophil%    0.0  Comment:  Corrected result; previously reported as NO READ on 07/12/2017 at   05:25.  [C]      Total Bilirubin   1.0  Comment:  For infants and newborns, interpretation of results should be based  on gestational age, weight and in agreement with clinical  observations.  Premature Infant recommended reference ranges:  Up to 24  hours.............<8.0 mg/dL  Up to 48 hours............<12.0 mg/dL  3-5 days..................<15.0 mg/dL  6-29 days.................<15.0 mg/dL         BUN, Bld   12  15     Calcium   7.1(L)  7.6(L)     Chloride   110  113(H)     CO2   23  22(L)     CODING SYSTEM    XGKC877[P]          YULQ088[P]          [id number][P]      Creatinine   0.8  0.9     Differential Method    Manual  Comment:  Corrected result; previously reported as Automated on 07/12/2017 at   05:25.  [C]      DISPENSE STATUS    ISSUED[P]          ISSUED[P]          ISSUED[P]      eGFR if    >60.0  >60.0     eGFR if non    >60.0  Comment:  Calculation used to obtain the estimated glomerular filtration  rate (eGFR) is the CKD-EPI equation. Since race is unknown   in our information system, the eGFR values for   -American and Non--American patients are given   for each creatinine result.    >60.0  Comment:  Calculation used to obtain the estimated glomerular filtration  rate (eGFR) is the CKD-EPI equation. Since race is unknown   in our information system, the eGFR values for   -American and Non--American patients are given   for each creatinine result.       Eos #    Test Not Performed  Comment:  Corrected result; previously reported as NO READ on 07/12/2017 at   05:25.  [C]      Eosinophil%    0.0  Comment:  Corrected result; previously reported as NO READ on 07/12/2017 at   05:25.  [C]      Glucose   111(H)  102     Gran #    Test Not Performed  Comment:  Corrected result; previously reported as NO READ on 07/12/2017 at   05:25.  [C]      Gran%    50.0  Comment:  Corrected result; previously reported as NO READ on 07/12/2017 at   05:25.  [C]      Group & Rh    O POS      Hematocrit 16.1  Comment:  wbc hgb and hct critical result(s) called and verbal readback   obtained from monserrat ramos rn, 07/12/2017 09:36  (LL) 17.8  Comment:  wbc hgb and hct critical result(s) called and verbal readback    obtained from monserrat ramos rn, 07/12/2017 09:38  (LL)  18.3  Comment:  WBC HGB HCT & PRELIM PLT critical result(s) called and verbal   readback obtained from BRANDIE CHÁVEZ RN, 07/12/2017 05:25  (LL)      Hemoglobin 5.6  Comment:  wbc hgb and hct critical result(s) called and verbal readback   obtained from monserrat ramos rn, 07/12/2017 09:36  (LL) 6.1  Comment:  wbc hgb and hct critical result(s) called and verbal readback   obtained from monserrat ramos rn, 07/12/2017 09:38  (L)  6.4(L)      INDIRECT ANDREA    NEG      Lymph #    Test Not Performed  Comment:  Corrected result; previously reported as NO READ on 07/12/2017 at   05:25.  [C]      Lymph%    50.0  Comment:  Corrected result; previously reported as NO READ on 07/12/2017 at   05:25.  (H)[C]      Magnesium   1.7  1.5(L)     MCH 30.8 30.0  30.9      MCHC 34.8 34.3  35.0      MCV 89 88  88      Mono #    Test Not Performed  Comment:  Corrected result; previously reported as NO READ on 07/12/2017 at   05:25.  [C]      Mono%    0.0  Comment:  Corrected result; previously reported as NO READ on 07/12/2017 at   05:25.  (L)[C]      MPV 10.0 9.8  11.7      Phosphorus   2.0(L)  1.9(L)     Platelet Estimate    Decreased(A)      Platelets    27  Comment:  critical result(s) called and verbal readback obtained from   PLATELET COUNT CALLED TO BRANDIE CHÁVEZ RN, 07/12/2017 07:43  (LL)      Potassium   3.6  3.2(L)     PRODUCT CODE    C4589M55[P]          [id number][P]          [id number][P]      Total Protein   4.5(L)       RBC 1.82(L) 2.03(L)  2.07(L)      RDW 14.8(H) 14.9(H)  14.9(H)      Sodium   138  140     UNIT NUMBER    [id number][P]          [id number][P]          [id number][P]      WBC 0.06  Comment:  wbc hgb and hct critical result(s) called and verbal readback   obtained from monserrat ramos rn, 07/12/2017 09:36  (LL) 0.04  Comment:  wbc hgb and hct critical result(s) called and verbal readback   obtained from monserrat ramos rn, 07/12/2017 09:38  (LL)   0.07  Comment:  WBC Previously reported results for this analyte were similarly   abnormal.  Call not needed.  Documented, 07/12/2017 05:24  (LL)                   and All pertinent labs from the last 24 hours have been reviewed.    Diagnostic Results:  I have reviewed all pertinent imaging results/findings within the past 24 hours.

## 2017-07-12 NOTE — ASSESSMENT & PLAN NOTE
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 Differential for pneumonia includes: IFI (including breakthrough Aspergillosis along with other molds and previously diagnosed histoplasmosis), bacterial pneumonia (including atypical pathogens), community-acquired viral infections, and PCP (prophylaxis stopped in 1/2017 but his CD4 percentage was quite low recently despite a high absolute count). He is stable despite these findings.    - continue empiric vanc/cefepime for now for bacterial coverage (urine legionella antigen pending). Awaiting BAL culture and cytology for GMS to exclude PCP.  - continue ambisome 5mg/kg IV q24h (with associated timed saline boluses and premedications) for possible IFI and continue to closely monitor electrolytes (K, Mg) and CrCl on therapy (fungitell, urine histo/blasto, RVP and quantiferon gold pending)

## 2017-07-12 NOTE — SEDATION DOCUMENTATION
Verbal report given to  to include documentation charted in procedural sedation documentation.  Patient to be NPO 1 hour post procedure and place in PO tolerance at 1130.  Moderate concious sedation was performed and cardiorespiratory functions were monitored the entire procedure by Rupinder Aquino RN.  Sedation began at 73542  and concluded at 1059.  Rupinder Aquino RN

## 2017-07-12 NOTE — ASSESSMENT & PLAN NOTE
- blood cultures no growth from 6/24/17 and 7/4/17, CXR unremarkable.   - on Cefepime (started 6/24 - discontinued 6/28) restarted on 7/4/17   - Pt currently only on prophylactic abx--Bactrim, acyclovir.   - Cefepime day 9; vancomycin day 4   - repeated blood and urine cultures 7/9/17 - NGTD  - CT chest/abdomen/pelvis reveals bilateral groundglass opacities as well as a left lower-lobe consolidation.   - started on ambisome 7/9 per ID recommendations  - consulted pulm for a BAL will be done 7/12 or 7/13 - platelets need to be transfused before procedure   - fungitell pending, aspergillus negative and quantiferon gold results show indeterminate   - per ID will consider transitioning to cipro if patient remains afebrile

## 2017-07-12 NOTE — PLAN OF CARE
Problem: Patient Care Overview  Goal: Plan of Care Review  Outcome: Ongoing (interventions implemented as appropriate)  Pt has remained free from injury this shift. Bed in low locked position. Call light and personal belongings within reach. Side rails up x2. Nonskid socks in place.  Pt ambulates independently in room. Pt went for bronch this AM; pt lethargic for most of shift but reoriented easily. Pt c/o pain throughout shift; PRN dilaudid given as needed. IV cefepime, vanc, and ampho B given as ordered. 2 units of platelets and 1 unit of RBC given as ordered. vanc trough drawn this afternoon. No c/o nausea this shift. All questions and concerns addressed at this time. Will continue to monitor.

## 2017-07-12 NOTE — ASSESSMENT & PLAN NOTE
Patient has required multiple transfusions of pRBC and platelets.  Transfused 1 pheresis of platelets  this AM.  Currently having pRBC hanging.  Will follow up platelet count.  Will need to have platelet count of 30-50 to mitigate risk of bleeding.

## 2017-07-12 NOTE — PROGRESS NOTES
[facility]  Pulmonology  Progress Note    Patient Name: Paul E Sistrunk  MRN: [id number]  Admission Date: 6/20/2017  Hospital Length of Stay: 22 days  Code Status: Full Code  Attending Provider: Enriqueta Saleem MD  Primary Care Provider: Edgar Pinon MD   Principal Problem: AML (acute myeloid leukemia) in relapse      Subjective:   HPI:  Mr. Paul E Sistrunk is a 56 year old male with relapsing AML, HIV, anxiety, that presented to the hospital on 6/20/2017 for induction for relapsing AML.  7/8/2017 patient became febrile to 101.3 which was documented in the medical record.  He has been on vancomycin, cefepime and amphotericin. He reports that he has had a dry cough that has resulted in chest wall soreness.  He has since been afebrile with intermittent tachycardia but otherwise normal vital signs.  Blood cultures and urine cultures have been no growth to date.  Cryptococcus has been negative. Pulmonology was consulted for bronchoscopy for further evaluation of possible infectious etiology of he fever.    Interval History:   Patient complained of pain overnight in back and jaw.  NPO at midnight for bronchoscopy.  Received 1 pheresis of platelets this AM as well as having a unit of pRBCs hanging.      Objective:     Vital Signs (Most Recent):  Temp: 98.4 °F (36.9 °C) (07/12/17 0806)  Pulse: 109 (07/12/17 0745)  Resp: 17 (07/12/17 0742)  BP: (!) 124/56 (07/12/17 0806)  SpO2: (!) 90 % (07/12/17 0745) Vital Signs (24h Range):  Temp:  [98.3 °F (36.8 °C)-99.5 °F (37.5 °C)] 98.4 °F (36.9 °C)  Pulse:  [] 109  Resp:  [16-20] 17  SpO2:  [90 %-96 %] 90 %  BP: (117-149)/(56-80) 124/56     Weight: 85.2 kg (187 lb 14.4 oz)  Body mass index is 24.79 kg/m².      Intake/Output Summary (Last 24 hours) at 07/12/17 0820  Last data filed at 07/12/17 0738   Gross per 24 hour   Intake          5532.08 ml   Output             5100 ml   Net           432.08 ml       Physical Exam   Constitutional: He is oriented to  person, place, and time. He appears well-developed and well-nourished. No distress.   HENT:   Head: Normocephalic and atraumatic.   Eyes: EOM are normal. Pupils are equal, round, and reactive to light.   Neck: Normal range of motion. Neck supple. No JVD present.   Cardiovascular: Regular rhythm, normal heart sounds and intact distal pulses.  Exam reveals no gallop and no friction rub.    No murmur heard.  Pulmonary/Chest: Effort normal. No stridor. No respiratory distress. He has no wheezes. He has no rales.   Breath sounds CTA  Bilaterally, desatting to 88% while in recumbent position, did increase to 93% with deep inspiration   Abdominal: Soft. He exhibits no distension and no mass. There is no tenderness.   Musculoskeletal: Normal range of motion. He exhibits no edema.   Neurological: He is alert and oriented to person, place, and time. No cranial nerve deficit. Coordination normal.   Skin: Skin is warm and dry. Capillary refill takes less than 2 seconds.   Psychiatric: He has a normal mood and affect. His behavior is normal. Judgment and thought content normal.       Lines/Drains/Airways     Central Venous Catheter Line                 Tunneled Central Line Insertion/Assessment - Double Lumen  06/21/17 1519 left subclavian 20 days                Significant Labs:    CBC/Anemia Profile:    Recent Labs  Lab 07/11/17  0420 07/12/17  0416   WBC 0.04* 0.07*   HGB 7.0* 6.4*   HCT 19.5* 18.3*   PLT 4* 27*   MCV 87 88   RDW 14.9* 14.9*        Chemistries:    Recent Labs  Lab 07/11/17  0420 07/11/17  1245 07/12/17  0625    140 138   K 3.0* 3.2* 3.6   * 113* 110   CO2 22* 22* 23   BUN 16 15 12   CREATININE 0.9 0.9 0.8   CALCIUM 7.4* 7.6* 7.1*   ALBUMIN 1.9*  --  1.7*   PROT 4.8*  --  4.5*   BILITOT 1.4*  --  1.0   ALKPHOS 153*  --  185*   ALT 13  --  12   AST 17  --  13   MG 1.4* 1.5* 1.7   PHOS 1.6* 1.9* 2.0*       All pertinent labs within the past 24 hours have been reviewed.    Significant Imaging:  I have  reviewed and interpreted all pertinent imaging results/findings within the past 24 hours.    Assessment/Plan:     Neutropenic fever    Will perform bronchoscopy with BAL today pending platelet count.  Will send fluid for culture.  Continue antibiotics.        Pancytopenia due to antineoplastic chemotherapy    Patient has required multiple transfusions of pRBC and platelets.  Transfused 1 pheresis of platelets  this AM.  Currently having pRBC hanging.  Will follow up platelet count.  Will need to have platelet count of 30-50 to mitigate risk of bleeding.        * AML (acute myeloid leukemia) in relapse    Continue treatment per primary team.               Darell Jones MD  Pulmonology  [facility]

## 2017-07-12 NOTE — ASSESSMENT & PLAN NOTE
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 Blast of 62%  - awaiting mutation analysis and cytogenetics; FLT-3 negative     - Jaw pain and pain in right cranial nerve 7 distribution with persistent, severe headaches concerning for CNS involvement - underwent IT chemotherapy on 6/23. Flow negative for disease. CSF LDH was 29.  - Had previous reaction to MEC (etoposide caused full body rash). Did not qualify for the Tolero trial due to receiving IT chemotherapy    - Day 16 of FLAG-BETZY   - Day 14 bone marrow biopsy done 7/10 - results pending

## 2017-07-12 NOTE — PROGRESS NOTES
[facility]  Adult Nutrition  Progress Note    SUMMARY     Recommendations    Recommendation/Intervention:   1. Continue with current Regular diet - Promote (1) Boost Plus daily.    2. encourage HPV with each meal.   3.Encourage po intake >/= 75%   4. RD will continue to follow    Goals: PO intake =/> 75% EEN/EPN   Nutrition Goal Status: progressing towards goal     Reason for Assessment  Reason for Assessment: RD follow-up  Diagnosis: cancer diagnosis/related complications (AML (acute myeloid leukemia) in relapse )  Relevent Medical History: Non-M3 AML   Interdisciplinary Rounds: attended     General Information Comments: Pt NPO for bronchoscopy today. RN reduced Boost ONS order to 1x daily from RD request while in room. Pt's last BM noted to be 2 days ago. Denies n/v at this time. Pt reports eating ~75% meals. Dry mouth side effects continues, RD encouraged pt to continue sucking ice chips or rinses as needed. Will follow.    Nutrition Discharge Planning: Optimal PO intake on regular diet, high biological value protein foods with ONS as needed to help meet needs. Food safety provisions accordingly    Nutrition Prescription Ordered  Current Diet Order: Regular      Oral Nutrition Supplement: Boost Plus TID     Evaluation of Received Nutrients/Fluid Intake  Oral Fluid (mL): 240       IV Fluid (mL): 530       % Intake of Estimated Energy Needs: 50 - 75 %  % Meal Intake: 75%     Nutrition Risk Screen  Nutrition Risk Screen:  (d/c on regular diet w/ PO intake meeting =/> 75% EEN/EPN)    Nutrition/Diet History    Patient Reported Diet/Restrictions/Preferences: general  Typical Food/Fluid Intake: Pt has been tolerating smoothies brought in from family. Ate 75% salad, apple sauce yesterday.  Food Preferences: No cultural or Christian preferences     Supplemental Drinks or Food Habits: Boost Plus  Factors Affecting Nutritional Intake:  (none)     Labs/Tests/Procedures/Meds  Pertinent Labs Reviewed:  "reviewed  Pertinent Labs Comments: WBC 0.06, Hgb 5.6, Hct 16.1, Glu 11, Ca 7.1, Phos 2.0  Pertinent Medications Reviewed: reviewed  Pertinent Medications Comments: Acyclovir, IVF@ 125,ml/hr, Fludarabine, heparin,     Physical Findings  Overall Physical Appearance: generalized wasting  Tubes:  (central line)  Oral/Mouth Cavity: all teeth present (age appropriate)  Skin: intact    Anthropometrics  Temp: 98.6 °F (37 °C)     Height: 6' 0.99" (185.4 cm)  Weight Method: Standard Scale  Weight: 85.2 kg (187 lb 13.3 oz)  Ideal Body Weight (IBW), Male: 183.94 lb     % Ideal Body Weight, Male (lb): 102.11 lb     BMI (Calculated): 24.8  BMI Grade: 18.5-24.9 - normal  Weight Loss: unintentional      Estimated/Assessed Needs  Weight Used For Calorie Calculations: 88.9 kg (195 lb 15.8 oz)   Height (cm): 185.4 cm  Energy Calorie Requirements (kcal): 3108-8127  Energy Need Method: Kcal/kg (30-35kcal/kg (2667-3112kcal/day))      RMR ([place]-St. Jeor Equation): 1782.88        Weight Used For Protein Calculations: 88.9 kg (195 lb 15.8 oz)  Protein Requirements: 106-133 (1.2-1.5g/kg)     Fluid Need Method: RDA Method (Or per MD)   RDA Method (mL): 2667     Assessment and Plan    * AML (acute myeloid leukemia) in relapse    Nutrition Problem  increased nutrient needs    Related to (etiology):   Physiological Needs    Signs and Symptoms (as evidenced by):   AML (acute myeloid leukemia)     Interventions/Recommendations (treatment strategy):  1.Addition of ONS  2. If PO intake does not improve by next visit, consider appetite stimulant    Nutrition Diagnosis Status:   Continues                  Monitor and Evaluation  Food and Nutrient Intake: energy intake, food and beverage intake  Food and Nutrient Adminstration: diet order     Physical Activity and Function: nutrition-related ADLs and IADLs  Anthropometric Measurements: weight, weight change  Biochemical Data, Medical Tests and Procedures:  (All  labs)  Nutrition-Focused Physical " Findings: overall appearance, extremities, muscles and bones, skin    Nutrition Risk  Level of Risk:  (f/u 2x week)    Nutrition Follow-Up  RD Follow-up?: Yes

## 2017-07-12 NOTE — PLAN OF CARE
Problem: Patient Care Overview  Goal: Plan of Care Review  Outcome: Ongoing (interventions implemented as appropriate)  Patient remained free from falls throughout shift, call bell within reach. Day 16 Flag + Lynette. Afebrile overnight. Has been NPO past midnight for bronch today. Most likely will get plts this AM, possibly RBC too depending on his hgb. Complained of jaw and back pain all throughout shift. Prn dilaudid given q2h. Cefepime, vanc, and ampho B continued. vanc trough due before this afternoons dose. Vitals stable, will continue to monitor.

## 2017-07-12 NOTE — ASSESSMENT & PLAN NOTE
- white blood cell count is 0.07 k/uL; absolute neutrophil count is 35 cells/uL  - hemoglobin 6.4 g/dL; platelet count is 27 k/uL  - transfuse for hemoglobin < 7 g/dL, platelets < 10 k/uL  - 1 unit of platelets before BAL

## 2017-07-12 NOTE — ASSESSMENT & PLAN NOTE
- 303 days post Flu/Bu/ATG MUD allogeneic transplant for high risk AML after his second IDAC (6/20/16 - 6/24/16) after a prolonged hospitalization (2/5/16 - 3/21/16) for induction with 7&3 and reinduction with MEC to remission and IDAC (4/4/16 - 4/9/16).   - continue ppx acyclovir, renally dosed. We have discussed antifungal coverage with infectious disease. Discontinued itraconzaole (7/09) and started amphotericin.   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. Pt now off steroids.  - chimerics from marrow done 6/21 show CD3 of 90% donor and 10% recipient, but CD34 of 5% donor and 95% recipient   - repeat chimerics pending from marrow done 7/10

## 2017-07-12 NOTE — SEDATION DOCUMENTATION
H and P updated-yes  Anesthesia Plan:  conscious sedation   ASA verified-yes  Airway exam performed-yes  Personal or Family history of anesthesia complications-No  Consent signed and witnessed, Rupinder Aquino RN

## 2017-07-13 PROBLEM — G47.00 INSOMNIA: Status: ACTIVE | Noted: 2017-01-01

## 2017-07-13 NOTE — SUBJECTIVE & OBJECTIVE
Subjective:     Interval History:   -Pt became restless overnight and per nursing, was mumbling and walking quickly down the halls all through the night (even after zyprexa was given). Pt reports that he has not slept in two nights. Has been noted that pt has been refusing Ramelteon for the past several nights. States he takes ambien as home infrequently when needed for insomnia  -Continues with dilaudid 2 mg q2hrs prn (asks for it prior to 2 hrs). Ordered long acting medication. Ordered CT face/sinuses for continued pain  -BAL done yesterday with results pending  -Spiked fever again this AM prior to rounds. ID saw pt yesterday and suggested continuing cef, ambisone, and ART therapy and stopping vanc (will continue vanc at this time as pt spiked another fever this AM)    Objective:     Vital Signs (Most Recent):  Temp: 98.4 °F (36.9 °C) (07/13/17 1246)  Pulse: 106 (07/13/17 1246)  Resp: 18 (07/13/17 0731)  BP: 127/64 (07/13/17 1246)  SpO2: (!) 93 % (07/13/17 1246) Vital Signs (24h Range):  Temp:  [98 °F (36.7 °C)-100.9 °F (38.3 °C)] 98.4 °F (36.9 °C)  Pulse:  [103-124] 106  Resp:  [16-20] 18  SpO2:  [86 %-98 %] 93 %  BP: (127-172)/(64-87) 127/64     Weight: 85.1 kg (187 lb 9.8 oz)  Body mass index is 24.76 kg/m².  Body surface area is 2.09 meters squared.    ECOG SCORE         [unfilled]    Intake/Output - Last 3 Shifts       07/11 0700 - 07/12 0659 07/12 0700 - 07/13 0659 07/13 0700 - 07/14 0659    P.O. 880 1440 120    I.V. (mL/kg) 2602.1 (30.5) 2800 (32.9)     Blood 437.8 600     IV Piggyback 1800 2100     Total Intake(mL/kg) 5719.9 (67.1) 6940 (81.6) 120 (1.4)    Urine (mL/kg/hr) 5100 (2.5) 4125 (2)     Stool 0 (0)      Total Output 5100 4125      Net +619.9 +2815 +120           Stool Occurrence 2 x            Physical Exam   Constitutional: He is oriented to person, place, and time. He appears well-developed and well-nourished. No distress this AM  HENT:   Head: Normocephalic.   Right Ear: External ear normal.    Left Ear: External ear normal.   Nose: Nose normal.   Mouth/Throat: Oropharynx is clear and moist and mucous membranes are normal. No oropharyngeal exudate.   Eyes: Conjunctivae and EOM are normal. No scleral icterus.   Cardiovascular: Tachycardic, regular rhythm and normal heart sounds.    Pulmonary/Chest: Effort normal and breath sounds normal. On RA currently satting high 90s  Abdominal: Soft. Normal appearance and bowel sounds are normal. He exhibits no distension. There is no tenderness.   Musculoskeletal: He exhibits no edema.   Neurological: He is alert and oriented to person, place, and time. Speech normal this AM  Skin: Skin is warm, dry and intact. No cyanosis. Nails show no clubbing.   Central line and dressing clean, dry and intact   Psychiatric: He has a normal mood and affect. His behavior is normal. Thought content normal. His mood appears not anxious.     Significant Labs:   Recent Lab Results       07/13/17  0129 07/12/17  2032 07/12/17  1705 07/12/17  1704      Albumin 1.9(L)        Alkaline Phosphatase 231(H)        ALT 12        Anion Gap 7(L)        Aniso Slight   Slight     AST 14        Baso #    Test Not Performed  Comment:  Corrected result; previously reported as NO READ on 07/12/2017 at   17:50.  [C]     Basophil% 0.0  Comment:  Corrected result; previously reported as NO READ on %DDDDDDDD% at   %TTT%.  [C]   0.0  Comment:  Corrected result; previously reported as NO READ on 07/12/2017 at   17:50.  [C]     Total Bilirubin 1.5  Comment:  For infants and newborns, interpretation of results should be based  on gestational age, weight and in agreement with clinical  observations.  Premature Infant recommended reference ranges:  Up to 24 hours.............<8.0 mg/dL  Up to 48 hours............<12.0 mg/dL  3-5 days..................<15.0 mg/dL  6-29 days.................<15.0 mg/dL  (H)        BUN, Bld 14        [place] Cells Moderate        Calcium 7.2(L)        Chloride 111(H)        CO2 21(L)         Creatinine 0.8        Differential Method Manual   Manual  Comment:  Corrected result; previously reported as Automated on 07/12/2017 at   17:50.  [C]     eGFR if  >60.0        eGFR if non  >60.0  Comment:  Calculation used to obtain the estimated glomerular filtration  rate (eGFR) is the CKD-EPI equation. Since race is unknown   in our information system, the eGFR values for   -American and Non--American patients are given   for each creatinine result.          Eos #    Test Not Performed  Comment:  Corrected result; previously reported as NO READ on 07/12/2017 at   17:50.  [C]     Eosinophil% 0.0  Comment:  Corrected result; previously reported as NO READ on %DDDDDDDD% at   %TTT%.  [C]   0.0  Comment:  Corrected result; previously reported as NO READ on 07/12/2017 at   17:50.  [C]     Glucose 108        Gran #    Test Not Performed  Comment:  Corrected result; previously reported as NO READ on 07/12/2017 at   17:50.  [C]     Gran% 0.0  Comment:  Corrected result; previously reported as NO READ on %DDDDDDDD% at   %TTT%.  (L)[C]   0.0  Comment:  Corrected result; previously reported as NO READ on 07/12/2017 at   17:50.  (L)[C]     Hematocrit 22.1(L)   18.2  Comment:  hct  critical result(s) called and verbal readback obtained from   Leyla Choe RN, 07/12/2017 17:50  (LL)     Hemoglobin 7.8(L)   6.2(L)     Hypo    Occasional       Comment:  Results are increased in hemolyzed samples.        Lymph #    Test Not Performed  Comment:  Corrected result; previously reported as NO READ on 07/12/2017 at   17:50.  [C]     Lymph% 77.8  Comment:  Corrected result; previously reported as NO READ on %DDDDDDDD% at   %TTT%.  (H)[C]   75.0  Comment:  Corrected result; previously reported as NO READ on 07/12/2017 at   17:50.  (H)[C]     Magnesium 1.7        MCH 30.7   29.8     MCHC 35.3   34.1     MCV 87   88     Miscellaneous Test Name  Respiratory Viral Panel       Mono #     Test Not Performed  Comment:  Corrected result; previously reported as NO READ on 07/12/2017 at   17:50.  [C]     Mono% 22.2  Comment:  Corrected result; previously reported as NO READ on %DDDDDDDD% at   %TTT%.  (H)[C]   25.0  Comment:  Corrected result; previously reported as NO READ on 07/12/2017 at   17:50.  (H)[C]     MPV 9.5   SEE COMMENT  Comment:  Result not available.         SEE COMMENT  Comment:  Result not available.     Ovalocytes Occasional   Occasional     Phosphorus 1.6(L)        Platelet Estimate Decreased(A)   Decreased(A)     Platelets 25  Comment:  WBC and Platelet critical result(s) called and verbal readback   obtained from Della Flynn RN., 07/13/2017 03:10  (LL)   26  Comment:  PLT COUNT    critical result(s) called and verbal readback obtained   from CAMELIA CHOE RN, 07/12/2017 18:29  (LL)         26(LL)     Poik Moderate   Slight     Poly Occasional        Potassium 3.7        Total Protein 4.8(L)        RBC 2.54(L)   2.08(L)     RDW 14.8(H)   14.5     Sodium 139        Uric Acid 1.6(L)        Vancomycin-Trough   19.7      WBC 0.06  Comment:  WBC Previously reported results for this analyte were similarly   abnormal.  Call not needed.  Documented, 07/13/2017 02:04  (LL)   0.03  Comment:  wbc  critical result(s) called and verbal readback obtained from   camelia Choe RN, 07/12/2017 17:49  (LL)           Diagnostic Results:  I have reviewed all pertinent imaging results/findings within the past 24 hours.

## 2017-07-13 NOTE — ASSESSMENT & PLAN NOTE
- blood cultures no growth from 6/24/17 and 7/4/17 and 7/7/17, CXR unremarkable.   - on Cefepime (started 6/24 - discontinued 6/28) restarted on 7/4/17 and continues currently  - Pt currently only on prophylactic abx--Bactrim, acyclovir.   - Cefepime day 10; vancomycin day 5 (will continue both at this time)   - repeated blood and urine cultures 7/9/17 - NGTD  - CT chest/abdomen/pelvis reveals bilateral groundglass opacities as well as a left lower-lobe consolidation  - started on ambisome 7/9 per ID recommendations, continues currently   - pulm was consulted for a BAL which was done 7/12, cytology and aspergillus pending from BAL; KOH was negative for yeast/fungus   - fungitell pending, aspergillus negative and quantiferon gold results show indeterminate   - respiratory viral panel negative on 7/10/17  - per ID will consider transitioning to cipro if patient remains afebrile; ID also last recommended on 7/12/17 to continue cef, ambisome, and ART therapy and d/c vanc (will continue vanc at this time as pt spiked another fever 7/13/17 in AM)

## 2017-07-13 NOTE — PLAN OF CARE
Problem: Patient Care Overview  Goal: Plan of Care Review  Outcome: Ongoing (interventions implemented as appropriate)  Patient remained free from injury throughout shift, call bell within reach. Patient walking halls all night, did not sleep at all, even after zyprexa given. Patient complaining of jaw pain, back pain, and chest soreness has prn 2mg IV dilaudid q2h, which patient calls for before it's even due. There is no PO or long acting pain medication available in patient's medication regimen. Patient also refusing ramelteon every night. Day 17 Flag + Lynette. Awaiting bronc and BM bx results. Patient coughing up blood tinged sputum - Dr. Suárez notified, ordered morning labs to drawn early to check h/h - 7.8/22.1. Will continue to monitor.

## 2017-07-13 NOTE — ASSESSMENT & PLAN NOTE
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 Blast of 62%  - awaiting mutation analysis and cytogenetics; FLT-3 negative     - Jaw pain and pain in right cranial nerve 7 distribution with persistent, severe headaches concerning for CNS involvement - underwent IT chemotherapy on 6/23. Flow negative for disease. CSF LDH was 29  - Had previous reaction to MEC (etoposide caused full body rash). Did not qualify for the Tolero trial due to receiving IT chemotherapy    - Day 17 of FLAG-BETZY   - Day 14 bone marrow biopsy done 7/10 - hypocellular with no evidence of residual disease  - Future plans for DLI

## 2017-07-13 NOTE — ASSESSMENT & PLAN NOTE
- with restlessness and no sleep for 2 nights (7/12/17); pt has been refusing Ramelteon for several days (now d/c'd); states he infrequently takes ambien prn at home (still ordered as prn)  - was started on zyprexa on 7/12/17 and d/c'd on 7/13/17 for restlessness  - for insomnia, pt to use ambien prn (Ramelteon and zyprexa are now d/c'd)

## 2017-07-13 NOTE — PROGRESS NOTES
07/13/17 0325   Vital Signs   Temp 98.1 °F (36.7 °C)   Pulse (!) 124   Heart Rate Source Monitor   Resp 16   SpO2 (!) 88 %   O2 Device (Oxygen Therapy) room air   BP (!) 172/78     Patient very anxious and restless. Has not slept for more than 1 hour the last couple of days. Patient mumbling and not making much sense. Dr. Suárez notified - ordered 5mg zyprexa for delirium. Will continue to monitor.

## 2017-07-13 NOTE — PHYSICIAN QUERY
PT Name: Paul E Sistrunk  MR #: [id number]     Physician Query Form - Documentation Clarification      CDS/: Darshana Salmeron RN, CCDS               Contact information:  [email or web address]    This form is a permanent document in the medical record.     Query Date: July 13, 2017    By submitting this query, we are merely seeking further clarification of documentation. Please utilize your independent clinical judgment when addressing the question(s) below.    The Medical record reflects the following:    Supporting Clinical Findings Location in Medical Record   Procedure Date: 7/12/2017  Procedure:   Bronchoscopy    After obtaining informed consent, the Olympus scope BF-Q180 ([id number]) was introduced through the left nostril and advanced to the tracheobronchial tree. The patient tolerated the procedure well. The procedure was accomplished with ease.    Impression:    - Bilateral infiltrate                        - The airway examination was normal.                        - No specimens collected.  Recommendation:       - Await BAL results.     ml nacl inserted into LLL 30ml return    Bronchoscopy performed today.  BAL studies sent for micro and cyto.  Bronchoscopy report       Bronchoscopy report             Bronchoscopy report           RN sedation documentation 07/12/17 10:43 AM    Pulmonary progress note 07/12/17                                                                                      Doctor, Please specify diagnosis or diagnoses associated with above clinical findings.  Please further specify bronchoscopy procedure.    Provider Use Only      [ x  ] Bronchoscopy with BAL    [   ] Bronchoscopy:  Other (specify) _________________    [   ] Other (specify) _________________                                                                                                               [  ] Clinically undetermined

## 2017-07-13 NOTE — PROGRESS NOTES
[facility]  Hematology  Bone Marrow Transplant  Progress Note    Patient Name: Paul E Sistrunk  Admission Date: 6/20/2017  Hospital Length of Stay: 23 days  Code Status: Full Code    Subjective:     Interval History:   -Pt became restless overnight and per nursing, was mumbling and walking quickly down the halls all through the night (even after zyprexa was given). Pt reports that he has not slept in two nights. Has been noted that pt has been refusing Ramelteon for the past several nights. States he takes ambien as home infrequently when needed for insomnia  -Continues with dilaudid 2 mg q2hrs prn (asks for it prior to 2 hrs). Ordered long acting medication. Ordered CT face/sinuses for continued pain  -BAL done yesterday with results pending  -Spiked fever again this AM prior to rounds. ID saw pt yesterday and suggested continuing cef, ambisone, and ART therapy and stopping vanc (will continue vanc at this time as pt spiked another fever this AM)    Objective:     Vital Signs (Most Recent):  Temp: 98.4 °F (36.9 °C) (07/13/17 1246)  Pulse: 106 (07/13/17 1246)  Resp: 18 (07/13/17 0731)  BP: 127/64 (07/13/17 1246)  SpO2: (!) 93 % (07/13/17 1246) Vital Signs (24h Range):  Temp:  [98 °F (36.7 °C)-100.9 °F (38.3 °C)] 98.4 °F (36.9 °C)  Pulse:  [103-124] 106  Resp:  [16-20] 18  SpO2:  [86 %-98 %] 93 %  BP: (127-172)/(64-87) 127/64     Weight: 85.1 kg (187 lb 9.8 oz)  Body mass index is 24.76 kg/m².  Body surface area is 2.09 meters squared.    ECOG SCORE         [unfilled]    Intake/Output - Last 3 Shifts       07/11 0700 - 07/12 0659 07/12 0700 - 07/13 0659 07/13 0700 - 07/14 0659    P.O. 880 1440 120    I.V. (mL/kg) 2602.1 (30.5) 2800 (32.9)     Blood 437.8 600     IV Piggyback 1800 2100     Total Intake(mL/kg) 5719.9 (67.1) 6940 (81.6) 120 (1.4)    Urine (mL/kg/hr) 5100 (2.5) 4125 (2)     Stool 0 (0)      Total Output 5100 4125      Net +619.9 +2815 +120           Stool Occurrence 2 x             Physical Exam   Constitutional: He is oriented to person, place, and time. He appears well-developed and well-nourished. No distress this AM  HENT:   Head: Normocephalic.   Right Ear: External ear normal.   Left Ear: External ear normal.   Nose: Nose normal.   Mouth/Throat: Oropharynx is clear and moist and mucous membranes are normal. No oropharyngeal exudate.   Eyes: Conjunctivae and EOM are normal. No scleral icterus.   Cardiovascular: Tachycardic, regular rhythm and normal heart sounds.    Pulmonary/Chest: Effort normal and breath sounds normal. On RA currently satting high 90s  Abdominal: Soft. Normal appearance and bowel sounds are normal. He exhibits no distension. There is no tenderness.   Musculoskeletal: He exhibits no edema.   Neurological: He is alert and oriented to person, place, and time. Speech normal this AM  Skin: Skin is warm, dry and intact. No cyanosis. Nails show no clubbing.   Central line and dressing clean, dry and intact   Psychiatric: He has a normal mood and affect. His behavior is normal. Thought content normal. His mood appears not anxious.     Significant Labs:   Recent Lab Results       07/13/17  0129 07/12/17  2032 07/12/17  1705 07/12/17  1704      Albumin 1.9(L)        Alkaline Phosphatase 231(H)        ALT 12        Anion Gap 7(L)        Aniso Slight   Slight     AST 14        Baso #    Test Not Performed  Comment:  Corrected result; previously reported as NO READ on 07/12/2017 at   17:50.  [C]     Basophil% 0.0  Comment:  Corrected result; previously reported as NO READ on %DDDDDDDD% at   %TTT%.  [C]   0.0  Comment:  Corrected result; previously reported as NO READ on 07/12/2017 at   17:50.  [C]     Total Bilirubin 1.5  Comment:  For infants and newborns, interpretation of results should be based  on gestational age, weight and in agreement with clinical  observations.  Premature Infant recommended reference ranges:  Up to 24 hours.............<8.0 mg/dL  Up to 48  hours............<12.0 mg/dL  3-5 days..................<15.0 mg/dL  6-29 days.................<15.0 mg/dL  (H)        BUN, Bld 14        Lobo Cells Moderate        Calcium 7.2(L)        Chloride 111(H)        CO2 21(L)        Creatinine 0.8        Differential Method Manual   Manual  Comment:  Corrected result; previously reported as Automated on 07/12/2017 at   17:50.  [C]     eGFR if  >60.0        eGFR if non  >60.0  Comment:  Calculation used to obtain the estimated glomerular filtration  rate (eGFR) is the CKD-EPI equation. Since race is unknown   in our information system, the eGFR values for   -American and Non--American patients are given   for each creatinine result.          Eos #    Test Not Performed  Comment:  Corrected result; previously reported as NO READ on 07/12/2017 at   17:50.  [C]     Eosinophil% 0.0  Comment:  Corrected result; previously reported as NO READ on %DDDDDDDD% at   %TTT%.  [C]   0.0  Comment:  Corrected result; previously reported as NO READ on 07/12/2017 at   17:50.  [C]     Glucose 108        Gran #    Test Not Performed  Comment:  Corrected result; previously reported as NO READ on 07/12/2017 at   17:50.  [C]     Gran% 0.0  Comment:  Corrected result; previously reported as NO READ on %DDDDDDDD% at   %TTT%.  (L)[C]   0.0  Comment:  Corrected result; previously reported as NO READ on 07/12/2017 at   17:50.  (L)[C]     Hematocrit 22.1(L)   18.2  Comment:  hct  critical result(s) called and verbal readback obtained from   Leyla Choe RN, 07/12/2017 17:50  (LL)     Hemoglobin 7.8(L)   6.2(L)     Hypo    Occasional       Comment:  Results are increased in hemolyzed samples.        Lymph #    Test Not Performed  Comment:  Corrected result; previously reported as NO READ on 07/12/2017 at   17:50.  [C]     Lymph% 77.8  Comment:  Corrected result; previously reported as NO READ on %DDDDDDDD% at   %TTT%.  (H)[C]    75.0  Comment:  Corrected result; previously reported as NO READ on 07/12/2017 at   17:50.  (H)[C]     Magnesium 1.7        MCH 30.7   29.8     MCHC 35.3   34.1     MCV 87   88     Miscellaneous Test Name  Respiratory Viral Panel       Mono #    Test Not Performed  Comment:  Corrected result; previously reported as NO READ on 07/12/2017 at   17:50.  [C]     Mono% 22.2  Comment:  Corrected result; previously reported as NO READ on %DDDDDDDD% at   %TTT%.  (H)[C]   25.0  Comment:  Corrected result; previously reported as NO READ on 07/12/2017 at   17:50.  (H)[C]     MPV 9.5   SEE COMMENT  Comment:  Result not available.         SEE COMMENT  Comment:  Result not available.     Ovalocytes Occasional   Occasional     Phosphorus 1.6(L)        Platelet Estimate Decreased(A)   Decreased(A)     Platelets 25  Comment:  WBC and Platelet critical result(s) called and verbal readback   obtained from Della Flynn RN., 07/13/2017 03:10  (LL)   26  Comment:  PLT COUNT    critical result(s) called and verbal readback obtained   from CAMELIA CHOE RN, 07/12/2017 18:29  (LL)         26(LL)     Poik Moderate   Slight     Poly Occasional        Potassium 3.7        Total Protein 4.8(L)        RBC 2.54(L)   2.08(L)     RDW 14.8(H)   14.5     Sodium 139        Uric Acid 1.6(L)        Vancomycin-Trough   19.7      WBC 0.06  Comment:  WBC Previously reported results for this analyte were similarly   abnormal.  Call not needed.  Documented, 07/13/2017 02:04  (LL)   0.03  Comment:  wbc  critical result(s) called and verbal readback obtained from   camelia Choe RN, 07/12/2017 17:49  (LL)           Diagnostic Results:  I have reviewed all pertinent imaging results/findings within the past 24 hours.    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 Blast of 62%  - awaiting mutation analysis and cytogenetics; FLT-3 negative     - Jaw pain and pain in right cranial nerve 7 distribution with persistent, severe headaches concerning for CNS involvement - underwent IT chemotherapy on 6/23. Flow negative for disease. CSF LDH was 29  - Had previous reaction to MEC (etoposide caused full body rash). Did not qualify for the Tolero trial due to receiving IT chemotherapy    - Day 17 of FLAG-BETZY   - Day 14 bone marrow biopsy done 7/10 - hypocellular with no evidence of residual disease  - Future plans for DLI        Pancytopenia due to antineoplastic chemotherapy    - white blood cell count is 0.06 k/uL; absolute neutrophil count is 0 cells/uL  - hemoglobin 7.8 g/dL; platelet count is 25 k/uL  - transfuse for hemoglobin < 7 g/dL, platelets < 10 k/uL        S/P allogeneic bone marrow transplant    - +304 days s/p Flu/Bu/ATG MUD allogeneic transplant for 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 We have discussed antifungal coverage with infectious disease. Discontinued itraconzaole (7/09) and started ambisome  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. Pt now off steroids  - chimerics from marrow done 6/21 show CD3 of 90% donor and 10% recipient, but CD34 of 5% donor and 95% recipient   - repeat chimerics still pending from marrow done 7/10         Neutropenic fever    - blood cultures no growth from 6/24/17 and 7/4/17 and 7/7/17, CXR unremarkable.   - on Cefepime (started 6/24 - discontinued 6/28) restarted on 7/4/17 and continues currently  - Pt currently only on prophylactic abx--Bactrim, acyclovir.   - Cefepime day 10;  vancomycin day 5 (will continue both at this time)   - repeated blood and urine cultures 7/9/17 - NGTD  - CT chest/abdomen/pelvis reveals bilateral groundglass opacities as well as a left lower-lobe consolidation  - started on ambisome 7/9 per ID recommendations, continues currently   - pulm was consulted for a BAL which was done 7/12, cytology and aspergillus pending from BAL; KOH was negative for yeast/fungus   - fungitell pending, aspergillus negative and quantiferon gold results show indeterminate   - respiratory viral panel negative on 7/10/17  - per ID will consider transitioning to cipro if patient remains afebrile; ID also last recommended on 7/12/17 to continue cef, ambisome, and ART therapy and d/c vanc (will continue vanc at this time as pt spiked another fever 7/13/17 in AM)        Chronic bilateral low back pain without sciatica    - related to Neupogen  - started on zyrtec, lidocaine patch and Flexeril  - pain continues. Continue to monitor.  - started on ms continue 15 mg q12 (may likely need to increase to 30 mg q12); has dilaudid 2 mg q2 hrs for breakthrough pain         HIV disease    - CD4 low at 11.4%, Absolute CD4 469 and HIV RNA not detected from 6/21  - As per ID: continue triumeq        Facial numbness    - evaluated by Neurology  - MRI with no evidence of  acute intracranial pathology.  - thought to be secondary to drug induced peripheral neuropathy?  - Neurology rec Vitamin E, will hold for now given side effects of thrombocytopenia and bleeding    - with continued facial/jaw pain also; added MS contin for pain, may continue prn dilaudid; ordered CT of sinus/face 7/13/17, pending        Electrolyte abnormality    - ordered prn electrolyte replacement per protocol   - monitor closely due to ambisome         Insomnia    - with restlessness and no sleep for 2 nights (7/12/17); pt has been refusing Ramelteon for several days (now d/c'd); states he infrequently takes ambien prn at home (still  ordered as prn)  - was started on zyprexa on 7/12/17 and d/c'd on 7/13/17 for restlessness  - for insomnia, pt to use ambien prn (Ramelteon and zyprexa are now d/c'd)        Anxiety    - PRN Xanax        Prostate hypertrophy    - Continue home flomax            VTE Risk Mitigation         Ordered     Place sequential compression device  Until discontinued      06/20/17 2005     High Risk of VTE  Once      06/20/17 1901          Disposition: pending count recovery from FLAG-BETZY induction and resolution of fevers    Cheryl Carrera, CY  Bone Marrow Transplant  [facility]

## 2017-07-13 NOTE — ASSESSMENT & PLAN NOTE
- related to Neupogen  - started on zyrtec, lidocaine patch and Flexeril  - pain continues. Continue to monitor.  - started on ms continue 15 mg q12 (may likely need to increase to 30 mg q12); has dilaudid 2 mg q2 hrs for breakthrough pain

## 2017-07-13 NOTE — ASSESSMENT & PLAN NOTE
- white blood cell count is 0.06 k/uL; absolute neutrophil count is 0 cells/uL  - hemoglobin 7.8 g/dL; platelet count is 25 k/uL  - transfuse for hemoglobin < 7 g/dL, platelets < 10 k/uL

## 2017-07-13 NOTE — PLAN OF CARE
Problem: Patient Care Overview  Goal: Plan of Care Review  Outcome: Ongoing (interventions implemented as appropriate)  Pt has remained free from injury this shift. Tmax this shift of 100.9 F. Resolved with tylenol. Repeat blood cultures drawn. Pt c/o of pain; PRN Dilaudid given as ordered. Pt went for CT scan of sinuses. IV cefepime, vanc, and ampho given as ordered. Bed in low locked position. Call light and personal belongings within reach. Side rails up x2. Nonskid socks in place. All questions and comments addressed at this time. Will continue to monitor.

## 2017-07-13 NOTE — ASSESSMENT & PLAN NOTE
Bronchoscopy performed yesterday.  Patient tolerated the procedure well.  Nursing reports that he has been spiking fever of 100.6 F overnight.  He is currently at the same oxygen requirement as he was before the procedure.  KOH prep negative.  Cultures are currently no growth to date.   Will need to follow up cultures.  Would recommend continuing current antibiotics and antifungals until cultures finalize.

## 2017-07-13 NOTE — ASSESSMENT & PLAN NOTE
- evaluated by Neurology  - MRI with no evidence of  acute intracranial pathology.  - thought to be secondary to drug induced peripheral neuropathy?  - Neurology rec Vitamin E, will hold for now given side effects of thrombocytopenia and bleeding    - with continued facial/jaw pain also; added MS contin for pain, may continue prn dilaudid; ordered CT of sinus/face 7/13/17, pending

## 2017-07-13 NOTE — PHYSICIAN QUERY
PT Name: Paul E Sistrunk  MR #: [id number]    Physician Query Form - Nutrition Clarification     CDS/: Darshana Salmeron RN, CCDS               Contact information:  [email or web address]    This form is a permanent document in the medical record.     Query Date: July 13, 2017    By submitting this query, we are merely seeking further clarification of documentation.. Please utilize your independent clinical judgment when addressing the question(s) below.    The Medical record contains the following:   Indicators  Supporting Clinical Findings Location in Medical Record    % of Estimated Energy Intake over a time frame from p.o., TF, or TPN      Weight Status over a time frame     X Subcutaneous Fat and/or Muscle Loss generalized wasting Nutrition progress note 07/03    Fluid Accumulation or Edema      Reduced  Strength     X Wt / BMI / Usual Body Weight BMI (Calculated): 25.6     Delayed Wound Healing / Failure to Thrive     X Acute or Chronic Illness Suspect relapsed AML    Day +5 from FLAG-Lynette reinduction for AML relapsing following alloSCT.   Bone Marrow Transplant H & P 06/21    Bone Marrow Transplant progress note 07/01      Medication      Treatment     X Other Supplemental Drinks or Food Habits: Boost Plus Nutrition progress note 07/03     AND / ASPEN Clinical Characteristics (October 2011)  A minimum of two characteristics is recommended for diagnosing either moderate or severe malnutrition   Mild Malnutrition Moderate Malnutrition Severe Malnutrition   Energy Intake from p.o., TF or TPN. < 75% intake of estimated energy needs for less than 7 days < 75% intake of estimated energy needs for greater than 7 days < 50% intake of estimated energy needs for > 5 days   Weight Loss 1-2% in 1 month  5% in 3 months  7.5% in 6 months  10% in 1 year 1-2 % in 1 week  5% in 1 month  7.5% in 3 months  10% in 6 months  20% in 1 year > 2% in 1 week  > 5% in 1 month  > 7.5% in 3 months  > 10% in 6 months  > 20% in 1 year    Physical Findings     None *Mild subcutaneous fat and/or muscle loss  *Mild fluid accumulation  *Stage II decubitus  *Surgical wound or non-healing wound *Mod/severe subcutaneous fat and/or muscle loss  *Mod/severe fluid accumulation  *Stage III or IV decubitus  *Non-healing surgical wound     Provider, please specify diagnosis or diagnoses associated with above clinical findings.    [ ] Mild Protein-Calorie Malnutrition  [ ] Moderate Protein-Calorie Malnutrition  [ ] Cachexia  [ ] Other Nutritional Diagnosis (please specify): ____________________________________  [ ] Other: ________________________________  [x ] Clinically Undetermined    Please document in your progress notes daily for the duration of treatment until resolved and include in your discharge summary.

## 2017-07-13 NOTE — PROGRESS NOTES
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 Paul E Sistrunk is a 56 year old male with relapsing AML, HIV, anxiety, that presented to the hospital on 6/20/2017 for induction for relapsing AML.  7/8/2017 patient became febrile to 101.3 which was documented in the medical record.  He has been on vancomycin, cefepime and amphotericin. He reports that he has had a dry cough that has resulted in chest wall soreness.  He has since been afebrile with intermittent tachycardia but otherwise normal vital signs.  Blood cultures and urine cultures have been no growth to date.  Cryptococcus has been negative. Pulmonology was consulted for bronchoscopy for further evaluation of possible infectious etiology of he fever.    Interval History: Bronchoscopy performed yesterday.  KOH negative.  Respiratory culture no growth thus far.  Patient reports that he has not had difficulty breathing post procedure.  Requiring 2L NC while lying in bed which is the same requirement that he had prior to the procedure.    Objective:     Vital Signs (Most Recent):  Temp: (!) 100.9 °F (38.3 °C) (07/13/17 0825)  Pulse: (!) 114 (07/13/17 0740)  Resp: 18 (07/13/17 0731)  BP: 134/72 (07/13/17 0740)  SpO2: 95 % (07/13/17 0742) Vital Signs (24h Range):  Temp:  [98 °F (36.7 °C)-100.9 °F (38.3 °C)] 100.9 °F (38.3 °C)  Pulse:  [103-124] 114  Resp:  [14-21] 18  SpO2:  [73 %-98 %] 95 %  BP: (127-172)/(63-87) 134/72     Weight: 85.1 kg (187 lb 9.8 oz)  Body mass index is 24.76 kg/m².      Intake/Output Summary (Last 24 hours) at 07/13/17 0831  Last data filed at 07/13/17 0343   Gross per 24 hour   Intake             6690 ml   Output              4125 ml   Net             2565 ml       Physical Exam   Constitutional: He is oriented to person, place, and time. He appears well-developed and well-nourished. No distress.   HENT:   Head: Normocephalic and atraumatic.   Eyes: EOM are normal. Pupils are equal, round, and reactive to light.   Neck: Normal range of motion. Neck supple. No JVD present.   Cardiovascular: Regular rhythm, normal heart sounds and intact distal pulses.  Exam reveals no gallop and no friction rub.    No murmur heard.  Pulmonary/Chest: Effort normal. No stridor. No respiratory distress. He has no wheezes. He has no rales.   Breath sounds CTA  With some expiratory wheezing in right middle and upper lung fields.   Abdominal: Soft. He exhibits no distension and no mass. There is no tenderness.   Musculoskeletal: Normal range of motion. He exhibits no edema.   Neurological: He is alert and oriented to person, place, and time. No cranial nerve deficit. Coordination normal.   Skin: Skin is warm and dry. Capillary refill takes less than 2 seconds.   Psychiatric: He has a normal mood and affect. His behavior is normal. Judgment and thought content normal.     Lines/Drains/Airways     Central Venous Catheter Line                 Tunneled Central Line Insertion/Assessment - Double Lumen  06/21/17 1519 left subclavian 21 days                Significant Labs:    CBC/Anemia Profile:    Recent Labs  Lab 07/12/17  0717 07/12/17  1704 07/13/17  0129   WBC 0.06* 0.03* 0.06*   HGB 5.6* 6.2* 7.8*   HCT 16.1* 18.2* 22.1*   PLT 31* 26*  26* 25*   MCV 89 88 87   RDW 14.8* 14.5 14.8*        Chemistries:    Recent Labs  Lab 07/11/17  1245 07/12/17  0625 07/13/17  0129    138 139   K 3.2* 3.6 3.7   * 110 111*   CO2 22* 23 21*   BUN 15 12 14   CREATININE 0.9 0.8 0.8   CALCIUM 7.6* 7.1* 7.2*   ALBUMIN  --  1.7* 1.9*   PROT  --  4.5* 4.8*   BILITOT  --  1.0 1.5*   ALKPHOS  --  185* 231*   ALT  --  12 12   AST  --  13 14   MG 1.5* 1.7 1.7   PHOS 1.9* 2.0*  1.6*       All pertinent labs within the past 24 hours have been reviewed.    Significant Imaging:  no new imaging    Assessment/Plan:     Neutropenic fever    Bronchoscopy performed yesterday.  Patient tolerated the procedure well.  Nursing reports that he has been spiking fever of 100.6 F overnight.  He is currently at the same oxygen requirement as he was before the procedure.  KOH prep negative.  Cultures are currently no growth to date.   Will need to follow up cultures.  Would recommend continuing current antibiotics and antifungals until cultures finalize.         * AML (acute myeloid leukemia) in relapse    Continue treatment per primary team.        HIV disease    Continue antiretroviral therapy.            Thank you for the consult.  Pulmonology will sign off at this time. Please do not hesitate to call with any questions.         Darell Jones MD  Pulmonology  [facility]

## 2017-07-13 NOTE — PHYSICIAN QUERY
PT Name: Paul E Sistrunk  MR #: [id number]     Physician Query Form - Documentation Clarification      CDS/: Darshana Salmeron RN, CCDS               Contact information:   [email or web address]    This form is a permanent document in the medical record.     Query Date: July 13, 2017    By submitting this query, we are merely seeking further clarification of documentation. Please utilize your independent clinical judgment when addressing the question(s) below.    The Medical record reflects the following:    Supporting Clinical Findings Location in Medical Record   Acute hypernatremia  - slowly improving  - sodium 132   - monitor for now    Acute hypernatremia  - slowly improving  - sodium 133   - monitor for now    Sodium  130-141   Bone Marrow Transplant progress note 07/06 1:19 PM          Bone Marrow Transplant progress note 07/07 2:30 PM          Lab   06/20/17 - 07/07/17                                                                                        Doctor, Please specify diagnosis or diagnoses associated with above clinical findings.  Please further specify acute hypernatremia.    Provider Use Only    [   ] Ruled out    [ x  ] Other (specify) Hyponatremia, not hyper                                                                                                                       [  ] Clinically undetermined

## 2017-07-13 NOTE — PROGRESS NOTES
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 Differential for pneumonia: unlikely PCP w/ (-) Fungitell and unlikely Staph given no response related to fevers w/ vancomycin and negative gram stain.     - continue cefepime for now for bacterial coverage (urine legionella antigen pending). Awaiting BAL culture and cytology for GMS to exclude PCP.  - continue ambisome 5mg/kg IV q24h (with associated timed saline boluses and premedications) for possible IFI and continue to closely monitor electrolytes (K, Mg) and CrCl on therapy          Thank you for your consult. I will follow-up with patient. Please contact us if you have any additional questions.    Kadeem Whiteside DO  Infectious Disease  [facility]    Subjective:     Principal Problem:AML (acute myeloid leukemia)  in relapse    HPI: Mr. 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 ID has been consulted today for neutropenic fevers that have persisted the last few days despite empiric vanc, cefepime, chronic itraconazole, and acyclovir prophylaxis. Patient notes that prior to diagnosis of relapse in clinic, he was not having F/C/S or other localizing infectious symptoms -- only malaise and acute onset BLE body aches just prior to diagnosis. Since admission he notes neutropenic F/C/S and dry cough but denies HA, URI symptoms, sore throat, dysphagia, sputum production, SOB, N/V, abdominal pain, diarrhea, dysuria, genital lesions, line pain/drainage, or rash. Exposure history is largely unremarkable: he is currently single, lives alone, recently visited [place] but denies other travel (including international), no pets, no new sexual contacts, and no new hobbies (does garden).   Interval History:   -Low grade fever 100.9 F @0731  -ANC 0   -Bronchoscopy this AM w/ BAL  -Both aspergillus, cryptococcus, Fungitell, Blasto and Histo negative  -Day 14 BM bx shows hypocellular w/o evidence of residual disease   -Given 1 unit of pRBC yesterday    Review of Systems   Constitutional: Positive for fatigue. Negative for chills and fever.   HENT:  Positive for mouth sores. Negative for rhinorrhea and sore throat.    Eyes: Negative for photophobia and visual disturbance.   Respiratory: Positive for cough. Negative for shortness of breath and wheezing.    Gastrointestinal: Negative for abdominal pain and diarrhea.   Genitourinary: Negative for dysuria.   Musculoskeletal: Negative for arthralgias and myalgias.   Skin: Negative for rash.   Allergic/Immunologic: Positive for immunocompromised state.   Neurological: Negative for headaches.   Hematological: Bruises/bleeds easily.   Psychiatric/Behavioral: Negative for confusion.     Objective:     Vital Signs (Most Recent):  Temp: 98.4 °F (36.9 °C) (07/13/17 1246)  Pulse: 106 (07/13/17 1246)  Resp: 18 (07/13/17 0731)  BP: 127/64 (07/13/17 1246)  SpO2: (!) 93 % (07/13/17 1246) Vital Signs (24h Range):  Temp:  [98 °F (36.7 °C)-100.9 °F (38.3 °C)] 98.4 °F (36.9 °C)  Pulse:  [103-124] 106  Resp:  [16-20] 18  SpO2:  [88 %-98 %] 93 %  BP: (127-172)/(64-87) 127/64     Weight: 85.1 kg (187 lb 9.8 oz)  Body mass index is 24.76 kg/m².    Estimated Creatinine Clearance: 119.3 mL/min (based on Cr of 0.8).    Physical Exam   Constitutional: He appears well-developed. He appears cachectic.   Up and walking in room   HENT:   Head: Normocephalic and atraumatic.   Eyes: EOM are normal. Pupils are equal, round, and reactive to light.   Neck: Normal range of motion. Neck supple.   Cardiovascular: Regular rhythm.  Tachycardia present.    No murmur heard.  Pulmonary/Chest: Effort normal and breath sounds normal. He has no wheezes. He has no rales.   Abdominal: Soft. Bowel sounds are normal.   Musculoskeletal: Normal range of motion.   Neurological: He is alert.   Skin: Skin is warm and dry.   Left subclavian port w/o tenderness or erythema     Significant Labs:   CBC:     Recent Labs  Lab 07/12/17  0717 07/12/17  1704 07/13/17  0129   WBC 0.06* 0.03* 0.06*   HGB 5.6* 6.2* 7.8*   HCT 16.1* 18.2* 22.1*   PLT 31* 26*  26* 25*     CMP:      Recent Labs  Lab 07/12/17  0625 07/13/17  0129    139   K 3.6 3.7    111*   CO2 23 21*   * 108   BUN 12 14   CREATININE 0.8 0.8   CALCIUM 7.1* 7.2*   PROT 4.5* 4.8*   ALBUMIN 1.7* 1.9*   BILITOT 1.0 1.5*   ALKPHOS 185* 231*   AST 13 14   ALT 12 12   ANIONGAP 5* 7*   EGFRNONAA >60.0 >60.0     Microbiology Results (last 7 days)     Procedure Component Value Units Date/Time    Blood culture [[id number]] Collected:  07/13/17 1018    Order Status:  Sent Specimen:  Blood from Line, Subclavian, Left Updated:  07/13/17 1221    Blood culture [[id number]] Collected:  07/13/17 1052    Order Status:  Sent Specimen:  Blood Updated:  07/13/17 1148    Blood culture [[id number]] Collected:  07/09/17 0640    Order Status:  Completed Specimen:  Blood Updated:  07/13/17 1012     Blood Culture, Routine No Growth to date     Blood Culture, Routine No Growth to date     Blood Culture, Routine No Growth to date     Blood Culture, Routine No Growth to date     Blood Culture, Routine No Growth to date    Narrative:       peripheral    Blood culture [[id number]] Collected:  07/09/17 0638    Order Status:  Completed Specimen:  Blood Updated:  07/13/17 1012     Blood Culture, Routine No Growth to date     Blood Culture, Routine No Growth to date     Blood Culture, Routine No Growth to date     Blood Culture, Routine No Growth to date     Blood Culture, Routine No Growth to date    Narrative:       peripheral    Blood culture [[id number]]     Order Status:  Canceled Specimen:  Blood     Culture, Respiratory [[id number]] Collected:  07/12/17 1057    Order Status:  Completed Specimen:  Respiratory from Bronchial Wash Updated:  07/13/17 0911     Respiratory Culture No Growth     Gram Stain (Respiratory) No organisms seen     Gram Stain (Respiratory) No WBC's    Narrative:       Bronchial Wash    Respiratory Viral Panel by PCR Nasal Swab [[id number]] Collected:  07/10/17 1231    Order Status:  Completed Specimen:  Respiratory  Updated:  07/13/17 0712     Respiratory Virus Panel, source NS     RVP - Adenovirus Not Detected     Comment: Respiratory Viral Panel is a product of [employer].  It has been approved or cleared by the U.S. Food and Drug  Administration for in vitro diagnostic use.  Results should be  used in conjunction with clinical findings, and should not form  the sole basis for a diagnosis or treatment decision.  Negative results do not preclude respiratory virus infection  and should not be used as the sole basis for diagnosis,   treatment, or other management decisions.  Positive results do not rule out bacterial infection, or  co-infection with other viruses.  The agent detected may not  be the definitive cause of the disease. The use of additional   laboratory testing (e.g. bacterial culture, immunofluorescence,  radiography) and clinical presentation must be taken into  consideration in order to obtain the final diagnosis of   respiratory viral infection.  The RVP assay cannot adequately detect Adenovirus species C,  or serotypes 7a and 41.  The RVP primers for detection of   rhinovirus have been shown to cross-react with enterovirus.  A rhinovirus reactive result should be confirmed by an   alternative method (e.g. cell culture).  The  of the Respiratory Viral Panel has   recommmended that specimens found to be negative for  Adenovirus be confirmed by an alternative method.  The  of the Respiratory Viral Panel has  recommended that specimens found to be negative for   Influenza be confirmed by an alternative method.          Enterovirus Not Detected     Comment: Cross-reactivity has been observed between certain Rhinovirus  strains and the Enterovirus assay.          Human Bocavirus Not Detected     Human Coronavirus Not Detected     Comment: The Human Coronavirus assay detects Human coronavirus types  229E, OC43,NL63 and HKO1.          RVP - Human Metapneumovirus (hMPV) Not Detected      RVP - Influenza A Not Detected     Influenza A - T3V9-53 Not Detected     RVP - Influenza B Not Detected     Parainfluenza Not Detected     Respiratory Syncytial VirusVirus (RSV) A Not Detected     Comment: The Respiratory Syncytial Viral assay detects types A and B,  however it does not distinguish between the two.          RVP - Rhinovirus Not Detected    Blood culture [[id number]] Collected:  07/07/17 0601    Order Status:  Completed Specimen:  Blood Updated:  07/13/17 0612     Blood Culture, Routine No growth after 5 days.    Blood culture [[id number]] Collected:  07/07/17 0601    Order Status:  Completed Specimen:  Blood Updated:  07/13/17 0612     Blood Culture, Routine No growth after 5 days.    KOH prep [[id number]] Collected:  07/12/17 1058    Order Status:  Completed Specimen:  Bronchial Alveolar Lavage from Lung, LLL Updated:  07/12/17 1210     KOH Prep No yeast or fungal elements seen    Narrative:       Bronchial Wash    Gram stain [[id number]] Collected:  07/12/17 1058    Order Status:  Canceled Specimen:  Bronchial Alveolar Lavage from Lung, LLL Updated:  07/12/17 1138    AFB Culture & Smear [[id number]] Collected:  07/12/17 1058    Order Status:  Sent Specimen:  Bronchial Alveolar Lavage from Lung, LLL Updated:  07/12/17 1138    Fungus culture [[id number]] Collected:  07/12/17 1058    Order Status:  Sent Specimen:  Bronchial Alveolar Lavage from Lung, LLL Updated:  07/12/17 1137    Urine culture [[id number]] Collected:  07/09/17 0618    Order Status:  Completed Specimen:  Urine from Urine, Clean Catch Updated:  07/10/17 1251     Urine Culture, Routine No growth    Blood culture [[id number]] Collected:  07/04/17 0151    Order Status:  Completed Specimen:  Blood Updated:  07/09/17 0612     Blood Culture, Routine No growth after 5 days.    Blood culture [[id number]] Collected:  07/04/17 0151    Order Status:  Completed Specimen:  Blood Updated:  07/09/17 0612     Blood Culture, Routine No growth after 5  days.    Urine culture [[id number]] Collected:  07/07/17 0648    Order Status:  Completed Specimen:  Urine from Urine, Clean Catch Updated:  07/08/17 1145     Urine Culture, Routine No growth    Cryptococcal antigen [[id number]] Collected:  07/07/17 1047    Order Status:  Completed Specimen:  Blood from Blood Updated:  07/07/17 1333     Cryptococcal Ag, Blood Negative

## 2017-07-13 NOTE — SUBJECTIVE & OBJECTIVE
Interval History:   -Low grade fever 100.9 F @0731  -ANC 0   -Bronchoscopy this AM w/ BAL  -Both aspergillus, cryptococcus, Fungitell, Blasto and Histo negative  -Day 14 BM bx shows hypocellular w/o evidence of residual disease   -Given 1 unit of pRBC yesterday    Review of Systems   Constitutional: Positive for fatigue. Negative for chills and fever.   HENT: Positive for mouth sores. Negative for rhinorrhea and sore throat.    Eyes: Negative for photophobia and visual disturbance.   Respiratory: Positive for cough. Negative for shortness of breath and wheezing.    Gastrointestinal: Negative for abdominal pain and diarrhea.   Genitourinary: Negative for dysuria.   Musculoskeletal: Negative for arthralgias and myalgias.   Skin: Negative for rash.   Allergic/Immunologic: Positive for immunocompromised state.   Neurological: Negative for headaches.   Hematological: Bruises/bleeds easily.   Psychiatric/Behavioral: Negative for confusion.     Objective:     Vital Signs (Most Recent):  Temp: 98.4 °F (36.9 °C) (07/13/17 1246)  Pulse: 106 (07/13/17 1246)  Resp: 18 (07/13/17 0731)  BP: 127/64 (07/13/17 1246)  SpO2: (!) 93 % (07/13/17 1246) Vital Signs (24h Range):  Temp:  [98 °F (36.7 °C)-100.9 °F (38.3 °C)] 98.4 °F (36.9 °C)  Pulse:  [103-124] 106  Resp:  [16-20] 18  SpO2:  [88 %-98 %] 93 %  BP: (127-172)/(64-87) 127/64     Weight: 85.1 kg (187 lb 9.8 oz)  Body mass index is 24.76 kg/m².    Estimated Creatinine Clearance: 119.3 mL/min (based on Cr of 0.8).    Physical Exam   Constitutional: He appears well-developed. He appears cachectic.   Up and walking in room   HENT:   Head: Normocephalic and atraumatic.   Eyes: EOM are normal. Pupils are equal, round, and reactive to light.   Neck: Normal range of motion. Neck supple.   Cardiovascular: Regular rhythm.  Tachycardia present.    No murmur heard.  Pulmonary/Chest: Effort normal and breath sounds normal. He has no wheezes. He has no rales.   Abdominal: Soft. Bowel sounds  are normal.   Musculoskeletal: Normal range of motion.   Neurological: He is alert.   Skin: Skin is warm and dry.   Left subclavian port w/o tenderness or erythema     Significant Labs:   CBC:     Recent Labs  Lab 07/12/17  0717 07/12/17  1704 07/13/17  0129   WBC 0.06* 0.03* 0.06*   HGB 5.6* 6.2* 7.8*   HCT 16.1* 18.2* 22.1*   PLT 31* 26*  26* 25*     CMP:     Recent Labs  Lab 07/12/17  0625 07/13/17  0129    139   K 3.6 3.7    111*   CO2 23 21*   * 108   BUN 12 14   CREATININE 0.8 0.8   CALCIUM 7.1* 7.2*   PROT 4.5* 4.8*   ALBUMIN 1.7* 1.9*   BILITOT 1.0 1.5*   ALKPHOS 185* 231*   AST 13 14   ALT 12 12   ANIONGAP 5* 7*   EGFRNONAA >60.0 >60.0     Microbiology Results (last 7 days)     Procedure Component Value Units Date/Time    Blood culture [[id number]] Collected:  07/13/17 1018    Order Status:  Sent Specimen:  Blood from Line, Subclavian, Left Updated:  07/13/17 1221    Blood culture [[id number]] Collected:  07/13/17 1052    Order Status:  Sent Specimen:  Blood Updated:  07/13/17 1148    Blood culture [[id number]] Collected:  07/09/17 0640    Order Status:  Completed Specimen:  Blood Updated:  07/13/17 1012     Blood Culture, Routine No Growth to date     Blood Culture, Routine No Growth to date     Blood Culture, Routine No Growth to date     Blood Culture, Routine No Growth to date     Blood Culture, Routine No Growth to date    Narrative:       peripheral    Blood culture [[id number]] Collected:  07/09/17 0638    Order Status:  Completed Specimen:  Blood Updated:  07/13/17 1012     Blood Culture, Routine No Growth to date     Blood Culture, Routine No Growth to date     Blood Culture, Routine No Growth to date     Blood Culture, Routine No Growth to date     Blood Culture, Routine No Growth to date    Narrative:       peripheral    Blood culture [[id number]]     Order Status:  Canceled Specimen:  Blood     Culture, Respiratory [[id number]] Collected:  07/12/17 1057    Order Status:  Completed  Specimen:  Respiratory from Bronchial Wash Updated:  07/13/17 0911     Respiratory Culture No Growth     Gram Stain (Respiratory) No organisms seen     Gram Stain (Respiratory) No WBC's    Narrative:       Bronchial Wash    Respiratory Viral Panel by PCR Nasal Swab [[id number]] Collected:  07/10/17 1231    Order Status:  Completed Specimen:  Respiratory Updated:  07/13/17 0712     Respiratory Virus Panel, source NS     RVP - Adenovirus Not Detected     Comment: Respiratory Viral Panel is a product of [employer].  It has been approved or cleared by the U.S. Food and Drug  Administration for in vitro diagnostic use.  Results should be  used in conjunction with clinical findings, and should not form  the sole basis for a diagnosis or treatment decision.  Negative results do not preclude respiratory virus infection  and should not be used as the sole basis for diagnosis,   treatment, or other management decisions.  Positive results do not rule out bacterial infection, or  co-infection with other viruses.  The agent detected may not  be the definitive cause of the disease. The use of additional   laboratory testing (e.g. bacterial culture, immunofluorescence,  radiography) and clinical presentation must be taken into  consideration in order to obtain the final diagnosis of   respiratory viral infection.  The RVP assay cannot adequately detect Adenovirus species C,  or serotypes 7a and 41.  The RVP primers for detection of   rhinovirus have been shown to cross-react with enterovirus.  A rhinovirus reactive result should be confirmed by an   alternative method (e.g. cell culture).  The  of the Respiratory Viral Panel has   recommmended that specimens found to be negative for  Adenovirus be confirmed by an alternative method.  The  of the Respiratory Viral Panel has  recommended that specimens found to be negative for   Influenza be confirmed by an alternative method.          Enterovirus  Not Detected     Comment: Cross-reactivity has been observed between certain Rhinovirus  strains and the Enterovirus assay.          Human Bocavirus Not Detected     Human Coronavirus Not Detected     Comment: The Human Coronavirus assay detects Human coronavirus types  229E, OC43,NL63 and HKO1.          RVP - Human Metapneumovirus (hMPV) Not Detected     RVP - Influenza A Not Detected     Influenza A - U5E1-54 Not Detected     RVP - Influenza B Not Detected     Parainfluenza Not Detected     Respiratory Syncytial VirusVirus (RSV) A Not Detected     Comment: The Respiratory Syncytial Viral assay detects types A and B,  however it does not distinguish between the two.          RVP - Rhinovirus Not Detected    Blood culture [[id number]] Collected:  07/07/17 0601    Order Status:  Completed Specimen:  Blood Updated:  07/13/17 0612     Blood Culture, Routine No growth after 5 days.    Blood culture [[id number]] Collected:  07/07/17 0601    Order Status:  Completed Specimen:  Blood Updated:  07/13/17 0612     Blood Culture, Routine No growth after 5 days.    KOH prep [[id number]] Collected:  07/12/17 1058    Order Status:  Completed Specimen:  Bronchial Alveolar Lavage from Lung, LLL Updated:  07/12/17 1210     KOH Prep No yeast or fungal elements seen    Narrative:       Bronchial Wash    Gram stain [[id number]] Collected:  07/12/17 1058    Order Status:  Canceled Specimen:  Bronchial Alveolar Lavage from Lung, LLL Updated:  07/12/17 1138    AFB Culture & Smear [[id number]] Collected:  07/12/17 1058    Order Status:  Sent Specimen:  Bronchial Alveolar Lavage from Lung, LLL Updated:  07/12/17 1138    Fungus culture [[id number]] Collected:  07/12/17 1058    Order Status:  Sent Specimen:  Bronchial Alveolar Lavage from Lung, LLL Updated:  07/12/17 1137    Urine culture [[id number]] Collected:  07/09/17 0618    Order Status:  Completed Specimen:  Urine from Urine, Clean Catch Updated:  07/10/17 1251     Urine Culture, Routine No  growth    Blood culture [[id number]] Collected:  07/04/17 0151    Order Status:  Completed Specimen:  Blood Updated:  07/09/17 0612     Blood Culture, Routine No growth after 5 days.    Blood culture [[id number]] Collected:  07/04/17 0151    Order Status:  Completed Specimen:  Blood Updated:  07/09/17 0612     Blood Culture, Routine No growth after 5 days.    Urine culture [[id number]] Collected:  07/07/17 0648    Order Status:  Completed Specimen:  Urine from Urine, Clean Catch Updated:  07/08/17 1145     Urine Culture, Routine No growth    Cryptococcal antigen [[id number]] Collected:  07/07/17 1047    Order Status:  Completed Specimen:  Blood from Blood Updated:  07/07/17 1333     Cryptococcal Ag, Blood Negative

## 2017-07-13 NOTE — ASSESSMENT & PLAN NOTE
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 Differential for pneumonia: unlikely PCP w/ (-) Fungitell and unlikely Staph given no response related to fevers w/ vancomycin and negative gram stain.     - continue cefepime for now for bacterial coverage (urine legionella antigen pending). Awaiting BAL culture and cytology for GMS to exclude PCP.  - continue ambisome 5mg/kg IV q24h (with associated timed saline boluses and premedications) for possible IFI and continue to closely monitor electrolytes (K, Mg) and CrCl on therapy

## 2017-07-13 NOTE — SUBJECTIVE & OBJECTIVE
Subjective:   HPI:  Mr. Paul E Sistrunk is a 56 year old male with relapsing AML, HIV, anxiety, that presented to the hospital on 6/20/2017 for induction for relapsing AML.  7/8/2017 patient became febrile to 101.3 which was documented in the medical record.  He has been on vancomycin, cefepime and amphotericin. He reports that he has had a dry cough that has resulted in chest wall soreness.  He has since been afebrile with intermittent tachycardia but otherwise normal vital signs.  Blood cultures and urine cultures have been no growth to date.  Cryptococcus has been negative. Pulmonology was consulted for bronchoscopy for further evaluation of possible infectious etiology of he fever.    Interval History: Bronchoscopy performed yesterday.  KOH negative.  Respiratory culture no growth thus far.  Patient reports that he has not had difficulty breathing post procedure.  Requiring 2L NC while lying in bed which is the same requirement that he had prior to the procedure.    Objective:     Vital Signs (Most Recent):  Temp: (!) 100.9 °F (38.3 °C) (07/13/17 0825)  Pulse: (!) 114 (07/13/17 0740)  Resp: 18 (07/13/17 0731)  BP: 134/72 (07/13/17 0740)  SpO2: 95 % (07/13/17 0742) Vital Signs (24h Range):  Temp:  [98 °F (36.7 °C)-100.9 °F (38.3 °C)] 100.9 °F (38.3 °C)  Pulse:  [103-124] 114  Resp:  [14-21] 18  SpO2:  [73 %-98 %] 95 %  BP: (127-172)/(63-87) 134/72     Weight: 85.1 kg (187 lb 9.8 oz)  Body mass index is 24.76 kg/m².      Intake/Output Summary (Last 24 hours) at 07/13/17 0831  Last data filed at 07/13/17 0343   Gross per 24 hour   Intake             6690 ml   Output             4125 ml   Net             2565 ml       Physical Exam   Constitutional: He is oriented to person, place, and time. He appears well-developed and well-nourished. No distress.   HENT:   Head: Normocephalic and atraumatic.   Eyes: EOM are normal. Pupils are equal, round, and reactive to light.   Neck: Normal range of motion. Neck supple. No  JVD present.   Cardiovascular: Regular rhythm, normal heart sounds and intact distal pulses.  Exam reveals no gallop and no friction rub.    No murmur heard.  Pulmonary/Chest: Effort normal. No stridor. No respiratory distress. He has no wheezes. He has no rales.   Breath sounds CTA  With some expiratory wheezing in right middle and upper lung fields.   Abdominal: Soft. He exhibits no distension and no mass. There is no tenderness.   Musculoskeletal: Normal range of motion. He exhibits no edema.   Neurological: He is alert and oriented to person, place, and time. No cranial nerve deficit. Coordination normal.   Skin: Skin is warm and dry. Capillary refill takes less than 2 seconds.   Psychiatric: He has a normal mood and affect. His behavior is normal. Judgment and thought content normal.     Lines/Drains/Airways     Central Venous Catheter Line                 Tunneled Central Line Insertion/Assessment - Double Lumen  06/21/17 1519 left subclavian 21 days                Significant Labs:    CBC/Anemia Profile:    Recent Labs  Lab 07/12/17  0717 07/12/17  1704 07/13/17  0129   WBC 0.06* 0.03* 0.06*   HGB 5.6* 6.2* 7.8*   HCT 16.1* 18.2* 22.1*   PLT 31* 26*  26* 25*   MCV 89 88 87   RDW 14.8* 14.5 14.8*        Chemistries:    Recent Labs  Lab 07/11/17  1245 07/12/17  0625 07/13/17  0129    138 139   K 3.2* 3.6 3.7   * 110 111*   CO2 22* 23 21*   BUN 15 12 14   CREATININE 0.9 0.8 0.8   CALCIUM 7.6* 7.1* 7.2*   ALBUMIN  --  1.7* 1.9*   PROT  --  4.5* 4.8*   BILITOT  --  1.0 1.5*   ALKPHOS  --  185* 231*   ALT  --  12 12   AST  --  13 14   MG 1.5* 1.7 1.7   PHOS 1.9* 2.0* 1.6*       All pertinent labs within the past 24 hours have been reviewed.    Significant Imaging:  no new imaging

## 2017-07-14 PROBLEM — I48.92 ATRIAL FLUTTER: Status: ACTIVE | Noted: 2017-01-01

## 2017-07-14 PROBLEM — I47.10 SUPRAVENTRICULAR TACHYCARDIA: Status: ACTIVE | Noted: 2017-01-01

## 2017-07-14 NOTE — ASSESSMENT & PLAN NOTE
- white blood cell count is 0.21 k/uL; absolute neutrophil count is 20 cells/uL  - hemoglobin 7.0 g/dL; platelet count is 10 k/uL  - transfuse for hemoglobin < 7 g/dL, platelets < 10 k/uL; transfused today 7/14/17 per Dr. Shin

## 2017-07-14 NOTE — TRANSFER OF CARE
"Anesthesia Transfer of Care Note    Patient: Paul E Sistrunk    Procedure(s) Performed: Procedure(s) (LRB):  CARDIOVERSION (N/A)    Patient location: Other:    Anesthesia Type: general    Transport from OR: Transported from OR on 2-3 L/min O2 by NC with adequate spontaneous ventilation    Post pain: adequate analgesia    Post assessment: no apparent anesthetic complications    Post vital signs: stable    Level of consciousness: awake    Nausea/Vomiting: no nausea/vomiting    Complications: none    Transfer of care protocol was followed      Last vitals:   Visit Vitals  /73   Pulse (!) 160   Temp 36.8 °C (98.3 °F) (Oral)   Resp (!) 24   Ht 6' 0.99" (1.854 m)   Wt 85.1 kg (187 lb 9.8 oz)   SpO2 (!) 92%   BMI 24.76 kg/m²     "

## 2017-07-14 NOTE — ANESTHESIA RELEASE NOTE
"Anesthesia Release from PACU Note    Patient: Paul E Sistrunk    Procedure(s) Performed: Procedure(s) (LRB):  CARDIOVERSION (N/A)    Anesthesia type: general    Post pain: Adequate analgesia    Post assessment: no apparent anesthetic complications    Last Vitals:   Visit Vitals  /73   Pulse (!) 160   Temp 36.8 °C (98.3 °F) (Oral)   Resp (!) 24   Ht 6' 0.99" (1.854 m)   Wt 85.1 kg (187 lb 9.8 oz)   SpO2 (!) 92%   BMI 24.76 kg/m²       Post vital signs: stable    Level of consciousness: awake    Nausea/Vomiting: no nausea/no vomiting    Complications: none    Airway Patency: patent    Respiratory: unassisted    Cardiovascular: stable and blood pressure at baseline    Hydration: euvolemic  "

## 2017-07-14 NOTE — PROGRESS NOTES
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 Differential for pneumonia: unlikely PCP w/ (-) Fungitell and unlikely Staph given no response related to fevers w/ vancomycin and negative gram stain.     - Sinus CT negative for any acute findings. Developed SVT. Plan for DCCV. Can continue cefepime, ambisome and ART. Blood and BAL culture NGTD.             Thank you for your consult. I will follow-up with patient. Please contact us if you have any additional questions.    Kadeem Whiteside DO  Infectious Disease  [facility]    Subjective:     Principal Problem:AML (acute myeloid leukemia) in relapse    HPI: Mr. 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 ID has been consulted today for neutropenic fevers that have persisted the last few days despite empiric vanc, cefepime, chronic itraconazole, and acyclovir prophylaxis. Patient notes that prior to diagnosis of relapse in clinic, he was not having F/C/S or other localizing infectious symptoms -- only malaise and acute onset BLE body aches just prior to diagnosis. Since admission he notes neutropenic F/C/S and dry cough but denies HA, URI symptoms, sore throat, dysphagia, sputum production, SOB, N/V, abdominal pain, diarrhea, dysuria, genital lesions, line pain/drainage, or rash. Exposure history is largely unremarkable: he is currently single, lives alone, recently visited [place] but denies other travel (including international), no pets, no new sexual contacts, and no new hobbies (does garden).   Interval History:   -Low grade fever 100.9 F @0825 7/13  -ANC 0   -HR increased to 170 @2000  -ON/off supplemental O2     Review of Systems   Constitutional: Positive for fatigue. Negative for chills and fever.   HENT: Positive for mouth sores. Negative for rhinorrhea and sore throat.    Eyes: Negative for photophobia and visual disturbance.   Respiratory: Positive for cough. Negative for shortness of breath and wheezing.    Cardiovascular: Positive for palpitations. Negative for chest pain and leg swelling.   Gastrointestinal: Negative for  abdominal pain and diarrhea.   Genitourinary: Negative for dysuria.   Musculoskeletal: Negative for arthralgias and myalgias.   Skin: Negative for rash.   Allergic/Immunologic: Positive for immunocompromised state.   Neurological: Negative for headaches.   Hematological: Bruises/bleeds easily.   Psychiatric/Behavioral: Negative for confusion.     Objective:     Vital Signs (Most Recent):  Temp: 98.1 °F (36.7 °C) (07/14/17 1145)  Pulse: (!) 161 (07/14/17 1145)  Resp: (!) 24 (07/14/17 1145)  BP: (!) 96/55 (07/14/17 1145)  SpO2: (!) 94 % (07/14/17 1127) Vital Signs (24h Range):  Temp:  [98.1 °F (36.7 °C)-98.8 °F (37.1 °C)] 98.1 °F (36.7 °C)  Pulse:  [106-172] 161  Resp:  [18-28] 24  SpO2:  [88 %-95 %] 94 %  BP: ()/(55-70) 96/55     Weight: 85.1 kg (187 lb 9.8 oz)  Body mass index is 24.76 kg/m².    Estimated Creatinine Clearance: 106 mL/min (based on Cr of 0.9).    Physical Exam   Constitutional: He appears well-developed. He appears cachectic.   Up and walking in room   HENT:   Head: Normocephalic and atraumatic.   Eyes: EOM are normal. Pupils are equal, round, and reactive to light.   Neck: Normal range of motion. Neck supple.   Cardiovascular: Regular rhythm.  Tachycardia present.    No murmur heard.  Pulmonary/Chest: Effort normal and breath sounds normal. He has no wheezes. He has no rales.   Abdominal: Soft. Bowel sounds are normal.   Musculoskeletal: Normal range of motion.   Neurological: He is alert.   Skin: Skin is warm and dry.   Left subclavian port w/o tenderness or erythema     Significant Labs:   CBC:     Recent Labs  Lab 07/12/17  1704 07/13/17  0129 07/14/17  0400   WBC 0.03* 0.06* 0.21*   HGB 6.2* 7.8* 7.0*   HCT 18.2* 22.1* 20.6*   PLT 26*  26* 25* 10*     CMP:     Recent Labs  Lab 07/13/17  0129 07/14/17  0400    137   K 3.7 4.0   * 111*   CO2 21* 18*    103   BUN 14 13   CREATININE 0.8 0.9   CALCIUM 7.2* 7.1*   PROT 4.8* 4.5*   ALBUMIN 1.9* 1.7*   BILITOT 1.5* 1.2*    ALKPHOS 231* 304*   AST 14 12   ALT 12 12   ANIONGAP 7* 8   EGFRNONAA >60.0 >60.0     Microbiology Results (last 7 days)     Procedure Component Value Units Date/Time    Blood culture [[id number]] Collected:  07/13/17 1052    Order Status:  Completed Specimen:  Blood Updated:  07/14/17 1212     Blood Culture, Routine No Growth to date     Blood Culture, Routine No Growth to date    Fungus culture [[id number]] Collected:  07/12/17 1058    Order Status:  Completed Specimen:  Bronchial Alveolar Lavage from Lung, LLL Updated:  07/14/17 1055     Fungus (Mycology) Culture Culture in progress    Narrative:       Bronchial Wash    Blood culture [[id number]] Collected:  07/09/17 0638    Order Status:  Completed Specimen:  Blood Updated:  07/14/17 1012     Blood Culture, Routine No growth after 5 days.    Narrative:       peripheral    Blood culture [[id number]] Collected:  07/09/17 0640    Order Status:  Completed Specimen:  Blood Updated:  07/14/17 1012     Blood Culture, Routine No growth after 5 days.    Narrative:       peripheral    Culture, Respiratory [[id number]] Collected:  07/12/17 1057    Order Status:  Completed Specimen:  Respiratory from Bronchial Wash Updated:  07/14/17 1003     Respiratory Culture No growth     Gram Stain (Respiratory) No organisms seen     Gram Stain (Respiratory) No WBC's    Narrative:       Bronchial Wash    AFB Culture & Smear [[id number]] Collected:  07/12/17 1058    Order Status:  Completed Specimen:  Bronchial Alveolar Lavage from Lung, LLL Updated:  07/13/17 2127     AFB Culture & Smear Culture in progress     AFB CULTURE STAIN No acid fast bacilli seen.    Narrative:       Bronchial Wash    Blood culture [[id number]] Collected:  07/13/17 1018    Order Status:  Completed Specimen:  Blood from Line, Subclavian, Left Updated:  07/13/17 1915     Blood Culture, Routine No Growth to date    Blood culture [[id number]]     Order Status:  Canceled Specimen:  Blood     Respiratory Viral Panel by PCR  Nasal Swab [[id number]] Collected:  07/10/17 1231    Order Status:  Completed Specimen:  Respiratory Updated:  07/13/17 0712     Respiratory Virus Panel, source NS     RVP - Adenovirus Not Detected     Comment: Respiratory Viral Panel is a product of [employer].  It has been approved or cleared by the [place]. Food and Drug  Administration for in vitro diagnostic use.  Results should be  used in conjunction with clinical findings, and should not form  the sole basis for a diagnosis or treatment decision.  Negative results do not preclude respiratory virus infection  and should not be used as the sole basis for diagnosis,   treatment, or other management decisions.  Positive results do not rule out bacterial infection, or  co-infection with other viruses.  The agent detected may not  be the definitive cause of the disease. The use of additional   laboratory testing (e.g. bacterial culture, immunofluorescence,  radiography) and clinical presentation must be taken into  consideration in order to obtain the final diagnosis of   respiratory viral infection.  The RVP assay cannot adequately detect Adenovirus species C,  or serotypes 7a and 41.  The RVP primers for detection of   rhinovirus have been shown to cross-react with enterovirus.  A rhinovirus reactive result should be confirmed by an   alternative method (e.g. cell culture).  The  of the Respiratory Viral Panel has   recommmended that specimens found to be negative for  Adenovirus be confirmed by an alternative method.  The  of the Respiratory Viral Panel has  recommended that specimens found to be negative for   Influenza be confirmed by an alternative method.          Enterovirus Not Detected     Comment: Cross-reactivity has been observed between certain Rhinovirus  strains and the Enterovirus assay.          Human Bocavirus Not Detected     Human Coronavirus Not Detected     Comment: The Human Coronavirus assay detects Human  coronavirus types  229E, OC43,NL63 and HKO1.          RVP - Human Metapneumovirus (hMPV) Not Detected     RVP - Influenza A Not Detected     Influenza A - W2S4-89 Not Detected     RVP - Influenza B Not Detected     Parainfluenza Not Detected     Respiratory Syncytial VirusVirus (RSV) A Not Detected     Comment: The Respiratory Syncytial Viral assay detects types A and B,  however it does not distinguish between the two.          RVP - Rhinovirus Not Detected    Blood culture [[id number]] Collected:  07/07/17 0601    Order Status:  Completed Specimen:  Blood Updated:  07/13/17 0612     Blood Culture, Routine No growth after 5 days.    Blood culture [[id number]] Collected:  07/07/17 0601    Order Status:  Completed Specimen:  Blood Updated:  07/13/17 0612     Blood Culture, Routine No growth after 5 days.    KOH prep [[id number]] Collected:  07/12/17 1058    Order Status:  Completed Specimen:  Bronchial Alveolar Lavage from Lung, LLL Updated:  07/12/17 1210     KOH Prep No yeast or fungal elements seen    Narrative:       Bronchial Wash    Gram stain [[id number]] Collected:  07/12/17 1058    Order Status:  Canceled Specimen:  Bronchial Alveolar Lavage from Lung, LLL Updated:  07/12/17 1138    Urine culture [[id number]] Collected:  07/09/17 0618    Order Status:  Completed Specimen:  Urine from Urine, Clean Catch Updated:  07/10/17 1251     Urine Culture, Routine No growth    Blood culture [[id number]] Collected:  07/04/17 0151    Order Status:  Completed Specimen:  Blood Updated:  07/09/17 0612     Blood Culture, Routine No growth after 5 days.    Blood culture [[id number]] Collected:  07/04/17 0151    Order Status:  Completed Specimen:  Blood Updated:  07/09/17 0612     Blood Culture, Routine No growth after 5 days.    Urine culture [[id number]] Collected:  07/07/17 0648    Order Status:  Completed Specimen:  Urine from Urine, Clean Catch Updated:  07/08/17 1145     Urine Culture, Routine No growth    Cryptococcal antigen  [[id number]] Collected:  07/07/17 1047    Order Status:  Completed Specimen:  Blood from Blood Updated:  07/07/17 1333     Cryptococcal Ag, Blood Negative

## 2017-07-14 NOTE — SUBJECTIVE & OBJECTIVE
Interval History:   -Low grade fever 100.9 F @0825 7/13  -ANC 0   -HR increased to 170 @2000  -ON/off supplemental O2     Review of Systems   Constitutional: Positive for fatigue. Negative for chills and fever.   HENT: Positive for mouth sores. Negative for rhinorrhea and sore throat.    Eyes: Negative for photophobia and visual disturbance.   Respiratory: Positive for cough. Negative for shortness of breath and wheezing.    Cardiovascular: Positive for palpitations. Negative for chest pain and leg swelling.   Gastrointestinal: Negative for abdominal pain and diarrhea.   Genitourinary: Negative for dysuria.   Musculoskeletal: Negative for arthralgias and myalgias.   Skin: Negative for rash.   Allergic/Immunologic: Positive for immunocompromised state.   Neurological: Negative for headaches.   Hematological: Bruises/bleeds easily.   Psychiatric/Behavioral: Negative for confusion.     Objective:     Vital Signs (Most Recent):  Temp: 98.1 °F (36.7 °C) (07/14/17 1145)  Pulse: (!) 161 (07/14/17 1145)  Resp: (!) 24 (07/14/17 1145)  BP: (!) 96/55 (07/14/17 1145)  SpO2: (!) 94 % (07/14/17 1127) Vital Signs (24h Range):  Temp:  [98.1 °F (36.7 °C)-98.8 °F (37.1 °C)] 98.1 °F (36.7 °C)  Pulse:  [106-172] 161  Resp:  [18-28] 24  SpO2:  [88 %-95 %] 94 %  BP: ()/(55-70) 96/55     Weight: 85.1 kg (187 lb 9.8 oz)  Body mass index is 24.76 kg/m².    Estimated Creatinine Clearance: 106 mL/min (based on Cr of 0.9).    Physical Exam   Constitutional: He appears well-developed. He appears cachectic.   Up and walking in room   HENT:   Head: Normocephalic and atraumatic.   Eyes: EOM are normal. Pupils are equal, round, and reactive to light.   Neck: Normal range of motion. Neck supple.   Cardiovascular: Regular rhythm.  Tachycardia present.    No murmur heard.  Pulmonary/Chest: Effort normal and breath sounds normal. He has no wheezes. He has no rales.   Abdominal: Soft. Bowel sounds are normal.   Musculoskeletal: Normal range of  motion.   Neurological: He is alert.   Skin: Skin is warm and dry.   Left subclavian port w/o tenderness or erythema     Significant Labs:   CBC:     Recent Labs  Lab 07/12/17  1704 07/13/17  0129 07/14/17  0400   WBC 0.03* 0.06* 0.21*   HGB 6.2* 7.8* 7.0*   HCT 18.2* 22.1* 20.6*   PLT 26*  26* 25* 10*     CMP:     Recent Labs  Lab 07/13/17  0129 07/14/17  0400    137   K 3.7 4.0   * 111*   CO2 21* 18*    103   BUN 14 13   CREATININE 0.8 0.9   CALCIUM 7.2* 7.1*   PROT 4.8* 4.5*   ALBUMIN 1.9* 1.7*   BILITOT 1.5* 1.2*   ALKPHOS 231* 304*   AST 14 12   ALT 12 12   ANIONGAP 7* 8   EGFRNONAA >60.0 >60.0     Microbiology Results (last 7 days)     Procedure Component Value Units Date/Time    Blood culture [[id number]] Collected:  07/13/17 1052    Order Status:  Completed Specimen:  Blood Updated:  07/14/17 1212     Blood Culture, Routine No Growth to date     Blood Culture, Routine No Growth to date    Fungus culture [[id number]] Collected:  07/12/17 1058    Order Status:  Completed Specimen:  Bronchial Alveolar Lavage from Lung, LLL Updated:  07/14/17 1055     Fungus (Mycology) Culture Culture in progress    Narrative:       Bronchial Wash    Blood culture [[id number]] Collected:  07/09/17 0638    Order Status:  Completed Specimen:  Blood Updated:  07/14/17 1012     Blood Culture, Routine No growth after 5 days.    Narrative:       peripheral    Blood culture [[id number]] Collected:  07/09/17 0640    Order Status:  Completed Specimen:  Blood Updated:  07/14/17 1012     Blood Culture, Routine No growth after 5 days.    Narrative:       peripheral    Culture, Respiratory [[id number]] Collected:  07/12/17 1057    Order Status:  Completed Specimen:  Respiratory from Bronchial Wash Updated:  07/14/17 1003     Respiratory Culture No growth     Gram Stain (Respiratory) No organisms seen     Gram Stain (Respiratory) No WBC's    Narrative:       Bronchial Wash    AFB Culture & Smear [[id number]] Collected:  07/12/17  1058    Order Status:  Completed Specimen:  Bronchial Alveolar Lavage from Lung, LLL Updated:  07/13/17 2127     AFB Culture & Smear Culture in progress     AFB CULTURE STAIN No acid fast bacilli seen.    Narrative:       Bronchial Wash    Blood culture [[id number]] Collected:  07/13/17 1018    Order Status:  Completed Specimen:  Blood from Line, Subclavian, Left Updated:  07/13/17 1915     Blood Culture, Routine No Growth to date    Blood culture [[id number]]     Order Status:  Canceled Specimen:  Blood     Respiratory Viral Panel by PCR Nasal Swab [[id number]] Collected:  07/10/17 1231    Order Status:  Completed Specimen:  Respiratory Updated:  07/13/17 0712     Respiratory Virus Panel, source NS     RVP - Adenovirus Not Detected     Comment: Respiratory Viral Panel is a product of [employer].  It has been approved or cleared by the [place]. Food and Drug  Administration for in vitro diagnostic use.  Results should be  used in conjunction with clinical findings, and should not form  the sole basis for a diagnosis or treatment decision.  Negative results do not preclude respiratory virus infection  and should not be used as the sole basis for diagnosis,   treatment, or other management decisions.  Positive results do not rule out bacterial infection, or  co-infection with other viruses.  The agent detected may not  be the definitive cause of the disease. The use of additional   laboratory testing (e.g. bacterial culture, immunofluorescence,  radiography) and clinical presentation must be taken into  consideration in order to obtain the final diagnosis of   respiratory viral infection.  The RVP assay cannot adequately detect Adenovirus species C,  or serotypes 7a and 41.  The RVP primers for detection of   rhinovirus have been shown to cross-react with enterovirus.  A rhinovirus reactive result should be confirmed by an   alternative method (e.g. cell culture).  The  of the Respiratory Viral Panel  has   recommmended that specimens found to be negative for  Adenovirus be confirmed by an alternative method.  The  of the Respiratory Viral Panel has  recommended that specimens found to be negative for   Influenza be confirmed by an alternative method.          Enterovirus Not Detected     Comment: Cross-reactivity has been observed between certain Rhinovirus  strains and the Enterovirus assay.          Human Bocavirus Not Detected     Human Coronavirus Not Detected     Comment: The Human Coronavirus assay detects Human coronavirus types  229E, OC43,NL63 and HKO1.          RVP - Human Metapneumovirus (hMPV) Not Detected     RVP - Influenza A Not Detected     Influenza A - D2W6-39 Not Detected     RVP - Influenza B Not Detected     Parainfluenza Not Detected     Respiratory Syncytial VirusVirus (RSV) A Not Detected     Comment: The Respiratory Syncytial Viral assay detects types A and B,  however it does not distinguish between the two.          RVP - Rhinovirus Not Detected    Blood culture [[id number]] Collected:  07/07/17 0601    Order Status:  Completed Specimen:  Blood Updated:  07/13/17 0612     Blood Culture, Routine No growth after 5 days.    Blood culture [[id number]] Collected:  07/07/17 0601    Order Status:  Completed Specimen:  Blood Updated:  07/13/17 0612     Blood Culture, Routine No growth after 5 days.    KOH prep [[id number]] Collected:  07/12/17 1058    Order Status:  Completed Specimen:  Bronchial Alveolar Lavage from Lung, LLL Updated:  07/12/17 1210     KOH Prep No yeast or fungal elements seen    Narrative:       Bronchial Wash    Gram stain [[id number]] Collected:  07/12/17 1058    Order Status:  Canceled Specimen:  Bronchial Alveolar Lavage from Lung, LLL Updated:  07/12/17 1138    Urine culture [[id number]] Collected:  07/09/17 0618    Order Status:  Completed Specimen:  Urine from Urine, Clean Catch Updated:  07/10/17 1251     Urine Culture, Routine No growth    Blood culture  [[id number]] Collected:  07/04/17 0151    Order Status:  Completed Specimen:  Blood Updated:  07/09/17 0612     Blood Culture, Routine No growth after 5 days.    Blood culture [[id number]] Collected:  07/04/17 0151    Order Status:  Completed Specimen:  Blood Updated:  07/09/17 0612     Blood Culture, Routine No growth after 5 days.    Urine culture [[id number]] Collected:  07/07/17 0648    Order Status:  Completed Specimen:  Urine from Urine, Clean Catch Updated:  07/08/17 1145     Urine Culture, Routine No growth    Cryptococcal antigen [[id number]] Collected:  07/07/17 1047    Order Status:  Completed Specimen:  Blood from Blood Updated:  07/07/17 1333     Cryptococcal Ag, Blood Negative

## 2017-07-14 NOTE — PROGRESS NOTES
"Called to room by patient asking about what time he received his last pain medication, patient alert and oriented but was noted to be SOB when speaking and anxious appearing, heart rate and oxygen level checked at this time and heart rate noted to be in 170's range, patient had taken oxygen off that was placed with initial vital signs and was noted to be 90% on room air. When I questioned patient on if he felt like his heart was racing he stated " a little but didn't think it was anything because I get that way when I have panic attacks". Notified Dr. Lo of the above findings, stat EKG ordered, PRN xanax given awaiting results of EKG. Oxygen replaced to help with breathing.  "

## 2017-07-14 NOTE — PROGRESS NOTES
KUB done and does not show acute findings. Spoke with GI who is okay with clear liquids at this time if okay with BMT/ID service. Ordered clear liquids and informed pt to start slow and not to have full liquids at this time. Also ordered boost breeze. Informed pt to inform RN if any cramping, n/v occurs after having clear liquids. If this occurs, will keep NPO and reconsider PPN.    Cheryl Carrera, ANA, NP  Hematology/Oncology

## 2017-07-14 NOTE — PROGRESS NOTES
Cheryl Carrera, NP notified of HR this morning, will get EKG and c/s Cardiology.  Pt. placed on bedside telemetry.       07/14/17 0727   Vital Signs   Pulse (!) 170

## 2017-07-14 NOTE — ASSESSMENT & PLAN NOTE
- Supraventricular tachycardia on tele and EKG. Started 7/13/17 8pm with HR between 160-170, continues still  - Vagal maneuvers did not help convert  - 7/14/17: repeat ekg (aflutter?), troponin done (0.041), cardiology consulted, Dr. Jones pushed adenosine 6 mg and 12 mg  - Cardiology then consulted EP and patient to go for cardioversion 7/14/17. Consent obtained by cardiology  - Per cardiology: Metoprolol 25mg BID ordered 7/14/17 (first dose to be given before cardioversion today); Flecainide 100 mg BID ordered 7/14/17 (first dose to be given after cardioversion).   - EP states flecainide has the least effect on Qtc, which makes it more reasonable to use given other oncologic medications and future plans for DLI. However patient will need a nuclear stress test to rule out any coronary diseases next week. Okay to start flecainide in the interim per cardiology fellow.  - Pharmacologic nuclear stress test ordered for 7/17/17, okay to change date if needed by cardiology

## 2017-07-14 NOTE — PLAN OF CARE
Problem: Patient Care Overview  Goal: Plan of Care Review  Outcome: Ongoing (interventions implemented as appropriate)  Pt. Day 18 of Flag-Lynette.  Pt. with nonskid footwear on with bed in lowest position and locked with bed rails up x2.  Pt. ambulates independently and instructed to call if assistance is needed.  Pt. with call light within reach.  Pt. Received 1 unit of blood and platelets today.  Pt. with c/o face pain with dilaudid being given PRN with scheduled MS contin.  Pt. with cardio c/s today due to -170's.  Pt. given adenosine and cardioverted for arrhythmia.  Pt. started on metoprolol.  Will continue to monitor pt.

## 2017-07-14 NOTE — PROGRESS NOTES
Patient resting, oxygen taken off by patient again sats remain at 90% room air, heart rate remains elevated 170's but patient states he feels much calmer, EKG results given to Dr. Lo, no new orders given at this time, continue to monitor and administer necessary pain meds when needed. Will continue to monitor.

## 2017-07-14 NOTE — ASSESSMENT & PLAN NOTE
- related to Neupogen  - started on zyrtec, lidocaine patch and Flexeril  - pain continues. Continue to monitor.  - started on ms continue 15 mg q12 on 7/13/17, increased to 30 mg q12 on 7/14/17; decreased dilaudid 2 mg from q2 hrs to q3hrs on 7/14/17 for breakthrough pain

## 2017-07-14 NOTE — PLAN OF CARE
MDR's with Dr Shin.  Patient is day 18 of FLAG Lynette induction.  Day 14 BMB with no evidence of residual disease.  PRBC's and platelets transfused today.  ID and pulm following.  Cards consulted for sustained SVT overnight.  Adenosine given this am.  Cardioversion planned for this afternoon.  ANC 20 and awaiting count recovery.  Will continue to follow for d/c needs.

## 2017-07-14 NOTE — CONSULTS
Cardiology History & Physical  Attending Physician: Enriqueta Saleem MD  Reason for Consult: SVT      HPI:   56 y.o. gentleman with AML, HIV, anxiety who is here for AML relpase sent from clinic on 6/20. Cardiology was consulted for SVT with HR in the 170.     Patient states that he does not have a cardiac history. He recalls having anxiety attacks in his 20's that causes his heart rate to increase. These subside when he calms down.     Currently the patient does not feel chest pain,  Or palpitations. He endorses shortness of breath and being very anxious with the treaments he has been getting and the telemetry HR of 170.    According to the team and nursing patient has been in SVT since last night around 8 pm when his heart rate increased to 170. We do not have older EKG records and his telemetry was placed this morning at 7 am .     Last Echo was 6/21/ 2017, which showed an EF of 60 % , no diastolic dysfunction with PA pressure of 23 and AR and TR     ROS:    Constitution: Negative for fever, chills, weight loss or gain.   HENT: Positive for pain the jawNegative for sore throat, rhinorrhea, or headache.  Eyes: Negative for blurred or double vision.   Cardiovascular: See above  Pulmonary: Positive for SOB   Gastrointestinal: Negative for abdominal pain, nausea, vomiting, or diarrhea.   : Negative for dysuria.   Neurological: Negative for focal weakness or sensory changes.  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      Current Outpatient Prescriptions on File Prior to Encounter   Medication Sig Dispense Refill     abacavir-dolutegravir-lamivud (TRIUMEQ) 600- mg Tab Take 1 tablet by mouth once daily. 30 tablet 2    acyclovir (ZOVIRAX) 800 MG Tab Take 1 tablet (800 mg total) by mouth 2 (two) times daily. 60 tablet 11    alprazolam (XANAX) 0.5 MG tablet Take 1 tablet (0.5 mg total) by mouth 2 (two) times daily as needed for Insomnia or Anxiety. 60 tablet 0    butalbital-acetaminophen-caffeine -40 mg (FIORICET, ESGIC) -40 mg per tablet   0    finasteride (PROSCAR) 5 mg tablet Take 1 tablet (5 mg total) by mouth once daily. 30 tablet 1    HYDROmorphone (DILAUDID) 2 MG tablet Take 1 tablet (2 mg total) by mouth every 4 (four) hours as needed for Pain. 60 tablet 0    itraconazole (SPORANOX) 10 mg/mL Soln Take 2 mLs (20 mg total) by mouth 3 (three) times daily. 1800 mL 0    loperamide (IMODIUM) 2 mg capsule Take 2 mg by mouth daily as needed for Diarrhea.      magnesium oxide (MAG-OX) 400 mg tablet TAKE THREE TABLETS BY MOUTH THREE TIMES A  tablet 1    ondansetron (ZOFRAN) 8 MG tablet Take 1 tablet (8 mg total) by mouth every 8 (eight) hours as needed for Nausea. 30 tablet 1    pantoprazole (PROTONIX) 40 MG tablet Take 1 tablet (40 mg total) by mouth once daily. 30 tablet 11    promethazine (PHENERGAN) 12.5 MG Tab Take 1 tablet (12.5 mg total) by mouth every 6 (six) hours as needed (nausea). 30 tablet 2    tacrolimus (PROGRAF) 0.5 MG Cap Take 1 capsule (0.5 mg total) by mouth once daily. 120 capsule 11    tamsulosin (FLOMAX) 0.4 mg Cp24 Take 1 capsule (0.4 mg total) by mouth every other day. 15 capsule 1    zolpidem (AMBIEN) 5 MG Tab Take 1 tablet (5 mg total) by mouth nightly as needed. 30 tablet 1       Inpatient Medications   Continuous Infusions:   sodium chloride 0.9% 125 mL/hr at 07/13/17 2356     Scheduled Meds:   abacavir  600 mg Oral Daily    acetaminophen  500 mg Oral Q24H    acyclovir  400 mg Oral BID    amphotericin B liposome (AMBISOME) IVPB  5 mg/kg Intravenous Q24H     ceFEPime (MAXIPIME) IVPB  2 g Intravenous Q8H    cetirizine  10 mg Oral Daily    diphenhydrAMINE  25 mg Oral Q24H    dolutegravir  50 mg Oral Daily    filgrastim  480 mcg Subcutaneous Q24H    finasteride  5 mg Oral Daily    lamivudine  300 mg Oral Daily    magnesium oxide  800 mg Oral BID    morphine  30 mg Oral Q12H    pantoprazole  40 mg Oral Daily    sodium chloride 0.9%  500 mL Intravenous Q24H    sodium chloride 0.9%  500 mL Intravenous Q24H    sulfamethoxazole-trimethoprim 800-160mg  1 tablet Oral Every Mon, Wed, Fri    tamsulosin  0.4 mg Oral Every other day     PRN Meds:sodium chloride, sodium chloride, acetaminophen, acetaminophen, alprazolam, alteplase, butalbital-acetaminophen-caffeine -40 mg, cyclobenzaprine, diphenhydrAMINE, diphenhydrAMINE, heparin, porcine (PF), HYDROmorphone, magnesium sulfate IVPB, magnesium sulfate IVPB, ondansetron, potassium chloride **AND** potassium chloride **AND** potassium chloride, prochlorperazine, senna-docusate 8.6-50 mg, sodium chloride 0.9%, sodium chloride 0.9%, sodium phosphate IVPB, sodium phosphate IVPB, sodium phosphate IVPB, zolpidem     Social History:     Social History   Substance Use Topics    Smoking status: Never Smoker    Smokeless tobacco: Not on file    Alcohol use No     Family History:     Family History   Problem Relation Age of Onset    Cancer Father      Physical Exam:     Vitals:  Temp:  [98.4 °F (36.9 °C)-98.7 °F (37.1 °C)]   Pulse:  [106-172]   Resp:  [18-28]   BP: (116-142)/(63-70)   SpO2:  [88 %-94 %]  on RA I/O's:    Intake/Output Summary (Last 24 hours) at 07/14/17 0941  Last data filed at 07/14/17 0850   Gross per 24 hour   Intake          4717.08 ml   Output             5050 ml   Net          -332.92 ml        Constitutional: Patient anxious about his HR, in no pain   HEENT: Sclera anicteric, PERRLA, EOMI  Neck: No JVD, no carotid bruits  CV: Tachy, no murmur, normal S1/S2  Pulm: CTAB, no wheezes, rales, or  lashanda  GI: Abdomen soft, NTND, +BS  Extremities: No LE edema, warm and well perfused  Skin: No ecchymosis, erythema, or ulcers. Left subclavian port   Psych: AOx3, appropriate affect  Neuro: CNII-XII intact, no focal deficits    Labs:       Recent Labs  Lab 17  0625 17  0129 17  0400    139 137   K 3.6 3.7 4.0    111* 111*   CO2 23 21* 18*   BUN 12 14 13   CREATININE 0.8 0.8 0.9   ANIONGAP 5* 7* 8       Recent Labs  Lab 17  0625 17  0129 17  0400   AST 13 14 12   ALT 12 12 12   ALKPHOS 185* 231* 304*   BILITOT 1.0 1.5* 1.2*   ALBUMIN 1.7* 1.9* 1.7*       Recent Labs  Lab 17  0836   TROPONINI 0.041*      Recent Labs  Lab 17  1704 17  0129 17  0400   WBC 0.03* 0.06* 0.21*   HGB 6.2* 7.8* 7.0*   HCT 18.2* 22.1* 20.6*   PLT 26*  26* 25* 10*   GRAN 0.0*  Test Not Performed 0.0* 9.6*  0.0*       Recent Labs  Lab 17  0614   INR 1.2     Lab Results   Component Value Date    CHOL 188 2015    HDL 48 2015    LDLCALC 121.4 2015    TRIG 246 (H) 2016     No results found for: HGBA1C     Micro:  Blood Cultures  Lab Results   Component Value Date    LABBLOO No Growth to date 2017    LABBLOO No Growth to date 2017    LABBLOO No Growth to date 2017    LABBLOO No Growth to date 2017    LABBLOO No Growth to date 2017    LABBLOO No Growth to date 2017    LABBLOO No Growth to date 2017     Urine Cultures  Lab Results   Component Value Date    LABURIN No growth 2017    LABURIN No growth 2017    LABURIN No growth 2017    LABURIN No growth 2017    LABURIN No growth 2016       Imaging:       EF   Date Value Ref Range Status   2017 60 55 - 65    08/15/2016 60 55 - 65    2016 55 55 - 65        EK/14 SVT at rate 170 ,  SVT at rate 170 8 pm     Telemetry: SVT this morning at 8 am at rate 171    Assessment:   Principle Problem:  AML (acute myeloid  leukemia) in relapse     56 y.o. gentleman with AML, HIV, anxiety who is here for AML relpase sent from clinic on 6/20. Cardiology was consulted for SVT with HR in the 170.     Plan:     # Arrhythmia :  - Supraventricular tachycardia on tele and EKG. Started last night with HR at 170.   - None responsive to vagal maneuvers   - Patient was seen with Dr. Jones and was given adenosine 6mg [id number] with an EKG of atrial flutter.   - EP consulted and patient going for cardioversion today. Consent obtained  - Recommend Flecainide 100 mg BID and metoprolol 25mg BID. Talked to EP and flecainide has the least effect on Qtc. This makes it more reasonable to use given other oncological medication .  However patient will need a nuclear stress test to rule out any coronary diseases.  - Recommend Nuclear stress test next week if patient will be here.       Patient seen and examined this morning. Discussed with Dr. De Los Santos  - staff attestation to follow.    Signed:  Kay Beck MD, MPH  PGY-1  Spectra [id number]

## 2017-07-14 NOTE — ASSESSMENT & PLAN NOTE
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 Blast of 62%  - awaiting mutation analysis and cytogenetics; FLT-3 negative     - Jaw pain and pain in right cranial nerve 7 distribution with persistent, severe headaches concerning for CNS involvement - underwent IT chemotherapy on 6/23. Flow negative for disease. CSF LDH was 29  - Had previous reaction to MEC (etoposide caused full body rash). Did not qualify for the Tolero trial due to receiving IT chemotherapy    - Day 18 of FLAG-BETZY   - Day 14 bone marrow biopsy done 7/10 - hypocellular with no evidence of residual disease  - Future plans for DLI

## 2017-07-14 NOTE — PROGRESS NOTES
[facility]  Hematology  Bone Marrow Transplant  Progress Note    Patient Name: Paul E Sistrunk  Admission Date: 6/20/2017  Hospital Length of Stay: 24 days  Code Status: Full Code    Subjective:     Interval History:   -With HR in 160-170s since 8 pm on 7/13/17. EKG was done overnight and showed SVT and possible bundle branch block. No medications given at that time.  -This AM prior to rounds, pt was still with HR of 170s. Reports being able to feel his heart racing a little and feels a little anxious. States he has never had heart problems in the past. EKG ordered again (a flutter), troponin done (0.041), on tele now, cardiology consulted and came to bedside, adenosine was pushed, with plans for cardioversion today, pt was consented by cardiology fellow  -Still with lack of sleep and refusing sleep aid; intermittently taking off O2, pain medications continued    Objective:     Vital Signs (Most Recent):  Temp: 98.1 °F (36.7 °C) (07/14/17 1145)  Pulse: (!) 161 (07/14/17 1145)  Resp: (!) 24 (07/14/17 1145)  BP: (!) 96/55 (07/14/17 1145)  SpO2: (!) 94 % (07/14/17 1127) Vital Signs (24h Range):  Temp:  [98.1 °F (36.7 °C)-98.8 °F (37.1 °C)] 98.1 °F (36.7 °C)  Pulse:  [106-172] 161  Resp:  [18-28] 24  SpO2:  [88 %-95 %] 94 %  BP: ()/(55-70) 96/55     Weight: 85.1 kg (187 lb 9.8 oz)  Body mass index is 24.76 kg/m².  Body surface area is 2.09 meters squared.    ECOG SCORE         [unfilled]    Intake/Output - Last 3 Shifts       07/12 0700 - 07/13 0659 07/13 0700 - 07/14 0659 07/14 0700 - 07/15 0659    P.O. 1440 240     I.V. (mL/kg) 2800 (32.9) 3577.1 (42)     Blood 600  312    IV Piggyback 2100 900     Total Intake(mL/kg) 6940 (81.6) 4717.1 (55.4) 312 (3.7)    Urine (mL/kg/hr) 4125 (2) 4550 (2.2) 500 (1.1)    Stool       Total Output 4125 4550 500    Net +2815 +167.1 -188                 Physical Exam  Constitutional: He is oriented to person, place, and time. He appears well-developed and  well-nourished. No distress this AM besides some anxiety r/t HR  HENT:   Head: Normocephalic.   Right Ear: External ear normal.   Left Ear: External ear normal.   Nose: Nose normal.   Mouth/Throat: Oropharynx is clear and moist and mucous membranes are normal. No oropharyngeal exudate.   Eyes: Conjunctivae and EOM are normal. No scleral icterus.   Cardiovascular: Tachycardic 170s, in a flutter (asymptomatic), adenosine pushed, awaiting cardioversion, regular rhythm and normal heart sounds. Some 1-2+ edema of ankles   Pulmonary/Chest: Effort normal and breath sounds normal. On RA currently satting low-mid 90s  Abdominal: Soft. Normal appearance and bowel sounds are normal. He exhibits no distension. There is no tenderness.   Musculoskeletal: He exhibits no edema.   Neurological: He is alert and oriented to person, place, and time. Speech normal this AM  Skin: Skin is warm, dry and intact. No cyanosis. Nails show no clubbing.   Central line and dressing clean, dry and intact   Psychiatric: He has a normal mood and affect. His behavior is normal. Thought content normal. His mood appears anxious r/t high HR     Significant Labs:   Recent Lab Results       07/14/17  0836 07/14/17  0614 07/14/17  0400      Albumin   1.7(L)     Alkaline Phosphatase   304(H)     ALT   12     Anion Gap   8     aPTT  25.2  Comment:  aPTT therapeutic range = 39-69 seconds      AST   12     Baso #   0.00     Basophil%   0.0     Total Bilirubin   1.2  Comment:  For infants and newborns, interpretation of results should be based  on gestational age, weight and in agreement with clinical  observations.  Premature Infant recommended reference ranges:  Up to 24 hours.............<8.0 mg/dL  Up to 48 hours............<12.0 mg/dL  3-5 days..................<15.0 mg/dL  6-29 days.................<15.0 mg/dL  (H)     BUN, Bld   13     Calcium   7.1(L)     Chloride   111(H)     CO2   18(L)     Creatinine   0.9     Differential Method   Automated     eGFR  if    >60.0     eGFR if non    >60.0  Comment:  Calculation used to obtain the estimated glomerular filtration  rate (eGFR) is the CKD-EPI equation. Since race is unknown   in our information system, the eGFR values for   -American and Non--American patients are given   for each creatinine result.       Eos #   0.0     Eosinophil%   0.0     Glucose   103     Gran #   0.0(L)     Gran%   9.6(L)     Hematocrit   20.6(L)     Hemoglobin   7.0(L)     Coumadin Monitoring INR  1.2  Comment:  Coumadin Therapy:  2.0 - 3.0 for INR for all indicators except mechanical heart valves  and antiphospholipid syndromes which should use 2.5 - 3.5.        Lymph #   0.1(L)     Lymph%   57.1(H)     Magnesium   1.7     MCH   30.2     MCHC   34.0     MCV   89     Mono #   0.1(L)     Mono%   33.3(H)     MPV   9.6     Phosphorus   1.9(L)     Platelets   10  Comment:  plt  critical result(s) called and verbal readback obtained from   radha chow rn, 07/14/2017 06:02  (LL)     Potassium   4.0     Total Protein   4.5(L)     Protime  12.2      RBC   2.32(L)     RDW   14.6(H)     Sodium   137     Troponin I 0.041  Comment:  The reference interval for Troponin I represents the 99th percentile   cutoff   for our facility and is consistent with 3rd generation assay   performance.  (H)       WBC   0.21  Comment:  wbc    Previously reported results for this analyte were similarly   abnormal.  Call not needed.  Documented, 07/14/2017 05:28  (LL)           Diagnostic Results:  I have reviewed all pertinent imaging results/findings within the past 24 hours.   CT of sinus: paranasal sinus disease, no acute sinusitis. 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 Blast of 62%  - awaiting mutation analysis and cytogenetics; FLT-3 negative     - Jaw pain and pain in right cranial nerve 7 distribution with persistent, severe headaches concerning for CNS involvement - underwent IT chemotherapy on 6/23. Flow negative for disease. CSF LDH was 29  - Had previous reaction to MEC (etoposide caused full body rash). Did not qualify for the Tolero trial due to receiving IT chemotherapy    - Day 18 of FLAG-BETZY   - Day 14 bone marrow biopsy done 7/10 - hypocellular with no evidence of residual disease  - Future plans for DLI        Pancytopenia due to antineoplastic chemotherapy    - white blood cell count is 0.21 k/uL; absolute neutrophil count is 20 cells/uL  - hemoglobin 7.0 g/dL; platelet count is 10 k/uL  - transfuse for hemoglobin < 7 g/dL, platelets < 10 k/uL; transfused today 7/14/17 per Dr. Shin         S/P allogeneic bone marrow transplant    - +305 days s/p Flu/Bu/ATG MUD allogeneic transplant for 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 We have discussed antifungal coverage with infectious disease. Discontinued itraconzaole (7/09) and started ambisome  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. Pt now off steroids  - chimerics from marrow done 6/21 show CD3 of 90% donor and 10% recipient, but CD34 of 5% donor and 95% recipient   - repeat chimerics still pending from marrow done 7/10   - plans for DLI in future        Neutropenic fever    - blood cultures no growth from 6/24/17 and 7/4/17 and 7/7/17, CXR unremarkable.   - on Cefepime (started 6/24 - discontinued 6/28) restarted on  7/4/17 and continues currently  - Pt currently only on prophylactic abx--Bactrim, acyclovir.   - Cefepime day 11, continues; vancomycin d/cd on day 6 (7/14/17) per ID recs  - repeated cultures 7/9/17 and 7/13/17 - NGTD  - CT chest/abdomen/pelvis reveals bilateral groundglass opacities as well as a left lower-lobe consolidation  - started on ambisome 7/9 per ID recommendations, continues currently   - pulm was consulted for a BAL which was done 7/12, cytology pending from BAL; aspergillus <0.500; KOH was negative for yeast/fungus   - fungitell pending, aspergillus negative and quantiferon gold results show indeterminate   - respiratory viral panel negative on 7/10/17  - per ID will consider transitioning to cipro if patient remains afebrile; ID also last recommended on 7/12/17 and 7/13/17 to continue cef, ambisome, and ART therapy and d/c vanc (vanc d/c'd on 7/14/17).         Chronic bilateral low back pain without sciatica    - related to Neupogen  - started on zyrtec, lidocaine patch and Flexeril  - pain continues. Continue to monitor.  - started on ms continue 15 mg q12 on 7/13/17, increased to 30 mg q12 on 7/14/17; decreased dilaudid 2 mg from q2 hrs to q3hrs on 7/14/17 for breakthrough pain         HIV disease    - CD4 low at 11.4%, Absolute CD4 469 and HIV RNA not detected from 6/21  - As per ID: continue triumeq        Facial numbness    - evaluated by Neurology  - MRI with no evidence of  acute intracranial pathology.  - thought to be secondary to drug induced peripheral neuropathy?  - Neurology rec Vitamin E, will hold for now given side effects of thrombocytopenia and bleeding    - with continued facial/jaw pain also; added MS contin for pain 7/13/17, may continue prn dilaudid; ordered CT of sinus/face 7/13/17, results show paranasal sinus disease, no acute sinusitis, nonspecific partial opacification of bilateral mastoid air cells right greater than left. ID to f/u on this CT--will have ID recommend if  this needs to be followed up on with ENT?        Electrolyte abnormality    - ordered prn electrolyte replacement per protocol   - monitor closely due to ambisome         Insomnia    - with restlessness and no sleep for 2 nights (7/12/17); pt has been refusing Ramelteon for several days (now d/c'd); states he infrequently takes ambien prn at home (still ordered as prn). 7/14/17, pt refused ambien but still reports difficulty sleeping  - was started on zyprexa on 7/12/17 and d/c'd on 7/13/17 for restlessness  - for insomnia, encourage ambien use prn (Ramelteon and zyprexa are now d/c'd)        Anxiety    - PRN Xanax        Supraventricular tachycardia    - Supraventricular tachycardia on tele and EKG. Started 7/13/17 8pm with HR between 160-170, continues still  - Vagal maneuvers did not help convert  - 7/14/17: repeat ekg (aflutter?), troponin done (0.041), cardiology consulted, Dr. Jones pushed adenosine 6 mg and 12 mg  - Cardiology then consulted EP and patient to go for cardioversion  7/14/17. Consent obtained by cardiology  -  Per cardiology: Metoprolol 25mg BID ordered 7/14/17 (first dose to be given before cardioversion today); Flecainide 100 mg BID ordered 7/14/17 (first dose to be given after cardioversion).   - EP states flecainide has the least effect on Qtc, which makes it more reasonable to use given other oncologic medications and future plans for DLI. However patient will need a nuclear stress test to rule out any coronary diseases next week. Okay to start flecainide in the interim per cardiology fellow.  - Pharmacologic nuclear stress test ordered for 7/17/17, okay to change date if needed by cardiology         Prostate hypertrophy    - Continue home flomax            VTE Risk Mitigation         Ordered     Place sequential compression device  Until discontinued      06/20/17 2005     High Risk of VTE  Once      06/20/17 1901          Disposition: pending count recovery and clinical improvement. Plans  for DLI in future    Cheryl Carrera NP  Bone Marrow Transplant  [facility]

## 2017-07-14 NOTE — PLAN OF CARE
Problem: Patient Care Overview  Goal: Plan of Care Review  Outcome: Ongoing (interventions implemented as appropriate)  Patient aa&ox3. Patient still requesting pain med of dilaudid IVP q2hrs. Was given Morphine 15mg PO at bedtime. Reports his pain is jaw/chin pain and left side of ribs. Patient remains on IV antibiotics of cefepime and vanc. Afebrile through my shift thus far. Ambulatory to restroom. Patient has remained thru shift thus far with high heart rate fluctuating betweens 160's-170's with periods of tachypnea, oxygen 2L nasal cannula had to be used a few times to bring his sats on room air from 88% to 92%. Patient states at times he felt his heart racing but once he received pain meds he did not feel it. Had patient valsalver a few times during my shift but did not help with slowing rate down. Patient also receiving continous IVF's. Also received xanax at bedtime but declined offer of ambien although he reports he is not sleeping well. Will continue to monitor. Has remained injury free thus far my shift.

## 2017-07-14 NOTE — PHYSICIAN QUERY
PT Name: Paul E Sistrunk  MR #: [id number]     Physician Query Form - Documentation Clarification      CDS/: Darshana Salmeron RN, CCDS               Contact information:  [email or web address]    This form is a permanent document in the medical record.     Query Date: July 14, 2017    By submitting this query, we are merely seeking further clarification of documentation. Please utilize your independent clinical judgment when addressing the question(s) below.    The Medical record reflects the following:    Supporting Clinical Findings Location in Medical Record   Procedure Date: 7/12/2017   Procedure:  Bronchoscopy    After obtaining informed consent, the Olympus scope BF-Q180 ([id number]) was introduced through the left nostril and advanced to the tracheobronchial tree.  The patient tolerated the procedure well. The procedure was accomplished with ease.    The nasopharynx/oropharynx appears normal. The larynx appears  normal. The vocal cords appear normal. The subglottic space is normal. The trachea is of normal caliber. The louise is sharp. The   tracheobronchial tree was examined to at least the first subsegmental level. Bronchial mucosa and anatomy are normal; there are no endobronchial lesions, and no secretions. Moderate tracheomalacia noted during exam. Cough associated petechiae scattered in lower lobes bilaterally.   Bronchoscopy report      Bronchoscopy report        Bronchoscopy report                                                                                      Doctor, Please specify diagnosis or diagnoses associated with above clinical findings.  Please further specify BAL.    Provider Use Only    [ x ] Lower lung lobe, left  [   ] Lower lung lobe, right  [   ] Lung lingual  [   ] Middle lung lobe, right  [   ] Upper lung lobe, left  [   ] Upper lung lobe, right  [   ] Other (specify) __________                                                                                                                  [  ] Clinically undetermined

## 2017-07-14 NOTE — ANESTHESIA POSTPROCEDURE EVALUATION
"Anesthesia Post Evaluation    Patient: Paul E Sistrunk    Procedure(s) Performed: Procedure(s) (LRB):  CARDIOVERSION (N/A)    Final Anesthesia Type: general  Patient location during evaluation: PACU  Patient participation: Yes- Able to Participate  Level of consciousness: awake and alert  Post-procedure vital signs: reviewed and stable  Pain management: adequate  Airway patency: patent  PONV status at discharge: No PONV  Anesthetic complications: no      Cardiovascular status: blood pressure returned to baseline  Respiratory status: unassisted  Hydration status: euvolemic  Follow-up not needed.        Visit Vitals  /73   Pulse (!) 160   Temp 36.8 °C (98.3 °F) (Oral)   Resp (!) 24   Ht 6' 0.99" (1.854 m)   Wt 85.1 kg (187 lb 9.8 oz)   SpO2 (!) 92%   BMI 24.76 kg/m²       Pain/Arik Score: Pain Assessment Performed: Yes (7/14/2017  1:47 PM)  Presence of Pain: non-verbal indicators absent (7/14/2017  1:47 PM)  Pain Rating Prior to Med Admin: 8 (7/14/2017 11:55 AM)  Pain Rating Post Med Admin: 7 (7/14/2017  6:17 AM)      "

## 2017-07-14 NOTE — ASSESSMENT & PLAN NOTE
- evaluated by Neurology  - MRI with no evidence of  acute intracranial pathology.  - thought to be secondary to drug induced peripheral neuropathy?  - Neurology rec Vitamin E, will hold for now given side effects of thrombocytopenia and bleeding    - with continued facial/jaw pain also; added MS contin for pain 7/13/17, may continue prn dilaudid; ordered CT of sinus/face 7/13/17, results show paranasal sinus disease, no acute sinusitis, nonspecific partial opacification of bilateral mastoid air cells right greater than left. ID to f/u on this CT--will have ID recommend if this needs to be followed up on with ENT?

## 2017-07-14 NOTE — SUBJECTIVE & OBJECTIVE
Subjective:     Interval History:   -With HR in 160-170s since 8 pm on 7/13/17. EKG was done overnight and showed SVT and possible bundle branch block. No medications given at that time.  -This AM prior to rounds, pt was still with HR of 170s. Reports being able to feel his heart racing a little and feels a little anxious. States he has never had heart problems in the past. EKG ordered again (a flutter), troponin done (0.041), on tele now, cardiology consulted and came to bedside, adenosine was pushed, with plans for cardioversion today, pt was consented by cardiology fellow  -Still with lack of sleep and refusing sleep aid; intermittently taking off O2, pain medications continued    Objective:     Vital Signs (Most Recent):  Temp: 98.1 °F (36.7 °C) (07/14/17 1145)  Pulse: (!) 161 (07/14/17 1145)  Resp: (!) 24 (07/14/17 1145)  BP: (!) 96/55 (07/14/17 1145)  SpO2: (!) 94 % (07/14/17 1127) Vital Signs (24h Range):  Temp:  [98.1 °F (36.7 °C)-98.8 °F (37.1 °C)] 98.1 °F (36.7 °C)  Pulse:  [106-172] 161  Resp:  [18-28] 24  SpO2:  [88 %-95 %] 94 %  BP: ()/(55-70) 96/55     Weight: 85.1 kg (187 lb 9.8 oz)  Body mass index is 24.76 kg/m².  Body surface area is 2.09 meters squared.    ECOG SCORE         [unfilled]    Intake/Output - Last 3 Shifts       07/12 0700 - 07/13 0659 07/13 0700 - 07/14 0659 07/14 0700 - 07/15 0659    P.O. 1440 240     I.V. (mL/kg) 2800 (32.9) 3577.1 (42)     Blood 600  312    IV Piggyback 2100 900     Total Intake(mL/kg) 6940 (81.6) 4717.1 (55.4) 312 (3.7)    Urine (mL/kg/hr) 4125 (2) 4550 (2.2) 500 (1.1)    Stool       Total Output 4125 4550 500    Net +2815 +167.1 -188                 Physical Exam  Constitutional: He is oriented to person, place, and time. He appears well-developed and well-nourished. No distress this AM besides some anxiety r/t HR  HENT:   Head: Normocephalic.   Right Ear: External ear normal.   Left Ear: External ear normal.   Nose: Nose normal.   Mouth/Throat: Oropharynx  is clear and moist and mucous membranes are normal. No oropharyngeal exudate.   Eyes: Conjunctivae and EOM are normal. No scleral icterus.   Cardiovascular: Tachycardic 170s, in a flutter (asymptomatic), adenosine pushed, awaiting cardioversion, regular rhythm and normal heart sounds. Some 1-2+ edema of ankles   Pulmonary/Chest: Effort normal and breath sounds normal. On RA currently satting low-mid 90s  Abdominal: Soft. Normal appearance and bowel sounds are normal. He exhibits no distension. There is no tenderness.   Musculoskeletal: He exhibits no edema.   Neurological: He is alert and oriented to person, place, and time. Speech normal this AM  Skin: Skin is warm, dry and intact. No cyanosis. Nails show no clubbing.   Central line and dressing clean, dry and intact   Psychiatric: He has a normal mood and affect. His behavior is normal. Thought content normal. His mood appears anxious r/t high HR     Significant Labs:   Recent Lab Results       07/14/17  0836 07/14/17  0614 07/14/17  0400      Albumin   1.7(L)     Alkaline Phosphatase   304(H)     ALT   12     Anion Gap   8     aPTT  25.2  Comment:  aPTT therapeutic range = 39-69 seconds      AST   12     Baso #   0.00     Basophil%   0.0     Total Bilirubin   1.2  Comment:  For infants and newborns, interpretation of results should be based  on gestational age, weight and in agreement with clinical  observations.  Premature Infant recommended reference ranges:  Up to 24 hours.............<8.0 mg/dL  Up to 48 hours............<12.0 mg/dL  3-5 days..................<15.0 mg/dL  6-29 days.................<15.0 mg/dL  (H)     BUN, Bld   13     Calcium   7.1(L)     Chloride   111(H)     CO2   18(L)     Creatinine   0.9     Differential Method   Automated     eGFR if    >60.0     eGFR if non    >60.0  Comment:  Calculation used to obtain the estimated glomerular filtration  rate (eGFR) is the CKD-EPI equation. Since race is unknown   in  our information system, the eGFR values for   -American and Non--American patients are given   for each creatinine result.       Eos #   0.0     Eosinophil%   0.0     Glucose   103     Gran #   0.0(L)     Gran%   9.6(L)     Hematocrit   20.6(L)     Hemoglobin   7.0(L)     Coumadin Monitoring INR  1.2  Comment:  Coumadin Therapy:  2.0 - 3.0 for INR for all indicators except mechanical heart valves  and antiphospholipid syndromes which should use 2.5 - 3.5.        Lymph #   0.1(L)     Lymph%   57.1(H)     Magnesium   1.7     MCH   30.2     MCHC   34.0     MCV   89     Mono #   0.1(L)     Mono%   33.3(H)     MPV   9.6     Phosphorus   1.9(L)     Platelets   10  Comment:  plt  critical result(s) called and verbal readback obtained from   radha chow rn, 07/14/2017 06:02  (LL)     Potassium   4.0     Total Protein   4.5(L)     Protime  12.2      RBC   2.32(L)     RDW   14.6(H)     Sodium   137     Troponin I 0.041  Comment:  The reference interval for Troponin I represents the 99th percentile   cutoff   for our facility and is consistent with 3rd generation assay   performance.  (H)       WBC   0.21  Comment:  wbc    Previously reported results for this analyte were similarly   abnormal.  Call not needed.  Documented, 07/14/2017 05:28  (LL)           Diagnostic Results:  I have reviewed all pertinent imaging results/findings within the past 24 hours.   CT of sinus: paranasal sinus disease, no acute sinusitis. Non specific partial opacification of bilateral mastoid air cells, right greater than left

## 2017-07-14 NOTE — ASSESSMENT & PLAN NOTE
- blood cultures no growth from 6/24/17 and 7/4/17 and 7/7/17, CXR unremarkable.   - on Cefepime (started 6/24 - discontinued 6/28) restarted on 7/4/17 and continues currently  - Pt currently only on prophylactic abx--Bactrim, acyclovir.   - Cefepime day 11, continues; vancomycin d/cd on day 6 (7/14/17) per ID recs  - repeated cultures 7/9/17 and 7/13/17 - NGTD  - CT chest/abdomen/pelvis reveals bilateral groundglass opacities as well as a left lower-lobe consolidation  - started on ambisome 7/9 per ID recommendations, continues currently   - pulm was consulted for a BAL which was done 7/12, cytology pending from BAL; aspergillus <0.500; KOH was negative for yeast/fungus   - fungitell pending, aspergillus negative and quantiferon gold results show indeterminate   - respiratory viral panel negative on 7/10/17  - per ID will consider transitioning to cipro if patient remains afebrile; ID also last recommended on 7/12/17 and 7/13/17 to continue cef, ambisome, and ART therapy and d/c vanc (vanc d/c'd on 7/14/17).

## 2017-07-14 NOTE — PLAN OF CARE
Problem: Patient Care Overview  Goal: Individualization & Mutuality  Left chest oquendo catheter dsg change q Monday and PRN   Outcome: Ongoing (interventions implemented as appropriate)  1. Cluster care for rest

## 2017-07-14 NOTE — ANESTHESIA PREPROCEDURE EVALUATION
07/14/2017  Paul E Sistrunk is a 56 y.o., male withleukemia and HIV here for cardioversion.  Thrombocytopenic with platelet count of 10k    Past Medical History:   Diagnosis Date    Cancer 2/2016    ALL    HIV infection     Pancytopenia due to antineoplastic chemotherapy 7/4/2016     Lab Results   Component Value Date    WBC 0.21 (LL) 07/14/2017    HGB 7.0 (L) 07/14/2017    HCT 20.6 (L) 07/14/2017    MCV 89 07/14/2017    PLT 10 (LL) 07/14/2017       Chemistry        Component Value Date/Time     07/14/2017 0400    K 4.0 07/14/2017 0400     (H) 07/14/2017 0400    CO2 18 (L) 07/14/2017 0400    BUN 13 07/14/2017 0400    BUN 34 (H) 01/05/2016 0836    CREATININE 0.9 07/14/2017 0400     07/14/2017 0400        Component Value Date/Time    CALCIUM 7.1 (L) 07/14/2017 0400    ALKPHOS 304 (H) 07/14/2017 0400    AST 12 07/14/2017 0400    ALT 12 07/14/2017 0400    BILITOT 1.2 (H) 07/14/2017 0400    ESTGFRAFRICA >60.0 07/14/2017 0400    EGFRNONAA >60.0 07/14/2017 0400            Anesthesia Evaluation    I have reviewed the Patient Summary Reports.     I have reviewed the Medications.     Review of Systems  Anesthesia Hx:  No problems with previous Anesthesia    Social:  Non-Smoker    Hematology/Oncology:        Hematology Comments: thrombocytopenia --  Cancer in past history:    Cardiovascular:   Exercise tolerance: poor Dysrhythmias    Pulmonary:   Denies Shortness of breath.    Psych:   anxiety          Physical Exam  General:  Well nourished    Airway/Jaw/Neck:  Airway Findings: Mouth Opening: Normal Tongue: Normal  General Airway Assessment: Adult  Mallampati: II  Jaw/Neck Findings:  Neck ROM: Normal ROM      Dental:  Dental Findings: In tact   Chest/Lungs:  Chest/Lungs Findings: Clear to auscultation, Normal Respiratory Rate     Heart/Vascular:  Heart Findings: Rate: Normal  Rhythm: Regular  Rhythm  Sounds: Normal        Mental Status:  Mental Status Findings:  Cooperative, Alert and Oriented         Anesthesia Plan  Type of Anesthesia, risks & benefits discussed:  Anesthesia Type:  general  Patient's Preference:   Intra-op Monitoring Plan:   Intra-op Monitoring Plan Comments:   Post Op Pain Control Plan:   Post Op Pain Control Plan Comments:   Induction:   IV  Beta Blocker:  Patient is not currently on a Beta-Blocker (No further documentation required).       Informed Consent: Patient understands risks and agrees with Anesthesia plan.  Questions answered. Anesthesia consent signed with patient.  ASA Score: 3     Day of Surgery Review of History & Physical:    H&P update referred to the surgeon.         Ready For Surgery From Anesthesia Perspective.

## 2017-07-14 NOTE — ASSESSMENT & PLAN NOTE
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 Differential for pneumonia: unlikely PCP w/ (-) Fungitell and unlikely Staph given no response related to fevers w/ vancomycin and negative gram stain.     - Sinus CT negative for any acute findings. Developed SVT. Plan for DCCV. Can continue cefepime, ambisome and ART. Blood and BAL culture NGTD.

## 2017-07-14 NOTE — ASSESSMENT & PLAN NOTE
- with restlessness and no sleep for 2 nights (7/12/17); pt has been refusing Ramelteon for several days (now d/c'd); states he infrequently takes ambien prn at home (still ordered as prn). 7/14/17, pt refused ambien but still reports difficulty sleeping  - was started on zyprexa on 7/12/17 and d/c'd on 7/13/17 for restlessness  - for insomnia, encourage ambien use prn (Ramelteon and zyprexa are now d/c'd)

## 2017-07-14 NOTE — ASSESSMENT & PLAN NOTE
- +305 days s/p Flu/Bu/ATG MUD allogeneic transplant for 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 We have discussed antifungal coverage with infectious disease. Discontinued itraconzaole (7/09) and started ambisome  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. Pt now off steroids  - chimerics from marrow done 6/21 show CD3 of 90% donor and 10% recipient, but CD34 of 5% donor and 95% recipient   - repeat chimerics still pending from marrow done 7/10   - plans for DLI in future

## 2017-07-15 NOTE — ASSESSMENT & PLAN NOTE
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 Differential for pneumonia: suspect mold, atypical organism such as nocardia possible,  unlikely PCP w/ (-) Fungitell and unlikely Staph given no response related to fevers w/ vancomycin and negative gram stain and thus far BAL cultures NGTD.  Overall fever curve down but now with intermittent fevers    - for now continue empiric cefepime while neutropenic, ambisome, ARVs  - will follow

## 2017-07-15 NOTE — ASSESSMENT & PLAN NOTE
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 Blast of 62%  - awaiting mutation analysis and cytogenetics; FLT-3 negative     - Jaw pain and pain in right cranial nerve 7 distribution with persistent, severe headaches concerning for CNS involvement - underwent IT chemotherapy on 6/23. Flow negative for disease. CSF LDH was 29  - Had previous reaction to MEC (etoposide caused full body rash). Did not qualify for the Tolero trial due to receiving IT chemotherapy    - Day 19 of FLAG-BETZY   - Day 14 bone marrow biopsy done 7/10 - hypocellular with no evidence of residual disease  - Future plans for DLI

## 2017-07-15 NOTE — PLAN OF CARE
Problem: Patient Care Overview  Goal: Plan of Care Review  Outcome: Ongoing (interventions implemented as appropriate)  Pt. Day 19 of Flag-Lynette.  Pt. with nonskid footwear on with bed in lowest position and locked with bed rails up x2.  Pt. ambulates independently and instructed to call if assistance is needed.  Pt. with call light within reach.  Pt. Remains on IVF's. Still receiving cefepime but Vanc was d/c'd. Patient still requesting IV pain meds as often as he can have. MS contin was increased yesterday to 30mg. HR remained in 100's-110's throughout shift. Oxygen did have to be used once during my shift. Patient also reported a cough tessalon pearls ordered and given.  Pt. started on metoprolol.  Will continue to monitor pt.

## 2017-07-15 NOTE — ASSESSMENT & PLAN NOTE
- white blood cell count is 1.28 k/uL; absolute neutrophil count is 0 cells/uL  - hemoglobin 7.5 g/dL; platelet count is 15 k/uL  - transfuse for hemoglobin < 7 g/dL, platelets < 10 k/uL

## 2017-07-15 NOTE — SUBJECTIVE & OBJECTIVE
Subjective:     Interval History: Pt cardioverted by cardiology yesterday and started on metoprolol and flecainide..  Pt remeins tachycardic this AM.  Pt with fever this AM as well as complaint of blood tinged sputum and SOB.  Pt denies N/V, constipation diarrhea,.    Review of Systems   Constitutional: Positive for fever. Negative for chills.   HENT: Negative for congestion.    Respiratory: Positive for cough, hemoptysis, sputum production and shortness of breath.    Cardiovascular: Negative for chest pain and leg swelling.   Gastrointestinal: Negative for abdominal pain, constipation, diarrhea, nausea and vomiting.   Neurological: Negative for headaches.     Objective:     Vital Signs (Most Recent):  Temp: (!) 102.5 °F (39.2 °C) (07/15/17 0901)  Pulse: (!) 120 (07/15/17 0913)  Resp: (!) 28 (07/15/17 0913)  BP: 135/71 (07/15/17 0913)  SpO2: 96 % (07/15/17 0913) Vital Signs (24h Range):  Temp:  [98 °F (36.7 °C)-102.5 °F (39.2 °C)] 102.5 °F (39.2 °C)  Pulse:  [105-163] 120  Resp:  [20-28] 28  SpO2:  [88 %-96 %] 96 %  BP: ()/(55-73) 135/71     Weight: 93.5 kg (206 lb 2.1 oz)  Body mass index is 27.2 kg/m².  Body surface area is 2.19 meters squared.    ECOG SCORE         [unfilled]    Intake/Output - Last 3 Shifts       07/13 0700 - 07/14 0659 07/14 0700 - 07/15 0659 07/15 0700 - 07/16 0659    P.O. 240 400     I.V. (mL/kg) 3577.1 (42) 2616.8 (28)     Blood  312     IV Piggyback 900 2250     Total Intake(mL/kg) 4717.1 (55.4) 5578.8 (59.7)     Urine (mL/kg/hr) 4550 (2.2) 2225 (1) 400 (1.7)    Stool  0 (0)     Total Output 4550 2225 400    Net +167.1 +3353.8 -400           Urine Occurrence  1 x     Stool Occurrence  2 x           Physical Exam   Constitutional: He is oriented to person, place, and time. He appears well-developed and well-nourished. No distress.   HENT:   Head: Normocephalic and atraumatic.   Cardiovascular: Normal rate, regular rhythm and normal heart sounds.  Exam reveals no gallop and no friction  rub.    No murmur heard.  Pulmonary/Chest: Effort normal. No respiratory distress. He has no wheezes. He has rales. He exhibits no tenderness.   Blood tinged sputum in medicine cup   Abdominal: Soft. Bowel sounds are normal. He exhibits no distension. There is no tenderness.   Neurological: He is alert and oriented to person, place, and time.   Skin: He is not diaphoretic.       Significant Labs:   Recent Results (from the past 24 hour(s))   CBC auto differential    Collection Time: 07/15/17  4:00 AM   Result Value Ref Range    WBC 0.28 (LL) 3.90 - 12.70 K/uL    RBC 2.43 (L) 4.60 - 6.20 M/uL    Hemoglobin 7.5 (L) 14.0 - 18.0 g/dL    Hematocrit 21.4 (L) 40.0 - 54.0 %    MCV 88 82 - 98 fL    MCH 30.9 27.0 - 31.0 pg    MCHC 35.0 32.0 - 36.0 %    RDW 15.2 (H) 11.5 - 14.5 %    Platelets 15 (LL) 150 - 350 K/uL    MPV 8.9 (L) 9.2 - 12.9 fL    Gran # 0.0 (L) 1.8 - 7.7 K/uL    Lymph # 0.3 (L) 1.0 - 4.8 K/uL    Mono # 0.0 (L) 0.3 - 1.0 K/uL    Eos # 0.0 0.0 - 0.5 K/uL    Baso # 0.01 0.00 - 0.20 K/uL    Gran% 0.0 (L) 38.0 - 73.0 %    Lymph% 96.4 (H) 18.0 - 48.0 %    Mono% 0.0 (L) 4.0 - 15.0 %    Eosinophil% 0.0 0.0 - 8.0 %    Basophil% 3.6 (H) 0.0 - 1.9 %    Platelet Estimate Decreased (A)     Differential Method Automated    Comprehensive metabolic panel    Collection Time: 07/15/17  4:00 AM   Result Value Ref Range    Sodium 138 136 - 145 mmol/L    Potassium 3.6 3.5 - 5.1 mmol/L    Chloride 112 (H) 95 - 110 mmol/L    CO2 20 (L) 23 - 29 mmol/L    Glucose 105 70 - 110 mg/dL    BUN, Bld 17 6 - 20 mg/dL    Creatinine 0.9 0.5 - 1.4 mg/dL    Calcium 7.2 (L) 8.7 - 10.5 mg/dL    Total Protein 4.6 (L) 6.0 - 8.4 g/dL    Albumin 1.8 (L) 3.5 - 5.2 g/dL    Total Bilirubin 1.1 (H) 0.1 - 1.0 mg/dL    Alkaline Phosphatase 294 (H) 55 - 135 U/L    AST 13 10 - 40 U/L    ALT 12 10 - 44 U/L    Anion Gap 6 (L) 8 - 16 mmol/L    eGFR if African American >60.0 >60 mL/min/1.73 m^2    eGFR if non African American >60.0 >60 mL/min/1.73 m^2   Magnesium     Collection Time: 07/15/17  4:00 AM   Result Value Ref Range    Magnesium 1.7 1.6 - 2.6 mg/dL   Phosphorus    Collection Time: 07/15/17  4:00 AM   Result Value Ref Range    Phosphorus 2.0 (L) 2.7 - 4.5 mg/dL         Diagnostic Results:  I have reviewed all pertinent imaging results/findings within the past 24 hours.

## 2017-07-15 NOTE — ASSESSMENT & PLAN NOTE
- Supraventricular tachycardia on tele and EKG. Started 7/13/17 8pm with HR between 160-170  -Patient was cardioverted by cardiology yesterday and started on metoprolol and flecainide.  -Will repeat EKG this AM as pt remains tachycardic.  -Will f/u further cardiology recs  -Treat active infection  - Pharmacologic nuclear stress test ordered for 7/17/17, okay to change date if needed by cardiology

## 2017-07-15 NOTE — ASSESSMENT & PLAN NOTE
- blood cultures no growth from 6/24/17 and 7/4/17 and 7/7/17, CXR unremarkable.   - on Cefepime (started 6/24 - discontinued 6/28) restarted on 7/4/17 and continues currently  - Pt currently only on prophylactic abx--Bactrim, acyclovir.   -Pt on Cefepime and Amphotericin B  - Cefepime day 12, continues;  -ID contacted concerning recurrent fever today, Will panculture and restart vancomycin.  - repeated cultures 7/9/17 and 7/13/17 - NGTD  - CT chest/abdomen/pelvis reveals bilateral groundglass opacities as well as a left lower-lobe consolidation  - started on ambisome 7/9 per ID recommendations, continues currently   - pulm was consulted for a BAL which was done 7/12, cytology pending from BAL; aspergillus <0.500; KOH was negative for yeast/fungus   - fungitell pending, aspergillus negative and quantiferon gold results show indeterminate   - respiratory viral panel negative on 7/10/17

## 2017-07-15 NOTE — PROGRESS NOTES
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 Differential for pneumonia: suspect mold, atypical organism such as nocardia possible,  unlikely PCP w/ (-) Fungitell and unlikely Staph given no response related to fevers w/ vancomycin and negative gram stain and thus far BAL cultures NGTD.  Overall fever curve down but now with intermittent fevers    - for now continue empiric cefepime while neutropenic, ambisome, ARVs  - will follow            Anticipated Disposition: pending    Thank you for your consult. I will follow-up with patient. Please contact us if you have any additional questions.    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 ID has been consulted today for neutropenic fevers that have persisted the last few days despite empiric vanc, cefepime, chronic itraconazole, and acyclovir prophylaxis. Patient notes that prior to diagnosis of relapse in clinic, he was not having F/C/S or other localizing infectious symptoms -- only malaise and acute onset BLE body aches just prior to diagnosis. Since admission he notes neutropenic F/C/S and dry cough but denies HA, URI symptoms, sore throat, dysphagia, sputum production, SOB, N/V, abdominal pain, diarrhea, dysuria, genital lesions, line pain/drainage, or rash. Exposure history is largely unremarkable: he is currently single, lives alone, recently visited [place] but denies other travel (including international), no pets, no new sexual contacts, and no new hobbies (does garden).   Interval History:   -febrile this AM  -ANC 0   -ON/off supplemental O2 4l    Review of Systems   Constitutional: Positive for fatigue. Negative for chills and fever.   HENT: Positive for mouth sores. Negative for rhinorrhea and sore throat.    Eyes: Negative for photophobia and visual disturbance.   Respiratory: Positive for cough. Negative for shortness of breath and wheezing.    Cardiovascular: Positive for palpitations. Negative for  chest pain and leg swelling.   Gastrointestinal: Negative for abdominal pain and diarrhea.   Genitourinary: Negative for dysuria.   Musculoskeletal: Negative for arthralgias and myalgias.   Skin: Negative for rash.   Allergic/Immunologic: Positive for immunocompromised state.   Neurological: Negative for headaches.   Hematological: Bruises/bleeds easily.   Psychiatric/Behavioral: Negative for confusion.     Objective:     Vital Signs (Most Recent):  Temp: 99.2 °F (37.3 °C) (07/15/17 0401)  Pulse: (!) 134 (07/15/17 0700)  Resp: (!) 24 (07/15/17 0401)  BP: 135/69 (07/15/17 0401)  SpO2: (!) 93 % (07/15/17 0401) Vital Signs (24h Range):  Temp:  [98 °F (36.7 °C)-99.2 °F (37.3 °C)] 99.2 °F (37.3 °C)  Pulse:  [105-171] 134  Resp:  [18-26] 24  SpO2:  [90 %-95 %] 93 %  BP: ()/(55-73) 135/69     Weight: 93.5 kg (206 lb 2.1 oz)  Body mass index is 27.2 kg/m².    Estimated Creatinine Clearance: 106 mL/min (based on Cr of 0.9).    Physical Exam   Constitutional: He appears well-developed. He appears cachectic.   Up and walking in room   HENT:   Head: Normocephalic and atraumatic.   Eyes: EOM are normal. Pupils are equal, round, and reactive to light.   Neck: Normal range of motion. Neck supple.   Cardiovascular: Regular rhythm.  Tachycardia present.    No murmur heard.  Pulmonary/Chest: Effort normal and breath sounds normal. He has no wheezes. He has no rales.   Abdominal: Soft. Bowel sounds are normal.   Musculoskeletal: Normal range of motion.   Neurological: He is alert.   Skin: Skin is warm and dry.   Left subclavian port w/o tenderness or erythema     Significant Labs:   CBC:     Recent Labs  Lab 07/14/17  0400 07/15/17  0400   WBC 0.21* 0.28*   HGB 7.0* 7.5*   HCT 20.6* 21.4*   PLT 10*  --      CMP:     Recent Labs  Lab 07/14/17 0400 07/15/17  0400    138   K 4.0 3.6   * 112*   CO2 18* 20*    105   BUN 13 17   CREATININE 0.9 0.9   CALCIUM 7.1* 7.2*   PROT 4.5* 4.6*   ALBUMIN 1.7* 1.8*   BILITOT  1.2* 1.1*   ALKPHOS 304* 294*   AST 12 13   ALT 12 12   ANIONGAP 8 6*   EGFRNONAA >60.0 >60.0     Imaging  7/13 CT sinus  1. Paranasal sinus disease as discussed above. No evidence of air-fluid level to suggest acute sinusitis.    2. Nonspecific partial opacification of the bilateral mastoid air cells, right greater than left.    7/8 CT c/a/p  1.  Diffuse bilateral patchy groundglass opacities, with more confluent regions of groundglass opacity in the upper lobes with peripheral sparing as well as superimposed regions of more consolidative change.  Overall, findings highly concerning for underlying infectious/atypical infectious process.  Additional differential consideration including possible noninfectious inflammatory etiology or pulmonary edema.  Clinical correlation recommended.    2.  Slight colonic wall thickening of the sigmoid and descending colon, likely relating to nondistention.  However clinical correlation for symptoms of colitis recommended.    3.  Subcentimeter hepatic hypodensities too small to definitively characterize.  Please note the patient's previously identified enhancing left hepatic lobe lesion is not definitively visualized on today's examination, which could relate to differences in contrast bolus timing.    4.  Calcified left hilar lymph nodes likely reflecting sequela of prior granulomatous disease.    5.  Mild nonspecific perinephric fat stranding.  Correlation with urinalysis recommended.     Micro  7/13 bcx NGTD  7/12 BAL cultures NGTD  712 BAL asp ag negative  7/10 RVP negative  7/11 urine legionella negtive  7/10 BDG negative  7/10 quant gold indeterminate  7/10 asp ag negative  7/9 urine histo/blast ag both negative  7/9 bcx NGTD

## 2017-07-15 NOTE — PLAN OF CARE
Problem: Patient Care Overview  Goal: Plan of Care Review  Outcome: Ongoing (interventions implemented as appropriate)  Pt. Day 19 of Flag-Lynette.  Pt has remained free from injury this shift. Tmax 102.5 F. Blood cx and urine cx collected. EKG and chest Xray complete. Pt received a unit of platelets. Pt has new onset hemoptysis. Pt started on vancomyacin.  Lasix and electrolyte replacements given. Pt complains of facial pain; Dilaudid PRN given.  Bed in low locked position. Call light and personal belongings within reach. Side rails up x2. Nonskid socks in place. All questions and comments addressed at this time. Will continue to monitor.

## 2017-07-15 NOTE — PROGRESS NOTES
[facility]  Hematology  Bone Marrow Transplant  Progress Note    Patient Name: Paul E Sistrunk  Admission Date: 6/20/2017  Hospital Length of Stay: 25 days  Code Status: Full Code    Subjective:     Interval History: Pt cardioverted by cardiology yesterday and started on metoprolol and flecainide..  Pt remeins tachycardic this AM.  Pt with fever this AM as well as complaint of blood tinged sputum and SOB.  Pt denies N/V, constipation diarrhea,.    Review of Systems   Constitutional: Positive for fever. Negative for chills.   HENT: Negative for congestion.    Respiratory: Positive for cough, hemoptysis, sputum production and shortness of breath.    Cardiovascular: Negative for chest pain and leg swelling.   Gastrointestinal: Negative for abdominal pain, constipation, diarrhea, nausea and vomiting.   Neurological: Negative for headaches.     Objective:     Vital Signs (Most Recent):  Temp: (!) 102.5 °F (39.2 °C) (07/15/17 0901)  Pulse: (!) 120 (07/15/17 0913)  Resp: (!) 28 (07/15/17 0913)  BP: 135/71 (07/15/17 0913)  SpO2: 96 % (07/15/17 0913) Vital Signs (24h Range):  Temp:  [98 °F (36.7 °C)-102.5 °F (39.2 °C)] 102.5 °F (39.2 °C)  Pulse:  [105-163] 120  Resp:  [20-28] 28  SpO2:  [88 %-96 %] 96 %  BP: ()/(55-73) 135/71     Weight: 93.5 kg (206 lb 2.1 oz)  Body mass index is 27.2 kg/m².  Body surface area is 2.19 meters squared.    ECOG SCORE         [unfilled]    Intake/Output - Last 3 Shifts       07/13 0700 - 07/14 0659 07/14 0700 - 07/15 0659 07/15 0700 - 07/16 0659    P.O. 240 400     I.V. (mL/kg) 3577.1 (42) 2616.8 (28)     Blood  312     IV Piggyback 900 2250     Total Intake(mL/kg) 4717.1 (55.4) 5578.8 (59.7)     Urine (mL/kg/hr) 4550 (2.2) 2225 (1) 400 (1.7)    Stool  0 (0)     Total Output 4550 2225 400    Net +167.1 +3353.8 -400           Urine Occurrence  1 x     Stool Occurrence  2 x           Physical Exam   Constitutional: He is oriented to person, place, and time. He appears  well-developed and well-nourished. No distress.   HENT:   Head: Normocephalic and atraumatic.   Cardiovascular: Normal rate, regular rhythm and normal heart sounds.  Exam reveals no gallop and no friction rub.    No murmur heard.  Pulmonary/Chest: Effort normal. No respiratory distress. He has no wheezes. He has rales. He exhibits no tenderness.   Blood tinged sputum in medicine cup   Abdominal: Soft. Bowel sounds are normal. He exhibits no distension. There is no tenderness.   Neurological: He is alert and oriented to person, place, and time.   Skin: He is not diaphoretic.       Significant Labs:   Recent Results (from the past 24 hour(s))   CBC auto differential    Collection Time: 07/15/17  4:00 AM   Result Value Ref Range    WBC 0.28 (LL) 3.90 - 12.70 K/uL    RBC 2.43 (L) 4.60 - 6.20 M/uL    Hemoglobin 7.5 (L) 14.0 - 18.0 g/dL    Hematocrit 21.4 (L) 40.0 - 54.0 %    MCV 88 82 - 98 fL    MCH 30.9 27.0 - 31.0 pg    MCHC 35.0 32.0 - 36.0 %    RDW 15.2 (H) 11.5 - 14.5 %    Platelets 15 (LL) 150 - 350 K/uL    MPV 8.9 (L) 9.2 - 12.9 fL    Gran # 0.0 (L) 1.8 - 7.7 K/uL    Lymph # 0.3 (L) 1.0 - 4.8 K/uL    Mono # 0.0 (L) 0.3 - 1.0 K/uL    Eos # 0.0 0.0 - 0.5 K/uL    Baso # 0.01 0.00 - 0.20 K/uL    Gran% 0.0 (L) 38.0 - 73.0 %    Lymph% 96.4 (H) 18.0 - 48.0 %    Mono% 0.0 (L) 4.0 - 15.0 %    Eosinophil% 0.0 0.0 - 8.0 %    Basophil% 3.6 (H) 0.0 - 1.9 %    Platelet Estimate Decreased (A)     Differential Method Automated    Comprehensive metabolic panel    Collection Time: 07/15/17  4:00 AM   Result Value Ref Range    Sodium 138 136 - 145 mmol/L    Potassium 3.6 3.5 - 5.1 mmol/L    Chloride 112 (H) 95 - 110 mmol/L    CO2 20 (L) 23 - 29 mmol/L    Glucose 105 70 - 110 mg/dL    BUN, Bld 17 6 - 20 mg/dL    Creatinine 0.9 0.5 - 1.4 mg/dL    Calcium 7.2 (L) 8.7 - 10.5 mg/dL    Total Protein 4.6 (L) 6.0 - 8.4 g/dL    Albumin 1.8 (L) 3.5 - 5.2 g/dL    Total Bilirubin 1.1 (H) 0.1 - 1.0 mg/dL    Alkaline Phosphatase 294 (H) 55 - 135  U/L    AST 13 10 - 40 U/L    ALT 12 10 - 44 U/L    Anion Gap 6 (L) 8 - 16 mmol/L    eGFR if African American >60.0 >60 mL/min/1.73 m^2    eGFR if non African American >60.0 >60 mL/min/1.73 m^2   Magnesium    Collection Time: 07/15/17  4:00 AM   Result Value Ref Range    Magnesium 1.7 1.6 - 2.6 mg/dL   Phosphorus    Collection Time: 07/15/17  4:00 AM   Result Value Ref Range    Phosphorus 2.0 (L) 2.7 - 4.5 mg/dL         Diagnostic Results:  I have reviewed all pertinent imaging results/findings within the past 24 hours.      Assessment/Plan:     Neutropenic fever    - blood cultures no growth from 6/24/17 and 7/4/17 and 7/7/17, CXR unremarkable.   - on Cefepime (started 6/24 - discontinued 6/28) restarted on 7/4/17 and continues currently  - Pt currently only on prophylactic abx--Bactrim, acyclovir.   -Pt on Cefepime and Amphotericin B  - Cefepime day 12, continues;  -ID contacted concerning recurrent fever today, Will panculture and restart vancomycin.  - repeated cultures 7/9/17 and 7/13/17 - NGTD  - CT chest/abdomen/pelvis reveals bilateral groundglass opacities as well as a left lower-lobe consolidation  - started on ambisome 7/9 per ID recommendations, continues currently   - pulm was consulted for a BAL which was done 7/12, cytology pending from BAL; aspergillus <0.500; KOH was negative for yeast/fungus   - fungitell pending, aspergillus negative and quantiferon gold results show indeterminate   - respiratory viral panel negative on 7/10/17        S/P allogeneic bone marrow transplant    - +306 days s/p Flu/Bu/ATG MUD allogeneic transplant for 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 We have discussed antifungal coverage with infectious disease. Discontinued itraconzaole (7/09) and started ambisome  - started on  steroids due to itching - possible GVHD not confirmed. Pt now off steroids  - chimerics from marrow done 6/21 show CD3 of 90% donor and 10% recipient, but CD34 of 5% donor and 95% recipient   - repeat chimerics still pending from marrow done 7/10   - plans for DLI in future        Supraventricular tachycardia    - Supraventricular tachycardia on tele and EKG. Started 7/13/17 8pm with HR between 160-170  -Patient was cardioverted by cardiology yesterday and started on metoprolol and flecainide.  -Will repeat EKG this AM as pt remains tachycardic.  -Will f/u further cardiology recs  -Treat active infection  - Pharmacologic nuclear stress test ordered for 7/17/17, okay to change date if needed by cardiology         Insomnia    - with restlessness and no sleep for 2 nights (7/12/17); pt has been refusing Ramelteon for several days (now d/c'd); states he infrequently takes ambien prn at home (still ordered as prn). 7/14/17, pt refused ambien but still reports difficulty sleeping  - was started on zyprexa on 7/12/17 and d/c'd on 7/13/17 for restlessness  - for insomnia, encourage ambien use prn (Ramelteon and zyprexa are now d/c'd)        Facial numbness    - evaluated by Neurology  - MRI with no evidence of  acute intracranial pathology.  - thought to be secondary to drug induced peripheral neuropathy?  - Neurology rec Vitamin E, will hold for now given side effects of thrombocytopenia and bleeding    - with continued facial/jaw pain also; added MS contin for pain 7/13/17, may continue prn dilaudid; ordered CT of sinus/face 7/13/17, results show paranasal sinus disease, no acute sinusitis, nonspecific partial opacification of bilateral mastoid air cells right greater than left. ID to f/u on this CT--will have ID recommend if this needs to be followed up on with ENT?        Chronic bilateral low back pain without sciatica    - related to Neupogen  - started on zyrtec, lidocaine patch and Flexeril  - pain continues.  Continue to monitor.  - started on ms continue 15 mg q12 on 7/13/17, increased to 30 mg q12 on 7/14/17; decreased dilaudid 2 mg from q2 hrs to q3hrs on 7/14/17 for breakthrough pain         Electrolyte abnormality    - ordered prn electrolyte replacement per protocol   - monitor closely due to ambisome         Anxiety    - PRN Xanax        Pancytopenia due to antineoplastic chemotherapy    - white blood cell count is 1.28 k/uL; absolute neutrophil count is 0 cells/uL  - hemoglobin 7.5 g/dL; platelet count is 15 k/uL  - transfuse for hemoglobin < 7 g/dL, platelets < 10 k/uL        HIV disease    - CD4 low at 11.4%, Absolute CD4 469 and HIV RNA not detected from 6/21  - As per ID: continue triumeq        Prostate hypertrophy    - Continue home flomax        * AML (acute myeloid leukemia) in relapse    - relapsed AML - peripheral blood shows 30% blasts on admit  - 2D echo shows 60% EF  - Mazariegos placed, local measures to control bleeding, gen surg placed more sutures, now stopped bleeding  - BM biopsy results - consistent with 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 Blast of 62%  - awaiting mutation analysis and cytogenetics; FLT-3 negative     - Jaw pain and pain in right cranial nerve 7 distribution with persistent, severe headaches concerning for CNS involvement - underwent IT chemotherapy on 6/23. Flow negative for disease. CSF LDH was 29  - Had previous reaction to MEC (etoposide caused full body rash). Did not qualify for the Tolero trial due to receiving IT chemotherapy    - Day 19 of FLAG-BETZY   - Day 14 bone marrow biopsy done 7/10 - hypocellular with no evidence of residual disease  - Future plans for DLI            VTE Risk Mitigation         Ordered     Place sequential compression device  Until discontinued      06/20/17 2005     High Risk of VTE  Once      06/20/17 1901        Diet - regular  Prophylaxis - lovenox held secondary to thrombocytopenia  Dispo - pending  improvement in infection, HR and blood counts.      Mike Segal MD  Bone Marrow Transplant  [facility]

## 2017-07-15 NOTE — ASSESSMENT & PLAN NOTE
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 We have discussed antifungal coverage with infectious disease. Discontinued itraconzaole (7/09) and started ambisome  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. Pt now off steroids  - chimerics from marrow done 6/21 show CD3 of 90% donor and 10% recipient, but CD34 of 5% donor and 95% recipient   - repeat chimerics still pending from marrow done 7/10   - plans for DLI in future

## 2017-07-15 NOTE — PROGRESS NOTES
Dr. Segal notified of current temp, tylenol 650 mg PO given.  Will continue to monitor pt.       07/15/17 0901   Vital Signs   Temp (!) 102.5 °F (39.2 °C)   Temp src Oral

## 2017-07-16 PROBLEM — R60.0 LOWER EXTREMITY EDEMA: Status: ACTIVE | Noted: 2017-01-01

## 2017-07-16 NOTE — PROGRESS NOTES
[facility]  Infectious Disease  Progress Note    Patient Name: Paul E Sistrunk  MRN: [id number]  Admission Date: 6/20/2017  Length of Stay: 26 days  Attending Physician: Enriqueta Saleem MD  Primary Care Provider: Edgar Pinon MD    Isolation Status: No active isolations  Assessment/Plan:      Neutropenic fever    54 y/o M well-controlled HIV (CD4 469/11.4%, VL <49 in 6/2017, on triumeq), high-risk non-M3 AmL (dx 2/2016, s/p 7+3 induction with residual leukemia, successful MEC reinduction 3/2016 and IDAC consolidation x 2 through 6/2016 and subsequent MUD allo-SCT 9/5/2016; CMV D+/R+, Flu/Bu/ATG conditioning, ACV and maintenance tacro ppx; c/b neutropenic fever due to pulmonary histoplasmosis with positive urine antigen 2.35 and pulmonary micronodules on CT chest 2/2016; s/p ambisome induction and on itraconazole maintenance with serial negative repeat antigens; and Klebsiella septicemia 2016 while neutropenic), and CKD-3 who was admitted on 6/20/2017 with relapsed AML (s/p repeat induction with FLAG-BETZY starting 6/28) with neutropenic fevers despite empiric vanc/cefepime/itra/ACV with multifocal pneumonia noted on CT CAP as a likely source. Differential for pneumonia: suspect mold, atypical organism such as nocardia possible,  unlikely PCP w/ (-) Fungitell and unlikely Staph given no response related to fevers w/ vancomycin and negative gram stain and thus far BAL cultures NGTD.  Overall fever curve down but now with intermittent fevers    - for now continue empiric cefepime while neutropenic, ambisome, ARVs  - will follow            Anticipated Disposition: pending    Thank you for your consult. I will follow-up with patient. Please contact us if you have any additional questions.    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 Sistrunk is a 54yo man w/a history of well-controlled  HIV (CD4 469/11.4%, VL <49 in 6/2017, on triumeq), high-risk non-M3 AmL (dx 2/2016, s/p 7+3 induction with residual leukemia, successful MEC reinduction 3/2016 and IDAC consolidation x 2 through 6/2016 and subsequent MUD allo-SCT 9/5/2016; CMV D+/R+, Flu/Bu/ATG conditioning, ACV and maintenance tacro ppx; c/b neutropenic fever due to pulmonary histoplasmosis with positive urine antigen 2.35 and pulmonary micronodules on CT chest 2/2016; s/p ambisome induction and on itraconazole maintenance with serial negative repeat antigens; and Klebsiella septicemia 2016 while neutropenic), and CKD-3 who was admitted on 6/20/2017 with relapsed AML for repeat induction with FLAG-BETZY (starting 6/28). ID has been consulted today for neutropenic fevers that have persisted the last few days despite empiric vanc, cefepime, chronic itraconazole, and acyclovir prophylaxis. Patient notes that prior to diagnosis of relapse in clinic, he was not having F/C/S or other localizing infectious symptoms -- only malaise and acute onset BLE body aches just prior to diagnosis. Since admission he notes neutropenic F/C/S and dry cough but denies HA, URI symptoms, sore throat, dysphagia, sputum production, SOB, N/V, abdominal pain, diarrhea, dysuria, genital lesions, line pain/drainage, or rash. Exposure history is largely unremarkable: he is currently single, lives alone, recently visited [place] but denies other travel (including international), no pets, no new sexual contacts, and no new hobbies (does garden).   Interval History:   -febrile yesterday AM  -ANC 0   -ON/off supplemental O2 4l    Review of Systems   Constitutional: Positive for fatigue. Negative for chills and fever.   HENT: Positive for mouth sores. Negative for rhinorrhea and sore throat.    Eyes: Negative for photophobia and visual disturbance.   Respiratory: Positive for cough. Negative for shortness of breath and wheezing.    Cardiovascular: Positive for palpitations. Negative for  chest pain and leg swelling.   Gastrointestinal: Negative for abdominal pain and diarrhea.   Genitourinary: Negative for dysuria.   Musculoskeletal: Negative for arthralgias and myalgias.   Skin: Negative for rash.   Allergic/Immunologic: Positive for immunocompromised state.   Neurological: Negative for headaches.   Hematological: Bruises/bleeds easily.   Psychiatric/Behavioral: Negative for confusion.     Objective:     Vital Signs (Most Recent):  Temp: 98.8 °F (37.1 °C) (07/16/17 0711)  Pulse: (!) 116 (07/16/17 0723)  Resp: (!) 24 (07/16/17 0711)  BP: (!) 145/70 (07/16/17 0711)  SpO2: (!) 93 % (07/16/17 0711) Vital Signs (24h Range):  Temp:  [98 °F (36.7 °C)-99.1 °F (37.3 °C)] 98.8 °F (37.1 °C)  Pulse:  [] 116  Resp:  [20-24] 24  SpO2:  [91 %-97 %] 93 %  BP: (110-145)/(56-76) 145/70     Weight: 94.4 kg (208 lb 1.8 oz)  Body mass index is 27.46 kg/m².    Estimated Creatinine Clearance: 106 mL/min (based on Cr of 0.9).    Physical Exam   Constitutional: He appears well-developed. He appears cachectic.   Up and walking in room   HENT:   Head: Normocephalic and atraumatic.   Eyes: EOM are normal. Pupils are equal, round, and reactive to light.   Neck: Normal range of motion. Neck supple.   Cardiovascular: Regular rhythm.  Tachycardia present.    No murmur heard.  Pulmonary/Chest: Effort normal and breath sounds normal. He has no wheezes. He has no rales.   Abdominal: Soft. Bowel sounds are normal.   Musculoskeletal: Normal range of motion.   Neurological: He is alert.   Skin: Skin is warm and dry.   Left subclavian port w/o tenderness or erythema     Significant Labs:   CBC:     Recent Labs  Lab 07/15/17  0400 07/16/17  0400   WBC 0.28* 0.35*   HGB 7.5* 7.1*   HCT 21.4* 20.6*   PLT 15* 13*     CMP:     Recent Labs  Lab 07/15/17  0400 07/16/17  0400    137   K 3.6 3.6   * 110   CO2 20* 22*    123*   BUN 17 16   CREATININE 0.9 0.9   CALCIUM 7.2* 7.0*   PROT 4.6* 4.5*   ALBUMIN 1.8* 1.8*    BILITOT 1.1* 1.0   ALKPHOS 294* 258*   AST 13 13   ALT 12 12   ANIONGAP 6* 5*   EGFRNONAA >60.0 >60.0     Imaging  7/13 CT sinus  1. Paranasal sinus disease as discussed above. No evidence of air-fluid level to suggest acute sinusitis.    2. Nonspecific partial opacification of the bilateral mastoid air cells, right greater than left.    7/8 CT c/a/p  1.  Diffuse bilateral patchy groundglass opacities, with more confluent regions of groundglass opacity in the upper lobes with peripheral sparing as well as superimposed regions of more consolidative change.  Overall, findings highly concerning for underlying infectious/atypical infectious process.  Additional differential consideration including possible noninfectious inflammatory etiology or pulmonary edema.  Clinical correlation recommended.    2.  Slight colonic wall thickening of the sigmoid and descending colon, likely relating to nondistention.  However clinical correlation for symptoms of colitis recommended.    3.  Subcentimeter hepatic hypodensities too small to definitively characterize.  Please note the patient's previously identified enhancing left hepatic lobe lesion is not definitively visualized on today's examination, which could relate to differences in contrast bolus timing.    4.  Calcified left hilar lymph nodes likely reflecting sequela of prior granulomatous disease.    5.  Mild nonspecific perinephric fat stranding.  Correlation with urinalysis recommended.     Micro  7/13 bcx NGTD  7/12 BAL cultures NGTD  712 BAL asp ag negative  7/10 RVP negative  7/11 urine legionella negtive  7/10 BDG negative  7/10 quant gold indeterminate  7/10 asp ag negative  7/9 urine histo/blast ag both negative  7/9 bcx NGTD

## 2017-07-16 NOTE — ASSESSMENT & PLAN NOTE
- white blood cell count is 0.35 k/uL; absolute neutrophil count is 0 cells/uL  - hemoglobin 7.1 g/dL; platelet count is 13 k/uL  - transfuse for hemoglobin < 7 g/dL, platelets < 10 k/uL

## 2017-07-16 NOTE — ASSESSMENT & PLAN NOTE
54 y/o M well-controlled HIV (CD4 469/11.4%, VL <49 in 6/2017, on triumeq), high-risk non-M3 AmL (dx 2/2016, s/p 7+3 induction with residual leukemia, successful MEC reinduction 3/2016 and IDAC consolidation x 2 through 6/2016 and subsequent MUD allo-SCT 9/5/2016; CMV D+/R+, Flu/Bu/ATG conditioning, ACV and maintenance tacro ppx; c/b neutropenic fever due to pulmonary histoplasmosis with positive urine antigen 2.35 and pulmonary micronodules on CT chest 2/2016; s/p ambisome induction and on itraconazole maintenance with serial negative repeat antigens; and Klebsiella septicemia 2016 while neutropenic), and CKD-3 who was admitted on 6/20/2017 with relapsed AML (s/p repeat induction with FLAG-BETZY starting 6/28) with neutropenic fevers despite empiric vanc/cefepime/itra/ACV with multifocal pneumonia noted on CT CAP as a likely source. Differential for pneumonia: suspect mold, atypical organism such as nocardia possible,  unlikely PCP w/ (-) Fungitell and unlikely Staph given no response related to fevers w/ vancomycin and negative gram stain and thus far BAL cultures NGTD.  Overall fever curve down but now with intermittent fevers    - for now continue empiric cefepime while neutropenic, ambisome, ARVs  - will follow

## 2017-07-16 NOTE — PLAN OF CARE
Problem: Patient Care Overview  Goal: Plan of Care Review  Outcome: Ongoing (interventions implemented as appropriate)  Pt. Day 20 of Flag-Lynette.  Pt. with nonskid footwear on with bed in lowest position and locked with bed rails up x2.  Pt. ambulates independently and instructed to call if assistance is needed.  Pt. with call light within reach.  Pt. Remains on IVF's. Still receiving cefepime and Vanc restarted yesterday as patient had temp on dayshift. Remained afebrile through night. Patient still requesting IV pain meds as often as he can have and has been requesting an hour before it is due. Sputum sample sent. Patient having hemopytsis. Voiding without difficulty. Received lasix yesterday for weight gain. Remains on  MS contin 30mg. HR remained in 100's-110's throughout shift. Oxygen used throughout shift . Patient has a cough tessalon pearls given.  Pt. started on metoprolol.  Will continue to monitor pt.

## 2017-07-16 NOTE — ASSESSMENT & PLAN NOTE
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 Blast of 62%  - awaiting mutation analysis and cytogenetics; FLT-3 negative     - Jaw pain and pain in right cranial nerve 7 distribution with persistent, severe headaches concerning for CNS involvement - underwent IT chemotherapy on 6/23. Flow negative for disease. CSF LDH was 29  - Had previous reaction to MEC (etoposide caused full body rash). Did not qualify for the Tolero trial due to receiving IT chemotherapy    - Day 20 of FLAG-BETZY   - Day 14 bone marrow biopsy done 7/10 - hypocellular with no evidence of residual disease  - Future plans for DLI

## 2017-07-16 NOTE — SUBJECTIVE & OBJECTIVE
Interval History:   -febrile yesterday AM  -ANC 0   -ON/off supplemental O2 4l    Review of Systems   Constitutional: Positive for fatigue. Negative for chills and fever.   HENT: Positive for mouth sores. Negative for rhinorrhea and sore throat.    Eyes: Negative for photophobia and visual disturbance.   Respiratory: Positive for cough. Negative for shortness of breath and wheezing.    Cardiovascular: Positive for palpitations. Negative for chest pain and leg swelling.   Gastrointestinal: Negative for abdominal pain and diarrhea.   Genitourinary: Negative for dysuria.   Musculoskeletal: Negative for arthralgias and myalgias.   Skin: Negative for rash.   Allergic/Immunologic: Positive for immunocompromised state.   Neurological: Negative for headaches.   Hematological: Bruises/bleeds easily.   Psychiatric/Behavioral: Negative for confusion.     Objective:     Vital Signs (Most Recent):  Temp: 98.8 °F (37.1 °C) (07/16/17 0711)  Pulse: (!) 116 (07/16/17 0723)  Resp: (!) 24 (07/16/17 0711)  BP: (!) 145/70 (07/16/17 0711)  SpO2: (!) 93 % (07/16/17 0711) Vital Signs (24h Range):  Temp:  [98 °F (36.7 °C)-99.1 °F (37.3 °C)] 98.8 °F (37.1 °C)  Pulse:  [] 116  Resp:  [20-24] 24  SpO2:  [91 %-97 %] 93 %  BP: (110-145)/(56-76) 145/70     Weight: 94.4 kg (208 lb 1.8 oz)  Body mass index is 27.46 kg/m².    Estimated Creatinine Clearance: 106 mL/min (based on Cr of 0.9).    Physical Exam   Constitutional: He appears well-developed. He appears cachectic.   Up and walking in room   HENT:   Head: Normocephalic and atraumatic.   Eyes: EOM are normal. Pupils are equal, round, and reactive to light.   Neck: Normal range of motion. Neck supple.   Cardiovascular: Regular rhythm.  Tachycardia present.    No murmur heard.  Pulmonary/Chest: Effort normal and breath sounds normal. He has no wheezes. He has no rales.   Abdominal: Soft. Bowel sounds are normal.   Musculoskeletal: Normal range of motion.   Neurological: He is alert.    Skin: Skin is warm and dry.   Left subclavian port w/o tenderness or erythema     Significant Labs:   CBC:     Recent Labs  Lab 07/15/17  0400 07/16/17  0400   WBC 0.28* 0.35*   HGB 7.5* 7.1*   HCT 21.4* 20.6*   PLT 15* 13*     CMP:     Recent Labs  Lab 07/15/17  0400 07/16/17  0400    137   K 3.6 3.6   * 110   CO2 20* 22*    123*   BUN 17 16   CREATININE 0.9 0.9   CALCIUM 7.2* 7.0*   PROT 4.6* 4.5*   ALBUMIN 1.8* 1.8*   BILITOT 1.1* 1.0   ALKPHOS 294* 258*   AST 13 13   ALT 12 12   ANIONGAP 6* 5*   EGFRNONAA >60.0 >60.0     Imaging  7/13 CT sinus  1. Paranasal sinus disease as discussed above. No evidence of air-fluid level to suggest acute sinusitis.    2. Nonspecific partial opacification of the bilateral mastoid air cells, right greater than left.    7/8 CT c/a/p  1.  Diffuse bilateral patchy groundglass opacities, with more confluent regions of groundglass opacity in the upper lobes with peripheral sparing as well as superimposed regions of more consolidative change.  Overall, findings highly concerning for underlying infectious/atypical infectious process.  Additional differential consideration including possible noninfectious inflammatory etiology or pulmonary edema.  Clinical correlation recommended.    2.  Slight colonic wall thickening of the sigmoid and descending colon, likely relating to nondistention.  However clinical correlation for symptoms of colitis recommended.    3.  Subcentimeter hepatic hypodensities too small to definitively characterize.  Please note the patient's previously identified enhancing left hepatic lobe lesion is not definitively visualized on today's examination, which could relate to differences in contrast bolus timing.    4.  Calcified left hilar lymph nodes likely reflecting sequela of prior granulomatous disease.    5.  Mild nonspecific perinephric fat stranding.  Correlation with urinalysis recommended.     Micro  7/13 bcx NGTD  7/12 BAL cultures  NGTD  712 BAL asp ag negative  7/10 RVP negative  7/11 urine legionella negtive  7/10 BDG negative  7/10 quant gold indeterminate  7/10 asp ag negative  7/9 urine histo/blast ag both negative  7/9 bcx NGTD

## 2017-07-16 NOTE — SUBJECTIVE & OBJECTIVE
Subjective:     Interval History: Pt with fever yesterday, pancultured, and started on vanc.  CXr done showing possible pneumonia.  Pt continues to complain of SOB and blood tinged sputum.  Pt denies CP, N/V constipation diarrhea.    Review of Systems   Constitutional: Positive for fever. Negative for chills.   HENT: Negative for congestion.    Respiratory: Positive for cough, hemoptysis, sputum production and shortness of breath.    Cardiovascular: Negative for chest pain and leg swelling.   Gastrointestinal: Negative for abdominal pain, constipation, diarrhea, nausea and vomiting.   Neurological: Negative for headaches.     Objective:     Vital Signs (Most Recent):  Temp: 98.8 °F (37.1 °C) (07/16/17 0711)  Pulse: (!) 116 (07/16/17 0723)  Resp: (!) 24 (07/16/17 0711)  BP: (!) 145/70 (07/16/17 0711)  SpO2: (!) 93 % (07/16/17 0711) Vital Signs (24h Range):  Temp:  [98 °F (36.7 °C)-98.8 °F (37.1 °C)] 98.8 °F (37.1 °C)  Pulse:  [] 116  Resp:  [20-24] 24  SpO2:  [91 %-97 %] 93 %  BP: (110-145)/(56-76) 145/70     Weight: 94.4 kg (208 lb 1.8 oz)  Body mass index is 27.46 kg/m².  Body surface area is 2.2 meters squared.    ECOG SCORE         [unfilled]    Intake/Output - Last 3 Shifts       07/14 0700 - 07/15 0659 07/15 0700 - 07/16 0659 07/16 0700 - 07/17 0659    P.O. 400 960     I.V. (mL/kg) 2693.9 (28.8) 3027.1 (32.1)     Blood 312 255     IV Piggyback 2250 1700 450    Total Intake(mL/kg) 5655.9 (60.5) 5942.1 (62.9) 450 (4.8)    Urine (mL/kg/hr) 2225 (1) 3325 (1.5) 300 (1)    Stool 0 (0) 0 (0)     Total Output 2225 3325 300    Net +3430.9 +2617.1 +150           Urine Occurrence 1 x      Stool Occurrence 2 x 0 x           Physical Exam   Constitutional: He is oriented to person, place, and time. He appears well-developed and well-nourished. No distress.   HENT:   Head: Normocephalic and atraumatic.   Cardiovascular: Normal heart sounds.  Exam reveals no gallop and no friction rub.    No murmur heard.  tachycardic    Pulmonary/Chest: Effort normal and breath sounds normal. No respiratory distress. He has no wheezes. He has no rales. He exhibits no tenderness.   Blood tinged sputum in medicine cup   Abdominal: Soft. Bowel sounds are normal. He exhibits no distension. There is no tenderness.   Neurological: He is alert and oriented to person, place, and time.   Skin: He is not diaphoretic.       Significant Labs:   Recent Results (from the past 24 hour(s))   Gram stain    Collection Time: 07/15/17 11:03 AM   Result Value Ref Range    Gram Stain Result No WBC's     Gram Stain Result No organisms seen    Urinalysis    Collection Time: 07/15/17 11:03 AM   Result Value Ref Range    Specimen UA Urine, Clean Catch     Color, UA Yellow Yellow, Straw, Vanita    Appearance, UA Clear Clear    pH, UA 5.0 5.0 - 8.0    Specific Gravity, UA 1.010 1.005 - 1.030    Protein, UA 1+ (A) Negative    Glucose, UA Negative Negative    Ketones, UA Negative Negative    Bilirubin (UA) Negative Negative    Occult Blood UA 2+ (A) Negative    Nitrite, UA Negative Negative    Urobilinogen, UA Negative <2.0 EU/dL    Leukocytes, UA Negative Negative   Urinalysis Microscopic    Collection Time: 07/15/17 11:03 AM   Result Value Ref Range    RBC, UA 2 0 - 4 /hpf    WBC, UA 1 0 - 5 /hpf    Bacteria, UA None None-Occ /hpf    Squam Epithel, UA 0 /hpf    Hyaline Casts, UA 0 0-1/lpf /lpf    Microscopic Comment SEE COMMENT    Culture, Respiratory with Gram Stain    Collection Time: 07/15/17 10:39 PM   Result Value Ref Range    Gram Stain (Respiratory) <10 epithelial cells per low power field.     Gram Stain (Respiratory) No WBC's or organisms seen    CBC auto differential    Collection Time: 07/16/17  4:00 AM   Result Value Ref Range    WBC 0.35 (LL) 3.90 - 12.70 K/uL    RBC 2.30 (L) 4.60 - 6.20 M/uL    Hemoglobin 7.1 (L) 14.0 - 18.0 g/dL    Hematocrit 20.6 (L) 40.0 - 54.0 %    MCV 90 82 - 98 fL    MCH 30.9 27.0 - 31.0 pg    MCHC 34.5 32.0 - 36.0 %    RDW 14.9 (H) 11.5  - 14.5 %    Platelets 13 (LL) 150 - 350 K/uL    MPV 9.8 9.2 - 12.9 fL    Gran # 0.0 (L) 1.8 - 7.7 K/uL    Lymph # 0.3 (L) 1.0 - 4.8 K/uL    Mono # 0.0 (L) 0.3 - 1.0 K/uL    Eos # 0.0 0.0 - 0.5 K/uL    Baso # 0.01 0.00 - 0.20 K/uL    Gran% 0.0 (L) 38.0 - 73.0 %    Lymph% 94.2 (H) 18.0 - 48.0 %    Mono% 2.9 (L) 4.0 - 15.0 %    Eosinophil% 0.0 0.0 - 8.0 %    Basophil% 2.9 (H) 0.0 - 1.9 %    Platelet Estimate Decreased (A)     Differential Method Automated    Comprehensive metabolic panel    Collection Time: 07/16/17  4:00 AM   Result Value Ref Range    Sodium 137 136 - 145 mmol/L    Potassium 3.6 3.5 - 5.1 mmol/L    Chloride 110 95 - 110 mmol/L    CO2 22 (L) 23 - 29 mmol/L    Glucose 123 (H) 70 - 110 mg/dL    BUN, Bld 16 6 - 20 mg/dL    Creatinine 0.9 0.5 - 1.4 mg/dL    Calcium 7.0 (L) 8.7 - 10.5 mg/dL    Total Protein 4.5 (L) 6.0 - 8.4 g/dL    Albumin 1.8 (L) 3.5 - 5.2 g/dL    Total Bilirubin 1.0 0.1 - 1.0 mg/dL    Alkaline Phosphatase 258 (H) 55 - 135 U/L    AST 13 10 - 40 U/L    ALT 12 10 - 44 U/L    Anion Gap 5 (L) 8 - 16 mmol/L    eGFR if African American >60.0 >60 mL/min/1.73 m^2    eGFR if non African American >60.0 >60 mL/min/1.73 m^2   Magnesium    Collection Time: 07/16/17  4:00 AM   Result Value Ref Range    Magnesium 1.6 1.6 - 2.6 mg/dL   Phosphorus    Collection Time: 07/16/17  4:00 AM   Result Value Ref Range    Phosphorus 1.8 (L) 2.7 - 4.5 mg/dL   Type & Screen    Collection Time: 07/16/17  4:46 AM   Result Value Ref Range    Group & Rh O POS     Indirect [place] NEG          Diagnostic Results:  Microbiology Results (last 7 days)     Procedure Component Value Units Date/Time    Culture, Respiratory with Gram Stain [[id number]] Collected:  07/15/17 2239    Order Status:  Completed Specimen:  Respiratory from Sputum, Expectorated Updated:  07/16/17 0528     Gram Stain (Respiratory) <10 epithelial cells per low power field.     Gram Stain (Respiratory) No WBC's or organisms seen    Blood culture [[id number]]  Collected:  07/15/17 1008    Order Status:  Completed Specimen:  Blood Updated:  07/15/17 1915     Blood Culture, Routine No Growth to date    Blood culture [[id number]] Collected:  07/15/17 1008    Order Status:  Completed Specimen:  Blood Updated:  07/15/17 1915     Blood Culture, Routine No Growth to date    Gram stain [[id number]] Collected:  07/15/17 1103    Order Status:  Completed Specimen:  Urine from Urine, Catheterized Updated:  07/15/17 1526     Gram Stain Result No WBC's      No organisms seen    Blood culture [[id number]] Collected:  07/13/17 1018    Order Status:  Completed Specimen:  Blood from Line, Subclavian, Left Updated:  07/15/17 1412     Blood Culture, Routine No Growth to date     Blood Culture, Routine No Growth to date     Blood Culture, Routine No Growth to date    Blood culture [[id number]] Collected:  07/13/17 1052    Order Status:  Completed Specimen:  Blood Updated:  07/15/17 1212     Blood Culture, Routine No Growth to date     Blood Culture, Routine No Growth to date     Blood Culture, Routine No Growth to date    Urine culture [[id number]] Collected:  07/15/17 1103    Order Status:  Sent Specimen:  Urine from Urine, Catheterized Updated:  07/15/17 1203    Blood culture [[id number]]     Order Status:  Canceled Specimen:  Blood     Blood culture [[id number]]     Order Status:  Canceled Specimen:  Blood     Fungus culture [[id number]] Collected:  07/12/17 1058    Order Status:  Completed Specimen:  Bronchial Alveolar Lavage from Lung, LLL Updated:  07/14/17 1055     Fungus (Mycology) Culture Culture in progress    Narrative:       Bronchial Wash    Blood culture [[id number]] Collected:  07/09/17 0638    Order Status:  Completed Specimen:  Blood Updated:  07/14/17 1012     Blood Culture, Routine No growth after 5 days.    Narrative:       peripheral    Blood culture [[id number]] Collected:  07/09/17 0640    Order Status:  Completed Specimen:  Blood Updated:  07/14/17 1012     Blood Culture,  Routine No growth after 5 days.    Narrative:       peripheral    Culture, Respiratory [[id number]] Collected:  07/12/17 1057    Order Status:  Completed Specimen:  Respiratory from Bronchial Wash Updated:  07/14/17 1003     Respiratory Culture No growth     Gram Stain (Respiratory) No organisms seen     Gram Stain (Respiratory) No WBC's    Narrative:       Bronchial Wash    AFB Culture & Smear [[id number]] Collected:  07/12/17 1058    Order Status:  Completed Specimen:  Bronchial Alveolar Lavage from Lung, LLL Updated:  07/13/17 2127     AFB Culture & Smear Culture in progress     AFB CULTURE STAIN No acid fast bacilli seen.    Narrative:       Bronchial Wash    Blood culture [[id number]]     Order Status:  Canceled Specimen:  Blood     Respiratory Viral Panel by PCR Nasal Swab [[id number]] Collected:  07/10/17 1231    Order Status:  Completed Specimen:  Respiratory Updated:  07/13/17 0712     Respiratory Virus Panel, source NS     RVP - Adenovirus Not Detected     Comment: Respiratory Viral Panel is a product of [employer].  It has been approved or cleared by the [place] Food and Drug  Administration for in vitro diagnostic use.  Results should be  used in conjunction with clinical findings, and should not form  the sole basis for a diagnosis or treatment decision.  Negative results do not preclude respiratory virus infection  and should not be used as the sole basis for diagnosis,   treatment, or other management decisions.  Positive results do not rule out bacterial infection, or  co-infection with other viruses.  The agent detected may not  be the definitive cause of the disease. The use of additional   laboratory testing (e.g. bacterial culture, immunofluorescence,  radiography) and clinical presentation must be taken into  consideration in order to obtain the final diagnosis of   respiratory viral infection.  The RVP assay cannot adequately detect Adenovirus species C,  or serotypes 7a and 41.  The RVP  primers for detection of   rhinovirus have been shown to cross-react with enterovirus.  A rhinovirus reactive result should be confirmed by an   alternative method (e.g. cell culture).  The  of the Respiratory Viral Panel has   recommmended that specimens found to be negative for  Adenovirus be confirmed by an alternative method.  The  of the Respiratory Viral Panel has  recommended that specimens found to be negative for   Influenza be confirmed by an alternative method.          Enterovirus Not Detected     Comment: Cross-reactivity has been observed between certain Rhinovirus  strains and the Enterovirus assay.          Human Bocavirus Not Detected     Human Coronavirus Not Detected     Comment: The Human Coronavirus assay detects Human coronavirus types  229E, OC43,NL63 and HKO1.          RVP - Human Metapneumovirus (hMPV) Not Detected     RVP - Influenza A Not Detected     Influenza A - O8P7-25 Not Detected     RVP - Influenza B Not Detected     Parainfluenza Not Detected     Respiratory Syncytial VirusVirus (RSV) A Not Detected     Comment: The Respiratory Syncytial Viral assay detects types A and B,  however it does not distinguish between the two.          RVP - Rhinovirus Not Detected    Blood culture [[id number]] Collected:  07/07/17 0601    Order Status:  Completed Specimen:  Blood Updated:  07/13/17 0612     Blood Culture, Routine No growth after 5 days.    Blood culture [[id number]] Collected:  07/07/17 0601    Order Status:  Completed Specimen:  Blood Updated:  07/13/17 0612     Blood Culture, Routine No growth after 5 days.    KOH prep [[id number]] Collected:  07/12/17 1058    Order Status:  Completed Specimen:  Bronchial Alveolar Lavage from Lung, LLL Updated:  07/12/17 1210     KOH Prep No yeast or fungal elements seen    Narrative:       Bronchial Wash    Gram stain [[id number]] Collected:  07/12/17 1058    Order Status:  Canceled Specimen:  Bronchial Alveolar Lavage from Lung,  LLL Updated:  07/12/17 1138    Urine culture [[id number]] Collected:  07/09/17 0618    Order Status:  Completed Specimen:  Urine from Urine, Clean Catch Updated:  07/10/17 1251     Urine Culture, Routine No growth        CXR:  Findings most concerning for a lateral upper lobe pneumonia as well as small volume bilateral pleural effusions.  Pulmonary hemorrhage and pulmonary edema are also in this differential.

## 2017-07-16 NOTE — PROGRESS NOTES
[facility]  Hematology  Bone Marrow Transplant  Progress Note    Patient Name: Paul E Sistrunk  Admission Date: 6/20/2017  Hospital Length of Stay: 26 days  Code Status: Full Code    Subjective:     Interval History: Pt with fever yesterday, pancultured, and started on vanc.  CXr done showing possible pneumonia.  Pt continues to complain of SOB and blood tinged sputum.  Pt denies CP, N/V constipation diarrhea.    Review of Systems   Constitutional: Positive for fever. Negative for chills.   HENT: Negative for congestion.    Respiratory: Positive for cough, hemoptysis, sputum production and shortness of breath.    Cardiovascular: Negative for chest pain and leg swelling.   Gastrointestinal: Negative for abdominal pain, constipation, diarrhea, nausea and vomiting.   Neurological: Negative for headaches.     Objective:     Vital Signs (Most Recent):  Temp: 98.8 °F (37.1 °C) (07/16/17 0711)  Pulse: (!) 116 (07/16/17 0723)  Resp: (!) 24 (07/16/17 0711)  BP: (!) 145/70 (07/16/17 0711)  SpO2: (!) 93 % (07/16/17 0711) Vital Signs (24h Range):  Temp:  [98 °F (36.7 °C)-98.8 °F (37.1 °C)] 98.8 °F (37.1 °C)  Pulse:  [] 116  Resp:  [20-24] 24  SpO2:  [91 %-97 %] 93 %  BP: (110-145)/(56-76) 145/70     Weight: 94.4 kg (208 lb 1.8 oz)  Body mass index is 27.46 kg/m².  Body surface area is 2.2 meters squared.    ECOG SCORE         [unfilled]    Intake/Output - Last 3 Shifts       07/14 0700 - 07/15 0659 07/15 0700 - 07/16 0659 07/16 0700 - 07/17 0659    P.O. 400 960     I.V. (mL/kg) 2693.9 (28.8) 3027.1 (32.1)     Blood 312 255     IV Piggyback 2250 1700 450    Total Intake(mL/kg) 5655.9 (60.5) 5942.1 (62.9) 450 (4.8)    Urine (mL/kg/hr) 2225 (1) 3325 (1.5) 300 (1)    Stool 0 (0) 0 (0)     Total Output 2225 3325 300    Net +3430.9 +2617.1 +150           Urine Occurrence 1 x      Stool Occurrence 2 x 0 x           Physical Exam   Constitutional: He is oriented to person, place, and time. He appears  well-developed and well-nourished. No distress.   HENT:   Head: Normocephalic and atraumatic.   Cardiovascular: Normal heart sounds.  Exam reveals no gallop and no friction rub.    No murmur heard.  tachycardic   Pulmonary/Chest: Effort normal and breath sounds normal. No respiratory distress. He has no wheezes. He has no rales. He exhibits no tenderness.   Blood tinged sputum in medicine cup   Abdominal: Soft. Bowel sounds are normal. He exhibits no distension. There is no tenderness.   Neurological: He is alert and oriented to person, place, and time.   Skin: He is not diaphoretic.       Significant Labs:   Recent Results (from the past 24 hour(s))   Gram stain    Collection Time: 07/15/17 11:03 AM   Result Value Ref Range    Gram Stain Result No WBC's     Gram Stain Result No organisms seen    Urinalysis    Collection Time: 07/15/17 11:03 AM   Result Value Ref Range    Specimen UA Urine, Clean Catch     Color, UA Yellow Yellow, Straw, Vanita    Appearance, UA Clear Clear    pH, UA 5.0 5.0 - 8.0    Specific Gravity, UA 1.010 1.005 - 1.030    Protein, UA 1+ (A) Negative    Glucose, UA Negative Negative    Ketones, UA Negative Negative    Bilirubin (UA) Negative Negative    Occult Blood UA 2+ (A) Negative    Nitrite, UA Negative Negative    Urobilinogen, UA Negative <2.0 EU/dL    Leukocytes, UA Negative Negative   Urinalysis Microscopic    Collection Time: 07/15/17 11:03 AM   Result Value Ref Range    RBC, UA 2 0 - 4 /hpf    WBC, UA 1 0 - 5 /hpf    Bacteria, UA None None-Occ /hpf    Squam Epithel, UA 0 /hpf    Hyaline Casts, UA 0 0-1/lpf /lpf    Microscopic Comment SEE COMMENT    Culture, Respiratory with Gram Stain    Collection Time: 07/15/17 10:39 PM   Result Value Ref Range    Gram Stain (Respiratory) <10 epithelial cells per low power field.     Gram Stain (Respiratory) No WBC's or organisms seen    CBC auto differential    Collection Time: 07/16/17  4:00 AM   Result Value Ref Range    WBC 0.35 (LL) 3.90 - 12.70  K/uL    RBC 2.30 (L) 4.60 - 6.20 M/uL    Hemoglobin 7.1 (L) 14.0 - 18.0 g/dL    Hematocrit 20.6 (L) 40.0 - 54.0 %    MCV 90 82 - 98 fL    MCH 30.9 27.0 - 31.0 pg    MCHC 34.5 32.0 - 36.0 %    RDW 14.9 (H) 11.5 - 14.5 %    Platelets 13 (LL) 150 - 350 K/uL    MPV 9.8 9.2 - 12.9 fL    Gran # 0.0 (L) 1.8 - 7.7 K/uL    Lymph # 0.3 (L) 1.0 - 4.8 K/uL    Mono # 0.0 (L) 0.3 - 1.0 K/uL    Eos # 0.0 0.0 - 0.5 K/uL    Baso # 0.01 0.00 - 0.20 K/uL    Gran% 0.0 (L) 38.0 - 73.0 %    Lymph% 94.2 (H) 18.0 - 48.0 %    Mono% 2.9 (L) 4.0 - 15.0 %    Eosinophil% 0.0 0.0 - 8.0 %    Basophil% 2.9 (H) 0.0 - 1.9 %    Platelet Estimate Decreased (A)     Differential Method Automated    Comprehensive metabolic panel    Collection Time: 07/16/17  4:00 AM   Result Value Ref Range    Sodium 137 136 - 145 mmol/L    Potassium 3.6 3.5 - 5.1 mmol/L    Chloride 110 95 - 110 mmol/L    CO2 22 (L) 23 - 29 mmol/L    Glucose 123 (H) 70 - 110 mg/dL    BUN, Bld 16 6 - 20 mg/dL    Creatinine 0.9 0.5 - 1.4 mg/dL    Calcium 7.0 (L) 8.7 - 10.5 mg/dL    Total Protein 4.5 (L) 6.0 - 8.4 g/dL    Albumin 1.8 (L) 3.5 - 5.2 g/dL    Total Bilirubin 1.0 0.1 - 1.0 mg/dL    Alkaline Phosphatase 258 (H) 55 - 135 U/L    AST 13 10 - 40 U/L    ALT 12 10 - 44 U/L    Anion Gap 5 (L) 8 - 16 mmol/L    eGFR if African American >60.0 >60 mL/min/1.73 m^2    eGFR if non African American >60.0 >60 mL/min/1.73 m^2   Magnesium    Collection Time: 07/16/17  4:00 AM   Result Value Ref Range    Magnesium 1.6 1.6 - 2.6 mg/dL   Phosphorus    Collection Time: 07/16/17  4:00 AM   Result Value Ref Range    Phosphorus 1.8 (L) 2.7 - 4.5 mg/dL   Type & Screen    Collection Time: 07/16/17  4:46 AM   Result Value Ref Range    Group & Rh O POS     Indirect [place] NEG          Diagnostic Results:  Microbiology Results (last 7 days)     Procedure Component Value Units Date/Time    Culture, Respiratory with Gram Stain [[id number]] Collected:  07/15/17 9999    Order Status:  Completed Specimen:   Respiratory from Sputum, Expectorated Updated:  07/16/17 0528     Gram Stain (Respiratory) <10 epithelial cells per low power field.     Gram Stain (Respiratory) No WBC's or organisms seen    Blood culture [[id number]] Collected:  07/15/17 1008    Order Status:  Completed Specimen:  Blood Updated:  07/15/17 1915     Blood Culture, Routine No Growth to date    Blood culture [[id number]] Collected:  07/15/17 1008    Order Status:  Completed Specimen:  Blood Updated:  07/15/17 1915     Blood Culture, Routine No Growth to date    Gram stain [[id number]] Collected:  07/15/17 1103    Order Status:  Completed Specimen:  Urine from Urine, Catheterized Updated:  07/15/17 1526     Gram Stain Result No WBC's      No organisms seen    Blood culture [[id number]] Collected:  07/13/17 1018    Order Status:  Completed Specimen:  Blood from Line, Subclavian, Left Updated:  07/15/17 1412     Blood Culture, Routine No Growth to date     Blood Culture, Routine No Growth to date     Blood Culture, Routine No Growth to date    Blood culture [[id number]] Collected:  07/13/17 1052    Order Status:  Completed Specimen:  Blood Updated:  07/15/17 1212     Blood Culture, Routine No Growth to date     Blood Culture, Routine No Growth to date     Blood Culture, Routine No Growth to date    Urine culture [[id number]] Collected:  07/15/17 1103    Order Status:  Sent Specimen:  Urine from Urine, Catheterized Updated:  07/15/17 1203    Blood culture [[id number]]     Order Status:  Canceled Specimen:  Blood     Blood culture [[id number]]     Order Status:  Canceled Specimen:  Blood     Fungus culture [[id number]] Collected:  07/12/17 1058    Order Status:  Completed Specimen:  Bronchial Alveolar Lavage from Lung, LLL Updated:  07/14/17 1055     Fungus (Mycology) Culture Culture in progress    Narrative:       Bronchial Wash    Blood culture [[id number]] Collected:  07/09/17 0638    Order Status:  Completed Specimen:  Blood Updated:  07/14/17 1012     Blood  Culture, Routine No growth after 5 days.    Narrative:       peripheral    Blood culture [[id number]] Collected:  07/09/17 0640    Order Status:  Completed Specimen:  Blood Updated:  07/14/17 1012     Blood Culture, Routine No growth after 5 days.    Narrative:       peripheral    Culture, Respiratory [[id number]] Collected:  07/12/17 1057    Order Status:  Completed Specimen:  Respiratory from Bronchial Wash Updated:  07/14/17 1003     Respiratory Culture No growth     Gram Stain (Respiratory) No organisms seen     Gram Stain (Respiratory) No WBC's    Narrative:       Bronchial Wash    AFB Culture & Smear [[id number]] Collected:  07/12/17 1058    Order Status:  Completed Specimen:  Bronchial Alveolar Lavage from Lung, LLL Updated:  07/13/17 2127     AFB Culture & Smear Culture in progress     AFB CULTURE STAIN No acid fast bacilli seen.    Narrative:       Bronchial Wash    Blood culture [[id number]]     Order Status:  Canceled Specimen:  Blood     Respiratory Viral Panel by PCR Nasal Swab [[id number]] Collected:  07/10/17 1231    Order Status:  Completed Specimen:  Respiratory Updated:  07/13/17 0712     Respiratory Virus Panel, source NS     RVP - Adenovirus Not Detected     Comment: Respiratory Viral Panel is a product of [employer].  It has been approved or cleared by the [place]. Food and Drug  Administration for in vitro diagnostic use.  Results should be  used in conjunction with clinical findings, and should not form  the sole basis for a diagnosis or treatment decision.  Negative results do not preclude respiratory virus infection  and should not be used as the sole basis for diagnosis,   treatment, or other management decisions.  Positive results do not rule out bacterial infection, or  co-infection with other viruses.  The agent detected may not  be the definitive cause of the disease. The use of additional   laboratory testing (e.g. bacterial culture, immunofluorescence,  radiography) and clinical  presentation must be taken into  consideration in order to obtain the final diagnosis of   respiratory viral infection.  The RVP assay cannot adequately detect Adenovirus species C,  or serotypes 7a and 41.  The RVP primers for detection of   rhinovirus have been shown to cross-react with enterovirus.  A rhinovirus reactive result should be confirmed by an   alternative method (e.g. cell culture).  The  of the Respiratory Viral Panel has   recommmended that specimens found to be negative for  Adenovirus be confirmed by an alternative method.  The  of the Respiratory Viral Panel has  recommended that specimens found to be negative for   Influenza be confirmed by an alternative method.          Enterovirus Not Detected     Comment: Cross-reactivity has been observed between certain Rhinovirus  strains and the Enterovirus assay.          Human Bocavirus Not Detected     Human Coronavirus Not Detected     Comment: The Human Coronavirus assay detects Human coronavirus types  229E, OC43,NL63 and HKO1.          RVP - Human Metapneumovirus (hMPV) Not Detected     RVP - Influenza A Not Detected     Influenza A - P1E3-04 Not Detected     RVP - Influenza B Not Detected     Parainfluenza Not Detected     Respiratory Syncytial VirusVirus (RSV) A Not Detected     Comment: The Respiratory Syncytial Viral assay detects types A and B,  however it does not distinguish between the two.          RVP - Rhinovirus Not Detected    Blood culture [[id number]] Collected:  07/07/17 0601    Order Status:  Completed Specimen:  Blood Updated:  07/13/17 0612     Blood Culture, Routine No growth after 5 days.    Blood culture [[id number]] Collected:  07/07/17 0601    Order Status:  Completed Specimen:  Blood Updated:  07/13/17 0612     Blood Culture, Routine No growth after 5 days.    KOH prep [[id number]] Collected:  07/12/17 1058    Order Status:  Completed Specimen:  Bronchial Alveolar Lavage from Lung, LLL Updated:   07/12/17 1210     KOH Prep No yeast or fungal elements seen    Narrative:       Bronchial Wash    Gram stain [[id number]] Collected:  07/12/17 1058    Order Status:  Canceled Specimen:  Bronchial Alveolar Lavage from Lung, LLL Updated:  07/12/17 1138    Urine culture [[id number]] Collected:  07/09/17 0618    Order Status:  Completed Specimen:  Urine from Urine, Clean Catch Updated:  07/10/17 1251     Urine Culture, Routine No growth        CXR:  Findings most concerning for a lateral upper lobe pneumonia as well as small volume bilateral pleural effusions.  Pulmonary hemorrhage and pulmonary edema are also in this differential.      Assessment/Plan:     Neutropenic fever    - blood cultures no growth from 6/24/17 and 7/4/17 and 7/7/17, CXR unremarkable.   - on Cefepime (started 6/24 - discontinued 6/28) restarted on 7/4/17 and continues currently  - Pt currently only on prophylactic abx--Bactrim, acyclovir.   -Pt on Cefepime, Amphotericin B, and vancomycin  - Cefepime day 13, continues;  -Pt started on vancomycin on 7/15/17 for recurrent fever  -Will f/u ID recs  -CXR Shows pneumonia  - repeated cultures 7/9/17 and 7/13/17 - NGTD  - CT chest/abdomen/pelvis reveals bilateral groundglass opacities as well as a left lower-lobe consolidation  - started on ambisome 7/9 per ID recommendations, continues currently   - pulm was consulted for a BAL which was done 7/12, cytology pending from BAL; aspergillus <0.500; KOH was negative for yeast/fungus   - fungitell pending, aspergillus negative and quantiferon gold results show indeterminate   - respiratory viral panel negative on 7/10/17        S/P allogeneic bone marrow transplant    - +307 days s/p Flu/Bu/ATG MUD allogeneic transplant for 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 We  have discussed antifungal coverage with infectious disease. Discontinued itraconzaole (7/09) and started ambisome  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. Pt now off steroids  - chimerics from marrow done 6/21 show CD3 of 90% donor and 10% recipient, but CD34 of 5% donor and 95% recipient   - repeat chimerics still pending from marrow done 7/10   - plans for DLI in future        Supraventricular tachycardia    - Supraventricular tachycardia on tele and EKG. Started 7/13/17 8pm with HR between 160-170  -Patient was cardioverted by cardiology yesterday and started on metoprolol and flecainide.  -Will f/u further cardiology recs  -Treat active infection  - Pharmacologic nuclear stress test ordered for 7/17/17, okay to change date if needed by cardiology         Lower extremity edema    -Pt with diffuse lower extremity edema.  -IV fluids stopped  -Pt to be given IV lasix.        Insomnia    - with restlessness and no sleep for 2 nights (7/12/17); pt has been refusing Ramelteon for several days (now d/c'd); states he infrequently takes ambien prn at home (still ordered as prn). 7/14/17, pt refused ambien but still reports difficulty sleeping  - was started on zyprexa on 7/12/17 and d/c'd on 7/13/17 for restlessness  - for insomnia, encourage ambien use prn (Ramelteon and zyprexa are now d/c'd)        Facial numbness    - evaluated by Neurology  - MRI with no evidence of  acute intracranial pathology.  - thought to be secondary to drug induced peripheral neuropathy?  - Neurology rec Vitamin E, will hold for now given side effects of thrombocytopenia and bleeding    - with continued facial/jaw pain also; added MS contin for pain 7/13/17, may continue prn dilaudid; ordered CT of sinus/face 7/13/17, results show paranasal sinus disease, no acute sinusitis, nonspecific partial opacification of bilateral mastoid air cells right greater than left. ID to f/u on this CT--will have ID recommend if this  needs to be followed up on with ENT?        Chronic bilateral low back pain without sciatica    - related to Neupogen  - started on zyrtec, lidocaine patch and Flexeril  - pain continues. Continue to monitor.  - started on ms continue 15 mg q12 on 7/13/17, increased to 30 mg q12 on 7/14/17; decreased dilaudid 2 mg from q2 hrs to q3hrs on 7/14/17 for breakthrough pain         Electrolyte abnormality    - ordered prn electrolyte replacement per protocol   - monitor closely due to ambisome         Anxiety    - PRN Xanax        Pancytopenia due to antineoplastic chemotherapy    - white blood cell count is 0.35 k/uL; absolute neutrophil count is 0 cells/uL  - hemoglobin 7.1 g/dL; platelet count is 13 k/uL  - transfuse for hemoglobin < 7 g/dL, platelets < 10 k/uL        HIV disease    - CD4 low at 11.4%, Absolute CD4 469 and HIV RNA not detected from 6/21  - As per ID: continue triumeq        Prostate hypertrophy    - Continue home flomax        * AML (acute myeloid leukemia) in relapse    - relapsed AML - peripheral blood shows 30% blasts on admit  - 2D echo shows 60% EF  - Mazariegos placed, local measures to control bleeding, gen surg placed more sutures, now stopped bleeding  - BM biopsy results - consistent with 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 Blast of 62%  - awaiting mutation analysis and cytogenetics; FLT-3 negative     - Jaw pain and pain in right cranial nerve 7 distribution with persistent, severe headaches concerning for CNS involvement - underwent IT chemotherapy on 6/23. Flow negative for disease. CSF LDH was 29  - Had previous reaction to MEC (etoposide caused full body rash). Did not qualify for the Tolero trial due to receiving IT chemotherapy    - Day 20 of FLAG-BETZY   - Day 14 bone marrow biopsy done 7/10 - hypocellular with no evidence of residual disease  - Future plans for DLI            VTE Risk Mitigation         Ordered     Place sequential compression device   Until discontinued      06/20/17 2005     High Risk of VTE  Once      06/20/17 1901              Diet - regular  Prophylaxis - lovenox held secondary to thrombocytopenia  Dispo - pending improvement in infection, HR and blood counts.      Mike Segal MD  Bone Marrow Transplant  [facility]

## 2017-07-16 NOTE — ASSESSMENT & PLAN NOTE
- blood cultures no growth from 6/24/17 and 7/4/17 and 7/7/17, CXR unremarkable.   - on Cefepime (started 6/24 - discontinued 6/28) restarted on 7/4/17 and continues currently  - Pt currently only on prophylactic abx--Bactrim, acyclovir.   -Pt on Cefepime, Amphotericin B, and vancomycin  - Cefepime day 13, continues;  -Pt started on vancomycin on 7/15/17 for recurrent fever  -Will f/u ID recs  -CXR Shows pneumonia  - repeated cultures 7/9/17 and 7/13/17 - NGTD  - CT chest/abdomen/pelvis reveals bilateral groundglass opacities as well as a left lower-lobe consolidation  - started on ambisome 7/9 per ID recommendations, continues currently   - pulm was consulted for a BAL which was done 7/12, cytology pending from BAL; aspergillus <0.500; KOH was negative for yeast/fungus   - fungitell pending, aspergillus negative and quantiferon gold results show indeterminate   - respiratory viral panel negative on 7/10/17

## 2017-07-16 NOTE — PLAN OF CARE
Problem: Patient Care Overview  Goal: Plan of Care Review  Outcome: Ongoing (interventions implemented as appropriate)  Pt. day 20 of Flag-Lynette.  Pt. with nonskid footwear on with bed in lowest position and locked with bed rails up x2.  Pt. ambulates independently and instructed to call if assistance is needed.  Pt. with call light within reach.  Pt. Given lasix this morning.  Pt. with c/o face pain with dilaudid being given PRN with scheduled MS contin.  Pt. With poor appetite.   Will continue to monitor pt.

## 2017-07-16 NOTE — ASSESSMENT & PLAN NOTE
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 We have discussed antifungal coverage with infectious disease. Discontinued itraconzaole (7/09) and started ambisome  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. Pt now off steroids  - chimerics from marrow done 6/21 show CD3 of 90% donor and 10% recipient, but CD34 of 5% donor and 95% recipient   - repeat chimerics still pending from marrow done 7/10   - plans for DLI in future

## 2017-07-16 NOTE — ASSESSMENT & PLAN NOTE
- Supraventricular tachycardia on tele and EKG. Started 7/13/17 8pm with HR between 160-170  -Patient was cardioverted by cardiology yesterday and started on metoprolol and flecainide.  -Will f/u further cardiology recs  -Treat active infection  - Pharmacologic nuclear stress test ordered for 7/17/17, okay to change date if needed by cardiology

## 2017-07-17 NOTE — ASSESSMENT & PLAN NOTE
- Supraventricular tachycardia on tele and EKG. Started 7/13/17 8pm with HR between 160-170  -Patient was cardioverted by cardiology and started on metoprolol and flecainide.  -Will f/u further cardiology recs  -Treat active infection  - Pharmacologic nuclear stress test ordered for 7/18/17, okay to change date if needed by cardiology

## 2017-07-17 NOTE — ASSESSMENT & PLAN NOTE
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 (dx 2/2016, s/p 7+3 induction with residual leukemia, successful MEC reinduction 3/2016 and IDAC consolidation x 2 through 6/2016 and subsequent MUD allo-SCT 9/5/2016; CMV D+/R+, Flu/Bu/ATG conditioning, ACV and maintenance tacro ppx; c/b neutropenic fever due to pulmonary histoplasmosis with positive urine antigen 2.35 and pulmonary micronodules on CT chest 2/2016; s/p ambisome induction and on itraconazole maintenance with serial negative repeat antigens; and Klebsiella septicemia 2016 while neutropenic), and CKD-3 who was admitted on 6/20/2017 with relapsed AML (s/p repeat induction with FLAG-BETYZ starting 6/28) with neutropenic fevers despite empiric vanc/cefepime/itra/ACV with multifocal pneumonia noted on CT CAP as a likely source. Differential for pneumonia: suspect mold, atypical organism such as nocardia possible,  unlikely PCP w/ (-) Fungitell and unlikely Staph given no response related to fevers w/ vancomycin and negative gram stain and thus far BAL cultures NGTD.  Overall fever curve down but now with intermittent fevers    - for now continue empiric cefepime while neutropenic, ambisome, ARVs  - will follow

## 2017-07-17 NOTE — PROGRESS NOTES
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 (dx 2/2016, s/p 7+3 induction with residual leukemia, successful MEC reinduction 3/2016 and IDAC consolidation x 2 through 6/2016 and subsequent MUD allo-SCT 9/5/2016; CMV D+/R+, Flu/Bu/ATG conditioning, ACV and maintenance tacro ppx; c/b neutropenic fever due to pulmonary histoplasmosis with positive urine antigen 2.35 and pulmonary micronodules on CT chest 2/2016; s/p ambisome induction and on itraconazole maintenance with serial negative repeat antigens; and Klebsiella septicemia 2016 while neutropenic), and CKD-3 who was admitted on 6/20/2017 with relapsed AML (s/p repeat induction with FLAG-BETZY starting 6/28) with neutropenic fevers despite empiric vanc/cefepime/itra/ACV with multifocal pneumonia noted on CT CAP as a likely source. Differential for pneumonia: suspect mold, atypical organism such as nocardia possible,  unlikely PCP w/ (-) Fungitell and unlikely Staph given no response related to fevers w/ vancomycin and negative gram stain and thus far BAL cultures NGTD.  Overall fever curve down but now with intermittent fevers    - for now continue empiric cefepime while neutropenic, ambisome, ARVs  - will follow            Anticipated Disposition: pending    Thank you for your consult. I will follow-up with patient. Please contact us if you have any additional questions.    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 ID has been consulted today for neutropenic fevers that have persisted the last few days despite empiric vanc, cefepime, chronic itraconazole, and acyclovir prophylaxis. Patient notes that prior to diagnosis of relapse in clinic, he was not having F/C/S or other localizing infectious symptoms -- only malaise and acute onset BLE body aches just prior to diagnosis. Since admission he notes neutropenic F/C/S and dry cough but denies HA, URI symptoms, sore throat, dysphagia, sputum production, SOB, N/V, abdominal pain, diarrhea, dysuria, genital lesions, line pain/drainage, or rash. Exposure history is largely unremarkable: he is currently single, lives alone, recently visited [place] but denies other travel (including international), no pets, no new sexual contacts, and no new hobbies (does garden).   Interval History:   -afebrile  -ANC 0   -2L Nc    Review of Systems   Constitutional: Positive for fatigue. Negative for chills and fever.   HENT: Positive for mouth sores. Negative for rhinorrhea and sore throat.    Eyes: Negative for photophobia and visual disturbance.   Respiratory: Positive for cough. Negative for shortness of breath and wheezing.    Cardiovascular: Positive for palpitations. Negative for chest pain and  leg swelling.   Gastrointestinal: Negative for abdominal pain and diarrhea.   Genitourinary: Negative for dysuria.   Musculoskeletal: Negative for arthralgias and myalgias.   Skin: Negative for rash.   Allergic/Immunologic: Positive for immunocompromised state.   Neurological: Negative for headaches.   Hematological: Bruises/bleeds easily.   Psychiatric/Behavioral: Negative for confusion.     Objective:     Vital Signs (Most Recent):  Temp: 97.9 °F (36.6 °C) (07/17/17 1111)  Pulse: 96 (07/17/17 1111)  Resp: 20 (07/17/17 1111)  BP: (!) 102/59 (07/17/17 1111)  SpO2: (!) 94 % (07/17/17 1111) Vital Signs (24h Range):  Temp:  [97.9 °F (36.6 °C)-98.9 °F (37.2 °C)] 97.9 °F (36.6 °C)  Pulse:  [] 96  Resp:  [20-26] 20  SpO2:  [77 %-96 %] 94 %  BP: (100-130)/(55-75) 102/59     Weight: 93.8 kg (206 lb 12.7 oz)  Body mass index is 27.29 kg/m².    Estimated Creatinine Clearance: 106 mL/min (based on Cr of 0.9).    Physical Exam   Constitutional: He appears well-developed. He appears cachectic.   Up and walking in room   HENT:   Head: Normocephalic and atraumatic.   Eyes: EOM are normal. Pupils are equal, round, and reactive to light.   Neck: Normal range of motion. Neck supple.   Cardiovascular: Regular rhythm.  Tachycardia present.    No murmur heard.  Pulmonary/Chest: Effort normal and breath sounds normal. He has no wheezes. He has no rales.   Abdominal: Soft. Bowel sounds are normal.   Musculoskeletal: Normal range of motion.   Neurological: He is alert.   Skin: Skin is warm and dry.   Left subclavian port w/o tenderness or erythema     Significant Labs:   CBC:     Recent Labs  Lab 07/16/17  0400 07/17/17  0400   WBC 0.35* 0.64*   HGB 7.1* 7.1*   HCT 20.6* 20.4*   PLT 13* 5*     CMP:     Recent Labs  Lab 07/16/17  0400 07/16/17  1738 07/17/17  0400    130* 137   K 3.6 3.3* 4.0    103 108   CO2 22* 20* 21*   * 310* 109   BUN 16 16 16   CREATININE 0.9 0.9 0.9   CALCIUM 7.0* 6.9* 7.3*   PROT 4.5*  --   4.5*   ALBUMIN 1.8*  --  1.8*   BILITOT 1.0  --  1.1*   ALKPHOS 258*  --  295*   AST 13  --  13   ALT 12  --  11   ANIONGAP 5* 7* 8   EGFRNONAA >60.0 >60.0 >60.0     Imaging  7/13 CT sinus  1. Paranasal sinus disease as discussed above. No evidence of air-fluid level to suggest acute sinusitis.    2. Nonspecific partial opacification of the bilateral mastoid air cells, right greater than left.    7/8 CT c/a/p  1.  Diffuse bilateral patchy groundglass opacities, with more confluent regions of groundglass opacity in the upper lobes with peripheral sparing as well as superimposed regions of more consolidative change.  Overall, findings highly concerning for underlying infectious/atypical infectious process.  Additional differential consideration including possible noninfectious inflammatory etiology or pulmonary edema.  Clinical correlation recommended.    2.  Slight colonic wall thickening of the sigmoid and descending colon, likely relating to nondistention.  However clinical correlation for symptoms of colitis recommended.    3.  Subcentimeter hepatic hypodensities too small to definitively characterize.  Please note the patient's previously identified enhancing left hepatic lobe lesion is not definitively visualized on today's examination, which could relate to differences in contrast bolus timing.    4.  Calcified left hilar lymph nodes likely reflecting sequela of prior granulomatous disease.    5.  Mild nonspecific perinephric fat stranding.  Correlation with urinalysis recommended.     Micro  7/15 resp cx normal jose miguel  7/13 bcx NGTD  7/12 BAL cultures NGTD  712 BAL asp ag negative  7/10 RVP negative  7/11 urine legionella negtive  7/10 BDG negative  7/10 quant gold indeterminate  7/10 asp ag negative  7/9 urine histo/blast ag both negative  7/9 bcx NGTD

## 2017-07-17 NOTE — PLAN OF CARE
Problem: Patient Care Overview  Goal: Individualization & Mutuality  Left chest oquendo catheter dsg change q Monday and PRN   1. Pain control is top priority with him.  2. Likes door kept closed.

## 2017-07-17 NOTE — ASSESSMENT & PLAN NOTE
- white blood cell count is 0.64 k/uL; absolute neutrophil count 20 cells/uL  - hemoglobin 7.1 g/dL; platelet count is 5 k/uL  - transfuse for hemoglobin < 7 g/dL, platelets < 10 k/uL

## 2017-07-17 NOTE — SUBJECTIVE & OBJECTIVE
Subjective:     Interval History:   - No acute events overnight.  - Afebrile, remains tachycardia, on 2 L NC and desats when coughing.   - Complains of continued back pain, decreased appetite and anxious about not sleeping.     Objective:     Vital Signs (Most Recent):  Temp: 98.2 °F (36.8 °C) (07/17/17 0400)  Pulse: (!) 117 (07/17/17 0700)  Resp: (!) 24 (07/17/17 0400)  BP: 114/75 (07/17/17 0400)  SpO2: (!) 94 % (07/17/17 0400) Vital Signs (24h Range):  Temp:  [97.9 °F (36.6 °C)-98.5 °F (36.9 °C)] 98.2 °F (36.8 °C)  Pulse:  [] 117  Resp:  [24-26] 24  SpO2:  [82 %-94 %] 94 %  BP: (102-130)/(55-75) 114/75     Weight: 93.8 kg (206 lb 12.7 oz)  Body mass index is 27.29 kg/m².  Body surface area is 2.2 meters squared.    ECOG SCORE         [unfilled]    Intake/Output - Last 3 Shifts       07/15 0700 - 07/16 0659 07/16 0700 - 07/17 0659 07/17 0700 - 07/18 0659    P.O. 960 1000     I.V. (mL/kg) 3027.1 (32.1) 180 (1.9)     Blood 255      IV Piggyback 1700 1850     Total Intake(mL/kg) 5942.1 (62.9) 3030 (32.3)     Urine (mL/kg/hr) 3325 (1.5) 3400 (1.5)     Stool 0 (0) 0 (0)     Total Output 3325 3400      Net +2617.1 -370             Stool Occurrence 0 x 1 x           Physical Exam   Constitutional: He is oriented to person, place, and time. He appears well-developed and well-nourished. No distress. Nasal cannula in place.   HENT:   Head: Normocephalic and atraumatic.   Mouth/Throat: No oropharyngeal exudate.   Eyes: Pupils are equal, round, and reactive to light.   Cardiovascular: Regular rhythm.  Tachycardia present.    Pulmonary/Chest: Effort normal and breath sounds normal.   Abdominal: Soft. Bowel sounds are normal. He exhibits no distension. There is no tenderness.   Musculoskeletal: He exhibits no edema.   Neurological: He is alert and oriented to person, place, and time.   Skin: Skin is warm and dry. No erythema.   Nursing note and vitals reviewed.      Significant Labs:   CBC:   Recent Labs  Lab  07/16/17 0400 07/17/17 0400   WBC 0.35* 0.64*   HGB 7.1* 7.1*   HCT 20.6* 20.4*   PLT 13* 5*   , CMP:   Recent Labs  Lab 07/16/17 0400 07/16/17 1738 07/17/17 0400    130* 137   K 3.6 3.3* 4.0    103 108   CO2 22* 20* 21*   * 310* 109   BUN 16 16 16   CREATININE 0.9 0.9 0.9   CALCIUM 7.0* 6.9* 7.3*   PROT 4.5*  --  4.5*   ALBUMIN 1.8*  --  1.8*   BILITOT 1.0  --  1.1*   ALKPHOS 258*  --  295*   AST 13  --  13   ALT 12  --  11   ANIONGAP 5* 7* 8   EGFRNONAA >60.0 >60.0 >60.0   ,   Recent Lab Results       07/17/17 0400 07/16/17 1738      Albumin 1.8(L)      Alkaline Phosphatase 295(H)      ALT 11      Anion Gap 8 7(L)     Aniso Slight      AST 13      Baso # 0.01      Basophil% 1.6      Total Bilirubin 1.1  Comment:  For infants and newborns, interpretation of results should be based  on gestational age, weight and in agreement with clinical  observations.  Premature Infant recommended reference ranges:  Up to 24 hours.............<8.0 mg/dL  Up to 48 hours............<12.0 mg/dL  3-5 days..................<15.0 mg/dL  6-29 days.................<15.0 mg/dL  (H)      BUN, Bld 16 16     Calcium 7.3(L) 6.9  Comment:  *Critical value -  Results called to and read back by:Abby Jeansonne RN  (LL)     Chloride 108 103     CO2 21(L) 20(L)     Creatinine 0.9 0.9     Differential Method Automated      eGFR if  >60.0 >60.0     eGFR if non  >60.0  Comment:  Calculation used to obtain the estimated glomerular filtration  rate (eGFR) is the CKD-EPI equation. Since race is unknown   in our information system, the eGFR values for   -American and Non--American patients are given   for each creatinine result.   >60.0  Comment:  Calculation used to obtain the estimated glomerular filtration  rate (eGFR) is the CKD-EPI equation. Since race is unknown   in our information system, the eGFR values for   -American and Non--American patients are given    for each creatinine result.       Eos # 0.0      Eosinophil% 0.0      Glucose 109 310(H)     Gran # 0.0(L)      Gran% 3.1(L)      Hematocrit 20.4(L)      Hemoglobin 7.1(L)      Hypo Occasional        Comment:  Results are increased in hemolyzed samples.      Lymph # 0.6(L)      Lymph% 92.2(H)      Magnesium 1.6      MCH 30.9      MCHC 34.8      MCV 89      Mono # 0.0(L)      Mono% 3.1(L)      MPV 9.4      Ovalocytes Occasional      Phosphorus 1.7(L)      Platelet Estimate Decreased(A)      Platelets 5  Comment:  plt critical result(s) called and verbal readback obtained from   joselyn downey rn, 07/17/2017 07:20  (LL)      Poik Slight      Poly Occasional      Potassium 4.0 3.3(L)     Total Protein 4.5(L)      RBC 2.30(L)      RDW 14.8(H)      Sodium 137 130(L)     Tear Drop Cells Occasional      Uric Acid 2.3(L)      WBC 0.64  Comment:  WBC critical result(s) called and verbal readback obtained from   JOSELYN DOWNEY RN, 07/17/2017 06:08  (LL)         and All pertinent labs from the last 24 hours have been reviewed.    Diagnostic Results:  I have reviewed all pertinent imaging results/findings within the past 24 hours.

## 2017-07-17 NOTE — PROGRESS NOTES
[facility]  Hematology  Bone Marrow Transplant  Progress Note    Patient Name: Paul E Sistrunk  Admission Date: 6/20/2017  Hospital Length of Stay: 27 days  Code Status: Full Code    Subjective:     Interval History:   - No acute events overnight.  - Afebrile, remains tachycardia, on 2 L NC and desats when coughing.   - Complains of continued back pain, decreased appetite and anxious about not sleeping.     Objective:     Vital Signs (Most Recent):  Temp: 98.2 °F (36.8 °C) (07/17/17 0400)  Pulse: (!) 117 (07/17/17 0700)  Resp: (!) 24 (07/17/17 0400)  BP: 114/75 (07/17/17 0400)  SpO2: (!) 94 % (07/17/17 0400) Vital Signs (24h Range):  Temp:  [97.9 °F (36.6 °C)-98.5 °F (36.9 °C)] 98.2 °F (36.8 °C)  Pulse:  [] 117  Resp:  [24-26] 24  SpO2:  [82 %-94 %] 94 %  BP: (102-130)/(55-75) 114/75     Weight: 93.8 kg (206 lb 12.7 oz)  Body mass index is 27.29 kg/m².  Body surface area is 2.2 meters squared.    ECOG SCORE         [unfilled]    Intake/Output - Last 3 Shifts       07/15 0700 - 07/16 0659 07/16 0700 - 07/17 0659 07/17 0700 - 07/18 0659    P.O. 960 1000     I.V. (mL/kg) 3027.1 (32.1) 180 (1.9)     Blood 255      IV Piggyback 1700 1850     Total Intake(mL/kg) 5942.1 (62.9) 3030 (32.3)     Urine (mL/kg/hr) 3325 (1.5) 3400 (1.5)     Stool 0 (0) 0 (0)     Total Output 3325 3400      Net +2617.1 -370             Stool Occurrence 0 x 1 x           Physical Exam   Constitutional: He is oriented to person, place, and time. He appears well-developed and well-nourished. No distress. Nasal cannula in place.   HENT:   Head: Normocephalic and atraumatic.   Mouth/Throat: No oropharyngeal exudate.   Eyes: Pupils are equal, round, and reactive to light.   Cardiovascular: Regular rhythm.  Tachycardia present.    Pulmonary/Chest: Effort normal and breath sounds normal.   Abdominal: Soft. Bowel sounds are normal. He exhibits no distension. There is no tenderness.   Musculoskeletal: He exhibits no edema.    Neurological: He is alert and oriented to person, place, and time.   Skin: Skin is warm and dry. No erythema.   Nursing note and vitals reviewed.      Significant Labs:   CBC:   Recent Labs  Lab 07/16/17  0400 07/17/17  0400   WBC 0.35* 0.64*   HGB 7.1* 7.1*   HCT 20.6* 20.4*   PLT 13* 5*   , CMP:   Recent Labs  Lab 07/16/17  0400 07/16/17  1738 07/17/17 0400    130* 137   K 3.6 3.3* 4.0    103 108   CO2 22* 20* 21*   * 310* 109   BUN 16 16 16   CREATININE 0.9 0.9 0.9   CALCIUM 7.0* 6.9* 7.3*   PROT 4.5*  --  4.5*   ALBUMIN 1.8*  --  1.8*   BILITOT 1.0  --  1.1*   ALKPHOS 258*  --  295*   AST 13  --  13   ALT 12  --  11   ANIONGAP 5* 7* 8   EGFRNONAA >60.0 >60.0 >60.0   ,   Recent Lab Results       07/17/17 0400 07/16/17 1738      Albumin 1.8(L)      Alkaline Phosphatase 295(H)      ALT 11      Anion Gap 8 7(L)     Aniso Slight      AST 13      Baso # 0.01      Basophil% 1.6      Total Bilirubin 1.1  Comment:  For infants and newborns, interpretation of results should be based  on gestational age, weight and in agreement with clinical  observations.  Premature Infant recommended reference ranges:  Up to 24 hours.............<8.0 mg/dL  Up to 48 hours............<12.0 mg/dL  3-5 days..................<15.0 mg/dL  6-29 days.................<15.0 mg/dL  (H)      BUN, Bld 16 16     Calcium 7.3(L) 6.9  Comment:  *Critical value -  Results called to and read back by:Abby Jeansonne RN  (LL)     Chloride 108 103     CO2 21(L) 20(L)     Creatinine 0.9 0.9     Differential Method Automated      eGFR if  >60.0 >60.0     eGFR if non  >60.0  Comment:  Calculation used to obtain the estimated glomerular filtration  rate (eGFR) is the CKD-EPI equation. Since race is unknown   in our information system, the eGFR values for   -American and Non--American patients are given   for each creatinine result.   >60.0  Comment:  Calculation used to obtain the estimated  glomerular filtration  rate (eGFR) is the CKD-EPI equation. Since race is unknown   in our information system, the eGFR values for   -American and Non--American patients are given   for each creatinine result.       Eos # 0.0      Eosinophil% 0.0      Glucose 109 310(H)     Gran # 0.0(L)      Gran% 3.1(L)      Hematocrit 20.4(L)      Hemoglobin 7.1(L)      Hypo Occasional        Comment:  Results are increased in hemolyzed samples.      Lymph # 0.6(L)      Lymph% 92.2(H)      Magnesium 1.6      MCH 30.9      MCHC 34.8      MCV 89      Mono # 0.0(L)      Mono% 3.1(L)      MPV 9.4      Ovalocytes Occasional      Phosphorus 1.7(L)      Platelet Estimate Decreased(A)      Platelets 5  Comment:  plt critical result(s) called and verbal readback obtained from   joselyn downey rn, 07/17/2017 07:20  (LL)      Poik Slight      Poly Occasional      Potassium 4.0 3.3(L)     Total Protein 4.5(L)      RBC 2.30(L)      RDW 14.8(H)      Sodium 137 130(L)     Tear Drop Cells Occasional      Uric Acid 2.3(L)      WBC 0.64  Comment:  WBC critical result(s) called and verbal readback obtained from   JOSELYN DOWNEY RN, 07/17/2017 06:08  (LL)         and All pertinent labs from the last 24 hours have been reviewed.    Diagnostic Results:  I have reviewed all pertinent imaging results/findings within the past 24 hours.    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 Blast of 62%  - awaiting mutation analysis and cytogenetics; FLT-3 negative     - Jaw pain and pain in right cranial nerve 7 distribution with persistent, severe headaches concerning for CNS involvement - underwent IT chemotherapy on 6/23.  Flow negative for disease. CSF LDH was 29  - Had previous reaction to MEC (etoposide caused full body rash). Did not qualify for the Tolero trial due to receiving IT chemotherapy    - Day 21 of FLAG-BETZY   - Day 14 bone marrow biopsy done 7/10 - hypocellular with no evidence of residual disease  - Future plans for DLI        S/P allogeneic bone marrow transplant    - +308 days s/p Flu/Bu/ATG MUD allogeneic transplant for 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 We have discussed antifungal coverage with infectious disease. Discontinued itraconzaole (7/09) and started ambisome  - chimerics from marrow done 6/21 show CD3 of 90% donor and 10% recipient, but CD34 of 5% donor and 95% recipient   - repeat chimerics still pending from marrow done 7/10   - plans for DLI in future        HIV disease    - CD4 low at 11.4%, Absolute CD4 469 and HIV RNA not detected from 6/21  - As per ID: continue triumeq        Neutropenic fever    - blood cultures no growth from 6/24/17 and 7/4/17 and 7/7/17, CXR unremarkable.   - on Cefepime (started 6/24 - discontinued 6/28) restarted on 7/4/17 and continues currently  - Pt currently only on prophylactic abx--Bactrim, acyclovir.   -Pt on Cefepime, Amphotericin B, and vancomycin (restarted 7/15)  - Cefepime day 14, continues per ID until count recovery   -Will f/u ID recs  -CXR Shows pneumonia; repeat CXR 7/17  - repeated cultures 7/9/17 and 7/13/17 - NGTD  - CT chest/abdomen/pelvis reveals bilateral groundglass opacities as well as a left lower-lobe consolidation  - started on ambisome 7/9 per ID recommendations, continues currently   - pulm was consulted for a BAL which was done 7/12, cytology pending from BAL; aspergillus <0.500; KOH was negative for yeast/fungus   - fungitell pending,  aspergillus negative and quantiferon gold results show indeterminate   - respiratory viral panel negative on 7/10/17        Pancytopenia due to antineoplastic chemotherapy    - white blood cell count is 0.64 k/uL; absolute neutrophil count 20 cells/uL  - hemoglobin 7.1 g/dL; platelet count is 5 k/uL  - transfuse for hemoglobin < 7 g/dL, platelets < 10 k/uL        Prostate hypertrophy    - Continue home flomax        Lower extremity edema    -Pt with diffuse lower extremity edema.  -IV fluids stopped  - Net Negative from lasix yesterday        Supraventricular tachycardia    - Supraventricular tachycardia on tele and EKG. Started 7/13/17 8pm with HR between 160-170  -Patient was cardioverted by cardiology and started on metoprolol and flecainide.  -Will f/u further cardiology recs  -Treat active infection  - Pharmacologic nuclear stress test ordered for 7/18/17, okay to change date if needed by cardiology         Insomnia    - with restlessness and no sleep for 2 nights (7/12/17); pt has been refusing Ramelteon for several days (now d/c'd); states he infrequently takes ambien prn at home (still ordered as prn). 7/14/17, pt refused ambien but still reports difficulty sleeping  - was started on zyprexa on 7/12/17 and d/c'd on 7/13/17 for restlessness  - for insomnia, encourage ambien use prn (Ramelteon and zyprexa are now d/c'd)        Facial numbness    - evaluated by Neurology  - MRI with no evidence of  acute intracranial pathology.  - thought to be secondary to drug induced peripheral neuropathy?  - Neurology rec Vitamin E, will hold for now given side effects of thrombocytopenia and bleeding    - with continued facial/jaw pain also; added MS contin for pain 7/13/17, may continue prn dilaudid; ordered CT of sinus/face 7/13/17, results show paranasal sinus disease, no acute sinusitis, nonspecific partial opacification of bilateral mastoid air cells right greater than left. ID to f/u on this CT--will have ID  recommend if this needs to be followed up on with ENT?        Chronic bilateral low back pain without sciatica    - related to Neupogen  - started on zyrtec, lidocaine patch and Flexeril  - pain continues. Continue to monitor.  - started on ms continue 15 mg q12 on 7/13/17, increased to 30 mg q12 on 7/14/17; decreased dilaudid 2 mg from q2 hrs to q3hrs on 7/14/17 for breakthrough pain         Electrolyte abnormality    - ordered prn electrolyte replacement per protocol   - monitor closely due to ambisome         Anxiety    - PRN Xanax            VTE Risk Mitigation         Ordered     Place sequential compression device  Until discontinued      06/20/17 2005     High Risk of VTE  Once      06/20/17 1901          Disposition: Pending count recovery and resolution of infectious issues.     Asia Melgar, NP  Bone Marrow Transplant  [facility]

## 2017-07-17 NOTE — SUBJECTIVE & OBJECTIVE
Interval History:   -afebrile  -ANC 0   -2L Nc    Review of Systems   Constitutional: Positive for fatigue. Negative for chills and fever.   HENT: Positive for mouth sores. Negative for rhinorrhea and sore throat.    Eyes: Negative for photophobia and visual disturbance.   Respiratory: Positive for cough. Negative for shortness of breath and wheezing.    Cardiovascular: Positive for palpitations. Negative for chest pain and leg swelling.   Gastrointestinal: Negative for abdominal pain and diarrhea.   Genitourinary: Negative for dysuria.   Musculoskeletal: Negative for arthralgias and myalgias.   Skin: Negative for rash.   Allergic/Immunologic: Positive for immunocompromised state.   Neurological: Negative for headaches.   Hematological: Bruises/bleeds easily.   Psychiatric/Behavioral: Negative for confusion.     Objective:     Vital Signs (Most Recent):  Temp: 97.9 °F (36.6 °C) (07/17/17 1111)  Pulse: 96 (07/17/17 1111)  Resp: 20 (07/17/17 1111)  BP: (!) 102/59 (07/17/17 1111)  SpO2: (!) 94 % (07/17/17 1111) Vital Signs (24h Range):  Temp:  [97.9 °F (36.6 °C)-98.9 °F (37.2 °C)] 97.9 °F (36.6 °C)  Pulse:  [] 96  Resp:  [20-26] 20  SpO2:  [77 %-96 %] 94 %  BP: (100-130)/(55-75) 102/59     Weight: 93.8 kg (206 lb 12.7 oz)  Body mass index is 27.29 kg/m².    Estimated Creatinine Clearance: 106 mL/min (based on Cr of 0.9).    Physical Exam   Constitutional: He appears well-developed. He appears cachectic.   Up and walking in room   HENT:   Head: Normocephalic and atraumatic.   Eyes: EOM are normal. Pupils are equal, round, and reactive to light.   Neck: Normal range of motion. Neck supple.   Cardiovascular: Regular rhythm.  Tachycardia present.    No murmur heard.  Pulmonary/Chest: Effort normal and breath sounds normal. He has no wheezes. He has no rales.   Abdominal: Soft. Bowel sounds are normal.   Musculoskeletal: Normal range of motion.   Neurological: He is alert.   Skin: Skin is warm and dry.   Left  subclavian port w/o tenderness or erythema     Significant Labs:   CBC:     Recent Labs  Lab 07/16/17  0400 07/17/17  0400   WBC 0.35* 0.64*   HGB 7.1* 7.1*   HCT 20.6* 20.4*   PLT 13* 5*     CMP:     Recent Labs  Lab 07/16/17  0400 07/16/17  1738 07/17/17  0400    130* 137   K 3.6 3.3* 4.0    103 108   CO2 22* 20* 21*   * 310* 109   BUN 16 16 16   CREATININE 0.9 0.9 0.9   CALCIUM 7.0* 6.9* 7.3*   PROT 4.5*  --  4.5*   ALBUMIN 1.8*  --  1.8*   BILITOT 1.0  --  1.1*   ALKPHOS 258*  --  295*   AST 13  --  13   ALT 12  --  11   ANIONGAP 5* 7* 8   EGFRNONAA >60.0 >60.0 >60.0     Imaging  7/13 CT sinus  1. Paranasal sinus disease as discussed above. No evidence of air-fluid level to suggest acute sinusitis.    2. Nonspecific partial opacification of the bilateral mastoid air cells, right greater than left.    7/8 CT c/a/p  1.  Diffuse bilateral patchy groundglass opacities, with more confluent regions of groundglass opacity in the upper lobes with peripheral sparing as well as superimposed regions of more consolidative change.  Overall, findings highly concerning for underlying infectious/atypical infectious process.  Additional differential consideration including possible noninfectious inflammatory etiology or pulmonary edema.  Clinical correlation recommended.    2.  Slight colonic wall thickening of the sigmoid and descending colon, likely relating to nondistention.  However clinical correlation for symptoms of colitis recommended.    3.  Subcentimeter hepatic hypodensities too small to definitively characterize.  Please note the patient's previously identified enhancing left hepatic lobe lesion is not definitively visualized on today's examination, which could relate to differences in contrast bolus timing.    4.  Calcified left hilar lymph nodes likely reflecting sequela of prior granulomatous disease.    5.  Mild nonspecific perinephric fat stranding.  Correlation with urinalysis recommended.      Micro  7/15 resp cx normal jose miguel  7/13 bcx NGTD  7/12 BAL cultures NGTD  712 BAL asp ag negative  7/10 RVP negative  7/11 urine legionella negtive  7/10 BDG negative  7/10 quant gold indeterminate  7/10 asp ag negative  7/9 urine histo/blast ag both negative  7/9 bcx NGTD

## 2017-07-17 NOTE — TELEPHONE ENCOUNTER
Returned phone call to patient's mother. Discussed due to HIPPA laws, this nurse cannot discuss information regarding patient. Patient's mother states she tries to call patient, but he is on too many medication and is incoherent. Suggested calling patient and asking him to place his inpatient nurse on the phone for an update. Mother verbalizes understanding.

## 2017-07-17 NOTE — PLAN OF CARE
Problem: Patient Care Overview  Goal: Plan of Care Review  Outcome: Ongoing (interventions implemented as appropriate)  Patient remains free from falls and injury this shift. Bed in low, locked position with call bell in reach. Family at bedside. Patient encouraged to call for assistance when getting out of bed. Patient verbalized understanding. All belongings within reach. Pain management remains key issue with patient. Dilaudid order modified from q 3 hours to q 4 hours due to patient confusion and concern of tolerance. Continued treatment with IV antibiotics and amphotericin B for pneumonia. Pt given pre meds for ampho B and tolerated well. Pt remains dependent on 4L of 02 via nasal cannula with sats in the 90's but confusion when oxygen is not on. Chest x ray today revealed improvement in pneumonia and pleural effusion.Electrolytes replaced for magnesium and phosphorus.  Platelets given for level of 5000, patient tolerated well. Tachycardia still present with HR ranging from 100-120. Will continue to monitor.

## 2017-07-17 NOTE — ASSESSMENT & PLAN NOTE
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 We have discussed antifungal coverage with infectious disease. Discontinued itraconzaole (7/09) and started ambisome  - chimerics from marrow done 6/21 show CD3 of 90% donor and 10% recipient, but CD34 of 5% donor and 95% recipient   - repeat chimerics still pending from marrow done 7/10   - plans for DLI in future

## 2017-07-17 NOTE — PROGRESS NOTES
Progress Note  Cardiology Consults      Admit Date: 6/20/2017    SUBJECTIVE:     Follow-up For:  AML (acute myeloid leukemia) in relapse    HPI/Interval history: Pt remains in Sinus tach since DCCV on Friday      Schedule Medications:    abacavir  600 mg Oral Daily    acetaminophen  500 mg Oral Q24H    acyclovir  400 mg Oral BID    amphotericin B liposome (AMBISOME) IVPB  5 mg/kg Intravenous Q24H    ceFEPime (MAXIPIME) IVPB  2 g Intravenous Q8H    cetirizine  10 mg Oral Daily    diphenhydrAMINE  25 mg Oral Q24H    dolutegravir  50 mg Oral Daily    filgrastim  480 mcg Subcutaneous Q24H    finasteride  5 mg Oral Daily    flecainide  100 mg Oral Q12H    lamivudine  300 mg Oral Daily    magnesium oxide  800 mg Oral BID    metoprolol tartrate  25 mg Oral BID    morphine  30 mg Oral Q12H    pantoprazole  40 mg Oral Daily    sulfamethoxazole-trimethoprim 800-160mg  1 tablet Oral Every Mon, Wed, Fri    tamsulosin  0.4 mg Oral Every other day    vancomycin (VANCOCIN) IVPB  15 mg/kg Intravenous Q12H          OBJECTIVE:     Current Vital Signs  Temp: 98.9 °F (37.2 °C) (07/17/17 0838)  Pulse: (!) 117 (07/17/17 0838)  Resp: (!) 22 (07/17/17 0838)  BP: 114/75 (07/17/17 0400)  SpO2: 96 % (07/17/17 0838)    Vital Signs Range (Last 24H):  Temp:  [97.9 °F (36.6 °C)-98.9 °F (37.2 °C)]   Pulse:  []   Resp:  [22-26]   BP: (102-130)/(55-75)   SpO2:  [77 %-96 %]     I & O (Last 24H):  Intake/Output Summary (Last 24 hours) at 07/17/17 0848  Last data filed at 07/17/17 0513   Gross per 24 hour   Intake             2930 ml   Output             3400 ml   Net             -470 ml       Physical Exam:  General appearance: WDWN in NAD  Neck: no JVD   Lungs:  CTAB  Heart: tachy, regular rhythm, no m/r/g  Abdomen: SNTND: BS+  Extremities: no cyanosis or edema, or clubbing  Pulses: 2+ and symmetric  Neurologic: AOx3      LABS:  CBC:   Recent Labs  Lab 07/15/17  0400 07/16/17  0400 07/17/17  0400   WBC 0.28* 0.35* 0.64*   RBC  2.43* 2.30* 2.30*   HGB 7.5* 7.1* 7.1*   HCT 21.4* 20.6* 20.4*   PLT 15* 13* 5*   MCV 88 90 89   MCH 30.9 30.9 30.9   MCHC 35.0 34.5 34.8     BMP:   Recent Labs  Lab 07/16/17  0400 07/16/17  1738 07/17/17  0400   * 310* 109    130* 137   K 3.6 3.3* 4.0    103 108   CO2 22* 20* 21*   BUN 16 16 16   CREATININE 0.9 0.9 0.9   CALCIUM 7.0* 6.9* 7.3*   MG 1.6  --  1.6     Coagulation:   Recent Labs  Lab 07/14/17  0614   INR 1.2   APTT 25.2         ASSESSMENT/PLAN:   54 y/o M well-controlled HIV, high-risk non-M3 AmL (dx 2/2016, s/p 7+3 induction with residual leukemia, successful MEC reinduction 3/2016 and IDAC consolidation x 2 through 6/2016 and subsequent MUD allo-SCT 9/5/2016) who was admitted on 6/20/2017 with relapsed AML (s/p repeat induction with FLAG-BETZY starting 6/28) complicated by neutropenic fevers with multifocal pneumonia noted on CT CAP as a likely source who we are consulted for episode of AFL with RVR now s/p successful DCCV on 7/15    AFLUTTER RVR  - PT in AFL with RVR<48hrs and then underwent successful DCCV with restoration of NSR as he is not a candidate for OAC 2/2 thrombocytopenia  - Placed on Flecainide and metoprolol  - Will get SPECT rosario to r/o underlying CAD given use of Flecainide. Flecainide was chosen given lower chance of interactions with current and future chemo drugs  - Keep NPO after midnight  - Pt is very anxious about SPECT in AM so OK to given some xananx before test    Jaciel Alicea MD  Cardiology Fellow  [phone]

## 2017-07-17 NOTE — PLAN OF CARE
Problem: Patient Care Overview  Goal: Plan of Care Review  Outcome: Ongoing (interventions implemented as appropriate)  Pt. Day 21 of Flag-Lynette.  Pt. with nonskid footwear on with bed in lowest position and locked with bed rails up x2.  Pt. ambulates independently and instructed to call if assistance is needed.  Pt. with call light within reach. IVF's d/c'd yesterday as patient is having significant weight gain. Lost 2lbs with this a.m. Weight.  Still receiving cefepime and Vanc. Vanc trough due before this morning dose.  Remained afebrile through night. Patient still requesting IV pain meds as often as he can have and has been requesting an hour before it is due. Voiding without difficulty. Received lasix yesterday x2  for weight gain. Good urine output.  Remains on  MS contin 30mg. HR remained in 100's-110's throughout shift. Oxygen used throughout shift . Patient has a cough tessalon pearls given.   Will continue to monitor pt.  patients sats on room air have been noted to be in 70's, patient does take oxygen off. sats on 2LNC stays around 90-93%, patient noted to be more confused when he has taken oxygen off, this morning he was wanting to go walk to the rehab hospital and eat breakfast, reoriented him, helped him to bed and checked sats, on room air was 73%, oxygen replaced and came back up to 93%. Will continue to monitor. Patient is really not sleeping well, constantly worrying about his pain medication, refuses to take ambien.

## 2017-07-17 NOTE — TELEPHONE ENCOUNTER
----- Message from Paradise Biggs sent at 7/17/2017 11:00 AM CDT -----  Contact: MOM  Please call mom at [phone].  She said her son has been in the hospital for one month and they haven't got an update.

## 2017-07-17 NOTE — ASSESSMENT & PLAN NOTE
- blood cultures no growth from 6/24/17 and 7/4/17 and 7/7/17, CXR unremarkable.   - on Cefepime (started 6/24 - discontinued 6/28) restarted on 7/4/17 and continues currently  - Pt currently only on prophylactic abx--Bactrim, acyclovir.   -Pt on Cefepime, Amphotericin B, and vancomycin (restarted 7/15)  - Cefepime day 14, continues per ID until count recovery   -Will f/u ID recs  -CXR Shows pneumonia; repeat CXR 7/17  - repeated cultures 7/9/17 and 7/13/17 - NGTD  - CT chest/abdomen/pelvis reveals bilateral groundglass opacities as well as a left lower-lobe consolidation  - started on ambisome 7/9 per ID recommendations, continues currently   - pulm was consulted for a BAL which was done 7/12, cytology pending from BAL; aspergillus <0.500; KOH was negative for yeast/fungus   - fungitell pending, aspergillus negative and quantiferon gold results show indeterminate   - respiratory viral panel negative on 7/10/17

## 2017-07-17 NOTE — ASSESSMENT & PLAN NOTE
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 Blast of 62%  - awaiting mutation analysis and cytogenetics; FLT-3 negative     - Jaw pain and pain in right cranial nerve 7 distribution with persistent, severe headaches concerning for CNS involvement - underwent IT chemotherapy on 6/23. Flow negative for disease. CSF LDH was 29  - Had previous reaction to MEC (etoposide caused full body rash). Did not qualify for the Tolero trial due to receiving IT chemotherapy    - Day 21 of FLAG-BETZY   - Day 14 bone marrow biopsy done 7/10 - hypocellular with no evidence of residual disease  - Future plans for DLI

## 2017-07-18 NOTE — PLAN OF CARE
Problem: Patient Care Overview  Goal: Plan of Care Review  Outcome: Ongoing (interventions implemented as appropriate)  Pt. Day 22 of Flag-Lynette.  Pt. with nonskid footwear on with bed in lowest position and locked with bed rails up x2.  Pt. ambulates independently and instructed to call if assistance is needed.  Pt. with call light within reach.  Still receiving cefepime. Vanc d/c'd yesterday. Patient remains afebrile. Patient still requesting IV pain meds. Time interval increased to q4hrs.  Voiding without difficulty.  Remains on  MS contin 30mg. HR remained in 100's-110's throughout shift. Oxygen used throughout shift . Now on continous pulse ox. Patient has a cough tessalon pearls given.  Will continue to monitor. Patient did sleep well during this shift.

## 2017-07-18 NOTE — PROGRESS NOTES
07/18/17 0850   Vital Signs   Temp 98.4 °F (36.9 °C)   Temp src Oral   Pulse (!) 113   Resp 20   SpO2 (!) 94 %   O2 Device (Oxygen Therapy) nasal cannula   BP (!) 97/55     Notified Dr. Byrd of low blood pressure. MD instructed to give metoprolol as ordered. Will continue to monitor.

## 2017-07-18 NOTE — ASSESSMENT & PLAN NOTE
- evaluated by Neurology  - MRI with no evidence of  acute intracranial pathology.  - thought to be secondary to drug induced peripheral neuropathy?  - Neurology rec Vitamin E, will hold for now given side effects of thrombocytopenia and bleeding    - with continued facial/jaw pain also; added MS contin for pain 7/13/17, may continue prn dilaudid; ordered CT of sinus/face 7/13/17, results show paranasal sinus disease, no acute sinusitis, nonspecific partial opacification of bilateral mastoid air cells right greater than left. ID following

## 2017-07-18 NOTE — ASSESSMENT & PLAN NOTE
- blood cultures no growth from 6/24/17, 7/4/17, 7/7/17, 7/9/17, 7/15/17  - on Cefepime (started 6/24 - discontinued 6/28) restarted on 7/4/17 and continues currently  - Continue ppx abx--Bactrim, acyclovir.   -Pt on Cefepime, Amphotericin B, and vancomycin (restarted 7/15 - stopped 7/17)  - Cefepime day 15, continues per ID until count recovery   -Will f/u ID recs  -CXR Shows pneumonia 7/15; CXR 7/17 - Significant patchy air space consolidation identified bilaterally slightly more on the left side.  Slight improvement as compared to the previous study.  Left-sided pleural effusion. On 4 L NC  - CT chest/abdomen/pelvis reveals bilateral groundglass opacities as well as a left lower-lobe consolidation  - started on ambisome 7/9 per ID recommendations, continues currently   - pulm was consulted for a BAL which was done 7/12, cytology pending from BAL; aspergillus <0.500; KOH was negative for yeast/fungus   - fungitell negative, aspergillus negative and quantiferon gold results show indeterminate   - respiratory viral panel negative on 7/10/17

## 2017-07-18 NOTE — PROGRESS NOTES
[facility]  Hematology  Bone Marrow Transplant  Progress Note    Patient Name: Paul E Sistrunk  Admission Date: 6/20/2017  Hospital Length of Stay: 28 days  Code Status: Full Code    Subjective:     Interval History:   - No acute events overnight. Afebrile x 72 hours.   - Nuclear test per cards this AM - will hold off and do a cardiac CT later this week - rate controlled overnight. S/P cardioversion 7/14  - On 4 L NC CXR shows slight improvement from 7/15, still with left pleural effusion. Also 12 lbs weight gain.     Objective:     Vital Signs (Most Recent):  Temp: 98.5 °F (36.9 °C) (07/18/17 0756)  Pulse: (!) 116 (07/18/17 0756)  Resp: 20 (07/18/17 0756)  BP: (!) 107/56 (07/18/17 0756)  SpO2: 95 % (07/18/17 0756) Vital Signs (24h Range):  Temp:  [97.9 °F (36.6 °C)-99.3 °F (37.4 °C)] 98.5 °F (36.9 °C)  Pulse:  [] 116  Resp:  [20-24] 20  SpO2:  [90 %-98 %] 95 %  BP: (100-145)/(55-69) 107/56     Weight: 93.7 kg (206 lb 9.1 oz)  Body mass index is 27.26 kg/m².  Body surface area is 2.2 meters squared.    ECOG SCORE         [unfilled]    Intake/Output - Last 3 Shifts       07/16 0700 - 07/17 0659 07/17 0700 - 07/18 0659 07/18 0700 - 07/19 0659    P.O. 1000 500     I.V. (mL/kg) 230 (2.5)      Blood  243     IV Piggyback 1850 850     Total Intake(mL/kg) 3080 (32.8) 1593 (17)     Urine (mL/kg/hr) 3400 (1.5) 2025 (0.9)     Emesis/NG output  150 (0.1)     Stool 0 (0) 0 (0)     Total Output 3400 2175      Net -320 -582             Stool Occurrence 1 x 0 x           Physical Exam   Constitutional: He is oriented to person, place, and time. He appears well-developed and well-nourished. No distress.   HENT:   Head: Normocephalic and atraumatic.   Mouth/Throat: Oropharynx is clear and moist. No oropharyngeal exudate.   Eyes: Pupils are equal, round, and reactive to light.   Cardiovascular: Normal rate and regular rhythm.    Pulmonary/Chest: Effort normal and breath sounds normal.   Abdominal: Soft. Bowel  sounds are normal. He exhibits no distension. There is no tenderness.   Musculoskeletal: He exhibits edema (2 + BLE).   Neurological: He is alert and oriented to person, place, and time.   Skin: Skin is warm and dry. No erythema.   Mazariegos in place; dressing clean, dry and intact    Psychiatric: He has a normal mood and affect.   Nursing note and vitals reviewed.      Significant Labs:   CBC:   Recent Labs  Lab 07/17/17  0400 07/18/17  0400   WBC 0.64* 0.35*   HGB 7.1* 6.2*   HCT 20.4* 18.3*   PLT 5* 11*   , CMP:   Recent Labs  Lab 07/16/17  1738 07/17/17  0400 07/18/17  0400   * 137 138   K 3.3* 4.0 4.0    108 109   CO2 20* 21* 22*   * 109 108   BUN 16 16 18   CREATININE 0.9 0.9 0.9   CALCIUM 6.9* 7.3* 7.2*   PROT  --  4.5* 4.2*   ALBUMIN  --  1.8* 1.7*   BILITOT  --  1.1* 0.9   ALKPHOS  --  295* 249*   AST  --  13 12   ALT  --  11 9*   ANIONGAP 7* 8 7*   EGFRNONAA >60.0 >60.0 >60.0   ,   Recent Lab Results       07/18/17  0400 07/17/17  0900      Albumin 1.7(L)      Alkaline Phosphatase 249(H)      ALT 9(L)      Anion Gap 7(L)      Aniso Slight      aPTT 26.8  Comment:  aPTT therapeutic range = 39-69 seconds      AST 12      Baso # 0.00      Basophil% 0.0      Total Bilirubin 0.9  Comment:  For infants and newborns, interpretation of results should be based  on gestational age, weight and in agreement with clinical  observations.  Premature Infant recommended reference ranges:  Up to 24 hours.............<8.0 mg/dL  Up to 48 hours............<12.0 mg/dL  3-5 days..................<15.0 mg/dL  6-29 days.................<15.0 mg/dL        BUN, Bld 18      Calcium 7.2(L)      Chloride 109      CO2 22(L)      Creatinine 0.9      Differential Method Automated      eGFR if African American >60.0      eGFR if non  >60.0  Comment:  Calculation used to obtain the estimated glomerular filtration  rate (eGFR) is the CKD-EPI equation. Since race is unknown   in our information system, the  eGFR values for   -American and Non--American patients are given   for each creatinine result.        Eos # 0.0      Eosinophil% 0.0      Glucose 108      Gran # 0.0(L)      Gran% 0.0(L)      Hematocrit 18.3  Comment:  WBC, HCT   critical result(s) called and verbal readback obtained   from Leora Ballesteros RN. Prelim PLT given, 07/18/2017 05:39  (LL)      Hemoglobin 6.2(L)      Coumadin Monitoring INR 1.3  Comment:  Coumadin Therapy:  2.0 - 3.0 for INR for all indicators except mechanical heart valves  and antiphospholipid syndromes which should use 2.5 - 3.5.  (H)      Lymph # 0.4(L)      Lymph% 100.0(H)      Magnesium 1.8      MCH 30.2      MCHC 33.9      MCV 89      Mono # 0.0(L)      Mono% 0.0(L)      MPV 10.3      Phosphorus 2.1(L)      Platelet Estimate Decreased(A)      Platelets 11  Comment:  critical result(s) called and verbal readback obtained from   PLATELET COUNT CALLED TO LEORA BALLESTEROS RN, 07/18/2017 05:52  (LL)      Potassium 4.0      Total Protein 4.2(L)      Protime 13.4(H)      RBC 2.05(L)      RDW 14.6(H)      Sodium 138      Vancomycin-Trough  21.9     WBC 0.35  Comment:  WBC, HCT   critical result(s) called and verbal readback obtained   from Leora Ballesteros RN. Prelim PLT given, 07/18/2017 05:39  (LL)         and All pertinent labs from the last 24 hours have been reviewed.    Diagnostic Results:  I have reviewed all pertinent imaging results/findings within the past 24 hours.    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 Blast of 62%  - awaiting mutation analysis and cytogenetics; FLT-3 negative     - Jaw pain and pain in right cranial nerve 7 distribution with  persistent, severe headaches concerning for CNS involvement - underwent IT chemotherapy on 6/23. Flow negative for disease. CSF LDH was 29  - Had previous reaction to MEC (etoposide caused full body rash). Did not qualify for the Tolero trial due to receiving IT chemotherapy    - Day 22 of FLAG-BETZY   - Day 14 bone marrow biopsy done 7/10 - hypocellular with no evidence of residual disease  - Future plans for DLI        S/P allogeneic bone marrow transplant    - +309 days s/p Flu/Bu/ATG MUD allogeneic transplant for 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 We have discussed antifungal coverage with infectious disease. Discontinued itraconzaole (7/09) and started ambisome  - chimerics from marrow done 6/21 show CD3 of 90% donor and 10% recipient, but CD34 of 5% donor and 95% recipient   - chimers from 7/10 shows 70% recipient and 30% donor - NOT SORTED  - plans for DLI in future        HIV disease    - CD4 low at 11.4%, Absolute CD4 469 and HIV RNA not detected from 6/21  - As per ID: continue triumeq        Neutropenic fever    - blood cultures no growth from 6/24/17, 7/4/17, 7/7/17, 7/9/17, 7/15/17  - on Cefepime (started 6/24 - discontinued 6/28) restarted on 7/4/17 and continues currently  - Continue ppx abx--Bactrim, acyclovir.   -Pt on Cefepime, Amphotericin B, and vancomycin (restarted 7/15 - stopped 7/17)  - Cefepime day 15, continues per ID until count recovery   -Will f/u ID recs  -CXR Shows pneumonia 7/15; CXR 7/17 - Significant patchy air space consolidation identified bilaterally slightly more on the left side.  Slight improvement as compared to the previous study.  Left-sided pleural effusion. On 4 L NC  - CT chest/abdomen/pelvis reveals bilateral groundglass opacities as well as a left lower-lobe  consolidation  - started on ambisome 7/9 per ID recommendations, continues currently   - pulm was consulted for a BAL which was done 7/12, cytology pending from BAL; aspergillus <0.500; KOH was negative for yeast/fungus   - fungitell negative, aspergillus negative and quantiferon gold results show indeterminate   - respiratory viral panel negative on 7/10/17        Supraventricular tachycardia    - Supraventricular tachycardia on tele and EKG. Started 7/13/17 8pm with HR between 160-170  -Patient was cardioverted by cardiology and started on metoprolol and flecainide.  -Will f/u further cardiology recs  -Treat active infection  - Pharmacologic nuclear stress test ordered for 7/18/17, okay to change date if needed by cardiology         Pancytopenia due to antineoplastic chemotherapy    - white blood cell count is 0.35 k/uL; absolute neutrophil count 0 cells/uL  - hemoglobin 6.2 g/dL; platelet count is 11 k/uL  - transfuse for hemoglobin < 7 g/dL, platelets < 10 k/uL        Prostate hypertrophy    - Continue home flomax        Lower extremity edema    -Pt with diffuse lower extremity edema.  -IV fluids stopped  - Net Negative from lasix 7/16  - 40 mg of lasix BID - will chek BMP between doses        Insomnia    - with restlessness and no sleep for 2 nights (7/12/17); pt has been refusing Ramelteon for several days (now d/c'd); states he infrequently takes ambien prn at home (still ordered as prn). 7/14/17, pt refused ambien but still reports difficulty sleeping  - was started on zyprexa on 7/12/17 and d/c'd on 7/13/17 for restlessness  - for insomnia, encourage ambien use prn (Ramelteon and zyprexa are now d/c'd)        Facial numbness    - evaluated by Neurology  - MRI with no evidence of  acute intracranial pathology.  - thought to be secondary to drug induced peripheral neuropathy?  - Neurology rec Vitamin E, will hold for now given side effects of thrombocytopenia and bleeding    - with continued facial/jaw pain  also; added MS contin for pain 7/13/17, may continue prn dilaudid; ordered CT of sinus/face 7/13/17, results show paranasal sinus disease, no acute sinusitis, nonspecific partial opacification of bilateral mastoid air cells right greater than left. ID following         Chronic bilateral low back pain without sciatica    - related to Neupogen  - started on zyrtec, lidocaine patch and Flexeril  - pain continues. Continue to monitor.  - started on ms continue 15 mg q12 on 7/13/17, increased to 30 mg q12 on 7/14/17; decreased dilaudid 2 mg from q2 hrs to q3hrs on 7/14/17 for breakthrough pain         Electrolyte abnormality    - ordered prn electrolyte replacement per protocol   - monitor closely due to ambisome         Anxiety    - PRN Xanax            VTE Risk Mitigation         Ordered     Place sequential compression device  Until discontinued      06/20/17 2005     High Risk of VTE  Once      06/20/17 1901          Disposition: Pending count recovery.     Asia Melgar NP  Bone Marrow Transplant  [facility]

## 2017-07-18 NOTE — ASSESSMENT & PLAN NOTE
-Pt with diffuse lower extremity edema.  -IV fluids stopped  - Net Negative from lasix 7/16  - 40 mg of lasix BID - will chek BMP between doses

## 2017-07-18 NOTE — ASSESSMENT & PLAN NOTE
- white blood cell count is 0.35 k/uL; absolute neutrophil count 0 cells/uL  - hemoglobin 6.2 g/dL; platelet count is 11 k/uL  - transfuse for hemoglobin < 7 g/dL, platelets < 10 k/uL

## 2017-07-18 NOTE — SIGNIFICANT EVENT
Pt anemic, SOB and actively getting transfused today so will cancel SPECT. Pt unable to exercise and too tachycardiac at baseline to get cardiac CTA so will plan for SPECT when he is more stable to eval for underlying CAD given he is on flecanide.

## 2017-07-18 NOTE — PLAN OF CARE
MDR's with Dr Shin.  Day 22 of FLAD Lynette induction. In remission from recent BMB. ANC 0 and awaiting count recovery. His VS continue to be labile and his O2 requirements have increased.  CTA and stress test on hold for now.  ID and cards following.  IV abx continued.  Will continue to follow for d/c needs.

## 2017-07-18 NOTE — PLAN OF CARE
Problem: Patient Care Overview  Goal: Plan of Care Review  Outcome: Ongoing (interventions implemented as appropriate)  Patient remains free from falls and injury this shift. Bed in low, locked position with call bell in reach. Family at bedside. Patient encouraged to call for assistance when getting out of bed. Patient verbalized understanding. All belongings within reach. Pain management remains key issue with patient. Dilaudid given PRN with long acting morphine. Requests for dilaudid less frequent today than yesterday. Pt nauseated with one episode of emesis. One unit of blood given, pre meds given, pt tolerated well. Continued treatment with IV antibiotics and amphotericin B for pneumonia. Pt remains dependent on 4L of 02 via nasal cannula with sats in the 90's but confusion when oxygen is not on. Tachycardia still present with HR ranging from 100-120. Will continue to monitor.

## 2017-07-18 NOTE — SUBJECTIVE & OBJECTIVE
Subjective:     Interval History:   - No acute events overnight. Afebrile x 72 hours.   - Nuclear test per cards this AM - will hold off and do a cardiac CT later this week - rate controlled overnight. S/P cardioversion 7/14  - On 4 L NC CXR shows slight improvement from 7/15, still with left pleural effusion. Also 12 lbs weight gain.     Objective:     Vital Signs (Most Recent):  Temp: 98.5 °F (36.9 °C) (07/18/17 0756)  Pulse: (!) 116 (07/18/17 0756)  Resp: 20 (07/18/17 0756)  BP: (!) 107/56 (07/18/17 0756)  SpO2: 95 % (07/18/17 0756) Vital Signs (24h Range):  Temp:  [97.9 °F (36.6 °C)-99.3 °F (37.4 °C)] 98.5 °F (36.9 °C)  Pulse:  [] 116  Resp:  [20-24] 20  SpO2:  [90 %-98 %] 95 %  BP: (100-145)/(55-69) 107/56     Weight: 93.7 kg (206 lb 9.1 oz)  Body mass index is 27.26 kg/m².  Body surface area is 2.2 meters squared.    ECOG SCORE         [unfilled]    Intake/Output - Last 3 Shifts       07/16 0700 - 07/17 0659 07/17 0700 - 07/18 0659 07/18 0700 - 07/19 0659    P.O. 1000 500     I.V. (mL/kg) 230 (2.5)      Blood  243     IV Piggyback 1850 850     Total Intake(mL/kg) 3080 (32.8) 1593 (17)     Urine (mL/kg/hr) 3400 (1.5) 2025 (0.9)     Emesis/NG output  150 (0.1)     Stool 0 (0) 0 (0)     Total Output 3400 2175      Net -320 -582             Stool Occurrence 1 x 0 x           Physical Exam   Constitutional: He is oriented to person, place, and time. He appears well-developed and well-nourished. No distress.   HENT:   Head: Normocephalic and atraumatic.   Mouth/Throat: Oropharynx is clear and moist. No oropharyngeal exudate.   Eyes: Pupils are equal, round, and reactive to light.   Cardiovascular: Normal rate and regular rhythm.    Pulmonary/Chest: Effort normal and breath sounds normal.   Abdominal: Soft. Bowel sounds are normal. He exhibits no distension. There is no tenderness.   Musculoskeletal: He exhibits edema (2 + BLE).   Neurological: He is alert and oriented to person, place, and time.   Skin: Skin  is warm and dry. No erythema.   Mazariegos in place; dressing clean, dry and intact    Psychiatric: He has a normal mood and affect.   Nursing note and vitals reviewed.      Significant Labs:   CBC:   Recent Labs  Lab 07/17/17  0400 07/18/17  0400   WBC 0.64* 0.35*   HGB 7.1* 6.2*   HCT 20.4* 18.3*   PLT 5* 11*   , CMP:   Recent Labs  Lab 07/16/17  1738 07/17/17  0400 07/18/17  0400   * 137 138   K 3.3* 4.0 4.0    108 109   CO2 20* 21* 22*   * 109 108   BUN 16 16 18   CREATININE 0.9 0.9 0.9   CALCIUM 6.9* 7.3* 7.2*   PROT  --  4.5* 4.2*   ALBUMIN  --  1.8* 1.7*   BILITOT  --  1.1* 0.9   ALKPHOS  --  295* 249*   AST  --  13 12   ALT  --  11 9*   ANIONGAP 7* 8 7*   EGFRNONAA >60.0 >60.0 >60.0   ,   Recent Lab Results       07/18/17  0400 07/17/17  0900      Albumin 1.7(L)      Alkaline Phosphatase 249(H)      ALT 9(L)      Anion Gap 7(L)      Aniso Slight      aPTT 26.8  Comment:  aPTT therapeutic range = 39-69 seconds      AST 12      Baso # 0.00      Basophil% 0.0      Total Bilirubin 0.9  Comment:  For infants and newborns, interpretation of results should be based  on gestational age, weight and in agreement with clinical  observations.  Premature Infant recommended reference ranges:  Up to 24 hours.............<8.0 mg/dL  Up to 48 hours............<12.0 mg/dL  3-5 days..................<15.0 mg/dL  6-29 days.................<15.0 mg/dL        BUN, Bld 18      Calcium 7.2(L)      Chloride 109      CO2 22(L)      Creatinine 0.9      Differential Method Automated      eGFR if African American >60.0      eGFR if non  >60.0  Comment:  Calculation used to obtain the estimated glomerular filtration  rate (eGFR) is the CKD-EPI equation. Since race is unknown   in our information system, the eGFR values for   -American and Non--American patients are given   for each creatinine result.        Eos # 0.0      Eosinophil% 0.0      Glucose 108      Gran # 0.0(L)      Gran% 0.0(L)       Hematocrit 18.3  Comment:  WBC, HCT   critical result(s) called and verbal readback obtained   from Leora Ballesteros RN. Prelim PLT given, 07/18/2017 05:39  (LL)      Hemoglobin 6.2(L)      Coumadin Monitoring INR 1.3  Comment:  Coumadin Therapy:  2.0 - 3.0 for INR for all indicators except mechanical heart valves  and antiphospholipid syndromes which should use 2.5 - 3.5.  (H)      Lymph # 0.4(L)      Lymph% 100.0(H)      Magnesium 1.8      MCH 30.2      MCHC 33.9      MCV 89      Mono # 0.0(L)      Mono% 0.0(L)      MPV 10.3      Phosphorus 2.1(L)      Platelet Estimate Decreased(A)      Platelets 11  Comment:  critical result(s) called and verbal readback obtained from   PLATELET COUNT CALLED TO LEORA BALLESTEROS RN, 07/18/2017 05:52  (LL)      Potassium 4.0      Total Protein 4.2(L)      Protime 13.4(H)      RBC 2.05(L)      RDW 14.6(H)      Sodium 138      Vancomycin-Trough  21.9     WBC 0.35  Comment:  WBC, HCT   critical result(s) called and verbal readback obtained   from Leora Ballesteros RN. Prelim PLT given, 07/18/2017 05:39  (LL)         and All pertinent labs from the last 24 hours have been reviewed.    Diagnostic Results:  I have reviewed all pertinent imaging results/findings within the past 24 hours.

## 2017-07-18 NOTE — ASSESSMENT & PLAN NOTE
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 Blast of 62%  - awaiting mutation analysis and cytogenetics; FLT-3 negative     - Jaw pain and pain in right cranial nerve 7 distribution with persistent, severe headaches concerning for CNS involvement - underwent IT chemotherapy on 6/23. Flow negative for disease. CSF LDH was 29  - Had previous reaction to MEC (etoposide caused full body rash). Did not qualify for the Tolero trial due to receiving IT chemotherapy    - Day 22 of FLAG-BETZY   - Day 14 bone marrow biopsy done 7/10 - hypocellular with no evidence of residual disease  - Future plans for DLI

## 2017-07-18 NOTE — ASSESSMENT & PLAN NOTE
- +309 days s/p Flu/Bu/ATG MUD allogeneic transplant for 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 We have discussed antifungal coverage with infectious disease. Discontinued itraconzaole (7/09) and started ambisome  - chimerics from marrow done 6/21 show CD3 of 90% donor and 10% recipient, but CD34 of 5% donor and 95% recipient   - chimers from 7/10 shows 70% recipient and 30% donor - NOT SORTED  - plans for DLI in future

## 2017-07-19 NOTE — ASSESSMENT & PLAN NOTE
Nutrition Problem  increased nutrient needs    Related to (etiology):   Physiological Needs-AML    Signs and Symptoms (as evidenced by):   Altered nutritional labs (WBC-.48, Hbg-7.1, Hct-2.1, Alk Phos-282)    Interventions/Recommendations (treatment strategy):  1.Addition of ONS  2. If PO intake does not improve by next visit, consider appetite stimulant    Nutrition Diagnosis Status:   Improving

## 2017-07-19 NOTE — ASSESSMENT & PLAN NOTE
- blood cultures no growth from 6/24/17, 7/4/17, 7/7/17, 7/9/17, 7/15/17  - on Cefepime (started 6/24 - discontinued 6/28) restarted on 7/4/17; cefepime day 16; Ampotericin B started 7/9; vancomycin (restarted 7/15 - stopped 7/17)  - Continue ppx abx--Bactrim, acyclovir.   -Will f/u ID recs  -CXR Shows pneumonia 7/15; CXR 7/17 - Significant patchy air space consolidation identified bilaterally slightly more on the left side.  Slight improvement as compared to the previous study.  Left-sided pleural effusion. On 4 L NC  - CT chest/abdomen/pelvis reveals bilateral groundglass opacities as well as a left lower-lobe consolidation; ID recommends repeating CT chest  - pulm was consulted for a BAL which was done 7/12, cytology pending from BAL; aspergillus <0.500; KOH was negative for yeast/fungus   - fungitell negative, aspergillus negative and quantiferon gold results show indeterminate; respiratory viral panel negative on 7/10/17

## 2017-07-19 NOTE — ASSESSMENT & PLAN NOTE
- Supraventricular tachycardia on tele and EKG. Started 7/13/17 8pm with HR between 160-170  -Patient was cardioverted by cardiology and started on metoprolol and flecainide.  -Will f/u further cardiology recs  -Treat active infection  - Pharmacologic nuclear stress test ordered; consulted cardiology and will do a cardiac CTA later this week

## 2017-07-19 NOTE — PROGRESS NOTES
07/19/17 1520   Vital Signs   Pulse (!) 114   Heart Rate Source Monitor   SpO2 (!) 82 %   Flow (L/min) 4   O2 Device (Oxygen Therapy) nasal cannula   /64   MAP (mmHg) 86   pt felt SOB and weak. O2 sat low on 4L, venti mask placed. Rhythm changed noted on tele. Dr. Byrd notified and at bedside. EKG ordered, ABG ordered.

## 2017-07-19 NOTE — ASSESSMENT & PLAN NOTE
-Pt with diffuse lower extremity edema.  -IV fluids stopped  - Net Negative 3 L from 7/18 - down 11 lbs from 7/18   - 40 mg of lasix BID - given on 7/18

## 2017-07-19 NOTE — ASSESSMENT & PLAN NOTE
- white blood cell count is 0.48 k/uL; absolute neutrophil count 0 cells/uL  - hemoglobin 7.1 g/dL; platelet count is 3 k/uL  - transfuse for hemoglobin < 7 g/dL, platelets < 10 k/uL

## 2017-07-19 NOTE — PROGRESS NOTES
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    Patient now admitted on 6/20/2017 with relapsed AML (s/p repeat induction with FLAG-BETZY starting 6/28/17) with neutropenic fevers despite empiric vancomycin/cefepime/itraconazole/ACV with multifocal pneumonia noted on chest CT scan 7/8/17.   - patient has been on amphotericin since 7/9/17; switch to voriconazole today  - continues on cefepime for neutropenic fever  - continue ART  - repeat chest CT pending              Anticipated Disposition: pending    Thank you for your consult. I will follow-up with patient. Please contact us if you have any additional questions.    Delgado Koenig MD  Infectious Disease Fellow, PGY-5  Pager: [phone]  [facility]    Subjective:     Principal Problem:AML (acute myeloid leukemia) in relapse    HPI: Mr. 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, s/p 7+3 induction with residual leukemia, successful  MEC reinduction 3/2016 and IDAC consolidation x 2 through 6/2016 and subsequent MUD allo-SCT 9/5/2016; CMV D+/R+, Flu/Bu/ATG conditioning, ACV and maintenance tacro ppx; c/b neutropenic fever due to pulmonary histoplasmosis with positive urine antigen 2.35 and pulmonary micronodules on CT chest 2/2016; s/p ambisome induction and on itraconazole maintenance with serial negative repeat antigens; and Klebsiella septicemia 2016 while neutropenic), and CKD-3 who was admitted on 6/20/2017 with relapsed AML for repeat induction with FLAG-BETZY (starting 6/28). ID has been consulted today for neutropenic fevers that have persisted the last few days despite empiric vanc, cefepime, chronic itraconazole, and acyclovir prophylaxis. Patient notes that prior to diagnosis of relapse in clinic, he was not having F/C/S or other localizing infectious symptoms -- only malaise and acute onset BLE body aches just prior to diagnosis. Since admission he notes neutropenic F/C/S and dry cough but denies HA, URI symptoms, sore throat, dysphagia, sputum production, SOB, N/V, abdominal pain, diarrhea, dysuria, genital lesions, line pain/drainage, or rash. Exposure history is largely unremarkable: he is currently single, lives alone, recently visited [place] but denies other travel (including international), no pets, no new sexual contacts, and no new hobbies (does garden).   Interval History: continues afebrile, no major changes in clinical condition in last 24 hours    Review of Systems   Constitutional: Negative for chills and fever.   HENT: Negative for sore throat.    Respiratory: Negative for cough, chest tightness and shortness of breath.    Cardiovascular: Negative for chest pain, palpitations and leg swelling.   Gastrointestinal: Negative for abdominal distention, abdominal pain, diarrhea, nausea and vomiting.   Genitourinary: Negative for dysuria, flank pain and urgency.   Skin: Negative for rash.   Neurological: Negative for dizziness,  light-headedness and numbness.   Psychiatric/Behavioral: Negative for confusion.     Objective:     Vital Signs (Most Recent):  Temp: 99.7 °F (37.6 °C) (07/19/17 1503)  Pulse: (!) 111 (07/19/17 1500)  Resp: 18 (07/19/17 0748)  BP: (!) 99/55 (07/19/17 1136)  SpO2: 96 % (07/19/17 1619) Vital Signs (24h Range):  Temp:  [97.8 °F (36.6 °C)-99.7 °F (37.6 °C)] 99.7 °F (37.6 °C)  Pulse:  [] 111  Resp:  [18-20] 18  SpO2:  [90 %-97 %] 96 %  BP: ()/(42-66) 99/55     Weight: 88.8 kg (195 lb 12.3 oz)  Body mass index is 25.83 kg/m².    Estimated Creatinine Clearance: 86.8 mL/min (based on Cr of 1.1).    Physical Exam   Constitutional: He is oriented to person, place, and time. No distress. Nasal cannula in place.   HENT:   Head: Normocephalic and atraumatic.   Mouth/Throat: No oropharyngeal exudate.   Eyes: No scleral icterus.   Cardiovascular: Normal rate, regular rhythm and normal heart sounds.  Exam reveals no gallop and no friction rub.    No murmur heard.  Pulmonary/Chest: Effort normal and breath sounds normal. No respiratory distress. He has no wheezes. He has no rales.   Abdominal: Soft. Bowel sounds are normal. He exhibits no distension. There is no tenderness. There is no rebound and no guarding.   Musculoskeletal: He exhibits no edema.   Neurological: He is alert and oriented to person, place, and time. No cranial nerve deficit.   Vitals reviewed.      Significant Labs:   Blood Culture:     Recent Labs  Lab 07/09/17  0638 07/09/17  0640 07/13/17  1018 07/13/17  1052 07/15/17  1008   LABBLOO No growth after 5 days. No growth after 5 days. No growth after 5 days. No growth after 5 days. No Growth to date  No Growth to date  No Growth to date  No Growth to date  No Growth to date  No Growth to date  No Growth to date  No Growth to date  No Growth to date  No Growth to date     BMP:     Recent Labs  Lab 07/19/17  0400   *      K 3.8      CO2 24   BUN 22*   CREATININE 1.1   CALCIUM  7.4*   MG 1.9     C4 Count: No results for input(s): C4 in the last 48 hours.  CBC:     Recent Labs  Lab 07/18/17  0400 07/19/17  0400   WBC 0.35* 0.48*   HGB 6.2* 7.1*   HCT 18.3* 21.1*   PLT 11* 3*     CMP:     Recent Labs  Lab 07/18/17  0400 07/18/17  1339 07/19/17  0400    139 137   K 4.0 3.5 3.8    108 105   CO2 22* 23 24    92 120*   BUN 18 20 22*   CREATININE 0.9 1.0 1.1   CALCIUM 7.2* 7.2* 7.4*   PROT 4.2*  --  4.4*   ALBUMIN 1.7*  --  1.8*   BILITOT 0.9  --  1.0   ALKPHOS 249*  --  282*   AST 12  --  13   ALT 9*  --  11   ANIONGAP 7* 8 8   EGFRNONAA >60.0 >60.0 >60.0     Microbiology Results (last 7 days)     Procedure Component Value Units Date/Time    Blood culture [[id number]] Collected:  07/15/17 1008    Order Status:  Completed Specimen:  Blood Updated:  07/19/17 1412     Blood Culture, Routine No Growth to date     Blood Culture, Routine No Growth to date     Blood Culture, Routine No Growth to date     Blood Culture, Routine No Growth to date     Blood Culture, Routine No Growth to date    Blood culture [[id number]] Collected:  07/15/17 1008    Order Status:  Completed Specimen:  Blood Updated:  07/19/17 1412     Blood Culture, Routine No Growth to date     Blood Culture, Routine No Growth to date     Blood Culture, Routine No Growth to date     Blood Culture, Routine No Growth to date     Blood Culture, Routine No Growth to date    Blood culture [[id number]] Collected:  07/13/17 1018    Order Status:  Completed Specimen:  Blood from Line, Subclavian, Left Updated:  07/18/17 1412     Blood Culture, Routine No growth after 5 days.    Blood culture [[id number]] Collected:  07/13/17 1052    Order Status:  Completed Specimen:  Blood Updated:  07/18/17 1212     Blood Culture, Routine No growth after 5 days.    Culture, Respiratory with Gram Stain [[id number]] Collected:  07/15/17 2239    Order Status:  Completed Specimen:  Respiratory from Sputum, Expectorated Updated:  07/18/17 0836      Respiratory Culture Normal respiratory jose miguel     Gram Stain (Respiratory) <10 epithelial cells per low power field.     Gram Stain (Respiratory) No WBC's or organisms seen    Urine culture [[id number]] Collected:  07/15/17 1103    Order Status:  Completed Specimen:  Urine from Urine, Catheterized Updated:  07/16/17 1456     Urine Culture, Routine No growth    Gram stain [[id number]] Collected:  07/15/17 1103    Order Status:  Completed Specimen:  Urine from Urine, Catheterized Updated:  07/15/17 1526     Gram Stain Result No WBC's      No organisms seen    Blood culture [[id number]]     Order Status:  Canceled Specimen:  Blood     Blood culture [[id number]]     Order Status:  Canceled Specimen:  Blood     Fungus culture [[id number]] Collected:  07/12/17 1058    Order Status:  Completed Specimen:  Bronchial Alveolar Lavage from Lung, LLL Updated:  07/14/17 1055     Fungus (Mycology) Culture Culture in progress    Narrative:       Bronchial Wash    Blood culture [[id number]] Collected:  07/09/17 0638    Order Status:  Completed Specimen:  Blood Updated:  07/14/17 1012     Blood Culture, Routine No growth after 5 days.    Narrative:       peripheral    Blood culture [[id number]] Collected:  07/09/17 0640    Order Status:  Completed Specimen:  Blood Updated:  07/14/17 1012     Blood Culture, Routine No growth after 5 days.    Narrative:       peripheral    Culture, Respiratory [[id number]] Collected:  07/12/17 1057    Order Status:  Completed Specimen:  Respiratory from Bronchial Wash Updated:  07/14/17 1003     Respiratory Culture No growth     Gram Stain (Respiratory) No organisms seen     Gram Stain (Respiratory) No WBC's    Narrative:       Bronchial Wash    AFB Culture & Smear [[id number]] Collected:  07/12/17 1058    Order Status:  Completed Specimen:  Bronchial Alveolar Lavage from Lung, LLL Updated:  07/13/17 2127     AFB Culture & Smear Culture in progress     AFB CULTURE STAIN No acid fast bacilli seen.    Narrative:        Bronchial Wash    Blood culture [[id number]]     Order Status:  Canceled Specimen:  Blood     Respiratory Viral Panel by PCR Nasal Swab [[id number]] Collected:  07/10/17 1231    Order Status:  Completed Specimen:  Respiratory Updated:  07/13/17 0712     Respiratory Virus Panel, source NS     RVP - Adenovirus Not Detected     Comment: Respiratory Viral Panel is a product of [employer].  It has been approved or cleared by the [place]. Food and Drug  Administration for in vitro diagnostic use.  Results should be  used in conjunction with clinical findings, and should not form  the sole basis for a diagnosis or treatment decision.  Negative results do not preclude respiratory virus infection  and should not be used as the sole basis for diagnosis,   treatment, or other management decisions.  Positive results do not rule out bacterial infection, or  co-infection with other viruses.  The agent detected may not  be the definitive cause of the disease. The use of additional   laboratory testing (e.g. bacterial culture, immunofluorescence,  radiography) and clinical presentation must be taken into  consideration in order to obtain the final diagnosis of   respiratory viral infection.  The RVP assay cannot adequately detect Adenovirus species C,  or serotypes 7a and 41.  The RVP primers for detection of   rhinovirus have been shown to cross-react with enterovirus.  A rhinovirus reactive result should be confirmed by an   alternative method (e.g. cell culture).  The  of the Respiratory Viral Panel has   recommmended that specimens found to be negative for  Adenovirus be confirmed by an alternative method.  The  of the Respiratory Viral Panel has  recommended that specimens found to be negative for   Influenza be confirmed by an alternative method.          Enterovirus Not Detected     Comment: Cross-reactivity has been observed between certain Rhinovirus  strains and the Enterovirus assay.           Human Bocavirus Not Detected     Human Coronavirus Not Detected     Comment: The Human Coronavirus assay detects Human coronavirus types  229E, OC43,NL63 and HKO1.          RVP - Human Metapneumovirus (hMPV) Not Detected     RVP - Influenza A Not Detected     Influenza A - W3Q1-63 Not Detected     RVP - Influenza B Not Detected     Parainfluenza Not Detected     Respiratory Syncytial VirusVirus (RSV) A Not Detected     Comment: The Respiratory Syncytial Viral assay detects types A and B,  however it does not distinguish between the two.          RVP - Rhinovirus Not Detected    Blood culture [[id number]] Collected:  07/07/17 0601    Order Status:  Completed Specimen:  Blood Updated:  07/13/17 0612     Blood Culture, Routine No growth after 5 days.    Blood culture [[id number]] Collected:  07/07/17 0601    Order Status:  Completed Specimen:  Blood Updated:  07/13/17 0612     Blood Culture, Routine No growth after 5 days.          Significant Imaging: I have reviewed all pertinent imaging results/findings within the past 24 hours.

## 2017-07-19 NOTE — ASSESSMENT & PLAN NOTE
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 Blast of 62%  - awaiting mutation analysis and cytogenetics; FLT-3 negative     - Jaw pain and pain in right cranial nerve 7 distribution with persistent, severe headaches concerning for CNS involvement - underwent IT chemotherapy on 6/23. Flow negative for disease. CSF LDH was 29  - Had previous reaction to MEC (etoposide caused full body rash). Did not qualify for the Tolero trial due to receiving IT chemotherapy    - Day 23 of FLAG-BETZY   - Day 14 bone marrow biopsy done 7/10 - hypocellular with no evidence of residual disease  - Future plans for DLI

## 2017-07-19 NOTE — PROGRESS NOTES
Pt transported with nurse transport and on bedside monitor to CT scan. While waiting for scan low BP noted on monitor. (See Flowsheet) Pt escorted back to room. Notified MD of low BP. No new orders at this time.

## 2017-07-19 NOTE — ASSESSMENT & PLAN NOTE
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    Patient now admitted on 6/20/2017 with relapsed AML (s/p repeat induction with FLAG-BETZY starting 6/28/17) with neutropenic fevers despite empiric vancomycin/cefepime/itraconazole/ACV with multifocal pneumonia noted on chest CT scan 7/8/17.   - patient has been on amphotericin since 7/9/17; switch to voriconazole today  - continues on cefepime for neutropenic fever  - continue ART  - repeat chest CT pending

## 2017-07-19 NOTE — PROGRESS NOTES
07/19/17 1652   Vital Signs   Pulse 108   BP (!) 93/52   Pt weaned off venti mask and labs drawn and sent. Pt being transported to CT with charge nurse, selam, 4L O2, portable monitor.

## 2017-07-19 NOTE — PROGRESS NOTES
Pt. Seen for follow up visit. Pt. In bed and appears very withdrawn. Pt. Not very talkative but does engage when questions asked. Pt. States that he has been feeling down about relapse disease. Pt. Also reports being worried about his mother who recently had open heart surgery unexpectedly. He also reports family still dealing with death of his dad within the past year. Provided support to the pt. And encouraged him to call if needed. Also recommended to BMT team to possibly have psy oncology (Dr. Bashir) to see pt. For f/u/. Will follow.

## 2017-07-19 NOTE — SUBJECTIVE & OBJECTIVE
Subjective:     Interval History:   - No acute events overnight.  - VSS Afebrile. Remains on 4 L NC despite being negative 3 L.   - Night nurse noted increasing in patient asking for pain medication. Also noted agitation when told it was not time and felt he was falling asleep when asking for the pain medication.   - Did complain of some nausea; one episode of vomiting yesterday.     Objective:     Vital Signs (Most Recent):  Temp: 98.8 °F (37.1 °C) (07/19/17 0748)  Pulse: 108 (07/19/17 0748)  Resp: 18 (07/19/17 0748)  BP: 113/66 (07/19/17 0748)  SpO2: 97 % (07/19/17 0748) Vital Signs (24h Range):  Temp:  [98.2 °F (36.8 °C)-99.4 °F (37.4 °C)] 98.8 °F (37.1 °C)  Pulse:  [] 108  Resp:  [18-20] 18  SpO2:  [90 %-97 %] 97 %  BP: ()/(53-66) 113/66     Weight: 88.8 kg (195 lb 12.3 oz)  Body mass index is 25.83 kg/m².  Body surface area is 2.14 meters squared.    ECOG SCORE         [unfilled]    Intake/Output - Last 3 Shifts       07/17 0700 - 07/18 0659 07/18 0700 - 07/19 0659 07/19 0700 - 07/20 0659    P.O. 500 600     I.V. (mL/kg)       Blood 243 250     IV Piggyback 1100 1100     Total Intake(mL/kg) 1843 (19.7) 1950 (22)     Urine (mL/kg/hr) 2025 (0.9) 4500 (2.1)     Emesis/NG output 150 (0.1) 400 (0.2)     Stool 0 (0) 0 (0)     Total Output 2175 4900      Net -332 -2950             Stool Occurrence 0 x 0 x           Physical Exam   Constitutional: He appears well-developed and well-nourished. No distress. Nasal cannula in place.   HENT:   Head: Normocephalic and atraumatic.   Mouth/Throat: Oropharynx is clear and moist. No oropharyngeal exudate.   Eyes: Pupils are equal, round, and reactive to light.   Cardiovascular: Regular rhythm.  Tachycardia present.    Pulmonary/Chest: Effort normal and breath sounds normal. No respiratory distress.   4 L NC   Abdominal: Soft. Bowel sounds are normal. He exhibits no distension. There is no tenderness.   Musculoskeletal: He exhibits edema (2 + BLE).   Neurological: He  is alert.   Skin: Skin is warm and dry. No erythema.   Nursing note and vitals reviewed.      Significant Labs:   CBC:   Recent Labs  Lab 07/18/17  0400 07/19/17  0400   WBC 0.35* 0.48*   HGB 6.2* 7.1*   HCT 18.3* 21.1*   PLT 11* 3*   , CMP:   Recent Labs  Lab 07/18/17  0400 07/18/17  1339 07/19/17  0400    139 137   K 4.0 3.5 3.8    108 105   CO2 22* 23 24    92 120*   BUN 18 20 22*   CREATININE 0.9 1.0 1.1   CALCIUM 7.2* 7.2* 7.4*   PROT 4.2*  --  4.4*   ALBUMIN 1.7*  --  1.8*   BILITOT 0.9  --  1.0   ALKPHOS 249*  --  282*   AST 12  --  13   ALT 9*  --  11   ANIONGAP 7* 8 8   EGFRNONAA >60.0 >60.0 >60.0   ,   Recent Lab Results       07/19/17  0400 07/18/17  1339      Albumin 1.8(L)      Alkaline Phosphatase 282(H)      ALT 11      Anion Gap 8 8     AST 13      Baso # 0.01      Basophil% 2.1(H)      Total Bilirubin 1.0  Comment:  For infants and newborns, interpretation of results should be based  on gestational age, weight and in agreement with clinical  observations.  Premature Infant recommended reference ranges:  Up to 24 hours.............<8.0 mg/dL  Up to 48 hours............<12.0 mg/dL  3-5 days..................<15.0 mg/dL  6-29 days.................<15.0 mg/dL        BUN, Bld 22(H) 20     Calcium 7.4(L) 7.2(L)     Chloride 105 108     CO2 24 23     Creatinine 1.1 1.0     Differential Method Automated      eGFR if  >60.0 >60.0     eGFR if non  >60.0  Comment:  Calculation used to obtain the estimated glomerular filtration  rate (eGFR) is the CKD-EPI equation. Since race is unknown   in our information system, the eGFR values for   -American and Non--American patients are given   for each creatinine result.   >60.0  Comment:  Calculation used to obtain the estimated glomerular filtration  rate (eGFR) is the CKD-EPI equation. Since race is unknown   in our information system, the eGFR values for   -American and Non--American  patients are given   for each creatinine result.       Eos # 0.0      Eosinophil% 0.0      Glucose 120(H) 92     Gran # 0.0(L)      Gran% 2.1(L)      Hematocrit 21.1(L)      Hemoglobin 7.1(L)      Lymph # 0.4(L)      Lymph% 87.5(H)      Magnesium 1.9      MCH 29.2      MCHC 33.6      MCV 87      Mono # 0.0(L)      Mono% 8.3      MPV 8.7(L)      Phosphorus 2.5(L)      Platelet Estimate Decreased(A)      Platelets 3  Comment:  WBC/PLT   critical result(s) called and verbal readback obtained from   IGNACIO BARRON RN AT 0628 ON 07/19/2017 BY RORO  FINAL RESULTS VERIFIED BY REPEAT ANALYSIS, 07/19/2017 06:28  (LL)      Potassium 3.8 3.5     Total Protein 4.4(L)      RBC 2.43(L)      RDW 15.2(H)      Sodium 137 139     WBC 0.48  Comment:  wbc   Previously reported results for this analyte were similarly   abnormal.  Call not needed.  Documented, 07/19/2017 05:21  (LL)         and All pertinent labs from the last 24 hours have been reviewed.    Diagnostic Results:  I have reviewed all pertinent imaging results/findings within the past 24 hours.

## 2017-07-19 NOTE — PROGRESS NOTES
07/19/17 1751   Vital Signs   Pulse 106   SpO2 98 %   BP (!) 97/56   MAP (mmHg) 71   Pt low BP. Notified MD. 1L NS bolus ordered.

## 2017-07-19 NOTE — SUBJECTIVE & OBJECTIVE
Interval History: afebrile, on 2L O2 supplement, has no particular complain today    Review of Systems   Constitutional: Negative for chills and fever.   HENT: Negative for sore throat.    Respiratory: Negative for cough, chest tightness and shortness of breath.    Cardiovascular: Negative for chest pain, palpitations and leg swelling.   Gastrointestinal: Negative for abdominal distention, abdominal pain, diarrhea, nausea and vomiting.   Genitourinary: Negative for dysuria, flank pain and urgency.   Skin: Negative for rash.   Neurological: Negative for dizziness, light-headedness and numbness.   Psychiatric/Behavioral: Negative for confusion.     Objective:     Vital Signs (Most Recent):  Temp: 98.3 °F (36.8 °C) (07/18/17 1907)  Pulse: 104 (07/18/17 1932)  Resp: 20 (07/18/17 1907)  BP: 118/60 (07/18/17 1907)  SpO2: 96 % (07/18/17 1932) Vital Signs (24h Range):  Temp:  [98.2 °F (36.8 °C)-99.4 °F (37.4 °C)] 98.3 °F (36.8 °C)  Pulse:  [] 104  Resp:  [20-24] 20  SpO2:  [90 %-98 %] 96 %  BP: ()/(53-66) 118/60     Weight: 93.7 kg (206 lb 9.1 oz)  Body mass index is 27.26 kg/m².    Estimated Creatinine Clearance: 95.4 mL/min (based on Cr of 1).    Physical Exam   Constitutional: He is oriented to person, place, and time. No distress. Nasal cannula in place.   HENT:   Head: Normocephalic and atraumatic.   Mouth/Throat: No oropharyngeal exudate.   Eyes: No scleral icterus.   Cardiovascular: Normal rate, regular rhythm and normal heart sounds.  Exam reveals no gallop and no friction rub.    No murmur heard.  Pulmonary/Chest: Effort normal and breath sounds normal. No respiratory distress. He has no wheezes. He has no rales.   Abdominal: Soft. Bowel sounds are normal. He exhibits no distension. There is no tenderness. There is no rebound and no guarding.   Musculoskeletal: He exhibits no edema.   Neurological: He is alert and oriented to person, place, and time. No cranial nerve deficit.   Vitals  reviewed.      Significant Labs:   Blood Culture:   Recent Labs  Lab 07/09/17  0638 07/09/17  0640 07/13/17  1018 07/13/17  1052 07/15/17  1008   LABBLOO No growth after 5 days. No growth after 5 days. No growth after 5 days. No growth after 5 days. No Growth to date  No Growth to date  No Growth to date  No Growth to date  No Growth to date  No Growth to date  No Growth to date  No Growth to date     BMP:   Recent Labs  Lab 07/18/17  0400 07/18/17  1339    92    139   K 4.0 3.5    108   CO2 22* 23   BUN 18 20   CREATININE 0.9 1.0   CALCIUM 7.2* 7.2*   MG 1.8  --      C4 Count: No results for input(s): C4 in the last 48 hours.  CBC:   Recent Labs  Lab 07/17/17  0400 07/18/17  0400   WBC 0.64* 0.35*   HGB 7.1* 6.2*   HCT 20.4* 18.3*   PLT 5* 11*     CMP:   Recent Labs  Lab 07/17/17  0400 07/18/17  0400 07/18/17  1339    138 139   K 4.0 4.0 3.5    109 108   CO2 21* 22* 23    108 92   BUN 16 18 20   CREATININE 0.9 0.9 1.0   CALCIUM 7.3* 7.2* 7.2*   PROT 4.5* 4.2*  --    ALBUMIN 1.8* 1.7*  --    BILITOT 1.1* 0.9  --    ALKPHOS 295* 249*  --    AST 13 12  --    ALT 11 9*  --    ANIONGAP 8 7* 8   EGFRNONAA >60.0 >60.0 >60.0     Microbiology Results (last 7 days)     Procedure Component Value Units Date/Time    Blood culture [[id number]] Collected:  07/15/17 1008    Order Status:  Completed Specimen:  Blood Updated:  07/18/17 1412     Blood Culture, Routine No Growth to date     Blood Culture, Routine No Growth to date     Blood Culture, Routine No Growth to date     Blood Culture, Routine No Growth to date    Blood culture [[id number]] Collected:  07/15/17 1008    Order Status:  Completed Specimen:  Blood Updated:  07/18/17 1412     Blood Culture, Routine No Growth to date     Blood Culture, Routine No Growth to date     Blood Culture, Routine No Growth to date     Blood Culture, Routine No Growth to date    Blood culture [[id number]] Collected:  07/13/17 1018    Order Status:   Completed Specimen:  Blood from Line, Subclavian, Left Updated:  07/18/17 1412     Blood Culture, Routine No growth after 5 days.    Blood culture [[id number]] Collected:  07/13/17 1052    Order Status:  Completed Specimen:  Blood Updated:  07/18/17 1212     Blood Culture, Routine No growth after 5 days.    Culture, Respiratory with Gram Stain [[id number]] Collected:  07/15/17 2239    Order Status:  Completed Specimen:  Respiratory from Sputum, Expectorated Updated:  07/18/17 0854     Respiratory Culture Normal respiratory jose miguel     Gram Stain (Respiratory) <10 epithelial cells per low power field.     Gram Stain (Respiratory) No WBC's or organisms seen    Urine culture [[id number]] Collected:  07/15/17 1103    Order Status:  Completed Specimen:  Urine from Urine, Catheterized Updated:  07/16/17 1456     Urine Culture, Routine No growth    Gram stain [[id number]] Collected:  07/15/17 1103    Order Status:  Completed Specimen:  Urine from Urine, Catheterized Updated:  07/15/17 1526     Gram Stain Result No WBC's      No organisms seen    Blood culture [[id number]]     Order Status:  Canceled Specimen:  Blood     Blood culture [[id number]]     Order Status:  Canceled Specimen:  Blood     Fungus culture [[id number]] Collected:  07/12/17 1058    Order Status:  Completed Specimen:  Bronchial Alveolar Lavage from Lung, LLL Updated:  07/14/17 1055     Fungus (Mycology) Culture Culture in progress    Narrative:       Bronchial Wash    Blood culture [[id number]] Collected:  07/09/17 0638    Order Status:  Completed Specimen:  Blood Updated:  07/14/17 1012     Blood Culture, Routine No growth after 5 days.    Narrative:       peripheral    Blood culture [[id number]] Collected:  07/09/17 0640    Order Status:  Completed Specimen:  Blood Updated:  07/14/17 1012     Blood Culture, Routine No growth after 5 days.    Narrative:       peripheral    Culture, Respiratory [[id number]] Collected:  07/12/17 1057    Order Status:  Completed  Specimen:  Respiratory from Bronchial Wash Updated:  07/14/17 1003     Respiratory Culture No growth     Gram Stain (Respiratory) No organisms seen     Gram Stain (Respiratory) No WBC's    Narrative:       Bronchial Wash    AFB Culture & Smear [[id number]] Collected:  07/12/17 1058    Order Status:  Completed Specimen:  Bronchial Alveolar Lavage from Lung, LLL Updated:  07/13/17 2127     AFB Culture & Smear Culture in progress     AFB CULTURE STAIN No acid fast bacilli seen.    Narrative:       Bronchial Wash    Blood culture [[id number]]     Order Status:  Canceled Specimen:  Blood     Respiratory Viral Panel by PCR Nasal Swab [[id number]] Collected:  07/10/17 1231    Order Status:  Completed Specimen:  Respiratory Updated:  07/13/17 0712     Respiratory Virus Panel, source NS     RVP - Adenovirus Not Detected     Comment: Respiratory Viral Panel is a product of [employer]  It has been approved or cleared by the U.S. Food and Drug  Administration for in vitro diagnostic use.  Results should be  used in conjunction with clinical findings, and should not form  the sole basis for a diagnosis or treatment decision.  Negative results do not preclude respiratory virus infection  and should not be used as the sole basis for diagnosis,   treatment, or other management decisions.  Positive results do not rule out bacterial infection, or  co-infection with other viruses.  The agent detected may not  be the definitive cause of the disease. The use of additional   laboratory testing (e.g. bacterial culture, immunofluorescence,  radiography) and clinical presentation must be taken into  consideration in order to obtain the final diagnosis of   respiratory viral infection.  The RVP assay cannot adequately detect Adenovirus species C,  or serotypes 7a and 41.  The RVP primers for detection of   rhinovirus have been shown to cross-react with enterovirus.  A rhinovirus reactive result should be confirmed by an    alternative method (e.g. cell culture).  The  of the Respiratory Viral Panel has   recommmended that specimens found to be negative for  Adenovirus be confirmed by an alternative method.  The  of the Respiratory Viral Panel has  recommended that specimens found to be negative for   Influenza be confirmed by an alternative method.          Enterovirus Not Detected     Comment: Cross-reactivity has been observed between certain Rhinovirus  strains and the Enterovirus assay.          Human Bocavirus Not Detected     Human Coronavirus Not Detected     Comment: The Human Coronavirus assay detects Human coronavirus types  229E, OC43,NL63 and HKO1.          RVP - Human Metapneumovirus (hMPV) Not Detected     RVP - Influenza A Not Detected     Influenza A - [id number] Not Detected     RVP - Influenza B Not Detected     Parainfluenza Not Detected     Respiratory Syncytial VirusVirus (RSV) A Not Detected     Comment: The Respiratory Syncytial Viral assay detects types A and B,  however it does not distinguish between the two.          RVP - Rhinovirus Not Detected    Blood culture [[id number]] Collected:  07/07/17 0601    Order Status:  Completed Specimen:  Blood Updated:  07/13/17 0612     Blood Culture, Routine No growth after 5 days.    Blood culture [[id number]] Collected:  07/07/17 0601    Order Status:  Completed Specimen:  Blood Updated:  07/13/17 0612     Blood Culture, Routine No growth after 5 days.    KOH prep [[id number]] Collected:  07/12/17 1058    Order Status:  Completed Specimen:  Bronchial Alveolar Lavage from Lung, LLL Updated:  07/12/17 1210     KOH Prep No yeast or fungal elements seen    Narrative:       Bronchial Wash    Gram stain [[id number]] Collected:  07/12/17 1058    Order Status:  Canceled Specimen:  Bronchial Alveolar Lavage from Lung, LLL Updated:  07/12/17 1138          Significant Imaging: I have reviewed all pertinent imaging results/findings within the past 24 hours.

## 2017-07-19 NOTE — SUBJECTIVE & OBJECTIVE
Interval History: continues afebrile, no major changes in clinical condition in last 24 hours    Review of Systems   Constitutional: Negative for chills and fever.   HENT: Negative for sore throat.    Respiratory: Negative for cough, chest tightness and shortness of breath.    Cardiovascular: Negative for chest pain, palpitations and leg swelling.   Gastrointestinal: Negative for abdominal distention, abdominal pain, diarrhea, nausea and vomiting.   Genitourinary: Negative for dysuria, flank pain and urgency.   Skin: Negative for rash.   Neurological: Negative for dizziness, light-headedness and numbness.   Psychiatric/Behavioral: Negative for confusion.     Objective:     Vital Signs (Most Recent):  Temp: 99.7 °F (37.6 °C) (07/19/17 1503)  Pulse: (!) 111 (07/19/17 1500)  Resp: 18 (07/19/17 0748)  BP: (!) 99/55 (07/19/17 1136)  SpO2: 96 % (07/19/17 1619) Vital Signs (24h Range):  Temp:  [97.8 °F (36.6 °C)-99.7 °F (37.6 °C)] 99.7 °F (37.6 °C)  Pulse:  [] 111  Resp:  [18-20] 18  SpO2:  [90 %-97 %] 96 %  BP: ()/(42-66) 99/55     Weight: 88.8 kg (195 lb 12.3 oz)  Body mass index is 25.83 kg/m².    Estimated Creatinine Clearance: 86.8 mL/min (based on Cr of 1.1).    Physical Exam   Constitutional: He is oriented to person, place, and time. No distress. Nasal cannula in place.   HENT:   Head: Normocephalic and atraumatic.   Mouth/Throat: No oropharyngeal exudate.   Eyes: No scleral icterus.   Cardiovascular: Normal rate, regular rhythm and normal heart sounds.  Exam reveals no gallop and no friction rub.    No murmur heard.  Pulmonary/Chest: Effort normal and breath sounds normal. No respiratory distress. He has no wheezes. He has no rales.   Abdominal: Soft. Bowel sounds are normal. He exhibits no distension. There is no tenderness. There is no rebound and no guarding.   Musculoskeletal: He exhibits no edema.   Neurological: He is alert and oriented to person, place, and time. No cranial nerve deficit.    Vitals reviewed.      Significant Labs:   Blood Culture:     Recent Labs  Lab 07/09/17  0638 07/09/17  0640 07/13/17  1018 07/13/17  1052 07/15/17  1008   LABBLOO No growth after 5 days. No growth after 5 days. No growth after 5 days. No growth after 5 days. No Growth to date  No Growth to date  No Growth to date  No Growth to date  No Growth to date  No Growth to date  No Growth to date  No Growth to date  No Growth to date  No Growth to date     BMP:     Recent Labs  Lab 07/19/17  0400   *      K 3.8      CO2 24   BUN 22*   CREATININE 1.1   CALCIUM 7.4*   MG 1.9     C4 Count: No results for input(s): C4 in the last 48 hours.  CBC:     Recent Labs  Lab 07/18/17  0400 07/19/17  0400   WBC 0.35* 0.48*   HGB 6.2* 7.1*   HCT 18.3* 21.1*   PLT 11* 3*     CMP:     Recent Labs  Lab 07/18/17  0400 07/18/17  1339 07/19/17  0400    139 137   K 4.0 3.5 3.8    108 105   CO2 22* 23 24    92 120*   BUN 18 20 22*   CREATININE 0.9 1.0 1.1   CALCIUM 7.2* 7.2* 7.4*   PROT 4.2*  --  4.4*   ALBUMIN 1.7*  --  1.8*   BILITOT 0.9  --  1.0   ALKPHOS 249*  --  282*   AST 12  --  13   ALT 9*  --  11   ANIONGAP 7* 8 8   EGFRNONAA >60.0 >60.0 >60.0     Microbiology Results (last 7 days)     Procedure Component Value Units Date/Time    Blood culture [[id number]] Collected:  07/15/17 1008    Order Status:  Completed Specimen:  Blood Updated:  07/19/17 1412     Blood Culture, Routine No Growth to date     Blood Culture, Routine No Growth to date     Blood Culture, Routine No Growth to date     Blood Culture, Routine No Growth to date     Blood Culture, Routine No Growth to date    Blood culture [[id number]] Collected:  07/15/17 1008    Order Status:  Completed Specimen:  Blood Updated:  07/19/17 1412     Blood Culture, Routine No Growth to date     Blood Culture, Routine No Growth to date     Blood Culture, Routine No Growth to date     Blood Culture, Routine No Growth to date     Blood Culture,  Routine No Growth to date    Blood culture [[id number]] Collected:  07/13/17 1018    Order Status:  Completed Specimen:  Blood from Line, Subclavian, Left Updated:  07/18/17 1412     Blood Culture, Routine No growth after 5 days.    Blood culture [[id number]] Collected:  07/13/17 1052    Order Status:  Completed Specimen:  Blood Updated:  07/18/17 1212     Blood Culture, Routine No growth after 5 days.    Culture, Respiratory with Gram Stain [[id number]] Collected:  07/15/17 2239    Order Status:  Completed Specimen:  Respiratory from Sputum, Expectorated Updated:  07/18/17 0854     Respiratory Culture Normal respiratory jose miguel     Gram Stain (Respiratory) <10 epithelial cells per low power field.     Gram Stain (Respiratory) No WBC's or organisms seen    Urine culture [[id number]] Collected:  07/15/17 1103    Order Status:  Completed Specimen:  Urine from Urine, Catheterized Updated:  07/16/17 1456     Urine Culture, Routine No growth    Gram stain [[id number]] Collected:  07/15/17 1103    Order Status:  Completed Specimen:  Urine from Urine, Catheterized Updated:  07/15/17 1526     Gram Stain Result No WBC's      No organisms seen    Blood culture [[id number]]     Order Status:  Canceled Specimen:  Blood     Blood culture [[id number]]     Order Status:  Canceled Specimen:  Blood     Fungus culture [[id number]] Collected:  07/12/17 1058    Order Status:  Completed Specimen:  Bronchial Alveolar Lavage from Lung, LLL Updated:  07/14/17 1055     Fungus (Mycology) Culture Culture in progress    Narrative:       Bronchial Wash    Blood culture [[id number]] Collected:  07/09/17 0638    Order Status:  Completed Specimen:  Blood Updated:  07/14/17 1012     Blood Culture, Routine No growth after 5 days.    Narrative:       peripheral    Blood culture [[id number]] Collected:  07/09/17 0640    Order Status:  Completed Specimen:  Blood Updated:  07/14/17 1012     Blood Culture, Routine No growth after 5 days.    Narrative:        peripheral    Culture, Respiratory [[id number]] Collected:  07/12/17 1057    Order Status:  Completed Specimen:  Respiratory from Bronchial Wash Updated:  07/14/17 1003     Respiratory Culture No growth     Gram Stain (Respiratory) No organisms seen     Gram Stain (Respiratory) No WBC's    Narrative:       Bronchial Wash    AFB Culture & Smear [[id number]] Collected:  07/12/17 1058    Order Status:  Completed Specimen:  Bronchial Alveolar Lavage from Lung, LLL Updated:  07/13/17 2127     AFB Culture & Smear Culture in progress     AFB CULTURE STAIN No acid fast bacilli seen.    Narrative:       Bronchial Wash    Blood culture [[id number]]     Order Status:  Canceled Specimen:  Blood     Respiratory Viral Panel by PCR Nasal Swab [[id number]] Collected:  07/10/17 1231    Order Status:  Completed Specimen:  Respiratory Updated:  07/13/17 0712     Respiratory Virus Panel, source NS     RVP - Adenovirus Not Detected     Comment: Respiratory Viral Panel is a product of [employer]  It has been approved or cleared by the U.S. Food and Drug  Administration for in vitro diagnostic use.  Results should be  used in conjunction with clinical findings, and should not form  the sole basis for a diagnosis or treatment decision.  Negative results do not preclude respiratory virus infection  and should not be used as the sole basis for diagnosis,   treatment, or other management decisions.  Positive results do not rule out bacterial infection, or  co-infection with other viruses.  The agent detected may not  be the definitive cause of the disease. The use of additional   laboratory testing (e.g. bacterial culture, immunofluorescence,  radiography) and clinical presentation must be taken into  consideration in order to obtain the final diagnosis of   respiratory viral infection.  The RVP assay cannot adequately detect Adenovirus species C,  or serotypes 7a and 41.  The RVP primers for detection of   rhinovirus have been shown  to cross-react with enterovirus.  A rhinovirus reactive result should be confirmed by an   alternative method (e.g. cell culture).  The  of the Respiratory Viral Panel has   recommmended that specimens found to be negative for  Adenovirus be confirmed by an alternative method.  The  of the Respiratory Viral Panel has  recommended that specimens found to be negative for   Influenza be confirmed by an alternative method.          Enterovirus Not Detected     Comment: Cross-reactivity has been observed between certain Rhinovirus  strains and the Enterovirus assay.          Human Bocavirus Not Detected     Human Coronavirus Not Detected     Comment: The Human Coronavirus assay detects Human coronavirus types  229E, OC43,NL63 and HKO1.          RVP - Human Metapneumovirus (hMPV) Not Detected     RVP - Influenza A Not Detected     Influenza A - [id number] Not Detected     RVP - Influenza B Not Detected     Parainfluenza Not Detected     Respiratory Syncytial VirusVirus (RSV) A Not Detected     Comment: The Respiratory Syncytial Viral assay detects types A and B,  however it does not distinguish between the two.          RVP - Rhinovirus Not Detected    Blood culture [[id number]] Collected:  07/07/17 0601    Order Status:  Completed Specimen:  Blood Updated:  07/13/17 0612     Blood Culture, Routine No growth after 5 days.    Blood culture [[id number]] Collected:  07/07/17 0601    Order Status:  Completed Specimen:  Blood Updated:  07/13/17 0612     Blood Culture, Routine No growth after 5 days.          Significant Imaging: I have reviewed all pertinent imaging results/findings within the past 24 hours.

## 2017-07-19 NOTE — ASSESSMENT & PLAN NOTE
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 We have discussed antifungal coverage with infectious disease. Discontinued itraconzaole (7/09) and started ambisome  - chimerics from marrow done 6/21 show CD3 of 90% donor and 10% recipient, but CD34 of 5% donor and 95% recipient   - chimers from 7/10 shows 70% recipient and 30% donor - NOT SORTED  - plans for DLI in future

## 2017-07-19 NOTE — ASSESSMENT & PLAN NOTE
- related to Neupogen  - started on zyrtec, lidocaine patch and Flexeril  - pain continues. Continue to monitor.  - started on ms continue 15 mg q12 on 7/13/17, increased to 30 mg q12 on 7/14/17; decreased dilaudid 2 mg from q2 hrs to q3hrs on 7/14/17; decreased to q4hrs on 7/17 for breakthrough pain

## 2017-07-19 NOTE — PROGRESS NOTES
Manual BP 80/42 after 1U of platelets infused. MD notified. 1L NS ordered and administered. Will continue to monitor.

## 2017-07-19 NOTE — PLAN OF CARE
Problem: Patient Care Overview  Goal: Plan of Care Review  Outcome: Ongoing (interventions implemented as appropriate)  Patient remains free from falls and injury this shift. Bed in low, locked position with call bell in reach.  Patient encouraged to call for assistance when getting out of bed. Patient verbalized understanding. All belongings within reach. Pain management remains key issue with patient. Dilaudid given PRN with long acting morphine. Requests for dilaudid this night were more frequent and patient got agitated when told it was not time. Noted to be falling asleep as I was explaining this to him.  No emesis during night.  Finished up potassium replacements. Continued treatment with IV antibiotics and amphotericin B for pneumonia. Pt remains dependent on 4L of 02 via nasal cannula with sats in the 90's but confusion when oxygen is not on. Tachycardia still present with HR ranging from 100-120. Will continue to monitor.

## 2017-07-19 NOTE — PROGRESS NOTES
[facility]  Adult Nutrition  Progress Note    SUMMARY     Recommendations    Recommendation/Intervention: Continue with current diet  2. encourage HPV with each meal. 3.Encourage po intake >/= 75% 4. 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 pt states he feels stronger. no N/V in last 48hrs. still using consuming ice chips for dry mouth  Nutrition Discharge Planning: regular diet + ONS >/= 75% EEN/EPN. Ensure pt is following BMT Food safety guidelines    Nutrition Prescription Ordered    Current Diet Order: Regular  Nutrition Order Comments: beneprotein TID  Oral Nutrition Supplement: Boost Plus 1x/d     Evaluation of Received Nutrients/Fluid Intake    Intake/Output Summary (Last 24 hours) at 07/19/17 1337  Last data filed at 07/19/17 1327   Gross per 24 hour   Intake             2687 ml   Output             3350 ml   Net             -663 ml      LBM:7/16/17   % Intake of Estimated Energy Needs: 50 - 75 %  % Meal Intake: 75%     Nutrition Risk Screen     Nutrition Risk Screen: no indicators present    Nutrition/Diet History    Patient Reported Diet/Restrictions/Preferences: general  Typical Food/Fluid Intake: adequate- pt eating most of meals + ONS  Food Preferences: No cultural or Shinto preferences  Supplemental Drinks or Food Habits: Boost Plus  Factors Affecting Nutritional Intake:  (none)     Labs/Tests/Procedures/Meds    Pertinent Labs Reviewed: reviewed  Pertinent Labs Comments: WBC-0.48, platelets-3, Hct-7.1, Hct-21.1, Alk Phos-282, Alb-1.8, Glu-120  Pertinent Medications Reviewed:  "reviewed  Pertinent Medications Comments: Acyclovir, IVF@ 125,ml/hr, Fludarabine, heparin,     Physical Findings    Overall Physical Appearance: generalized wasting  Tubes:  (central line)  Oral/Mouth Cavity: all teeth present (age appropriate)  Skin: intact    Anthropometrics    Temp: 97.8 °F (36.6 °C)     Height: 6' 0.99" (185.4 cm)  Weight Method: Standard Scale  Weight: 88.8 kg (195 lb 12.3 oz)  Ideal Body Weight (IBW), Male: 183.94 lb  % Ideal Body Weight, Male (lb): 102.11 lb  BMI (Calculated): 24.8  BMI Grade: 18.5-24.9 - normal  Weight Loss: unintentional    Estimated/Assessed Needs    Weight Used For Calorie Calculations: 88.9 kg (195 lb 15.8 oz)   Height (cm): 185.4 cm  Energy Calorie Requirements (kcal): 3832-5777  Energy Need Method: Kcal/kg (30-35kcal/kg (2667-3112kcal/day))   RMR ([place]-St. Jeor Equation): 1782.88  Weight Used For Protein Calculations: 88.9 kg (195 lb 15.8 oz)  Protein Requirements: 106-133 (1.2-1.5g/kg)  Fluid Need Method: RDA Method (Or per MD)  RDA Method (mL): 2667    Assessment and Plan    * AML (acute myeloid leukemia) in relapse    Nutrition Problem  increased nutrient needs    Related to (etiology):   Physiological Needs-AML    Signs and Symptoms (as evidenced by):   Altered nutritional labs (WBC-.48, Hbg-7.1, Hct-2.1, Alk Phos-282)    Interventions/Recommendations (treatment strategy):  1.Addition of ONS  2. If PO intake does not improve by next visit, consider appetite stimulant    Nutrition Diagnosis Status:   Improving                  Monitor and Evaluation    Food and Nutrient Intake: energy intake, food and beverage intake  Food and Nutrient Administration: diet order  Physical Activity and Function: nutrition-related ADLs and IADLs  Anthropometric Measurements: weight, weight change  Biochemical Data, Medical Tests and Procedures:  (All  labs)  Nutrition-Focused Physical Findings: overall appearance, extremities, muscles and bones, skin    Nutrition Risk    Level of " Risk:  (f/u 1x week)    Nutrition Follow-Up    RD Follow-up?: Yes

## 2017-07-19 NOTE — PROGRESS NOTES
[facility]  Hematology  Bone Marrow Transplant  Progress Note    Patient Name: Paul E Sistrunk  Admission Date: 6/20/2017  Hospital Length of Stay: 29 days  Code Status: Full Code    Subjective:     Interval History:   - No acute events overnight.  - VSS Afebrile. Remains on 4 L NC despite being negative 3 L.   - Night nurse noted increasing in patient asking for pain medication. Also noted agitation when told it was not time and felt he was falling asleep when asking for the pain medication.   - Did complain of some nausea; one episode of vomiting yesterday.     Objective:     Vital Signs (Most Recent):  Temp: 98.8 °F (37.1 °C) (07/19/17 0748)  Pulse: 108 (07/19/17 0748)  Resp: 18 (07/19/17 0748)  BP: 113/66 (07/19/17 0748)  SpO2: 97 % (07/19/17 0748) Vital Signs (24h Range):  Temp:  [98.2 °F (36.8 °C)-99.4 °F (37.4 °C)] 98.8 °F (37.1 °C)  Pulse:  [] 108  Resp:  [18-20] 18  SpO2:  [90 %-97 %] 97 %  BP: ()/(53-66) 113/66     Weight: 88.8 kg (195 lb 12.3 oz)  Body mass index is 25.83 kg/m².  Body surface area is 2.14 meters squared.    ECOG SCORE         [unfilled]    Intake/Output - Last 3 Shifts       07/17 0700 - 07/18 0659 07/18 0700 - 07/19 0659 07/19 0700 - 07/20 0659    P.O. 500 600     I.V. (mL/kg)       Blood 243 250     IV Piggyback 1100 1100     Total Intake(mL/kg) 1843 (19.7) 1950 (22)     Urine (mL/kg/hr) 2025 (0.9) 4500 (2.1)     Emesis/NG output 150 (0.1) 400 (0.2)     Stool 0 (0) 0 (0)     Total Output 2175 4900      Net -332 -2950             Stool Occurrence 0 x 0 x           Physical Exam   Constitutional: He appears well-developed and well-nourished. No distress. Nasal cannula in place.   HENT:   Head: Normocephalic and atraumatic.   Mouth/Throat: Oropharynx is clear and moist. No oropharyngeal exudate.   Eyes: Pupils are equal, round, and reactive to light.   Cardiovascular: Regular rhythm.  Tachycardia present.    Pulmonary/Chest: Effort normal and breath sounds  normal. No respiratory distress.   4 L NC   Abdominal: Soft. Bowel sounds are normal. He exhibits no distension. There is no tenderness.   Musculoskeletal: He exhibits edema (2 + BLE).   Neurological: He is alert.   Skin: Skin is warm and dry. No erythema.   Nursing note and vitals reviewed.      Significant Labs:   CBC:   Recent Labs  Lab 07/18/17  0400 07/19/17  0400   WBC 0.35* 0.48*   HGB 6.2* 7.1*   HCT 18.3* 21.1*   PLT 11* 3*   , CMP:   Recent Labs  Lab 07/18/17  0400 07/18/17  1339 07/19/17  0400    139 137   K 4.0 3.5 3.8    108 105   CO2 22* 23 24    92 120*   BUN 18 20 22*   CREATININE 0.9 1.0 1.1   CALCIUM 7.2* 7.2* 7.4*   PROT 4.2*  --  4.4*   ALBUMIN 1.7*  --  1.8*   BILITOT 0.9  --  1.0   ALKPHOS 249*  --  282*   AST 12  --  13   ALT 9*  --  11   ANIONGAP 7* 8 8   EGFRNONAA >60.0 >60.0 >60.0   ,   Recent Lab Results       07/19/17  0400 07/18/17  1339      Albumin 1.8(L)      Alkaline Phosphatase 282(H)      ALT 11      Anion Gap 8 8     AST 13      Baso # 0.01      Basophil% 2.1(H)      Total Bilirubin 1.0  Comment:  For infants and newborns, interpretation of results should be based  on gestational age, weight and in agreement with clinical  observations.  Premature Infant recommended reference ranges:  Up to 24 hours.............<8.0 mg/dL  Up to 48 hours............<12.0 mg/dL  3-5 days..................<15.0 mg/dL  6-29 days.................<15.0 mg/dL        BUN, Bld 22(H) 20     Calcium 7.4(L) 7.2(L)     Chloride 105 108     CO2 24 23     Creatinine 1.1 1.0     Differential Method Automated      eGFR if  >60.0 >60.0     eGFR if non  >60.0  Comment:  Calculation used to obtain the estimated glomerular filtration  rate (eGFR) is the CKD-EPI equation. Since race is unknown   in our information system, the eGFR values for   -American and Non--American patients are given   for each creatinine result.   >60.0  Comment:  Calculation used  to obtain the estimated glomerular filtration  rate (eGFR) is the CKD-EPI equation. Since race is unknown   in our information system, the eGFR values for   -American and Non--American patients are given   for each creatinine result.       Eos # 0.0      Eosinophil% 0.0      Glucose 120(H) 92     Gran # 0.0(L)      Gran% 2.1(L)      Hematocrit 21.1(L)      Hemoglobin 7.1(L)      Lymph # 0.4(L)      Lymph% 87.5(H)      Magnesium 1.9      MCH 29.2      MCHC 33.6      MCV 87      Mono # 0.0(L)      Mono% 8.3      MPV 8.7(L)      Phosphorus 2.5(L)      Platelet Estimate Decreased(A)      Platelets 3  Comment:  WBC/PLT   critical result(s) called and verbal readback obtained from   IGNACIO BARRON RN AT 0628 ON 07/19/2017 BY RORO  FINAL RESULTS VERIFIED BY REPEAT ANALYSIS, 07/19/2017 06:28  (LL)      Potassium 3.8 3.5     Total Protein 4.4(L)      RBC 2.43(L)      RDW 15.2(H)      Sodium 137 139     WBC 0.48  Comment:  wbc   Previously reported results for this analyte were similarly   abnormal.  Call not needed.  Documented, 07/19/2017 05:21  (LL)         and All pertinent labs from the last 24 hours have been reviewed.    Diagnostic Results:  I have reviewed all pertinent imaging results/findings within the past 24 hours.    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 Blast of 62%  - awaiting mutation analysis and cytogenetics; FLT-3 negative     - Jaw pain and pain in right cranial nerve 7 distribution with persistent, severe headaches concerning for CNS involvement - underwent IT chemotherapy on 6/23. Flow negative for disease. CSF LDH was 29  - Had previous reaction to MEC (etoposide  caused full body rash). Did not qualify for the Tolero trial due to receiving IT chemotherapy    - Day 23 of FLAG-BETZY   - Day 14 bone marrow biopsy done 7/10 - hypocellular with no evidence of residual disease  - Future plans for DLI        S/P allogeneic bone marrow transplant    - +310 days s/p Flu/Bu/ATG MUD allogeneic transplant for 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 We have discussed antifungal coverage with infectious disease. Discontinued itraconzaole (7/09) and started ambisome  - chimerics from marrow done 6/21 show CD3 of 90% donor and 10% recipient, but CD34 of 5% donor and 95% recipient   - chimers from 7/10 shows 70% recipient and 30% donor - NOT SORTED  - plans for DLI in future        HIV disease    - CD4 low at 11.4%, Absolute CD4 469 and HIV RNA not detected from 6/21  - As per ID: continue triumeq        Neutropenic fever    - blood cultures no growth from 6/24/17, 7/4/17, 7/7/17, 7/9/17, 7/15/17  - on Cefepime (started 6/24 - discontinued 6/28) restarted on 7/4/17; cefepime day 16; Ampotericin B started 7/9; vancomycin (restarted 7/15 - stopped 7/17)  - Continue ppx abx--Bactrim, acyclovir.   -Will f/u ID recs  -CXR Shows pneumonia 7/15; CXR 7/17 - Significant patchy air space consolidation identified bilaterally slightly more on the left side.  Slight improvement as compared to the previous study.  Left-sided pleural effusion. On 4 L NC  - CT chest/abdomen/pelvis reveals bilateral groundglass opacities as well as a left lower-lobe consolidation; ID recommends repeating CT chest  - pulm was consulted for a BAL which was done 7/12, cytology pending from BAL; aspergillus <0.500; KOH was negative for yeast/fungus   - fungitell negative, aspergillus negative and quantiferon gold results show  indeterminate; respiratory viral panel negative on 7/10/17        Supraventricular tachycardia    - Supraventricular tachycardia on tele and EKG. Started 7/13/17 8pm with HR between 160-170  -Patient was cardioverted by cardiology and started on metoprolol and flecainide.  -Will f/u further cardiology recs  -Treat active infection  - Pharmacologic nuclear stress test ordered; consulted cardiology and will do a cardiac CTA later this week        Pancytopenia due to antineoplastic chemotherapy    - white blood cell count is 0.48 k/uL; absolute neutrophil count 0 cells/uL  - hemoglobin 7.1 g/dL; platelet count is 3 k/uL  - transfuse for hemoglobin < 7 g/dL, platelets < 10 k/uL        Prostate hypertrophy    - Continue home flomax        Lower extremity edema    -Pt with diffuse lower extremity edema.  -IV fluids stopped  - Net Negative 3 L from 7/18 - down 11 lbs from 7/18   - 40 mg of lasix BID - given on 7/18         Insomnia    - with restlessness and no sleep for 2 nights (7/12/17); pt has been refusing Ramelteon for several days (now d/c'd); states he infrequently takes ambien prn at home (still ordered as prn). 7/14/17, pt refused ambien but still reports difficulty sleeping  - was started on zyprexa on 7/12/17 and d/c'd on 7/13/17 for restlessness  - for insomnia, encourage ambien use prn (Ramelteon and zyprexa are now d/c'd)        Facial numbness    - evaluated by Neurology  - MRI with no evidence of  acute intracranial pathology.  - thought to be secondary to drug induced peripheral neuropathy?  - Neurology rec Vitamin E, will hold for now given side effects of thrombocytopenia and bleeding    - with continued facial/jaw pain also; added MS contin for pain 7/13/17, may continue prn dilaudid; ordered CT of sinus/face 7/13/17, results show paranasal sinus disease, no acute sinusitis, nonspecific partial opacification of bilateral mastoid air cells right greater than left. ID following         Chronic bilateral  low back pain without sciatica    - related to Neupogen  - started on zyrtec, lidocaine patch and Flexeril  - pain continues. Continue to monitor.  - started on ms continue 15 mg q12 on 7/13/17, increased to 30 mg q12 on 7/14/17; decreased dilaudid 2 mg from q2 hrs to q3hrs on 7/14/17; decreased to q4hrs on 7/17 for breakthrough pain         Electrolyte abnormality    - ordered prn electrolyte replacement per protocol   - monitor closely due to ambisome         Anxiety    - PRN Xanax  - consulted hem/onc psych            VTE Risk Mitigation         Ordered     Place sequential compression device  Until discontinued      06/20/17 2005     High Risk of VTE  Once      06/20/17 1901          Disposition: Pending count recovery.     Asia Melgar, NP  Bone Marrow Transplant  [facility]

## 2017-07-19 NOTE — PROGRESS NOTES
07/19/17 1709   Vital Signs   Pulse 109   SpO2 97 %   /66   MAP (mmHg) 79   Pt returned to unit floor without completing CT due to low BP.

## 2017-07-19 NOTE — ASSESSMENT & PLAN NOTE
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    Patient now admitted on 6/20/2017 with relapsed AML (s/p repeat induction with FLAG-BETZY starting 6/28/17) with neutropenic fevers despite empiric vancomycin/cefepime/itraconazole/ACV with multifocal pneumonia noted on chest CT scan 7/8/17. Patient has been on amphotericin since 7/9/17 and continues on cefepime for neutropenic fever. Patient seems to be improving, has been afebrile, stable O2 requirement at 2L.    - continue empiric cefepime while neutropenic, amphotericin, and ART  - would repeat chest CT (last 7/8/17)

## 2017-07-19 NOTE — PROGRESS NOTES
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    Patient now admitted on 6/20/2017 with relapsed AML (s/p repeat induction with FLAG-BETZY starting 6/28/17) with neutropenic fevers despite empiric vancomycin/cefepime/itraconazole/ACV with multifocal pneumonia noted on chest CT scan 7/8/17. Patient has been on amphotericin since 7/9/17 and continues on cefepime for neutropenic fever. Patient seems to be improving, has been afebrile, stable O2 requirement at 2L.    - continue empiric cefepime while neutropenic, amphotericin, and ART  - would repeat chest CT (last 7/8/17)              Anticipated Disposition: pending    Thank you for your consult. I will follow-up with patient. Please contact us if you have any additional questions.    Delgado Koenig MD  Infectious Disease Fellow, PGY-5  Pager: [phone]  [facility]    Subjective:     Principal Problem:AML (acute myeloid leukemia) in relapse    HPI: Mr. 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 ID has been consulted today for neutropenic fevers that have persisted the last few days despite empiric vanc, cefepime, chronic itraconazole, and acyclovir prophylaxis. Patient notes that prior to diagnosis of relapse in clinic, he was not having F/C/S or other localizing infectious symptoms -- only malaise and acute onset BLE body aches just prior to diagnosis. Since admission he notes neutropenic F/C/S and dry cough but denies HA, URI symptoms, sore throat, dysphagia, sputum production, SOB, N/V, abdominal pain, diarrhea, dysuria, genital lesions, line pain/drainage, or rash. Exposure history is largely unremarkable: he is currently single, lives alone, recently visited [place] but denies other travel (including international), no pets, no new sexual contacts, and no new hobbies (does garden).   Interval History: afebrile, on 2L O2 supplement, has no particular complain today    Review of Systems   Constitutional: Negative for chills and fever.   HENT: Negative for sore throat.    Respiratory: Negative for cough, chest tightness and shortness of breath.    Cardiovascular: Negative for chest pain, palpitations and leg swelling.   Gastrointestinal: Negative for abdominal distention, abdominal pain, diarrhea, nausea and vomiting.   Genitourinary: Negative  for dysuria, flank pain and urgency.   Skin: Negative for rash.   Neurological: Negative for dizziness, light-headedness and numbness.   Psychiatric/Behavioral: Negative for confusion.     Objective:     Vital Signs (Most Recent):  Temp: 98.3 °F (36.8 °C) (07/18/17 1907)  Pulse: 104 (07/18/17 1932)  Resp: 20 (07/18/17 1907)  BP: 118/60 (07/18/17 1907)  SpO2: 96 % (07/18/17 1932) Vital Signs (24h Range):  Temp:  [98.2 °F (36.8 °C)-99.4 °F (37.4 °C)] 98.3 °F (36.8 °C)  Pulse:  [] 104  Resp:  [20-24] 20  SpO2:  [90 %-98 %] 96 %  BP: ()/(53-66) 118/60     Weight: 93.7 kg (206 lb 9.1 oz)  Body mass index is 27.26 kg/m².    Estimated Creatinine Clearance: 95.4 mL/min (based on Cr of 1).    Physical Exam   Constitutional: He is oriented to person, place, and time. No distress. Nasal cannula in place.   HENT:   Head: Normocephalic and atraumatic.   Mouth/Throat: No oropharyngeal exudate.   Eyes: No scleral icterus.   Cardiovascular: Normal rate, regular rhythm and normal heart sounds.  Exam reveals no gallop and no friction rub.    No murmur heard.  Pulmonary/Chest: Effort normal and breath sounds normal. No respiratory distress. He has no wheezes. He has no rales.   Abdominal: Soft. Bowel sounds are normal. He exhibits no distension. There is no tenderness. There is no rebound and no guarding.   Musculoskeletal: He exhibits no edema.   Neurological: He is alert and oriented to person, place, and time. No cranial nerve deficit.   Vitals reviewed.      Significant Labs:   Blood Culture:   Recent Labs  Lab 07/09/17  0638 07/09/17  0640 07/13/17  1018 07/13/17  1052 07/15/17  1008   LABBLOO No growth after 5 days. No growth after 5 days. No growth after 5 days. No growth after 5 days. No Growth to date  No Growth to date  No Growth to date  No Growth to date  No Growth to date  No Growth to date  No Growth to date  No Growth to date     BMP:   Recent Labs  Lab 07/18/17  0400 07/18/17  1339    92   NA  138 139   K 4.0 3.5    108   CO2 22* 23   BUN 18 20   CREATININE 0.9 1.0   CALCIUM 7.2* 7.2*   MG 1.8  --      C4 Count: No results for input(s): C4 in the last 48 hours.  CBC:   Recent Labs  Lab 07/17/17  0400 07/18/17  0400   WBC 0.64* 0.35*   HGB 7.1* 6.2*   HCT 20.4* 18.3*   PLT 5* 11*     CMP:   Recent Labs  Lab 07/17/17  0400 07/18/17  0400 07/18/17  1339    138 139   K 4.0 4.0 3.5    109 108   CO2 21* 22* 23    108 92   BUN 16 18 20   CREATININE 0.9 0.9 1.0   CALCIUM 7.3* 7.2* 7.2*   PROT 4.5* 4.2*  --    ALBUMIN 1.8* 1.7*  --    BILITOT 1.1* 0.9  --    ALKPHOS 295* 249*  --    AST 13 12  --    ALT 11 9*  --    ANIONGAP 8 7* 8   EGFRNONAA >60.0 >60.0 >60.0     Microbiology Results (last 7 days)     Procedure Component Value Units Date/Time    Blood culture [[id number]] Collected:  07/15/17 1008    Order Status:  Completed Specimen:  Blood Updated:  07/18/17 1412     Blood Culture, Routine No Growth to date     Blood Culture, Routine No Growth to date     Blood Culture, Routine No Growth to date     Blood Culture, Routine No Growth to date    Blood culture [[id number]] Collected:  07/15/17 1008    Order Status:  Completed Specimen:  Blood Updated:  07/18/17 1412     Blood Culture, Routine No Growth to date     Blood Culture, Routine No Growth to date     Blood Culture, Routine No Growth to date     Blood Culture, Routine No Growth to date    Blood culture [[id number]] Collected:  07/13/17 1018    Order Status:  Completed Specimen:  Blood from Line, Subclavian, Left Updated:  07/18/17 1412     Blood Culture, Routine No growth after 5 days.    Blood culture [[id number]] Collected:  07/13/17 1052    Order Status:  Completed Specimen:  Blood Updated:  07/18/17 1212     Blood Culture, Routine No growth after 5 days.    Culture, Respiratory with Gram Stain [[id number]] Collected:  07/15/17 2239    Order Status:  Completed Specimen:  Respiratory from Sputum, Expectorated Updated:  07/18/17 0836      Respiratory Culture Normal respiratory jose miguel     Gram Stain (Respiratory) <10 epithelial cells per low power field.     Gram Stain (Respiratory) No WBC's or organisms seen    Urine culture [[id number]] Collected:  07/15/17 1103    Order Status:  Completed Specimen:  Urine from Urine, Catheterized Updated:  07/16/17 1456     Urine Culture, Routine No growth    Gram stain [[id number]] Collected:  07/15/17 1103    Order Status:  Completed Specimen:  Urine from Urine, Catheterized Updated:  07/15/17 1526     Gram Stain Result No WBC's      No organisms seen    Blood culture [[id number]]     Order Status:  Canceled Specimen:  Blood     Blood culture [[id number]]     Order Status:  Canceled Specimen:  Blood     Fungus culture [[id number]] Collected:  07/12/17 1058    Order Status:  Completed Specimen:  Bronchial Alveolar Lavage from Lung, LLL Updated:  07/14/17 1055     Fungus (Mycology) Culture Culture in progress    Narrative:       Bronchial Wash    Blood culture [[id number]] Collected:  07/09/17 0638    Order Status:  Completed Specimen:  Blood Updated:  07/14/17 1012     Blood Culture, Routine No growth after 5 days.    Narrative:       peripheral    Blood culture [[id number]] Collected:  07/09/17 0640    Order Status:  Completed Specimen:  Blood Updated:  07/14/17 1012     Blood Culture, Routine No growth after 5 days.    Narrative:       peripheral    Culture, Respiratory [[id number]] Collected:  07/12/17 1057    Order Status:  Completed Specimen:  Respiratory from Bronchial Wash Updated:  07/14/17 1003     Respiratory Culture No growth     Gram Stain (Respiratory) No organisms seen     Gram Stain (Respiratory) No WBC's    Narrative:       Bronchial Wash    AFB Culture & Smear [[id number]] Collected:  07/12/17 1058    Order Status:  Completed Specimen:  Bronchial Alveolar Lavage from Lung, LLL Updated:  07/13/17 2127     AFB Culture & Smear Culture in progress     AFB CULTURE STAIN No acid fast bacilli seen.    Narrative:        Bronchial Wash    Blood culture [[id number]]     Order Status:  Canceled Specimen:  Blood     Respiratory Viral Panel by PCR Nasal Swab [[id number]] Collected:  07/10/17 1231    Order Status:  Completed Specimen:  Respiratory Updated:  07/13/17 0712     Respiratory Virus Panel, source NS     RVP - Adenovirus Not Detected     Comment: Respiratory Viral Panel is a product of [employer].  It has been approved or cleared by the [place]. Food and Drug  Administration for in vitro diagnostic use.  Results should be  used in conjunction with clinical findings, and should not form  the sole basis for a diagnosis or treatment decision.  Negative results do not preclude respiratory virus infection  and should not be used as the sole basis for diagnosis,   treatment, or other management decisions.  Positive results do not rule out bacterial infection, or  co-infection with other viruses.  The agent detected may not  be the definitive cause of the disease. The use of additional   laboratory testing (e.g. bacterial culture, immunofluorescence,  radiography) and clinical presentation must be taken into  consideration in order to obtain the final diagnosis of   respiratory viral infection.  The RVP assay cannot adequately detect Adenovirus species C,  or serotypes 7a and 41.  The RVP primers for detection of   rhinovirus have been shown to cross-react with enterovirus.  A rhinovirus reactive result should be confirmed by an   alternative method (e.g. cell culture).  The  of the Respiratory Viral Panel has   recommmended that specimens found to be negative for  Adenovirus be confirmed by an alternative method.  The  of the Respiratory Viral Panel has  recommended that specimens found to be negative for   Influenza be confirmed by an alternative method.          Enterovirus Not Detected     Comment: Cross-reactivity has been observed between certain Rhinovirus  strains and the Enterovirus assay.           Human Bocavirus Not Detected     Human Coronavirus Not Detected     Comment: The Human Coronavirus assay detects Human coronavirus types  229E, OC43,NL63 and HKO1.          RVP - Human Metapneumovirus (hMPV) Not Detected     RVP - Influenza A Not Detected     Influenza A - O3V7-09 Not Detected     RVP - Influenza B Not Detected     Parainfluenza Not Detected     Respiratory Syncytial VirusVirus (RSV) A Not Detected     Comment: The Respiratory Syncytial Viral assay detects types A and B,  however it does not distinguish between the two.          RVP - Rhinovirus Not Detected    Blood culture [[id number]] Collected:  07/07/17 0601    Order Status:  Completed Specimen:  Blood Updated:  07/13/17 0612     Blood Culture, Routine No growth after 5 days.    Blood culture [[id number]] Collected:  07/07/17 0601    Order Status:  Completed Specimen:  Blood Updated:  07/13/17 0612     Blood Culture, Routine No growth after 5 days.    KOH prep [[id number]] Collected:  07/12/17 1058    Order Status:  Completed Specimen:  Bronchial Alveolar Lavage from Lung, LLL Updated:  07/12/17 1210     KOH Prep No yeast or fungal elements seen    Narrative:       Bronchial Wash    Gram stain [[id number]] Collected:  07/12/17 1058    Order Status:  Canceled Specimen:  Bronchial Alveolar Lavage from Lung, LLL Updated:  07/12/17 1138          Significant Imaging: I have reviewed all pertinent imaging results/findings within the past 24 hours.

## 2017-07-20 PROBLEM — J90 PLEURAL EFFUSION: Status: ACTIVE | Noted: 2017-01-01

## 2017-07-20 NOTE — SUBJECTIVE & OBJECTIVE
Past Medical History:   Diagnosis Date    Cancer 2/2016    ALL    HIV infection     Pancytopenia due to antineoplastic chemotherapy 7/4/2016       Past Surgical History:   Procedure Laterality Date    CYSTOSCOPY         Review of patient's allergies indicates:   Allergen Reactions    Etoposide Rash       Family History     Problem Relation (Age of Onset)    Cancer Father        Social History Main Topics    Smoking status: Never Smoker    Smokeless tobacco: Not on file    Alcohol use No    Drug use: No    Sexual activity: Not on file         Review of Systems   Constitutional: Negative for chills, diaphoresis and fever.   Eyes: Negative for photophobia.   Respiratory: Positive for cough. Negative for chest tightness and shortness of breath.    Cardiovascular: Positive for leg swelling. Negative for chest pain.   Gastrointestinal: Negative for abdominal pain, nausea and vomiting.   Genitourinary: Negative for difficulty urinating.   Skin: Negative for color change.   Neurological: Negative for tremors and speech difficulty.   Psychiatric/Behavioral: Negative for agitation.     Objective:     Vital Signs (Most Recent):  Temp: 98.2 °F (36.8 °C) (07/20/17 1411)  Pulse: 95 (07/20/17 1411)  Resp: 18 (07/20/17 1411)  BP: (!) 109/59 (07/20/17 1411)  SpO2: (!) 93 % (07/20/17 1411) Vital Signs (24h Range):  Temp:  [98.2 °F (36.8 °C)-100.5 °F (38.1 °C)] 98.2 °F (36.8 °C)  Pulse:  [] 95  Resp:  [18-22] 18  SpO2:  [82 %-100 %] 93 %  BP: ()/(50-78) 109/59     Weight: 89.3 kg (196 lb 12.2 oz)  Body mass index is 25.97 kg/m².      Intake/Output Summary (Last 24 hours) at 07/20/17 1433  Last data filed at 07/20/17 1314   Gross per 24 hour   Intake             2401 ml   Output             1150 ml   Net             1251 ml       Physical Exam   Constitutional: He is oriented to person, place, and time. No distress.   HENT:   Head: Normocephalic.   Eyes: Conjunctivae and EOM are normal.   Cardiovascular: Regular  rhythm and intact distal pulses.    No murmur heard.  Pt has mild bilateral lower ext edema   Pulmonary/Chest: Effort normal. He exhibits no tenderness.   Pt has inspiratory wheeze and coarse breath sounds at right base, clear throughout other lung fields   Abdominal: Soft. Bowel sounds are normal. He exhibits no distension. There is no tenderness.   Musculoskeletal: He exhibits edema.   Neurological: He is alert and oriented to person, place, and time. No cranial nerve deficit.   Skin: He is not diaphoretic.   Psychiatric: He has a normal mood and affect.       Vents:       Lines/Drains/Airways     Central Venous Catheter Line                 Tunneled Central Line Insertion/Assessment - Double Lumen  06/21/17 1519 left subclavian 28 days                Significant Labs:    CBC/Anemia Profile:    Recent Labs  Lab 07/19/17  0400 07/19/17  1613 07/20/17  0904   WBC 0.48* 0.31* 0.33*   HGB 7.1* 6.1* 7.3*   HCT 21.1* 18.1* 21.4*   PLT 3* 10* 2*   MCV 87 86 86   RDW 15.2* 15.1* 16.8*        Chemistries:    Recent Labs  Lab 07/19/17  0400 07/19/17  1613 07/20/17  0904    138 140   K 3.8 3.8 4.0    109 110   CO2 24 25 26   BUN 22* 21* 19   CREATININE 1.1 1.0 0.8   CALCIUM 7.4* 7.2* 7.3*   ALBUMIN 1.8* 1.6* 1.6*   PROT 4.4* 4.1* 4.2*   BILITOT 1.0 0.9 0.9   ALKPHOS 282* 238* 234*   ALT 11 8* 9*   AST 13 13 13   MG 1.9 1.6 1.8   PHOS 2.5* 2.3* 1.7*       ABGs:   Recent Labs  Lab 07/19/17  1547   PH 7.458*   PCO2 32.4*   HCO3 22.9*   POCSATURATED 98   BE -1     CMP:   Recent Labs  Lab 07/19/17  0400 07/19/17  1613 07/20/17  0904    138 140   K 3.8 3.8 4.0    109 110   CO2 24 25 26   * 103 120*   BUN 22* 21* 19   CREATININE 1.1 1.0 0.8   CALCIUM 7.4* 7.2* 7.3*   PROT 4.4* 4.1* 4.2*   ALBUMIN 1.8* 1.6* 1.6*   BILITOT 1.0 0.9 0.9   ALKPHOS 282* 238* 234*   AST 13 13 13   ALT 11 8* 9*   ANIONGAP 8 4* 4*   EGFRNONAA >60.0 >60.0 >60.0       Significant Imaging:   CT: I have reviewed all pertinent  results/findings within the past 24 hours and my personal findings are:  moderate BL pleural effusions; worsening b/l airspace disease; no PE identified; study limited

## 2017-07-20 NOTE — PROGRESS NOTES
Vitals stable. Pt confused. MS contin held. Last dose of dilaudid IV was administered at 1233. BP stable. Dr. Neumann ordered okay to bring patient down for the CTA at this time. Will attempt the test. She stated that if patient is not cooperating to try tomorrow.

## 2017-07-20 NOTE — PROGRESS NOTES
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  Patient now admitted on 6/20/2017 with relapsed AML (s/p repeat induction with FLAG-BETZY starting 6/28/17) with neutropenic fevers despite empiric vancomycin/cefepime/itraconazole/ACV with multifocal pneumonia noted on chest CT scan 7/8/17.     - patient had overnight delirium, which now seems to be resolved' now with low grade fever  - TTE 7/20/17 showed no evidence of endocarditis, but showed left pleural effusion, adjacent to the LV apex, there are mobile masses concerning for malignancy  - repeat chest CT 7/19/17 showed: Suboptimal contrast bolus opacification of the pulmonary arteries resulting in limited assessment. No acute central pulmonary thromboembolism identified the level of the proximal segmental arteries. Peripheral pulmonary emboli are not excluded on this limited assessment. Interval development of bilateral moderate pleural effusions. Trace pericardial effusion. Interval progression of bilateral airspace disease with more extensive  areas of consolidation bilaterally, in a similar central distribution to the groundglass opacities previously noted, with an upper lobe predominance.  - plan for possible thoracentesis noted  - continue voriconazole + cefepime for now          HIV disease    - continue antiretroviral therapy            Anticipated Disposition: pending    Thank you for your consult. I will follow-up with patient. Please contact us if you have any additional questions.    Delgado Koenig MD  Infectious Disease Fellow, PGY-5  Pager: [phone]  [facility]    Subjective:     Principal Problem:AML (acute myeloid leukemia) in relapse    HPI: Mr. 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 ID has been consulted today for neutropenic fevers that have persisted the last few days despite empiric vanc, cefepime, chronic itraconazole, and acyclovir prophylaxis. Patient notes that prior to diagnosis of relapse in clinic, he was not having F/C/S or other localizing infectious symptoms -- only malaise and acute onset BLE body aches just prior to diagnosis. Since admission he notes neutropenic F/C/S and dry cough but denies HA, URI symptoms, sore throat, dysphagia, sputum production, SOB, N/V, abdominal pain, diarrhea, dysuria, genital  lesions, line pain/drainage, or rash. Exposure history is largely unremarkable: he is currently single, lives alone, recently visited [place] but denies other travel (including international), no pets, no new sexual contacts, and no new hobbies (does garden).   Interval History: overnight events noted, patient developed delirium ; during today's evaluation all symptoms has resolved, was feeling well and had no particular complain    Review of Systems   Constitutional: Negative for chills and fever.   HENT: Negative for sore throat.    Respiratory: Negative for cough, chest tightness and shortness of breath.    Cardiovascular: Negative for chest pain, palpitations and leg swelling.   Gastrointestinal: Negative for abdominal distention, abdominal pain, diarrhea, nausea and vomiting.   Genitourinary: Negative for dysuria, flank pain and urgency.   Skin: Negative for rash.   Neurological: Negative for dizziness, light-headedness and numbness.   Psychiatric/Behavioral: Positive for agitation (overnight) and confusion (overnight). The patient is nervous/anxious (overnight).      Objective:     Vital Signs (Most Recent):  Temp: 98.8 °F (37.1 °C) (07/20/17 1550)  Pulse: 95 (07/20/17 1550)  Resp: 17 (07/20/17 1550)  BP: 116/60 (07/20/17 1550)  SpO2: 97 % (07/20/17 1550) Vital Signs (24h Range):  Temp:  [98.2 °F (36.8 °C)-100.5 °F (38.1 °C)] 98.8 °F (37.1 °C)  Pulse:  [] 95  Resp:  [17-22] 17  SpO2:  [93 %-100 %] 97 %  BP: ()/(50-78) 116/60     Weight: 89.3 kg (196 lb 12.2 oz)  Body mass index is 25.97 kg/m².    Estimated Creatinine Clearance: 119.3 mL/min (based on Cr of 0.8).    Physical Exam   Constitutional: He is oriented to person, place, and time. No distress. Nasal cannula in place.   HENT:   Head: Normocephalic and atraumatic.   Mouth/Throat: No oropharyngeal exudate.   Eyes: No scleral icterus.   Cardiovascular: Normal rate, regular rhythm and normal heart sounds.  Exam reveals no gallop and no friction  rub.    No murmur heard.  Pulmonary/Chest: Effort normal and breath sounds normal. No respiratory distress. He has no wheezes. He has no rales.   Abdominal: Soft. Bowel sounds are normal. He exhibits no distension. There is no tenderness. There is no rebound and no guarding.   Musculoskeletal: He exhibits no edema.   Neurological: He is alert and oriented to person, place, and time. No cranial nerve deficit.   Vitals reviewed.      Significant Labs:   Blood Culture:     Recent Labs  Lab 07/09/17  0638 07/09/17  0640 07/13/17  1018 07/13/17  1052 07/15/17  1008   LABBLOO No growth after 5 days. No growth after 5 days. No growth after 5 days. No growth after 5 days. No growth after 5 days.  No growth after 5 days.     BMP:     Recent Labs  Lab 07/20/17  0904   *      K 4.0      CO2 26   BUN 19   CREATININE 0.8   CALCIUM 7.3*   MG 1.8     C4 Count: No results for input(s): C4 in the last 48 hours.  CBC:     Recent Labs  Lab 07/19/17  0400 07/19/17  1613 07/20/17  0904   WBC 0.48* 0.31* 0.33*   HGB 7.1* 6.1* 7.3*   HCT 21.1* 18.1* 21.4*   PLT 3* 10* 2*     CMP:     Recent Labs  Lab 07/19/17  0400 07/19/17  1613 07/20/17  0904    138 140   K 3.8 3.8 4.0    109 110   CO2 24 25 26   * 103 120*   BUN 22* 21* 19   CREATININE 1.1 1.0 0.8   CALCIUM 7.4* 7.2* 7.3*   PROT 4.4* 4.1* 4.2*   ALBUMIN 1.8* 1.6* 1.6*   BILITOT 1.0 0.9 0.9   ALKPHOS 282* 238* 234*   AST 13 13 13   ALT 11 8* 9*   ANIONGAP 8 4* 4*   EGFRNONAA >60.0 >60.0 >60.0     Microbiology Results (last 7 days)     Procedure Component Value Units Date/Time    Blood culture [[id number]] Collected:  07/15/17 1008    Order Status:  Completed Specimen:  Blood Updated:  07/20/17 1412     Blood Culture, Routine No growth after 5 days.    Blood culture [[id number]] Collected:  07/15/17 1008    Order Status:  Completed Specimen:  Blood Updated:  07/20/17 1412     Blood Culture, Routine No growth after 5 days.    Blood culture [[id number]]  Collected:  07/13/17 1018    Order Status:  Completed Specimen:  Blood from Line, Subclavian, Left Updated:  07/18/17 1412     Blood Culture, Routine No growth after 5 days.    Blood culture [[id number]] Collected:  07/13/17 1052    Order Status:  Completed Specimen:  Blood Updated:  07/18/17 1212     Blood Culture, Routine No growth after 5 days.    Culture, Respiratory with Gram Stain [[id number]] Collected:  07/15/17 2239    Order Status:  Completed Specimen:  Respiratory from Sputum, Expectorated Updated:  07/18/17 0854     Respiratory Culture Normal respiratory jose miguel     Gram Stain (Respiratory) <10 epithelial cells per low power field.     Gram Stain (Respiratory) No WBC's or organisms seen    Urine culture [[id number]] Collected:  07/15/17 1103    Order Status:  Completed Specimen:  Urine from Urine, Catheterized Updated:  07/16/17 1456     Urine Culture, Routine No growth    Gram stain [[id number]] Collected:  07/15/17 1103    Order Status:  Completed Specimen:  Urine from Urine, Catheterized Updated:  07/15/17 1526     Gram Stain Result No WBC's      No organisms seen    Blood culture [[id number]]     Order Status:  Canceled Specimen:  Blood     Blood culture [[id number]]     Order Status:  Canceled Specimen:  Blood     Fungus culture [[id number]] Collected:  07/12/17 1058    Order Status:  Completed Specimen:  Bronchial Alveolar Lavage from Lung, LLL Updated:  07/14/17 1055     Fungus (Mycology) Culture Culture in progress    Narrative:       Bronchial Wash    Blood culture [[id number]] Collected:  07/09/17 0638    Order Status:  Completed Specimen:  Blood Updated:  07/14/17 1012     Blood Culture, Routine No growth after 5 days.    Narrative:       peripheral    Blood culture [[id number]] Collected:  07/09/17 0640    Order Status:  Completed Specimen:  Blood Updated:  07/14/17 1012     Blood Culture, Routine No growth after 5 days.    Narrative:       peripheral    Culture, Respiratory [[id number]] Collected:   07/12/17 1057    Order Status:  Completed Specimen:  Respiratory from Bronchial Wash Updated:  07/14/17 1003     Respiratory Culture No growth     Gram Stain (Respiratory) No organisms seen     Gram Stain (Respiratory) No WBC's    Narrative:       Bronchial Wash    AFB Culture & Smear [[id number]] Collected:  07/12/17 1058    Order Status:  Completed Specimen:  Bronchial Alveolar Lavage from Lung, LLL Updated:  07/13/17 2127     AFB Culture & Smear Culture in progress     AFB CULTURE STAIN No acid fast bacilli seen.    Narrative:       Bronchial Wash          Significant Imaging: I have reviewed all pertinent imaging results/findings within the past 24 hours.

## 2017-07-20 NOTE — ASSESSMENT & PLAN NOTE
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  Patient now admitted on 6/20/2017 with relapsed AML (s/p repeat induction with FLAG-BETZY starting 6/28/17) with neutropenic fevers despite empiric vancomycin/cefepime/itraconazole/ACV with multifocal pneumonia noted on chest CT scan 7/8/17.     - patient had overnight delirium, which now seems to be resolved' now with low grade fever  - TTE 7/20/17 showed no evidence of endocarditis, but showed left pleural effusion, adjacent to the LV apex, there are mobile masses concerning for malignancy  - repeat chest CT 7/19/17 showed: Suboptimal contrast bolus opacification of the pulmonary arteries resulting in limited assessment. No acute central pulmonary thromboembolism identified the level of the proximal segmental arteries. Peripheral pulmonary emboli are not excluded on this limited assessment. Interval development of bilateral moderate pleural effusions. Trace pericardial effusion. Interval progression of bilateral airspace disease with more extensive areas of consolidation bilaterally, in a similar central distribution to the groundglass opacities previously noted, with an upper lobe predominance.  - plan for possible thoracentesis noted  - continue voriconazole + cefepime for now

## 2017-07-20 NOTE — ASSESSMENT & PLAN NOTE
- white blood cell count is 0.33 k/uL; absolute neutrophil count 10 cells/uL  - hemoglobin 7.3 g/dL; platelet count is 2 k/uL  - transfuse for hemoglobin < 7 g/dL, platelets < 10 k/uL

## 2017-07-20 NOTE — ASSESSMENT & PLAN NOTE
-B/L pleural effusions  -Pt day 24 of re-induction with FLAD-BETZY; consider cardiotoxicity (i.e. [place])  -BNP 60  -f/u repeat echo ordered by primary team  -If cardiac function declined, pt will need aggressive diuresis  -If r/o cardiac pathology, will plan for thoracentesis; pt will need platelet transfusion and thoracentesis immediately after

## 2017-07-20 NOTE — SUBJECTIVE & OBJECTIVE
Interval History: overnight events noted, patient developed delirium ; during today's evaluation all symptoms has resolved, was feeling well and had no particular complain    Review of Systems   Constitutional: Negative for chills and fever.   HENT: Negative for sore throat.    Respiratory: Negative for cough, chest tightness and shortness of breath.    Cardiovascular: Negative for chest pain, palpitations and leg swelling.   Gastrointestinal: Negative for abdominal distention, abdominal pain, diarrhea, nausea and vomiting.   Genitourinary: Negative for dysuria, flank pain and urgency.   Skin: Negative for rash.   Neurological: Negative for dizziness, light-headedness and numbness.   Psychiatric/Behavioral: Positive for agitation (overnight) and confusion (overnight). The patient is nervous/anxious (overnight).      Objective:     Vital Signs (Most Recent):  Temp: 98.8 °F (37.1 °C) (07/20/17 1550)  Pulse: 95 (07/20/17 1550)  Resp: 17 (07/20/17 1550)  BP: 116/60 (07/20/17 1550)  SpO2: 97 % (07/20/17 1550) Vital Signs (24h Range):  Temp:  [98.2 °F (36.8 °C)-100.5 °F (38.1 °C)] 98.8 °F (37.1 °C)  Pulse:  [] 95  Resp:  [17-22] 17  SpO2:  [93 %-100 %] 97 %  BP: ()/(50-78) 116/60     Weight: 89.3 kg (196 lb 12.2 oz)  Body mass index is 25.97 kg/m².    Estimated Creatinine Clearance: 119.3 mL/min (based on Cr of 0.8).    Physical Exam   Constitutional: He is oriented to person, place, and time. No distress. Nasal cannula in place.   HENT:   Head: Normocephalic and atraumatic.   Mouth/Throat: No oropharyngeal exudate.   Eyes: No scleral icterus.   Cardiovascular: Normal rate, regular rhythm and normal heart sounds.  Exam reveals no gallop and no friction rub.    No murmur heard.  Pulmonary/Chest: Effort normal and breath sounds normal. No respiratory distress. He has no wheezes. He has no rales.   Abdominal: Soft. Bowel sounds are normal. He exhibits no distension. There is no tenderness. There is no rebound and  no guarding.   Musculoskeletal: He exhibits no edema.   Neurological: He is alert and oriented to person, place, and time. No cranial nerve deficit.   Vitals reviewed.      Significant Labs:   Blood Culture:     Recent Labs  Lab 07/09/17  0638 07/09/17  0640 07/13/17  1018 07/13/17  1052 07/15/17  1008   LABBLOO No growth after 5 days. No growth after 5 days. No growth after 5 days. No growth after 5 days. No growth after 5 days.  No growth after 5 days.     BMP:     Recent Labs  Lab 07/20/17  0904   *      K 4.0      CO2 26   BUN 19   CREATININE 0.8   CALCIUM 7.3*   MG 1.8     C4 Count: No results for input(s): C4 in the last 48 hours.  CBC:     Recent Labs  Lab 07/19/17  0400 07/19/17  1613 07/20/17  0904   WBC 0.48* 0.31* 0.33*   HGB 7.1* 6.1* 7.3*   HCT 21.1* 18.1* 21.4*   PLT 3* 10* 2*     CMP:     Recent Labs  Lab 07/19/17  0400 07/19/17  1613 07/20/17  0904    138 140   K 3.8 3.8 4.0    109 110   CO2 24 25 26   * 103 120*   BUN 22* 21* 19   CREATININE 1.1 1.0 0.8   CALCIUM 7.4* 7.2* 7.3*   PROT 4.4* 4.1* 4.2*   ALBUMIN 1.8* 1.6* 1.6*   BILITOT 1.0 0.9 0.9   ALKPHOS 282* 238* 234*   AST 13 13 13   ALT 11 8* 9*   ANIONGAP 8 4* 4*   EGFRNONAA >60.0 >60.0 >60.0     Microbiology Results (last 7 days)     Procedure Component Value Units Date/Time    Blood culture [[id number]] Collected:  07/15/17 1008    Order Status:  Completed Specimen:  Blood Updated:  07/20/17 1412     Blood Culture, Routine No growth after 5 days.    Blood culture [[id number]] Collected:  07/15/17 1008    Order Status:  Completed Specimen:  Blood Updated:  07/20/17 1412     Blood Culture, Routine No growth after 5 days.    Blood culture [[id number]] Collected:  07/13/17 1018    Order Status:  Completed Specimen:  Blood from Line, Subclavian, Left Updated:  07/18/17 1412     Blood Culture, Routine No growth after 5 days.    Blood culture [[id number]] Collected:  07/13/17 1052    Order Status:  Completed  Specimen:  Blood Updated:  07/18/17 1212     Blood Culture, Routine No growth after 5 days.    Culture, Respiratory with Gram Stain [[id number]] Collected:  07/15/17 2239    Order Status:  Completed Specimen:  Respiratory from Sputum, Expectorated Updated:  07/18/17 0854     Respiratory Culture Normal respiratory jose miguel     Gram Stain (Respiratory) <10 epithelial cells per low power field.     Gram Stain (Respiratory) No WBC's or organisms seen    Urine culture [[id number]] Collected:  07/15/17 1103    Order Status:  Completed Specimen:  Urine from Urine, Catheterized Updated:  07/16/17 1456     Urine Culture, Routine No growth    Gram stain [[id number]] Collected:  07/15/17 1103    Order Status:  Completed Specimen:  Urine from Urine, Catheterized Updated:  07/15/17 1526     Gram Stain Result No WBC's      No organisms seen    Blood culture [[id number]]     Order Status:  Canceled Specimen:  Blood     Blood culture [[id number]]     Order Status:  Canceled Specimen:  Blood     Fungus culture [[id number]] Collected:  07/12/17 1058    Order Status:  Completed Specimen:  Bronchial Alveolar Lavage from Lung, LLL Updated:  07/14/17 1055     Fungus (Mycology) Culture Culture in progress    Narrative:       Bronchial Wash    Blood culture [[id number]] Collected:  07/09/17 0638    Order Status:  Completed Specimen:  Blood Updated:  07/14/17 1012     Blood Culture, Routine No growth after 5 days.    Narrative:       peripheral    Blood culture [[id number]] Collected:  07/09/17 0640    Order Status:  Completed Specimen:  Blood Updated:  07/14/17 1012     Blood Culture, Routine No growth after 5 days.    Narrative:       peripheral    Culture, Respiratory [[id number]] Collected:  07/12/17 1057    Order Status:  Completed Specimen:  Respiratory from Bronchial Wash Updated:  07/14/17 1003     Respiratory Culture No growth     Gram Stain (Respiratory) No organisms seen     Gram Stain (Respiratory) No WBC's    Narrative:       Bronchial  Wash    AFB Culture & Smear [[id number]] Collected:  07/12/17 1058    Order Status:  Completed Specimen:  Bronchial Alveolar Lavage from Lung, LLL Updated:  07/13/17 2127     AFB Culture & Smear Culture in progress     AFB CULTURE STAIN No acid fast bacilli seen.    Narrative:       Bronchial Wash          Significant Imaging: I have reviewed all pertinent imaging results/findings within the past 24 hours.

## 2017-07-20 NOTE — CONSULTS
"Spoke to patient briefly.  Patient wishes to defer visit until 7/19/17 due to fatigue.  MAT Eli, PhD      Psychological re-assessment     Date: 7/20/17  4:10 pm    Initial Assessment:  8/15/2016      CPT Code: 55244                                      Evaluation Length (direct face-to-face time): 45 minutes     Referred by:  Asia Melgar NP     Chief complaint/reason for encounter:  Depression due to diagnosis      Clinical status of patient: Inpatient     Paul E Sistrunk, a 56 y.o. 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  Patient initially assessed pre-transplant.                            Updated social situation/Stressors:  Mr. Sistrunk reports having coped fairly well despite extended hospitalization post-HCST.  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  He states his coping resources are strained after relapse and readmission (6/2017).  Patient experienced AMS on 7/19/17 which has him "anxious, nervous and scared."  Patient feeling distressed and guilty about "my unnecessary behavior last night."  Discussed causes of AMS and lack of blame for any uncharacteristic behavior during that time period.  Patient discussed being less optimistic, but still believes, "I can beat this."  Wishes he could "take a break from fighting cancer."                          Strengths and liabilities: Strength: Patient accepts guidance/feedback, Strength: Patient is expressive/articulate., Strength: Patient is intelligent., Strength: Patient has positive support network., Strength: Patient has reasonable judgment., Strength: Patient is stable.                           Pain scale: 0/10     Health Behaviors:                            ETOH Use:               No                                                                                                Tobacco Use: No                 (past " "irregular use, none in 15+ years)                        Illicit Drug Use:  No                                                           Prescription Misuse:No                        Firearms:  Yes  (uses appropriate safeguards)                        Caffeine: minimal                       Family History:                        Psychiatric illness:             No                                                                Alcohol/Drug Abuse:                      No                                                                Suicide:                    No                                             Past Psychiatric History:                        Inpatient treatment:            No                                                                Outpatient treatment:                     No                                                                Prior substance abuse treatment:            No                                                                Suicide Attempts:               No                                                                Psychotropic Medications: Current: Xanax PRN      Past: Klonopin PRN, Ambien ("gave me nightmares"); no history of antidepressant use                            Symptoms:   · Mood: depressed mood, diminished interest, insomnia, psychomotor agitation, fatigue, worthlessness/guilt and tearfulness   · Anxiety: restlessness/keyed up and irritability   · Cognitive functioning: MS clear and appropriate at the time of visit  · Sleep: (+) sleep onset/maintenance difficulty, day/night confusion, napping on and off all day, no restless legs; no caffeine/stimulants; Patient recently refused Ambien, Ramelteon ("not sure why"), reports minimal sleepiness with standard benadryl dose; has used Dilaudid for sleep in past                          Mental Status Exam:     General appearance:  appears stated age, neatly dressed, well groomed  Speech:  normal rate and tone  Level of " "cooperation:  cooperative  Thought processes:  logical, goal-directed  Mood:  dysphoric  Thought content:  no illusions, no visual hallucinations, no auditory hallucinations, no delusions, no active or passive homicidal thoughts, no active or passive suicidal ideation, no obsessions, no compulsions, no violence  Affect:  tearful  Orientation:  oriented to person, place, and time  Memory:  Confusion over activities during the past few days  Attention span and concentration:  no difficulty noted or reported  Abstract reasoning:  intact  Judgment and insight: fair  Language:  Intact        SUMMARY AND RECOMMENDATIONS:   Paul E Sistrunk is a  56 y.o. male referred by Asia Melgar NP for assessment.  Mr. Sistrunk is experiencing depression related to chronic illness "burnout."  Depressed mood likely exacerbated by sleep disruption/fragmentation.  Mr. Sistrunk is not currently willing to consider psychotropic medications for mood or sleep.  Discussed behavioral coping strategies to include:  Increased time out of bed, increased light (especially early light) in room, lights on/decrased napping during day, increased contact with social supports.    If medically safe, decreased nighttime sleep interruptions by staff (e.g., cluster visits, maximum time between vitals) would be helpful for this patient.    He requested additional therapeutic contacts and I will follow him for that purpose.                  "

## 2017-07-20 NOTE — SUBJECTIVE & OBJECTIVE
Subjective:     Interval History:   - Patient grossly confused overnight - not oriented to self, place or time. Threatened to leave AMA overnight. States he was not being informed of why he needed a CTA.  - Increased to 4.5 L on NC. Required 2 L bolus yesterday due to hypotension. Afebrile overnight.     Objective:     Vital Signs (Most Recent):  Temp: 98.4 °F (36.9 °C) (07/20/17 0725)  Pulse: 101 (07/20/17 0725)  Resp: 19 (07/20/17 0725)  BP: 124/63 (07/20/17 0725)  SpO2: 96 % (07/20/17 0725) Vital Signs (24h Range):  Temp:  [97.8 °F (36.6 °C)-99.7 °F (37.6 °C)] 98.4 °F (36.9 °C)  Pulse:  [] 101  Resp:  [19-22] 19  SpO2:  [82 %-99 %] 96 %  BP: ()/(42-78) 124/63     Weight: 89.3 kg (196 lb 12.2 oz)  Body mass index is 25.97 kg/m².  Body surface area is 2.14 meters squared.    ECOG SCORE         [unfilled]    Intake/Output - Last 3 Shifts       07/18 0700 - 07/19 0659 07/19 0700 - 07/20 0659 07/20 0700 - 07/21 0659    P.O. 600 780     I.V. (mL/kg)  50 (0.6)     Blood 250 577     IV Piggyback 1100 2100     Total Intake(mL/kg) 1950 (22) 3507 (39.3)     Urine (mL/kg/hr) 4500 (2.1) 1050 (0.5)     Emesis/NG output 400 (0.2)      Stool 0 (0)      Total Output 4900 1050      Net -2950 +2457             Urine Occurrence  2 x     Stool Occurrence 0 x            Physical Exam   Constitutional: He appears well-developed and well-nourished. No distress. Nasal cannula in place.   HENT:   Head: Normocephalic and atraumatic.   Mouth/Throat: Oropharynx is clear and moist. No oropharyngeal exudate.   Eyes: Pupils are equal, round, and reactive to light.   Cardiovascular: Normal rate and regular rhythm.    Pulmonary/Chest: Effort normal and breath sounds normal.   Abdominal: Soft. Bowel sounds are normal. He exhibits no distension. There is no tenderness.   Musculoskeletal: He exhibits edema (2+ BLE).   Neurological: He is alert.   Skin: Skin is warm and dry. There is pallor.   Nursing note and vitals  reviewed.      Significant Labs:   CBC:   Recent Labs  Lab 07/19/17  0400 07/19/17  1613   WBC 0.48* 0.31*   HGB 7.1* 6.1*   HCT 21.1* 18.1*   PLT 3* 10*   , CMP:   Recent Labs  Lab 07/18/17  1339 07/19/17  0400 07/19/17  1613    137 138   K 3.5 3.8 3.8    105 109   CO2 23 24 25   GLU 92 120* 103   BUN 20 22* 21*   CREATININE 1.0 1.1 1.0   CALCIUM 7.2* 7.4* 7.2*   PROT  --  4.4* 4.1*   ALBUMIN  --  1.8* 1.6*   BILITOT  --  1.0 0.9   ALKPHOS  --  282* 238*   AST  --  13 13   ALT  --  11 8*   ANIONGAP 8 8 4*   EGFRNONAA >60.0 >60.0 >60.0   ,   Recent Lab Results       07/19/17  1613 07/19/17  1547      Albumin 1.6(L)      Alkaline Phosphatase 238(H)      Allens Test  Pass     ALT 8(L)      Anion Gap 4(L)      Aniso Slight      AST 13      Baso # 0.01      Basophil% 3.2(H)      Total Bilirubin 0.9  Comment:  For infants and newborns, interpretation of results should be based  on gestational age, weight and in agreement with clinical  observations.  Premature Infant recommended reference ranges:  Up to 24 hours.............<8.0 mg/dL  Up to 48 hours............<12.0 mg/dL  3-5 days..................<15.0 mg/dL  6-29 days.................<15.0 mg/dL        BNP 60  Comment:  Values of less than 100 pg/ml are consistent with non-CHF populations.      Site  RR     BUN, Bld 21(H)      Calcium 7.2(L)      Chloride 109      CO2 25      Creatinine 1.0      DelSys  Nasal Can     Differential Method Automated      eGFR if African American >60.0      eGFR if non  >60.0  Comment:  Calculation used to obtain the estimated glomerular filtration  rate (eGFR) is the CKD-EPI equation. Since race is unknown   in our information system, the eGFR values for   -American and Non--American patients are given   for each creatinine result.        Eos # 0.0      Eosinophil% 0.0      Flow  4     Glucose 103      Gran # 0.0(L)      Gran% 9.7(L)      Hematocrit 18.1  Comment:  WBC, HCT  critical result(s)  called and verbal readback obtained from   JENN BALL RN, 07/19/2017 16:56  (LL)      Hemoglobin 6.1(L)      Hypo Occasional      Lymph # 0.3(L)      Lymph% 80.6(H)      Magnesium 1.6      MCH 29.0      MCHC 33.7      MCV 86      Mode  SPONT     Mono # 0.0(L)      Mono% 6.5      MPV 8.9(L)      Ovalocytes Occasional      Phosphorus 2.3(L)      Platelet Estimate Decreased(A)      Platelets 10  Comment:  PLT COUNT    critical result(s) called and verbal readback obtained   from ANÍBAL COLIN, 07/19/2017 18:01  (LL)      POC BE  -1     POC HCO3  22.9(L)     POC PCO2  32.4(L)     POC PH  7.458(H)     POC PO2  99     POC SATURATED O2  98     POC TCO2  24     Poik Slight      Potassium 3.8      Total Protein 4.1(L)      RBC 2.10(L)      RDW 15.1(H)      Sample  ARTERIAL     Sodium 138      Sp02  96     Tear Drop Cells Occasional      Troponin I 0.108  Comment:  The reference interval for Troponin I represents the 99th percentile   cutoff   for our facility and is consistent with 3rd generation assay   performance.  (H)      WBC 0.31  Comment:  WBC, HCT  critical result(s) called and verbal readback obtained from   JENN BALL RN, 07/19/2017 16:56  (LL)         and All pertinent labs from the last 24 hours have been reviewed.    Diagnostic Results:  I have reviewed all pertinent imaging results/findings within the past 24 hours.

## 2017-07-20 NOTE — PLAN OF CARE
"Problem: Patient Care Overview  Goal: Plan of Care Review  Outcome: Ongoing (interventions implemented as appropriate)  Plan of care reviewed with the patient at the beginning of the shift. Pt is day 20 of FLAG-[facility] for relapsed AML s/p allo transplant. Pt has been grossly confused overnight. Pt has required frequent reorientation. Pt has been pulling his monitor off, removing the IV tubing from the central line. Instructed pt to stop disconnecting himself from his line. Pt frequently not knowing where he is, and claims he "is not being informed". MD at the bedside when pt threatened to leave AMA. CTA done overnight. Lungs are diminished and coarse. Pt is frequently tachypneic with labored breathing when he takes his oxygen off. Sats are >96% when oxygen is on at 4.5L. Pt remains tachycardic, -110. Tele monitoring continued. Neutropenic precautions maintained. PPX abx continued. BP stable. Vitals stable. Pt denies n/v/d. Pt reports normal bowel movements. He reports having a good appetite. Oral intake encouraged. Mucous membranes are intact. Fall precautions maintained however, pt has been noncompliant. Bed alarm frequently alarming. Pt remained free from falls and injury this shift. Bed locked in lowest position, side rails up x2, call light within reach. Instructed pt to call for assistance as needed. Pt verbalized understanding. Electrolytes replaced daily as needed. Pt received one unit of blood overnight. Will continue to monitor.         "

## 2017-07-20 NOTE — PROGRESS NOTES
Dr Neumann aware of pt confusion. Pt is completely disoriented to place, time, and situation. On occasion, he is oriented to self, however, that waxes and wanes. MD states that pt has been confused for some time. Pt constantly disconnecting himself from the IV and monitors, turns the bed alarm off, and walks to the bathroom when he doesn't even need to use the restroom. Charge nurse aware and at the bedside.     BP reported to Dr Neumann- 112/60. Ordered to hold the Lopressor but to administer the Flecainide.

## 2017-07-20 NOTE — PROGRESS NOTES
[facility]  Hematology  Bone Marrow Transplant  Progress Note    Patient Name: Paul E Sistrunk  Admission Date: 6/20/2017  Hospital Length of Stay: 30 days  Code Status: Full Code    Subjective:     Interval History:   - Patient grossly confused overnight - not oriented to self, place or time. Threatened to leave AMA overnight. States he was not being informed of why he needed a CTA.  - Increased to 4.5 L on NC. Required 2 L bolus yesterday due to hypotension. Afebrile overnight.     Objective:     Vital Signs (Most Recent):  Temp: 98.4 °F (36.9 °C) (07/20/17 0725)  Pulse: 101 (07/20/17 0725)  Resp: 19 (07/20/17 0725)  BP: 124/63 (07/20/17 0725)  SpO2: 96 % (07/20/17 0725) Vital Signs (24h Range):  Temp:  [97.8 °F (36.6 °C)-99.7 °F (37.6 °C)] 98.4 °F (36.9 °C)  Pulse:  [] 101  Resp:  [19-22] 19  SpO2:  [82 %-99 %] 96 %  BP: ()/(42-78) 124/63     Weight: 89.3 kg (196 lb 12.2 oz)  Body mass index is 25.97 kg/m².  Body surface area is 2.14 meters squared.    ECOG SCORE         [unfilled]    Intake/Output - Last 3 Shifts       07/18 0700 - 07/19 0659 07/19 0700 - 07/20 0659 07/20 0700 - 07/21 0659    P.O. 600 780     I.V. (mL/kg)  50 (0.6)     Blood 250 577     IV Piggyback 1100 2100     Total Intake(mL/kg) 1950 (22) 3507 (39.3)     Urine (mL/kg/hr) 4500 (2.1) 1050 (0.5)     Emesis/NG output 400 (0.2)      Stool 0 (0)      Total Output 4900 1050      Net -2950 +2457             Urine Occurrence  2 x     Stool Occurrence 0 x            Physical Exam   Constitutional: He appears well-developed and well-nourished. No distress. Nasal cannula in place.   HENT:   Head: Normocephalic and atraumatic.   Mouth/Throat: Oropharynx is clear and moist. No oropharyngeal exudate.   Eyes: Pupils are equal, round, and reactive to light.   Cardiovascular: Normal rate and regular rhythm.    Pulmonary/Chest: Effort normal and breath sounds normal.   Abdominal: Soft. Bowel sounds are normal. He exhibits no  distension. There is no tenderness.   Musculoskeletal: He exhibits edema (2+ BLE).   Neurological: He is alert.   Skin: Skin is warm and dry. There is pallor.   Nursing note and vitals reviewed.      Significant Labs:   CBC:   Recent Labs  Lab 07/19/17  0400 07/19/17  1613   WBC 0.48* 0.31*   HGB 7.1* 6.1*   HCT 21.1* 18.1*   PLT 3* 10*   , CMP:   Recent Labs  Lab 07/18/17  1339 07/19/17  0400 07/19/17  1613    137 138   K 3.5 3.8 3.8    105 109   CO2 23 24 25   GLU 92 120* 103   BUN 20 22* 21*   CREATININE 1.0 1.1 1.0   CALCIUM 7.2* 7.4* 7.2*   PROT  --  4.4* 4.1*   ALBUMIN  --  1.8* 1.6*   BILITOT  --  1.0 0.9   ALKPHOS  --  282* 238*   AST  --  13 13   ALT  --  11 8*   ANIONGAP 8 8 4*   EGFRNONAA >60.0 >60.0 >60.0   ,   Recent Lab Results       07/19/17  1613 07/19/17  1547      Albumin 1.6(L)      Alkaline Phosphatase 238(H)      Allens Test  Pass     ALT 8(L)      Anion Gap 4(L)      Aniso Slight      AST 13      Baso # 0.01      Basophil% 3.2(H)      Total Bilirubin 0.9  Comment:  For infants and newborns, interpretation of results should be based  on gestational age, weight and in agreement with clinical  observations.  Premature Infant recommended reference ranges:  Up to 24 hours.............<8.0 mg/dL  Up to 48 hours............<12.0 mg/dL  3-5 days..................<15.0 mg/dL  6-29 days.................<15.0 mg/dL        BNP 60  Comment:  Values of less than 100 pg/ml are consistent with non-CHF populations.      Site  RR     BUN, Bld 21(H)      Calcium 7.2(L)      Chloride 109      CO2 25      Creatinine 1.0      DelSys  Nasal Can     Differential Method Automated      eGFR if African American >60.0      eGFR if non  >60.0  Comment:  Calculation used to obtain the estimated glomerular filtration  rate (eGFR) is the CKD-EPI equation. Since race is unknown   in our information system, the eGFR values for   -American and Non--American patients are given   for each  creatinine result.        Eos # 0.0      Eosinophil% 0.0      Flow  4     Glucose 103      Gran # 0.0(L)      Gran% 9.7(L)      Hematocrit 18.1  Comment:  WBC, HCT  critical result(s) called and verbal readback obtained from   JENN BALL RN, 07/19/2017 16:56  (LL)      Hemoglobin 6.1(L)      Hypo Occasional      Lymph # 0.3(L)      Lymph% 80.6(H)      Magnesium 1.6      MCH 29.0      MCHC 33.7      MCV 86      Mode  SPONT     Mono # 0.0(L)      Mono% 6.5      MPV 8.9(L)      Ovalocytes Occasional      Phosphorus 2.3(L)      Platelet Estimate Decreased(A)      Platelets 10  Comment:  PLT COUNT    critical result(s) called and verbal readback obtained   from ANÍBAL COLIN, 07/19/2017 18:01  (LL)      POC BE  -1     POC HCO3  22.9(L)     POC PCO2  32.4(L)     POC PH  7.458(H)     POC PO2  99     POC SATURATED O2  98     POC TCO2  24     Poik Slight      Potassium 3.8      Total Protein 4.1(L)      RBC 2.10(L)      RDW 15.1(H)      Sample  ARTERIAL     Sodium 138      Sp02  96     Tear Drop Cells Occasional      Troponin I 0.108  Comment:  The reference interval for Troponin I represents the 99th percentile   cutoff   for our facility and is consistent with 3rd generation assay   performance.  (H)      WBC 0.31  Comment:  WBC, HCT  critical result(s) called and verbal readback obtained from   JENN BALL RN, 07/19/2017 16:56  (LL)         and All pertinent labs from the last 24 hours have been reviewed.    Diagnostic Results:  I have reviewed all pertinent imaging results/findings within the past 24 hours.    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 Blast of 62%  - awaiting mutation analysis and  cytogenetics; FLT-3 negative     - Jaw pain and pain in right cranial nerve 7 distribution with persistent, severe headaches concerning for CNS involvement - underwent IT chemotherapy on 6/23. Flow negative for disease. CSF LDH was 29  - Had previous reaction to MEC (etoposide caused full body rash). 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 We have discussed antifungal coverage with infectious disease. Discontinued itraconzaole (7/09) and started ambisome  - chimerics from marrow done 6/21 show CD3 of 90% donor and 10% recipient, but CD34 of 5% donor and 95% recipient   - chimers from 7/10 shows 70% recipient and 30% donor - NOT SORTED  - plans for DLI in future        HIV disease    - CD4 low at 11.4%, Absolute CD4 469 and HIV RNA not detected from 6/21  - As per ID: continue triumeq        Neutropenic fever    - blood cultures no growth from 6/24/17, 7/4/17, 7/7/17, 7/9/17, 7/15/17  - on Cefepime (started 6/24 - discontinued 6/28) restarted on 7/4/17; cefepime day 16; Ampotericin B started 7/9-stopped 7/19; vancomycin (restarted 7/15 - stopped 7/17)  - Continue ppx abx--Bactrim, acyclovir.   -Will f/u ID recs  -CXR Shows pneumonia 7/15; CXR 7/17 - Significant patchy air space consolidation identified bilaterally slightly more on the left side.  Slight improvement as compared to the previous study.  Left-sided pleural effusion. On 4.5 L NC  - CT chest/abdomen/pelvis reveals bilateral groundglass opacities as  well as a left lower-lobe consolidation;  -  ID recommends repeating CT chest 7/19 shows: Limited assessment. No acute central pulmonary thromboembolism. Peripheral pulmonary emboli are not excluded. Interval development of bilateral moderate pleural effusions. Trace pericardial effusion. Interval progression of bilateral airspace disease with more extensive areas of consolidation bilaterally, in a similar central distribution to the groundglass opacities previously noted, with an upper lobe predominance. Pulmonology consulted    - pulm was consulted for a BAL which was done 7/12, cytology pending from BAL; aspergillus <0.500; KOH was negative for yeast/fungus   - fungitell negative, aspergillus negative and quantiferon gold results show indeterminate; respiratory viral panel negative on 7/10/17        Supraventricular tachycardia    - Supraventricular tachycardia on tele and EKG. Started 7/13/17 8pm with HR between 160-170  -Patient was cardioverted by cardiology and started on metoprolol and flecainide.  -Will f/u further cardiology recs  -Treat active infection  - Pharmacologic nuclear stress test ordered; consulted cardiology and will do a cardiac CTA later this week        Pancytopenia due to antineoplastic chemotherapy    - white blood cell count is 0.33 k/uL; absolute neutrophil count 10 cells/uL  - hemoglobin 7.3 g/dL; platelet count is 2 k/uL  - transfuse for hemoglobin < 7 g/dL, platelets < 10 k/uL        Prostate hypertrophy    - Continue home flomax        Lower extremity edema    -Pt with diffuse lower extremity edema.  -IV fluids stopped  - Net Negative 3 L from 7/18 - down 11 lbs from 7/18 - 40 mg of lasix BID - given on 7/18   - + 2.5L from 7/19         Insomnia    - with restlessness and no sleep for 2 nights (7/12/17); pt has been refusing Ramelteon for several days (now d/c'd); states he infrequently takes ambien prn at home (still ordered as prn). 7/14/17, pt refused ambien but still reports  difficulty sleeping  - was started on zyprexa on 7/12/17 and d/c'd on 7/13/17 for restlessness  - for insomnia, encourage ambien use prn (Ramelteon and zyprexa are now d/c'd)        Facial numbness    - evaluated by Neurology  - MRI with no evidence of  acute intracranial pathology.  - thought to be secondary to drug induced peripheral neuropathy?  - Neurology rec Vitamin E, will hold for now given side effects of thrombocytopenia and bleeding    - with continued facial/jaw pain also; added MS contin for pain 7/13/17, may continue prn dilaudid; ordered CT of sinus/face 7/13/17, results show paranasal sinus disease, no acute sinusitis, nonspecific partial opacification of bilateral mastoid air cells right greater than left. ID following         Chronic bilateral low back pain without sciatica    - related to Neupogen  - started on zyrtec, lidocaine patch and Flexeril  - pain continues. Continue to monitor.  - started on ms continue 15 mg q12 on 7/13/17, increased to 30 mg q12 on 7/14/17; decreased dilaudid 2 mg from q2 hrs to q3hrs on 7/14/17; decreased to q4hrs on 7/17 for breakthrough pain         Electrolyte abnormality    - ordered prn electrolyte replacement per protocol   - hypophosphatemia          Anxiety    - PRN Xanax  - consulted hem/onc psych            VTE Risk Mitigation         Ordered     Place sequential compression device  Until discontinued      06/20/17 2005     High Risk of VTE  Once      06/20/17 1901          Disposition: Pending count recovery and resolution of respiratory symptoms.     Asia Melgar NP  Bone Marrow Transplant  [facility]

## 2017-07-20 NOTE — ASSESSMENT & PLAN NOTE
- blood cultures no growth from 6/24/17, 7/4/17, 7/7/17, 7/9/17, 7/15/17  - on Cefepime (started 6/24 - discontinued 6/28) restarted on 7/4/17; cefepime day 16; Ampotericin B started 7/9-stopped 7/19; vancomycin (restarted 7/15 - stopped 7/17)  - Continue ppx abx--Bactrim, acyclovir.   -Will f/u ID recs  -CXR Shows pneumonia 7/15; CXR 7/17 - Significant patchy air space consolidation identified bilaterally slightly more on the left side.  Slight improvement as compared to the previous study.  Left-sided pleural effusion. On 4.5 L NC  - CT chest/abdomen/pelvis reveals bilateral groundglass opacities as well as a left lower-lobe consolidation;  -  ID recommends repeating CT chest 7/19 shows: Limited assessment. No acute central pulmonary thromboembolism. Peripheral pulmonary emboli are not excluded. Interval development of bilateral moderate pleural effusions. Trace pericardial effusion. Interval progression of bilateral airspace disease with more extensive areas of consolidation bilaterally, in a similar central distribution to the groundglass opacities previously noted, with an upper lobe predominance. Pulmonology consulted    - pulm was consulted for a BAL which was done 7/12, cytology pending from BAL; aspergillus <0.500; KOH was negative for yeast/fungus   - fungitell negative, aspergillus negative and quantiferon gold results show indeterminate; respiratory viral panel negative on 7/10/17

## 2017-07-20 NOTE — CONSULTS
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 Paul E Sistrunk is a 56 year old male with relapsing AML, HIV, anxiety, that presented to the hospital on 6/20/2017 for induction for relapsing AML.  7/8/2017 patient became febrile to 101.3 which was documented in the medical record.  He has been on vancomycin, cefepime and amphotericin. He reports that he has had a dry cough that has resulted in chest wall soreness.  He has since been afebrile with intermittent tachycardia but otherwise normal vital signs.  Blood cultures and urine cultures have been no growth to date.  Cryptococcus has been negative. Pulmonology was consulted on 7/12 for bronchoscopy for further evaluation of possible infectious etiology of his fever.    Interval History: Patient requiring 4.0L of O2 and has tachypnea when removed, has SVT; primary team to ordered CTA PE protocol. Upon completion patient was found to have moderate bilateral pleural effusions. The study was limited 2/2 suboptimal contrast bolus opacification of the pulmonary arteries resulting in limited assessment; no acute central pulmonary thromboembolism identified the level of the proximal segmental arteries; however the assessment was limited so peripheral pulmonary emboli could not be excluded; interval progression of bilateral airspace disease with more extensive areas of consolidation bilaterally per radiology read. Pulmonology consulted 7/20 for bilateral pleural effusions.       Past Medical  History:   Diagnosis Date    Cancer 2/2016    ALL    HIV infection     Pancytopenia due to antineoplastic chemotherapy 7/4/2016       Past Surgical History:   Procedure Laterality Date    CYSTOSCOPY         Review of patient's allergies indicates:   Allergen Reactions    Etoposide Rash       Family History     Problem Relation (Age of Onset)    Cancer Father        Social History Main Topics    Smoking status: Never Smoker    Smokeless tobacco: Not on file    Alcohol use No    Drug use: No    Sexual activity: Not on file         Review of Systems   Constitutional: Negative for chills, diaphoresis and fever.   Eyes: Negative for photophobia.   Respiratory: Positive for cough. Negative for chest tightness and shortness of breath.    Cardiovascular: Positive for leg swelling. Negative for chest pain.   Gastrointestinal: Negative for abdominal pain, nausea and vomiting.   Genitourinary: Negative for difficulty urinating.   Skin: Negative for color change.   Neurological: Negative for tremors and speech difficulty.   Psychiatric/Behavioral: Negative for agitation.     Objective:     Vital Signs (Most Recent):  Temp: 98.2 °F (36.8 °C) (07/20/17 1411)  Pulse: 95 (07/20/17 1411)  Resp: 18 (07/20/17 1411)  BP: (!) 109/59 (07/20/17 1411)  SpO2: (!) 93 % (07/20/17 1411) Vital Signs (24h Range):  Temp:  [98.2 °F (36.8 °C)-100.5 °F (38.1 °C)] 98.2 °F (36.8 °C)  Pulse:  [] 95  Resp:  [18-22] 18  SpO2:  [82 %-100 %] 93 %  BP: ()/(50-78) 109/59     Weight: 89.3 kg (196 lb 12.2 oz)  Body mass index is 25.97 kg/m².      Intake/Output Summary (Last 24 hours) at 07/20/17 1433  Last data filed at 07/20/17 1314   Gross per 24 hour   Intake             2401 ml   Output             1150 ml   Net             1251 ml       Physical Exam   Constitutional: He is oriented to person, place, and time. No distress.   HENT:   Head: Normocephalic.   Eyes: Conjunctivae and EOM are normal.   Cardiovascular: Regular rhythm and  intact distal pulses.    No murmur heard.  Pt has mild bilateral lower ext edema   Pulmonary/Chest: Effort normal. He exhibits no tenderness.   Pt has inspiratory wheeze and coarse breath sounds at right base, clear throughout other lung fields   Abdominal: Soft. Bowel sounds are normal. He exhibits no distension. There is no tenderness.   Musculoskeletal: He exhibits edema.   Neurological: He is alert and oriented to person, place, and time. No cranial nerve deficit.   Skin: He is not diaphoretic.   Psychiatric: He has a normal mood and affect.       Vents:       Lines/Drains/Airways     Central Venous Catheter Line                 Tunneled Central Line Insertion/Assessment - Double Lumen  06/21/17 1519 left subclavian 28 days                Significant Labs:    CBC/Anemia Profile:    Recent Labs  Lab 07/19/17  0400 07/19/17  1613 07/20/17  0904   WBC 0.48* 0.31* 0.33*   HGB 7.1* 6.1* 7.3*   HCT 21.1* 18.1* 21.4*   PLT 3* 10* 2*   MCV 87 86 86   RDW 15.2* 15.1* 16.8*        Chemistries:    Recent Labs  Lab 07/19/17  0400 07/19/17  1613 07/20/17  0904    138 140   K 3.8 3.8 4.0    109 110   CO2 24 25 26   BUN 22* 21* 19   CREATININE 1.1 1.0 0.8   CALCIUM 7.4* 7.2* 7.3*   ALBUMIN 1.8* 1.6* 1.6*   PROT 4.4* 4.1* 4.2*   BILITOT 1.0 0.9 0.9   ALKPHOS 282* 238* 234*   ALT 11 8* 9*   AST 13 13 13   MG 1.9 1.6 1.8   PHOS 2.5* 2.3* 1.7*       ABGs:   Recent Labs  Lab 07/19/17  1547   PH 7.458*   PCO2 32.4*   HCO3 22.9*   POCSATURATED 98   BE -1     CMP:   Recent Labs  Lab 07/19/17  0400 07/19/17  1613 07/20/17  0904    138 140   K 3.8 3.8 4.0    109 110   CO2 24 25 26   * 103 120*   BUN 22* 21* 19   CREATININE 1.1 1.0 0.8   CALCIUM 7.4* 7.2* 7.3*   PROT 4.4* 4.1* 4.2*   ALBUMIN 1.8* 1.6* 1.6*   BILITOT 1.0 0.9 0.9   ALKPHOS 282* 238* 234*   AST 13 13 13   ALT 11 8* 9*   ANIONGAP 8 4* 4*   EGFRNONAA >60.0 >60.0 >60.0       Significant Imaging:   CT: I have reviewed all pertinent  results/findings within the past 24 hours and my personal findings are:  moderate BL pleural effusions; worsening b/l airspace disease; no PE identified; study limited    Assessment/Plan:     Pleural effusion    -B/L pleural effusions  -Pt day 24 of re-induction with FLAD-BETZY; consider cardiotoxicity (i.e. [place])  -BNP 60  -f/u repeat echo ordered by primary team  -If cardiac function declined, pt will need aggressive diuresis  -If r/o cardiac pathology, will plan for thoracentesis; pt will need platelet transfusion and thoracentesis immediately after               Thank you for your consult. Will continue to follow.     Lucy Esteban MD  Pulmonology  [facility]

## 2017-07-20 NOTE — PLAN OF CARE
Problem: Patient Care Overview  Goal: Plan of Care Review  Outcome: Ongoing (interventions implemented as appropriate)  POC reviewed with patient; understanding verbalized. Pt AAOx4 for duration of shift. He received 1U of platelets this shift. He complains of pain but has not received any PRN medication due to previous AMS last night. He received PRN Tessalon Shweta for dry non-productive cough. EKG and 2D echo completed this shift. Pt weaned to 2L O2 via NC this shift. He remains on tele. He is on a regular diet but did not eat anything today. IV Cefepime continued. He has a left chest Mazariegos that flushes well but does not have blood return. Pt. with nonskid footwear on, bed in lowest position, and locked with bed rails up x2.  Pt. has call light and personal items within reach. Patient ambulates in room independently. VSS and afebrile this shift. All questions and concerns address at this time. Will continue to monitor.

## 2017-07-20 NOTE — ASSESSMENT & PLAN NOTE
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 Blast of 62%  - awaiting mutation analysis and cytogenetics; FLT-3 negative     - Jaw pain and pain in right cranial nerve 7 distribution with persistent, severe headaches concerning for CNS involvement - underwent IT chemotherapy on 6/23. Flow negative for disease. CSF LDH was 29  - Had previous reaction to MEC (etoposide caused full body rash). Did not qualify for the Tolero trial due to receiving IT chemotherapy    - Day 24 of FLAG-BETZY   - Day 14 bone marrow biopsy done 7/10 - hypocellular with no evidence of residual disease  - Future plans for DLI

## 2017-07-20 NOTE — ASSESSMENT & PLAN NOTE
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 We have discussed antifungal coverage with infectious disease. Discontinued itraconzaole (7/09) and started ambisome  - chimerics from marrow done 6/21 show CD3 of 90% donor and 10% recipient, but CD34 of 5% donor and 95% recipient   - chimers from 7/10 shows 70% recipient and 30% donor - NOT SORTED  - plans for DLI in future

## 2017-07-20 NOTE — ASSESSMENT & PLAN NOTE
-Pt with diffuse lower extremity edema.  -IV fluids stopped  - Net Negative 3 L from 7/18 - down 11 lbs from 7/18 - 40 mg of lasix BID - given on 7/18   - + 2.5L from 7/19

## 2017-07-21 PROBLEM — I63.9 STROKE OF UNKNOWN ETIOLOGY: Status: ACTIVE | Noted: 2017-01-01

## 2017-07-21 PROBLEM — R41.0 DELIRIUM: Status: ACTIVE | Noted: 2017-01-01

## 2017-07-21 NOTE — PROGRESS NOTES
[facility]  Infectious Disease  Progress Note    Patient Name: Paul E Sistrunk  MRN: [id number]  Admission Date: 6/20/2017  Length of Stay: 31 days  Attending Physician: Ramin Shin MD  Primary Care Provider: Edgar Pinon MD    Isolation Status: No active isolations  Assessment/Plan:      Neutropenic fever     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 (dx 2/2016, s/p 7+3 induction with residual leukemia, successful MEC reinduction 3/2016 and IDAC consolidation x 2 through 6/2016 and subsequent MUD allo-SCT 9/5/2016; CMV D+/R+, Flu/Bu/ATG conditioning, ACV and maintenance tacro ppx; c/b neutropenic fever due to pulmonary histoplasmosis with positive urine antigen 2.35 and pulmonary micronodules on CT chest 2/2016; s/p ambisome induction and on itraconazole maintenance with serial negative repeat antigens; and Klebsiella septicemia 2016 while neutropenic), and CKD-3 who was admitted on 6/20/2017 with relapsed AML (s/p repeat induction with FLAG-BETZY starting 6/28) with neutropenic fevers despite empiric vanc/cefepime/itra/ACV with multifocal pneumonia noted on CT CAP as a likely source. Differential for pneumonia: suspect mold, atypical organism such as nocardia possible,  unlikely PCP w/ (-) Fungitell and unlikely Staph given no response related to fevers w/ vancomycin and negative gram stain and thus far BAL cultures NGTD.  Overall fever curve down but now with intermittent fevers     - for now continue empiric cefepime while neutropenic, voriconazole, ARVs  - will follow                   Anticipated Disposition: pending    Thank you for your consult. I will follow-up with patient. Please contact us if you have any additional questions.    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 Sistrunk is a 54yo man w/a history  of well-controlled HIV (CD4 469/11.4%, VL <49 in 6/2017, on triumeq), high-risk non-M3 AmL (dx 2/2016, s/p 7+3 induction with residual leukemia, successful MEC reinduction 3/2016 and IDAC consolidation x 2 through 6/2016 and subsequent MUD allo-SCT 9/5/2016; CMV D+/R+, Flu/Bu/ATG conditioning, ACV and maintenance tacro ppx; c/b neutropenic fever due to pulmonary histoplasmosis with positive urine antigen 2.35 and pulmonary micronodules on CT chest 2/2016; s/p ambisome induction and on itraconazole maintenance with serial negative repeat antigens; and Klebsiella septicemia 2016 while neutropenic), and CKD-3 who was admitted on 6/20/2017 with relapsed AML for repeat induction with FLAG-BETZY (starting 6/28). ID has been consulted today for neutropenic fevers that have persisted the last few days despite empiric vanc, cefepime, chronic itraconazole, and acyclovir prophylaxis. Patient notes that prior to diagnosis of relapse in clinic, he was not having F/C/S or other localizing infectious symptoms -- only malaise and acute onset BLE body aches just prior to diagnosis. Since admission he notes neutropenic F/C/S and dry cough but denies HA, URI symptoms, sore throat, dysphagia, sputum production, SOB, N/V, abdominal pain, diarrhea, dysuria, genital lesions, line pain/drainage, or rash. Exposure history is largely unremarkable: he is currently single, lives alone, recently visited [place] but denies other travel (including international), no pets, no new sexual contacts, and no new hobbies (does garden).   Interval History:   -100.5 yesterday AM  -ANC 0   -2L Nc    Review of Systems   Constitutional: Positive for fatigue. Negative for chills and fever.   HENT: Positive for mouth sores. Negative for rhinorrhea and sore throat.    Eyes: Negative for photophobia and visual disturbance.   Respiratory: Positive for cough. Negative for shortness of breath and wheezing.    Cardiovascular: Positive for palpitations. Negative for  chest pain and leg swelling.   Gastrointestinal: Negative for abdominal pain and diarrhea.   Genitourinary: Negative for dysuria.   Musculoskeletal: Negative for arthralgias and myalgias.   Skin: Negative for rash.   Allergic/Immunologic: Positive for immunocompromised state.   Neurological: Negative for headaches.   Hematological: Bruises/bleeds easily.   Psychiatric/Behavioral: Negative for confusion.     Objective:     Vital Signs (Most Recent):  Temp: 98.6 °F (37 °C) (07/21/17 1038)  Pulse: 87 (07/21/17 1100)  Resp: 20 (07/21/17 1038)  BP: 115/69 (07/21/17 1038)  SpO2: 95 % (07/21/17 1100) Vital Signs (24h Range):  Temp:  [98 °F (36.7 °C)-100.5 °F (38.1 °C)] 98.6 °F (37 °C)  Pulse:  [] 87  Resp:  [17-20] 20  SpO2:  [89 %-97 %] 95 %  BP: (104-145)/(54-74) 115/69     Weight: 89.3 kg (196 lb 12.2 oz)  Body mass index is 25.97 kg/m².    Estimated Creatinine Clearance: 119.3 mL/min (based on Cr of 0.8).    Physical Exam   Constitutional: He appears well-developed. He appears cachectic.   Up and walking in room   HENT:   Head: Normocephalic and atraumatic.   Eyes: EOM are normal. Pupils are equal, round, and reactive to light.   Neck: Normal range of motion. Neck supple.   Cardiovascular: Regular rhythm.  Tachycardia present.    No murmur heard.  Pulmonary/Chest: Effort normal and breath sounds normal. He has no wheezes. He has no rales.   Abdominal: Soft. Bowel sounds are normal.   Musculoskeletal: Normal range of motion.   Neurological: He is alert.   Skin: Skin is warm and dry.   Left subclavian port w/o tenderness or erythema     Significant Labs:   CBC:     Recent Labs  Lab 07/19/17  1613 07/20/17  0904 07/21/17  0512   WBC 0.31* 0.33* 0.29*   HGB 6.1* 7.3* 6.8*   HCT 18.1* 21.4* 20.3*   PLT 10* 2* 7*     CMP:     Recent Labs  Lab 07/19/17  1613 07/20/17  0904 07/21/17  0512    140 137   K 3.8 4.0 4.1    110 108   CO2 25 26 24    120* 103   BUN 21* 19 19   CREATININE 1.0 0.8 0.8   CALCIUM  7.2* 7.3* 7.3*   PROT 4.1* 4.2* 4.2*   ALBUMIN 1.6* 1.6* 1.7*   BILITOT 0.9 0.9 0.9   ALKPHOS 238* 234* 334*   AST 13 13 15   ALT 8* 9* 13   ANIONGAP 4* 4* 5*   EGFRNONAA >60.0 >60.0 >60.0     Imaging  7/19 CTA  Suboptimal contrast bolus opacification of the pulmonary arteries resulting in limited assessment. No acute central pulmonary thromboembolism identified the level of the proximal segmental arteries. Peripheral pulmonary emboli are not excluded on this limited assessment.    Interval development of bilateral moderate pleural effusions. Trace pericardial effusion.    Interval progression of bilateral airspace disease with more extensive areas of consolidation bilaterally, in a similar central distribution to the groundglass opacities previously noted, with an upper lobe predominance.    7/13 CT sinus  1. Paranasal sinus disease as discussed above. No evidence of air-fluid level to suggest acute sinusitis.    2. Nonspecific partial opacification of the bilateral mastoid air cells, right greater than left.    7/8 CT c/a/p  1.  Diffuse bilateral patchy groundglass opacities, with more confluent regions of groundglass opacity in the upper lobes with peripheral sparing as well as superimposed regions of more consolidative change.  Overall, findings highly concerning for underlying infectious/atypical infectious process.  Additional differential consideration including possible noninfectious inflammatory etiology or pulmonary edema.  Clinical correlation recommended.    2.  Slight colonic wall thickening of the sigmoid and descending colon, likely relating to nondistention.  However clinical correlation for symptoms of colitis recommended.    3.  Subcentimeter hepatic hypodensities too small to definitively characterize.  Please note the patient's previously identified enhancing left hepatic lobe lesion is not definitively visualized on today's examination, which could relate to differences in contrast bolus  timing.    4.  Calcified left hilar lymph nodes likely reflecting sequela of prior granulomatous disease.    5.  Mild nonspecific perinephric fat stranding.  Correlation with urinalysis recommended.     Micro  7/15 resp cx normal jose miguel  7/13 bcx NGTD  7/12 BAL cultures NGTD  712 BAL asp ag negative  7/10 RVP negative  7/11 urine legionella negtive  7/10 BDG negative  7/10 quant gold indeterminate  7/10 asp ag negative  7/9 urine histo/blast ag both negative  7/9 bcx NGTD

## 2017-07-21 NOTE — ASSESSMENT & PLAN NOTE
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 Differential for pneumonia: suspect mold, atypical organism such as nocardia possible,  unlikely PCP w/ (-) Fungitell and unlikely Staph given no response related to fevers w/ vancomycin and negative gram stain and thus far BAL cultures NGTD.  Overall fever curve down but now with intermittent fevers     - for now continue empiric cefepime while neutropenic, voriconazole, ARVs  - will follow

## 2017-07-21 NOTE — PROGRESS NOTES
Pt oxygen saturation was 87% on the continuous pulse ox. Walked into pt's room and found that he had disconnected himself from his antibiotic and was in the bathroom. When the pt returned to the bed he stated he was unaware of how he got to the hospital. Pt was disoriented to time and situation. Pt felt he had been transported in the middle of the night to a different facility. Nurse explained the last 48hrs of events to the pt, which the pt agreed was correct. Pt stated he felt reoriented and rested. Dr. Siu notified. Will continue to monitor

## 2017-07-21 NOTE — ASSESSMENT & PLAN NOTE
-B/L pleural effusions  -Echo unremarkable  -Thoracentesis done today removed 1L  -Fluid studies and cytology pending

## 2017-07-21 NOTE — ASSESSMENT & PLAN NOTE
- Supraventricular tachycardia on tele and EKG. Started 7/13/17 8pm with HR between 160-170  - Patient was cardioverted by cardiology and started on metoprolol and flecainide.  - Will f/u further cardiology recs  -Treat active infection  - Pharmacologic nuclear stress test ordered; consulted cardiology and will do a cardiac CTA later this week

## 2017-07-21 NOTE — HPI
57 y/o male with complicated medical hx including relapsing AML s/p bone marrow transplant, HIV, histoplasmosis, BPH, neuropathy admitted on 6/20/17 for additional chemotherapy. Vascular Neurology consulted after CT head completed for work up of fluctuating AMS/confusion revealed right anterior internal capsule hypodensity concerning for subacute stroke; patient had MRI of brain 7/1/17 that was negative for any acute process/CVA. Patient without overt risk factors for ischemic stroke other than elevated cholesterol and chronic inflammation from HIV and cancer. 7/14/17 patient had sustained episode of SVT for several hours that required cardioversion, EKG was consistent with atrial flutter.

## 2017-07-21 NOTE — PROCEDURES
Thoracentesis Procedure Note    07/21/2017    Pre-operative Diagnosis:    Pleural Effusion on R    Post-operative Diagnosis:  same    Indications:   Pleural Effusion on R  Dyspnea    Consent:   Informed consent was obtained. Risks of the procedure were discussed including infection, bleeding, pain, and pneumothorax. The patient signed the consent freely in the presence of a witness (nursing staff) after all questions were answered.     Procedure Details:  After the patient was positioned, an acceptable site for fluid aspiration was visualized and marked under ultrasound guidance. The usual sterile field was created using Chlorhexadine solution and sterile drapes. 1% plain lidocaine was then used to create a wheal, and then given via deeper infiltration between the rib space.  A small nick was created using a #11 blade scalpel. The thoracentesis catheter was inserted without difficulty, and fluid was obtained via aspiration with 60mL syringe. The catheter was then withdrawn and a clean dressing was applied.      Findings:  1000 ml of serosanguinous pleural fluid was obtained. Samples were sent for microbiology, cell count, and cytology.     Complications:   None; patient tolerated the procedure well.          Condition:  stable    Plan:   A follow up chest x-ray was ordered.  Bed Rest for 2 hours.    Miguel Davalos MD  [facility]

## 2017-07-21 NOTE — PROGRESS NOTES
[facility]  General Surgery  Progress Note    Mr. Sistrunk is known to the General Surgery service after placement of Mazariegos catheter on 6/21/17.  Notified by primary team that catheter seems to be retracted on imaging.  Went to assess patient but not in room.  CXR with tip of catheter retracted and seems to be azygous vein.  Will plan for revision on Monday 7/24/17.  Consents to be obtained.    Mitul Galindo MD  General Surgery  PGY-4  [phone] (pager)

## 2017-07-21 NOTE — CONSULTS
[facility]  Vascular Neurology  Comprehensive Stroke Center  Consult Note    Inpatient consult to Vascular (Stroke) Neurology  Consult performed by: TIP GUAN  Consult ordered by: JOSÉ MANUEL DEVLIN  Reason for consult: stroke        Assessment/Plan:     Patient is a 56 y.o. year old male with:    Stroke of unknown etiology    Likely secondary to arrhythmia in the setting of anemia, however embolic etiology or small vessle disease can not be excluded. Patient without focal deficits, location of stroke does not explain intermittent confusion/disorientation likely secondary to medications (remelteon, opioids, zolpidem) exacerbated by insomnia.   Antithrombotics for secondary stroke prevention: None: Reason:  profound thrombocytopenia, Statins for secondary stroke prevention and hyperlipidemia, if present: Atorvastatin- 20 mg oral daily, Aggressive risk factor modification: High Cholesterol, Diagnostics: Ordered/Pending CTA Head to assess vasculature , CTA Neck/Arch to assess vasculature and VTE Prophylaxis: None: Reason for No Pharmacological VTE Prophylaxis: Confirmed malignancy disease that increases bleeding risk   - If MRI consistent with acute stroke; proceed with CTA head/neck to assess vasculature, r/o anatomic predisposition  - Start with atorvastatin 20 mg, if tolerated increase to 40 mg daily                                          Thrombolysis Candidate? No  1. Contraindications: Unclear onset of symptoms and Platelets < 100,000, PTT > 40 seconds after heparin use, or PT > 15 or INR > 1.7, or known diathesis  2. Warnings: Rapid improvement     Interventional Revascularization Candidate?  No; No large vessel occlusion    Research Candidate? No    Subjective:     History of Present Illness:  57 y/o male with complicated medical hx including relapsing AML s/p bone marrow transplant, HIV, histoplasmosis, BPH, neuropathy admitted on 6/20/17 for additional chemotherapy. Vascular  Neurology consulted after CT head completed for work up of fluctuating AMS/confusion revealed right anterior internal capsule hypodensity concerning for subacute stroke; patient had MRI of brain 7/1/17 that was negative for any acute process/CVA. Patient without overt risk factors for ischemic stroke other than elevated cholesterol and chronic inflammation from HIV and cancer. 7/14/17 patient had sustained episode of SVT for several hours that required cardioversion, EKG was consistent with atrial flutter.          Past Medical History:   Diagnosis Date    Cancer 2/2016    ALL    HIV infection     Pancytopenia due to antineoplastic chemotherapy 7/4/2016     Past Surgical History:   Procedure Laterality Date    CYSTOSCOPY       Family History   Problem Relation Age of Onset    Cancer Father      Social History   Substance Use Topics    Smoking status: Never Smoker    Smokeless tobacco: Not on file    Alcohol use No     Review of patient's allergies indicates:   Allergen Reactions    Etoposide Rash     Medications: I have reviewed the current medication administration record.    Facility-Administered Medications Prior to Admission   Medication Dose Route Frequency Provider Last Rate Last Dose    pentamidine inhalation solution 300 mg  300 mg Inhalation Q30 Days Raphael Atkinson MD   300 mg at 02/27/17 1054     Prescriptions Prior to Admission   Medication Sig Dispense Refill Last Dose    abacavir-dolutegravir-lamivud (TRIUMEQ) 600- mg Tab Take 1 tablet by mouth once daily. 30 tablet 2 Taking    acyclovir (ZOVIRAX) 800 MG Tab Take 1 tablet (800 mg total) by mouth 2 (two) times daily. 60 tablet 11 Taking    alprazolam (XANAX) 0.5 MG tablet Take 1 tablet (0.5 mg total) by mouth 2 (two) times daily as needed for Insomnia or Anxiety. 60 tablet 0 Taking    butalbital-acetaminophen-caffeine -40 mg (FIORICET, ESGIC) -40 mg per tablet   0 Taking    finasteride (PROSCAR) 5 mg tablet Take 1  tablet (5 mg total) by mouth once daily. 30 tablet 1 Taking    HYDROmorphone (DILAUDID) 2 MG tablet Take 1 tablet (2 mg total) by mouth every 4 (four) hours as needed for Pain. 60 tablet 0 Taking    itraconazole (SPORANOX) 10 mg/mL Soln Take 2 mLs (20 mg total) by mouth 3 (three) times daily. 1800 mL 0 Taking    loperamide (IMODIUM) 2 mg capsule Take 2 mg by mouth daily as needed for Diarrhea.   Taking    magnesium oxide (MAG-OX) 400 mg tablet TAKE THREE TABLETS BY MOUTH THREE TIMES A  tablet 1 Taking    ondansetron (ZOFRAN) 8 MG tablet Take 1 tablet (8 mg total) by mouth every 8 (eight) hours as needed for Nausea. 30 tablet 1 Taking    pantoprazole (PROTONIX) 40 MG tablet Take 1 tablet (40 mg total) by mouth once daily. 30 tablet 11 Taking    promethazine (PHENERGAN) 12.5 MG Tab Take 1 tablet (12.5 mg total) by mouth every 6 (six) hours as needed (nausea). 30 tablet 2 Taking    tacrolimus (PROGRAF) 0.5 MG Cap Take 1 capsule (0.5 mg total) by mouth once daily. 120 capsule 11 Taking    tamsulosin (FLOMAX) 0.4 mg Cp24 Take 1 capsule (0.4 mg total) by mouth every other day. 15 capsule 1 Taking    zolpidem (AMBIEN) 5 MG Tab Take 1 tablet (5 mg total) by mouth nightly as needed. 30 tablet 1 Taking       Review of Systems   Cardiovascular: Positive for palpitations and leg swelling.   Neurological: Positive for numbness.   Psychiatric/Behavioral: Positive for confusion and sleep disturbance.     Objective:     Vital Signs (Most Recent):  Temp: 98.8 °F (37.1 °C) (07/21/17 1618)  Pulse: 102 (07/21/17 1618)  Resp: 18 (07/21/17 1237)  BP: (!) 104/59 (07/21/17 1618)  SpO2: (!) 93 % (07/21/17 1618)    Vital Signs Range (Last 24H):  Temp:  [97.4 °F (36.3 °C)-99.1 °F (37.3 °C)]   Pulse:  []   Resp:  [18-20]   BP: (104-145)/(59-74)   SpO2:  [89 %-97 %]     Physical Exam   Constitutional: He is oriented to person, place, and time.   Pale, thin middle aged man. Resting in bed no distress.   HENT:   Head:  Normocephalic and atraumatic.   Eyes: EOM are normal. Pupils are equal, round, and reactive to light.   Cardiovascular: Normal rate and intact distal pulses.    Pulmonary/Chest: Effort normal.   Musculoskeletal: Normal range of motion. He exhibits edema.   Neurological: He is alert and oriented to person, place, and time. He has normal strength. He displays no tremor. A sensory deficit is present. No cranial nerve deficit. He exhibits normal muscle tone. He displays no seizure activity. Coordination normal. GCS eye subscore is 4. GCS verbal subscore is 5. GCS motor subscore is 6. He displays no Babinski's sign on the right side. He displays no Babinski's sign on the left side.   Reflex Scores:       Bicep reflexes are 3+ on the right side and 3+ on the left side.       Brachioradialis reflexes are 3+ on the right side and 3+ on the left side.       Patellar reflexes are 3+ on the right side and 3+ on the left side.       Achilles reflexes are 2+ on the right side and 2+ on the left side.  Skin: Skin is warm and dry.   Psychiatric: He has a normal mood and affect. Thought content normal.       Neurological Exam:   LOC: alert and follows requests  Language: No aphasia  Speech: No dysarthria  Orientation: Person, Place, Time  Memory: Recent memory intact, Remote memory intact, Age correct, Month correct  EOM (CN III, IV, VI): Full/intact  Pupils (CN III, IV, VI): PERRL  Tongue (CN XII): to midline  Reflexes: 3/4 diffusely, except for BR/ Achilles 2/4  Motor*: full strength  Cerebellar*: Normal limb  Sensation: decreased vibratory sensation BLLE to shin    NIH Stroke Scale:    Level of Consciousness: 0 - alert  LOC Questions: 0 - answers both correctly  LOC Commands: 0 - performs both correctly  Best Gaze: 0 - normal  Visual: 0 - no visual loss  Facial Palsy: 0 - normal  Motor Left Arm: 0 - no drift  Motor Right Arm: 0 - no drift  Motor Left Le - no drift  Motor Right Le - no drift  Limb Ataxia: 0 -  absent  Sensory: 0 - normal  Best Language: 0 - no aphasia  Dysarthria: 0 - normal articulation  Extinction and Inattention: 0 - no neglect  NIH Stroke Scale Total: 0  Paulina Coma Scale:  Best Eye Response: 4  Best Motor Response: 6  Best Verbal Response: 5    [id number] Scale:   Age: 0 - < 65 years old  CHF History: 0 - no  HTN History: 0 - no  Stroke/TIA/Thromboembolism History: 0 - no  Vascular Disease History: 0 - no  Diabetes Mellitus in History: 0 - no  Female: 0 - no  [id number] Scale Total: 0    Modified Logan Score  1    Laboratory:  CMP:   Recent Labs  Lab 07/21/17  0512   CALCIUM 7.3*   ALBUMIN 1.7*   PROT 4.2*      K 4.1   CO2 24      BUN 19   CREATININE 0.8   ALKPHOS 334*   ALT 13   AST 15   BILITOT 0.9     CBC:   Recent Labs  Lab 07/21/17 0512   WBC 0.29*   RBC 2.32*   HGB 6.8*   HCT 20.3*   PLT 7*   MCV 88   MCH 29.3   MCHC 33.5     Lipid Panel: No results for input(s): CHOL, LDLCALC, HDL, TRIG in the last 168 hours.  Coagulation:   Recent Labs  Lab 07/21/17 0512   INR 1.2   APTT 29.0     Hgb A1C: No results for input(s): HGBA1C in the last 168 hours.  TSH: No results for input(s): TSH in the last 168 hours.    Diagnostic Results:  Brain Imaging: CT Head. Date: 7/21/17  Acute Pathology: Infarct  Location: Internal capsule:  right  Old Vascular Pathology: None  Location: N/A    Cerebrovascular Imaging: none    Cervical Vascular Imaging: none    Cardiac Evaluation: Trans-thoracic. Date: 7/20/17  Key Findings: Normal  EKG/Telemetry: SVT w/ atrial flutter 7/14/17      Bogdan Asif MD  Comprehensive Stroke Center  Department of Vascular Neurology   [facility]

## 2017-07-21 NOTE — ASSESSMENT & PLAN NOTE
- related to Neupogen  - started on zyrtec, lidocaine patch and Flexeril  - pain continues. Continue to monitor.  - started on ms continue 15 mg q12 on 7/13/17, increased to 30 mg q12 on 7/14/17; decreased dilaudid 2 mg from q2 hrs to q3hrs on 7/14/17; decreased to q4hrs on 7/17; decreased to q6hrs 7/21 for breakthrough pain

## 2017-07-21 NOTE — ASSESSMENT & PLAN NOTE
- with restlessness and no sleep for 2 nights (7/12/17); pt has been refusing Ramelteon for several days (now d/c'd); states he infrequently takes ambien prn at home (still ordered as prn). 7/14/17, pt refused ambien but still reports difficulty sleeping  - was started on zyprexa on 7/12/17 and d/c'd on 7/13/17 for restlessness  - for insomnia, encourage ambien use prn (Ramelteon and zyprexa are now d/c'd)  - added phenergan 12.5 mg nightly for insomnia

## 2017-07-21 NOTE — SUBJECTIVE & OBJECTIVE
Past Medical History:   Diagnosis Date    Cancer 2/2016    ALL    HIV infection     Pancytopenia due to antineoplastic chemotherapy 7/4/2016       Past Surgical History:   Procedure Laterality Date    CYSTOSCOPY         Review of patient's allergies indicates:   Allergen Reactions    Etoposide Rash       Family History     Problem Relation (Age of Onset)    Cancer Father        Social History Main Topics    Smoking status: Never Smoker    Smokeless tobacco: Not on file    Alcohol use No    Drug use: No    Sexual activity: Not on file         Review of Systems   Constitutional: Negative for chills, diaphoresis and fever.   Eyes: Negative for photophobia.   Respiratory: Positive for cough. Negative for chest tightness and shortness of breath.    Cardiovascular: Positive for leg swelling. Negative for chest pain.   Gastrointestinal: Negative for abdominal pain, nausea and vomiting.   Genitourinary: Negative for difficulty urinating.   Skin: Negative for color change.   Neurological: Negative for tremors and speech difficulty.   Psychiatric/Behavioral: Negative for agitation.     Objective:     Vital Signs (Most Recent):  Temp: 97.4 °F (36.3 °C) (07/21/17 1237)  Pulse: 90 (07/21/17 1237)  Resp: 18 (07/21/17 1237)  BP: 120/63 (07/21/17 1237)  SpO2: (!) 94 % (07/21/17 1237) Vital Signs (24h Range):  Temp:  [97.4 °F (36.3 °C)-99.4 °F (37.4 °C)] 97.4 °F (36.3 °C)  Pulse:  [] 90  Resp:  [17-20] 18  SpO2:  [89 %-97 %] 94 %  BP: (104-145)/(57-74) 120/63     Weight: 89.3 kg (196 lb 12.2 oz)  Body mass index is 25.97 kg/m².      Intake/Output Summary (Last 24 hours) at 07/21/17 1317  Last data filed at 07/21/17 1008   Gross per 24 hour   Intake              892 ml   Output             1050 ml   Net             -158 ml       Physical Exam   Constitutional: He is oriented to person, place, and time. No distress.   HENT:   Head: Normocephalic.   Eyes: Conjunctivae and EOM are normal.   Cardiovascular: Regular rhythm  and intact distal pulses.    No murmur heard.  Pt has mild bilateral lower ext edema   Pulmonary/Chest: Effort normal. He exhibits no tenderness.   Pt has inspiratory wheeze and coarse breath sounds at right base, decreased breath sounds bilateral lower lobes   Abdominal: Soft. Bowel sounds are normal. He exhibits no distension. There is no tenderness.   Musculoskeletal: He exhibits edema.   Neurological: He is alert and oriented to person, place, and time. No cranial nerve deficit.   Skin: He is not diaphoretic.   Psychiatric: He has a normal mood and affect.       Vents:       Lines/Drains/Airways     Central Venous Catheter Line                 Tunneled Central Line Insertion/Assessment - Double Lumen  06/21/17 1519 left subclavian 29 days                Significant Labs:    CBC/Anemia Profile:    Recent Labs  Lab 07/19/17 1613 07/20/17  0904 07/21/17  0512   WBC 0.31* 0.33* 0.29*   HGB 6.1* 7.3* 6.8*   HCT 18.1* 21.4* 20.3*   PLT 10* 2* 7*   MCV 86 86 88   RDW 15.1* 16.8* 16.3*        Chemistries:    Recent Labs  Lab 07/19/17 1613 07/20/17  0904 07/21/17  0512    140 137   K 3.8 4.0 4.1    110 108   CO2 25 26 24   BUN 21* 19 19   CREATININE 1.0 0.8 0.8   CALCIUM 7.2* 7.3* 7.3*   ALBUMIN 1.6* 1.6* 1.7*   PROT 4.1* 4.2* 4.2*   BILITOT 0.9 0.9 0.9   ALKPHOS 238* 234* 334*   ALT 8* 9* 13   AST 13 13 15   MG 1.6 1.8 1.6   PHOS 2.3* 1.7* 1.9*       ABGs:     Recent Labs  Lab 07/19/17  1547   PH 7.458*   PCO2 32.4*   HCO3 22.9*   POCSATURATED 98   BE -1     CMP:     Recent Labs  Lab 07/19/17  1613 07/20/17  0904 07/21/17  0512    140 137   K 3.8 4.0 4.1    110 108   CO2 25 26 24    120* 103   BUN 21* 19 19   CREATININE 1.0 0.8 0.8   CALCIUM 7.2* 7.3* 7.3*   PROT 4.1* 4.2* 4.2*   ALBUMIN 1.6* 1.6* 1.7*   BILITOT 0.9 0.9 0.9   ALKPHOS 238* 234* 334*   AST 13 13 15   ALT 8* 9* 13   ANIONGAP 4* 4* 5*   EGFRNONAA >60.0 >60.0 >60.0       Significant Imaging:   None in 24 hrs

## 2017-07-21 NOTE — PLAN OF CARE
MDR's with Dr Howe.  Patient is day 25 of FLAG Lynette.  ANC 44 today.  Patient continues to have episodes of altered mental and hypoxia when off of O2.  Sats stable on 3L NC.  Thoracentesis to be performed by pulm today.  CT of the head ordered for today.  IV abx continued.  ID and cards following.  D/c pending count recovery and resolution of respiratory/cardiac issues.  Will continue to follow for d/c needs.

## 2017-07-21 NOTE — ASSESSMENT & PLAN NOTE
- white blood cell count is 0.29 k/uL; absolute neutrophil count 44 cells/uL  - hemoglobin 6.8 g/dL; platelet count is 7 k/uL  - transfuse for hemoglobin < 7 g/dL, platelets < 10 k/uL

## 2017-07-21 NOTE — ASSESSMENT & PLAN NOTE
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 We have discussed antifungal coverage with infectious disease. Discontinued itraconzaole (7/09) and started ambisome  - chimerics from marrow done 6/21 show CD3 of 90% donor and 10% recipient, but CD34 of 5% donor and 95% recipient   - chimers from 7/10 shows 70% recipient and 30% donor - NOT SORTED  - plans for DLI in future

## 2017-07-21 NOTE — PLAN OF CARE
Problem: Patient Care Overview  Goal: Plan of Care Review  Outcome: Ongoing (interventions implemented as appropriate)  Pt involved in plan of care and communicating needs throughout shift. Had some family visit this afternoon- lifted patient's spirit. Up as tolerated. Tolerating diet with little appetite, voiding without difficulty.  Received 1 unit of PRBC for 6.8/20.3 and 1 unit of Plt for count of 7. Mg 1.6- NP ordered to give scheduled PO magnesium and not PRN. Phos 1.9 so patient received 20 mmol of Phos. Thoracentesis completed removing 1L of seroserous fluid and sent to lab for further testing. CT head completed with contrast- showed possible acute lacunar infart so MRI of brain being completed now. Neurology consulted and examed patient. Ordered placed for oquendo to be repositioned or removed and replaced. Zofran given once for nausea. Cefepime given as ordered. Afebrile. Cardiac monitoring continued running NS-ST. Continuous O2 being monitoring- 94-97% on 3L. All vitals stable; no acute events this shift.  Pt remaining free from falls or injury throughout shift; bed in lowest position; call light within reach; pt instructed to call for assistance as needed. Q1h rounding on patient. Will continue to monitor.

## 2017-07-21 NOTE — ASSESSMENT & PLAN NOTE
Likely secondary to arrhythmia in the setting of anemia, however embolic etiology or small vessle disease can not be excluded. Patient without focal deficits, location of stroke does not explain intermittent confusion/disorientation likely secondary to medications (remelteon, opioids, zolpidem) exacerbated by insomnia.   Antithrombotics for secondary stroke prevention: None: Reason:  profound thrombocytopenia, Statins for secondary stroke prevention and hyperlipidemia, if present: Atorvastatin- 20 mg oral daily, Aggressive risk factor modification: High Cholesterol, Diagnostics: Ordered/Pending CTA Head to assess vasculature , CTA Neck/Arch to assess vasculature and VTE Prophylaxis: None: Reason for No Pharmacological VTE Prophylaxis: Confirmed malignancy disease that increases bleeding risk   - If MRI consistent with acute stroke; proceed with CTA head/neck to assess vasculature, r/o anatomic predisposition  - Start with atorvastatin 20 mg, if tolerated increase to 40 mg daily

## 2017-07-21 NOTE — ASSESSMENT & PLAN NOTE
-Pt with diffuse lower extremity edema.  -IV fluids stopped  - Net Negative 3 L from 7/18 - down 11 lbs from 7/18 - 40 mg of lasix BID - given on 7/18   - + 2.5L from 7/19   - negative 1L from 7/20

## 2017-07-21 NOTE — SUBJECTIVE & OBJECTIVE
Interval History:   -100.5 yesterday AM  -ANC 0   -2L Nc    Review of Systems   Constitutional: Positive for fatigue. Negative for chills and fever.   HENT: Positive for mouth sores. Negative for rhinorrhea and sore throat.    Eyes: Negative for photophobia and visual disturbance.   Respiratory: Positive for cough. Negative for shortness of breath and wheezing.    Cardiovascular: Positive for palpitations. Negative for chest pain and leg swelling.   Gastrointestinal: Negative for abdominal pain and diarrhea.   Genitourinary: Negative for dysuria.   Musculoskeletal: Negative for arthralgias and myalgias.   Skin: Negative for rash.   Allergic/Immunologic: Positive for immunocompromised state.   Neurological: Negative for headaches.   Hematological: Bruises/bleeds easily.   Psychiatric/Behavioral: Negative for confusion.     Objective:     Vital Signs (Most Recent):  Temp: 98.6 °F (37 °C) (07/21/17 1038)  Pulse: 87 (07/21/17 1100)  Resp: 20 (07/21/17 1038)  BP: 115/69 (07/21/17 1038)  SpO2: 95 % (07/21/17 1100) Vital Signs (24h Range):  Temp:  [98 °F (36.7 °C)-100.5 °F (38.1 °C)] 98.6 °F (37 °C)  Pulse:  [] 87  Resp:  [17-20] 20  SpO2:  [89 %-97 %] 95 %  BP: (104-145)/(54-74) 115/69     Weight: 89.3 kg (196 lb 12.2 oz)  Body mass index is 25.97 kg/m².    Estimated Creatinine Clearance: 119.3 mL/min (based on Cr of 0.8).    Physical Exam   Constitutional: He appears well-developed. He appears cachectic.   Up and walking in room   HENT:   Head: Normocephalic and atraumatic.   Eyes: EOM are normal. Pupils are equal, round, and reactive to light.   Neck: Normal range of motion. Neck supple.   Cardiovascular: Regular rhythm.  Tachycardia present.    No murmur heard.  Pulmonary/Chest: Effort normal and breath sounds normal. He has no wheezes. He has no rales.   Abdominal: Soft. Bowel sounds are normal.   Musculoskeletal: Normal range of motion.   Neurological: He is alert.   Skin: Skin is warm and dry.   Left  subclavian port w/o tenderness or erythema     Significant Labs:   CBC:     Recent Labs  Lab 07/19/17  1613 07/20/17  0904 07/21/17  0512   WBC 0.31* 0.33* 0.29*   HGB 6.1* 7.3* 6.8*   HCT 18.1* 21.4* 20.3*   PLT 10* 2* 7*     CMP:     Recent Labs  Lab 07/19/17  1613 07/20/17  0904 07/21/17  0512    140 137   K 3.8 4.0 4.1    110 108   CO2 25 26 24    120* 103   BUN 21* 19 19   CREATININE 1.0 0.8 0.8   CALCIUM 7.2* 7.3* 7.3*   PROT 4.1* 4.2* 4.2*   ALBUMIN 1.6* 1.6* 1.7*   BILITOT 0.9 0.9 0.9   ALKPHOS 238* 234* 334*   AST 13 13 15   ALT 8* 9* 13   ANIONGAP 4* 4* 5*   EGFRNONAA >60.0 >60.0 >60.0     Imaging  7/19 CTA  Suboptimal contrast bolus opacification of the pulmonary arteries resulting in limited assessment. No acute central pulmonary thromboembolism identified the level of the proximal segmental arteries. Peripheral pulmonary emboli are not excluded on this limited assessment.    Interval development of bilateral moderate pleural effusions. Trace pericardial effusion.    Interval progression of bilateral airspace disease with more extensive areas of consolidation bilaterally, in a similar central distribution to the groundglass opacities previously noted, with an upper lobe predominance.    7/13 CT sinus  1. Paranasal sinus disease as discussed above. No evidence of air-fluid level to suggest acute sinusitis.    2. Nonspecific partial opacification of the bilateral mastoid air cells, right greater than left.    7/8 CT c/a/p  1.  Diffuse bilateral patchy groundglass opacities, with more confluent regions of groundglass opacity in the upper lobes with peripheral sparing as well as superimposed regions of more consolidative change.  Overall, findings highly concerning for underlying infectious/atypical infectious process.  Additional differential consideration including possible noninfectious inflammatory etiology or pulmonary edema.  Clinical correlation recommended.    2.  Slight colonic  wall thickening of the sigmoid and descending colon, likely relating to nondistention.  However clinical correlation for symptoms of colitis recommended.    3.  Subcentimeter hepatic hypodensities too small to definitively characterize.  Please note the patient's previously identified enhancing left hepatic lobe lesion is not definitively visualized on today's examination, which could relate to differences in contrast bolus timing.    4.  Calcified left hilar lymph nodes likely reflecting sequela of prior granulomatous disease.    5.  Mild nonspecific perinephric fat stranding.  Correlation with urinalysis recommended.     Micro  7/15 resp cx normal jose miguel  7/13 bcx NGTD  7/12 BAL cultures NGTD  712 BAL asp ag negative  7/10 RVP negative  7/11 urine legionella negtive  7/10 BDG negative  7/10 quant gold indeterminate  7/10 asp ag negative  7/9 urine histo/blast ag both negative  7/9 bcx NGTD

## 2017-07-21 NOTE — PLAN OF CARE
Problem: Coping, Compromised Individual (Adult,Obstetrics,Pediatric)  Intervention: Support/Enhance Coping Strategies  Pt remains free from injury. Pt ambulates independently. Pt placed on delirium precautions. Pt called twice reporting he could not sleep, pt provided one time doses of Remeron and Ambien. Pt remains on tele and continuous pulse ox. No acute events overnight, will continue to monitor

## 2017-07-21 NOTE — ASSESSMENT & PLAN NOTE
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 Blast of 62%  - awaiting mutation analysis and cytogenetics; FLT-3 negative     - Jaw pain and pain in right cranial nerve 7 distribution with persistent, severe headaches concerning for CNS involvement - underwent IT chemotherapy on 6/23. Flow negative for disease. CSF LDH was 29  - Had previous reaction to MEC (etoposide caused full body rash). Did not qualify for the Tolero trial due to receiving IT chemotherapy    - Day 25 of FLAG-BETZY   - Day 14 bone marrow biopsy done 7/10 - hypocellular with no evidence of residual disease  - Future plans for DLI

## 2017-07-21 NOTE — SUBJECTIVE & OBJECTIVE
Subjective:     Interval History:   - Delirium noted again overnight. Nurse notes he was able to rest after Ambien and Remeron. Will obtain a CT of the head; if this is negative will obtain LP and administer chemotherapy with LP.  - Pulmonology would like to do a thoracentsis - requesting cytology on fluid  - 2 D Echo - with preserved EF at 55%    Objective:     Vital Signs (Most Recent):  Temp: 98.7 °F (37.1 °C) (07/20/17 2248)  Pulse: 104 (07/21/17 0749)  Resp: 20 (07/20/17 2248)  BP: 131/74 (07/21/17 0749)  SpO2: 95 % (07/21/17 0749) Vital Signs (24h Range):  Temp:  [98.2 °F (36.8 °C)-100.5 °F (38.1 °C)] 98.7 °F (37.1 °C)  Pulse:  [] 104  Resp:  [17-20] 20  SpO2:  [89 %-100 %] 95 %  BP: (104-145)/(54-74) 131/74     Weight: 89.3 kg (196 lb 12.2 oz)  Body mass index is 25.97 kg/m².  Body surface area is 2.14 meters squared.    ECOG SCORE         [unfilled]    Intake/Output - Last 3 Shifts       07/19 0700 - 07/20 0659 07/20 0700 - 07/21 0659 07/21 0700 - 07/22 0659    P.O. 780 400     I.V. (mL/kg) 50 (0.6)      Blood 577 211     IV Piggyback 2100 50     Total Intake(mL/kg) 3507 (39.3) 661 (7.4)     Urine (mL/kg/hr) 1050 (0.5) 1650 (0.8)     Emesis/NG output       Stool  0 (0)     Total Output 1050 1650      Net +2457 -989             Urine Occurrence 2 x      Stool Occurrence  2 x           Physical Exam   Constitutional: He is oriented to person, place, and time. He appears well-developed and well-nourished. No distress.   HENT:   Head: Normocephalic and atraumatic.   Mouth/Throat: Oropharynx is clear and moist. No oropharyngeal exudate.   Eyes: Pupils are equal, round, and reactive to light.   Cardiovascular: Normal rate and regular rhythm.    Pulmonary/Chest: Effort normal. No respiratory distress. He has wheezes.   Musculoskeletal: He exhibits edema (2+ BLE).   Neurological: He is alert and oriented to person, place, and time.   Skin: Skin is warm and dry. No erythema.   Psychiatric:   Delirium at  night  Occasionally speaks inappropriate during the day   Nursing note and vitals reviewed.      Significant Labs:   CBC:   Recent Labs  Lab 07/19/17  1613 07/20/17  0904 07/21/17  0512   WBC 0.31* 0.33* 0.29*   HGB 6.1* 7.3* 6.8*   HCT 18.1* 21.4* 20.3*   PLT 10* 2* 7*   , CMP:   Recent Labs  Lab 07/19/17  1613 07/20/17  0904 07/21/17  0512    140 137   K 3.8 4.0 4.1    110 108   CO2 25 26 24    120* 103   BUN 21* 19 19   CREATININE 1.0 0.8 0.8   CALCIUM 7.2* 7.3* 7.3*   PROT 4.1* 4.2* 4.2*   ALBUMIN 1.6* 1.6* 1.7*   BILITOT 0.9 0.9 0.9   ALKPHOS 238* 234* 334*   AST 13 13 15   ALT 8* 9* 13   ANIONGAP 4* 4* 5*   EGFRNONAA >60.0 >60.0 >60.0   ,   Recent Lab Results       07/21/17  0512 07/20/17  1515 07/20/17  0904      Unit Blood Type Code   5100[P]        5100     Unit Expiration   [id number][P]        [id number]     Unit Blood Type   O POS[P]        O POS     Albumin 1.7(L)  1.6(L)     Alkaline Phosphatase 334(H)  234(H)     ALT 13  9(L)     Anion Gap 5(L)  4(L)     Aniso Slight  Slight     aPTT 29.0  Comment:  aPTT therapeutic range = 39-69 seconds       AST 15  13     Baso # Test Not Performed  Comment:  Corrected result; previously reported as 0.00 on %DDDDDDDD% at %TTT%.[C]  0.00     Basophil% 0.0  0.0     Total Bilirubin 0.9  Comment:  For infants and newborns, interpretation of results should be based  on gestational age, weight and in agreement with clinical  observations.  Premature Infant recommended reference ranges:  Up to 24 hours.............<8.0 mg/dL  Up to 48 hours............<12.0 mg/dL  3-5 days..................<15.0 mg/dL  6-29 days.................<15.0 mg/dL    0.9  Comment:  For infants and newborns, interpretation of results should be based  on gestational age, weight and in agreement with clinical  observations.  Premature Infant recommended reference ranges:  Up to 24 hours.............<8.0 mg/dL  Up to 48 hours............<12.0 mg/dL  3-5  days..................<15.0 mg/dL  6-29 days.................<15.0 mg/dL       BUN, Bld 19  19     Calcium 7.3(L)  7.3(L)     Chloride 108  110     CO2 24  26     CODING SYSTEM   [id number][P]        [id number]     Creatinine 0.8  0.8     Diastolic Dysfunction  No      Differential Method Manual  Automated     DISPENSE STATUS   CROSSMATCHED[P]        TRANSFUSED     EF  55      eGFR if  >60.0  >60.0     eGFR if non  >60.0  Comment:  Calculation used to obtain the estimated glomerular filtration  rate (eGFR) is the CKD-EPI equation. Since race is unknown   in our information system, the eGFR values for   -American and Non--American patients are given   for each creatinine result.    >60.0  Comment:  Calculation used to obtain the estimated glomerular filtration  rate (eGFR) is the CKD-EPI equation. Since race is unknown   in our information system, the eGFR values for   -American and Non--American patients are given   for each creatinine result.       Eos # Test Not Performed  Comment:  Corrected result; previously reported as 0.0 on %DDDDDDDD% at %TTT%.[C]  0.0     Eosinophil% 0.0  0.0     Glucose 103  120(H)     Gran #   0.0(L)     Gran% 15.0  Comment:  Corrected result; previously reported as 6.9 on %DDDDDDDD% at %TTT%.(L)[C]  3.1(L)     Group & Rh   O POS     Hematocrit 20.3(L)  21.4(L)     Hemoglobin 6.8(L)  7.3(L)     Hypo   Occasional     INDIRECT ANDREA   NEG     Coumadin Monitoring INR 1.2  Comment:  Coumadin Therapy:  2.0 - 3.0 for INR for all indicators except mechanical heart valves  and antiphospholipid syndromes which should use 2.5 - 3.5.         LD   142  Comment:  Results are increased in hemolyzed samples.     Lymph # Test Not Performed  Comment:  Corrected result; previously reported as 0.2 on %DDDDDDDD% at %TTT%.[C]  0.3(L)     Lymph% 85.0  Comment:  Corrected result; previously reported as 79.3 on %DDDDDDDD% at %TTT%.(H)[C]  93.9(H)      Magnesium 1.6  1.8     MCH 29.3  29.4     MCHC 33.5  34.1     MCV 88  86     Mono # Test Not Performed  Comment:  Corrected result; previously reported as 0.0 on %DDDDDDDD% at %TTT%.[C]  0.0(L)     Mono% 0.0  Comment:  Corrected result; previously reported as 6.9 on %DDDDDDDD% at %TTT%.(L)[C]  3.0(L)     MPV 10.4  SEE COMMENT  Comment:  Result not available.     Ovalocytes   Occasional     Phosphorus 1.9(L)  1.7(L)     Platelet Estimate Decreased(A)  Decreased(A)     Platelets 7  Comment:  critical result(s) called and verbal readback obtained from   PLATELET COUNT CALLED TO RIYA MONAE RN, 07/21/2017 07:42  (LL)  2  Comment:  plt  critical result(s) called and verbal readback obtained from   issa ewst rn, 07/20/2017 10:46  (LL)     Poik   Slight     Potassium 4.1  4.0     PRODUCT CODE   [id number][P]        [id number]     Total Protein 4.2(L)  4.2(L)     Protime 12.1       RBC 2.32(L)  2.48(L)     RDW 16.3(H)  16.8(H)     Sodium 137  140     UNIT NUMBER   [id number][P]        [id number]     Uric Acid   2.8(L)     WBC 0.29  Comment:  Previously reported results for this analyte were similarly   abnormal.  Call not needed.  Documented, 07/21/2017 06:12  (LL)  0.33  Comment:  wbc Previously reported results for this analyte were similarly   abnormal.  Call not needed.  Documented, 07/20/2017 09:43  (LL)        and All pertinent labs from the last 24 hours have been reviewed.    Diagnostic Results:  I have reviewed all pertinent imaging results/findings within the past 24 hours.

## 2017-07-21 NOTE — CONSULTS
[facility]  Pulmonology  Consult Note    Patient Name: Paul E Sistrunk  MRN: [id number]  Admission Date: 6/20/2017  Hospital Length of Stay: 31 days  Code Status: Full Code  Attending Physician: Ramin Shin MD  Primary Care Provider: Edgar Pinon MD   Principal Problem: AML (acute myeloid leukemia) in relapse    Consults  Subjective:     HPI:  Mr. Paul E Sistrunk is a 56 year old male with relapsing AML, HIV, anxiety, that presented to the hospital on 6/20/2017 for induction for relapsing AML.  7/8/2017 patient became febrile to 101.3 which was documented in the medical record.  He has been on vancomycin, cefepime and amphotericin. He reports that he has had a dry cough that has resulted in chest wall soreness.  He has since been afebrile with intermittent tachycardia but otherwise normal vital signs.  Blood cultures and urine cultures have been no growth to date.  Cryptococcus has been negative. Pulmonology was consulted on 7/12 for bronchoscopy for further evaluation of possible infectious etiology of his fever.    Patient requiring 4.0L of O2 and has tachypnea when removed, has SVT; primary team to ordered CTA PE protocol. Upon completion patient was found to have moderate bilateral pleural effusions. The study was limited 2/2 suboptimal contrast bolus opacification of the pulmonary arteries resulting in limited assessment; no acute central pulmonary thromboembolism identified the level of the proximal segmental arteries; however the assessment was limited so peripheral pulmonary emboli could not be excluded; interval progression of bilateral airspace disease with more extensive areas of consolidation bilaterally per radiology read.       Past Medical History:   Diagnosis Date    Cancer 2/2016    ALL    HIV infection     Pancytopenia due to antineoplastic chemotherapy 7/4/2016       Past Surgical History:   Procedure Laterality Date    CYSTOSCOPY         Review of patient's  allergies indicates:   Allergen Reactions    Etoposide Rash       Family History     Problem Relation (Age of Onset)    Cancer Father        Social History Main Topics    Smoking status: Never Smoker    Smokeless tobacco: Not on file    Alcohol use No    Drug use: No    Sexual activity: Not on file         Review of Systems   Constitutional: Negative for chills, diaphoresis and fever.   Eyes: Negative for photophobia.   Respiratory: Positive for cough. Negative for chest tightness and shortness of breath.    Cardiovascular: Positive for leg swelling. Negative for chest pain.   Gastrointestinal: Negative for abdominal pain, nausea and vomiting.   Genitourinary: Negative for difficulty urinating.   Skin: Negative for color change.   Neurological: Negative for tremors and speech difficulty.   Psychiatric/Behavioral: Negative for agitation.     Objective:     Vital Signs (Most Recent):  Temp: 97.4 °F (36.3 °C) (07/21/17 1237)  Pulse: 90 (07/21/17 1237)  Resp: 18 (07/21/17 1237)  BP: 120/63 (07/21/17 1237)  SpO2: (!) 94 % (07/21/17 1237) Vital Signs (24h Range):  Temp:  [97.4 °F (36.3 °C)-99.4 °F (37.4 °C)] 97.4 °F (36.3 °C)  Pulse:  [] 90  Resp:  [17-20] 18  SpO2:  [89 %-97 %] 94 %  BP: (104-145)/(57-74) 120/63     Weight: 89.3 kg (196 lb 12.2 oz)  Body mass index is 25.97 kg/m².      Intake/Output Summary (Last 24 hours) at 07/21/17 1317  Last data filed at 07/21/17 1008   Gross per 24 hour   Intake              892 ml   Output             1050 ml   Net             -158 ml       Physical Exam   Constitutional: He is oriented to person, place, and time. No distress.   HENT:   Head: Normocephalic.   Eyes: Conjunctivae and EOM are normal.   Cardiovascular: Regular rhythm and intact distal pulses.    No murmur heard.  Pt has mild bilateral lower ext edema   Pulmonary/Chest: Effort normal. He exhibits no tenderness.   Pt has inspiratory wheeze and coarse breath sounds at right base, decreased breath sounds  bilateral lower lobes   Abdominal: Soft. Bowel sounds are normal. He exhibits no distension. There is no tenderness.   Musculoskeletal: He exhibits edema.   Neurological: He is alert and oriented to person, place, and time. No cranial nerve deficit.   Skin: He is not diaphoretic.   Psychiatric: He has a normal mood and affect.       Vents:       Lines/Drains/Airways     Central Venous Catheter Line                 Tunneled Central Line Insertion/Assessment - Double Lumen  06/21/17 1519 left subclavian 29 days                Significant Labs:    CBC/Anemia Profile:    Recent Labs  Lab 07/19/17  1613 07/20/17  0904 07/21/17  0512   WBC 0.31* 0.33* 0.29*   HGB 6.1* 7.3* 6.8*   HCT 18.1* 21.4* 20.3*   PLT 10* 2* 7*   MCV 86 86 88   RDW 15.1* 16.8* 16.3*        Chemistries:    Recent Labs  Lab 07/19/17  1613 07/20/17  0904 07/21/17  0512    140 137   K 3.8 4.0 4.1    110 108   CO2 25 26 24   BUN 21* 19 19   CREATININE 1.0 0.8 0.8   CALCIUM 7.2* 7.3* 7.3*   ALBUMIN 1.6* 1.6* 1.7*   PROT 4.1* 4.2* 4.2*   BILITOT 0.9 0.9 0.9   ALKPHOS 238* 234* 334*   ALT 8* 9* 13   AST 13 13 15   MG 1.6 1.8 1.6   PHOS 2.3* 1.7* 1.9*       ABGs:     Recent Labs  Lab 07/19/17  1547   PH 7.458*   PCO2 32.4*   HCO3 22.9*   POCSATURATED 98   BE -1     CMP:     Recent Labs  Lab 07/19/17  1613 07/20/17  0904 07/21/17  0512    140 137   K 3.8 4.0 4.1    110 108   CO2 25 26 24    120* 103   BUN 21* 19 19   CREATININE 1.0 0.8 0.8   CALCIUM 7.2* 7.3* 7.3*   PROT 4.1* 4.2* 4.2*   ALBUMIN 1.6* 1.6* 1.7*   BILITOT 0.9 0.9 0.9   ALKPHOS 238* 234* 334*   AST 13 13 15   ALT 8* 9* 13   ANIONGAP 4* 4* 5*   EGFRNONAA >60.0 >60.0 >60.0       Significant Imaging:   None in 24 hrs    Assessment/Plan:     Pleural effusion    -B/L pleural effusions and bilateral airspace disease with areas of consolidation  -BAL 7/15 NGTD, pt on broad spectrum ppx  -Pt taking acyclovir known to cause pleural effusions  -Albumin now 1.7 from 3.2 on  admission  -hypoalbunemic related effusions 2/2 to chronic illness and FTT vs acyclovir related effusions vs parapneumonic effusions 2/2 organizing pna bilaterally 2/2 to chemotherapy re-induction (i.e fludarabine or cladribine)  -Echo unremarkable  -Thoracentesis done today removed 1L  -Fluid studies and cytology pending                  Thank you for your consult.     Lucy Esteban MD  Pulmonology  [facility] [address]

## 2017-07-21 NOTE — SUBJECTIVE & OBJECTIVE
Past Medical History:   Diagnosis Date    Cancer 2/2016    ALL    HIV infection     Pancytopenia due to antineoplastic chemotherapy 7/4/2016     Past Surgical History:   Procedure Laterality Date    CYSTOSCOPY       Family History   Problem Relation Age of Onset    Cancer Father      Social History   Substance Use Topics    Smoking status: Never Smoker    Smokeless tobacco: Not on file    Alcohol use No     Review of patient's allergies indicates:   Allergen Reactions    Etoposide Rash     Medications: I have reviewed the current medication administration record.    Facility-Administered Medications Prior to Admission   Medication Dose Route Frequency Provider Last Rate Last Dose    pentamidine inhalation solution 300 mg  300 mg Inhalation Q30 Days Raphael Atkinson MD   300 mg at 02/27/17 1054     Prescriptions Prior to Admission   Medication Sig Dispense Refill Last Dose    abacavir-dolutegravir-lamivud (TRIUMEQ) 600- mg Tab Take 1 tablet by mouth once daily. 30 tablet 2 Taking    acyclovir (ZOVIRAX) 800 MG Tab Take 1 tablet (800 mg total) by mouth 2 (two) times daily. 60 tablet 11 Taking    alprazolam (XANAX) 0.5 MG tablet Take 1 tablet (0.5 mg total) by mouth 2 (two) times daily as needed for Insomnia or Anxiety. 60 tablet 0 Taking    butalbital-acetaminophen-caffeine -40 mg (FIORICET, ESGIC) -40 mg per tablet   0 Taking    finasteride (PROSCAR) 5 mg tablet Take 1 tablet (5 mg total) by mouth once daily. 30 tablet 1 Taking    HYDROmorphone (DILAUDID) 2 MG tablet Take 1 tablet (2 mg total) by mouth every 4 (four) hours as needed for Pain. 60 tablet 0 Taking    itraconazole (SPORANOX) 10 mg/mL Soln Take 2 mLs (20 mg total) by mouth 3 (three) times daily. 1800 mL 0 Taking    loperamide (IMODIUM) 2 mg capsule Take 2 mg by mouth daily as needed for Diarrhea.   Taking    magnesium oxide (MAG-OX) 400 mg tablet TAKE THREE TABLETS BY MOUTH THREE TIMES A  tablet 1 Taking     ondansetron (ZOFRAN) 8 MG tablet Take 1 tablet (8 mg total) by mouth every 8 (eight) hours as needed for Nausea. 30 tablet 1 Taking    pantoprazole (PROTONIX) 40 MG tablet Take 1 tablet (40 mg total) by mouth once daily. 30 tablet 11 Taking    promethazine (PHENERGAN) 12.5 MG Tab Take 1 tablet (12.5 mg total) by mouth every 6 (six) hours as needed (nausea). 30 tablet 2 Taking    tacrolimus (PROGRAF) 0.5 MG Cap Take 1 capsule (0.5 mg total) by mouth once daily. 120 capsule 11 Taking    tamsulosin (FLOMAX) 0.4 mg Cp24 Take 1 capsule (0.4 mg total) by mouth every other day. 15 capsule 1 Taking    zolpidem (AMBIEN) 5 MG Tab Take 1 tablet (5 mg total) by mouth nightly as needed. 30 tablet 1 Taking       Review of Systems   Cardiovascular: Positive for palpitations and leg swelling.   Neurological: Positive for numbness.   Psychiatric/Behavioral: Positive for confusion and sleep disturbance.     Objective:     Vital Signs (Most Recent):  Temp: 98.8 °F (37.1 °C) (07/21/17 1618)  Pulse: 102 (07/21/17 1618)  Resp: 18 (07/21/17 1237)  BP: (!) 104/59 (07/21/17 1618)  SpO2: (!) 93 % (07/21/17 1618)    Vital Signs Range (Last 24H):  Temp:  [97.4 °F (36.3 °C)-99.1 °F (37.3 °C)]   Pulse:  []   Resp:  [18-20]   BP: (104-145)/(59-74)   SpO2:  [89 %-97 %]     Physical Exam   Constitutional: He is oriented to person, place, and time.   Pale, thin middle aged man. Resting in bed no distress.   HENT:   Head: Normocephalic and atraumatic.   Eyes: EOM are normal. Pupils are equal, round, and reactive to light.   Cardiovascular: Normal rate and intact distal pulses.    Pulmonary/Chest: Effort normal.   Musculoskeletal: Normal range of motion. He exhibits edema.   Neurological: He is alert and oriented to person, place, and time. He has normal strength. He displays no tremor. A sensory deficit is present. No cranial nerve deficit. He exhibits normal muscle tone. He displays no seizure activity. Coordination normal. GCS eye  subscore is 4. GCS verbal subscore is 5. GCS motor subscore is 6. He displays no Babinski's sign on the right side. He displays no Babinski's sign on the left side.   Reflex Scores:       Bicep reflexes are 3+ on the right side and 3+ on the left side.       Brachioradialis reflexes are 3+ on the right side and 3+ on the left side.       Patellar reflexes are 3+ on the right side and 3+ on the left side.       Achilles reflexes are 2+ on the right side and 2+ on the left side.  Skin: Skin is warm and dry.   Psychiatric: He has a normal mood and affect. Thought content normal.       Neurological Exam:   LOC: alert and follows requests  Language: No aphasia  Speech: No dysarthria  Orientation: Person, Place, Time  Memory: Recent memory intact, Remote memory intact, Age correct, Month correct  EOM (CN III, IV, VI): Full/intact  Pupils (CN III, IV, VI): PERRL  Tongue (CN XII): to midline  Reflexes: 3/4 diffusely, except for BR/ Achilles 2/4  Motor*: full strength  Cerebellar*: Normal limb  Sensation: decreased vibratory sensation BLLE to shin    NIH Stroke Scale:    Level of Consciousness: 0 - alert  LOC Questions: 0 - answers both correctly  LOC Commands: 0 - performs both correctly  Best Gaze: 0 - normal  Visual: 0 - no visual loss  Facial Palsy: 0 - normal  Motor Left Arm: 0 - no drift  Motor Right Arm: 0 - no drift  Motor Left Le - no drift  Motor Right Le - no drift  Limb Ataxia: 0 - absent  Sensory: 0 - normal  Best Language: 0 - no aphasia  Dysarthria: 0 - normal articulation  Extinction and Inattention: 0 - no neglect  NIH Stroke Scale Total: 0  Paulina Coma Scale:  Best Eye Response: 4  Best Motor Response: 6  Best Verbal Response: 5    [id number] Scale:   Age: 0 - < 65 years old  CHF History: 0 - no  HTN History: 0 - no  Stroke/TIA/Thromboembolism History: 0 - no  Vascular Disease History: 0 - no  Diabetes Mellitus in History: 0 - no  Female: 0 - no  [id number] Scale Total: 0      Laboratory:  CMP:    Recent Labs  Lab 07/21/17  0512   CALCIUM 7.3*   ALBUMIN 1.7*   PROT 4.2*      K 4.1   CO2 24      BUN 19   CREATININE 0.8   ALKPHOS 334*   ALT 13   AST 15   BILITOT 0.9     CBC:   Recent Labs  Lab 07/21/17  0512   WBC 0.29*   RBC 2.32*   HGB 6.8*   HCT 20.3*   PLT 7*   MCV 88   MCH 29.3   MCHC 33.5     Lipid Panel: No results for input(s): CHOL, LDLCALC, HDL, TRIG in the last 168 hours.  Coagulation:   Recent Labs  Lab 07/21/17  0512   INR 1.2   APTT 29.0     Hgb A1C: No results for input(s): HGBA1C in the last 168 hours.  TSH: No results for input(s): TSH in the last 168 hours.    Diagnostic Results:  Brain Imaging: CT Head. Date: 7/21/17  Acute Pathology: Infarct  Location: Internal capsule:  right  Old Vascular Pathology: None  Location: N/A    Cerebrovascular Imaging: none    Cervical Vascular Imaging: none    Cardiac Evaluation: Trans-thoracic. Date: 7/20/17  Key Findings: Normal  EKG/Telemetry: SVT w/ atrial flutter 7/14/17

## 2017-07-21 NOTE — ASSESSMENT & PLAN NOTE
- noted 7/18  - progressively worse at night; speaks nonsense occasionally during the day; has threatened to leave AMA at night a few times, takes off oxygen at night and has disconnected pump  - CT of head today   - Possible LP depending on CT results

## 2017-07-21 NOTE — ASSESSMENT & PLAN NOTE
- blood cultures no growth from 6/24/17, 7/4/17, 7/7/17, 7/9/17, 7/15/17  - on Cefepime (started 6/24 - discontinued 6/28) restarted on 7/4/17; cefepime day 17; Ampotericin B started 7/9-stopped 7/19; vancomycin (restarted 7/15 - stopped 7/17)  - Continue ppx abx--Bactrim, acyclovir.   -Will f/u ID recs  -CXR Shows pneumonia 7/15; CXR 7/17 - Significant patchy air space consolidation identified bilaterally slightly more on the left side.  Slight improvement as compared to the previous study.  Left-sided pleural effusion. On 3 L NC  - CT chest/abdomen/pelvis reveals bilateral groundglass opacities as well as a left lower-lobe consolidation;  -  ID recommends repeating CT chest 7/19 shows: Limited assessment. No acute central pulmonary thromboembolism. Peripheral pulmonary emboli are not excluded. Interval development of bilateral moderate pleural effusions. Trace pericardial effusion. Interval progression of bilateral airspace disease with more extensive areas of consolidation bilaterally, in a similar central distribution to the groundglass opacities previously noted, with an upper lobe predominance. Pulmonology consulted- thoracentesis today  - pulm was consulted for a BAL which was done 7/12, cytology pending from BAL; aspergillus <0.500; KOH was negative for yeast/fungus   - fungitell negative, aspergillus negative and quantiferon gold results show indeterminate; respiratory viral panel negative on 7/10/17

## 2017-07-21 NOTE — PROGRESS NOTES
Notified Asia veloz NP about chest xray results about oquendo central line needing to be repositioned- she okayed for us to still use. Will cont to monitor.

## 2017-07-21 NOTE — PROGRESS NOTES
[facility]  Hematology  Bone Marrow Transplant  Progress Note    Patient Name: Paul E Sistrunk  Admission Date: 6/20/2017  Hospital Length of Stay: 31 days  Code Status: Full Code    Subjective:     Interval History:   - Delirium noted again overnight. Nurse notes he was able to rest after Ambien and Remeron. Will obtain a CT of the head; if this is negative will obtain LP and administer chemotherapy with LP.  - Pulmonology would like to do a thoracentsis - requesting cytology on fluid  - 2 D Echo - with preserved EF at 55%    Objective:     Vital Signs (Most Recent):  Temp: 98.7 °F (37.1 °C) (07/20/17 2248)  Pulse: 104 (07/21/17 0749)  Resp: 20 (07/20/17 2248)  BP: 131/74 (07/21/17 0749)  SpO2: 95 % (07/21/17 0749) Vital Signs (24h Range):  Temp:  [98.2 °F (36.8 °C)-100.5 °F (38.1 °C)] 98.7 °F (37.1 °C)  Pulse:  [] 104  Resp:  [17-20] 20  SpO2:  [89 %-100 %] 95 %  BP: (104-145)/(54-74) 131/74     Weight: 89.3 kg (196 lb 12.2 oz)  Body mass index is 25.97 kg/m².  Body surface area is 2.14 meters squared.    ECOG SCORE         [unfilled]    Intake/Output - Last 3 Shifts       07/19 0700 - 07/20 0659 07/20 0700 - 07/21 0659 07/21 0700 - 07/22 0659    P.O. 780 400     I.V. (mL/kg) 50 (0.6)      Blood 577 211     IV Piggyback 2100 50     Total Intake(mL/kg) 3507 (39.3) 661 (7.4)     Urine (mL/kg/hr) 1050 (0.5) 1650 (0.8)     Emesis/NG output       Stool  0 (0)     Total Output 1050 1650      Net +2457 -989             Urine Occurrence 2 x      Stool Occurrence  2 x           Physical Exam   Constitutional: He is oriented to person, place, and time. He appears well-developed and well-nourished. No distress.   HENT:   Head: Normocephalic and atraumatic.   Mouth/Throat: Oropharynx is clear and moist. No oropharyngeal exudate.   Eyes: Pupils are equal, round, and reactive to light.   Cardiovascular: Normal rate and regular rhythm.    Pulmonary/Chest: Effort normal. No respiratory distress. He has  wheezes.   Musculoskeletal: He exhibits edema (2+ BLE).   Neurological: He is alert and oriented to person, place, and time.   Skin: Skin is warm and dry. No erythema.   Psychiatric:   Delirium at night  Occasionally speaks inappropriate during the day   Nursing note and vitals reviewed.      Significant Labs:   CBC:   Recent Labs  Lab 07/19/17  1613 07/20/17  0904 07/21/17  0512   WBC 0.31* 0.33* 0.29*   HGB 6.1* 7.3* 6.8*   HCT 18.1* 21.4* 20.3*   PLT 10* 2* 7*   , CMP:   Recent Labs  Lab 07/19/17  1613 07/20/17  0904 07/21/17  0512    140 137   K 3.8 4.0 4.1    110 108   CO2 25 26 24    120* 103   BUN 21* 19 19   CREATININE 1.0 0.8 0.8   CALCIUM 7.2* 7.3* 7.3*   PROT 4.1* 4.2* 4.2*   ALBUMIN 1.6* 1.6* 1.7*   BILITOT 0.9 0.9 0.9   ALKPHOS 238* 234* 334*   AST 13 13 15   ALT 8* 9* 13   ANIONGAP 4* 4* 5*   EGFRNONAA >60.0 >60.0 >60.0   ,   Recent Lab Results       07/21/17  0512 07/20/17  1515 07/20/17  0904      Unit Blood Type Code   5100[P]        5100     Unit Expiration   [id number][P]        [id number]     Unit Blood Type   O POS[P]        O POS     Albumin 1.7(L)  1.6(L)     Alkaline Phosphatase 334(H)  234(H)     ALT 13  9(L)     Anion Gap 5(L)  4(L)     Aniso Slight  Slight     aPTT 29.0  Comment:  aPTT therapeutic range = 39-69 seconds       AST 15  13     Baso # Test Not Performed  Comment:  Corrected result; previously reported as 0.00 on %DDDDDDDD% at %TTT%.[C]  0.00     Basophil% 0.0  0.0     Total Bilirubin 0.9  Comment:  For infants and newborns, interpretation of results should be based  on gestational age, weight and in agreement with clinical  observations.  Premature Infant recommended reference ranges:  Up to 24 hours.............<8.0 mg/dL  Up to 48 hours............<12.0 mg/dL  3-5 days..................<15.0 mg/dL  6-29 days.................<15.0 mg/dL    0.9  Comment:  For infants and newborns, interpretation of results should be based  on gestational age, weight and  in agreement with clinical  observations.  Premature Infant recommended reference ranges:  Up to 24 hours.............<8.0 mg/dL  Up to 48 hours............<12.0 mg/dL  3-5 days..................<15.0 mg/dL  6-29 days.................<15.0 mg/dL       BUN, Bld 19  19     Calcium 7.3(L)  7.3(L)     Chloride 108  110     CO2 24  26     CODING SYSTEM   [id number][P]        [id number]     Creatinine 0.8  0.8     Diastolic Dysfunction  No      Differential Method Manual  Automated     DISPENSE STATUS   CROSSMATCHED[P]        TRANSFUSED     EF  55      eGFR if  >60.0  >60.0     eGFR if non  >60.0  Comment:  Calculation used to obtain the estimated glomerular filtration  rate (eGFR) is the CKD-EPI equation. Since race is unknown   in our information system, the eGFR values for   -American and Non--American patients are given   for each creatinine result.    >60.0  Comment:  Calculation used to obtain the estimated glomerular filtration  rate (eGFR) is the CKD-EPI equation. Since race is unknown   in our information system, the eGFR values for   -American and Non--American patients are given   for each creatinine result.       Eos # Test Not Performed  Comment:  Corrected result; previously reported as 0.0 on %DDDDDDDD% at %TTT%.[C]  0.0     Eosinophil% 0.0  0.0     Glucose 103  120(H)     Gran #   0.0(L)     Gran% 15.0  Comment:  Corrected result; previously reported as 6.9 on %DDDDDDDD% at %TTT%.(L)[C]  3.1(L)     Group & Rh   O POS     Hematocrit 20.3(L)  21.4(L)     Hemoglobin 6.8(L)  7.3(L)     Hypo   Occasional     INDIRECT ANDREA   NEG     Coumadin Monitoring INR 1.2  Comment:  Coumadin Therapy:  2.0 - 3.0 for INR for all indicators except mechanical heart valves  and antiphospholipid syndromes which should use 2.5 - 3.5.         LD   142  Comment:  Results are increased in hemolyzed samples.     Lymph # Test Not Performed  Comment:  Corrected result; previously  reported as 0.2 on %DDDDDDDD% at %TTT%.[C]  0.3(L)     Lymph% 85.0  Comment:  Corrected result; previously reported as 79.3 on %DDDDDDDD% at %TTT%.(H)[C]  93.9(H)     Magnesium 1.6  1.8     MCH 29.3  29.4     MCHC 33.5  34.1     MCV 88  86     Mono # Test Not Performed  Comment:  Corrected result; previously reported as 0.0 on %DDDDDDDD% at %TTT%.[C]  0.0(L)     Mono% 0.0  Comment:  Corrected result; previously reported as 6.9 on %DDDDDDDD% at %TTT%.(L)[C]  3.0(L)     MPV 10.4  SEE COMMENT  Comment:  Result not available.     Ovalocytes   Occasional     Phosphorus 1.9(L)  1.7(L)     Platelet Estimate Decreased(A)  Decreased(A)     Platelets 7  Comment:  critical result(s) called and verbal readback obtained from   PLATELET COUNT CALLED TO RIYA MONAE RN, 07/21/2017 07:42  (LL)  2  Comment:  plt  critical result(s) called and verbal readback obtained from   issa west rn, 07/20/2017 10:46  (LL)     Poik   Slight     Potassium 4.1  4.0     PRODUCT CODE   [id number][P]        [id number]     Total Protein 4.2(L)  4.2(L)     Protime 12.1       RBC 2.32(L)  2.48(L)     RDW 16.3(H)  16.8(H)     Sodium 137  140     UNIT NUMBER   [id number][P]        [id number]     Uric Acid   2.8(L)     WBC 0.29  Comment:  Previously reported results for this analyte were similarly   abnormal.  Call not needed.  Documented, 07/21/2017 06:12  (LL)  0.33  Comment:  wbc Previously reported results for this analyte were similarly   abnormal.  Call not needed.  Documented, 07/20/2017 09:43  (LL)        and All pertinent labs from the last 24 hours have been reviewed.    Diagnostic Results:  I have reviewed all pertinent imaging results/findings within the past 24 hours.    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 Blast of 62%  - awaiting mutation analysis and cytogenetics; FLT-3 negative     - Jaw pain and pain in right cranial nerve 7 distribution with persistent, severe headaches concerning for CNS involvement - underwent IT chemotherapy on 6/23. Flow negative for disease. CSF LDH was 29  - Had previous reaction to MEC (etoposide caused full body rash). Did not qualify for the Tolero trial due to receiving IT chemotherapy    - Day 25 of FLAG-BETZY   - Day 14 bone marrow biopsy done 7/10 - hypocellular with no evidence of residual disease  - Future plans for DLI        S/P allogeneic bone marrow transplant    - +312 days s/p Flu/Bu/ATG MUD allogeneic transplant for 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 We have discussed antifungal coverage with infectious disease. Discontinued itraconzaole (7/09) and started ambisome  - chimerics from marrow done 6/21 show CD3 of 90% donor and 10% recipient, but CD34 of 5% donor and 95% recipient   - chimers from 7/10 shows 70% recipient and 30% donor - NOT SORTED  - plans for DLI in future        HIV disease    - CD4 low at 11.4%, Absolute CD4 469 and HIV RNA not detected from 6/21  - As per ID: continue triumeq        Neutropenic fever    - blood cultures no growth from 6/24/17, 7/4/17, 7/7/17, 7/9/17, 7/15/17  - on Cefepime (started 6/24 - discontinued 6/28) restarted on 7/4/17; cefepime day 17; Ampotericin B started 7/9-stopped 7/19; vancomycin (restarted 7/15 - stopped 7/17)  - Continue ppx abx--Bactrim, acyclovir.   -Will f/u ID recs  -CXR Shows pneumonia 7/15; CXR 7/17 - Significant patchy air space consolidation identified bilaterally slightly more on the left side.  Slight improvement  as compared to the previous study.  Left-sided pleural effusion. On 3 L NC  - CT chest/abdomen/pelvis reveals bilateral groundglass opacities as well as a left lower-lobe consolidation;  -  ID recommends repeating CT chest 7/19 shows: Limited assessment. No acute central pulmonary thromboembolism. Peripheral pulmonary emboli are not excluded. Interval development of bilateral moderate pleural effusions. Trace pericardial effusion. Interval progression of bilateral airspace disease with more extensive areas of consolidation bilaterally, in a similar central distribution to the groundglass opacities previously noted, with an upper lobe predominance. Pulmonology consulted- thoracentesis today  - pulm was consulted for a BAL which was done 7/12, cytology pending from BAL; aspergillus <0.500; KOH was negative for yeast/fungus   - fungitell negative, aspergillus negative and quantiferon gold results show indeterminate; respiratory viral panel negative on 7/10/17        Supraventricular tachycardia    - Supraventricular tachycardia on tele and EKG. Started 7/13/17 8pm with HR between 160-170  - Patient was cardioverted by cardiology and started on metoprolol and flecainide.  - Will f/u further cardiology recs  -Treat active infection  - Pharmacologic nuclear stress test ordered; consulted cardiology and will do a cardiac CTA later this week        Pancytopenia due to antineoplastic chemotherapy    - white blood cell count is 0.29 k/uL; absolute neutrophil count 44 cells/uL  - hemoglobin 6.8 g/dL; platelet count is 7 k/uL  - transfuse for hemoglobin < 7 g/dL, platelets < 10 k/uL        Delirium    - noted 7/18  - progressively worse at night; speaks nonsense occasionally during the day; has threatened to leave AMA at night a few times, takes off oxygen at night and has disconnected pump  - CT of head today   - Possible LP depending on CT results        Prostate hypertrophy    - Continue home flomax        Pleural effusion     - pulm consulted 7/20  - thoracentesis to be done today         Lower extremity edema    -Pt with diffuse lower extremity edema.  -IV fluids stopped  - Net Negative 3 L from 7/18 - down 11 lbs from 7/18 - 40 mg of lasix BID - given on 7/18   - + 2.5L from 7/19   - negative 1L from 7/20         Insomnia    - with restlessness and no sleep for 2 nights (7/12/17); pt has been refusing Ramelteon for several days (now d/c'd); states he infrequently takes ambien prn at home (still ordered as prn). 7/14/17, pt refused ambien but still reports difficulty sleeping  - was started on zyprexa on 7/12/17 and d/c'd on 7/13/17 for restlessness  - for insomnia, encourage ambien use prn (Ramelteon and zyprexa are now d/c'd)  - added phenergan 12.5 mg nightly for insomnia         Facial numbness    - evaluated by Neurology  - MRI with no evidence of  acute intracranial pathology.  - thought to be secondary to drug induced peripheral neuropathy?  - Neurology rec Vitamin E, will hold for now given side effects of thrombocytopenia and bleeding    - with continued facial/jaw pain also; added MS contin for pain 7/13/17, may continue prn dilaudid; ordered CT of sinus/face 7/13/17, results show paranasal sinus disease, no acute sinusitis, nonspecific partial opacification of bilateral mastoid air cells right greater than left. ID following         Chronic bilateral low back pain without sciatica    - related to Neupogen  - started on zyrtec, lidocaine patch and Flexeril  - pain continues. Continue to monitor.  - started on ms continue 15 mg q12 on 7/13/17, increased to 30 mg q12 on 7/14/17; decreased dilaudid 2 mg from q2 hrs to q3hrs on 7/14/17; decreased to q4hrs on 7/17; decreased to q6hrs 7/21 for breakthrough pain         Electrolyte abnormality    - ordered prn electrolyte replacement per protocol   - hypophosphatemia          Anxiety    - PRN Xanax  - consulted hem/onc psych            VTE Risk Mitigation         Ordered     Place  sequential compression device  Until discontinued      06/20/17 2005     High Risk of VTE  Once      06/20/17 1901          Disposition: Pending count recovery and pulmonology  issues.     Asia Melgar NP  Bone Marrow Transplant  [facility]

## 2017-07-22 NOTE — ASSESSMENT & PLAN NOTE
-- CD4 low at 11.4%, Absolute CD4 469 and HIV RNA not detected from 6/21  -- As per ID: continue triumeq

## 2017-07-22 NOTE — ASSESSMENT & PLAN NOTE
-- noted 7/18  -- progressively worse at night; speaks nonsense occasionally during the day; has threatened to leave AMA at night a few times, takes off oxygen at night and has disconnected pump  -- CT of head revealed acute stroke, for which neurology is now following. Not thought to be the main contributor to AMS, rather the cause is likely multifactorial with contribution from medications and his prolonged hospitalization

## 2017-07-22 NOTE — SUBJECTIVE & OBJECTIVE
Subjective:     Interval History: Mr. Sistrunk did well overnight, with less confusion than before. He had a busy day yesterday, with a thoracentesis, multiple imaging studies for what is thought to be an acute stroke, and general surgery evaluation for CVL line in the azygous vein.    Objective:     Vital Signs (Most Recent):  Temp: 99.1 °F (37.3 °C) (07/22/17 1146)  Pulse: 95 (07/22/17 1458)  Resp: 18 (07/22/17 1146)  BP: 118/67 (07/22/17 1146)  SpO2: 96 % (07/22/17 1458) Vital Signs (24h Range):  Temp:  [98.2 °F (36.8 °C)-99.7 °F (37.6 °C)] 99.1 °F (37.3 °C)  Pulse:  [] 95  Resp:  [17-19] 18  SpO2:  [92 %-98 %] 96 %  BP: (104-147)/(59-75) 118/67     Weight: 86.7 kg (191 lb 0.5 oz)  Body mass index is 25.21 kg/m².  Body surface area is 2.11 meters squared.      Intake/Output - Last 3 Shifts       07/20 0700 - 07/21 0659 07/21 0700 - 07/22 0659 07/22 0700 - 07/23 0659    P.O. 400 1200     I.V. (mL/kg)       Blood 211 472     IV Piggyback 100 150     Total Intake(mL/kg) 711 (8) 1822 (21)     Urine (mL/kg/hr) 1650 (0.8) 1500 (0.7) 875 (1.2)    Stool 0 (0)  0 (0)    Total Output 1650 1500 875    Net -939 +322 -875           Stool Occurrence 2 x  1 x          Physical Exam   Constitutional: He is oriented to person, place, and time. No distress.   HENT:   Head: Normocephalic and atraumatic.   NC in place on 2L   Eyes: EOM are normal. Pupils are equal, round, and reactive to light.   Neck: Normal range of motion. Neck supple. No JVD present.   Cardiovascular: Normal rate and regular rhythm.  Exam reveals no gallop and no friction rub.    No murmur heard.  Pulmonary/Chest: Effort normal and breath sounds normal. No respiratory distress. He has no wheezes.   Dressing at site of thoracentesis c/d/i   Abdominal: Soft. Bowel sounds are normal. He exhibits no distension and no mass. There is no tenderness.   Musculoskeletal: Normal range of motion. He exhibits edema (1+ bilateral lower extremity). He exhibits no  "tenderness.   Neurological: He is alert and oriented to person, place, and time. He displays normal reflexes. No cranial nerve deficit.   Skin: Skin is warm and dry. No rash noted. He is not diaphoretic. No erythema.   Psychiatric: He has a normal mood and affect. His behavior is normal.       Significant Labs:   CBC:   Recent Labs  Lab 07/21/17 0512 07/22/17  0438   WBC 0.29* 0.29*   HGB 6.8* 8.0*   HCT 20.3* 23.7*   PLT 7* 13*       CMP:   Recent Labs  Lab 07/21/17 0512 07/22/17  0438    138   K 4.1 4.1    106   CO2 24 26    102   BUN 19 17   CREATININE 0.8 0.8   CALCIUM 7.3* 7.3*   PROT 4.2* 4.3*   ALBUMIN 1.7* 1.8*   BILITOT 0.9 0.8   ALKPHOS 334* 291*   AST 15 10   ALT 13 13   ANIONGAP 5* 6*   EGFRNONAA >60.0 >60.0       Diagnostic Results:  CT head 7/21:   "New 1 cm focus of hypoattenuation at the junction of the anterior limb internal capsule and right lentiform nucleus, most consistent with an acute or early subacute lacunar infarct."    CXR 7/21:   "Multifocal consolidative opacities in the lungs bilaterally most pronounced in the upper lobes left greater than right correspond to opacities seen on CT. Please note there is left costophrenic opacity compatible with continued effusion. There is no evidence for right-sided pneumothorax post right thoracentesis.    Please note that the left-sided central venous catheter tip extends medially compatible with azygos insertion. Clinical correlation and repositioning recommended."    MRI brain 7/21:   "1. Recent/subacute infarct at the junction of the right lentiform nucleus and anterior limb of the internal capsule.    2. Bilateral maxillary sinus because retention cysts.    3. Bilateral mastoid air cell fluid."    CTA head and neck 7/21:  "Evolving small subacute infarct of the right lentiform nucleus.    Markedly limited CTA head secondary to poor arterial contrast opacification.  No obvious focal high grade stenosis or vessel occlusion. " " Consider repeat CTA head via a peripheral IV.    Unremarkable CTA neck.    Extensive airspace consolidation throughout the upper lobes concerning for aspiration, pneumonia or edema.    Large bilateral pleural effusions, left greater than right."    "

## 2017-07-22 NOTE — ASSESSMENT & PLAN NOTE
-- related to Neupogen  -- Continue zyrtec, lidocaine patch   -- Cautiously resuming opioid analgesics, starting with one-dose dilaudid 0.5mg IV

## 2017-07-22 NOTE — PLAN OF CARE
Problem: Patient Care Overview  Goal: Plan of Care Review  Outcome: Ongoing (interventions implemented as appropriate)  Pt remained free of falls and injury. Bed in low locked position side rails up x2 with call light and belongings in reach. Non skid socks in place. Delirium precautions maintained. Pt had CTA head and neck completed overnight. Possible repeat CTA of head and neck to be done today. Telemetry and continuous pulse ox in place. All VS stable. No acute event thus far. Will continue to monitor.

## 2017-07-22 NOTE — ASSESSMENT & PLAN NOTE
-- Pt with diffuse lower extremity edema.  -- IV fluids stopped  -- Net Negative 3 L from 7/18 - down 11 lbs from 7/18 - 40 mg of lasix BID - given on 7/18   -- Monitoring

## 2017-07-22 NOTE — ASSESSMENT & PLAN NOTE
-- with restlessness and no sleep for 2 nights (7/12/17); pt has been refusing Ramelteon for several days (now d/c'd); states he infrequently takes ambien prn at home (still ordered as prn). 7/14/17, pt refused ambien but still reports difficulty sleeping  -- was started on zyprexa on 7/12/17 and d/c'd on 7/13/17 for restlessness  -- for insomnia, encourage ambien use prn (Ramelteon and zyprexa are now d/c'd)  -- added phenergan 12.5 mg nightly for insomnia

## 2017-07-22 NOTE — PROGRESS NOTES
[facility]  Vascular Neurology  Comprehensive Stroke Center  Progress Note    Assessment/Plan:     Patient is a 56 y.o. year old male with: development of confusion and delerium well into hospital course. 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 [C92.02] 06/20/2017 Yes    Delirium [R41.0] 07/21/2017 No    Stroke of unknown etiology [I63.9] 07/21/2017 No    Pleural effusion [J90] 07/20/2017 No    Lower extremity edema [R60.0] 07/16/2017 No    Supraventricular tachycardia [I47.1] 07/14/2017 No    Insomnia [G47.00] 07/13/2017 No    Facial numbness [R20.0] 07/02/2017 No    Chronic bilateral low back pain without sciatica [M54.5, G89.29] 06/28/2017 No    Neutropenic fever [D70.9, R50.81] 06/26/2017 No    Electrolyte abnormality [E87.8] 06/26/2017 Yes    Anxiety [F41.9] 12/28/2016 Yes    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] 10/04/2016 Not Applicable    Pancytopenia due to antineoplastic chemotherapy [D61.810, T45.1X5A] 02/05/2016 Yes    Prostate hypertrophy [N40.0] 07/02/2015 Yes    HIV disease [B20] 07/02/2015 Yes      Problems Resolved During this Admission:    Diagnosis Date Noted Date Resolved POA    Elevated liver enzymes [R74.8] 07/06/2017 07/08/2017 No    Acute hypernatremia [E87.0] 07/06/2017 07/08/2017 Yes    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 [E88.3] 06/30/2017 07/08/2017 No    Thrombocytopenia [D69.6] 06/26/2017 07/05/2017 Yes    Bleeding at insertion site [L76.82] 06/24/2017 06/29/2017 No    NICOLE (acute kidney injury) [N17.9] 06/20/2017 07/08/2017 Yes       Plan:  Antithrombotics for secondary stroke prevention:   Antiplatelets:  None low plts  Statins for secondary stroke prevention and hyperlipidemia, if present: Atorvastatin- 40 mg oral daily    VTE Prophylaxis:  None: Reason for No  Pharmacological VTE Prophylaxis: Impaired hemostasis:  Platelets < 100,000 / uL      Additional Recommendations:  I do not feel this is a conventional stroke and in fact, there does not appear to be a drop in adc signal to go along with bright dwi signal, suggesting abnormality non ischemic. With progression of pulmonic process which apparently had temporarily regressed on ambisome suggesting fungal etiology, I woul be worried about a fungal to CNS process of which their are manu some of which include histoplasmosis, cocciodes, and nocardia. Do not think typical antithrombotic measures helpful here and likely hazardous. Would temporarilyu cover for CNS fungal involvement    Neurologic Chief Complaint: confusion and mri abnormality    Subjective:     Interval History: Patient is seen for follow-up neurological assessment and treatment recommendations: mri abnormality and confusion    HPI, Past Medical, Family, and Social History remains the same as documented in the initial encounter.     Review of Systems  Scheduled Meds:   abacavir  600 mg Oral Daily    acyclovir  400 mg Oral BID    atorvastatin  20 mg Oral Daily    ceFEPime (MAXIPIME) IVPB  2 g Intravenous Q8H    cetirizine  10 mg Oral Daily    dolutegravir  50 mg Oral Daily    filgrastim  480 mcg Subcutaneous Q24H    finasteride  5 mg Oral Daily    flecainide  50 mg Oral Q12H    lamivudine  300 mg Oral Daily    lidocaine HCL 10 mg/ml (1%)  1 mL Other Once    magnesium oxide  800 mg Oral BID    metoprolol tartrate  25 mg Oral BID    morphine  30 mg Oral Q12H    pantoprazole  40 mg Oral Daily    promethazine  12.5 mg Oral QHS    sulfamethoxazole-trimethoprim 800-160mg  1 tablet Oral Every Mon, Wed, Fri    tamsulosin  0.4 mg Oral Every other day    voriconazole  200 mg Oral BID     Continuous Infusions:   PRN Meds:sodium chloride, acetaminophen, alteplase, benzonatate, butalbital-acetaminophen-caffeine -40 mg, diphenhydrAMINE, heparin,  porcine (PF), magnesium sulfate IVPB, magnesium sulfate IVPB, ondansetron, potassium chloride **AND** potassium chloride **AND** potassium chloride, senna-docusate 8.6-50 mg, sodium chloride 0.9%, sodium chloride 0.9%, sodium phosphate IVPB, sodium phosphate IVPB, sodium phosphate IVPB    Objective:     Vital Signs (Most Recent):  Temp: 99.1 °F (37.3 °C) (17 1146)  Pulse: 102 (17 1146)  Resp: 18 (17 1146)  BP: 118/67 (17 1146)  SpO2: 95 % (17 1146)  BP Location: Right arm    Vital Signs Range (Last 24H):  Temp:  [97.4 °F (36.3 °C)-99.7 °F (37.6 °C)]   Pulse:  []   Resp:  [17-19]   BP: (104-147)/(59-75)   SpO2:  [92 %-98 %]   BP Location: Right arm    Physical Exam    Neurological Exam:   LOC: alert and follows requests  Language: No aphasia  Speech: No dysarthria  Orientation: Person, Place  Visual Fields (CN II): Full  Oculocephalics: normal  Facial Movement (CN VII): symmetric facial expression  Motor*: grossly normal   No meningismus    NIH Stroke Scale:  Interval: 24 hours post onset of symptoms +/- 20 mins  Level of Consciousness: 0 - alert  LOC Questions: 0 - answers both correctly  LOC Commands: 0 - performs both correctly  Best Gaze: 0 - normal  Visual: 0 - no visual loss  Facial Palsy: 0 - normal  Motor Left Arm: 0 - no drift  Motor Right Arm: 0 - no drift  Motor Left Le - no drift  Motor Right Le - no drift  Limb Ataxia: 0 - absent  Sensory: 0 - normal  Best Language: 0 - no aphasia  Dysarthria: 0 - normal articulation  Extinction and Inattention: 0 - no neglect  NIH Stroke Scale Total: 0      Laboratory:  CMP:   Recent Labs  Lab 17  0438   CALCIUM 7.3*   ALBUMIN 1.8*   PROT 4.3*      K 4.1   CO2 26      BUN 17   CREATININE 0.8   ALKPHOS 291*   ALT 13   AST 10   BILITOT 0.8     BMP:   Recent Labs  Lab 17  0438      K 4.1      CO2 26   BUN 17   CREATININE 0.8   CALCIUM 7.3*     CBC:   Recent Labs  Lab 17   WBC 0.29*    RBC 2.74*   HGB 8.0*   HCT 23.7*   PLT 13*   MCV 87   MCH 29.2   MCHC 33.8     Lipid Panel: No results for input(s): CHOL, LDLCALC, HDL, TRIG in the last 168 hours.    Diagnostic Results:  I have personally reviewed: MRI Head. Date: 07/20 reviewed  Findings: as stated above    Gilberto Kemp MD  [facility]  Department of Vascular Neurology   [facility]

## 2017-07-22 NOTE — ASSESSMENT & PLAN NOTE
-- relapsed AML - peripheral blood shows 30% blasts on admit  -- 2D echo shows 60% EF  -- Mazariegos placed, appears to be in the azygous vein. General surgery to address Monday morning.  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 Blast of 62%  -- awaiting mutation analysis and cytogenetics; FLT-3 negative     -- Jaw pain and pain in right cranial nerve 7 distribution with persistent, severe headaches concerning for CNS involvement - underwent IT chemotherapy on 6/23. Flow negative for disease. CSF LDH was 29  -- Had previous reaction to MEC (etoposide caused full body rash). Did not qualify for the Tolero trial due to receiving IT chemotherapy    -- Day 26 of FLAG-BETZY   - Day 14 bone marrow biopsy done 7/10 - hypocellular with no evidence of residual disease  - Future plans for DLI

## 2017-07-22 NOTE — ASSESSMENT & PLAN NOTE
-- Supraventricular tachycardia on tele and EKG. Started 7/13/17 8pm with HR between 160-170  -- Patient was cardioverted by cardiology and started on metoprolol and flecainide.  -- Will f/u further cardiology recs  -- Treat active infection  -- Pharmacologic nuclear stress test ordered; consulted cardiology and will do a cardiac CTA later this week

## 2017-07-22 NOTE — ASSESSMENT & PLAN NOTE
-- Evaluated by Neurology  -- MRI with no evidence of  acute intracranial pathology in July  -- thought to be secondary to drug induced peripheral neuropathy?  -- Neurology rec Vitamin E, will hold for now given side effects of thrombocytopenia and bleeding    -- with continued facial/jaw pain also; added MS contin for pain 7/13/17, may continue prn dilaudid; ordered CT of sinus/face 7/13/17, results show paranasal sinus disease, no acute sinusitis, nonspecific partial opacification of bilateral mastoid air cells right greater than left. ID following

## 2017-07-22 NOTE — ASSESSMENT & PLAN NOTE
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 Differential for pneumonia: suspect mold, atypical organism such as nocardia possible,  unlikely PCP w/ (-) Fungitell and unlikely Staph given no response related to fevers w/ vancomycin and negative gram stain and thus far BAL cultures NGTD.  Overall fever curve down but now with intermittent fevers     - thoracentesis performed yesterday, results seems to exudative; but all cultures shows no growth, and gram stain shows no organisms  - for now continue empiric cefepime while neutropenic; if continues afebrile, will consider changing back to ciprofloxacin prophylaxis  - continue voriconazole for now

## 2017-07-22 NOTE — ASSESSMENT & PLAN NOTE
-- Neurology following, appreciate assistance  -- Several imaging studies have been performed, including CT head 7/21, MRI brain 7/21, and CTA head and neck 7/21 with interpreting radiologists citing concern for infarction of the right lentiform nucleus and anterior limb of the internal capsule   -- Neurology reviewed the images and were first concerned about possible embolic activity given his arrhythmia earlier this admission, now concerned about the possibility of fungal CNS infection, though after discussion with ID he has essentially completed a therapeutic course with ambisome and is now on voriconazole, recommending against prophylactic CNS fungal coverage

## 2017-07-22 NOTE — ASSESSMENT & PLAN NOTE
-- Stable  -- white blood cell count is 0.29 k/uL; absolute neutrophil count 40 cells/uL  -- hemoglobin 8.0 g/dL; platelet count is 13 k/uL (both were transfused yesterday)  -- Following with daily CBC and transfusing for hemoglobin < 7 g/dL, platelets < 10 k/uL

## 2017-07-22 NOTE — ASSESSMENT & PLAN NOTE
-- blood cultures no growth from 6/24/17, 7/4/17, 7/7/17, 7/9/17, 7/15/17  -- on Cefepime (started 6/24 - discontinued 6/28) restarted on 7/4/17; cefepime day 18; Ampotericin B started 7/9-stopped 7/19; vancomycin (restarted 7/15 - stopped 7/17)  -- Continue ppx abx--Bactrim, acyclovir.   -- Will f/u ID recs, anticipate narrowing to ciprofloxacin soon  -- CXR Shows pneumonia 7/15; CXR 7/17 - Significant patchy air space consolidation identified bilaterally slightly more on the left side.  Slight improvement as compared to the previous study.  Left-sided pleural effusion. On 2-3 L via NC  -- CT chest/abdomen/pelvis reveals bilateral groundglass opacities as well as a left lower-lobe consolidation;  --  ID recommends repeating CT chest 7/19 shows: Limited assessment. No acute central pulmonary thromboembolism. Peripheral pulmonary emboli are not excluded. Interval development of bilateral moderate pleural effusions. Trace pericardial effusion. Interval progression of bilateral airspace disease with more extensive areas of consolidation bilaterally, in a similar central distribution to the groundglass opacities previously noted, with an upper lobe predominance. Pulmonology consulted- thoracentesis performed  -- pulm was consulted for a BAL which was done 7/12, cytology pending from BAL; aspergillus <0.500; KOH was negative for yeast/fungus   -- fungitell negative, aspergillus negative and quantiferon gold results show indeterminate; respiratory viral panel negative on 7/10/17  -- Pulmonary re-consulted, now s/p thoracentesis on 7/22 with cultures NGTD, but labs showing exudative process

## 2017-07-22 NOTE — ANESTHESIA PREPROCEDURE EVALUATION
07/22/2017  Pre-operative evaluation for Procedure(s) (LRB):  BIOPSY-BONE MARROW (N/A)    Paul E Sistrunk is a 56 y.o. male is a 57 yo M with PMH of HIV, anxiety,  AML (now in relapse), NICOLE, neutropenic fever, pancytopenic fever (cefepime and vanc) , anemia s/p 3U pRBCs, and currently on intrathecal chemo injections who presents for above procedure.     LDA:  Left subclavian- double lumen    Prev airway: MAC/General: oquendo catheter placed 06/21/17     Drips: None    Patient Active Problem List   Diagnosis    Prostate hypertrophy    HIV disease    Erectile dysfunction    Pancytopenia due to antineoplastic chemotherapy    Histoplasma capsulatum infection    S/P allogeneic bone marrow transplant    Anxiety    AML (acute myeloid leukemia) in relapse    AML (acute myeloblastic leukemia)    Neutropenic fever    Electrolyte abnormality    Chronic bilateral low back pain without sciatica    Facial numbness    Insomnia    Supraventricular tachycardia    Lower extremity edema    Pleural effusion    Delirium    Stroke of unknown etiology       Review of patient's allergies indicates:   Allergen Reactions    Etoposide Rash        Current Facility-Administered Medications on File Prior to Visit   Medication Dose Route Frequency Provider Last Rate Last Dose    0.9%  NaCl infusion (for blood administration)   Intravenous Q24H PRN Raphael Nava MD        abacavir tablet 600 mg  600 mg Oral Daily Ulices Howard MD   600 mg at 07/22/17 0852    acetaminophen tablet 650 mg  650 mg Oral Q6H PRN Arminda Olivares NP   650 mg at 07/21/17 0901    acyclovir capsule 400 mg  400 mg Oral BID Ulices Howard MD   400 mg at 07/22/17 0852    alteplase injection 2 mg  2 mg Intra-Catheter PRN Gabriela Howe MD   2 mg at 07/12/17 0340    atorvastatin tablet 20 mg  20 mg Oral Daily DENEEN De   20  mg at 07/22/17 0852    benzonatate capsule 100 mg  100 mg Oral TID PRN DENEEN Lee   100 mg at 07/22/17 0452    butalbital-acetaminophen-caffeine -40 mg per tablet 1 tablet  1 tablet Oral Q6H PRN Ulices Howard MD   1 tablet at 06/22/17 1834    ceFEPIme in dextrose 5% 2 gram/50 mL IVPB 2 g  2 g Intravenous Q8H DENEEN Lee 100 mL/hr at 07/22/17 1230 2 g at 07/22/17 1230    cetirizine tablet 10 mg  10 mg Oral Daily Asia S. Doubleday, NP   10 mg at 07/22/17 0852    diphenhydrAMINE injection 12.5 mg  12.5 mg Intravenous Q6H PRN Asia S. Doubleday, NP   12.5 mg at 07/21/17 0901    dolutegravir Tab 50 mg  50 mg Oral Daily Temi Akhtar MD   50 mg at 07/22/17 1207    filgrastim injection 480 mcg  480 mcg Subcutaneous Q24H Gabriela Howe MD   480 mcg at 07/21/17 1837    finasteride tablet 5 mg  5 mg Oral Daily Ulices Howard MD   5 mg at 07/22/17 0852    flecainide tablet 50 mg  50 mg Oral Q12H Remington Byrd MD   50 mg at 07/22/17 1207    heparin, porcine (PF) 100 unit/mL injection flush 300 Units  300 Units Intravenous PRN Gabriela Howe MD        hydromorphone injection 0.5 mg  0.5 mg Intravenous Once PRN Raphael Nava MD        lamivudine tablet 300 mg  300 mg Oral Daily Ulices Howard MD   300 mg at 07/22/17 0852    lidocaine HCL 10 mg/ml (1%) injection 1 mL  1 mL Other Once Lucy Esteban MD        magnesium oxide tablet 800 mg  800 mg Oral BID Ulices Howard MD   800 mg at 07/22/17 0852    magnesium sulfate 2g in water 50mL IVPB (premix)  2 g Intravenous PRN Asia S. Doubleday, NP   2 g at 07/22/17 0609    magnesium sulfate 2g in water 50mL IVPB (premix)  2 g Intravenous Q2H PRN Asia Melgar NP   2 g at 07/11/17 1523    metoprolol tartrate (LOPRESSOR) tablet 25 mg  25 mg Oral BID Cheryl Carrera NP   25 mg at 07/22/17 0852    morphine 12 hr tablet 30 mg  30 mg Oral Q12H Cheryl Carrera NP   30 mg at 07/22/17 0852    ondansetron injection 8 mg  8 mg  Intravenous Q8H PRN Ulices Howard MD   8 mg at 07/21/17 1232    pantoprazole EC tablet 40 mg  40 mg Oral Daily Ulices Howard MD   40 mg at 07/22/17 0852    potassium chloride 10 mEq in 100 mL IVPB  40 mEq Intravenous Q1H PRN Asia S. Doubledmj,  mL/hr at 07/19/17 0707 10 mEq at 07/19/17 0707    And    potassium chloride 10 mEq in 100 mL IVPB  60 mEq Intravenous Q1H PRN Asia S. Doubleday,  mL/hr at 07/16/17 2203 10 mEq at 07/16/17 2203    And    potassium chloride 10 mEq in 100 mL IVPB  80 mEq Intravenous Q1H PRN Asia S. Doubledmj, NP        promethazine tablet 12.5 mg  12.5 mg Oral QHS Asia S. Doubleday, NP   12.5 mg at 07/21/17 2101    senna-docusate 8.6-50 mg per tablet 1 tablet  1 tablet Oral BID PRN Santiago Cottrell MD   1 tablet at 07/01/17 0951    sodium chloride 0.9% flush 10 mL  10 mL Intravenous PRN Gabriela Howe MD        sodium chloride 0.9% flush 3 mL  3 mL Intravenous PRN Ulices Howard MD        sodium phosphate 15 mmol in dextrose 5 % 250 mL IVPB  15 mmol Intravenous PRN Asia S. Doubledmj, NP 83.3 mL/hr at 07/19/17 0559 15 mmol at 07/19/17 0559    sodium phosphate 20.01 mmol in dextrose 5 % 250 mL IVPB  20.01 mmol Intravenous PRN Asia S. Doubleday, NP 62.5 mL/hr at 07/22/17 0656 20.01 mmol at 07/22/17 0656    sodium phosphate 30 mmol in dextrose 5 % 250 mL IVPB  30 mmol Intravenous PRN Asia S. Doubledmj, NP        sulfamethoxazole-trimethoprim 800-160mg per tablet 1 tablet  1 tablet Oral Every Mon, Wed, Fri Asia S. Ramón, NP   1 tablet at 07/21/17 0854    tamsulosin 24 hr capsule 0.4 mg  0.4 mg Oral Every other day Ulices Howard MD   0.4 mg at 07/21/17 0856    voriconazole tablet 200 mg  200 mg Oral BID Asia Melgar, NP   200 mg at 07/22/17 0852     Current Outpatient Prescriptions on File Prior to Visit   Medication Sig Dispense Refill    abacavir-dolutegravir-lamivud (TRIUMEQ) 600- mg Tab Take 1 tablet by mouth once daily. 30 tablet 2     acyclovir (ZOVIRAX) 800 MG Tab Take 1 tablet (800 mg total) by mouth 2 (two) times daily. 60 tablet 11    alprazolam (XANAX) 0.5 MG tablet Take 1 tablet (0.5 mg total) by mouth 2 (two) times daily as needed for Insomnia or Anxiety. 60 tablet 0    butalbital-acetaminophen-caffeine -40 mg (FIORICET, ESGIC) -40 mg per tablet   0    finasteride (PROSCAR) 5 mg tablet Take 1 tablet (5 mg total) by mouth once daily. 30 tablet 1    HYDROmorphone (DILAUDID) 2 MG tablet Take 1 tablet (2 mg total) by mouth every 4 (four) hours as needed for Pain. 60 tablet 0    itraconazole (SPORANOX) 10 mg/mL Soln Take 2 mLs (20 mg total) by mouth 3 (three) times daily. 1800 mL 0    loperamide (IMODIUM) 2 mg capsule Take 2 mg by mouth daily as needed for Diarrhea.      magnesium oxide (MAG-OX) 400 mg tablet TAKE THREE TABLETS BY MOUTH THREE TIMES A  tablet 1    ondansetron (ZOFRAN) 8 MG tablet Take 1 tablet (8 mg total) by mouth every 8 (eight) hours as needed for Nausea. 30 tablet 1    pantoprazole (PROTONIX) 40 MG tablet Take 1 tablet (40 mg total) by mouth once daily. 30 tablet 11    promethazine (PHENERGAN) 12.5 MG Tab Take 1 tablet (12.5 mg total) by mouth every 6 (six) hours as needed (nausea). 30 tablet 2    tacrolimus (PROGRAF) 0.5 MG Cap Take 1 capsule (0.5 mg total) by mouth once daily. 120 capsule 11    tamsulosin (FLOMAX) 0.4 mg Cp24 Take 1 capsule (0.4 mg total) by mouth every other day. 15 capsule 1    zolpidem (AMBIEN) 5 MG Tab Take 1 tablet (5 mg total) by mouth nightly as needed. 30 tablet 1       Past Surgical History:   Procedure Laterality Date    CYSTOSCOPY         Social History     Social History    Marital status: Single     Spouse name: N/A    Number of children: 1    Years of education: N/A     Occupational History    Not on file.     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; Father  of AML 2016         Vital Signs Range (Last 24H):  Temp:  [36.8 °C (98.2 °F)-37.6 °C (99.7 °F)]   Pulse:  []   Resp:  [17-19]   BP: (104-147)/(59-75)   SpO2:  [92 %-98 %]       CBC:   Recent Labs      17   0512  17   0438   WBC  0.29*  0.29*   RBC  2.32*  2.74*   HGB  6.8*  8.0*   HCT  20.3*  23.7*   PLT  7*  13*   MCV  88  87   MCH  29.3  29.2   MCHC  33.5  33.8       CMP:   Recent Labs      17   0512  17   0438   NA  137  138   K  4.1  4.1   CL  108  106   CO2  24  26   BUN  19  17   CREATININE  0.8  0.8   GLU  103  102   MG  1.6  1.8   PHOS  1.9*  2.2*   CALCIUM  7.3*  7.3*   ALBUMIN  1.7*  1.8*   PROT  4.2*  4.3*   ALKPHOS  334*  291*   ALT  13  13   AST  15  10   BILITOT  0.9  0.8       INR  Recent Labs      17   INR  1.2   APTT  29.0           Diagnostic Studies:    MRI brain (17):   Contrast enhanced MRI demonstrates no evidence of  acute intracranial pathology.  Right maxillary sinus retention cysts.    CXR 17:   Central line in SVC.  Heart size is normal.  There is moderate edema and no change    EKG:  Vent. Rate : 101 BPM     Atrial Rate : 101 BPM     P-R Int : 162 ms          QRS Dur : 112 ms      QT Int : 382 ms       P-R-T Axes : 052 076 064 degrees     QTc Int : 495 ms    Sinus tachycardia  Otherwise normal ECG  When compared with ECG of 2017 15:32,  NE interval has increased  Left bundle branch block is no longer Present  Confirmed by MILLY ZENG, HOMEYAR ([id number]) on 2017 6:34:56 AM      2D Echo:  17:  CONCLUSIONS     1 - Normal left ventricular systolic function (EF 55-60%).     2 - Right  ventricular enlargement with normal systolic function.     3 - Normal left ventricular diastolic function.     4 - No valvular abnormalities or evidence of endocarditis.     5 - Bilateral pleural effusion.     6 - Within the left pleural effusion, adjacent to the LV apex, there are mobile masses concerning for malignancy.  See clips 23-29 and 54.       Pre-op Assessment    I have reviewed the Patient Summary Reports.      I have reviewed the Medications.     Review of Systems  Anesthesia Hx:  No problems with previous Anesthesia Denies Hx of Anesthetic complications  History of prior surgery of interest to airway management or planning: Previous anesthesia: MAC, General Denies Family Hx of Anesthesia complications.   Denies Personal Hx of Anesthesia complications.   Social:  Non-Smoker    Hematology/Oncology:         -- Anemia: Current/Recent Cancer. chemotherapy   EENT/Dental:EENT/Dental Normal   Cardiovascular:   Exercise tolerance: good Denies Hypertension.  Denies MI.  Denies CAD.           Pulmonary:   Denies COPD. Shortness of breath    Renal/:   Chronic Renal Disease    Hepatic/GI:   Denies GERD.  Denies Hepatitis.    Musculoskeletal:  Musculoskeletal Normal    Neurological:   Denies CVA.  Denies Headaches. Denies Seizures.    Endocrine:  Endocrine Normal    Dermatological:  Skin Normal    Psych:   anxiety          Physical Exam  General:  Well nourished    Airway/Jaw/Neck:  Airway Findings: Mouth Opening: Normal Tongue: Normal  General Airway Assessment: Adult  Mallampati: I  TM Distance: Normal, at least 6 cm  Jaw/Neck Findings:     Neck ROM: Normal ROM     Eyes/Ears/Nose:  EYES/EARS/NOSE FINDINGS: Normal   Dental:  DENTAL FINDINGS: Normal   Chest/Lungs:  Chest/Lungs Findings: Clear to auscultation, Normal Respiratory Rate     Heart/Vascular:  Heart Findings: Rate: Normal  Rhythm: Regular Rhythm  Sounds: Normal     Abdomen:  Abdomen Findings: Normal    Musculoskeletal:  Musculoskeletal Findings: Normal    Skin:  Skin Findings: Normal    Mental Status:  Mental Status Findings:  Cooperative, Alert and Oriented         Anesthesia Plan  Type of Anesthesia, risks & benefits discussed:  Anesthesia Type:  MAC, general  Patient's Preference:   Intra-op Monitoring Plan: standard ASA monitors  Intra-op Monitoring Plan Comments:   Post Op Pain Control Plan: multimodal analgesia and per primary service following discharge from PACU  Post Op Pain Control Plan Comments:   Induction:   IV  Beta Blocker:  Patient is not currently on a Beta-Blocker (No further documentation required).       Informed Consent: Patient understands risks and agrees with Anesthesia plan.  Questions answered. Anesthesia consent signed with patient.  ASA Score: 3     Day of Surgery Review of History & Physical: I have interviewed and examined the patient. I have reviewed the patient's H&P dated:    H&P update referred to the surgeon.         Ready For Surgery From Anesthesia Perspective.

## 2017-07-22 NOTE — PROGRESS NOTES
[facility]  Infectious Disease  Progress Note    Patient Name: Paul E Sistrunk  MRN: [id number]  Admission Date: 6/20/2017  Length of Stay: 32 days  Attending Physician: Ramin Shin MD  Primary Care Provider: Edgar Pinon MD    Isolation Status: No active isolations  Assessment/Plan:      Neutropenic fever     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 (dx 2/2016, s/p 7+3 induction with residual leukemia, successful MEC reinduction 3/2016 and IDAC consolidation x 2 through 6/2016 and subsequent MUD allo-SCT 9/5/2016; CMV D+/R+, Flu/Bu/ATG conditioning, ACV and maintenance tacro ppx; c/b neutropenic fever due to pulmonary histoplasmosis with positive urine antigen 2.35 and pulmonary micronodules on CT chest 2/2016; s/p ambisome induction and on itraconazole maintenance with serial negative repeat antigens; and Klebsiella septicemia 2016 while neutropenic), and CKD-3 who was admitted on 6/20/2017 with relapsed AML (s/p repeat induction with FLAG-BETZY starting 6/28) with neutropenic fevers despite empiric vanc/cefepime/itra/ACV with multifocal pneumonia noted on CT CAP as a likely source. Differential for pneumonia: suspect mold, atypical organism such as nocardia possible,  unlikely PCP w/ (-) Fungitell and unlikely Staph given no response related to fevers w/ vancomycin and negative gram stain and thus far BAL cultures NGTD.  Overall fever curve down but now with intermittent fevers     - thoracentesis performed yesterday, results seems to exudative; but all cultures shows no growth, and gram stain shows no organisms  - for now continue empiric cefepime while neutropenic; if continues afebrile, will consider changing back to ciprofloxacin prophylaxis  - continue voriconazole for now               HIV disease    - continue antiretroviral therapy            Anticipated Disposition: pending    Thank you for your consult. I will follow-up  with patient. Please contact us if you have any additional questions.    Delgado Koenig MD  Infectious Disease Fellow, PGY-5  Pager: [phone]  [facility]    Subjective:     Principal Problem:AML (acute myeloid leukemia) in relapse    HPI: Mr. 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 ID has been consulted today for neutropenic fevers that have persisted the last few days despite empiric vanc, cefepime, chronic itraconazole, and acyclovir prophylaxis. Patient notes that prior to diagnosis of relapse in clinic, he was not having F/C/S or other localizing infectious symptoms -- only malaise and acute onset BLE body aches just prior to diagnosis. Since admission he notes neutropenic F/C/S and dry cough but denies HA, URI symptoms, sore throat, dysphagia, sputum production, SOB, N/V, abdominal pain, diarrhea, dysuria, genital lesions, line pain/drainage, or rash. Exposure history is largely unremarkable: he is currently single, lives alone, recently visited [place] but denies other travel (including international), no pets, no new sexual contacts, and no new hobbies (does garden).   Interval History: patient feels well today, has no particular complain, still neutropenic; CTA head/neck 7/22/17 showed  Evolving small subacute infarct of the right lentiform nucleus    Review of Systems   Constitutional: Negative for chills and fever.   HENT: Negative for sore throat.    Respiratory: Negative for cough, chest tightness and shortness of breath.    Cardiovascular: Negative for chest pain, palpitations and leg swelling.   Gastrointestinal: Negative for abdominal distention, abdominal pain, diarrhea, nausea and vomiting.   Genitourinary: Negative for dysuria, flank pain and urgency.   Skin: Negative for rash.   Neurological: Negative for dizziness, light-headedness and numbness.   Psychiatric/Behavioral: Negative for agitation and confusion. The patient is not nervous/anxious.      Objective:     Vital Signs (Most Recent):  Temp: 98.2 °F (36.8 °C) (07/22/17 0756)  Pulse: 102 (07/22/17 0756)  Resp: 18 (07/22/17 0756)  BP: 131/75 (07/22/17 0756)  SpO2: 95 % (07/22/17 0756) Vital Signs (24h Range):  Temp:  [97.4 °F (36.3 °C)-99.7 °F (37.6 °C)] 98.2 °F (36.8 °C)  Pulse:  [] 102  Resp:  [17-20] 18  SpO2:  [92 %-98 %] 95 %  BP: (104-147)/(59-75) 131/75     Weight: 86.7 kg (191 lb 0.5 oz)  Body mass index is 25.21 kg/m².    Estimated Creatinine Clearance: 119.3 mL/min (based on Cr of 0.8).    Physical Exam   Constitutional: He is oriented to person, place, and time. No distress. Nasal cannula in place.   HENT:   Head: Normocephalic and atraumatic.   Mouth/Throat: No oropharyngeal exudate.   Eyes: No scleral icterus.   Cardiovascular: Normal rate, regular rhythm and normal heart sounds.  Exam reveals no gallop and no friction rub.    No murmur heard.  Pulmonary/Chest: Effort normal and breath sounds normal. No respiratory distress. He has no wheezes. He has no rales.   Abdominal: Soft. Bowel sounds are normal. He exhibits no distension. There is no tenderness. There is no rebound and no guarding.   Musculoskeletal: He exhibits no edema.   Neurological: He is alert and oriented to person, place, and time. No cranial nerve  deficit.   Vitals reviewed.      Significant Labs:   Blood Culture:     Recent Labs  Lab 07/09/17  0638 07/09/17  0640 07/13/17  1018 07/13/17  1052 07/15/17  1008   LABBLOO No growth after 5 days. No growth after 5 days. No growth after 5 days. No growth after 5 days. No growth after 5 days.  No growth after 5 days.     BMP:     Recent Labs  Lab 07/22/17  0438         K 4.1      CO2 26   BUN 17   CREATININE 0.8   CALCIUM 7.3*   MG 1.8     C4 Count: No results for input(s): C4 in the last 48 hours.  CBC:     Recent Labs  Lab 07/20/17  0904 07/21/17  0512 07/22/17  0438   WBC 0.33* 0.29* 0.29*   HGB 7.3* 6.8* 8.0*   HCT 21.4* 20.3* 23.7*   PLT 2* 7* 13*     CMP:     Recent Labs  Lab 07/20/17  0904 07/21/17  0512 07/22/17  0438    137 138   K 4.0 4.1 4.1    108 106   CO2 26 24 26   * 103 102   BUN 19 19 17   CREATININE 0.8 0.8 0.8   CALCIUM 7.3* 7.3* 7.3*   PROT 4.2* 4.2* 4.3*   ALBUMIN 1.6* 1.7* 1.8*   BILITOT 0.9 0.9 0.8   ALKPHOS 234* 334* 291*   AST 13 15 10   ALT 9* 13 13   ANIONGAP 4* 5* 6*   EGFRNONAA >60.0 >60.0 >60.0     Microbiology Results (last 7 days)     Procedure Component Value Units Date/Time    Culture, Body Fluid - Bactec [[id number]] Collected:  07/21/17 1223    Order Status:  Completed Specimen:  Body Fluid from Pleural Fluid Updated:  07/21/17 2115     Body Fluid Culture, Sterile No Growth to date    Gram stain [[id number]] Collected:  07/21/17 1223    Order Status:  Completed Specimen:  Respiratory from Pleural Fluid Updated:  07/21/17 1557     Gram Stain Result No WBC's      No organisms seen    Fungus culture [[id number]] Collected:  07/21/17 1223    Order Status:  Sent Specimen:  Respiratory from Pleural Fluid Updated:  07/21/17 1252    AFB Culture & Smear [[id number]] Collected:  07/21/17 1223    Order Status:  Sent Specimen:  Respiratory from Pleural Fluid Updated:  07/21/17 1251    Blood culture [[id number]] Collected:  07/15/17 1008    Order Status:   Completed Specimen:  Blood Updated:  07/20/17 1412     Blood Culture, Routine No growth after 5 days.    Blood culture [[id number]] Collected:  07/15/17 1008    Order Status:  Completed Specimen:  Blood Updated:  07/20/17 1412     Blood Culture, Routine No growth after 5 days.    Blood culture [[id number]] Collected:  07/13/17 1018    Order Status:  Completed Specimen:  Blood from Line, Subclavian, Left Updated:  07/18/17 1412     Blood Culture, Routine No growth after 5 days.    Blood culture [[id number]] Collected:  07/13/17 1052    Order Status:  Completed Specimen:  Blood Updated:  07/18/17 1212     Blood Culture, Routine No growth after 5 days.    Culture, Respiratory with Gram Stain [[id number]] Collected:  07/15/17 2239    Order Status:  Completed Specimen:  Respiratory from Sputum, Expectorated Updated:  07/18/17 0854     Respiratory Culture Normal respiratory jose miguel     Gram Stain (Respiratory) <10 epithelial cells per low power field.     Gram Stain (Respiratory) No WBC's or organisms seen    Urine culture [[id number]] Collected:  07/15/17 1103    Order Status:  Completed Specimen:  Urine from Urine, Catheterized Updated:  07/16/17 1456     Urine Culture, Routine No growth    Gram stain [[id number]] Collected:  07/15/17 1103    Order Status:  Completed Specimen:  Urine from Urine, Catheterized Updated:  07/15/17 1526     Gram Stain Result No WBC's      No organisms seen    Blood culture [[id number]]     Order Status:  Canceled Specimen:  Blood     Blood culture [[id number]]     Order Status:  Canceled Specimen:  Blood           Significant Imaging: I have reviewed all pertinent imaging results/findings within the past 24 hours.

## 2017-07-22 NOTE — SUBJECTIVE & OBJECTIVE
Interval History: patient feels well today, has no particular complain, still neutropenic; CTA head/neck 7/22/17 showed Evolving small subacute infarct of the right lentiform nucleus    Review of Systems   Constitutional: Negative for chills and fever.   HENT: Negative for sore throat.    Respiratory: Negative for cough, chest tightness and shortness of breath.    Cardiovascular: Negative for chest pain, palpitations and leg swelling.   Gastrointestinal: Negative for abdominal distention, abdominal pain, diarrhea, nausea and vomiting.   Genitourinary: Negative for dysuria, flank pain and urgency.   Skin: Negative for rash.   Neurological: Negative for dizziness, light-headedness and numbness.   Psychiatric/Behavioral: Negative for agitation and confusion. The patient is not nervous/anxious.      Objective:     Vital Signs (Most Recent):  Temp: 98.2 °F (36.8 °C) (07/22/17 0756)  Pulse: 102 (07/22/17 0756)  Resp: 18 (07/22/17 0756)  BP: 131/75 (07/22/17 0756)  SpO2: 95 % (07/22/17 0756) Vital Signs (24h Range):  Temp:  [97.4 °F (36.3 °C)-99.7 °F (37.6 °C)] 98.2 °F (36.8 °C)  Pulse:  [] 102  Resp:  [17-20] 18  SpO2:  [92 %-98 %] 95 %  BP: (104-147)/(59-75) 131/75     Weight: 86.7 kg (191 lb 0.5 oz)  Body mass index is 25.21 kg/m².    Estimated Creatinine Clearance: 119.3 mL/min (based on Cr of 0.8).    Physical Exam   Constitutional: He is oriented to person, place, and time. No distress. Nasal cannula in place.   HENT:   Head: Normocephalic and atraumatic.   Mouth/Throat: No oropharyngeal exudate.   Eyes: No scleral icterus.   Cardiovascular: Normal rate, regular rhythm and normal heart sounds.  Exam reveals no gallop and no friction rub.    No murmur heard.  Pulmonary/Chest: Effort normal and breath sounds normal. No respiratory distress. He has no wheezes. He has no rales.   Abdominal: Soft. Bowel sounds are normal. He exhibits no distension. There is no tenderness. There is no rebound and no guarding.    Musculoskeletal: He exhibits no edema.   Neurological: He is alert and oriented to person, place, and time. No cranial nerve deficit.   Vitals reviewed.      Significant Labs:   Blood Culture:     Recent Labs  Lab 07/09/17  0638 07/09/17  0640 07/13/17  1018 07/13/17  1052 07/15/17  1008   LABBLOO No growth after 5 days. No growth after 5 days. No growth after 5 days. No growth after 5 days. No growth after 5 days.  No growth after 5 days.     BMP:     Recent Labs  Lab 07/22/17  0438         K 4.1      CO2 26   BUN 17   CREATININE 0.8   CALCIUM 7.3*   MG 1.8     C4 Count: No results for input(s): C4 in the last 48 hours.  CBC:     Recent Labs  Lab 07/20/17  0904 07/21/17  0512 07/22/17  0438   WBC 0.33* 0.29* 0.29*   HGB 7.3* 6.8* 8.0*   HCT 21.4* 20.3* 23.7*   PLT 2* 7* 13*     CMP:     Recent Labs  Lab 07/20/17  0904 07/21/17  0512 07/22/17  0438    137 138   K 4.0 4.1 4.1    108 106   CO2 26 24 26   * 103 102   BUN 19 19 17   CREATININE 0.8 0.8 0.8   CALCIUM 7.3* 7.3* 7.3*   PROT 4.2* 4.2* 4.3*   ALBUMIN 1.6* 1.7* 1.8*   BILITOT 0.9 0.9 0.8   ALKPHOS 234* 334* 291*   AST 13 15 10   ALT 9* 13 13   ANIONGAP 4* 5* 6*   EGFRNONAA >60.0 >60.0 >60.0     Microbiology Results (last 7 days)     Procedure Component Value Units Date/Time    Culture, Body Fluid - Bactec [[id number]] Collected:  07/21/17 1223    Order Status:  Completed Specimen:  Body Fluid from Pleural Fluid Updated:  07/21/17 2115     Body Fluid Culture, Sterile No Growth to date    Gram stain [[id number]] Collected:  07/21/17 1223    Order Status:  Completed Specimen:  Respiratory from Pleural Fluid Updated:  07/21/17 1557     Gram Stain Result No WBC's      No organisms seen    Fungus culture [[id number]] Collected:  07/21/17 1223    Order Status:  Sent Specimen:  Respiratory from Pleural Fluid Updated:  07/21/17 1252    AFB Culture & Smear [[id number]] Collected:  07/21/17 1223    Order Status:  Sent Specimen:   Respiratory from Pleural Fluid Updated:  07/21/17 1251    Blood culture [[id number]] Collected:  07/15/17 1008    Order Status:  Completed Specimen:  Blood Updated:  07/20/17 1412     Blood Culture, Routine No growth after 5 days.    Blood culture [[id number]] Collected:  07/15/17 1008    Order Status:  Completed Specimen:  Blood Updated:  07/20/17 1412     Blood Culture, Routine No growth after 5 days.    Blood culture [[id number]] Collected:  07/13/17 1018    Order Status:  Completed Specimen:  Blood from Line, Subclavian, Left Updated:  07/18/17 1412     Blood Culture, Routine No growth after 5 days.    Blood culture [[id number]] Collected:  07/13/17 1052    Order Status:  Completed Specimen:  Blood Updated:  07/18/17 1212     Blood Culture, Routine No growth after 5 days.    Culture, Respiratory with Gram Stain [[id number]] Collected:  07/15/17 2239    Order Status:  Completed Specimen:  Respiratory from Sputum, Expectorated Updated:  07/18/17 0854     Respiratory Culture Normal respiratory jose miguel     Gram Stain (Respiratory) <10 epithelial cells per low power field.     Gram Stain (Respiratory) No WBC's or organisms seen    Urine culture [[id number]] Collected:  07/15/17 1103    Order Status:  Completed Specimen:  Urine from Urine, Catheterized Updated:  07/16/17 1456     Urine Culture, Routine No growth    Gram stain [[id number]] Collected:  07/15/17 1103    Order Status:  Completed Specimen:  Urine from Urine, Catheterized Updated:  07/15/17 1526     Gram Stain Result No WBC's      No organisms seen    Blood culture [[id number]]     Order Status:  Canceled Specimen:  Blood     Blood culture [[id number]]     Order Status:  Canceled Specimen:  Blood           Significant Imaging: I have reviewed all pertinent imaging results/findings within the past 24 hours.

## 2017-07-22 NOTE — PLAN OF CARE
Problem: Patient Care Overview  Goal: Plan of Care Review  Remained safe. Rested in bed much of shift. Complained of pain to thoracentesis site. One time dose prn 0.5 mg IV dilaudid ordered. Pt declined when med was offered but stated that he may request it later. SR to ST in low 100s on tele. Denied nausea. Poor appetite. Voiding adequately. Pt to be NPO after midnight of Sunday for oquendo repositioning vs. Replacement. Mag and phos replaced. Afebrile. Vitals stable. trialed pt on room air. Pulse ox 88% ot 89% on room air. Nasal cannula at 2 liters replaced. Neutropenic precautions maintained. Fall precautions maintained and callbell and personal items kept within reach. Ambulated in room with steady gait. No apparent distress.

## 2017-07-22 NOTE — PROGRESS NOTES
[facility]  Hematology  Bone Marrow Transplant  Progress Note    Patient Name: Paul E Sistrunk  Admission Date: 6/20/2017  Hospital Length of Stay: 32 days  Code Status: Full Code    Subjective:     Interval History: Mr. Sistrunk did well overnight, with less confusion than before. He had a busy day yesterday, with a thoracentesis, multiple imaging studies for what is thought to be an acute stroke, and general surgery evaluation for CVL line in the azygous vein.    Objective:     Vital Signs (Most Recent):  Temp: 99.1 °F (37.3 °C) (07/22/17 1146)  Pulse: 95 (07/22/17 1458)  Resp: 18 (07/22/17 1146)  BP: 118/67 (07/22/17 1146)  SpO2: 96 % (07/22/17 1458) Vital Signs (24h Range):  Temp:  [98.2 °F (36.8 °C)-99.7 °F (37.6 °C)] 99.1 °F (37.3 °C)  Pulse:  [] 95  Resp:  [17-19] 18  SpO2:  [92 %-98 %] 96 %  BP: (104-147)/(59-75) 118/67     Weight: 86.7 kg (191 lb 0.5 oz)  Body mass index is 25.21 kg/m².  Body surface area is 2.11 meters squared.      Intake/Output - Last 3 Shifts       07/20 0700 - 07/21 0659 07/21 0700 - 07/22 0659 07/22 0700 - 07/23 0659    P.O. 400 1200     I.V. (mL/kg)       Blood 211 472     IV Piggyback 100 150     Total Intake(mL/kg) 711 (8) 1822 (21)     Urine (mL/kg/hr) 1650 (0.8) 1500 (0.7) 875 (1.2)    Stool 0 (0)  0 (0)    Total Output 1650 1500 875    Net -939 +322 -875           Stool Occurrence 2 x  1 x          Physical Exam   Constitutional: He is oriented to person, place, and time. No distress.   HENT:   Head: Normocephalic and atraumatic.   NC in place on 2L   Eyes: EOM are normal. Pupils are equal, round, and reactive to light.   Neck: Normal range of motion. Neck supple. No JVD present.   Cardiovascular: Normal rate and regular rhythm.  Exam reveals no gallop and no friction rub.    No murmur heard.  Pulmonary/Chest: Effort normal and breath sounds normal. No respiratory distress. He has no wheezes.   Dressing at site of thoracentesis c/d/i   Abdominal: Soft.  "Bowel sounds are normal. He exhibits no distension and no mass. There is no tenderness.   Musculoskeletal: Normal range of motion. He exhibits edema (1+ bilateral lower extremity). He exhibits no tenderness.   Neurological: He is alert and oriented to person, place, and time. He displays normal reflexes. No cranial nerve deficit.   Skin: Skin is warm and dry. No rash noted. He is not diaphoretic. No erythema.   Psychiatric: He has a normal mood and affect. His behavior is normal.       Significant Labs:   CBC:   Recent Labs  Lab 07/21/17 0512 07/22/17 0438   WBC 0.29* 0.29*   HGB 6.8* 8.0*   HCT 20.3* 23.7*   PLT 7* 13*       CMP:   Recent Labs  Lab 07/21/17 0512 07/22/17 0438    138   K 4.1 4.1    106   CO2 24 26    102   BUN 19 17   CREATININE 0.8 0.8   CALCIUM 7.3* 7.3*   PROT 4.2* 4.3*   ALBUMIN 1.7* 1.8*   BILITOT 0.9 0.8   ALKPHOS 334* 291*   AST 15 10   ALT 13 13   ANIONGAP 5* 6*   EGFRNONAA >60.0 >60.0       Diagnostic Results:  CT head 7/21:   "New 1 cm focus of hypoattenuation at the junction of the anterior limb internal capsule and right lentiform nucleus, most consistent with an acute or early subacute lacunar infarct."    CXR 7/21:   "Multifocal consolidative opacities in the lungs bilaterally most pronounced in the upper lobes left greater than right correspond to opacities seen on CT. Please note there is left costophrenic opacity compatible with continued effusion. There is no evidence for right-sided pneumothorax post right thoracentesis.    Please note that the left-sided central venous catheter tip extends medially compatible with azygos insertion. Clinical correlation and repositioning recommended."    MRI brain 7/21:   "1. Recent/subacute infarct at the junction of the right lentiform nucleus and anterior limb of the internal capsule.    2. Bilateral maxillary sinus because retention cysts.    3. Bilateral mastoid air cell fluid."    CTA head and neck 7/21:  "Evolving " "small subacute infarct of the right lentiform nucleus.    Markedly limited CTA head secondary to poor arterial contrast opacification.  No obvious focal high grade stenosis or vessel occlusion.  Consider repeat CTA head via a peripheral IV.    Unremarkable CTA neck.    Extensive airspace consolidation throughout the upper lobes concerning for aspiration, pneumonia or edema.    Large bilateral pleural effusions, left greater than right."      Assessment/Plan:     Stroke of unknown etiology    -- Neurology following, appreciate assistance  -- Several imaging studies have been performed, including CT head 7/21, MRI brain 7/21, and CTA head and neck 7/21 with interpreting radiologists citing concern for infarction of the right lentiform nucleus and anterior limb of the internal capsule   -- Neurology reviewed the images and were first concerned about possible embolic activity given his arrhythmia earlier this admission, now concerned about the possibility of fungal CNS infection, though after discussion with ID he has essentially completed a therapeutic course with ambisome and is now on voriconazole, recommending against prophylactic CNS fungal coverage        Delirium    -- noted 7/18  -- progressively worse at night; speaks nonsense occasionally during the day; has threatened to leave AMA at night a few times, takes off oxygen at night and has disconnected pump  -- CT of head revealed acute stroke, for which neurology is now following. Not thought to be the main contributor to AMS, rather the cause is likely multifactorial with contribution from medications and his prolonged hospitalization        Pleural effusion    -- pulm consulted 7/20  -- thoracentesis as described above        Lower extremity edema    -- Pt with diffuse lower extremity edema.  -- IV fluids stopped  -- Net Negative 3 L from 7/18 - down 11 lbs from 7/18 - 40 mg of lasix BID - given on 7/18   -- Monitoring        Supraventricular tachycardia    -- " Supraventricular tachycardia on tele and EKG. Started 7/13/17 8pm with HR between 160-170  -- Patient was cardioverted by cardiology and started on metoprolol and flecainide.  -- Will f/u further cardiology recs  -- Treat active infection  -- Pharmacologic nuclear stress test ordered; consulted cardiology and will do a cardiac CTA later this week        Insomnia    -- with restlessness and no sleep for 2 nights (7/12/17); pt has been refusing Ramelteon for several days (now d/c'd); states he infrequently takes ambien prn at home (still ordered as prn). 7/14/17, pt refused ambien but still reports difficulty sleeping  -- was started on zyprexa on 7/12/17 and d/c'd on 7/13/17 for restlessness  -- for insomnia, encourage ambien use prn (Ramelteon and zyprexa are now d/c'd)  -- added phenergan 12.5 mg nightly for insomnia         Facial numbness    -- Evaluated by Neurology  -- MRI with no evidence of  acute intracranial pathology in July  -- thought to be secondary to drug induced peripheral neuropathy?  -- Neurology rec Vitamin E, will hold for now given side effects of thrombocytopenia and bleeding    -- with continued facial/jaw pain also; added MS contin for pain 7/13/17, may continue prn dilaudid; ordered CT of sinus/face 7/13/17, results show paranasal sinus disease, no acute sinusitis, nonspecific partial opacification of bilateral mastoid air cells right greater than left. ID following         Chronic bilateral low back pain without sciatica    -- related to Neupogen  -- Continue zyrtec, lidocaine patch   -- Cautiously resuming opioid analgesics, starting with one-dose dilaudid 0.5mg IV        Electrolyte abnormality    -- ordered prn electrolyte replacement per protocol   -- hypophosphatemia replaced        Neutropenic fever    -- blood cultures no growth from 6/24/17, 7/4/17, 7/7/17, 7/9/17, 7/15/17  -- on Cefepime (started 6/24 - discontinued 6/28) restarted on 7/4/17; cefepime day 18; Ampotericin B started  7/9-stopped 7/19; vancomycin (restarted 7/15 - stopped 7/17)  -- Continue ppx abx--Bactrim, acyclovir.   -- Will f/u ID recs, anticipate narrowing to ciprofloxacin soon  -- CXR Shows pneumonia 7/15; CXR 7/17 - Significant patchy air space consolidation identified bilaterally slightly more on the left side.  Slight improvement as compared to the previous study.  Left-sided pleural effusion. On 2-3 L via NC  -- CT chest/abdomen/pelvis reveals bilateral groundglass opacities as well as a left lower-lobe consolidation;  --  ID recommends repeating CT chest 7/19 shows: Limited assessment. No acute central pulmonary thromboembolism. Peripheral pulmonary emboli are not excluded. Interval development of bilateral moderate pleural effusions. Trace pericardial effusion. Interval progression of bilateral airspace disease with more extensive areas of consolidation bilaterally, in a similar central distribution to the groundglass opacities previously noted, with an upper lobe predominance. Pulmonology consulted- thoracentesis performed  -- pulm was consulted for a BAL which was done 7/12, cytology pending from BAL; aspergillus <0.500; KOH was negative for yeast/fungus   -- fungitell negative, aspergillus negative and quantiferon gold results show indeterminate; respiratory viral panel negative on 7/10/17  -- Pulmonary re-consulted, now s/p thoracentesis on 7/22 with cultures NGTD, but labs showing exudative process        Anxiety    - Holding xanax PRN due to confusion  - consulted hem/onc psych        S/P allogeneic bone marrow transplant    - +313 days s/p Flu/Bu/ATG MUD allogeneic transplant for 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 We have discussed antifungal coverage with infectious disease. Discontinued itraconzaole (7/09)  was on ambisome, now on voriconazole  - chimerics from marrow done 6/21 show CD3 of 90% donor and 10% recipient, but CD34 of 5% donor and 95% recipient   - chimers from 7/10 shows 70% recipient and 30% donor - NOT SORTED  - plans for DLI in future        Pancytopenia due to antineoplastic chemotherapy    -- Stable  -- white blood cell count is 0.29 k/uL; absolute neutrophil count 40 cells/uL  -- hemoglobin 8.0 g/dL; platelet count is 13 k/uL (both were transfused yesterday)  -- Following with daily CBC and transfusing for hemoglobin < 7 g/dL, platelets < 10 k/uL        HIV disease    -- CD4 low at 11.4%, Absolute CD4 469 and HIV RNA not detected from 6/21  -- As per ID: continue triumeq        Prostate hypertrophy    -- Stable  -- Continue home flomax        * 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 General surgery to address Monday morning.  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 Blast of 62%  -- awaiting mutation analysis and cytogenetics; FLT-3 negative     -- Jaw pain and pain in right cranial nerve 7 distribution with persistent, severe headaches concerning for CNS involvement - underwent IT chemotherapy on 6/23. Flow negative for disease. CSF LDH was 29  -- Had previous reaction to MEC (etoposide caused full body rash). Did not qualify for the Tolero trial due to receiving IT chemotherapy    -- Day 26 of FLAG-BETZY   - Day 14 bone marrow biopsy done 7/10 - hypocellular with no evidence of residual disease  - Future plans for DLI            VTE Risk Mitigation         Ordered     Place sequential compression device  Until discontinued      06/20/17 2005     High Risk of VTE  Once      06/20/17 1901          Disposition: pending resolution of neutropenic fever and stroke workup    Raphael Nava,  MD  Bone Marrow Transplant  [facility]    Patient seen and discussed with Dr. Shin, who helped formulate the above plan.

## 2017-07-22 NOTE — ASSESSMENT & PLAN NOTE
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 We have discussed antifungal coverage with infectious disease. Discontinued itraconzaole (7/09) was on ambisome, now on voriconazole  - chimerics from marrow done 6/21 show CD3 of 90% donor and 10% recipient, but CD34 of 5% donor and 95% recipient   - chimers from 7/10 shows 70% recipient and 30% donor - NOT SORTED  - plans for DLI in future

## 2017-07-23 PROBLEM — R20.0 FACIAL NUMBNESS: Status: RESOLVED | Noted: 2017-01-01 | Resolved: 2017-01-01

## 2017-07-23 NOTE — ASSESSMENT & PLAN NOTE
-- Holding xanax PRN due to confusion  -- Consulted hem/onc psych, will need to follow-up with them as an outpatient when discharged

## 2017-07-23 NOTE — PLAN OF CARE
Problem: Patient Care Overview  Goal: Plan of Care Review  Outcome: Ongoing (interventions implemented as appropriate)  POC reviewed with patient; understanding verbalized. Pt received 2U of platelets this shift. He also received Magnesium and NaPhos replacements today. He is now receiving scheduled Mucinex. He has been oriented X4 all shift. Pt ambulates around the room independently. He voids in the urinal and had one reported BM this shift. He is on a regular diet with a decreased appetite. He did not eat anything this shift. He will be NPO tonight at midnight for Mazariegos procedure tomorrow. Pt. with nonskid footwear on, bed in lowest position, and locked with bed rails up x2. Pt. has call light and personal items within reach. VSS and afebrile this shift. All questions and concerns address at this time. Will continue to monitor.

## 2017-07-23 NOTE — ASSESSMENT & PLAN NOTE
-- Marginally improved  -- White blood cell count is 0.34 k/uL; absolute neutrophil count 60 cells/uL  -- hemoglobin 8.3 g/dL; platelet count is 5 k/uL (for which he received 1U pRBC)  -- Following with daily CBC and transfusing for hemoglobin < 7 g/dL, platelets < 10 k/uL  -- Anticipate the need for more aggressive platelet transfusion prior to surgery

## 2017-07-23 NOTE — ASSESSMENT & PLAN NOTE
-- Improving  -- Typically worse at night; speaks nonsense occasionally during the day; has threatened to leave AMA at night a few times, takes off oxygen at night and has disconnected pump  -- CT of head revealed acute stroke, for which neurology is now following. Not thought to be the main contributor to AMS, rather the cause is likely multifactorial with contribution from medications and his prolonged hospitalization

## 2017-07-23 NOTE — PROGRESS NOTES
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 Differential for pneumonia: suspect mold, atypical organism such as nocardia possible,  unlikely PCP w/ (-) Fungitell and unlikely Staph given no response related to fevers w/ vancomycin and negative gram stain and thus far BAL cultures NGTD.      - patient now seems to be improving, findings on brain imaging of unclear etiology at this moment, but in case it is fungal, he received 10 days of amphotericin and now continues on voriconazole (both are adequate therapy for most molds causing CNS disease), no need to change therapy at this moment  - all cultures remains negative so far   - patient continues afebrile, will discontinue cefepime and change to ciprofloxacin for prophylaxis  - continue voriconazole                HIV disease    - continue  antiretroviral therapy            Anticipated Disposition: pending    Thank you for your consult. I will follow-up with patient. Please contact us if you have any additional questions.    Delgado Koenig MD  Infectious Disease Fellow, PGY-5  Pager: [phone]  [facility]    Subjective:     Principal Problem:AML (acute myeloid leukemia) in relapse    HPI: Mr. 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 ID has been consulted today for neutropenic fevers that have persisted the last few days despite empiric vanc, cefepime, chronic itraconazole, and acyclovir prophylaxis. Patient notes that prior to diagnosis of relapse in clinic, he was not having F/C/S or other localizing infectious symptoms -- only malaise and acute onset BLE body aches just prior to diagnosis. Since admission he notes neutropenic F/C/S and dry cough but denies HA, URI symptoms, sore throat, dysphagia, sputum production, SOB, N/V, abdominal pain, diarrhea, dysuria, genital lesions, line pain/drainage, or rash. Exposure history is largely unremarkable: he is currently single, lives alone, recently visited [place] but denies other travel (including international), no pets, no new sexual contacts, and no new hobbies (does garden).    Interval History: afebrile for 48 hours, no overnight adverse events, feeling well    Review of Systems   Constitutional: Negative for chills, diaphoresis and fever.   HENT: Negative for sore throat.    Eyes: Negative for redness and itching.   Respiratory: Negative for cough, chest tightness and shortness of breath.    Cardiovascular: Negative for chest pain, palpitations and leg swelling.   Gastrointestinal: Negative for abdominal distention, abdominal pain, diarrhea, nausea and vomiting.   Genitourinary: Negative for dysuria, flank pain, frequency and urgency.   Musculoskeletal: Negative for arthralgias, back pain, joint swelling and neck stiffness.   Skin: Negative for rash.   Neurological: Negative for dizziness, seizures, speech difficulty, light-headedness, numbness and headaches.   Psychiatric/Behavioral: Negative for agitation and confusion. The patient is not nervous/anxious.      Objective:     Vital Signs (Most Recent):  Temp: 98.2 °F (36.8 °C) (07/23/17 0729)  Pulse: 108 (07/23/17 0729)  Resp: 18 (07/23/17 0729)  BP: 128/67 (07/23/17 0729)  SpO2: (!) 94 % (07/23/17 0729) Vital Signs (24h Range):  Temp:  [98.2 °F (36.8 °C)-99.2 °F (37.3 °C)] 98.2 °F (36.8 °C)  Pulse:  [] 108  Resp:  [18] 18  SpO2:  [92 %-100 %] 94 %  BP: (107-128)/(62-67) 128/67     Weight: 84.6 kg (186 lb 8.2 oz)  Body mass index is 24.61 kg/m².    Estimated Creatinine Clearance: 119.3 mL/min (based on Cr of 0.8).    Physical Exam   Constitutional: He is oriented to person, place, and time. No distress. Nasal cannula in place.   HENT:   Head: Normocephalic and atraumatic.   Mouth/Throat: No oropharyngeal exudate.   Eyes: No scleral icterus.   Cardiovascular: Normal rate, regular rhythm and normal heart sounds.  Exam reveals no gallop and no friction rub.    No murmur heard.  Pulmonary/Chest: Effort normal and breath sounds normal. No respiratory distress. He has no wheezes. He has no rales.   Abdominal: Soft. Bowel sounds are  normal. He exhibits no distension. There is no tenderness. There is no rebound and no guarding.   Musculoskeletal: He exhibits no edema.   Neurological: He is alert and oriented to person, place, and time. No cranial nerve deficit.   Vitals reviewed.      Significant Labs:   Blood Culture:     Recent Labs  Lab 07/09/17  0638 07/09/17  0640 07/13/17  1018 07/13/17  1052 07/15/17  1008   LABBLOO No growth after 5 days. No growth after 5 days. No growth after 5 days. No growth after 5 days. No growth after 5 days.  No growth after 5 days.     BMP:     Recent Labs  Lab 07/23/17  0547   GLU 96      K 4.4      CO2 27   BUN 16   CREATININE 0.8   CALCIUM 7.3*   MG 1.7     C4 Count: No results for input(s): C4 in the last 48 hours.  CBC:     Recent Labs  Lab 07/22/17  0438 07/23/17  0547   WBC 0.29* 0.34*   HGB 8.0* 8.3*   HCT 23.7* 24.7*   PLT 13* 5*     CMP:     Recent Labs  Lab 07/22/17  0438 07/23/17  0547    138   K 4.1 4.4    105   CO2 26 27    96   BUN 17 16   CREATININE 0.8 0.8   CALCIUM 7.3* 7.3*   PROT 4.3* 4.3*   ALBUMIN 1.8* 1.8*   BILITOT 0.8 0.9   ALKPHOS 291* 273*   AST 10 11   ALT 13 10   ANIONGAP 6* 6*   EGFRNONAA >60.0 >60.0     Microbiology Results (last 7 days)     Procedure Component Value Units Date/Time    AFB Culture & Smear [[id number]] Collected:  07/21/17 1223    Order Status:  Completed Specimen:  Respiratory from Pleural Fluid Updated:  07/22/17 2127     AFB Culture & Smear Culture in progress    Culture, Body Fluid - Bactec [[id number]] Collected:  07/21/17 1223    Order Status:  Completed Specimen:  Body Fluid from Pleural Fluid Updated:  07/22/17 1412     Body Fluid Culture, Sterile No Growth to date     Body Fluid Culture, Sterile No Growth to date    Gram stain [[id number]] Collected:  07/21/17 1223    Order Status:  Completed Specimen:  Respiratory from Pleural Fluid Updated:  07/21/17 1558     Gram Stain Result No WBC's      No organisms seen    Fungus culture  [[id number]] Collected:  07/21/17 1223    Order Status:  Sent Specimen:  Respiratory from Pleural Fluid Updated:  07/21/17 1252    Blood culture [[id number]] Collected:  07/15/17 1008    Order Status:  Completed Specimen:  Blood Updated:  07/20/17 1412     Blood Culture, Routine No growth after 5 days.    Blood culture [[id number]] Collected:  07/15/17 1008    Order Status:  Completed Specimen:  Blood Updated:  07/20/17 1412     Blood Culture, Routine No growth after 5 days.    Blood culture [[id number]] Collected:  07/13/17 1018    Order Status:  Completed Specimen:  Blood from Line, Subclavian, Left Updated:  07/18/17 1412     Blood Culture, Routine No growth after 5 days.    Blood culture [[id number]] Collected:  07/13/17 1052    Order Status:  Completed Specimen:  Blood Updated:  07/18/17 1212     Blood Culture, Routine No growth after 5 days.    Culture, Respiratory with Gram Stain [[id number]] Collected:  07/15/17 2239    Order Status:  Completed Specimen:  Respiratory from Sputum, Expectorated Updated:  07/18/17 0854     Respiratory Culture Normal respiratory jose miguel     Gram Stain (Respiratory) <10 epithelial cells per low power field.     Gram Stain (Respiratory) No WBC's or organisms seen    Urine culture [[id number]] Collected:  07/15/17 1103    Order Status:  Completed Specimen:  Urine from Urine, Catheterized Updated:  07/16/17 1456     Urine Culture, Routine No growth          Significant Imaging: I have reviewed all pertinent imaging results/findings within the past 24 hours.

## 2017-07-23 NOTE — ASSESSMENT & PLAN NOTE
-- +314 days s/p Flu/Bu/ATG MUD allogeneic transplant for high risk AML after his second IDAC (6/20/16 - 6/24/16) after a prolonged hospitalization (2/5/16 - 3/21/16) for induction with 7&3 and reinduction with MEC to remission and IDAC (4/4/16 - 4/9/16)  -- Continue ppx acyclovir, renally dosed. We have discussed antifungal coverage with infectious disease. Discontinued itraconzaole (7/09) was on ambisome, now on voriconazole  -- Chimerics from marrow done 6/21 show CD3 of 90% donor and 10% recipient, but CD34 of 5% donor and 95% recipient   -- Chimers from 7/10 shows 70% recipient and 30% donor - NOT SORTED  -- Plans for DLI in future

## 2017-07-23 NOTE — PROGRESS NOTES
[facility]  Hematology  Bone Marrow Transplant  Progress Note    Patient Name: Paul E Sistrunk  Admission Date: 6/20/2017  Hospital Length of Stay: 33 days  Code Status: Full Code    Subjective:     Interval History: Mr. Sistrunk felt better this morning relative to yesterday, with his main complaint being mild intermittent nausea. His pain, most of which is localized to the prior thoracentesis site, is well controlled with the addition of dilaudid PRN which he has tolerated without confusion or sedation. No neurologic complaints, specifically denying weakness, numbness, and tingling. Surgery evaluated with plans for line replacement tomorrow..    Objective:     Vital Signs (Most Recent):  Temp: 98.1 °F (36.7 °C) (07/23/17 1120)  Pulse: 98 (07/23/17 1120)  Resp: 18 (07/23/17 1120)  BP: 107/63 (07/23/17 1120)  SpO2: (!) 94 % (07/23/17 1120) Vital Signs (24h Range):  Temp:  [98.1 °F (36.7 °C)-99.2 °F (37.3 °C)] 98.1 °F (36.7 °C)  Pulse:  [] 98  Resp:  [18] 18  SpO2:  [92 %-100 %] 94 %  BP: (103-128)/(55-67) 107/63     Weight: 84.6 kg (186 lb 8.2 oz)  Body mass index is 24.61 kg/m².  Body surface area is 2.09 meters squared.      Intake/Output - Last 3 Shifts       07/21 0700 - 07/22 0659 07/22 0700 - 07/23 0659 07/23 0700 - 07/24 0659    P.O. 1200 880 250    I.V. (mL/kg)   50 (0.6)    Blood 472  251    IV Piggyback 150 100 250    Total Intake(mL/kg) 1822 (21) 980 (11.6) 801 (9.5)    Urine (mL/kg/hr) 1500 (0.7) 2400 (1.2) 600 (1.1)    Stool  0 (0) 1 (0)    Total Output 1500 2400 601    Net +322 -1420 +200           Stool Occurrence  1 x           Physical Exam   Constitutional: He is oriented to person, place, and time. No distress.   HENT:   Head: Normocephalic and atraumatic.   Mouth/Throat: Oropharynx is clear and moist.   NC in place on 2L   Eyes: EOM are normal. Pupils are equal, round, and reactive to light.   Neck: Normal range of motion. Neck supple. No JVD present.   Cardiovascular:  Normal rate and regular rhythm.  Exam reveals no gallop and no friction rub.    No murmur heard.  Pulmonary/Chest: Effort normal and breath sounds normal. No respiratory distress. He has no wheezes.   Thoracentesis dressing c/d/i   Abdominal: Soft. Bowel sounds are normal. He exhibits no distension and no mass. There is no tenderness.   Musculoskeletal: Normal range of motion. He exhibits edema (1+ LE bilaterally). He exhibits no tenderness.   Neurological: He is alert and oriented to person, place, and time. He displays normal reflexes. No cranial nerve deficit.   Skin: Skin is warm and dry. No rash noted. He is not diaphoretic. No erythema.   Psychiatric: He has a normal mood and affect. His behavior is normal.        Significant Labs:     CBC:    Recent Labs  Lab 07/22/17 0438 07/23/17  0547   WBC 0.29* 0.34*   HGB 8.0* 8.3*   HCT 23.7* 24.7*   PLT 13* 5*       Chem 10:    Recent Labs  Lab 07/22/17 0438 07/23/17  0547    138   K 4.1 4.4    105   CO2 26 27   BUN 17 16   CREATININE 0.8 0.8    96   CALCIUM 7.3* 7.3*   MG 1.8 1.7   PHOS 2.2* 1.7*       LFTs:    Recent Labs  Lab 07/22/17 0438 07/23/17  0547   ALKPHOS 291* 273*   BILITOT 0.8 0.9   AST 10 11   ALT 13 10   ALBUMIN 1.8* 1.8*       TLS monitoring:    Recent Labs  Lab 07/22/17 0438 07/23/17  0547   K 4.1 4.4   PHOS 2.2* 1.7*   CALCIUM 7.3* 7.3*       Diagnostic Results:  None    Assessment/Plan:     Stroke of unknown etiology    -- Stable  -- Neurology following, appreciate assistance  -- Several imaging studies have been performed, including CT head 7/21, MRI brain 7/21, and CTA head and neck 7/21 with interpreting radiologists citing concern for infarction of the right lentiform nucleus and anterior limb of the internal capsule   -- Neurology reviewed the images and were first concerned about possible embolic activity given his arrhythmia earlier this admission, now concerned about the possibility of fungal CNS infection, though  after discussion with ID he has essentially completed a therapeutic course with ambisome and is now on voriconazole, recommending against additional prophylactic CNS fungal coverage  -- Continue atorvastatin. Not a candidate for any sort of anticoagulation or antiplatelet agents given transfusion-dependent thrombocytopenia        Delirium    -- Improving  -- Typically worse at night; speaks nonsense occasionally during the day; has threatened to leave AMA at night a few times, takes off oxygen at night and has disconnected pump  -- CT of head revealed acute stroke, for which neurology is now following. Not thought to be the main contributor to AMS, rather the cause is likely multifactorial with contribution from medications and his prolonged hospitalization        Pleural effusion    -- Pulm re-consulted 7/20  -- s/p Thoracentesis as described above        Lower extremity edema    -- Stable, likely iatrogenic from fluids  -- IV fluids stopped  -- Net Negative 3 L from 7/18 - down 11 lbs from 7/18 - 40 mg of lasix BID - given on 7/18   -- Monitoring I/Os        Supraventricular tachycardia    -- Stable  -- Supraventricular tachycardia on tele and EKG. Started 7/13/17 8pm with HR between 160-170  -- Patient was cardioverted by cardiology and started on metoprolol and flecainide.  -- Treating underlying cause: infection and malignancy  -- Pharmacologic nuclear stress test originally ordered; cardiology following with tentative plans for a cardiac CTA        Insomnia    -- Improving  -- Ramelteon and zyprexa not tolerated  -- Continue phenergan 12.5 mg nightly for insomnia, which seems to help without causing excessive sedation or confusion        Chronic bilateral low back pain without sciatica    -- Thought to be related to Neupogen  -- Continue zyrtec, lidocaine patch   -- Cautiously resuming opioid analgesics, including dilaudid PRN        Electrolyte abnormality    -- Ordered prn electrolyte replacement per  protocol   -- Hypophosphatemia replaced; he appears to be running persistently low. Rechecking this afternoon.        Neutropenic fever    -- Blood cultures no growth from 6/24/17, 7/4/17, 7/7/17, 7/9/17, 7/15/17  -- Complicated antimicrobial treatment history as follows:   - Cefepime: (started 6/24 - discontinued 6/28) restarted on 7/4/17 and continued through 7/23   - Ampotericin B started 7/9-stopped 7/19;    - Vancomycin (restarted 7/15 - stopped 7/17)  -- Now on PPX antimicrobial agents alone, with bactrim, voriconazole,  acyclovir, and ciprofloxacin  -- Appreciate ID assistance. They have now signed off.  -- Multiple possible sources. CXR showed pneumonia 7/15; CXR 7/17 - Significant patchy air space consolidation identified bilaterally slightly more on the left side.  Slight improvement as compared to the previous study.  Left-sided pleural effusion. On 2-3 L via NC  -- CT chest/abdomen/pelvis reveals bilateral groundglass opacities as well as a left lower-lobe consolidation;  -- ID recommended repeating CT chest 7/19, which showed: Limited assessment. No acute central pulmonary thromboembolism. Peripheral pulmonary emboli are not excluded. Interval development of bilateral moderate pleural effusions. Trace pericardial effusion. Interval progression of bilateral airspace disease with more extensive areas of consolidation bilaterally, in a similar central distribution to the groundglass opacities previously noted, with an upper lobe predominance.   -- Pulm was consulted for a BAL which was done 7/12, cytology pending from BAL; aspergillus <0.500; KOH was negative for yeast/fungus   -- Pulmonary re-consulted, now s/p thoracentesis on 7/22 with cultures NGTD, but labs showing exudative process        Anxiety    -- Holding xanax PRN due to confusion  -- Consulted hem/onc psych, will need to follow-up with them as an outpatient when discharged        S/P allogeneic bone marrow transplant    -- +314 days s/p  Flu/Bu/ATG MUD allogeneic transplant for high risk AML after his second IDAC (6/20/16 - 6/24/16) after a prolonged hospitalization (2/5/16 - 3/21/16) for induction with 7&3 and reinduction with MEC to remission and IDAC (4/4/16 - 4/9/16)  -- Continue ppx acyclovir, renally dosed. We have discussed antifungal coverage with infectious disease. Discontinued itraconzaole (7/09) was on ambisome, now on voriconazole  -- Chimerics from marrow done 6/21 show CD3 of 90% donor and 10% recipient, but CD34 of 5% donor and 95% recipient   -- Chimers from 7/10 shows 70% recipient and 30% donor - NOT SORTED  -- Plans for DLI in future        Pancytopenia due to antineoplastic chemotherapy    -- Marginally improved  -- White blood cell count is 0.34 k/uL; absolute neutrophil count 60 cells/uL  -- hemoglobin 8.3 g/dL; platelet count is 5 k/uL (for which he received 1U pRBC)  -- Following with daily CBC and transfusing for hemoglobin < 7 g/dL, platelets < 10 k/uL  -- Anticipate the need for more aggressive platelet transfusion prior to surgery        HIV disease    -- CD4 low at 11.4%, Absolute CD4 469 and HIV RNA not detected from 6/21  -- As per ID: continue triumeq. 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 General surgery to replace Monday. 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 Blast of 62%  -- Awaiting mutation analysis and cytogenetics; FLT-3 negative     -- Jaw pain and pain in right cranial nerve 7 distribution with persistent, severe headaches concerning for CNS involvement - underwent IT chemotherapy on 6/23. Flow negative for disease. CSF LDH was 29  --  Had previous reaction to MEC (etoposide caused full body rash). Did not qualify for the Tolero trial due to receiving IT chemotherapy    -- Day 27 of FLAG-BETZY   -- Day 14 bone marrow biopsy done 7/10 - hypocellular with no evidence of residual disease  -- Future plans for DLI            VTE Risk Mitigation         Ordered     Place sequential compression device  Until discontinued      06/20/17 2005     High Risk of VTE  Once      06/20/17 1901          Disposition: discharge pending recovery in count, completion of his stroke workup, and stabilization of his cardiac and respiratory issues    Raphael Nava MD  Bone Marrow Transplant  [facility]    Patient seen and discussed with Dr. Shin, who helped formulate the above plan.

## 2017-07-23 NOTE — ASSESSMENT & PLAN NOTE
-- CD4 low at 11.4%, Absolute CD4 469 and HIV RNA not detected from 6/21  -- As per ID: continue triumeq. Will set up ID follow-up upon discharge

## 2017-07-23 NOTE — SUBJECTIVE & OBJECTIVE
Subjective:     Interval History: Mr. Sistrunk felt better this morning relative to yesterday, with his main complaint being mild intermittent nausea. His pain, most of which is localized to the prior thoracentesis site, is well controlled with the addition of dilaudid PRN which he has tolerated without confusion or sedation. No neurologic complaints, specifically denying weakness, numbness, and tingling. Surgery evaluated with plans for line replacement tomorrow..    Objective:     Vital Signs (Most Recent):  Temp: 98.1 °F (36.7 °C) (07/23/17 1120)  Pulse: 98 (07/23/17 1120)  Resp: 18 (07/23/17 1120)  BP: 107/63 (07/23/17 1120)  SpO2: (!) 94 % (07/23/17 1120) Vital Signs (24h Range):  Temp:  [98.1 °F (36.7 °C)-99.2 °F (37.3 °C)] 98.1 °F (36.7 °C)  Pulse:  [] 98  Resp:  [18] 18  SpO2:  [92 %-100 %] 94 %  BP: (103-128)/(55-67) 107/63     Weight: 84.6 kg (186 lb 8.2 oz)  Body mass index is 24.61 kg/m².  Body surface area is 2.09 meters squared.      Intake/Output - Last 3 Shifts       07/21 0700 - 07/22 0659 07/22 0700 - 07/23 0659 07/23 0700 - 07/24 0659    P.O. 1200 880 250    I.V. (mL/kg)   50 (0.6)    Blood 472  251    IV Piggyback 150 100 250    Total Intake(mL/kg) 1822 (21) 980 (11.6) 801 (9.5)    Urine (mL/kg/hr) 1500 (0.7) 2400 (1.2) 600 (1.1)    Stool  0 (0) 1 (0)    Total Output 1500 2400 601    Net +322 -1420 +200           Stool Occurrence  1 x           Physical Exam   Constitutional: He is oriented to person, place, and time. No distress.   HENT:   Head: Normocephalic and atraumatic.   Mouth/Throat: Oropharynx is clear and moist.   NC in place on 2L   Eyes: EOM are normal. Pupils are equal, round, and reactive to light.   Neck: Normal range of motion. Neck supple. No JVD present.   Cardiovascular: Normal rate and regular rhythm.  Exam reveals no gallop and no friction rub.    No murmur heard.  Pulmonary/Chest: Effort normal and breath sounds normal. No respiratory distress. He has no wheezes.    Thoracentesis dressing c/d/i   Abdominal: Soft. Bowel sounds are normal. He exhibits no distension and no mass. There is no tenderness.   Musculoskeletal: Normal range of motion. He exhibits edema (1+ LE bilaterally). He exhibits no tenderness.   Neurological: He is alert and oriented to person, place, and time. He displays normal reflexes. No cranial nerve deficit.   Skin: Skin is warm and dry. No rash noted. He is not diaphoretic. No erythema.   Psychiatric: He has a normal mood and affect. His behavior is normal.        Significant Labs:     CBC:    Recent Labs  Lab 07/22/17 0438 07/23/17  0547   WBC 0.29* 0.34*   HGB 8.0* 8.3*   HCT 23.7* 24.7*   PLT 13* 5*       Chem 10:    Recent Labs  Lab 07/22/17 0438 07/23/17  0547    138   K 4.1 4.4    105   CO2 26 27   BUN 17 16   CREATININE 0.8 0.8    96   CALCIUM 7.3* 7.3*   MG 1.8 1.7   PHOS 2.2* 1.7*       LFTs:    Recent Labs  Lab 07/22/17 0438 07/23/17  0547   ALKPHOS 291* 273*   BILITOT 0.8 0.9   AST 10 11   ALT 13 10   ALBUMIN 1.8* 1.8*       TLS monitoring:    Recent Labs  Lab 07/22/17 0438 07/23/17  0547   K 4.1 4.4   PHOS 2.2* 1.7*   CALCIUM 7.3* 7.3*       Diagnostic Results:  None

## 2017-07-23 NOTE — ASSESSMENT & PLAN NOTE
-- Blood cultures no growth from 6/24/17, 7/4/17, 7/7/17, 7/9/17, 7/15/17  -- Complicated antimicrobial treatment history as follows:   - Cefepime: (started 6/24 - discontinued 6/28) restarted on 7/4/17 and continued through 7/23   - Ampotericin B started 7/9-stopped 7/19;    - Vancomycin (restarted 7/15 - stopped 7/17)  -- Now on PPX antimicrobial agents alone, with bactrim, voriconazole,  acyclovir, and ciprofloxacin  -- Appreciate ID assistance. They have now signed off.  -- Multiple possible sources. CXR showed pneumonia 7/15; CXR 7/17 - Significant patchy air space consolidation identified bilaterally slightly more on the left side.  Slight improvement as compared to the previous study.  Left-sided pleural effusion. On 2-3 L via NC  -- CT chest/abdomen/pelvis reveals bilateral groundglass opacities as well as a left lower-lobe consolidation;  -- ID recommended repeating CT chest 7/19, which showed: Limited assessment. No acute central pulmonary thromboembolism. Peripheral pulmonary emboli are not excluded. Interval development of bilateral moderate pleural effusions. Trace pericardial effusion. Interval progression of bilateral airspace disease with more extensive areas of consolidation bilaterally, in a similar central distribution to the groundglass opacities previously noted, with an upper lobe predominance.   -- Pulm was consulted for a BAL which was done 7/12, cytology pending from BAL; aspergillus <0.500; KOH was negative for yeast/fungus   -- Pulmonary re-consulted, now s/p thoracentesis on 7/22 with cultures NGTD, but labs showing exudative process

## 2017-07-23 NOTE — SUBJECTIVE & OBJECTIVE
Interval History: afebrile for 48 hours, no overnight adverse events, feeling well    Review of Systems   Constitutional: Negative for chills, diaphoresis and fever.   HENT: Negative for sore throat.    Eyes: Negative for redness and itching.   Respiratory: Negative for cough, chest tightness and shortness of breath.    Cardiovascular: Negative for chest pain, palpitations and leg swelling.   Gastrointestinal: Negative for abdominal distention, abdominal pain, diarrhea, nausea and vomiting.   Genitourinary: Negative for dysuria, flank pain, frequency and urgency.   Musculoskeletal: Negative for arthralgias, back pain, joint swelling and neck stiffness.   Skin: Negative for rash.   Neurological: Negative for dizziness, seizures, speech difficulty, light-headedness, numbness and headaches.   Psychiatric/Behavioral: Negative for agitation and confusion. The patient is not nervous/anxious.      Objective:     Vital Signs (Most Recent):  Temp: 98.2 °F (36.8 °C) (07/23/17 0729)  Pulse: 108 (07/23/17 0729)  Resp: 18 (07/23/17 0729)  BP: 128/67 (07/23/17 0729)  SpO2: (!) 94 % (07/23/17 0729) Vital Signs (24h Range):  Temp:  [98.2 °F (36.8 °C)-99.2 °F (37.3 °C)] 98.2 °F (36.8 °C)  Pulse:  [] 108  Resp:  [18] 18  SpO2:  [92 %-100 %] 94 %  BP: (107-128)/(62-67) 128/67     Weight: 84.6 kg (186 lb 8.2 oz)  Body mass index is 24.61 kg/m².    Estimated Creatinine Clearance: 119.3 mL/min (based on Cr of 0.8).    Physical Exam   Constitutional: He is oriented to person, place, and time. No distress. Nasal cannula in place.   HENT:   Head: Normocephalic and atraumatic.   Mouth/Throat: No oropharyngeal exudate.   Eyes: No scleral icterus.   Cardiovascular: Normal rate, regular rhythm and normal heart sounds.  Exam reveals no gallop and no friction rub.    No murmur heard.  Pulmonary/Chest: Effort normal and breath sounds normal. No respiratory distress. He has no wheezes. He has no rales.   Abdominal: Soft. Bowel sounds are  normal. He exhibits no distension. There is no tenderness. There is no rebound and no guarding.   Musculoskeletal: He exhibits no edema.   Neurological: He is alert and oriented to person, place, and time. No cranial nerve deficit.   Vitals reviewed.      Significant Labs:   Blood Culture:     Recent Labs  Lab 07/09/17  0638 07/09/17  0640 07/13/17  1018 07/13/17  1052 07/15/17  1008   LABBLOO No growth after 5 days. No growth after 5 days. No growth after 5 days. No growth after 5 days. No growth after 5 days.  No growth after 5 days.     BMP:     Recent Labs  Lab 07/23/17  0547   GLU 96      K 4.4      CO2 27   BUN 16   CREATININE 0.8   CALCIUM 7.3*   MG 1.7     C4 Count: No results for input(s): C4 in the last 48 hours.  CBC:     Recent Labs  Lab 07/22/17  0438 07/23/17  0547   WBC 0.29* 0.34*   HGB 8.0* 8.3*   HCT 23.7* 24.7*   PLT 13* 5*     CMP:     Recent Labs  Lab 07/22/17  0438 07/23/17  0547    138   K 4.1 4.4    105   CO2 26 27    96   BUN 17 16   CREATININE 0.8 0.8   CALCIUM 7.3* 7.3*   PROT 4.3* 4.3*   ALBUMIN 1.8* 1.8*   BILITOT 0.8 0.9   ALKPHOS 291* 273*   AST 10 11   ALT 13 10   ANIONGAP 6* 6*   EGFRNONAA >60.0 >60.0     Microbiology Results (last 7 days)     Procedure Component Value Units Date/Time    AFB Culture & Smear [[id number]] Collected:  07/21/17 1223    Order Status:  Completed Specimen:  Respiratory from Pleural Fluid Updated:  07/22/17 2127     AFB Culture & Smear Culture in progress    Culture, Body Fluid - Bactec [[id number]] Collected:  07/21/17 1223    Order Status:  Completed Specimen:  Body Fluid from Pleural Fluid Updated:  07/22/17 1412     Body Fluid Culture, Sterile No Growth to date     Body Fluid Culture, Sterile No Growth to date    Gram stain [[id number]] Collected:  07/21/17 1223    Order Status:  Completed Specimen:  Respiratory from Pleural Fluid Updated:  07/21/17 1550     Gram Stain Result No WBC's      No organisms seen    Fungus culture  [[id number]] Collected:  07/21/17 1223    Order Status:  Sent Specimen:  Respiratory from Pleural Fluid Updated:  07/21/17 1252    Blood culture [[id number]] Collected:  07/15/17 1008    Order Status:  Completed Specimen:  Blood Updated:  07/20/17 1412     Blood Culture, Routine No growth after 5 days.    Blood culture [[id number]] Collected:  07/15/17 1008    Order Status:  Completed Specimen:  Blood Updated:  07/20/17 1412     Blood Culture, Routine No growth after 5 days.    Blood culture [[id number]] Collected:  07/13/17 1018    Order Status:  Completed Specimen:  Blood from Line, Subclavian, Left Updated:  07/18/17 1412     Blood Culture, Routine No growth after 5 days.    Blood culture [[id number]] Collected:  07/13/17 1052    Order Status:  Completed Specimen:  Blood Updated:  07/18/17 1212     Blood Culture, Routine No growth after 5 days.    Culture, Respiratory with Gram Stain [[id number]] Collected:  07/15/17 2239    Order Status:  Completed Specimen:  Respiratory from Sputum, Expectorated Updated:  07/18/17 0854     Respiratory Culture Normal respiratory jose miguel     Gram Stain (Respiratory) <10 epithelial cells per low power field.     Gram Stain (Respiratory) No WBC's or organisms seen    Urine culture [[id number]] Collected:  07/15/17 1103    Order Status:  Completed Specimen:  Urine from Urine, Catheterized Updated:  07/16/17 1456     Urine Culture, Routine No growth          Significant Imaging: I have reviewed all pertinent imaging results/findings within the past 24 hours.

## 2017-07-23 NOTE — ASSESSMENT & PLAN NOTE
-- Relapsed AML - peripheral blood shows 30% blasts on admit  -- 2D echo shows 60% EF  -- Mazariegos placed, appears to be in the azygous vein, likely from repositioning. General surgery to replace Monday. 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 Blast of 62%  -- Awaiting mutation analysis and cytogenetics; FLT-3 negative     -- Jaw pain and pain in right cranial nerve 7 distribution with persistent, severe headaches concerning for CNS involvement - underwent IT chemotherapy on 6/23. Flow negative for disease. CSF LDH was 29  -- Had previous reaction to MEC (etoposide caused full body rash). Did not qualify for the Tolero trial due to receiving IT chemotherapy    -- Day 27 of FLAG-BETZY   -- Day 14 bone marrow biopsy done 7/10 - hypocellular with no evidence of residual disease  -- Future plans for DLI

## 2017-07-23 NOTE — PLAN OF CARE
Problem: Patient Care Overview  Goal: Plan of Care Review  Outcome: Ongoing (interventions implemented as appropriate)  Pt remains free from injury. Pt remains on delirium precautions. Pt reported nausea and persistent coughing overnight; PRN medications provided, full relief obtained. Pain relieved with PRN medication. Pt has blanchable redness to buttock, turning wedge order and pt educated on importance of changing positions. Will continue to monitor

## 2017-07-23 NOTE — ASSESSMENT & PLAN NOTE
-- Stable  -- Neurology following, appreciate assistance  -- Several imaging studies have been performed, including CT head 7/21, MRI brain 7/21, and CTA head and neck 7/21 with interpreting radiologists citing concern for infarction of the right lentiform nucleus and anterior limb of the internal capsule   -- Neurology reviewed the images and were first concerned about possible embolic activity given his arrhythmia earlier this admission, now concerned about the possibility of fungal CNS infection, though after discussion with ID he has essentially completed a therapeutic course with ambisome and is now on voriconazole, recommending against additional prophylactic CNS fungal coverage  -- Continue atorvastatin. Not a candidate for any sort of anticoagulation or antiplatelet agents given transfusion-dependent thrombocytopenia

## 2017-07-23 NOTE — ASSESSMENT & PLAN NOTE
-- Improving  -- Ramelteon and zyprexa not tolerated  -- Continue phenergan 12.5 mg nightly for insomnia, which seems to help without causing excessive sedation or confusion

## 2017-07-23 NOTE — ASSESSMENT & PLAN NOTE
-- Stable  -- Supraventricular tachycardia on tele and EKG. Started 7/13/17 8pm with HR between 160-170  -- Patient was cardioverted by cardiology and started on metoprolol and flecainide.  -- Treating underlying cause: infection and malignancy  -- Pharmacologic nuclear stress test originally ordered; cardiology following with tentative plans for a cardiac CTA

## 2017-07-23 NOTE — ASSESSMENT & PLAN NOTE
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 Differential for pneumonia: suspect mold, atypical organism such as nocardia possible,  unlikely PCP w/ (-) Fungitell and unlikely Staph given no response related to fevers w/ vancomycin and negative gram stain and thus far BAL cultures NGTD.      - patient now seems to be improving, findings on brain imaging of unclear etiology at this moment, but in case it is fungal, he received 10 days of amphotericin and now continues on voriconazole (both are adequate therapy for most molds causing CNS disease), no need to change therapy at this moment  - all cultures remains negative so far   - patient continues afebrile, will discontinue cefepime and change to ciprofloxacin for prophylaxis  - continue voriconazole

## 2017-07-23 NOTE — ASSESSMENT & PLAN NOTE
-- Stable, likely iatrogenic from fluids  -- IV fluids stopped  -- Net Negative 3 L from 7/18 - down 11 lbs from 7/18 - 40 mg of lasix BID - given on 7/18   -- Monitoring I/Os

## 2017-07-23 NOTE — ASSESSMENT & PLAN NOTE
-- Thought to be related to Neupogen  -- Continue zyrtec, lidocaine patch   -- Cautiously resuming opioid analgesics, including dilaudid PRN

## 2017-07-23 NOTE — ASSESSMENT & PLAN NOTE
-- Ordered prn electrolyte replacement per protocol   -- Hypophosphatemia replaced; he appears to be running persistently low. Rechecking this afternoon.

## 2017-07-24 PROBLEM — T82.518A VASCULAR CATHETER DYSFUNCTION: Status: ACTIVE | Noted: 2017-01-01

## 2017-07-24 PROBLEM — D69.6 THROMBOCYTOPENIA: Status: RESOLVED | Noted: 2017-01-01 | Resolved: 2017-01-01

## 2017-07-24 PROBLEM — R11.2 CINV (CHEMOTHERAPY-INDUCED NAUSEA AND VOMITING): Status: ACTIVE | Noted: 2017-01-01

## 2017-07-24 PROBLEM — T45.1X5A CINV (CHEMOTHERAPY-INDUCED NAUSEA AND VOMITING): Status: ACTIVE | Noted: 2017-01-01

## 2017-07-24 NOTE — ASSESSMENT & PLAN NOTE
-- +315 days s/p Flu/Bu/ATG MUD allogeneic transplant for high risk AML after his second IDAC (6/20/16 - 6/24/16) after a prolonged hospitalization (2/5/16 - 3/21/16) for induction with 7&3 and reinduction with MEC to remission and IDAC (4/4/16 - 4/9/16)  -- Continue ppx acyclovir, renally dosed. We have discussed antifungal coverage with infectious disease. Discontinued itraconzaole (7/09) was on ambisome, now on voriconazole  -- Chimerics from marrow done 6/21 show CD3 of 90% donor and 10% recipient, but CD34 of 5% donor and 95% recipient   -- Chimers from 7/10 shows 70% recipient and 30% donor - NOT SORTED  -- Plans for DLI in future

## 2017-07-24 NOTE — SUBJECTIVE & OBJECTIVE
Interval History:   No acute events overnight. Afebrile. VSS. Plt pending. Plan for replacement of Mazariegos catheter in OR today.      Medications:  Continuous Infusions:   Scheduled Meds:   abacavir  600 mg Oral Daily    acyclovir  400 mg Oral BID    atorvastatin  20 mg Oral Daily    cetirizine  10 mg Oral Daily    ciprofloxacin HCl  500 mg Oral Q12H    dolutegravir  50 mg Oral Daily    filgrastim  480 mcg Subcutaneous Q24H    finasteride  5 mg Oral Daily    flecainide  50 mg Oral Q12H    guaifenesin  600 mg Oral BID    lamivudine  300 mg Oral Daily    lidocaine HCL 10 mg/ml (1%)  1 mL Other Once    magnesium oxide  800 mg Oral BID    metoprolol tartrate  25 mg Oral BID    morphine  30 mg Oral Q12H    pantoprazole  40 mg Oral Daily    promethazine  12.5 mg Oral QHS    sulfamethoxazole-trimethoprim 800-160mg  1 tablet Oral Every Mon, Wed, Fri    tamsulosin  0.4 mg Oral Every other day    voriconazole  200 mg Oral BID     PRN Meds:sodium chloride, sodium chloride, acetaminophen, alteplase, benzonatate, butalbital-acetaminophen-caffeine -40 mg, diphenhydrAMINE, heparin, porcine (PF), HYDROmorphone, magnesium sulfate IVPB, magnesium sulfate IVPB, ondansetron, potassium chloride **AND** potassium chloride **AND** potassium chloride, senna-docusate 8.6-50 mg, sodium chloride 0.9%, sodium chloride 0.9%, sodium phosphate IVPB, sodium phosphate IVPB, sodium phosphate IVPB     Review of patient's allergies indicates:   Allergen Reactions    Etoposide Rash     Objective:     Vital Signs (Most Recent):  Temp: 98.2 °F (36.8 °C) (07/24/17 0611)  Pulse: 108 (07/24/17 0611)  Resp: 18 (07/23/17 2025)  BP: 131/69 (07/24/17 0611)  SpO2: (!) 93 % (07/24/17 0611) Vital Signs (24h Range):  Temp:  [98.1 °F (36.7 °C)-98.8 °F (37.1 °C)] 98.2 °F (36.8 °C)  Pulse:  [] 108  Resp:  [16-18] 18  SpO2:  [92 %-99 %] 93 %  BP: (103-131)/(55-69) 131/69     Weight: 83.6 kg (184 lb 3.1 oz)  Body mass index is 24.31  kg/m².    Intake/Output - Last 3 Shifts       07/22 0700 - 07/23 0659 07/23 0700 - 07/24 0659 07/24 0700 - 07/25 0659    P.O. 880 1060     I.V. (mL/kg)  50 (0.6)     Blood  503     IV Piggyback 100 250     Total Intake(mL/kg) 980 (11.6) 1863 (22.3)     Urine (mL/kg/hr) 2400 (1.2) 2000 (1)     Stool 0 (0) 1 (0)     Total Output 2400 2001      Net -1420 -138             Stool Occurrence 1 x            Physical Exam   Constitutional: He is oriented to person, place, and time. He appears well-developed. No distress.   HENT:   Head: Normocephalic and atraumatic.   Eyes: EOM are normal.   Neck: Normal range of motion.   Cardiovascular: Normal rate and regular rhythm.    Pulmonary/Chest: Effort normal. No respiratory distress.   Mazariegos catheter in place to left chest   Abdominal: Soft. He exhibits no distension. There is no tenderness.   Neurological: He is alert and oriented to person, place, and time.   Skin: Skin is warm and dry. No rash noted. He is not diaphoretic. No erythema.   Psychiatric: He has a normal mood and affect. His behavior is normal.   Nursing note and vitals reviewed.      Significant Labs:  CBC:   Recent Labs  Lab 07/23/17  1521 07/24/17  0553   WBC 0.33* 0.39*   RBC 2.53* 2.63*   HGB 7.5* 7.7*   HCT 22.1* 23.3*   PLT 10*  --    MCV 87 89   MCH 29.6 29.3   MCHC 33.9 33.0     CMP:   Recent Labs  Lab 07/24/17  0553      CALCIUM 7.4*   ALBUMIN 2.0*   PROT 4.5*      K 4.7   CO2 31*      BUN 15   CREATININE 0.8   ALKPHOS 351*   ALT 15   AST 18   BILITOT 0.8     Coagulation:   Recent Labs  Lab 07/21/17  0512   LABPROT 12.1   INR 1.2   APTT 29.0       Significant Diagnostics:  None

## 2017-07-24 NOTE — ASSESSMENT & PLAN NOTE
- NPO  - To OR with Dr. Goldberg today for Mazariegos catheter replacement  - Risks, benefits, alternatives to the procedure discussed with the patient who wishes to proceed  - All questions and concerns addressed  - Consents in chart  - Other care per primary team

## 2017-07-24 NOTE — ASSESSMENT & PLAN NOTE
-- Relapsed AML - peripheral blood shows 30% blasts on admit  -- 2D echo shows 60% EF  -- Mazariegos placed, appears to be in the azygous vein, likely from repositioning. General surgery to replace 7/24. 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 Blast of 62%  -- Awaiting mutation analysis and cytogenetics; FLT-3 negative     -- Jaw pain and pain in right cranial nerve 7 distribution with persistent, severe headaches concerning for CNS involvement - underwent IT chemotherapy on 6/23. Flow negative for disease. CSF LDH was 29  -- Had previous reaction to MEC (etoposide caused full body rash). Did not qualify for the Tolero trial due to receiving IT chemotherapy    -- Day 28 of FLAG-BETZY   -- Day 14 bone marrow biopsy done 7/10 - hypocellular with no evidence of residual disease  -- Future plans for DLI  -- repeat Lumbar puncture tomorrow with chemotherapy

## 2017-07-24 NOTE — ASSESSMENT & PLAN NOTE
-- Resolved   -- Blood cultures no growth from 6/24/17, 7/4/17, 7/7/17, 7/9/17, 7/15/17  -- Complicated antimicrobial treatment history as follows:   - Cefepime: (started 6/24 - discontinued 6/28) restarted on 7/4/17 - 7/23   - Ampotericin B started 7/9-stopped 7/19;    - Vancomycin (restarted 7/15 - stopped 7/17)  -- Now on PPX antimicrobial agents alone, with bactrim, voriconazole,  acyclovir, and ciprofloxacin  -- Appreciate ID assistance. They have now signed off.  -- Multiple possible sources. CXR showed pneumonia 7/15; CXR 7/17 - Significant patchy air space consolidation identified bilaterally slightly more on the left side.  Slight improvement as compared to the previous study.  Left-sided pleural effusion. On 2-3 L via NC  -- CT chest/abdomen/pelvis reveals bilateral groundglass opacities as well as a left lower-lobe consolidation;  -- ID recommended repeating CT chest 7/19, which showed: Limited assessment. No acute central pulmonary thromboembolism. Peripheral pulmonary emboli are not excluded. Interval development of bilateral moderate pleural effusions. Trace pericardial effusion. Interval progression of bilateral airspace disease with more extensive areas of consolidation bilaterally, in a similar central distribution to the groundglass opacities previously noted, with an upper lobe predominance.   -- Pulm was consulted for a BAL which was done 7/12, cytology pending from BAL; aspergillus <0.500; KOH was negative for yeast/fungus   -- Pulmonary re-consulted, now s/p thoracentesis on 7/22 with cultures NGTD, but labs showing exudative process

## 2017-07-24 NOTE — PROGRESS NOTES
Brief Cardiology Note    Patient with AFlutter started on Flecainide. We would like to perform a stress test on Mr. Sistrunk, but he has not been optimized for such yet. Please call us when patient is ready to perform a stress test.    Thank you for the consult. We will sign off at this time.    Leyla Jones MD  Cardiology PGY5

## 2017-07-24 NOTE — PROGRESS NOTES
[facility]  Hematology  Bone Marrow Transplant  Progress Note    Patient Name: Paul E Sistrunk  Admission Date: 6/20/2017  Hospital Length of Stay: 34 days  Code Status: Full Code    Subjective:     Interval History:   - Day 28 FLAG-BETZY - day 14 marrow showed remission  - MRI abnormal - vascular surgery following - on statin  - VSS Afebrile. No acute issues overnight. On ppx antimicrobials.   - Complains of vomiting with every thing he takes in orally. 12 lbs weight loss in 2 days. Scheduled zofran and PRN compazine.   - Also complains of pain right chest wall - changed pain meds from 0.5 mg - 1 mg every 6 hours.   - NPO this morning for oquendo replacement, plan for LP tomorrow.     Objective:     Vital Signs (Most Recent):  Temp: 98.2 °F (36.8 °C) (07/24/17 0751)  Pulse: 109 (07/24/17 0751)  Resp: 20 (07/24/17 0751)  BP: 118/67 (07/24/17 0751)  SpO2: (!) 94 % (07/24/17 0751) Vital Signs (24h Range):  Temp:  [98.1 °F (36.7 °C)-98.8 °F (37.1 °C)] 98.2 °F (36.8 °C)  Pulse:  [] 109  Resp:  [16-20] 20  SpO2:  [92 %-99 %] 94 %  BP: (103-131)/(55-69) 118/67     Weight: 83.6 kg (184 lb 3.1 oz)  Body mass index is 24.31 kg/m².  Body surface area is 2.07 meters squared.    ECOG SCORE         [unfilled]    Intake/Output - Last 3 Shifts       07/22 0700 - 07/23 0659 07/23 0700 - 07/24 0659 07/24 0700 - 07/25 0659    P.O. 880 1060     I.V. (mL/kg)  50 (0.6)     Blood  503     IV Piggyback 100 250     Total Intake(mL/kg) 980 (11.6) 1863 (22.3)     Urine (mL/kg/hr) 2400 (1.2) 2000 (1) 275 (1.8)    Stool 0 (0) 1 (0)     Total Output 2400 2001 575    Net -1420 -138 -275           Stool Occurrence 1 x            Physical Exam   Constitutional: He is oriented to person, place, and time. He appears well-developed and well-nourished. No distress. Nasal cannula in place.   HENT:   Head: Normocephalic and atraumatic.   Mouth/Throat: Oropharynx is clear and moist. No oropharyngeal exudate.   Eyes: Pupils are  equal, round, and reactive to light.   Cardiovascular: Normal rate and regular rhythm.    Pulmonary/Chest: Effort normal and breath sounds normal. No respiratory distress.   Abdominal: Soft. Bowel sounds are normal. He exhibits no distension. There is no tenderness.   Musculoskeletal: He exhibits no edema.   Neurological: He is alert and oriented to person, place, and time.   Skin: Skin is warm and dry. No rash noted. No erythema.   Psychiatric: He has a normal mood and affect.   Anxious    Nursing note and vitals reviewed.      Significant Labs:   CBC:   Recent Labs  Lab 07/23/17  0547 07/23/17  1521 07/24/17  0553   WBC 0.34* 0.33* 0.39*   HGB 8.3* 7.5* 7.7*   HCT 24.7* 22.1* 23.3*   PLT 5* 10* 18*   , CMP:   Recent Labs  Lab 07/23/17  0547 07/24/17  0553    140   K 4.4 4.7    105   CO2 27 31*   GLU 96 100   BUN 16 15   CREATININE 0.8 0.8   CALCIUM 7.3* 7.4*   PROT 4.3* 4.5*   ALBUMIN 1.8* 2.0*   BILITOT 0.9 0.8   ALKPHOS 273* 351*   AST 11 18   ALT 10 15   ANIONGAP 6* 4*   EGFRNONAA >60.0 >60.0   ,   Recent Lab Results       07/24/17  0553 07/23/17  1521 07/23/17  1302      Albumin 2.0(L)       Alkaline Phosphatase 351(H)       ALT 15       Anion Gap 4(L)       Aniso Slight Slight      AST 18       BANDS 2.0       Baso #  Test Not Performed  Comment:  Corrected result; previously reported as 0.01 on %DDDDDDDD% at %TTT%.[C]      Basophil% 0.0 0.0  Comment:  Corrected result; previously reported as 3.0 on %DDDDDDDD% at %TTT%.[C]      Total Bilirubin 0.8  Comment:  For infants and newborns, interpretation of results should be based  on gestational age, weight and in agreement with clinical  observations.  Premature Infant recommended reference ranges:  Up to 24 hours.............<8.0 mg/dL  Up to 48 hours............<12.0 mg/dL  3-5 days..................<15.0 mg/dL  6-29 days.................<15.0 mg/dL         BUN, Bld 15       Calcium 7.4(L)       Chloride 105       CO2 31(H)       Creatinine 0.8        Differential Method Manual Manual      eGFR if  >60.0       eGFR if non  >60.0  Comment:  Calculation used to obtain the estimated glomerular filtration  rate (eGFR) is the CKD-EPI equation. Since race is unknown   in our information system, the eGFR values for   -American and Non--American patients are given   for each creatinine result.         Eos #  Test Not Performed  Comment:  Corrected result; previously reported as 0.0 on %DDDDDDDD% at %TTT%.[C]      Eosinophil% 0.0 0.0      Glucose 100       Gran% 40.0  Comment:  Corrected result; previously reported as 35.9 on %DDDDDDDD% at %TTT%.[C] 40.0  Comment:  Corrected result; previously reported as 39.4 on %DDDDDDDD% at %TTT%.[C]      Hematocrit 23.3(L) 22.1(L)      Hemoglobin 7.7(L) 7.5(L)      Lymph #  Test Not Performed  Comment:  Corrected result; previously reported as 0.2 on %DDDDDDDD% at %TTT%.[C]      Lymph% 54.0  Comment:  Corrected result; previously reported as 48.7 on %DDDDDDDD% at %TTT%.(H)[C] 57.1  Comment:  Corrected result; previously reported as 48.5 on %DDDDDDDD% at %TTT%.(H)[C]      Magnesium 1.9       MCH 29.3 29.6      MCHC 33.0 33.9      MCV 89 87      Mono #  Test Not Performed  Comment:  Corrected result; previously reported as 0.0 on %DDDDDDDD% at %TTT%.[C]      Mono% 4.0  Comment:  Corrected result; previously reported as 10.3 on %DDDDDDDD% at %TTT%.[C] 2.9  Comment:  Corrected result; previously reported as 6.1 on %DDDDDDDD% at %TTT%.(L)[C]      MPV 9.7 9.5      Ovalocytes Occasional Occasional      Phosphorus 2.0(L)  2.2(L)     Platelet Estimate Decreased(A) Decreased(A)      Platelets 18  Comment:  plt  critical result(s) called and verbal readback obtained from   lanie stewart rn, 07/24/2017 08:27  (LL) 10  Comment:  PLT critical result(s) called and verbal readback obtained from   ANÍBAL DONALDSON RN., 07/23/2017 16:20  (LL)      Poik Slight Slight      Poly Occasional Occasional       Potassium 4.7       Total Protein 4.5(L)       RBC 2.63(L) 2.53(L)      RDW 15.8(H) 15.8(H)      Smudge Cells Present  Comment:  Smudge cells present;Substantial numbers may affect the   accuracy of the differential.         Sodium 140       Uric Acid 2.4(L)       WBC 0.39  Comment:  WBC  critical result(s) called and verbal readback obtained from   ARJUN TRUJILLO RN, 07/24/2017 07:00  (LL) 0.33  Comment:  WBC critical result(s) called and verbal readback obtained   fromANÍBAL DONALDSON RN , 07/23/2017 15:59  (LL)         and All pertinent labs from the last 24 hours have been reviewed.    Diagnostic Results:  I have reviewed all pertinent imaging results/findings within the past 24 hours.    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 General surgery to replace 7/24. 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 Blast of 62%  -- Awaiting mutation analysis and cytogenetics; FLT-3 negative     -- Jaw pain and pain in right cranial nerve 7 distribution with persistent, severe headaches concerning for CNS involvement - underwent IT chemotherapy on 6/23. Flow negative for disease. CSF LDH was 29  -- Had previous reaction to MEC (etoposide caused full body rash). Did not qualify for the Tolero trial due to receiving IT chemotherapy    -- Day 28 of FLAG-BETZY   -- Day 14 bone marrow biopsy done 7/10 - hypocellular with no evidence of residual disease  -- Future plans for DLI  -- repeat Lumbar puncture tomorrow with chemotherapy         S/P allogeneic bone marrow transplant    -- +315 days s/p Flu/Bu/ATG MUD allogeneic transplant for high risk AML after his second IDAC (6/20/16 - 6/24/16) after a prolonged hospitalization (2/5/16 - 3/21/16) for induction  with 7&3 and reinduction with MEC to remission and IDAC (4/4/16 - 4/9/16)  -- Continue ppx acyclovir, renally dosed. We have discussed antifungal coverage with infectious disease. Discontinued itraconzaole (7/09) was on ambisome, now on voriconazole  -- Chimerics from marrow done 6/21 show CD3 of 90% donor and 10% recipient, but CD34 of 5% donor and 95% recipient   -- Chimers from 7/10 shows 70% recipient and 30% donor - NOT SORTED  -- Plans for DLI in future        HIV disease    -- CD4 low at 11.4%, Absolute CD4 469 and HIV RNA not detected from 6/21  -- As per ID: continue triumeq. Will set up ID follow-up upon discharge        Neutropenic fever    -- Resolved   -- Blood cultures no growth from 6/24/17, 7/4/17, 7/7/17, 7/9/17, 7/15/17  -- Complicated antimicrobial treatment history as follows:   - Cefepime: (started 6/24 - discontinued 6/28) restarted on 7/4/17 - 7/23   - Ampotericin B started 7/9-stopped 7/19;    - Vancomycin (restarted 7/15 - stopped 7/17)  -- Now on PPX antimicrobial agents alone, with bactrim, voriconazole,  acyclovir, and ciprofloxacin  -- Appreciate ID assistance. They have now signed off.  -- Multiple possible sources. CXR showed pneumonia 7/15; CXR 7/17 - Significant patchy air space consolidation identified bilaterally slightly more on the left side.  Slight improvement as compared to the previous study.  Left-sided pleural effusion. On 2-3 L via NC  -- CT chest/abdomen/pelvis reveals bilateral groundglass opacities as well as a left lower-lobe consolidation;  -- ID recommended repeating CT chest 7/19, which showed: Limited assessment. No acute central pulmonary thromboembolism. Peripheral pulmonary emboli are not excluded. Interval development of bilateral moderate pleural effusions. Trace pericardial effusion. Interval progression of bilateral airspace disease with more extensive areas of consolidation bilaterally, in a similar central distribution to the groundglass opacities  previously noted, with an upper lobe predominance.   -- Pulm was consulted for a BAL which was done 7/12, cytology pending from BAL; aspergillus <0.500; KOH was negative for yeast/fungus   -- Pulmonary re-consulted, now s/p thoracentesis on 7/22 with cultures NGTD, but labs showing exudative process        CINV (chemotherapy-induced nausea and vomiting)    - scheduled zofran with PRN compazine  - lost 12 lbs in 2 days  - dietary consult placed         Pancytopenia due to antineoplastic chemotherapy    -- Marginally improved  -- White blood cell count is 0.39 k/uL; absolute neutrophil count 160 cells/uL  -- hemoglobin 7.7 g/dL; platelet count is 18 k/uL (for which he received 1U pRBC)  -- Following with daily CBC and transfusing for hemoglobin < 7 g/dL, platelets < 10 k/uL  -- Anticipate the need for more aggressive platelet transfusion prior to surgery        Delirium    -- Improving  -- Typically worse at night; speaks nonsense occasionally during the day; has threatened to leave AMA at night a few times, takes off oxygen at night and has disconnected pump  -- CT of head revealed acute stroke, for which neurology is now following. Not thought to be the main contributor to AMS, rather the cause is likely multifactorial with contribution from medications and his prolonged hospitalization        Prostate hypertrophy    -- Stable  -- Continue home flomax        Supraventricular tachycardia    -- Stable  -- Supraventricular tachycardia on tele and EKG. Started 7/13/17 8pm with HR between 160-170  -- Patient was cardioverted by cardiology and started on metoprolol and flecainide.  -- Treating underlying cause: infection and malignancy  -- Pharmacologic nuclear stress test originally ordered; cardiology following with tentative plans for a cardiac CTA        Stroke of unknown etiology    -- Stable  -- Neurology following, appreciate assistance  -- Several imaging studies have been performed, including CT head 7/21, MRI  brain 7/21, and CTA head and neck 7/21 with interpreting radiologists citing concern for infarction of the right lentiform nucleus and anterior limb of the internal capsule   -- Neurology reviewed the images and were first concerned about possible embolic activity given his arrhythmia earlier this admission, now concerned about the possibility of fungal CNS infection, though after discussion with ID he has essentially completed a therapeutic course with ambisome and is now on voriconazole, recommending against additional prophylactic CNS fungal coverage  -- Continue atorvastatin. Not a candidate for any sort of anticoagulation or antiplatelet agents given transfusion-dependent thrombocytopenia        Pleural effusion    -- Pulm re-consulted 7/20  -- s/p Thoracentesis as described above        Lower extremity edema    -- Stable, likely iatrogenic from fluids  -- IV fluids stopped  -- Net Negative 3 L from 7/18 - down 11 lbs from 7/18 - 40 mg of lasix BID - given on 7/18   -- Monitoring I/Os        Insomnia    -- Improving  -- Ramelteon and zyprexa not tolerated  -- Continue phenergan 12.5 mg nightly for insomnia, which seems to help without causing excessive sedation or confusion        Chronic bilateral low back pain without sciatica    -- Thought to be related to Neupogen  -- Continue zyrtec, lidocaine patch   -- Cautiously resuming opioid analgesics, including dilaudid PRN        Electrolyte abnormality    -- Ordered prn electrolyte replacement per protocol   -- Hypophosphatemia replaced; he appears to be running persistently low. Rechecking this afternoon.        Anxiety    -- Holding xanax PRN due to confusion  -- Consulted hem/onc psych, will need to follow-up with them as an outpatient when discharged            VTE Risk Mitigation         Ordered     Place sequential compression device  Until discontinued      06/20/17 2005     High Risk of VTE  Once      06/20/17 1901          Disposition: Pending line  placement, LP tomorrow and count recovery.    Asia Melgar NP  Bone Marrow Transplant  [facility]

## 2017-07-24 NOTE — OP NOTE
DATE: 07/24/2017    PREOPERATIVE DIAGNOSIS: AML    POSTOPERATIVE DIAGNOSIS: AML    PROCEDURE PERFORMED: Left subclavian tunneled venous (Mazariegos) catheter replacement.     ATTENDING SURGEON: Joshua Goldberg, M.D.     HOUSESTAFF SURGEON: Greer choi M.D. (RES)     ANESTHESIA: Monitored anesthesia care plus local.     ESTIMATED BLOOD LOSS: 10 mL     FINDINGS: A dual-lumen 9.5 [employer] Mazariegos catheter placed with tip at junction of superior vena cava and right atrium.     SPECIMEN: None.     DRAINS: None.     COMPLICATIONS: None.     INDICATIONS: Mr. Sistrunk is a 57 y/o male referred to the General Surgery service for Mazariegos catheter placement for venous access. I recommended a left subclavian catheter and the patient agreed to proceed. The patient did sign informed consent and expressed understanding of the risks and benefits of surgery.     OPERATIVE PROCEDURE: The patient was identified in preoperative holding and transported directly to the Operating Room. He was placed supine on the operating table and padded appropriately. Monitors were applied. Monitored anesthesia care was initiated. The left neck and chest were prepped and draped in the standard sterile surgical fashion. A timeout was performed and all team members present agreed this was the correct procedure on the correct patient. We also confirmed administration of appropriate preoperative antibiotics.     After administering local anesthesia, an incision was made in the prior site of the counter-incision, and the pre-existing Mazariegos catheter was clamped with a hemostat and divided on the port-side. The previous Mazariegos was placed off the sterile field. A wire was inserted through the remaining Mazariegos tip under fluoroscopy. The remainder of the prior catheter was removed. A new Mazariegos was subcutaneously  tunneled in a site lateral to the existing site. Under fluoroscopic guidance, the split sheath and dilator were placed over the  guidewire, and the guidewire and dilator were then removed. The catheter was placed down the split sheath and the sheath was split and peeled away. It was seen that the catheter was not in the appropriate place. The wire was not able to be re-threaded. The decision was made to remove the catheter from the vein and re-cannulate the subclavian vein with a new stick. The left subclavian vein was cannulated with a hollow bore needle. A guidewire was placed and confirmed to be in correct position using fluoroscopy. A small skin incision was made around the guidewire. An appropriately sized catheter was chosen. A counter incision inferolateral on the left chest was made after administering additional lidocaine. A subcutaneous tunnel between the lower chest incision and the cannulation site was made with a hemostat after administration of additional local. The catheter was loaded onto the tunneling device and tunneled from the lower chest incision to the upper one, and then cut to appropriate length. Under fluoroscopic guidance, the split sheath and dilator were placed over the guidewire, and the guidewire and dilator were then removed. The catheter was placed down the split sheath and the sheath was split and peeled away. Fluoroscopy confirmed appropriate position of the catheter, which aspirated and flushed easily. Heparinized saline was instilled in the catheter. The catheter was secured at it's exit site using 2-0 Prolene suture. The skin incision at the cannulation site was closed with subcuticular 4-0 Monocryl. A sterile dressing was applied. The patient was transported to the Recovery Room in stable condition. All sponge, instrument and needle counts were correct at the end of the procedure. I was present and scrubbed for the entire case.

## 2017-07-24 NOTE — SUBJECTIVE & OBJECTIVE
Subjective:     Interval History:   - Day 28 FLAG-BETZY - day 14 marrow showed remission  - MRI abnormal - vascular surgery following - on statin  - VSS Afebrile. No acute issues overnight. On ppx antimicrobials.   - Complains of vomiting with every thing he takes in orally. 12 lbs weight loss in 2 days. Scheduled zofran and PRN compazine.   - Also complains of pain right chest wall - changed pain meds from 0.5 mg - 1 mg every 6 hours.   - NPO this morning for oquendo replacement, plan for LP tomorrow.     Objective:     Vital Signs (Most Recent):  Temp: 98.2 °F (36.8 °C) (07/24/17 0751)  Pulse: 109 (07/24/17 0751)  Resp: 20 (07/24/17 0751)  BP: 118/67 (07/24/17 0751)  SpO2: (!) 94 % (07/24/17 0751) Vital Signs (24h Range):  Temp:  [98.1 °F (36.7 °C)-98.8 °F (37.1 °C)] 98.2 °F (36.8 °C)  Pulse:  [] 109  Resp:  [16-20] 20  SpO2:  [92 %-99 %] 94 %  BP: (103-131)/(55-69) 118/67     Weight: 83.6 kg (184 lb 3.1 oz)  Body mass index is 24.31 kg/m².  Body surface area is 2.07 meters squared.    ECOG SCORE         [unfilled]    Intake/Output - Last 3 Shifts       07/22 0700 - 07/23 0659 07/23 0700 - 07/24 0659 07/24 0700 - 07/25 0659    P.O. 880 1060     I.V. (mL/kg)  50 (0.6)     Blood  503     IV Piggyback 100 250     Total Intake(mL/kg) 980 (11.6) 1863 (22.3)     Urine (mL/kg/hr) 2400 (1.2) 2000 (1) 275 (1.8)    Stool 0 (0) 1 (0)     Total Output 2400 2001 275    Net -1420 -138 -275           Stool Occurrence 1 x            Physical Exam   Constitutional: He is oriented to person, place, and time. He appears well-developed and well-nourished. No distress. Nasal cannula in place.   HENT:   Head: Normocephalic and atraumatic.   Mouth/Throat: Oropharynx is clear and moist. No oropharyngeal exudate.   Eyes: Pupils are equal, round, and reactive to light.   Cardiovascular: Normal rate and regular rhythm.    Pulmonary/Chest: Effort normal and breath sounds normal. No respiratory distress.   Abdominal: Soft. Bowel sounds  are normal. He exhibits no distension. There is no tenderness.   Musculoskeletal: He exhibits no edema.   Neurological: He is alert and oriented to person, place, and time.   Skin: Skin is warm and dry. No rash noted. No erythema.   Psychiatric: He has a normal mood and affect.   Anxious    Nursing note and vitals reviewed.      Significant Labs:   CBC:   Recent Labs  Lab 07/23/17  0547 07/23/17  1521 07/24/17  0553   WBC 0.34* 0.33* 0.39*   HGB 8.3* 7.5* 7.7*   HCT 24.7* 22.1* 23.3*   PLT 5* 10* 18*   , CMP:   Recent Labs  Lab 07/23/17  0547 07/24/17  0553    140   K 4.4 4.7    105   CO2 27 31*   GLU 96 100   BUN 16 15   CREATININE 0.8 0.8   CALCIUM 7.3* 7.4*   PROT 4.3* 4.5*   ALBUMIN 1.8* 2.0*   BILITOT 0.9 0.8   ALKPHOS 273* 351*   AST 11 18   ALT 10 15   ANIONGAP 6* 4*   EGFRNONAA >60.0 >60.0   ,   Recent Lab Results       07/24/17  0553 07/23/17  1521 07/23/17  1302      Albumin 2.0(L)       Alkaline Phosphatase 351(H)       ALT 15       Anion Gap 4(L)       Aniso Slight Slight      AST 18       BANDS 2.0       Baso #  Test Not Performed  Comment:  Corrected result; previously reported as 0.01 on %DDDDDDDD% at %TTT%.[C]      Basophil% 0.0 0.0  Comment:  Corrected result; previously reported as 3.0 on %DDDDDDDD% at %TTT%.[C]      Total Bilirubin 0.8  Comment:  For infants and newborns, interpretation of results should be based  on gestational age, weight and in agreement with clinical  observations.  Premature Infant recommended reference ranges:  Up to 24 hours.............<8.0 mg/dL  Up to 48 hours............<12.0 mg/dL  3-5 days..................<15.0 mg/dL  6-29 days.................<15.0 mg/dL         BUN, Bld 15       Calcium 7.4(L)       Chloride 105       CO2 31(H)       Creatinine 0.8       Differential Method Manual Manual      eGFR if  >60.0       eGFR if non  >60.0  Comment:  Calculation used to obtain the estimated glomerular filtration  rate (eGFR) is  the CKD-EPI equation. Since race is unknown   in our information system, the eGFR values for   -American and Non--American patients are given   for each creatinine result.         Eos #  Test Not Performed  Comment:  Corrected result; previously reported as 0.0 on %DDDDDDDD% at %TTT%.[C]      Eosinophil% 0.0 0.0      Glucose 100       Gran% 40.0  Comment:  Corrected result; previously reported as 35.9 on %DDDDDDDD% at %TTT%.[C] 40.0  Comment:  Corrected result; previously reported as 39.4 on %DDDDDDDD% at %TTT%.[C]      Hematocrit 23.3(L) 22.1(L)      Hemoglobin 7.7(L) 7.5(L)      Lymph #  Test Not Performed  Comment:  Corrected result; previously reported as 0.2 on %DDDDDDDD% at %TTT%.[C]      Lymph% 54.0  Comment:  Corrected result; previously reported as 48.7 on %DDDDDDDD% at %TTT%.(H)[C] 57.1  Comment:  Corrected result; previously reported as 48.5 on %DDDDDDDD% at %TTT%.(H)[C]      Magnesium 1.9       MCH 29.3 29.6      MCHC 33.0 33.9      MCV 89 87      Mono #  Test Not Performed  Comment:  Corrected result; previously reported as 0.0 on %DDDDDDDD% at %TTT%.[C]      Mono% 4.0  Comment:  Corrected result; previously reported as 10.3 on %DDDDDDDD% at %TTT%.[C] 2.9  Comment:  Corrected result; previously reported as 6.1 on %DDDDDDDD% at %TTT%.(L)[C]      MPV 9.7 9.5      Ovalocytes Occasional Occasional      Phosphorus 2.0(L)  2.2(L)     Platelet Estimate Decreased(A) Decreased(A)      Platelets 18  Comment:  plt  critical result(s) called and verbal readback obtained from   lanie stewart rn, 07/24/2017 08:27  (LL) 10  Comment:  PLT critical result(s) called and verbal readback obtained from   ANÍBAL DONALDSON RN., 07/23/2017 16:20  (LL)      Poik Slight Slight      Poly Occasional Occasional      Potassium 4.7       Total Protein 4.5(L)       RBC 2.63(L) 2.53(L)      RDW 15.8(H) 15.8(H)      Smudge Cells Present  Comment:  Smudge cells present;Substantial numbers may affect the   accuracy of the  differential.         Sodium 140       Uric Acid 2.4(L)       WBC 0.39  Comment:  WBC  critical result(s) called and verbal readback obtained from   ARJUN TRUJILLO RN, 07/24/2017 07:00  (LL) 0.33  Comment:  WBC critical result(s) called and verbal readback obtained   fromANÍBAL DONALDSON RN , 07/23/2017 15:59  (LL)         and All pertinent labs from the last 24 hours have been reviewed.    Diagnostic Results:  I have reviewed all pertinent imaging results/findings within the past 24 hours.

## 2017-07-24 NOTE — TRANSFER OF CARE
Anesthesia Transfer of Care Note    Patient: Paul E Sistrunk    Procedure(s) Performed: Procedure(s) (LRB):  INSERTION-CATHETER-HARRISON, LEFT  sub clavian (Left)    Patient location: PACU    Anesthesia Type: MAC    Transport from OR: Transported from OR on room air with adequate spontaneous ventilation. Transported from OR on 6-10 L/min O2 by face mask with adequate spontaneous ventilation    Post pain: adequate analgesia    Post assessment: no apparent anesthetic complications and tolerated procedure well    Post vital signs: stable    Level of consciousness: awake and alert    Nausea/Vomiting: no nausea/vomiting    Complications: none    Transfer of care protocol was followed      Last vitals:   Visit Vitals  /70 (BP Location: Right arm, Patient Position: Lying, BP Method: Automatic)   Pulse 90   Temp 36.8 °C (98.2 °F) (Oral)   Resp 20   Ht 6' (1.829 m)   Wt 83.5 kg (184 lb)   SpO2 98%   BMI 24.95 kg/m²

## 2017-07-24 NOTE — ASSESSMENT & PLAN NOTE
-- Marginally improved  -- White blood cell count is 0.39 k/uL; absolute neutrophil count 160 cells/uL  -- hemoglobin 7.7 g/dL; platelet count is 18 k/uL (for which he received 1U pRBC)  -- Following with daily CBC and transfusing for hemoglobin < 7 g/dL, platelets < 10 k/uL  -- Anticipate the need for more aggressive platelet transfusion prior to surgery

## 2017-07-24 NOTE — ANESTHESIA POSTPROCEDURE EVALUATION
Anesthesia Post Evaluation    Patient: Paul E Sistrunk    Procedure(s) Performed: Procedure(s) (LRB):  INSERTION-CATHETER-HARRISON, LEFT  sub clavian (Left)    Final Anesthesia Type: MAC  Patient location during evaluation: [facility]  Patient participation: Yes- Able to Participate  Level of consciousness: awake and alert and oriented  Post-procedure vital signs: reviewed and stable  Pain management: adequate  Airway patency: patent  PONV status at discharge: No PONV  Anesthetic complications: no      Cardiovascular status: blood pressure returned to baseline and hemodynamically stable  Respiratory status: unassisted, spontaneous ventilation and nasal cannula  Hydration status: euvolemic  Follow-up not needed.        Visit Vitals  /85 (BP Location: Right arm, Patient Position: Lying, BP Method: cNIBP)   Pulse 103   Temp 36.8 °C (98.2 °F) (Temporal)   Resp 20   Ht 6' (1.829 m)   Wt 83.5 kg (184 lb)   SpO2 97%   BMI 24.95 kg/m²       Pain/Arik Score: Pain Assessment Performed: Yes (7/24/2017 12:43 PM)  Presence of Pain: denies (7/24/2017 12:43 PM)  Pain Rating Prior to Med Admin: 7 (7/24/2017  9:44 AM)  Pain Rating Post Med Admin: 4 (7/24/2017 10:17 AM)  Arik Score: 6 (7/24/2017  4:10 PM)

## 2017-07-24 NOTE — PLAN OF CARE
Problem: Patient Care Overview  Goal: Plan of Care Review  Outcome: Ongoing (interventions implemented as appropriate)  Pt remains free from injury overnight. Pt ambulates independently in room. Pt remains on delirium precautions Pt remains on tele monitor and continuous pulse ox. Pt on 2.5L oxygen, oxygen saturation >92% except with ambulation, sats 85-87%. Pt NPO after midnight for procedure in AM. Pt pain controlled with PRN and scheduled pain medication. Pt coughing up clear sputum, mucin ex and tessalon pearls provided. No acute events overnight. Will continue to monitor

## 2017-07-24 NOTE — PROGRESS NOTES
[facility]  General Surgery  Progress Note    Subjective:     Post-Op Info:  Procedure(s) (LRB):  CARDIOVERSION (N/A)   10 Days Post-Op     Interval History:   No acute events overnight. Afebrile. VSS. Plt pending. Plan for replacement of Mazariegos catheter in OR today.      Medications:  Continuous Infusions:   Scheduled Meds:   abacavir  600 mg Oral Daily    acyclovir  400 mg Oral BID    atorvastatin  20 mg Oral Daily    cetirizine  10 mg Oral Daily    ciprofloxacin HCl  500 mg Oral Q12H    dolutegravir  50 mg Oral Daily    filgrastim  480 mcg Subcutaneous Q24H    finasteride  5 mg Oral Daily    flecainide  50 mg Oral Q12H    guaifenesin  600 mg Oral BID    lamivudine  300 mg Oral Daily    lidocaine HCL 10 mg/ml (1%)  1 mL Other Once    magnesium oxide  800 mg Oral BID    metoprolol tartrate  25 mg Oral BID    morphine  30 mg Oral Q12H    pantoprazole  40 mg Oral Daily    promethazine  12.5 mg Oral QHS    sulfamethoxazole-trimethoprim 800-160mg  1 tablet Oral Every Mon, Wed, Fri    tamsulosin  0.4 mg Oral Every other day    voriconazole  200 mg Oral BID     PRN Meds:sodium chloride, sodium chloride, acetaminophen, alteplase, benzonatate, butalbital-acetaminophen-caffeine -40 mg, diphenhydrAMINE, heparin, porcine (PF), HYDROmorphone, magnesium sulfate IVPB, magnesium sulfate IVPB, ondansetron, potassium chloride **AND** potassium chloride **AND** potassium chloride, senna-docusate 8.6-50 mg, sodium chloride 0.9%, sodium chloride 0.9%, sodium phosphate IVPB, sodium phosphate IVPB, sodium phosphate IVPB     Review of patient's allergies indicates:   Allergen Reactions    Etoposide Rash     Objective:     Vital Signs (Most Recent):  Temp: 98.2 °F (36.8 °C) (07/24/17 0611)  Pulse: 108 (07/24/17 0611)  Resp: 18 (07/23/17 2025)  BP: 131/69 (07/24/17 0611)  SpO2: (!) 93 % (07/24/17 0611) Vital Signs (24h Range):  Temp:  [98.1 °F (36.7 °C)-98.8 °F (37.1 °C)] 98.2 °F (36.8  °C)  Pulse:  [] 108  Resp:  [16-18] 18  SpO2:  [92 %-99 %] 93 %  BP: (103-131)/(55-69) 131/69     Weight: 83.6 kg (184 lb 3.1 oz)  Body mass index is 24.31 kg/m².    Intake/Output - Last 3 Shifts       07/22 0700 - 07/23 0659 07/23 0700 - 07/24 0659 07/24 0700 - 07/25 0659    P.O. 880 1060     I.V. (mL/kg)  50 (0.6)     Blood  503     IV Piggyback 100 250     Total Intake(mL/kg) 980 (11.6) 1863 (22.3)     Urine (mL/kg/hr) 2400 (1.2) 2000 (1)     Stool 0 (0) 1 (0)     Total Output 2400 2001      Net -1420 -138             Stool Occurrence 1 x            Physical Exam   Constitutional: He is oriented to person, place, and time. He appears well-developed. No distress.   HENT:   Head: Normocephalic and atraumatic.   Eyes: EOM are normal.   Neck: Normal range of motion.   Cardiovascular: Normal rate and regular rhythm.    Pulmonary/Chest: Effort normal. No respiratory distress.   Mazariegos catheter in place to left chest   Abdominal: Soft. He exhibits no distension. There is no tenderness.   Neurological: He is alert and oriented to person, place, and time.   Skin: Skin is warm and dry. No rash noted. He is not diaphoretic. No erythema.   Psychiatric: He has a normal mood and affect. His behavior is normal.   Nursing note and vitals reviewed.      Significant Labs:  CBC:   Recent Labs  Lab 07/23/17  1521 07/24/17  0553   WBC 0.33* 0.39*   RBC 2.53* 2.63*   HGB 7.5* 7.7*   HCT 22.1* 23.3*   PLT 10*  --    MCV 87 89   MCH 29.6 29.3   MCHC 33.9 33.0     CMP:   Recent Labs  Lab 07/24/17  0553      CALCIUM 7.4*   ALBUMIN 2.0*   PROT 4.5*      K 4.7   CO2 31*      BUN 15   CREATININE 0.8   ALKPHOS 351*   ALT 15   AST 18   BILITOT 0.8     Coagulation:   Recent Labs  Lab 07/21/17  0512   LABPROT 12.1   INR 1.2   APTT 29.0       Significant Diagnostics:  None    Assessment/Plan:     Vascular catheter dysfunction    - NPO  - To OR with Dr. Goldberg today for Mazariegos catheter replacement  - Risks, benefits,  alternatives to the procedure discussed with the patient who wishes to proceed  - All questions and concerns addressed  - Consents in chart  - Other care per primary team      Thrombocytopenia    - Follow up Plt          Albert Corrales MD  Surgery Resident, PGY-III  Pager: [phone]  7/24/2017 7:16 AM

## 2017-07-24 NOTE — ANESTHESIA RELEASE NOTE
Anesthesia Release from PACU Note    Patient: Paul E Sistrunk    Procedure(s) Performed: Procedure(s) (LRB):  INSERTION-CATHETER-HARRISON, LEFT  sub clavian (Left)    Anesthesia type: MAC    Post pain: Adequate analgesia    Post assessment: no apparent anesthetic complications, tolerated procedure well and no evidence of recall    Last Vitals:   Visit Vitals  /66 (BP Location: Right arm, Patient Position: Lying, BP Method: cNIBP)   Pulse 106   Temp 36.8 °C (98.2 °F) (Temporal)   Resp 20   Ht 6' (1.829 m)   Wt 83.5 kg (184 lb)   SpO2 100%   BMI 24.95 kg/m²       Post vital signs: stable    Level of consciousness: awake, alert  and oriented    Nausea/Vomiting: no nausea/no vomiting    Complications: none    Airway Patency: patent    Respiratory: unassisted, spontaneous ventilation, nasal cannula    Cardiovascular: stable and blood pressure at baseline    Hydration: euvolemic

## 2017-07-25 NOTE — ASSESSMENT & PLAN NOTE
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 We have discussed antifungal coverage with infectious disease. Discontinued itraconzaole (7/09) was on ambisome, now on voriconazole  -- Chimerics from marrow done 6/21 show CD3 of 90% donor and 10% recipient, but CD34 of 5% donor and 95% recipient   -- Chimers from 7/10 shows 70% recipient and 30% donor - NOT SORTED  -- Plans for DLI in future

## 2017-07-25 NOTE — PROGRESS NOTES
[facility]  General Surgery  Progress Note    Subjective:     Post-Op Info:  Procedure(s) (LRB):  INSERTION-CATHETER-HARRISON, LEFT  sub clavian (Left)   1 Day Post-Op     Interval History:   Patient seen and examined, no acute events overnight, s/p left harrison revision in OR yesterday, had some minor oozing overnight, now resolved - used sandbag for pressure dressing, no other complaints    Medications:  Continuous Infusions:   sodium chloride 0.9% 100 mL/hr at 07/24/17 1820     Scheduled Meds:   abacavir  600 mg Oral Daily    acyclovir  400 mg Oral BID    atorvastatin  20 mg Oral Daily    cetirizine  10 mg Oral Daily    ciprofloxacin HCl  500 mg Oral Q12H    dolutegravir  50 mg Oral Daily    filgrastim  480 mcg Subcutaneous Q24H    finasteride  5 mg Oral Daily    flecainide  50 mg Oral Q12H    guaifenesin  600 mg Oral BID    lamivudine  300 mg Oral Daily    magnesium oxide  800 mg Oral BID    metoprolol tartrate  25 mg Oral BID    morphine  30 mg Oral Q12H    ondansetron  8 mg Intravenous Q6H    pantoprazole  40 mg Oral Daily    promethazine  12.5 mg Oral QHS    sulfamethoxazole-trimethoprim 800-160mg  1 tablet Oral Every Mon, Wed, Fri    tamsulosin  0.4 mg Oral Every other day    voriconazole  200 mg Oral BID     PRN Meds:acetaminophen, alteplase, benzonatate, butalbital-acetaminophen-caffeine -40 mg, diphenhydrAMINE, heparin, porcine (PF), HYDROmorphone, magnesium sulfate IVPB, magnesium sulfate IVPB, potassium chloride **AND** potassium chloride **AND** potassium chloride, prochlorperazine, senna-docusate 8.6-50 mg, sodium chloride 0.9%, sodium chloride 0.9%, sodium phosphate IVPB, sodium phosphate IVPB, sodium phosphate IVPB     Review of patient's allergies indicates:   Allergen Reactions    Etoposide Rash     Objective:     Vital Signs (Most Recent):  Temp: 98.3 °F (36.8 °C) (07/25/17 0340)  Pulse: 102 (07/25/17 0340)  Resp: 19 (07/25/17 0340)  BP: 122/67  (07/25/17 0340)  SpO2: (!) 94 % (07/25/17 0340) Vital Signs (24h Range):  Temp:  [98.1 °F (36.7 °C)-98.8 °F (37.1 °C)] 98.3 °F (36.8 °C)  Pulse:  [] 102  Resp:  [16-20] 19  SpO2:  [88 %-100 %] 94 %  BP: (100-135)/(59-85) 122/67     Weight: 83.5 kg (184 lb 1.4 oz)  Body mass index is 24.97 kg/m².    Intake/Output - Last 3 Shifts       07/23 0700 - 07/24 0659 07/24 0700 - 07/25 0659    P.O. 1060 410    I.V. (mL/kg) 50 (0.6) 1033.3 (12.4)    Blood 503 318    IV Piggyback 250 250    Total Intake(mL/kg) 1863 (22.3) 2011.3 (24.1)    Urine (mL/kg/hr) 2000 (1) 1600 (0.8)    Other  0.3 (0)    Stool 1 (0) 0 (0)    Total Output 2001 1600.3    Net -138 +411.1          Stool Occurrence  2 x          Physical Exam   Constitutional: He is oriented to person, place, and time. He appears well-developed and well-nourished. No distress.   HENT:   Head: Normocephalic and atraumatic.   Eyes: No scleral icterus.   Neck: Normal range of motion. Neck supple.   Cardiovascular:   tachycardic   Pulmonary/Chest: Effort normal. No respiratory distress.   Left chest wall - oquendo in place - clean, dry and intact   Neurological: He is alert and oriented to person, place, and time.   Skin: Skin is warm and dry.       Significant Labs:  CBC:   Recent Labs  Lab 07/24/17  0553 07/25/17 0348   WBC 0.39* 0.82*   RBC 2.63* 2.52*   HGB 7.7* 7.4*   HCT 23.3* 22.2*   PLT 18*  --    MCV 89 88   MCH 29.3 29.4   MCHC 33.0 33.3     BMP:   Recent Labs  Lab 07/25/17 0348   GLU 98      K 4.0      CO2 29   BUN 13   CREATININE 0.8   CALCIUM 7.3*   MG 1.7     CMP:   Recent Labs  Lab 07/25/17 0348   GLU 98   CALCIUM 7.3*   ALBUMIN 2.0*   PROT 4.5*      K 4.0   CO2 29      BUN 13   CREATININE 0.8   ALKPHOS 276*   ALT 11   AST 11   BILITOT 0.7     LFTs:   Recent Labs  Lab 07/25/17 0348   ALT 11   AST 11   ALKPHOS 276*   BILITOT 0.7   PROT 4.5*   ALBUMIN 2.0*     Coagulation:   Recent Labs  Lab 07/25/17 0348   LABPROT 11.8   INR 1.1    APTT 31.2     Cardiac markers:   Recent Labs  Lab 07/19/17  1613   TROPONINI 0.108*     ABGs:   Recent Labs  Lab 07/19/17  1547   PH 7.458*   PCO2 32.4*   PO2 99   HCO3 22.9*   POCSATURATED 98   BE -1       Significant Diagnostics:  Narrative     PORTABLE AP CHEST:      Comparison: 7/21/17    Findings:     Visualized and is been replaced and terminates over the distal SVC. Slight worsening of the bilateral consolidations. The cardiac silhouette isnormal in size.  There are no acute bony abnormalities.   Impression         Central line in appropriate position without evidence of provocation.    Slight worsening of the multifocal consolidations.         Assessment/Plan:     Vascular catheter dysfunction    -s/p oquendo revision in OR yesterday  -ok to use oquendo as needed  -surgery will sign off at this time, please call for questions, thank you for allowing us to participate in the care of Mr. Sistrunk            Camila Freeman PA-C   [id number]  General Surgery  [facility]

## 2017-07-25 NOTE — PROGRESS NOTES
Notified Dr. Nj that patient's CXR is ready to be reviewed. Dr. Nj stated that he would review CXR and call me back. Will continue to monitor patient.

## 2017-07-25 NOTE — SUBJECTIVE & OBJECTIVE
Interval History:   Patient seen and examined, no acute events overnight, s/p left oquendo revision in OR yesterday, had some minor oozing overnight, now resolved - used sandbag for pressure dressing, no other complaints    Medications:  Continuous Infusions:   sodium chloride 0.9% 100 mL/hr at 07/24/17 1820     Scheduled Meds:   abacavir  600 mg Oral Daily    acyclovir  400 mg Oral BID    atorvastatin  20 mg Oral Daily    cetirizine  10 mg Oral Daily    ciprofloxacin HCl  500 mg Oral Q12H    dolutegravir  50 mg Oral Daily    filgrastim  480 mcg Subcutaneous Q24H    finasteride  5 mg Oral Daily    flecainide  50 mg Oral Q12H    guaifenesin  600 mg Oral BID    lamivudine  300 mg Oral Daily    magnesium oxide  800 mg Oral BID    metoprolol tartrate  25 mg Oral BID    morphine  30 mg Oral Q12H    ondansetron  8 mg Intravenous Q6H    pantoprazole  40 mg Oral Daily    promethazine  12.5 mg Oral QHS    sulfamethoxazole-trimethoprim 800-160mg  1 tablet Oral Every Mon, Wed, Fri    tamsulosin  0.4 mg Oral Every other day    voriconazole  200 mg Oral BID     PRN Meds:acetaminophen, alteplase, benzonatate, butalbital-acetaminophen-caffeine -40 mg, diphenhydrAMINE, heparin, porcine (PF), HYDROmorphone, magnesium sulfate IVPB, magnesium sulfate IVPB, potassium chloride **AND** potassium chloride **AND** potassium chloride, prochlorperazine, senna-docusate 8.6-50 mg, sodium chloride 0.9%, sodium chloride 0.9%, sodium phosphate IVPB, sodium phosphate IVPB, sodium phosphate IVPB     Review of patient's allergies indicates:   Allergen Reactions    Etoposide Rash     Objective:     Vital Signs (Most Recent):  Temp: 98.3 °F (36.8 °C) (07/25/17 0340)  Pulse: 102 (07/25/17 0340)  Resp: 19 (07/25/17 0340)  BP: 122/67 (07/25/17 0340)  SpO2: (!) 94 % (07/25/17 0340) Vital Signs (24h Range):  Temp:  [98.1 °F (36.7 °C)-98.8 °F (37.1 °C)] 98.3 °F (36.8 °C)  Pulse:  [] 102  Resp:  [16-20] 19  SpO2:  [88 %-100  %] 94 %  BP: (100-135)/(59-85) 122/67     Weight: 83.5 kg (184 lb 1.4 oz)  Body mass index is 24.97 kg/m².    Intake/Output - Last 3 Shifts       07/23 0700 - 07/24 0659 07/24 0700 - 07/25 0659    P.O. 1060 410    I.V. (mL/kg) 50 (0.6) 1033.3 (12.4)    Blood 503 318    IV Piggyback 250 250    Total Intake(mL/kg) 1863 (22.3) 2011.3 (24.1)    Urine (mL/kg/hr) 2000 (1) 1600 (0.8)    Other  0.3 (0)    Stool 1 (0) 0 (0)    Total Output 2001 1600.3    Net -138 +411.1          Stool Occurrence  2 x          Physical Exam   Constitutional: He is oriented to person, place, and time. He appears well-developed and well-nourished. No distress.   HENT:   Head: Normocephalic and atraumatic.   Eyes: No scleral icterus.   Neck: Normal range of motion. Neck supple.   Cardiovascular:   tachycardic   Pulmonary/Chest: Effort normal. No respiratory distress.   Left chest wall - oquendo in place - clean, dry and intact   Neurological: He is alert and oriented to person, place, and time.   Skin: Skin is warm and dry.       Significant Labs:  CBC:   Recent Labs  Lab 07/24/17  0553 07/25/17 0348   WBC 0.39* 0.82*   RBC 2.63* 2.52*   HGB 7.7* 7.4*   HCT 23.3* 22.2*   PLT 18*  --    MCV 89 88   MCH 29.3 29.4   MCHC 33.0 33.3     BMP:   Recent Labs  Lab 07/25/17  0348   GLU 98      K 4.0      CO2 29   BUN 13   CREATININE 0.8   CALCIUM 7.3*   MG 1.7     CMP:   Recent Labs  Lab 07/25/17 0348   GLU 98   CALCIUM 7.3*   ALBUMIN 2.0*   PROT 4.5*      K 4.0   CO2 29      BUN 13   CREATININE 0.8   ALKPHOS 276*   ALT 11   AST 11   BILITOT 0.7     LFTs:   Recent Labs  Lab 07/25/17 0348   ALT 11   AST 11   ALKPHOS 276*   BILITOT 0.7   PROT 4.5*   ALBUMIN 2.0*     Coagulation:   Recent Labs  Lab 07/25/17  0348   LABPROT 11.8   INR 1.1   APTT 31.2     Cardiac markers:   Recent Labs  Lab 07/19/17  1613   TROPONINI 0.108*     ABGs:   Recent Labs  Lab 07/19/17  1547   PH 7.458*   PCO2 32.4*   PO2 99   HCO3 22.9*   POCSATURATED 98    BE -1       Significant Diagnostics:  Narrative     PORTABLE AP CHEST:      Comparison: 7/21/17    Findings:     Visualized and is been replaced and terminates over the distal SVC. Slight worsening of the bilateral consolidations. The cardiac silhouette isnormal in size.  There are no acute bony abnormalities.   Impression         Central line in appropriate position without evidence of provocation.    Slight worsening of the multifocal consolidations.

## 2017-07-25 NOTE — SUBJECTIVE & OBJECTIVE
Subjective:     Interval History:   - Day 29 FLAG-BETZY - day 14 marrow showed remission  - MRI abnormal - vascular surgery following - on statin  - VSS Afebrile. No acute issues overnight. On ppx antimicrobials.   - Has not eaten yet today, on scheduled zofran, will attempt to eat today.   - Also complains of pain right chest wall - changed pain meds from 0.5 mg - 1 mg every 6 hours.       Objective:     Vital Signs (Most Recent):  Temp: 98.2 °F (36.8 °C) (07/25/17 0726)  Pulse: 107 (07/25/17 0726)  Resp: 20 (07/25/17 0726)  BP: 135/64 (07/25/17 0726)  SpO2: (!) 94 % (07/25/17 0726) Vital Signs (24h Range):  Temp:  [98.1 °F (36.7 °C)-98.8 °F (37.1 °C)] 98.2 °F (36.8 °C)  Pulse:  [] 107  Resp:  [16-20] 20  SpO2:  [88 %-100 %] 94 %  BP: (100-135)/(59-85) 135/64     Weight: 83.5 kg (184 lb 1.4 oz)  Body mass index is 24.97 kg/m².  Body surface area is 2.06 meters squared.    ECOG SCORE         [unfilled]    Intake/Output - Last 3 Shifts       07/23 0700 - 07/24 0659 07/24 0700 - 07/25 0659 07/25 0700 - 07/26 0659    P.O. 1060 410     I.V. (mL/kg) 50 (0.6) 1083.3 (13)     Blood 503 318     IV Piggyback 250 500     Total Intake(mL/kg) 1863 (22.3) 2311.3 (27.7)     Urine (mL/kg/hr) 2000 (1) 1600 (0.8) 500 (3.6)    Other  0.3 (0)     Stool 1 (0) 0 (0)     Total Output 2001 1600.3 500    Net -138 +711.1 -500           Stool Occurrence  2 x           Physical Exam   Constitutional: He is oriented to person, place, and time. He appears well-developed and well-nourished. No distress. Nasal cannula in place.   HENT:   Head: Normocephalic and atraumatic.   Mouth/Throat: Oropharynx is clear and moist. No oropharyngeal exudate.   Eyes: Pupils are equal, round, and reactive to light.   Cardiovascular: Normal rate and regular rhythm.    Pulmonary/Chest: Effort normal and breath sounds normal. No respiratory distress.   Abdominal: Soft. Bowel sounds are normal. He exhibits no distension. There is no tenderness.    Musculoskeletal: He exhibits no edema.   Neurological: He is alert and oriented to person, place, and time.   Skin: Skin is warm and dry. No rash noted. No erythema.   Left chest wall oquendo in place, no bleeding noted today   Psychiatric: He has a normal mood and affect.   Anxious    Nursing note and vitals reviewed.      Significant Labs:   CBC:     Recent Labs  Lab 07/23/17  1521 07/24/17  0553 07/25/17  0348   WBC 0.33* 0.39* 0.82*   HGB 7.5* 7.7* 7.4*   HCT 22.1* 23.3* 22.2*   PLT 10* 18* 22*   , CMP:     Recent Labs  Lab 07/24/17  0553 07/25/17  0348    140   K 4.7 4.0    107   CO2 31* 29    98   BUN 15 13   CREATININE 0.8 0.8   CALCIUM 7.4* 7.3*   PROT 4.5* 4.5*   ALBUMIN 2.0* 2.0*   BILITOT 0.8 0.7   ALKPHOS 351* 276*   AST 18 11   ALT 15 11   ANIONGAP 4* 4*   EGFRNONAA >60.0 >60.0   ,   Recent Lab Results       07/25/17  0348      Albumin 2.0(L)     Alkaline Phosphatase 276(H)     ALT 11     Anion Gap 4(L)     Aniso Slight     aPTT 31.2  Comment:  aPTT therapeutic range = 39-69 seconds     AST 11     Baso # Test Not Performed  Comment:  Corrected result; previously reported as 0.00 on %DDDDDDDD% at %TTT%.[C]     Basophil% 0.0     Total Bilirubin 0.7  Comment:  For infants and newborns, interpretation of results should be based  on gestational age, weight and in agreement with clinical  observations.  Premature Infant recommended reference ranges:  Up to 24 hours.............<8.0 mg/dL  Up to 48 hours............<12.0 mg/dL  3-5 days..................<15.0 mg/dL  6-29 days.................<15.0 mg/dL       BUN, Bld 13     Calcium 7.3(L)     Chloride 107     CO2 29     Creatinine 0.8     Differential Method Manual     eGFR if African American >60.0     eGFR if non  >60.0  Comment:  Calculation used to obtain the estimated glomerular filtration  rate (eGFR) is the CKD-EPI equation. Since race is unknown   in our information system, the eGFR values for   -American  and Non--American patients are given   for each creatinine result.       Eos # Test Not Performed  Comment:  Corrected result; previously reported as 0.0 on %DDDDDDDD% at %TTT%.[C]     Eosinophil% 0.0     Glucose 98     Gran% 71.4  Comment:  Corrected result; previously reported as 41.5 on %DDDDDDDD% at %TTT%.[C]     Hematocrit 22.2(L)     Hemoglobin 7.4(L)     Hypo Occasional     Coumadin Monitoring INR 1.1  Comment:  Coumadin Therapy:  2.0 - 3.0 for INR for all indicators except mechanical heart valves  and antiphospholipid syndromes which should use 2.5 - 3.5.       Lymph # Test Not Performed  Comment:  Corrected result; previously reported as 0.4 on %DDDDDDDD% at %TTT%.[C]     Lymph% 28.6  Comment:  Corrected result; previously reported as 42.7 on %DDDDDDDD% at %TTT%.[C]     Magnesium 1.7     MCH 29.4     MCHC 33.3     MCV 88     Mono # Test Not Performed  Comment:  Corrected result; previously reported as 0.1 on %DDDDDDDD% at %TTT%.[C]     Mono% 0.0  Comment:  Corrected result; previously reported as 13.4 on %DDDDDDDD% at %TTT%.(L)[C]     MPV 9.5     Ovalocytes Occasional     Phosphorus 1.8(L)     Platelets 22  Comment:  PLT  critical result(s) called and verbal readback obtained from MARILYNN THOMAS,NURSE, 07/25/2017 08:08  (LL)     Poik Slight     Poly Occasional     Potassium 4.0     Total Protein 4.5(L)     Protime 11.8     RBC 2.52(L)     RDW 15.4(H)     Sodium 140     WBC 0.82  Comment:  wbc  Previously reported results for this analyte were similarly   abnormal.  Call not needed.  Documented, 07/25/2017 05:26  (LL)        and All pertinent labs from the last 24 hours have been reviewed.    Diagnostic Results:  I have reviewed all pertinent imaging results/findings within the past 24 hours.

## 2017-07-25 NOTE — PROGRESS NOTES
Dr. Nj called back to notify nurse that CXR looks fine an patient is okay to sent back to the floor. Nursing communication to be entered by Dr. Nj. Will continue to monitor patient.

## 2017-07-25 NOTE — PLAN OF CARE
Patient arrived from OR with LALY Lee and CRNA.  Patient stable.  Report received at this time.  Assumed care of patient at this time.

## 2017-07-25 NOTE — ASSESSMENT & PLAN NOTE
- scheduled zofran with PRN compazine  - lost 12 lbs in 2 days  - dietary consult placed   - will attempt to eat today - was NPO yesterday for line replacement

## 2017-07-25 NOTE — PROGRESS NOTES
[facility]  Adult Nutrition  Progress Note    SUMMARY     Recommendations    Recommendation/Intervention: 1. continue w/ Regular diet+ Boost Plus TID and Beneprotein 3pks daily . 2.Encourage PO intake >/= 75% EEN/EPN w/HVP  each meal. 4. 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 discussed tip to increase po intake w/ altered taste  Nutrition Discharge Planning: regular diet + ONS >/= 75% EEN/EPN. Ensure pt is following BMT Food safety guidelines    Nutrition Prescription Ordered    Current Diet Order: Regular  Nutrition Order Comments: beneprotein TID  Oral Nutrition Supplement: Boost Plus 1x/d     Evaluation of Received Nutrients/Fluid Intake    Intake/Output Summary (Last 24 hours) at 07/25/17 0828  Last data filed at 07/25/17 0729   Gross per 24 hour   Intake          2311.34 ml   Output          1825.25 ml   Net           486.09 ml     LBM:7/22/17   Oral Fluid (mL): 720ml   IV Fluid (mL): 2400   % Intake of Estimated Energy Needs: 0 - 25 %  % Meal Intake: 25%     Nutrition Risk Screen     Nutrition Risk Screen: no indicators present    Nutrition/Diet History    Patient Reported Diet/Restrictions/Preferences: general  Typical Food/Fluid Intake: decrease over last wk  Food Preferences: No cultural or Islam preferences  Supplemental Drinks or Food Habits: Boost Plus  Factors Affecting Nutritional Intake: decreased appetite, dry mouth, nausea/vomiting     Labs/Tests/Procedures/Meds    Pertinent Labs Reviewed:  reviewed  Pertinent Labs Comments: WBC-0.82, Plat-18, Hgb-7.7, Hct-23.3, Glu-98, Phos-1.8, ALk Phos-276, Alb-2.0  Pertinent Medications Reviewed: reviewed  Pertinent Medications Comments: IVF, acyclovir, statin, heparin, Mg, Kcl,    Physical Findings    Overall Physical Appearance: generalized wasting  Tubes:  (central line)  Oral/Mouth Cavity: all teeth present (age appropriate)  Skin: intact    Anthropometrics    Temp: 98.2 °F (36.8 °C)  Height: 6' (182.9 cm)  Weight Method: Standard Scale  Weight: 83.5 kg (184 lb 1.4 oz)  Ideal Body Weight (IBW), Male: 178 lb  % Ideal Body Weight, Male (lb): 103.37 lb  BMI (Calculated): 25  BMI Grade: 18.5-24.9 - normal  Weight Loss: unintentional  Usual Body Weight (UBW), k.6 kg     Estimated/Assessed Needs    Weight Used For Calorie Calculations: 83.5 kg (184 lb 1.4 oz)   Height (cm): 185.4 cm  Energy Calorie Requirements (kcal): 1122-3017  Energy Need Method: Kcal/kg   RMR ([place]-St. 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 If PO intake does not improve by next visit, consider appetite stimulant    Nutrition Diagnosis Status:   Continues                  Monitor and Evaluation    Food and Nutrient Intake: energy intake, food and beverage intake  Food and Nutrient Administration: diet order  Physical Activity and Function: nutrition-related ADLs and IADLs  Anthropometric Measurements: weight, weight change  Biochemical Data, Medical Tests and Procedures:  (All   labs)  Nutrition-Focused Physical Findings: overall appearance, extremities, muscles and bones, skin    Nutrition Risk    Level of Risk:  (f/u 2x/wk)    Nutrition Follow-Up    RD Follow-up?: Yes

## 2017-07-25 NOTE — ASSESSMENT & PLAN NOTE
-s/p oquendo revision in OR yesterday  -ok to use oquendo as needed  -surgery will sign off at this time, please call for questions, thank you for allowing us to participate in the care of Mr. Sistrunk

## 2017-07-25 NOTE — PLAN OF CARE
Problem: Patient Care Overview  Goal: Plan of Care Review  Outcome: Ongoing (interventions implemented as appropriate)  Fall, neutropenic, and pressure ulcer precautions continued. Nausea managed with PRN and scheduled medication. Pain and anxiety managed with PRN medication. Telemetry and continuous pulse oxygen monitoring continued. Patient desats when walking. Patient encouraged to eat. Patient stable, will continue to monitor.

## 2017-07-25 NOTE — PLAN OF CARE
Problem: Patient Care Overview  Goal: Plan of Care Review  Outcome: Ongoing (interventions implemented as appropriate)  Pt remained free of falls and injury. Bed in low locked position side rails up x2 with call light and belongings in reach. Non skid socks in place. IVFs continued. Pt had slight bleeding to newly place oquendo relieved by applying sandbag to site. Prn dilaudid given. Pt coughing up clear sputum scheduled mucinex and tessalon perles given. All VS stable. Telemetry and continuous pulse ox in place. Will continue to monitor.

## 2017-07-25 NOTE — PROGRESS NOTES
[facility]  Hematology  Bone Marrow Transplant  Progress Note    Patient Name: Paul E Sistrunk  Admission Date: 6/20/2017  Hospital Length of Stay: 35 days  Code Status: Full Code    Subjective:     Interval History:   - Day 29 FLAG-BETZY - day 14 marrow showed remission  - MRI abnormal - vascular surgery following - on statin  - VSS Afebrile. No acute issues overnight. On ppx antimicrobials.   - Has not eaten yet today, on scheduled zofran, will attempt to eat today.   - Also complains of pain right chest wall - changed pain meds from 0.5 mg - 1 mg every 6 hours.       Objective:     Vital Signs (Most Recent):  Temp: 98.2 °F (36.8 °C) (07/25/17 0726)  Pulse: 107 (07/25/17 0726)  Resp: 20 (07/25/17 0726)  BP: 135/64 (07/25/17 0726)  SpO2: (!) 94 % (07/25/17 0726) Vital Signs (24h Range):  Temp:  [98.1 °F (36.7 °C)-98.8 °F (37.1 °C)] 98.2 °F (36.8 °C)  Pulse:  [] 107  Resp:  [16-20] 20  SpO2:  [88 %-100 %] 94 %  BP: (100-135)/(59-85) 135/64     Weight: 83.5 kg (184 lb 1.4 oz)  Body mass index is 24.97 kg/m².  Body surface area is 2.06 meters squared.    ECOG SCORE         [unfilled]    Intake/Output - Last 3 Shifts       07/23 0700 - 07/24 0659 07/24 0700 - 07/25 0659 07/25 0700 - 07/26 0659    P.O. 1060 410     I.V. (mL/kg) 50 (0.6) 1083.3 (13)     Blood 503 318     IV Piggyback 250 500     Total Intake(mL/kg) 1863 (22.3) 2311.3 (27.7)     Urine (mL/kg/hr) 2000 (1) 1600 (0.8) 500 (3.6)    Other  0.3 (0)     Stool 1 (0) 0 (0)     Total Output 2001 1600.3 500    Net -138 +711.1 -500           Stool Occurrence  2 x           Physical Exam   Constitutional: He is oriented to person, place, and time. He appears well-developed and well-nourished. No distress. Nasal cannula in place.   HENT:   Head: Normocephalic and atraumatic.   Mouth/Throat: Oropharynx is clear and moist. No oropharyngeal exudate.   Eyes: Pupils are equal, round, and reactive to light.   Cardiovascular: Normal rate and regular  rhythm.    Pulmonary/Chest: Effort normal and breath sounds normal. No respiratory distress.   Abdominal: Soft. Bowel sounds are normal. He exhibits no distension. There is no tenderness.   Musculoskeletal: He exhibits no edema.   Neurological: He is alert and oriented to person, place, and time.   Skin: Skin is warm and dry. No rash noted. No erythema.   Left chest wall oquendo in place, no bleeding noted today   Psychiatric: He has a normal mood and affect.   Anxious    Nursing note and vitals reviewed.      Significant Labs:   CBC:     Recent Labs  Lab 07/23/17  1521 07/24/17  0553 07/25/17  0348   WBC 0.33* 0.39* 0.82*   HGB 7.5* 7.7* 7.4*   HCT 22.1* 23.3* 22.2*   PLT 10* 18* 22*   , CMP:     Recent Labs  Lab 07/24/17  0553 07/25/17  0348    140   K 4.7 4.0    107   CO2 31* 29    98   BUN 15 13   CREATININE 0.8 0.8   CALCIUM 7.4* 7.3*   PROT 4.5* 4.5*   ALBUMIN 2.0* 2.0*   BILITOT 0.8 0.7   ALKPHOS 351* 276*   AST 18 11   ALT 15 11   ANIONGAP 4* 4*   EGFRNONAA >60.0 >60.0   ,   Recent Lab Results       07/25/17  0348      Albumin 2.0(L)     Alkaline Phosphatase 276(H)     ALT 11     Anion Gap 4(L)     Aniso Slight     aPTT 31.2  Comment:  aPTT therapeutic range = 39-69 seconds     AST 11     Baso # Test Not Performed  Comment:  Corrected result; previously reported as 0.00 on %DDDDDDDD% at %TTT%.[C]     Basophil% 0.0     Total Bilirubin 0.7  Comment:  For infants and newborns, interpretation of results should be based  on gestational age, weight and in agreement with clinical  observations.  Premature Infant recommended reference ranges:  Up to 24 hours.............<8.0 mg/dL  Up to 48 hours............<12.0 mg/dL  3-5 days..................<15.0 mg/dL  6-29 days.................<15.0 mg/dL       BUN, Bld 13     Calcium 7.3(L)     Chloride 107     CO2 29     Creatinine 0.8     Differential Method Manual     eGFR if African American >60.0     eGFR if non African American  >60.0  Comment:  Calculation used to obtain the estimated glomerular filtration  rate (eGFR) is the CKD-EPI equation. Since race is unknown   in our information system, the eGFR values for   -American and Non--American patients are given   for each creatinine result.       Eos # Test Not Performed  Comment:  Corrected result; previously reported as 0.0 on %DDDDDDDD% at %TTT%.[C]     Eosinophil% 0.0     Glucose 98     Gran% 71.4  Comment:  Corrected result; previously reported as 41.5 on %DDDDDDDD% at %TTT%.[C]     Hematocrit 22.2(L)     Hemoglobin 7.4(L)     Hypo Occasional     Coumadin Monitoring INR 1.1  Comment:  Coumadin Therapy:  2.0 - 3.0 for INR for all indicators except mechanical heart valves  and antiphospholipid syndromes which should use 2.5 - 3.5.       Lymph # Test Not Performed  Comment:  Corrected result; previously reported as 0.4 on %DDDDDDDD% at %TTT%.[C]     Lymph% 28.6  Comment:  Corrected result; previously reported as 42.7 on %DDDDDDDD% at %TTT%.[C]     Magnesium 1.7     MCH 29.4     MCHC 33.3     MCV 88     Mono # Test Not Performed  Comment:  Corrected result; previously reported as 0.1 on %DDDDDDDD% at %TTT%.[C]     Mono% 0.0  Comment:  Corrected result; previously reported as 13.4 on %DDDDDDDD% at %TTT%.(L)[C]     MPV 9.5     Ovalocytes Occasional     Phosphorus 1.8(L)     Platelets 22  Comment:  PLT  critical result(s) called and verbal readback obtained from MARILYNN THOMAS,NURSE, 07/25/2017 08:08  (LL)     Pierreik Slight     Poly Occasional     Potassium 4.0     Total Protein 4.5(L)     Protime 11.8     RBC 2.52(L)     RDW 15.4(H)     Sodium 140     WBC 0.82  Comment:  wbc  Previously reported results for this analyte were similarly   abnormal.  Call not needed.  Documented, 07/25/2017 05:26  (LL)        and All pertinent labs from the last 24 hours have been reviewed.    Diagnostic Results:  I have reviewed all pertinent imaging results/findings within the past 24  hours.    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 General surgery to replace 7/24. 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 Blast of 62%  -- Awaiting mutation analysis and cytogenetics; FLT-3 negative     -- Jaw pain and pain in right cranial nerve 7 distribution with persistent, severe headaches concerning for CNS involvement - underwent IT chemotherapy on 6/23. Flow negative for disease. CSF LDH was 29  -- Had previous reaction to MEC (etoposide caused full body rash). Did not qualify for the Tolero trial due to receiving IT chemotherapy    -- Day 29 of FLAG-BETZY   -- Day 14 bone marrow biopsy done 7/10 - hypocellular with no evidence of residual disease  -- Future plans for DLI  -- hold lumbar puncture due to decrease in nausea         S/P allogeneic bone marrow transplant    -- +316 days s/p Flu/Bu/ATG MUD allogeneic transplant for high risk AML after his second IDAC (6/20/16 - 6/24/16) after a prolonged hospitalization (2/5/16 - 3/21/16) for induction with 7&3 and reinduction with MEC to remission and IDAC (4/4/16 - 4/9/16)  -- Continue ppx acyclovir, renally dosed. We have discussed antifungal coverage with infectious disease. Discontinued itraconzaole (7/09) was on ambisome, now on voriconazole  -- Chimerics from marrow done 6/21 show CD3 of 90% donor and 10% recipient, but CD34 of 5% donor and 95% recipient   -- Chimers from 7/10 shows 70% recipient and 30% donor - NOT SORTED  -- Plans for DLI in future        HIV disease    -- CD4 low at 11.4%, Absolute CD4 469 and HIV RNA not detected from 6/21  -- As per ID: continue triumeq. Will set up ID follow-up upon discharge        Neutropenic fever    -- Resolved   -- Blood cultures no  growth from 6/24/17, 7/4/17, 7/7/17, 7/9/17, 7/15/17  -- Complicated antimicrobial treatment history as follows:   - Cefepime: (started 6/24 - discontinued 6/28) restarted on 7/4/17 - 7/23   - Ampotericin B started 7/9-stopped 7/19;    - Vancomycin (restarted 7/15 - stopped 7/17)  -- Now on PPX antimicrobial agents alone, with bactrim, voriconazole,  acyclovir, and ciprofloxacin  -- Appreciate ID assistance. They have now signed off.  -- Multiple possible sources. CXR showed pneumonia 7/15; CXR 7/17 - Significant patchy air space consolidation identified bilaterally slightly more on the left side.  Slight improvement as compared to the previous study.  Left-sided pleural effusion. On 2-3 L via NC  -- CT chest/abdomen/pelvis reveals bilateral groundglass opacities as well as a left lower-lobe consolidation;  -- ID recommended repeating CT chest 7/19, which showed: Limited assessment. No acute central pulmonary thromboembolism. Peripheral pulmonary emboli are not excluded. Interval development of bilateral moderate pleural effusions. Trace pericardial effusion. Interval progression of bilateral airspace disease with more extensive areas of consolidation bilaterally, in a similar central distribution to the groundglass opacities previously noted, with an upper lobe predominance.   -- Pulm was consulted for a BAL which was done 7/12, cytology pending from BAL; aspergillus <0.500; KOH was negative for yeast/fungus   -- Pulmonary re-consulted, now s/p thoracentesis on 7/22 with cultures NGTD, but labs showing exudative process        CINV (chemotherapy-induced nausea and vomiting)    - scheduled zofran with PRN compazine  - lost 12 lbs in 2 days  - dietary consult placed   - will attempt to eat today - was NPO yesterday for line replacement        Pancytopenia due to antineoplastic chemotherapy    -- Marginally improved  -- White blood cell count is 0.82 k/uL; absolute neutrophil count 585 cells/uL  -- hemoglobin 7.4  g/dL; platelet count is 22 k/uL   -- Following with daily CBC and transfusing for hemoglobin < 7 g/dL, platelets < 10 k/uL  -- Anticipate the need for more aggressive platelet transfusion prior to surgery        Delirium    -- Improving  -- Typically worse at night; speaks nonsense occasionally during the day; has threatened to leave AMA at night a few times, takes off oxygen at night and has disconnected pump  -- CT of head revealed acute stroke, for which neurology is now following. Not thought to be the main contributor to AMS, rather the cause is likely multifactorial with contribution from medications and his prolonged hospitalization        Prostate hypertrophy    -- Stable  -- Continue home flomax        Supraventricular tachycardia    -- Stable  -- Supraventricular tachycardia on tele and EKG. Started 7/13/17 8pm with HR between 160-170  -- Patient was cardioverted by cardiology and started on metoprolol and flecainide.  -- Treating underlying cause: infection and malignancy  -- Pharmacologic nuclear stress test originally ordered; cardiology following with tentative plans for a cardiac CTA        Stroke of unknown etiology    -- Stable  -- Neurology following, appreciate assistance  -- Several imaging studies have been performed, including CT head 7/21, MRI brain 7/21, and CTA head and neck 7/21 with interpreting radiologists citing concern for infarction of the right lentiform nucleus and anterior limb of the internal capsule   -- Neurology reviewed the images and were first concerned about possible embolic activity given his arrhythmia earlier this admission, now concerned about the possibility of fungal CNS infection, though after discussion with ID he has essentially completed a therapeutic course with ambisome and is now on voriconazole, recommending against additional prophylactic CNS fungal coverage  -- Continue atorvastatin. Not a candidate for any sort of anticoagulation or antiplatelet agents  given transfusion-dependent thrombocytopenia        Pleural effusion    -- Pulm re-consulted 7/20  -- s/p Thoracentesis as described above        Lower extremity edema    -- Stable, likely iatrogenic from fluids  -- IV fluids stopped  -- Net Negative 3 L from 7/18 - down 11 lbs from 7/18 - 40 mg of lasix BID - given on 7/18   -- Monitoring I/Os        Insomnia    -- Improving  -- Ramelteon and zyprexa not tolerated  -- Continue phenergan 12.5 mg nightly for insomnia, which seems to help without causing excessive sedation or confusion        Chronic bilateral low back pain without sciatica    -- Thought to be related to Neupogen  -- Continue zyrtec, lidocaine patch   -- Cautiously resuming opioid analgesics, including dilaudid PRN        Electrolyte abnormality    -- Ordered prn electrolyte replacement per protocol   -- Hypophosphatemia replaced; he appears to be running persistently low. Rechecking this afternoon.        Anxiety    -- Holding xanax PRN due to confusion  -- Consulted hem/onc psych, will need to follow-up with them as an outpatient when discharged            VTE Risk Mitigation         Ordered     Place sequential compression device  Until discontinued      06/20/17 2005     High Risk of VTE  Once      06/20/17 1901          Disposition: Pending count recovery and pulmonary issues.     Asia Melgar, NP  Bone Marrow Transplant  [facility]

## 2017-07-25 NOTE — PROGRESS NOTES
Pt with slight bleeding from oquendo site. Sandbag applied. Pt platelets are 18,000. Dr. Neumann notified.. Will continue to monitor.

## 2017-07-25 NOTE — ASSESSMENT & PLAN NOTE
-- Relapsed AML - peripheral blood shows 30% blasts on admit  -- 2D echo shows 60% EF  -- Mazariegos placed, appears to be in the azygous vein, likely from repositioning. General surgery to replace 7/24. 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 Blast of 62%  -- Awaiting mutation analysis and cytogenetics; FLT-3 negative     -- Jaw pain and pain in right cranial nerve 7 distribution with persistent, severe headaches concerning for CNS involvement - underwent IT chemotherapy on 6/23. Flow negative for disease. CSF LDH was 29  -- Had previous reaction to MEC (etoposide caused full body rash). Did not qualify for the Tolero trial due to receiving IT chemotherapy    -- Day 29 of FLAG-BETZY   -- Day 14 bone marrow biopsy done 7/10 - hypocellular with no evidence of residual disease  -- Future plans for DLI  -- hold lumbar puncture due to decrease in nausea

## 2017-07-25 NOTE — PLAN OF CARE
MDR's with Dr Howe.  Patient is day of 29 FLAG-Lynette.  Counts starting to recover.   today.  Mazariegos was replaced yesterday. Bronch cytology from 7/21 is still pending and patient continues to require O2 2L NC. Plan to wean as tolerated.  Progressing towards d/c.  Will continue to follow for d/c needs.

## 2017-07-25 NOTE — DISCHARGE INSTRUCTIONS
Discharge instructions for having a Bone Marrow Aspiration / Biopsy:    Keep Bandage in place for 24 hours.  - Do not shower or take a tube bath during this time (you may sponge bathe).  - Call the nurse or physician for excessive bleeding or pain at biopsy site.  - You may take Tylenol as needed for pain.    You have received medication to sedate you.  - Do not drive a car or operate heavy machinery for the rest of the day.  - You may resume other activities as tolerated.    You can call [phone] for any problems during the hours of 8:00 AM-5:00PM.        Caring for Your Central Vein Access  Its important to care for your central venous access device properly. If you dont, it may become infected. Then it cant be used. The tube (catheter) will have to be removed and a new one placed in another vein. Your nurse will show you how to care for your access to help it last.    Follow these tips  · Wash your hands often.  · Try not to touch the catheter.  · Don't let anything (such as clothing) rub or pull on the catheter.  · Don't get your catheter wet.  Watching for problems  Call your healthcare provider right away if you have any of these problems:  · You see a break in the tubing.   · The skin around your access bleeds, oozes, or becomes red or sore.  · You have a fever of 100.4°F (38.0°C) or higher.  · The stitches (sutures) become loose or the catheter falls out.  · An arm becomes swollen.  Remember. . .  A central vein access is often used only for a short time. Take good care of it.      Important numbers  Healthcare provider:  Name _______________________________ Phone _______________________________  Nurse:  Name _______________________________ Phone _______________________________   Date Last Reviewed: 7/1/2016  © 3574-3306 [employer]. [address] [place] [address], [place] [address]. All rights reserved. This information is not intended as a substitute for professional medical care. Always  follow your healthcare professional's instructions.          For an emergency after 5:00 PM you can call [phone] and have the  page the Hematologist / Oncologist on call.

## 2017-07-25 NOTE — NURSING TRANSFER
Nursing Transfer Note      7/24/2017     Transfer To: Hospital, Oncology, 8th Floor, Room [address]A from [place] [id number].    Transfer Via: Stretcher.    Transfer With: Continuous Cardiac Monitoring.    Transported By: MALENA Tinoco.    Medicines Sent: NS infusing at 100mL/hr.    Chart Sent with Patient: Yes.    Notified: Susi Francis RN.    Patient Reassessed at: 1900 on 07/24/2017.    Upon arrival to floor: Cardiac monitor on, patient oriented to room, bed in lowest position, and call bell within reach.

## 2017-07-26 NOTE — PROGRESS NOTES
07/25/17 1915   Vital Signs   Temp 98.2 °F (36.8 °C)   Temp src Oral   Pulse (!) 114   Heart Rate Source Monitor   Resp 19   SpO2 (!) 94 %   Flow (L/min) 3   O2 Device (Oxygen Therapy) nasal cannula   /62   BP Location Right arm   BP Method Automatic   Patient Position Lying     Pt in Vtach. Dr Neumann notified. MD ordered EKG, and labs (BNP, Mg and Phos)

## 2017-07-26 NOTE — ASSESSMENT & PLAN NOTE
-- Pulm re-consulted 7/20  -- s/p Thoracentesis - cytology negative   -- repeat CXR 7/26 shows Ill-defined patchy airspace consolidation bilaterally more on the left side identified.  Pleural effusion slightly more on the left.  -- will obtain CT of chest  -- Pulm re-consulted 7/26

## 2017-07-26 NOTE — SUBJECTIVE & OBJECTIVE
Past Medical History:   Diagnosis Date    Cancer 2/2016    ALL    HIV infection     Pancytopenia due to antineoplastic chemotherapy 7/4/2016       Past Surgical History:   Procedure Laterality Date    CYSTOSCOPY         Review of patient's allergies indicates:   Allergen Reactions    Etoposide Rash       Family History     Problem Relation (Age of Onset)    Cancer Father        Social History Main Topics    Smoking status: Never Smoker    Smokeless tobacco: Not on file    Alcohol use No    Drug use: No    Sexual activity: Not on file         Review of Systems   Constitutional: Negative for chills, diaphoresis and fever.   Eyes: Negative for photophobia.   Respiratory: Positive for cough, chest tightness and shortness of breath.    Cardiovascular: Positive for leg swelling. Negative for chest pain.   Gastrointestinal: Negative for abdominal pain, nausea and vomiting.   Genitourinary: Negative for dysuria and hematuria.   Skin: Negative for color change.   Neurological: Negative for tremors and speech difficulty.   Psychiatric/Behavioral: Negative for agitation.     Objective:     Vital Signs (Most Recent):  Temp: 98.1 °F (36.7 °C) (07/26/17 0924)  Pulse: 101 (07/26/17 0924)  Resp: 20 (07/26/17 0924)  BP: (!) 123/58 (07/26/17 0924)  SpO2: 96 % (07/26/17 0924) Vital Signs (24h Range):  Temp:  [98 °F (36.7 °C)-99 °F (37.2 °C)] 98.1 °F (36.7 °C)  Pulse:  [] 101  Resp:  [16-20] 20  SpO2:  [91 %-99 %] 96 %  BP: (101-139)/(54-74) 123/58     Weight: 85.3 kg (188 lb 2.6 oz)  Body mass index is 25.52 kg/m².      Intake/Output Summary (Last 24 hours) at 07/26/17 1105  Last data filed at 07/26/17 0926   Gross per 24 hour   Intake             4865 ml   Output             2500 ml   Net             2365 ml       Physical Exam   Constitutional: He is oriented to person, place, and time. No distress.   HENT:   Head: Normocephalic.   Eyes: Conjunctivae and EOM are normal.   Cardiovascular: Regular rhythm and intact  distal pulses.    No murmur heard.  Pt has mild bilateral lower ext edema   Pulmonary/Chest: Effort normal. He exhibits no tenderness.   Decreased breath sounds on LLL   Abdominal: Soft. Bowel sounds are normal. He exhibits no distension. There is no tenderness.   Neurological: He is alert and oriented to person, place, and time. No cranial nerve deficit.   Skin: He is not diaphoretic.   Diffuse petechiae bilateral lower extremity   Psychiatric: He has a normal mood and affect.       Vents:  Oxygen Concentration (%): 2 (07/22/17 2247)    Lines/Drains/Airways     Central Venous Catheter Line                 Tunneled Central Line Insertion/Assessment - Double Lumen  07/24/17 1522 left subclavian 1 day                Significant Labs:    CBC/Anemia Profile:    Recent Labs  Lab 07/25/17 0348 07/26/17 0455   WBC 0.82* 1.27*   HGB 7.4* 6.8*   HCT 22.2* 21.0*   PLT 22* 15*   MCV 88 89   RDW 15.4* 15.5*        Chemistries:    Recent Labs  Lab 07/25/17 0348 07/25/17 1927 07/26/17  0455    138 139   K 4.0 3.8 4.5    107 108   CO2 29 26 26   BUN 13 12 10   CREATININE 0.8 0.8 0.7   CALCIUM 7.3* 7.1* 7.1*   ALBUMIN 2.0*  --  1.9*   PROT 4.5*  --  4.2*   BILITOT 0.7  --  0.7   ALKPHOS 276*  --  292*   ALT 11  --  10   AST 11  --  11   MG 1.7 1.6 1.8   PHOS 1.8* 1.6* 2.1*       ABGs: No results for input(s): PH, PCO2, HCO3, POCSATURATED, BE in the last 48 hours.  CMP:     Recent Labs  Lab 07/25/17 0348 07/25/17 1927 07/26/17  0455    138 139   K 4.0 3.8 4.5    107 108   CO2 29 26 26   GLU 98 113* 94   BUN 13 12 10   CREATININE 0.8 0.8 0.7   CALCIUM 7.3* 7.1* 7.1*   PROT 4.5*  --  4.2*   ALBUMIN 2.0*  --  1.9*   BILITOT 0.7  --  0.7   ALKPHOS 276*  --  292*   AST 11  --  11   ALT 11  --  10   ANIONGAP 4* 5* 5*   EGFRNONAA >60.0 >60.0 >60.0       Significant Imaging:   CXR: I have reviewed all pertinent results/findings within the past 24 hours and my personal findings are:  Bilateral pleural effusion L  >> R; patchy bilateral airspace consolidation

## 2017-07-26 NOTE — ASSESSMENT & PLAN NOTE
-- Stable  -- Supraventricular tachycardia on tele and EKG. Started 7/13/17 8pm with HR between 160-170  -- Patient was cardioverted by cardiology and started on metoprolol and flecainide.  -- Treating underlying cause: infection and malignancy  -- Pharmacologic nuclear stress test originally ordered; cardiology following with tentative plans for a cardiac CTA  -- PVCs seen on EKG on 7/26 - corrected electrolytes

## 2017-07-26 NOTE — ASSESSMENT & PLAN NOTE
56 year old male with relapsing AML, HIV, anxiety, that presented to the hospital on 6/20/2017 for induction for relapsing AML found to have bilateral pleural effusions and bilateral pathchy airspace disease s/p thoracentesis x 1.    -B/L pleural effusions L> R today on CXR; s/p thoracentesis on right 7/21 removed 1L  -Patient complains of dyspnea today; consulted for thoracentesis on the right  -will send for fluid studies as previous studies have been non-revealing

## 2017-07-26 NOTE — ASSESSMENT & PLAN NOTE
-- Relapsed AML - peripheral blood shows 30% blasts on admit  -- 2D echo shows 60% EF  -- Mazariegos placed, appears to be in the azygous vein, likely from repositioning. General surgery to replace 7/24. 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 Blast of 62%  -- Awaiting mutation analysis and cytogenetics; FLT-3 negative     -- Jaw pain and pain in right cranial nerve 7 distribution with persistent, severe headaches concerning for CNS involvement - underwent IT chemotherapy on 6/23. Flow negative for disease. CSF LDH was 29  -- Had previous reaction to MEC (etoposide caused full body rash). Did not qualify for the Tolero trial due to receiving IT chemotherapy    -- Day 30 of FLAG-BETZY   -- Day 14 bone marrow biopsy done 7/10 - hypocellular with no evidence of residual disease  -- Future plans for DLI  -- hold lumbar puncture due to decrease in nausea

## 2017-07-26 NOTE — PLAN OF CARE
Problem: Patient Care Overview  Goal: Plan of Care Review  Outcome: Ongoing (interventions implemented as appropriate)  Fall, neutropenic, and pressure ulcer precautions continued. Nausea managed scheduled medication. Pain managed with PRN medication. Telemetry and continuous pulse oxygen monitoring continued. One unit of PRBCs and PLTs given with premedication of tylenol and benadryl. Chest x-ray completed. Thoracentesis completed at bedside, 1350ml removed per thoracentesis. Post thoracentesis chest x-ray completed. Patient encouraged to eat. Patient stable, will continue to monitor.

## 2017-07-26 NOTE — ASSESSMENT & PLAN NOTE
- scheduled zofran with PRN compazine  - lost 12 lbs in 2 days  - dietary consult placed   - ate very small amount on 7/25  - nurses to put meals in front of patient and encourage nutrition

## 2017-07-26 NOTE — PROGRESS NOTES
[facility]  Hematology  Bone Marrow Transplant  Progress Note    Patient Name: Paul E Sistrunk  Admission Date: 6/20/2017  Hospital Length of Stay: 36 days  Code Status: Full Code    Subjective:     Interval History:   - Day 30 FLAG-BETZY - day 14 marrow showed remission  - MRI abnormal - vascular surgery following - on statin  - VSS Afebrile. Tachycardia overnight; resolved now, EKG showed PVCs - replaced electrolytes. On ppx antimicrobials.   - CXR today due to shortness of breath this morning and increase in oxygen demands.  - Depression noted - denied hem/onc psych       Objective:     Vital Signs (Most Recent):  Temp: 98.3 °F (36.8 °C) (07/26/17 0732)  Pulse: (!) 112 (07/26/17 0732)  Resp: 16 (07/26/17 0732)  BP: 139/74 (07/26/17 0732)  SpO2: (!) 92 % (07/26/17 0732) Vital Signs (24h Range):  Temp:  [98 °F (36.7 °C)-99 °F (37.2 °C)] 98.3 °F (36.8 °C)  Pulse:  [] 112  Resp:  [16-20] 16  SpO2:  [91 %-99 %] 92 %  BP: (101-139)/(54-74) 139/74     Weight: 85.3 kg (188 lb 2.6 oz)  Body mass index is 25.52 kg/m².  Body surface area is 2.08 meters squared.    ECOG SCORE         [unfilled]    Intake/Output - Last 3 Shifts       07/24 0700 - 07/25 0659 07/25 0700 - 07/26 0659 07/26 0700 - 07/27 0659    P.O. 410 840     I.V. (mL/kg) 1083.3 (13) 2015 (23.6) 766.7 (9)    Blood 318      IV Piggyback 500 400     Total Intake(mL/kg) 2311.3 (27.7) 3255 (38.2) 766.7 (9)    Urine (mL/kg/hr) 1600 (0.8) 2100 (1) 1000 (5.5)    Other 0.3 (0)      Stool 0 (0) 0 (0)     Total Output 1600.3 2100 1000    Net +711.1 +1155 -233.3           Urine Occurrence  1 x     Stool Occurrence 2 x 2 x           Physical Exam   Constitutional: He is oriented to person, place, and time. He appears well-developed and well-nourished. No distress. Nasal cannula in place.   HENT:   Head: Normocephalic and atraumatic.   Mouth/Throat: Oropharynx is clear and moist. No oropharyngeal exudate.   Eyes: Pupils are equal, round, and reactive  to light.   Cardiovascular: Normal rate and regular rhythm.    Pulmonary/Chest: Effort normal and breath sounds normal. No respiratory distress.   Abdominal: Soft. Bowel sounds are normal. He exhibits no distension. There is no tenderness.   Musculoskeletal: He exhibits no edema.   Neurological: He is alert and oriented to person, place, and time.   Skin: Skin is warm and dry. No rash noted. No erythema.   Left chest wall oquendo in place, no bleeding noted today   Psychiatric: He has a normal mood and affect.   Anxious and Depression    Nursing note and vitals reviewed.      Significant Labs:   CBC:     Recent Labs  Lab 07/25/17 0348 07/26/17 0455   WBC 0.82* 1.27*   HGB 7.4* 6.8*   HCT 22.2* 21.0*   PLT 22* 15*   , CMP:     Recent Labs  Lab 07/25/17 0348 07/25/17 1927 07/26/17 0455    138 139   K 4.0 3.8 4.5    107 108   CO2 29 26 26   GLU 98 113* 94   BUN 13 12 10   CREATININE 0.8 0.8 0.7   CALCIUM 7.3* 7.1* 7.1*   PROT 4.5*  --  4.2*   ALBUMIN 2.0*  --  1.9*   BILITOT 0.7  --  0.7   ALKPHOS 276*  --  292*   AST 11  --  11   ALT 11  --  10   ANIONGAP 4* 5* 5*   EGFRNONAA >60.0 >60.0 >60.0   ,   Recent Lab Results       07/26/17 0455 07/25/17 1927      Unit Blood Type Code 5100[P]      Unit Expiration [id number][P]      Unit Blood Type O POS[P]      Albumin 1.9(L)      Alkaline Phosphatase 292(H)      ALT 10      Anion Gap 5(L) 5(L)     Aniso Slight      AST 11      Baso # 0.01      Basophil% 0.8      Total Bilirubin 0.7  Comment:  For infants and newborns, interpretation of results should be based  on gestational age, weight and in agreement with clinical  observations.  Premature Infant recommended reference ranges:  Up to 24 hours.............<8.0 mg/dL  Up to 48 hours............<12.0 mg/dL  3-5 days..................<15.0 mg/dL  6-29 days.................<15.0 mg/dL        BUN, Bld 10 12     Calcium 7.1(L) 7.1(L)     Chloride 108 107     CO2 26 26     CODING SYSTEM [id number][P]       Creatinine 0.7 0.8     Differential Method Automated      DISPENSE STATUS ISSUED[P]      eGFR if  >60.0 >60.0     eGFR if non  >60.0  Comment:  Calculation used to obtain the estimated glomerular filtration  rate (eGFR) is the CKD-EPI equation. Since race is unknown   in our information system, the eGFR values for   -American and Non--American patients are given   for each creatinine result.   >60.0  Comment:  Calculation used to obtain the estimated glomerular filtration  rate (eGFR) is the CKD-EPI equation. Since race is unknown   in our information system, the eGFR values for   -American and Non--American patients are given   for each creatinine result.       Eos # 0.0      Eosinophil% 0.0      Glucose 94 113(H)     Gran # 0.9(L)      Gran% 75.5(H)      Group & Rh O POS      Hematocrit 21.0(L)      Hemoglobin 6.8(L)      Hypo Occasional      INDIRECT ANDREA NEG      Lymph # 0.3(L)      Lymph% 21.3      Magnesium 1.8 1.6     MCH 28.8      MCHC 32.4      MCV 89      Mono # 0.0(L)      Mono% 2.4(L)      MPV 11.4      Ovalocytes Occasional      Phosphorus 2.1(L) 1.6(L)     Platelets 15  Comment:  PLT  critical result(s) called and verbal readback obtained from MARILYNN THOMAS,NURSE, 07/26/2017 07:43  (LL)      Poik Slight      Poly Occasional      Potassium 4.5 3.8     PRODUCT CODE [id number][P]      Total Protein 4.2(L)      RBC 2.36(L)      RDW 15.5(H)      Sodium 139 138     UNIT NUMBER [id number][P]      WBC 1.27  Comment:  wbc  Previously reported results for this analyte were similarly   abnormal.  Call not needed.  Documented, 07/26/2017 06:28  (LL)         and All pertinent labs from the last 24 hours have been reviewed.    Diagnostic Results:  I have reviewed all pertinent imaging results/findings within the past 24 hours.    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 General surgery to replace 7/24. 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 Blast of 62%  -- Awaiting mutation analysis and cytogenetics; FLT-3 negative     -- Jaw pain and pain in right cranial nerve 7 distribution with persistent, severe headaches concerning for CNS involvement - underwent IT chemotherapy on 6/23. Flow negative for disease. CSF LDH was 29  -- Had previous reaction to MEC (etoposide caused full body rash). Did not qualify for the Tolero trial due to receiving IT chemotherapy    -- Day 30 of FLAG-BETZY   -- Day 14 bone marrow biopsy done 7/10 - hypocellular with no evidence of residual disease  -- Future plans for DLI  -- hold lumbar puncture due to decrease in nausea         S/P allogeneic bone marrow transplant    -- +317 days s/p Flu/Bu/ATG MUD allogeneic transplant for high risk AML after his second IDAC (6/20/16 - 6/24/16) after a prolonged hospitalization (2/5/16 - 3/21/16) for induction with 7&3 and reinduction with MEC to remission and IDAC (4/4/16 - 4/9/16)  -- Continue ppx acyclovir, renally dosed. We have discussed antifungal coverage with infectious disease. Discontinued itraconzaole (7/09) was on ambisome, now on voriconazole  -- Chimerics from marrow done 6/21 show CD3 of 90% donor and 10% recipient, but CD34 of 5% donor and 95% recipient   -- Chimers from 7/10 shows 70% recipient and 30% donor - NOT SORTED  -- Plans for DLI in future        HIV disease    -- CD4 low at 11.4%, Absolute CD4 469 and HIV RNA not detected from 6/21  -- As per ID: continue triumeq. Will set up ID follow-up upon discharge        Pleural effusion    -- Pulm re-consulted 7/20  -- s/p Thoracentesis - cytology negative   -- repeat CXR 7/26 shows Ill-defined patchy airspace consolidation bilaterally more on the left  side identified.  Pleural effusion slightly more on the left.  -- will obtain CT of chest  -- Pulm re-consulted 7/26        Neutropenic fever    -- Resolved   -- Blood cultures no growth from 6/24/17, 7/4/17, 7/7/17, 7/9/17, 7/15/17  -- Complicated antimicrobial treatment history as follows:   - Cefepime: (started 6/24 - discontinued 6/28) restarted on 7/4/17 - 7/23   - Ampotericin B started 7/9-stopped 7/19;    - Vancomycin (restarted 7/15 - stopped 7/17)  -- Now on PPX antimicrobial agents alone, with bactrim, voriconazole,  acyclovir, and ciprofloxacin  -- Appreciate ID assistance. They have now signed off.  -- Multiple possible sources. CXR showed pneumonia 7/15; CXR 7/17 - Significant patchy air space consolidation identified bilaterally slightly more on the left side.  Slight improvement as compared to the previous study.  Left-sided pleural effusion. On 2-3 L via NC  -- CT chest/abdomen/pelvis reveals bilateral groundglass opacities as well as a left lower-lobe consolidation;  -- ID recommended repeating CT chest 7/19, which showed: Limited assessment. No acute central pulmonary thromboembolism. Peripheral pulmonary emboli are not excluded. Interval development of bilateral moderate pleural effusions. Trace pericardial effusion. Interval progression of bilateral airspace disease with more extensive areas of consolidation bilaterally, in a similar central distribution to the groundglass opacities previously noted, with an upper lobe predominance.   -- Pulm was consulted for a BAL which was done 7/12, cytology pending from BAL; aspergillus <0.500; KOH was negative for yeast/fungus   -- Pulmonary re-consulted, now s/p thoracentesis on 7/22 with cultures NGTD, negative for cytology         CINV (chemotherapy-induced nausea and vomiting)    - scheduled zofran with PRN compazine  - lost 12 lbs in 2 days  - dietary consult placed   - ate very small amount on 7/25  - nurses to put meals in front of patient and  encourage nutrition         Pancytopenia due to antineoplastic chemotherapy    -- Marginally improved  -- White blood cell count is 1.72 k/uL; absolute neutrophil count 900 cells/uL  -- hemoglobin 6.8 g/dL; platelet count is 15 k/uL   -- Following with daily CBC and transfusing for hemoglobin < 7 g/dL, platelets < 10 k/uL  -- Anticipate the need for more aggressive platelet transfusion prior to surgery        Delirium    -- Improving  -- Typically worse at night; speaks nonsense occasionally during the day; has threatened to leave AMA at night a few times, takes off oxygen at night and has disconnected pump  -- CT of head revealed acute stroke, for which neurology is now following. Not thought to be the main contributor to AMS, rather the cause is likely multifactorial with contribution from medications and his prolonged hospitalization        Prostate hypertrophy    -- Stable  -- Continue home flomax        Supraventricular tachycardia    -- Stable  -- Supraventricular tachycardia on tele and EKG. Started 7/13/17 8pm with HR between 160-170  -- Patient was cardioverted by cardiology and started on metoprolol and flecainide.  -- Treating underlying cause: infection and malignancy  -- Pharmacologic nuclear stress test originally ordered; cardiology following with tentative plans for a cardiac CTA  -- PVCs seen on EKG on 7/26 - corrected electrolytes         Stroke of unknown etiology    -- Stable  -- Neurology following, appreciate assistance  -- Several imaging studies have been performed, including CT head 7/21, MRI brain 7/21, and CTA head and neck 7/21 with interpreting radiologists citing concern for infarction of the right lentiform nucleus and anterior limb of the internal capsule   -- Neurology reviewed the images and were first concerned about possible embolic activity given his arrhythmia earlier this admission, now concerned about the possibility of fungal CNS infection, though after discussion with ID he  has essentially completed a therapeutic course with ambisome and is now on voriconazole, recommending against additional prophylactic CNS fungal coverage  -- Continue atorvastatin. Not a candidate for any sort of anticoagulation or antiplatelet agents given transfusion-dependent thrombocytopenia        Lower extremity edema    -- Stable, likely iatrogenic from fluids  -- IV fluids stopped  -- Net Negative 3 L from 7/18 - down 11 lbs from 7/18 - 40 mg of lasix BID - given on 7/18   -- Monitoring I/Os        Insomnia    -- Improving  -- Ramelteon and zyprexa not tolerated  -- Continue phenergan 12.5 mg nightly for insomnia, which seems to help without causing excessive sedation or confusion        Chronic bilateral low back pain without sciatica    -- Thought to be related to Neupogen  -- Continue zyrtec, lidocaine patch   -- Cautiously resuming opioid analgesics, including dilaudid PRN        Electrolyte abnormality    -- Ordered prn electrolyte replacement per protocol   -- Hypophosphatemia replaced; he appears to be running persistently low.         Anxiety    -- Holding xanax PRN due to confusion  -- Consulted hem/onc psych, will need to follow-up with them as an outpatient when discharged  -- patient denies wanting to follow up in patient            VTE Risk Mitigation         Ordered     Place sequential compression device  Until discontinued      06/20/17 2005     High Risk of VTE  Once      06/20/17 1901          Disposition: Pending resolution of oxygen requirements.     Asia Melgar NP  Bone Marrow Transplant  [facility]

## 2017-07-26 NOTE — PLAN OF CARE
Problem: Patient Care Overview  Goal: Plan of Care Review  Outcome: Ongoing (interventions implemented as appropriate)  Pt remained free of falls and injury. Bed in low locked position side rails up x2 with call light and belongings in reach. Non skid socks in place. IVFs continued. Mg, K and Na PHos replaced. Prn dilaudid given. Telemetry and continuous pulse ox in place. Will continue to monitor

## 2017-07-26 NOTE — ASSESSMENT & PLAN NOTE
-- Marginally improved  -- White blood cell count is 1.72 k/uL; absolute neutrophil count 900 cells/uL  -- hemoglobin 6.8 g/dL; platelet count is 15 k/uL   -- Following with daily CBC and transfusing for hemoglobin < 7 g/dL, platelets < 10 k/uL  -- Anticipate the need for more aggressive platelet transfusion prior to surgery

## 2017-07-26 NOTE — PROGRESS NOTES
Paul E Sistrunk is a 56 y.o. male with PMHx of relapsing AML, HIV, histoplasmosis, BPH, and neuropathy who was admitted for chemotherapy. CT head ordered by primary team due to fluctuating AMS and confusion, which revealed a R anterior IC hypodensity suggestive of possible subacute stroke. Stroke team was consulted. On exam, patient without neurologic deficits and has NIH score of 0. MRI revealed area  diffusion restriction, but this did not correlate with the ADC. Therefore, this area is not ischemic and not likely due to acute stroke. This area may suggest fungal CNS process. Recommend exploring other etiologies. Will sign off. Please contact stroke team with any questions or new stroke symptoms.              Zandra Trujillo PA-C  Vascular Neurology  [phone]

## 2017-07-26 NOTE — ASSESSMENT & PLAN NOTE
-- Resolved   -- Blood cultures no growth from 6/24/17, 7/4/17, 7/7/17, 7/9/17, 7/15/17  -- Complicated antimicrobial treatment history as follows:   - Cefepime: (started 6/24 - discontinued 6/28) restarted on 7/4/17 - 7/23   - Ampotericin B started 7/9-stopped 7/19;    - Vancomycin (restarted 7/15 - stopped 7/17)  -- Now on PPX antimicrobial agents alone, with bactrim, voriconazole,  acyclovir, and ciprofloxacin  -- Appreciate ID assistance. They have now signed off.  -- Multiple possible sources. CXR showed pneumonia 7/15; CXR 7/17 - Significant patchy air space consolidation identified bilaterally slightly more on the left side.  Slight improvement as compared to the previous study.  Left-sided pleural effusion. On 2-3 L via NC  -- CT chest/abdomen/pelvis reveals bilateral groundglass opacities as well as a left lower-lobe consolidation;  -- ID recommended repeating CT chest 7/19, which showed: Limited assessment. No acute central pulmonary thromboembolism. Peripheral pulmonary emboli are not excluded. Interval development of bilateral moderate pleural effusions. Trace pericardial effusion. Interval progression of bilateral airspace disease with more extensive areas of consolidation bilaterally, in a similar central distribution to the groundglass opacities previously noted, with an upper lobe predominance.   -- Pulm was consulted for a BAL which was done 7/12, cytology pending from BAL; aspergillus <0.500; KOH was negative for yeast/fungus   -- Pulmonary re-consulted, now s/p thoracentesis on 7/22 with cultures NGTD, negative for cytology

## 2017-07-26 NOTE — PROGRESS NOTES
Notified by RN that patient is in VT on tele - ordered stat EKG, BMP, mag and phos. Patient is not complaining of chest pain at this time.     EKG revealing PVCs with electrolyte derangement (K 3.8/mag 1.6, phos 1.6). Will repeat and monitor closely.

## 2017-07-26 NOTE — ASSESSMENT & PLAN NOTE
-- Holding xanax PRN due to confusion  -- Consulted hem/onc psych, will need to follow-up with them as an outpatient when discharged  -- patient denies wanting to follow up in patient

## 2017-07-26 NOTE — PROGRESS NOTES
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 CXR showing large left pleural effusion and still bilateral consolidation. Consulted pulmonology for thoracentesis.     Subjective:     Past Medical History:   Diagnosis Date    Cancer 2/2016    ALL    HIV infection     Pancytopenia due to antineoplastic chemotherapy 7/4/2016       Past Surgical History:   Procedure Laterality Date    CYSTOSCOPY         Review of patient's allergies indicates:   Allergen Reactions    Etoposide Rash       Family History     Problem Relation (Age of Onset)    Cancer Father        Social History Main Topics    Smoking status: Never Smoker    Smokeless tobacco: Not on file    Alcohol use No    Drug use: No    Sexual activity: Not on file         Review of Systems   Constitutional: Negative for chills, diaphoresis and fever.   Eyes: Negative for photophobia.   Respiratory: Positive for cough, chest tightness and shortness of breath.    Cardiovascular: Positive for leg swelling. Negative for chest pain.   Gastrointestinal: Negative for abdominal pain, nausea and vomiting.   Genitourinary: Negative for dysuria and hematuria.   Skin: Negative for color change.   Neurological: Negative for tremors and speech difficulty.   Psychiatric/Behavioral: Negative for agitation.     Objective:     Vital Signs (Most Recent):  Temp: 98.1 °F (36.7 °C) (07/26/17 0924)  Pulse: 101 (07/26/17 0924)  Resp: 20 (07/26/17 0924)  BP: (!) 123/58 (07/26/17 0924)  SpO2: 96 % (07/26/17 0924) Vital Signs (24h Range):  Temp:  [98 °F (36.7 °C)-99 °F (37.2 °C)] 98.1 °F (36.7 °C)  Pulse:  [] 101  Resp:  [16-20] 20  SpO2:   [91 %-99 %] 96 %  BP: (101-139)/(54-74) 123/58     Weight: 85.3 kg (188 lb 2.6 oz)  Body mass index is 25.52 kg/m².      Intake/Output Summary (Last 24 hours) at 07/26/17 1105  Last data filed at 07/26/17 0926   Gross per 24 hour   Intake             4865 ml   Output             2500 ml   Net             2365 ml       Physical Exam   Constitutional: He is oriented to person, place, and time. No distress.   HENT:   Head: Normocephalic.   Eyes: Conjunctivae and EOM are normal.   Cardiovascular: Regular rhythm and intact distal pulses.    No murmur heard.  Pt has mild bilateral lower ext edema   Pulmonary/Chest: Effort normal. He exhibits no tenderness.   Decreased breath sounds on LLL   Abdominal: Soft. Bowel sounds are normal. He exhibits no distension. There is no tenderness.   Neurological: He is alert and oriented to person, place, and time. No cranial nerve deficit.   Skin: He is not diaphoretic.   Diffuse petechiae bilateral lower extremity   Psychiatric: He has a normal mood and affect.       Vents:  Oxygen Concentration (%): 2 (07/22/17 2247)    Lines/Drains/Airways     Central Venous Catheter Line                 Tunneled Central Line Insertion/Assessment - Double Lumen  07/24/17 1522 left subclavian 1 day                Significant Labs:    CBC/Anemia Profile:    Recent Labs  Lab 07/25/17  0348 07/26/17  0455   WBC 0.82* 1.27*   HGB 7.4* 6.8*   HCT 22.2* 21.0*   PLT 22* 15*   MCV 88 89   RDW 15.4* 15.5*        Chemistries:    Recent Labs  Lab 07/25/17  0348 07/25/17  1927 07/26/17  0455    138 139   K 4.0 3.8 4.5    107 108   CO2 29 26 26   BUN 13 12 10   CREATININE 0.8 0.8 0.7   CALCIUM 7.3* 7.1* 7.1*   ALBUMIN 2.0*  --  1.9*   PROT 4.5*  --  4.2*   BILITOT 0.7  --  0.7   ALKPHOS 276*  --  292*   ALT 11  --  10   AST 11  --  11   MG 1.7 1.6 1.8   PHOS 1.8* 1.6* 2.1*       ABGs: No results for input(s): PH, PCO2, HCO3, POCSATURATED, BE in the last 48 hours.  CMP:     Recent Labs  Lab  07/25/17  0348 07/25/17  1927 07/26/17  0455    138 139   K 4.0 3.8 4.5    107 108   CO2 29 26 26   GLU 98 113* 94   BUN 13 12 10   CREATININE 0.8 0.8 0.7   CALCIUM 7.3* 7.1* 7.1*   PROT 4.5*  --  4.2*   ALBUMIN 2.0*  --  1.9*   BILITOT 0.7  --  0.7   ALKPHOS 276*  --  292*   AST 11  --  11   ALT 11  --  10   ANIONGAP 4* 5* 5*   EGFRNONAA >60.0 >60.0 >60.0       Significant Imaging:   CXR: I have reviewed all pertinent results/findings within the past 24 hours and my personal findings are:  Left pleural effusion; patchy bilateral airspace consolidation    Assessment/Plan:     Pleural effusion    56 year old male with relapsing AML, HIV, anxiety, that presented to the hospital on 6/20/2017 for induction for relapsing AML found to have bilateral pleural effusions and bilateral pathchy airspace disease s/p thoracentesis x 1.    -Left pleural effusion today on CXR; s/p thoracentesis on right 7/21 removed 1L  -Patient complains of dyspnea today; consulted for thoracentesis on the left; will proceed with thoracentesis  -will send for fluid studies as previous studies have been non-revealing                 Lucy Esteban MD  Pulmonology  [facility]

## 2017-07-26 NOTE — PROGRESS NOTES
Dr Neumann notified of electrolytes (Mg 1.6, Phos 1.6 and K 3.8) MD stated ok to replace electrolytes per prn orders.

## 2017-07-26 NOTE — SUBJECTIVE & OBJECTIVE
Subjective:     Interval History:   - Day 30 FLAG-BETZY - day 14 marrow showed remission  - MRI abnormal - vascular surgery following - on statin  - VSS Afebrile. Tachycardia overnight; resolved now, EKG showed PVCs - replaced electrolytes. On ppx antimicrobials.   - CXR today due to shortness of breath this morning and increase in oxygen demands.  - Depression noted - denied hem/onc psych       Objective:     Vital Signs (Most Recent):  Temp: 98.3 °F (36.8 °C) (07/26/17 0732)  Pulse: (!) 112 (07/26/17 0732)  Resp: 16 (07/26/17 0732)  BP: 139/74 (07/26/17 0732)  SpO2: (!) 92 % (07/26/17 0732) Vital Signs (24h Range):  Temp:  [98 °F (36.7 °C)-99 °F (37.2 °C)] 98.3 °F (36.8 °C)  Pulse:  [] 112  Resp:  [16-20] 16  SpO2:  [91 %-99 %] 92 %  BP: (101-139)/(54-74) 139/74     Weight: 85.3 kg (188 lb 2.6 oz)  Body mass index is 25.52 kg/m².  Body surface area is 2.08 meters squared.    ECOG SCORE         [unfilled]    Intake/Output - Last 3 Shifts       07/24 0700 - 07/25 0659 07/25 0700 - 07/26 0659 07/26 0700 - 07/27 0659    P.O. 410 840     I.V. (mL/kg) 1083.3 (13) 2015 (23.6) 766.7 (9)    Blood 318      IV Piggyback 500 400     Total Intake(mL/kg) 2311.3 (27.7) 3255 (38.2) 766.7 (9)    Urine (mL/kg/hr) 1600 (0.8) 2100 (1) 1000 (5.5)    Other 0.3 (0)      Stool 0 (0) 0 (0)     Total Output 1600.3 2100 1000    Net +711.1 +1155 -233.3           Urine Occurrence  1 x     Stool Occurrence 2 x 2 x           Physical Exam   Constitutional: He is oriented to person, place, and time. He appears well-developed and well-nourished. No distress. Nasal cannula in place.   HENT:   Head: Normocephalic and atraumatic.   Mouth/Throat: Oropharynx is clear and moist. No oropharyngeal exudate.   Eyes: Pupils are equal, round, and reactive to light.   Cardiovascular: Normal rate and regular rhythm.    Pulmonary/Chest: Effort normal and breath sounds normal. No respiratory distress.   Abdominal: Soft. Bowel sounds are normal. He exhibits  no distension. There is no tenderness.   Musculoskeletal: He exhibits no edema.   Neurological: He is alert and oriented to person, place, and time.   Skin: Skin is warm and dry. No rash noted. No erythema.   Left chest wall oquendo in place, no bleeding noted today   Psychiatric: He has a normal mood and affect.   Anxious and Depression    Nursing note and vitals reviewed.      Significant Labs:   CBC:     Recent Labs  Lab 07/25/17 0348 07/26/17 0455   WBC 0.82* 1.27*   HGB 7.4* 6.8*   HCT 22.2* 21.0*   PLT 22* 15*   , CMP:     Recent Labs  Lab 07/25/17 0348 07/25/17 1927 07/26/17 0455    138 139   K 4.0 3.8 4.5    107 108   CO2 29 26 26   GLU 98 113* 94   BUN 13 12 10   CREATININE 0.8 0.8 0.7   CALCIUM 7.3* 7.1* 7.1*   PROT 4.5*  --  4.2*   ALBUMIN 2.0*  --  1.9*   BILITOT 0.7  --  0.7   ALKPHOS 276*  --  292*   AST 11  --  11   ALT 11  --  10   ANIONGAP 4* 5* 5*   EGFRNONAA >60.0 >60.0 >60.0   ,   Recent Lab Results       07/26/17 0455 07/25/17 1927      Unit Blood Type Code 5100[P]      Unit Expiration [id number][P]      Unit Blood Type O POS[P]      Albumin 1.9(L)      Alkaline Phosphatase 292(H)      ALT 10      Anion Gap 5(L) 5(L)     Aniso Slight      AST 11      Baso # 0.01      Basophil% 0.8      Total Bilirubin 0.7  Comment:  For infants and newborns, interpretation of results should be based  on gestational age, weight and in agreement with clinical  observations.  Premature Infant recommended reference ranges:  Up to 24 hours.............<8.0 mg/dL  Up to 48 hours............<12.0 mg/dL  3-5 days..................<15.0 mg/dL  6-29 days.................<15.0 mg/dL        BUN, Bld 10 12     Calcium 7.1(L) 7.1(L)     Chloride 108 107     CO2 26 26     CODING SYSTEM MZOQ047[P]      Creatinine 0.7 0.8     Differential Method Automated      DISPENSE STATUS ISSUED[P]      eGFR if  >60.0 >60.0     eGFR if non  >60.0  Comment:  Calculation used to obtain the  estimated glomerular filtration  rate (eGFR) is the CKD-EPI equation. Since race is unknown   in our information system, the eGFR values for   -American and Non--American patients are given   for each creatinine result.   >60.0  Comment:  Calculation used to obtain the estimated glomerular filtration  rate (eGFR) is the CKD-EPI equation. Since race is unknown   in our information system, the eGFR values for   -American and Non--American patients are given   for each creatinine result.       Eos # 0.0      Eosinophil% 0.0      Glucose 94 113(H)     Gran # 0.9(L)      Gran% 75.5(H)      Group & Rh O POS      Hematocrit 21.0(L)      Hemoglobin 6.8(L)      Hypo Occasional      INDIRECT ANDREA NEG      Lymph # 0.3(L)      Lymph% 21.3      Magnesium 1.8 1.6     MCH 28.8      MCHC 32.4      MCV 89      Mono # 0.0(L)      Mono% 2.4(L)      MPV 11.4      Ovalocytes Occasional      Phosphorus 2.1(L) 1.6(L)     Platelets 15  Comment:  PLT  critical result(s) called and verbal readback obtained from MARILYNN THOMAS,NURSE, 07/26/2017 07:43  (LL)      Poik Slight      Poly Occasional      Potassium 4.5 3.8     PRODUCT CODE M5799G12[P]      Total Protein 4.2(L)      RBC 2.36(L)      RDW 15.5(H)      Sodium 139 138     UNIT NUMBER [id number][P]      WBC 1.27  Comment:  wbc  Previously reported results for this analyte were similarly   abnormal.  Call not needed.  Documented, 07/26/2017 06:28  (LL)         and All pertinent labs from the last 24 hours have been reviewed.    Diagnostic Results:  I have reviewed all pertinent imaging results/findings within the past 24 hours.

## 2017-07-26 NOTE — ASSESSMENT & PLAN NOTE
-- Ordered prn electrolyte replacement per protocol   -- Hypophosphatemia replaced; he appears to be running persistently low.

## 2017-07-26 NOTE — PROCEDURES
Thoracentesis Procedure Note    07/26/2017    Pre-operative Diagnosis:    Pleural Effusion on L    Post-operative Diagnosis:  same    Indications:   Pleural Effusion on L  Dyspnea    Operators: Dr. Lang, Dr. Davalos    Consent:   Informed consent was obtained. Risks of the procedure were discussed including infection, bleeding, pain, and pneumothorax. The patient signed the consent freely in the presence of a witness (nursing staff) after all questions were answered.     Procedure Details:  After the patient was positioned, an acceptable site for fluid aspiration was visualized and marked under ultrasound guidance. The usual sterile field was created using Chlorhexadine solution and sterile drapes. 1% plain lidocaine was then used to create a wheal, and then given via deeper infiltration between the rib space.  A small nick was created using a #11 blade scalpel. The thoracentesis catheter was inserted without difficulty, and fluid was obtained via aspiration with 60mL syringe. The catheter was then withdrawn and a clean dressing was applied.      Findings:  1350 ml of serosanguinous pleural fluid was obtained. Samples were sent for microbiology, cell count, and cytology.     Complications:   None; patient tolerated the procedure well.          Condition:  stable    Plan:   A follow up chest x-ray was ordered.  Bed Rest for 2 hours.    Miguel Davalos MD  [facility]

## 2017-07-27 PROBLEM — R91.8 PULMONARY INFILTRATES ON CXR: Status: ACTIVE | Noted: 2017-01-01

## 2017-07-27 NOTE — SUBJECTIVE & OBJECTIVE
Past Medical History:   Diagnosis Date    Cancer 2/2016    ALL    HIV infection     Pancytopenia due to antineoplastic chemotherapy 7/4/2016       Past Surgical History:   Procedure Laterality Date    CYSTOSCOPY         Review of patient's allergies indicates:   Allergen Reactions    Etoposide Rash       Family History     Problem Relation (Age of Onset)    Cancer Father        Social History Main Topics    Smoking status: Never Smoker    Smokeless tobacco: Not on file    Alcohol use No    Drug use: No    Sexual activity: Not on file         Review of Systems   Constitutional: Negative for chills, diaphoresis and fever.   Eyes: Negative for photophobia.   Respiratory: Positive for cough. Negative for chest tightness and shortness of breath.    Cardiovascular: Positive for leg swelling. Negative for chest pain.   Gastrointestinal: Negative for abdominal pain, nausea and vomiting.   Genitourinary: Negative for dysuria and hematuria.   Skin: Negative for color change.   Neurological: Negative for tremors and speech difficulty.   Psychiatric/Behavioral: Negative for agitation.     Objective:     Vital Signs (Most Recent):  Temp: 98.4 °F (36.9 °C) (07/27/17 0746)  Pulse: (!) 136 (07/27/17 1102)  Resp: 16 (07/27/17 0746)  BP: 128/67 (07/27/17 0746)  SpO2: (!) 90 % (07/27/17 1102) Vital Signs (24h Range):  Temp:  [97.9 °F (36.6 °C)-98.4 °F (36.9 °C)] 98.4 °F (36.9 °C)  Pulse:  [] 136  Resp:  [16-22] 16  SpO2:  [90 %-97 %] 90 %  BP: (124-137)/(62-73) 128/67     Weight: 84.5 kg (186 lb 4.6 oz)  Body mass index is 25.27 kg/m².      Intake/Output Summary (Last 24 hours) at 07/27/17 1123  Last data filed at 07/27/17 0751   Gross per 24 hour   Intake          3239.34 ml   Output             2550 ml   Net           689.34 ml       Physical Exam   Constitutional: He is oriented to person, place, and time. No distress.   HENT:   Head: Normocephalic.   Eyes: Conjunctivae and EOM are normal.   Cardiovascular: Regular  rhythm and intact distal pulses.    No murmur heard.  Pt has mild bilateral lower ext edema   Pulmonary/Chest: Effort normal. He exhibits no tenderness.   Inspiratory crackles b/l L>R   Abdominal: Soft. Bowel sounds are normal. He exhibits no distension. There is no tenderness.   Neurological: He is alert and oriented to person, place, and time. No cranial nerve deficit.   Skin: He is not diaphoretic.   Diffuse petechiae bilateral lower extremity   Psychiatric: He has a normal mood and affect.       Vents:  Oxygen Concentration (%): 2 (07/22/17 2247)    Lines/Drains/Airways     Central Venous Catheter Line                 Tunneled Central Line Insertion/Assessment - Double Lumen  07/24/17 1522 left subclavian 2 days                Significant Labs:    CBC/Anemia Profile:    Recent Labs  Lab 07/26/17 0455 07/27/17  0325   WBC 1.27* 2.13*   HGB 6.8* 7.7*   HCT 21.0* 23.1*   PLT 15* 21*   MCV 89 91   RDW 15.5* 15.5*        Chemistries:    Recent Labs  Lab 07/25/17 1927 07/26/17 0455 07/26/17 1412 07/27/17  0325    139 138 139   K 3.8 4.5 4.2 4.2    108 107 109   CO2 26 26 24 25   BUN 12 10 10 9   CREATININE 0.8 0.7 0.8 0.8   CALCIUM 7.1* 7.1* 7.2* 7.2*   ALBUMIN  --  1.9* 2.2* 2.0*   PROT  --  4.2* 4.6* 4.3*   BILITOT  --  0.7 0.9 0.7   ALKPHOS  --  292* 290* 327*   ALT  --  10 11 13   AST  --  11 13 19   MG 1.6 1.8  --  1.8   PHOS 1.6* 2.1*  --  1.8*       ABGs: No results for input(s): PH, PCO2, HCO3, POCSATURATED, BE in the last 48 hours.  CMP:     Recent Labs  Lab 07/26/17 0455 07/26/17  1412 07/27/17  0325    138 139   K 4.5 4.2 4.2    107 109   CO2 26 24 25   GLU 94 97 111*   BUN 10 10 9   CREATININE 0.7 0.8 0.8   CALCIUM 7.1* 7.2* 7.2*   PROT 4.2* 4.6* 4.3*   ALBUMIN 1.9* 2.2* 2.0*   BILITOT 0.7 0.9 0.7   ALKPHOS 292* 290* 327*   AST 11 13 19   ALT 10 11 13   ANIONGAP 5* 7* 5*   EGFRNONAA >60.0 >60.0 >60.0       Significant Imaging:   CXR: I have reviewed all pertinent  results/findings within the past 24 hours and my personal findings are:  Bilateral pleural effusion L >> R; patchy bilateral airspace consolidation

## 2017-07-27 NOTE — PLAN OF CARE
Problem: Patient Care Overview  Goal: Plan of Care Review  Outcome: Ongoing (interventions implemented as appropriate)  Patient remains free from falls and injury this shift. Bed in low, locked position with call bell in reach. Family at bedside. Patient encouraged to call for assistance when getting out of bed. Patient verbalized understanding. All belongings within reach. Pain management remains key issue with patient. Dilaudid given PRN with long acting morphine. Nausea managed with PRN meds. Telemetry shows several rounds of v tach, with pt complaining of malaise and anxiety. Pt given ativan and monitored closely with no further episodes of v tach.  Pt remains dependent on 2L of 02 via nasal cannula. Tachycardia still present with HR ranging from 100-120. Will continue to monitor.

## 2017-07-27 NOTE — PROGRESS NOTES
Notified Dr. Byrd that patient had a 14 beat run of V-tach - currently sinus rhythm. Electrolytes still being replaced from morning labs. Patient c/o increased anxiety in the last hour concerning frequent coughing with mucus expectoration. No new orders. Will continue to monitor.

## 2017-07-27 NOTE — PHYSICIAN QUERY
PT Name: Paul E Sistrunk  MR #: [id number]     Physician Query Form - Documentation Clarification      CDS/: Darshana Salmeron RN, CCDS               Contact information:   [email or web address]    This form is a permanent document in the medical record.     Query Date: July 27, 2017    By submitting this query, we are merely seeking further clarification of documentation. Please utilize your independent clinical judgment when addressing the question(s) below.    The Medical record reflects the following:    Supporting Clinical Findings Location in Medical Record   Thoracentesis Procedure Note    Pre-operative Diagnosis:    Pleural Effusion on L     Post-operative Diagnosis:  same     Indications:   Pleural Effusion on L  Dyspnea    After the patient was positioned, an acceptable site for fluid aspiration was visualized and marked under ultrasound guidance. The usual sterile field was created using Chlorhexadine solution and sterile drapes. 1% plain lidocaine was then used to create a wheal, and then given via deeper infiltration between the rib space.  A small nick was created using a #11 blade scalpel. The thoracentesis catheter was inserted without difficulty, and fluid was obtained via aspiration with 60mL syringe. The catheter was then withdrawn and a clean dressing was applied.   Procedures 07/26    Procedures 07/26      Procedures 07/26      Procedures 07/26        Procedures 07/26                                                                                      Doctor, Please specify diagnosis or diagnoses associated with above clinical findings.  Please further specify thoracentesis.    Provider Use Only    [  x ] Pleural cavity, left    [   ] Other (Specify)_________________                                                                                                                           [  ] Clinically undetermined

## 2017-07-27 NOTE — ASSESSMENT & PLAN NOTE
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 We have discussed antifungal coverage with infectious disease. Discontinued itraconzaole (7/09) was on ambisome, now on voriconazole  -- Chimerics from marrow done 6/21 show CD3 of 90% donor and 10% recipient, but CD34 of 5% donor and 95% recipient   -- Chimers from 7/10 shows 70% recipient and 30% donor - NOT SORTED  -- Plans for DLI in future (8/14/17?)

## 2017-07-27 NOTE — SUBJECTIVE & OBJECTIVE
Subjective:     Interval History:   - Day 31 FLAG-BETZY - day 14 marrow showed remission  - MRI abnormal - vascular surgery following - on statin  - VSS Afebrile.   - Reports subjective improvement in SOB after thoracentesis done 7/26/17  - Continued jaw pain but no new complaints of chest pain or abdo pains  - Remains with poor appetite   - Depression noted - denied hem/onc psych     Objective:     Vital Signs (Most Recent):  Temp: 98.4 °F (36.9 °C) (07/27/17 0746)  Pulse: 103 (07/27/17 0753)  Resp: 16 (07/27/17 0746)  BP: 128/67 (07/27/17 0746)  SpO2: (!) 93 % (07/27/17 0746) Vital Signs (24h Range):  Temp:  [97.9 °F (36.6 °C)-98.4 °F (36.9 °C)] 98.4 °F (36.9 °C)  Pulse:  [] 103  Resp:  [16-22] 16  SpO2:  [92 %-97 %] 93 %  BP: (124-137)/(62-73) 128/67     Weight: 84.5 kg (186 lb 4.6 oz)  Body mass index is 25.27 kg/m².  Body surface area is 2.07 meters squared.    ECOG SCORE         [unfilled]    Intake/Output - Last 3 Shifts       07/25 0700 - 07/26 0659 07/26 0700 - 07/27 0659 07/27 0700 - 07/28 0659    P.O. 840 1440     I.V. (mL/kg) 2015 (23.6) 2798.3 (33.1)     Blood  601     IV Piggyback 400 250     Total Intake(mL/kg) 3255 (38.2) 5089.3 (60.2)     Urine (mL/kg/hr) 2100 (1) 3300 (1.6) 650 (2)    Other       Stool 0 (0)      Total Output 2100 3300 650    Net +1155 +1789.3 -650           Urine Occurrence 1 x      Stool Occurrence 2 x            Physical Exam   Constitutional: He is oriented to person, place, and time. He appears well-developed and well-nourished. No distress. Nasal cannula in place.   HENT:   Head: Normocephalic and atraumatic.   Mouth/Throat: Oropharynx is clear and moist. No oropharyngeal exudate.   Eyes: Pupils are equal, round, and reactive to light.   Cardiovascular: Normal rate and regular rhythm.    Pulmonary/Chest: Effort normal and breath sounds normal. No respiratory distress.   +decreased bs in base  - no wheeze   Abdominal: Soft. Bowel sounds are normal. He exhibits no  distension. There is no tenderness.   Musculoskeletal: He exhibits no edema.   Neurological: He is alert and oriented to person, place, and time.   Skin: Skin is warm and dry. No rash noted. No erythema.   Left chest wall oquendo in place, no bleeding noted today   Psychiatric: He has a normal mood and affect.   Anxious and Depression    Nursing note and vitals reviewed.      Significant Labs:   CBC:     Recent Labs  Lab 07/26/17  0455 07/27/17  0325   WBC 1.27* 2.13*   HGB 6.8* 7.7*   HCT 21.0* 23.1*   PLT 15* 21*   , CMP:     Recent Labs  Lab 07/26/17  0455 07/26/17  1412 07/27/17  0325    138 139   K 4.5 4.2 4.2    107 109   CO2 26 24 25   GLU 94 97 111*   BUN 10 10 9   CREATININE 0.7 0.8 0.8   CALCIUM 7.1* 7.2* 7.2*   PROT 4.2* 4.6* 4.3*   ALBUMIN 1.9* 2.2* 2.0*   BILITOT 0.7 0.9 0.7   ALKPHOS 292* 290* 327*   AST 11 13 19   ALT 10 11 13   ANIONGAP 5* 7* 5*   EGFRNONAA >60.0 >60.0 >60.0   ,   Recent Lab Results       07/27/17  0325 07/26/17  1412 07/26/17  1401      WBC, Body Fluid   58  Comment:  Reference ranges for body fluids not established.   Correlate clinically.       Lymphs, Fluid   5     Mesothelial cells, Fluid   60     Body Fluid Type   Pleural Fluid, Left     Monocytes/Macrophages, Fluid   35     Body Fluid Source, Refrigerated   Pleural Fluid, Left     Albumin 2.0(L) 2.2(L)      Alkaline Phosphatase 327(H) 290(H)      ALT 13 11      Amylase, Fluid   14  Comment:  Reference intervals have not been established for body fluids.  Comparison of this result with the concentration in the blood,  serum,or plasma is recommended.  The reference interval for amylase in serum/plasma is    U/L. Please interpret in context with the clinical  picture.       Anion Gap 5(L) 7(L)      Aniso Slight       AST 19 13      BANDS 4.0       Baso # CANCELED  Comment:  Result canceled by the ancillary       Basophil% 0.0       Total Bilirubin 0.7  Comment:  For infants and newborns, interpretation of  results should be based  on gestational age, weight and in agreement with clinical  observations.  Premature Infant recommended reference ranges:  Up to 24 hours.............<8.0 mg/dL  Up to 48 hours............<12.0 mg/dL  3-5 days..................<15.0 mg/dL  6-29 days.................<15.0 mg/dL   0.9  Comment:  For infants and newborns, interpretation of results should be based  on gestational age, weight and in agreement with clinical  observations.  Premature Infant recommended reference ranges:  Up to 24 hours.............<8.0 mg/dL  Up to 48 hours............<12.0 mg/dL  3-5 days..................<15.0 mg/dL  6-29 days.................<15.0 mg/dL        Body Fluid Albumin   1.3  Comment:  Reference intervals have not been established for body fluids.   Comparison of this result with the concentration in the blood,  serum,or plasma is recommended.  The reference interval for albumin in serum/plasma is   3.5-5.2 g/dL. Please interpret in context with the clinical  picture.       Body Fluid Culture, Sterile   No Growth to date[P]     Body Fluid Source Amylase   Thoracentesis     Body Fluid Source, Albumin   Thoracentesis     Body Fluid Source, Glucose   Thoracentesis Fluid     Body Fluid Source, LDH   Thoracentesis     Body Fluid Source, Total Protein   Thoracentesis     Body Fluid, Protein   2.1  Comment:  Reference intervals have not been established for body fluids.  Comparison of this result with the concentration in the blood,   serum,or plasma is recommended.  The reference interval for total protein in serum/plasma is   0-3 years......5.4-7.4 g/dL  4-6 years......5.9-8.2 g/dL  >7 years.......6.0-8.4 g/dL  Please interpret in context with the clinical  picture.       BUN, Bld 9 10      Calcium 7.2(L) 7.2(L)      Chloride 109 107      CO2 25 24      Creatinine 0.8 0.8      Differential Method Manual       eGFR if  >60.0 >60.0      eGFR if non  >60.0  Comment:  Calculation  used to obtain the estimated glomerular filtration  rate (eGFR) is the CKD-EPI equation. Since race is unknown   in our information system, the eGFR values for   -American and Non--American patients are given   for each creatinine result.   >60.0  Comment:  Calculation used to obtain the estimated glomerular filtration  rate (eGFR) is the CKD-EPI equation. Since race is unknown   in our information system, the eGFR values for   -American and Non--American patients are given   for each creatinine result.        Eos # CANCELED  Comment:  Result canceled by the ancillary       Eosinophil% 0.0       Fluid Appearance   Bloody     Fluid Color   Red     Glucose 111(H) 97      Glucose, Fluid   102  Comment:  Reference intervals have not been established for body fluids.  Comparison of this result with the concentration in the blood,   serum,or plasma is recommended.  The reference interval for glucose in serum/plasma is    mg/dL. Please interpret in context with the clinical  picture.       Gram Stain Result   Rare WBC's        No organisms seen     Gran% 91.0(H)       Hematocrit 23.1(L)       Hemoglobin 7.7(L)       Hypo Occasional         Comment:  Results are increased in hemolyzed samples. 156  Comment:  Results are increased in hemolyzed samples.      LD, Fluid   142  Comment:  Reference intervals have not been established for body fluids.  Comparison of this result with the concentration in the blood,   serum,or plasma is recommended.  The reference interval for LDH in serum/plasma is   110-260 U/L. Please interpret in context with the clinical  picture.       Lymph # CANCELED  Comment:  Result canceled by the ancillary       Lymph% 2.0(L)       Magnesium 1.8       MCH 30.2       MCHC 33.3       MCV 91       Metamyelocytes 1.0       Mono # CANCELED  Comment:  Result canceled by the ancillary       Mono% 1.0(L)       MPV 10.9       Myelocytes 1.0       Ovalocytes Occasional        Phosphorus 1.8(L)       Platelets 21  Comment:  plt   critical result(s) called and verbal readback obtained from   nurse lawson, 07/27/2017 06:28  (LL)       Poik Slight       Poly Occasional       Potassium 4.2 4.2      Total Protein 4.3(L) 4.6(L)      RBC 2.55(L)       RDW 15.5(H)       Sodium 139 138      Uric Acid 2.6(L)       WBC 2.13(L)          and All pertinent labs from the last 24 hours have been reviewed.    Diagnostic Results:  I have reviewed all pertinent imaging results/findings within the past 24 hours.

## 2017-07-27 NOTE — PROGRESS NOTES
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 Patient subjectively improved dyspnea. Some cough with clear mucous production, no fevers or chills. Still requiring 2L O2  Subjective:     Past Medical History:   Diagnosis Date    Cancer 2/2016    ALL    HIV infection     Pancytopenia due to antineoplastic chemotherapy 7/4/2016       Past Surgical History:   Procedure Laterality Date    CYSTOSCOPY         Review of patient's allergies indicates:   Allergen Reactions    Etoposide Rash       Family History     Problem Relation (Age of Onset)    Cancer Father        Social History Main Topics    Smoking status: Never Smoker    Smokeless tobacco: Not on file    Alcohol use No    Drug use: No    Sexual activity: Not on file         Review of Systems   Constitutional: Negative for chills, diaphoresis and fever.   Eyes: Negative for photophobia.   Respiratory: Positive for cough. Negative for chest tightness and shortness of breath.    Cardiovascular: Positive for leg swelling. Negative for chest pain.   Gastrointestinal: Negative for abdominal pain, nausea and vomiting.   Genitourinary: Negative for dysuria and hematuria.   Skin: Negative for color change.   Neurological: Negative for tremors and speech difficulty.   Psychiatric/Behavioral: Negative for agitation.     Objective:     Vital Signs (Most Recent):  Temp: 98.4 °F (36.9 °C) (07/27/17 0746)  Pulse: (!) 136 (07/27/17 1102)  Resp: 16 (07/27/17 0746)  BP: 128/67 (07/27/17 0746)  SpO2: (!) 90 % (07/27/17 1102) Vital Signs (24h Range):  Temp:  [97.9 °F (36.6 °C)-98.4 °F (36.9 °C)] 98.4 °F (36.9 °C)  Pulse:  [] 136  Resp:  [16-22]  16  SpO2:  [90 %-97 %] 90 %  BP: (124-137)/(62-73) 128/67     Weight: 84.5 kg (186 lb 4.6 oz)  Body mass index is 25.27 kg/m².      Intake/Output Summary (Last 24 hours) at 07/27/17 1123  Last data filed at 07/27/17 0751   Gross per 24 hour   Intake          3239.34 ml   Output             2550 ml   Net           689.34 ml       Physical Exam   Constitutional: He is oriented to person, place, and time. No distress.   HENT:   Head: Normocephalic.   Eyes: Conjunctivae and EOM are normal.   Cardiovascular: Regular rhythm and intact distal pulses.    No murmur heard.  Pt has mild bilateral lower ext edema   Pulmonary/Chest: Effort normal. He exhibits no tenderness.   Inspiratory crackles b/l L>R   Abdominal: Soft. Bowel sounds are normal. He exhibits no distension. There is no tenderness.   Neurological: He is alert and oriented to person, place, and time. No cranial nerve deficit.   Skin: He is not diaphoretic.   Diffuse petechiae bilateral lower extremity   Psychiatric: He has a normal mood and affect.       Vents:  Oxygen Concentration (%): 2 (07/22/17 2247)    Lines/Drains/Airways     Central Venous Catheter Line                 Tunneled Central Line Insertion/Assessment - Double Lumen  07/24/17 1522 left subclavian 2 days                Significant Labs:    CBC/Anemia Profile:    Recent Labs  Lab 07/26/17  0455 07/27/17  0325   WBC 1.27* 2.13*   HGB 6.8* 7.7*   HCT 21.0* 23.1*   PLT 15* 21*   MCV 89 91   RDW 15.5* 15.5*        Chemistries:    Recent Labs  Lab 07/25/17  1927 07/26/17  0455 07/26/17  1412 07/27/17  0325    139 138 139   K 3.8 4.5 4.2 4.2    108 107 109   CO2 26 26 24 25   BUN 12 10 10 9   CREATININE 0.8 0.7 0.8 0.8   CALCIUM 7.1* 7.1* 7.2* 7.2*   ALBUMIN  --  1.9* 2.2* 2.0*   PROT  --  4.2* 4.6* 4.3*   BILITOT  --  0.7 0.9 0.7   ALKPHOS  --  292* 290* 327*   ALT  --  10 11 13   AST  --  11 13 19   MG 1.6 1.8  --  1.8   PHOS 1.6* 2.1*  --  1.8*       ABGs: No results for input(s): PH,  PCO2, HCO3, POCSATURATED, BE in the last 48 hours.  CMP:     Recent Labs  Lab 07/26/17  0455 07/26/17  1412 07/27/17  0325    138 139   K 4.5 4.2 4.2    107 109   CO2 26 24 25   GLU 94 97 111*   BUN 10 10 9   CREATININE 0.7 0.8 0.8   CALCIUM 7.1* 7.2* 7.2*   PROT 4.2* 4.6* 4.3*   ALBUMIN 1.9* 2.2* 2.0*   BILITOT 0.7 0.9 0.7   ALKPHOS 292* 290* 327*   AST 11 13 19   ALT 10 11 13   ANIONGAP 5* 7* 5*   EGFRNONAA >60.0 >60.0 >60.0       Significant Imaging:   CXR: I have reviewed all pertinent results/findings within the past 24 hours and my personal findings are:  Bilateral pleural effusion L >> R; patchy bilateral airspace consolidation    Assessment/Plan:     Pleural effusion    56 year old male with relapsing AML, HIV, anxiety, that presented to the hospital on 6/20/2017 for induction for relapsing AML found to have bilateral pleural effusions and bilateral pathchy airspace disease s/p thoracentesis x 1, pt on empiric abx/antifungal/antiviral coverage    -s/p thoracentesis x2 on right 7/21 removed 1L and on left 1.35L 7/26  -fluid studies have thus far revealed non-specific exudate; cytology from 7/26 pending  -Patient has significant patchy, bilateral airspace disease  -Induction medication from relapse known to cause ; inflammatory process likely as infectious work-up of pleural fluid thus far negative and sputum culture negative  -Recommend empiric steroid treatment 60mg for 1-2weeks then 40mg for 1-2 weeks; total of 3 weeks  -CXR in 3 weeks  -Please schedule f/u in pulm clinic for a 2-3 days after                 Lucy Esteban MD  Pulmonology  [facility]

## 2017-07-27 NOTE — ASSESSMENT & PLAN NOTE
-- Relapsed AML - peripheral blood shows 30% blasts on admit  -- 2D echo shows 60% EF  -- Mazariegos placed, appears to be in the azygous vein, likely from repositioning. General surgery to replace 7/24. 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 Blast of 62%  -- Awaiting mutation analysis and cytogenetics; FLT-3 negative     -- Jaw pain and pain in right cranial nerve 7 distribution with persistent, severe headaches concerning for CNS involvement - underwent IT chemotherapy on 6/23. Flow negative for disease. CSF LDH was 29  -- Had previous reaction to MEC (etoposide caused full body rash). Did not qualify for the Tolero trial due to receiving IT chemotherapy    -- Day 31 of FLAG-BETZY   -- Day 14 bone marrow biopsy done 7/10 - hypocellular with no evidence of residual disease  -- Future plans for DLI (8/14/17?)  -- hold lumbar puncture due to decrease in nausea

## 2017-07-27 NOTE — ASSESSMENT & PLAN NOTE
56 year old male with relapsing AML, HIV, anxiety, that presented to the hospital on 6/20/2017 for induction for relapsing AML found to have bilateral pleural effusions and bilateral pathchy airspace disease s/p thoracentesis x 1, pt on empiric abx/antifungal/antiviral coverage    -s/p thoracentesis x2 on right 7/21 removed 1L and on left 1.35L 7/26  -fluid studies have thus far revealed non-specific exudate; cytology from 7/26 pending  -Patient has significant patchy, bilateral airspace disease  -Induction medication from relapse known to cause ; inflammatory process likely as infectious work-up of pleural fluid thus far negative and sputum culture negative  -Recommend empiric steroid treatment 60mg for 1-2weeks then 40mg for 1-2 weeks; total of 3 weeks  -CXR in 3 weeks  -Please schedule f/u in pulm clinic for a 2-3 days after

## 2017-07-27 NOTE — PHYSICIAN QUERY
PT Name: Paul E Sistrunk  MR #: [id number]     Physician Query Form - Documentation Clarification      CDS/: Darshana Salmeron RN, CCDS               Contact information:  [email or web address]    This form is a permanent document in the medical record.     Query Date: July 27, 2017    By submitting this query, we are merely seeking further clarification of documentation. Please utilize your independent clinical judgment when addressing the question(s) below.    The Medical record reflects the following:    Supporting Clinical Findings Location in Medical Record   Thoracentesis Procedure Note     Pre-operative Diagnosis:   Pleural Effusion on R     Post-operative Diagnosis:  same     Indications:   Pleural Effusion on R  Dyspnea    After the patient was positioned, an acceptable site for fluid aspiration was visualized and marked under ultrasound guidance. The usual sterile field was created using Chlorhexadine solution and sterile drapes. 1% plain lidocaine was then used to create a wheal, and then given via deeper infiltration between the rib space.  A small nick was created using a #11 blade scalpel. The thoracentesis catheter was inserted without difficulty, and fluid was obtained via aspiration with 60mL syringe. The catheter was then withdrawn and a clean dressing was applied.     Procedures 07/21    Procedures 07/21      Procedures 07/21      Procedures 07/21        Procedures 07/21                                                                                        Doctor, Please specify diagnosis or diagnoses associated with above clinical findings.  Please further specify thoracentesis.    Provider Use Only    [  x ] Pleural cavity, right    [   ] Other (Specify) ____________________________                                                                                                                     [  ] Clinically undetermined

## 2017-07-27 NOTE — ASSESSMENT & PLAN NOTE
- scheduled zofran with PRN compazine  - dietary consult placed   - ate very small amount    - nurses to put meals in front of patient and encourage nutrition   - brother to bring food today

## 2017-07-27 NOTE — ASSESSMENT & PLAN NOTE
-- Pulm re-consulted 7/20  -- s/p Thoracentesis - cytology negative   -- repeat CXR 7/26 shows Ill-defined patchy airspace consolidation bilaterally more on the left side identified.  Pleural effusion slightly more on the left.  -- Pulm re-consulted 7/26  --s/p Thoracentesis 7/26/17 with 1.3L removed  -- refused CT of chest  -- plan for trial of steroids per pulmonary, appreciate recs

## 2017-07-27 NOTE — PLAN OF CARE
Problem: Patient Care Overview  Goal: Plan of Care Review  Outcome: Ongoing (interventions implemented as appropriate)  Pt remained free of falls and injury. Bed in low locked position side rails up x2 with call light and belongings in reach. Non skid socks in place. IVFs continued.  Prn dilaudid given. Telemetry and continuous pulse ox in place. All VS stable. No acute events thus far. Will continue to monitor

## 2017-07-27 NOTE — ASSESSMENT & PLAN NOTE
-- Marginally improved  -- White blood cell count is 2.13 k/uL; absolute neutrophil count 1938 cells/uL  -- hemoglobin 7.7 g/dL; platelet count is 21 k/uL   -- Following with daily CBC and transfusing for hemoglobin < 7 g/dL, platelets < 10 k/uL  -- Anticipate the need for more aggressive platelet transfusion prior to surgery

## 2017-07-27 NOTE — PROGRESS NOTES
[facility]  Hematology  Bone Marrow Transplant  Progress Note    Patient Name: Paul E Sistrunk  Admission Date: 6/20/2017  Hospital Length of Stay: 37 days  Code Status: Full Code    Subjective:     Interval History:   - Day 31 FLAG-BETZY - day 14 marrow showed remission  - MRI abnormal - vascular surgery following - on statin  - VSS Afebrile.   - Reports subjective improvement in SOB after thoracentesis done 7/26/17  - Continued jaw pain but no new complaints of chest pain or abdo pains  - Remains with poor appetite   - Depression noted - denied hem/onc psych     Objective:     Vital Signs (Most Recent):  Temp: 98.4 °F (36.9 °C) (07/27/17 0746)  Pulse: 103 (07/27/17 0753)  Resp: 16 (07/27/17 0746)  BP: 128/67 (07/27/17 0746)  SpO2: (!) 93 % (07/27/17 0746) Vital Signs (24h Range):  Temp:  [97.9 °F (36.6 °C)-98.4 °F (36.9 °C)] 98.4 °F (36.9 °C)  Pulse:  [] 103  Resp:  [16-22] 16  SpO2:  [92 %-97 %] 93 %  BP: (124-137)/(62-73) 128/67     Weight: 84.5 kg (186 lb 4.6 oz)  Body mass index is 25.27 kg/m².  Body surface area is 2.07 meters squared.    ECOG SCORE         [unfilled]    Intake/Output - Last 3 Shifts       07/25 0700 - 07/26 0659 07/26 0700 - 07/27 0659 07/27 0700 - 07/28 0659    P.O. 840 1440     I.V. (mL/kg) 2015 (23.6) 2798.3 (33.1)     Blood  601     IV Piggyback 400 250     Total Intake(mL/kg) 3255 (38.2) 5089.3 (60.2)     Urine (mL/kg/hr) 2100 (1) 3300 (1.6) 650 (2)    Other       Stool 0 (0)      Total Output 2100 3300 650    Net +1155 +1789.3 -650           Urine Occurrence 1 x      Stool Occurrence 2 x            Physical Exam   Constitutional: He is oriented to person, place, and time. He appears well-developed and well-nourished. No distress. Nasal cannula in place.   HENT:   Head: Normocephalic and atraumatic.   Mouth/Throat: Oropharynx is clear and moist. No oropharyngeal exudate.   Eyes: Pupils are equal, round, and reactive to light.   Cardiovascular: Normal rate and  regular rhythm.    Pulmonary/Chest: Effort normal and breath sounds normal. No respiratory distress.   +decreased bs in base  - no wheeze   Abdominal: Soft. Bowel sounds are normal. He exhibits no distension. There is no tenderness.   Musculoskeletal: He exhibits no edema.   Neurological: He is alert and oriented to person, place, and time.   Skin: Skin is warm and dry. No rash noted. No erythema.   Left chest wall oquendo in place, no bleeding noted today   Psychiatric: He has a normal mood and affect.   Anxious and Depression    Nursing note and vitals reviewed.      Significant Labs:   CBC:     Recent Labs  Lab 07/26/17  0455 07/27/17  0325   WBC 1.27* 2.13*   HGB 6.8* 7.7*   HCT 21.0* 23.1*   PLT 15* 21*   , CMP:     Recent Labs  Lab 07/26/17  0455 07/26/17  1412 07/27/17  0325    138 139   K 4.5 4.2 4.2    107 109   CO2 26 24 25   GLU 94 97 111*   BUN 10 10 9   CREATININE 0.7 0.8 0.8   CALCIUM 7.1* 7.2* 7.2*   PROT 4.2* 4.6* 4.3*   ALBUMIN 1.9* 2.2* 2.0*   BILITOT 0.7 0.9 0.7   ALKPHOS 292* 290* 327*   AST 11 13 19   ALT 10 11 13   ANIONGAP 5* 7* 5*   EGFRNONAA >60.0 >60.0 >60.0   ,   Recent Lab Results       07/27/17  0325 07/26/17  1412 07/26/17  1401      WBC, Body Fluid   58  Comment:  Reference ranges for body fluids not established.   Correlate clinically.       Lymphs, Fluid   5     Mesothelial cells, Fluid   60     Body Fluid Type   Pleural Fluid, Left     Monocytes/Macrophages, Fluid   35     Body Fluid Source, Refrigerated   Pleural Fluid, Left     Albumin 2.0(L) 2.2(L)      Alkaline Phosphatase 327(H) 290(H)      ALT 13 11      Amylase, Fluid   14  Comment:  Reference intervals have not been established for body fluids.  Comparison of this result with the concentration in the blood,  serum,or plasma is recommended.  The reference interval for amylase in serum/plasma is    U/L. Please interpret in context with the clinical  picture.       Anion Gap 5(L) 7(L)      Aniso Slight        AST 19 13      BANDS 4.0       Baso # CANCELED  Comment:  Result canceled by the ancillary       Basophil% 0.0       Total Bilirubin 0.7  Comment:  For infants and newborns, interpretation of results should be based  on gestational age, weight and in agreement with clinical  observations.  Premature Infant recommended reference ranges:  Up to 24 hours.............<8.0 mg/dL  Up to 48 hours............<12.0 mg/dL  3-5 days..................<15.0 mg/dL  6-29 days.................<15.0 mg/dL   0.9  Comment:  For infants and newborns, interpretation of results should be based  on gestational age, weight and in agreement with clinical  observations.  Premature Infant recommended reference ranges:  Up to 24 hours.............<8.0 mg/dL  Up to 48 hours............<12.0 mg/dL  3-5 days..................<15.0 mg/dL  6-29 days.................<15.0 mg/dL        Body Fluid Albumin   1.3  Comment:  Reference intervals have not been established for body fluids.   Comparison of this result with the concentration in the blood,  serum,or plasma is recommended.  The reference interval for albumin in serum/plasma is   3.5-5.2 g/dL. Please interpret in context with the clinical  picture.       Body Fluid Culture, Sterile   No Growth to date[P]     Body Fluid Source Amylase   Thoracentesis     Body Fluid Source, Albumin   Thoracentesis     Body Fluid Source, Glucose   Thoracentesis Fluid     Body Fluid Source, LDH   Thoracentesis     Body Fluid Source, Total Protein   Thoracentesis     Body Fluid, Protein   2.1  Comment:  Reference intervals have not been established for body fluids.  Comparison of this result with the concentration in the blood,   serum,or plasma is recommended.  The reference interval for total protein in serum/plasma is   0-3 years......5.4-7.4 g/dL  4-6 years......5.9-8.2 g/dL  >7 years.......6.0-8.4 g/dL  Please interpret in context with the clinical  picture.       BUN, Bld 9 10      Calcium 7.2(L) 7.2(L)       Chloride 109 107      CO2 25 24      Creatinine 0.8 0.8      Differential Method Manual       eGFR if  >60.0 >60.0      eGFR if non  >60.0  Comment:  Calculation used to obtain the estimated glomerular filtration  rate (eGFR) is the CKD-EPI equation. Since race is unknown   in our information system, the eGFR values for   -American and Non--American patients are given   for each creatinine result.   >60.0  Comment:  Calculation used to obtain the estimated glomerular filtration  rate (eGFR) is the CKD-EPI equation. Since race is unknown   in our information system, the eGFR values for   -American and Non--American patients are given   for each creatinine result.        Eos # CANCELED  Comment:  Result canceled by the ancillary       Eosinophil% 0.0       Fluid Appearance   Bloody     Fluid Color   Red     Glucose 111(H) 97      Glucose, Fluid   102  Comment:  Reference intervals have not been established for body fluids.  Comparison of this result with the concentration in the blood,   serum,or plasma is recommended.  The reference interval for glucose in serum/plasma is    mg/dL. Please interpret in context with the clinical  picture.       Gram Stain Result   Rare WBC's        No organisms seen     Gran% 91.0(H)       Hematocrit 23.1(L)       Hemoglobin 7.7(L)       Hypo Occasional         Comment:  Results are increased in hemolyzed samples. 156  Comment:  Results are increased in hemolyzed samples.      LD, Fluid   142  Comment:  Reference intervals have not been established for body fluids.  Comparison of this result with the concentration in the blood,   serum,or plasma is recommended.  The reference interval for LDH in serum/plasma is   110-260 U/L. Please interpret in context with the clinical  picture.       Lymph # CANCELED  Comment:  Result canceled by the ancillary       Lymph% 2.0(L)       Magnesium 1.8       MCH 30.2       MCHC  33.3       MCV 91       Metamyelocytes 1.0       Mono # CANCELED  Comment:  Result canceled by the ancillary       Mono% 1.0(L)       MPV 10.9       Myelocytes 1.0       Ovalocytes Occasional       Phosphorus 1.8(L)       Platelets 21  Comment:  plt   critical result(s) called and verbal readback obtained from   nurse lawson, 07/27/2017 06:28  (LL)       Poik Slight       Poly Occasional       Potassium 4.2 4.2      Total Protein 4.3(L) 4.6(L)      RBC 2.55(L)       RDW 15.5(H)       Sodium 139 138      Uric Acid 2.6(L)       WBC 2.13(L)          and All pertinent labs from the last 24 hours have been reviewed.    Diagnostic Results:  I have reviewed all pertinent imaging results/findings within the past 24 hours.    Assessment/Plan:     CINV (chemotherapy-induced nausea and vomiting)    - scheduled zofran with PRN compazine  - dietary consult placed   - ate very small amount    - nurses to put meals in front of patient and encourage nutrition   - brother to bring food today        Stroke of unknown etiology    -- Stable  -- Neurology following, appreciate assistance  -- Several imaging studies have been performed, including CT head 7/21, MRI brain 7/21, and CTA head and neck 7/21 with interpreting radiologists citing concern for infarction of the right lentiform nucleus and anterior limb of the internal capsule   -- Neurology reviewed the images and were first concerned about possible embolic activity given his arrhythmia earlier this admission, now concerned about the possibility of fungal CNS infection, though after discussion with ID he has essentially completed a therapeutic course with ambisome and is now on voriconazole, recommending against additional prophylactic CNS fungal coverage  -- Continue atorvastatin. Not a candidate for any sort of anticoagulation or antiplatelet agents given transfusion-dependent thrombocytopenia        Delirium    -- Improving  -- Typically worse at night; speaks  nonsense occasionally during the day; has threatened to leave AMA at night a few times, takes off oxygen at night and has disconnected pump  -- CT of head revealed acute stroke, for which neurology is now following. Not thought to be the main contributor to AMS, rather the cause is likely multifactorial with contribution from medications and his prolonged hospitalization        Pleural effusion    -- Pulm re-consulted 7/20  -- s/p Thoracentesis - cytology negative   -- repeat CXR 7/26 shows Ill-defined patchy airspace consolidation bilaterally more on the left side identified.  Pleural effusion slightly more on the left.  -- Pulm re-consulted 7/26  --s/p Thoracentesis 7/26/17 with 1.3L removed  -- refused CT of chest  -- plan for trial of steroids per pulmonary, appreciate recs        Lower extremity edema    -- Stable, likely iatrogenic from fluids  -- IV fluids stopped  -- Net Negative 3 L from 7/18 - down 11 lbs from 7/18 - 40 mg of lasix BID - given on 7/18   -- Monitoring I/Os        Supraventricular tachycardia    -- Stable  -- Supraventricular tachycardia on tele and EKG. Started 7/13/17 8pm with HR between 160-170  -- Patient was cardioverted by cardiology and started on metoprolol and flecainide.  -- Treating underlying cause: infection and malignancy  -- Pharmacologic nuclear stress test originally ordered; cardiology following with tentative plans for a cardiac CTA  -- PVCs seen on EKG on 7/26 - corrected electrolytes         Insomnia    -- Improving  -- Ramelteon and zyprexa not tolerated  -- Continue phenergan 12.5 mg nightly for insomnia, which seems to help without causing excessive sedation or confusion        Chronic bilateral low back pain without sciatica    -- Thought to be related to Neupogen  -- Continue zyrtec, lidocaine patch   -- Cautiously resuming opioid analgesics, including dilaudid PRN        Electrolyte abnormality    -- Ordered prn electrolyte replacement per protocol   --  Hypophosphatemia replaced; he appears to be running persistently low.         Neutropenic fever    -- Resolved   -- Blood cultures no growth from 6/24/17, 7/4/17, 7/7/17, 7/9/17, 7/15/17  -- Complicated antimicrobial treatment history as follows:   - Cefepime: (started 6/24 - discontinued 6/28) restarted on 7/4/17 - 7/23   - Ampotericin B started 7/9-stopped 7/19;    - Vancomycin (restarted 7/15 - stopped 7/17)  -- Now on PPX antimicrobial agents alone, with bactrim, voriconazole,  acyclovir, and ciprofloxacin  -- Appreciate ID assistance. They have now signed off.  -- Multiple possible sources. CXR showed pneumonia 7/15; CXR 7/17 - Significant patchy air space consolidation identified bilaterally slightly more on the left side.  Slight improvement as compared to the previous study.  Left-sided pleural effusion. On 2-3 L via NC  -- CT chest/abdomen/pelvis reveals bilateral groundglass opacities as well as a left lower-lobe consolidation;  -- ID recommended repeating CT chest 7/19, which showed: Limited assessment. No acute central pulmonary thromboembolism. Peripheral pulmonary emboli are not excluded. Interval development of bilateral moderate pleural effusions. Trace pericardial effusion. Interval progression of bilateral airspace disease with more extensive areas of consolidation bilaterally, in a similar central distribution to the groundglass opacities previously noted, with an upper lobe predominance.   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 We have discussed antifungal coverage with infectious disease. Discontinued itraconzaole (7/09) was on ambisome, now on voriconazole  -- Chimerics from marrow done 6/21 show CD3 of 90% donor and 10% recipient, but CD34 of 5% donor and 95% recipient   -- Chimers from 7/10 shows 70% recipient and 30% donor - NOT SORTED  -- Plans for DLI in future (8/14/17?)        Pancytopenia due to antineoplastic chemotherapy    -- Marginally improved  -- White blood cell count is 2.13 k/uL; absolute neutrophil count 1938 cells/uL  -- hemoglobin 7.7 g/dL; platelet count is 21 k/uL   -- Following with daily CBC and transfusing for hemoglobin < 7 g/dL, platelets < 10 k/uL  -- Anticipate the need for more aggressive platelet transfusion prior to surgery        HIV disease    -- CD4 low at 11.4%, Absolute CD4 469 and HIV RNA not detected from 6/21  -- As per ID: continue triumeq. 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 General surgery to replace 7/24. 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 Blast of 62%  -- Awaiting mutation analysis and cytogenetics; FLT-3 negative     -- Jaw pain and pain in right cranial nerve 7 distribution with persistent, severe headaches concerning for CNS involvement - underwent IT chemotherapy on 6/23. Flow negative for disease. CSF LDH was 29  -- Had previous reaction to MEC  (etoposide caused full body rash). Did not qualify for the Tolero trial due to receiving IT chemotherapy    -- Day 31 of FLAG-BETZY   -- Day 14 bone marrow biopsy done 7/10 - hypocellular with no evidence of residual disease  -- Future plans for DLI (8/14/17?)  -- hold lumbar puncture due to decrease in nausea             VTE Risk Mitigation         Ordered     Place sequential compression device  Until discontinued      06/20/17 2005     High Risk of VTE  Once      06/20/17 1901          Disposition: pending clinical improvement    Remington Byrd MD  Bone Marrow Transplant  [facility]

## 2017-07-28 PROBLEM — J96.01 ACUTE RESPIRATORY FAILURE WITH HYPOXIA: Status: ACTIVE | Noted: 2017-01-01

## 2017-07-28 NOTE — ASSESSMENT & PLAN NOTE
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 We have discussed antifungal coverage with infectious disease. Discontinued itraconzaole (7/09) was on ambisome, now on voriconazole  -- Chimerics from marrow done 6/21 show CD3 of 90% donor and 10% recipient, but CD34 of 5% donor and 95% recipient   -- Chimers from 7/10 shows 70% recipient and 30% donor - NOT SORTED  -- Plans for DLI in future (8/14/17?)

## 2017-07-28 NOTE — PROGRESS NOTES
Spoke with pt. Regarding home health. Pt. Does not feel that he needs HH at this time. He will dc home with his oquendo line but line does not need to be flushed but once a week. Pt. Will be coming to clinic and can have line flushed and dressing changed at that time. Team attempting to wean pt. Off O2 prior to dc. Pt. Has phone # to call this sw'er should he need assistance. Will follow.

## 2017-07-28 NOTE — PLAN OF CARE
Problem: Patient Care Overview  Goal: Plan of Care Review  Patient remains free from falls and injury this shift. Bed in low, locked position with call bell in reach. Patient encouraged to call for assistance when getting out of bed. Patient verbalized understanding. All belongings within reach. Patient c/o pain to lower chin - dilaudid given once this shift. Afebrile. Appetite improved and PO intake increased today. Antiemetics used as needed and zofran scheduled. IVFs maintained. Telemetry maintained with no acute events - continuous pulse on. Oxygen weaned to 1L - O2 sats 90-95%. Physical activity increased - walking in room and hallway. Patient making an effort to be up in chair rather than bed. WBCs recovered - Neupogen discontinued. will continue to monitor.

## 2017-07-28 NOTE — ASSESSMENT & PLAN NOTE
-- Marginally improved  -- White blood cell count is 7.58 k/uL  -- hemoglobin 7.6 g/dL; platelet count is 17 k/uL   -- Following with daily CBC and transfusing for hemoglobin < 7 g/dL, platelets < 10 k/uL  -- Anticipate the need for more aggressive platelet transfusion prior to surgery

## 2017-07-28 NOTE — PLAN OF CARE
MDR's with Dr Howe.  Patient is day 32 of FLAG Lynette.  Counts recovering.  Patient reports a decreased WOB since his thoracentesis and has less O2 requirements.  Currently on 1L NC.  Plan to wean as tolerated.  Plan for potential d/c on Sunday if his respiratory status continues to improve.  Patient will need to be tested for home O2 tomorrow if unable to wean to RA.   orders placed for oquendo line supplies and SN.  No DME needs.  Message sent to the oncology clinic to schedule f/u next Tuesday.  Patient is aware of the plan of care.  Will continue to follow.    Future Appointments  Date Time Provider Department Center   8/1/2017 1:15 PM [facility]   8/1/2017 2:20 PM Ramin Shin MD [facility] BM GEIGER [facility]   8/21/2017 3:15 PM EKG, APPT [facility] EKG [facility]   8/21/2017 4:00 PM SLOAN [place] [facility] ARRHYTH [facility]        07/28/17 1500   Final Note   Assessment Type Final Discharge Note   Discharge Disposition Home-Health   Discharge planning education complete? Yes   What phone number can be called within the next 1-3 days to see how you are doing after discharge? ([phone])   Hospital Follow Up  Appt(s) scheduled? Yes   Discharge plans and expectations educations in teach back method with documentation complete? Yes   Discharge/Hospital Encounter Summary to ([facility]) PCP n/a   Referral to Outpatient Case Management complete? n/a   Referral to / orders for Home Health Complete? Yes   Any social issues identified prior to discharge? No

## 2017-07-28 NOTE — SUBJECTIVE & OBJECTIVE
Subjective:     Interval History:   - Day 32 FLAG-BETZY - day 14 marrow showed remission  - MRI abnormal - vascular surgery following - on statin  - VSS Afebrile.   - Reports subjective improvement in SOB after thoracentesis done 7/26/17  - Continued jaw pain but no new complaints of chest pain or abdo pains  - Remains with poor appetite; able to eat yesterday   - Depression noted - denied hem/onc psych     Objective:     Vital Signs (Most Recent):  Temp: 98.5 °F (36.9 °C) (07/28/17 0738)  Pulse: 106 (07/28/17 0738)  Resp: 18 (07/28/17 0738)  BP: (!) 115/54 (07/28/17 0738)  SpO2: 95 % (07/28/17 0738) Vital Signs (24h Range):  Temp:  [97.9 °F (36.6 °C)-98.9 °F (37.2 °C)] 98.5 °F (36.9 °C)  Pulse:  [] 106  Resp:  [16-18] 18  SpO2:  [90 %-95 %] 95 %  BP: (108-143)/(54-73) 115/54     Weight: 84.8 kg (186 lb 13.4 oz)  Body mass index is 25.34 kg/m².  Body surface area is 2.08 meters squared.    ECOG SCORE         [unfilled]    Intake/Output - Last 3 Shifts       07/26 0700 - 07/27 0659 07/27 0700 - 07/28 0659 07/28 0700 - 07/29 0659    P.O. 1440 750     I.V. (mL/kg) 2798.3 (33.1) 2618.3 (31)     Blood 601      IV Piggyback 250 500     Total Intake(mL/kg) 5089.3 (60.2) 3868.3 (45.8)     Urine (mL/kg/hr) 3300 (1.6) 1975 (1)     Stool  0 (0)     Total Output 3300 1975      Net +1789.3 +1893.3             Stool Occurrence  0 x 1 x          Physical Exam   Constitutional: He is oriented to person, place, and time. He appears well-developed and well-nourished. No distress. Nasal cannula in place.   HENT:   Head: Normocephalic and atraumatic.   Mouth/Throat: Oropharynx is clear and moist. No oropharyngeal exudate.   Eyes: Pupils are equal, round, and reactive to light.   Cardiovascular: Normal rate and regular rhythm.    Pulmonary/Chest: Effort normal and breath sounds normal. No respiratory distress.   +decreased bs in base  - no wheeze   Abdominal: Soft. Bowel sounds are normal. He exhibits no distension. There is no  tenderness.   Musculoskeletal: He exhibits no edema.   Neurological: He is alert and oriented to person, place, and time.   Skin: Skin is warm and dry. No rash noted. No erythema.   Left chest wall oquendo in place, no bleeding noted today   Psychiatric: He has a normal mood and affect.   Anxious and Depression    Nursing note and vitals reviewed.      Significant Labs:   CBC:     Recent Labs  Lab 07/27/17  0325 07/28/17  0345   WBC 2.13* 7.58   HGB 7.7* 7.6*   HCT 23.1* 22.7*   PLT 21* 17*   , CMP:     Recent Labs  Lab 07/26/17  1412 07/27/17  0325 07/27/17  1441 07/28/17  0345    139 138 140   K 4.2 4.2 4.0 4.3    109 109 110   CO2 24 25 26 25   GLU 97 111* 112* 119*   BUN 10 9 9 10   CREATININE 0.8 0.8 0.7 0.8   CALCIUM 7.2* 7.2* 6.9* 7.5*   PROT 4.6* 4.3*  --  4.4*   ALBUMIN 2.2* 2.0*  --  2.1*   BILITOT 0.9 0.7  --  0.7   ALKPHOS 290* 327*  --  296*   AST 13 19  --  14   ALT 11 13  --  14   ANIONGAP 7* 5* 3* 5*   EGFRNONAA >60.0 >60.0 >60.0 >60.0   ,   Recent Lab Results       07/28/17  0345 07/27/17  1441      Albumin 2.1(L)      Alkaline Phosphatase 296(H)      ALT 14      Anion Gap 5(L) 3(L)     Aniso Slight      aPTT 26.3  Comment:  aPTT therapeutic range = 39-69 seconds      AST 14      BANDS 8.0      Baso # CANCELED  Comment:  Result canceled by the ancillary      Basophil% 0.0      Total Bilirubin 0.7  Comment:  For infants and newborns, interpretation of results should be based  on gestational age, weight and in agreement with clinical  observations.  Premature Infant recommended reference ranges:  Up to 24 hours.............<8.0 mg/dL  Up to 48 hours............<12.0 mg/dL  3-5 days..................<15.0 mg/dL  6-29 days.................<15.0 mg/dL        BUN, Bld 10 9     Calcium 7.5(L) 6.9  Comment:  *Critical value -  Results called to and read back by: PATRICIA BRYANT RN  ()     Chloride 110 109     CO2 25 26     Creatinine 0.8 0.7     Differential Method Manual      eGFR if   American >60.0 >60.0     eGFR if non  >60.0  Comment:  Calculation used to obtain the estimated glomerular filtration  rate (eGFR) is the CKD-EPI equation. Since race is unknown   in our information system, the eGFR values for   -American and Non--American patients are given   for each creatinine result.   >60.0  Comment:  Calculation used to obtain the estimated glomerular filtration  rate (eGFR) is the CKD-EPI equation. Since race is unknown   in our information system, the eGFR values for   -American and Non--American patients are given   for each creatinine result.       Eos # CANCELED  Comment:  Result canceled by the ancillary      Eosinophil% 0.0      Glucose 119(H) 112(H)     Gran% 86.0(H)      Hematocrit 22.7(L)      Hemoglobin 7.6(L)      Coumadin Monitoring INR 1.2  Comment:  Coumadin Therapy:  2.0 - 3.0 for INR for all indicators except mechanical heart valves  and antiphospholipid syndromes which should use 2.5 - 3.5.        Lymph # CANCELED  Comment:  Result canceled by the ancillary      Lymph% 5.0(L)      Magnesium 1.9 1.8     MCH 29.7      MCHC 33.5      MCV 89      Mono # CANCELED  Comment:  Result canceled by the ancillary      Mono% 1.0(L)      MPV 12.1      Ovalocytes Occasional      Phosphorus 2.8 2.3(L)     Platelet Estimate Decreased(A)      Platelets 17  Comment:  plt   critical result(s) called and verbal readback obtained from   radha chow rn, 07/28/2017 05:52  (LL)      Poik Slight      Potassium 4.3 4.0     Total Protein 4.4(L)      Protime 12.2      RBC 2.56(L)      RDW 15.2(H)      Sodium 140 138     WBC 7.58         and All pertinent labs from the last 24 hours have been reviewed.    Diagnostic Results:  I have reviewed all pertinent imaging results/findings within the past 24 hours.

## 2017-07-28 NOTE — PLAN OF CARE
Problem: Patient Care Overview  Goal: Plan of Care Review  Outcome: Ongoing (interventions implemented as appropriate)  Patient aa&ox3. Calm and cooperative during my shift, remains on continous oxygen and does have to be reminded to not remove from his face. Remains on IVF's. Reported pain only once this shift and received 1mg IV dilaudid. Patient having coughing episodes which make him vomit but is noted to be phlegm, white thick and clear, patient is receiving scheduled nausea meds. Mucinex given and tessalon pearls given. Voiding in urinal. No injuries or falls thus far my shift. Will continue to monitor. Patient remains on tele.

## 2017-07-28 NOTE — ASSESSMENT & PLAN NOTE
-- Relapsed AML - peripheral blood shows 30% blasts on admit  -- 2D echo shows 60% EF  -- Mazariegos placed, appears to be in the azygous vein, likely from repositioning. General surgery to replace 7/24. 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 Blast of 62%  -- Awaiting mutation analysis and cytogenetics; FLT-3 negative     -- Jaw pain and pain in right cranial nerve 7 distribution with persistent, severe headaches concerning for CNS involvement - underwent IT chemotherapy on 6/23. Flow negative for disease. CSF LDH was 29  -- Had previous reaction to MEC (etoposide caused full body rash). Did not qualify for the Tolero trial due to receiving IT chemotherapy    -- Day 32 of FLAG-BETZY   -- Day 14 bone marrow biopsy done 7/10 - hypocellular with no evidence of residual disease  -- Future plans for DLI (8/14/17?)  -- hold lumbar puncture due to decrease in nausea

## 2017-07-28 NOTE — ASSESSMENT & PLAN NOTE
Nutrition Problem  increased nutrient needs    Related to (etiology):   Physiological Needs-AML    Signs and Symptoms (as evidenced by):   Altered nutritional labs (WBC-.48, Hbg-7.1, Hct-2.1, Alk Phos-282)    Interventions/Recommendations (treatment strategy):  See recs above    Nutrition Diagnosis Status:   Improving

## 2017-07-28 NOTE — ASSESSMENT & PLAN NOTE
- scheduled zofran with PRN compazine  - dietary consult placed   - drank a protein drink 7/27  - nurses to put meals in front of patient and encourage nutrition   - brother to bring food today

## 2017-07-28 NOTE — PLAN OF CARE
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]  Not Applicable     Priority: 2     HIV disease [B20]  Yes     Priority: 3     Pleural effusion [J90]  No     Priority: 4     Neutropenic fever [D70.9, R50.81]  No     Priority: 4     Acute respiratory failure with hypoxia [J96.01]  No     Priority: 5     CINV (chemotherapy-induced nausea and vomiting) [R11.2, T45.1X5A]  No     Priority: 5     Pancytopenia due to antineoplastic chemotherapy [D61.810, T45.1X5A]  Yes     Priority: 5     Delirium [R41.0]  No     Priority: 6     Prostate hypertrophy [N40.0]  Yes     Priority: 7     Supraventricular tachycardia [I47.1]  No     Priority: 8     Pulmonary infiltrates on CXR [R91.8]  Yes    Vascular catheter dysfunction [T82.41XA]  No    Stroke of unknown etiology [I63.9]  No    Insomnia [G47.00]  No    Chronic bilateral low back pain without sciatica [M54.5, G89.29]  No    Electrolyte abnormality [E87.8]  Yes    Anxiety [F41.9]  Yes      Resolved Hospital Problems    Diagnosis Date Resolved POA    Elevated liver enzymes [R74.8] 07/08/2017 No    Acute hypernatremia [E87.0] 07/08/2017 Yes    Facial numbness [R20.0] 07/23/2017 No    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 [E88.3] 07/08/2017 No    Thrombocytopenia [D69.6] 07/24/2017 Yes    Bleeding at insertion site [L76.82] 06/29/2017 No    NICOLE (acute kidney injury) [N17.9] 07/08/2017 Yes       Future Appointments  Date Time Provider Department Center   8/21/2017 3:15 PM  EKG, APPT [facility] EKG [facility]   8/21/2017 4:00 PM S[facility] [place] [facility] ARRHYTH [facility]           I have seen and examined this patient face to face today. My clinical findings that support the need for the home health skilled services and home bound status are the following:  Weakness/numbness causing balance and gait disturbance due to Weakness/Debility and Malignancy/Cancer making it taxing to leave home.    Allergies:  Review of patient's allergies indicates:   Allergen Reactions    Etoposide Rash       Diet: regular diet    Activities: activity as tolerated    Nursing:   SN to complete comprehensive assessment including routine vital signs. Instruct on disease process and s/s of complications to report to MD. Review/verify medication list sent home with the patient at time of discharge  and instruct patient/caregiver as needed. Frequency may be adjusted depending on start of care date.    Notify MD if SBP > 160 or < 90; DBP > 90 or < 50; HR > 120 or < 50; Temp > 101; Other:         CONSULTS:    NA    MISCELLANEOUS CARE:  Home Oxygen:  Oxygen at 2 L/min nasal canula to be used:  Continuously.      WOUND CARE ORDERS  n/a      Medications: Review discharge medications with patient and family and provide education.    Current Discharge Medication List      CONTINUE these medications which have NOT CHANGED    Details   abacavir-dolutegravir-lamivud (TRIUMEQ) 600- mg Tab Take 1 tablet by mouth once daily.  Qty: 30 tablet, Refills: 2    Associated Diagnoses: HIV (human immunodeficiency virus infection)      acyclovir (ZOVIRAX) 800 MG Tab Take 1 tablet (800 mg total) by mouth 2 (two) times daily.  Qty: 60 tablet, Refills: 11      alprazolam (XANAX) 0.5 MG tablet Take 1 tablet (0.5 mg total) by mouth 2 (two) times daily as needed for Insomnia or Anxiety.  Qty: 60 tablet, Refills: 0    Associated Diagnoses: Anxiety      butalbital-acetaminophen-caffeine -40 mg (FIORICET, ESGIC) -40 mg per tablet  Refills: 0      finasteride (PROSCAR) 5 mg tablet Take 1 tablet (5 mg total) by mouth once daily.  Qty: 30 tablet, Refills: 1    Associated Diagnoses: Prostate hypertrophy      HYDROmorphone (DILAUDID) 2 MG tablet Take 1 tablet (2 mg total) by mouth every 4 (four) hours as needed for Pain.  Qty: 60 tablet, Refills: 0    Associated Diagnoses: AML (acute myelogenous leukemia); AML (acute myeloblastic leukemia); Prostate hypertrophy; HIV infection; Thrombocytopenia; Anemia due to other cause; CKD (chronic kidney disease), stage IV; Neutropenic fever; Hematuria; Acute maxillary sinusitis, recurrence not specified; Renal insufficiency; Erectile dysfunction, unspecified erectile dysfunction type; Neutropenia; Histoplasma capsulatum infection; Histoplasmosis pneumonia; Hypokalemia      itraconazole (SPORANOX) 10 mg/mL Soln Take 2 mLs (20 mg total) by mouth 3 (three) times daily.  Qty: 1800 mL, Refills: 0    Associated Diagnoses: Histoplasma capsulatum infection      loperamide (IMODIUM) 2 mg capsule Take 2 mg by mouth daily as needed for Diarrhea.      magnesium oxide (MAG-OX) 400 mg tablet TAKE THREE TABLETS BY MOUTH THREE TIMES A DAY  Qty: 270 tablet, Refills: 1    Associated Diagnoses: S/P allogeneic bone marrow transplant      ondansetron (ZOFRAN) 8 MG tablet Take 1 tablet (8 mg total) by mouth every 8 (eight) hours as needed for Nausea.  Qty: 30 tablet, Refills: 1      pantoprazole (PROTONIX) 40 MG tablet Take 1 tablet (40 mg total) by mouth once daily.  Qty: 30 tablet, Refills: 11      promethazine (PHENERGAN) 12.5 MG Tab Take 1 tablet (12.5 mg total) by mouth every 6 (six) hours as needed (nausea).  Qty: 30 tablet, Refills: 2      tacrolimus (PROGRAF) 0.5 MG Cap Take 1 capsule (0.5 mg total) by mouth once daily.  Qty: 120 capsule, Refills: 11    Associated Diagnoses: S/P allogeneic bone marrow transplant      tamsulosin (FLOMAX) 0.4 mg Cp24 Take 1 capsule (0.4 mg total) by mouth every other day.  Qty: 15 capsule,  Refills: 1    Associated Diagnoses: Prostate hypertrophy      zolpidem (AMBIEN) 5 MG Tab Take 1 tablet (5 mg total) by mouth nightly as needed.  Qty: 30 tablet, Refills: 1    Associated Diagnoses: Sleep deficient             I certify that this patient is confined to his home and needs intermittent skilled nursing care.

## 2017-07-28 NOTE — PROGRESS NOTES
[facility]  Adult Nutrition  Progress Note    SUMMARY     Recommendations    Recommendation/Intervention: 1. continue w/ Regular diet+ Boost Plus 1x/d and Beneprotein 3pks daily . 2.Encourage PO intake >/= 75% EEN/EPN w/HVP  each meal. 4. RD to follow  Goals: PO intake =/> 75% EEN/EPN   Nutrition Goal Status: goal not met  Communication of RD Recs: discussed on rounds (informed MD of edu and addition of Beneprotein)    Reason for Assessment    Reason for Assessment: RD follow-up  Diagnosis: cancer diagnosis/related complications (AML (acute myeloid leukemia) in relapse )  Relevant Medical History: Non-M3 AML   Interdisciplinary Rounds: attended  General Information Comments: Appetite has improved pt had smoothie and cookies late night from family, this AM consumed ~75% of breakfast split over 2 feedings. Pt looks stronger today. pt states feels stronger and trying to stay out of the bed as much as possible   Nutrition Discharge Planning: regular diet + ONS >/= 75% EEN/EPN. Ensure pt is following BMT Food safety guidelines    Nutrition Prescription Ordered    Current Diet Order: Regular  Nutrition Order Comments: Beneprotein TID  Oral Nutrition Supplement: Boost Plus 1x/d     Evaluation of Received Nutrients/Fluid Intake          Intake/Output Summary (Last 24 hours) at 07/28/17 1314  Last data filed at 07/28/17 1126   Gross per 24 hour   Intake             3730 ml   Output             1525 ml   Net             2205 ml     LBM:7/28/17   IV Fluid (mL): 2400    % Intake of Estimated Energy Needs: 50-75 %  % Meal Intake: 75%     Nutrition Risk Screen     Nutrition Risk Screen: no indicators present    Nutrition/Diet History    Patient Reported Diet/Restrictions/Preferences: general  Typical Food/Fluid Intake: decreased, eating small meals  Food Preferences: No cultural or Amish preferences  Supplemental Drinks or Food Habits: Boost Plus  Factors Affecting Nutritional Intake: decreased appetite,  dry mouth, nausea/vomiting    Labs/Tests/Procedures/Meds      Pertinent Labs Reviewed: reviewed  Pertinent Labs Comments: WBC-7.58, Plat-17, Hgb-7.6, Hct-22.7, Glu-119, Alk phos, 296, Alb-2.1  Pertinent Medications Reviewed: reviewed  Pertinent Medications Comments: IVF, acyclovir, statin, heparin, Mg, Kcl,    Physical Findings    Overall Physical Appearance: loss of muscle mass, generalized wasting  Tubes:  (central line)  Oral/Mouth Cavity: all teeth present (age appropriate)  Skin: intact    Anthropometrics    Temp: 98.5 °F (36.9 °C)  Height: 6' (182.9 cm)  Weight Method: Standard Scale  Weight: 84.8 kg (186 lb 13.4 oz)  Ideal Body Weight (IBW), Male: 178 lb  % Ideal Body Weight, Male (lb): 103.37 lb  BMI (Calculated): 25  BMI Grade: 18.5-24.9 - normal  Weight Loss: unintentional  Usual Body Weight (UBW), k.6 kg    Estimated/Assessed Needs    Weight Used For Calorie Calculations: 83.5 kg (184 lb 1.4 oz)   Height (cm): 185.4 cm  Energy Calorie Requirements (kcal): 2350-5367  Energy Need Method: Kcal/kg   RMR ([place]-St. Jeor Equation): 1713  Weight Used For Protein Calculations: 83.5 kg (184 lb 1.4 oz)  Protein Requirements: 100-126  Fluid Need Method: RDA Method (or per MD)  RDA Method (mL): 2505    Assessment and Plan    * AML (acute myeloid leukemia) in relapse    Nutrition Problem  increased nutrient needs    Related to (etiology):   Physiological Needs-AML    Signs and Symptoms (as evidenced by):   Altered nutritional labs (WBC-.48, Hbg-7.1, Hct-2.1, Alk Phos-282)    Interventions/Recommendations (treatment strategy):  See recs above    Nutrition Diagnosis Status:   Improving                  Monitor and Evaluation    Food and Nutrient Intake: energy intake, food and beverage intake  Food and Nutrient Adminstration: diet order  Physical Activity and Function: nutrition-related ADLs and IADLs  Anthropometric Measurements: weight, weight change  Biochemical Data, Medical Tests and Procedures:  (All   labs)  Nutrition-Focused Physical Findings: overall appearance, extremities, muscles and bones, skin    Nutrition Risk    Level of Risk:  (f/u 2x/wk)    Nutrition Follow-Up    RD Follow-up?: Yes

## 2017-07-28 NOTE — PROGRESS NOTES
[facility]  Hematology  Bone Marrow Transplant  Progress Note    Patient Name: Paul E Sistrunk  Admission Date: 6/20/2017  Hospital Length of Stay: 38 days  Code Status: Full Code    Subjective:     Interval History:   - Day 32 FLAG-BETZY - day 14 marrow showed remission  - MRI abnormal - vascular surgery following - on statin  - VSS Afebrile.   - Reports subjective improvement in SOB after thoracentesis done 7/26/17  - Continued jaw pain but no new complaints of chest pain or abdo pains  - Remains with poor appetite; able to eat yesterday   - Depression noted - denied hem/onc psych     Objective:     Vital Signs (Most Recent):  Temp: 98.5 °F (36.9 °C) (07/28/17 0738)  Pulse: 106 (07/28/17 0738)  Resp: 18 (07/28/17 0738)  BP: (!) 115/54 (07/28/17 0738)  SpO2: 95 % (07/28/17 0738) Vital Signs (24h Range):  Temp:  [97.9 °F (36.6 °C)-98.9 °F (37.2 °C)] 98.5 °F (36.9 °C)  Pulse:  [] 106  Resp:  [16-18] 18  SpO2:  [90 %-95 %] 95 %  BP: (108-143)/(54-73) 115/54     Weight: 84.8 kg (186 lb 13.4 oz)  Body mass index is 25.34 kg/m².  Body surface area is 2.08 meters squared.    ECOG SCORE         [unfilled]    Intake/Output - Last 3 Shifts       07/26 0700 - 07/27 0659 07/27 0700 - 07/28 0659 07/28 0700 - 07/29 0659    P.O. 1440 750     I.V. (mL/kg) 2798.3 (33.1) 2618.3 (31)     Blood 601      IV Piggyback 250 500     Total Intake(mL/kg) 5089.3 (60.2) 3868.3 (45.8)     Urine (mL/kg/hr) 3300 (1.6) 1975 (1)     Stool  0 (0)     Total Output 3300 1975      Net +1789.3 +1893.3             Stool Occurrence  0 x 1 x          Physical Exam   Constitutional: He is oriented to person, place, and time. He appears well-developed and well-nourished. No distress. Nasal cannula in place.   HENT:   Head: Normocephalic and atraumatic.   Mouth/Throat: Oropharynx is clear and moist. No oropharyngeal exudate.   Eyes: Pupils are equal, round, and reactive to light.   Cardiovascular: Normal rate and regular rhythm.     Pulmonary/Chest: Effort normal and breath sounds normal. No respiratory distress.   +decreased bs in base  - no wheeze   Abdominal: Soft. Bowel sounds are normal. He exhibits no distension. There is no tenderness.   Musculoskeletal: He exhibits no edema.   Neurological: He is alert and oriented to person, place, and time.   Skin: Skin is warm and dry. No rash noted. No erythema.   Left chest wall oquendo in place, no bleeding noted today   Psychiatric: He has a normal mood and affect.   Anxious and Depression    Nursing note and vitals reviewed.      Significant Labs:   CBC:     Recent Labs  Lab 07/27/17  0325 07/28/17  0345   WBC 2.13* 7.58   HGB 7.7* 7.6*   HCT 23.1* 22.7*   PLT 21* 17*   , CMP:     Recent Labs  Lab 07/26/17  1412 07/27/17  0325 07/27/17  1441 07/28/17  0345    139 138 140   K 4.2 4.2 4.0 4.3    109 109 110   CO2 24 25 26 25   GLU 97 111* 112* 119*   BUN 10 9 9 10   CREATININE 0.8 0.8 0.7 0.8   CALCIUM 7.2* 7.2* 6.9* 7.5*   PROT 4.6* 4.3*  --  4.4*   ALBUMIN 2.2* 2.0*  --  2.1*   BILITOT 0.9 0.7  --  0.7   ALKPHOS 290* 327*  --  296*   AST 13 19  --  14   ALT 11 13  --  14   ANIONGAP 7* 5* 3* 5*   EGFRNONAA >60.0 >60.0 >60.0 >60.0   ,   Recent Lab Results       07/28/17  0345 07/27/17  1441      Albumin 2.1(L)      Alkaline Phosphatase 296(H)      ALT 14      Anion Gap 5(L) 3(L)     Aniso Slight      aPTT 26.3  Comment:  aPTT therapeutic range = 39-69 seconds      AST 14      BANDS 8.0      Baso # CANCELED  Comment:  Result canceled by the ancillary      Basophil% 0.0      Total Bilirubin 0.7  Comment:  For infants and newborns, interpretation of results should be based  on gestational age, weight and in agreement with clinical  observations.  Premature Infant recommended reference ranges:  Up to 24 hours.............<8.0 mg/dL  Up to 48 hours............<12.0 mg/dL  3-5 days..................<15.0 mg/dL  6-29 days.................<15.0 mg/dL        BUN, Bld 10 9     Calcium 7.5(L)  6.9  Comment:  *Critical value -  Results called to and read back by: PATRICIA BRYANT RN  (LL)     Chloride 110 109     CO2 25 26     Creatinine 0.8 0.7     Differential Method Manual      eGFR if  >60.0 >60.0     eGFR if non  >60.0  Comment:  Calculation used to obtain the estimated glomerular filtration  rate (eGFR) is the CKD-EPI equation. Since race is unknown   in our information system, the eGFR values for   -American and Non--American patients are given   for each creatinine result.   >60.0  Comment:  Calculation used to obtain the estimated glomerular filtration  rate (eGFR) is the CKD-EPI equation. Since race is unknown   in our information system, the eGFR values for   -American and Non--American patients are given   for each creatinine result.       Eos # CANCELED  Comment:  Result canceled by the ancillary      Eosinophil% 0.0      Glucose 119(H) 112(H)     Gran% 86.0(H)      Hematocrit 22.7(L)      Hemoglobin 7.6(L)      Coumadin Monitoring INR 1.2  Comment:  Coumadin Therapy:  2.0 - 3.0 for INR for all indicators except mechanical heart valves  and antiphospholipid syndromes which should use 2.5 - 3.5.        Lymph # CANCELED  Comment:  Result canceled by the ancillary      Lymph% 5.0(L)      Magnesium 1.9 1.8     MCH 29.7      MCHC 33.5      MCV 89      Mono # CANCELED  Comment:  Result canceled by the ancillary      Mono% 1.0(L)      MPV 12.1      Ovalocytes Occasional      Phosphorus 2.8 2.3(L)     Platelet Estimate Decreased(A)      Platelets 17  Comment:  plt   critical result(s) called and verbal readback obtained from   radha chow rn, 07/28/2017 05:52  (LL)      Poik Slight      Potassium 4.3 4.0     Total Protein 4.4(L)      Protime 12.2      RBC 2.56(L)      RDW 15.2(H)      Sodium 140 138     WBC 7.58         and All pertinent labs from the last 24 hours have been reviewed.    Diagnostic Results:  I have reviewed all pertinent  imaging results/findings within the past 24 hours.    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 General surgery to replace 7/24. 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 Blast of 62%  -- Awaiting mutation analysis and cytogenetics; FLT-3 negative     -- Jaw pain and pain in right cranial nerve 7 distribution with persistent, severe headaches concerning for CNS involvement - underwent IT chemotherapy on 6/23. Flow negative for disease. CSF LDH was 29  -- Had previous reaction to MEC (etoposide caused full body rash). Did not qualify for the Tolero trial due to receiving IT chemotherapy    -- Day 32 of FLAG-BETZY   -- Day 14 bone marrow biopsy done 7/10 - hypocellular with no evidence of residual disease  -- Future plans for DLI (8/14/17?)  -- hold lumbar puncture due to decrease in nausea         S/P allogeneic bone marrow transplant    -- +319 days s/p Flu/Bu/ATG MUD allogeneic transplant for high risk AML after his second IDAC (6/20/16 - 6/24/16) after a prolonged hospitalization (2/5/16 - 3/21/16) for induction with 7&3 and reinduction with MEC to remission and IDAC (4/4/16 - 4/9/16)  -- Continue ppx acyclovir, renally dosed. We have discussed antifungal coverage with infectious disease. Discontinued itraconzaole (7/09) was on ambisome, now on voriconazole  -- Chimerics from marrow done 6/21 show CD3 of 90% donor and 10% recipient, but CD34 of 5% donor and 95% recipient   -- Chimers from 7/10 shows 70% recipient and 30% donor - NOT SORTED  -- Plans for DLI in future (8/14/17?)        HIV disease    -- CD4 low at 11.4%, Absolute CD4 469 and HIV RNA not detected from 6/21  -- As per ID: continue triumeq. Will set up ID follow-up upon  discharge        Pleural effusion    -- Pulm re-consulted 7/20  -- s/p Thoracentesis - cytology negative   -- repeat CXR 7/26 shows Ill-defined patchy airspace consolidation bilaterally more on the left side identified.  Pleural effusion slightly more on the left.  -- Pulm re-consulted 7/26  --s/p Thoracentesis 7/26/17 with 1.3L removed  -- refused CT of chest  -- plan for trial of steroids per pulmonary, appreciate recs        Acute respiratory failure with hypoxia    - 2 L NC saturation 90-94%        CINV (chemotherapy-induced nausea and vomiting)    - scheduled zofran with PRN compazine  - dietary consult placed   - drank a protein drink 7/27  - nurses to put meals in front of patient and encourage nutrition   - brother to bring food today        Pancytopenia due to antineoplastic chemotherapy    -- Marginally improved  -- White blood cell count is 7.58 k/uL  -- hemoglobin 7.6 g/dL; platelet count is 17 k/uL   -- Following with daily CBC and transfusing for hemoglobin < 7 g/dL, platelets < 10 k/uL  -- Anticipate the need for more aggressive platelet transfusion prior to surgery        Delirium    -- Improving  -- Typically worse at night; speaks nonsense occasionally during the day; has threatened to leave AMA at night a few times, takes off oxygen at night and has disconnected pump  -- CT of head revealed acute stroke, for which neurology is now following. Not thought to be the main contributor to AMS, rather the cause is likely multifactorial with contribution from medications and his prolonged hospitalization        Prostate hypertrophy    -- Stable  -- Continue home flomax        Supraventricular tachycardia    -- Stable  -- Supraventricular tachycardia on tele and EKG. Started 7/13/17 8pm with HR between 160-170  -- Patient was cardioverted by cardiology and started on metoprolol and flecainide.  -- Treating underlying cause: infection and malignancy  -- Pharmacologic nuclear stress test originally ordered;  cardiology following with tentative plans for a cardiac CTA  -- PVCs seen on EKG on 7/26 - corrected electrolytes         Neutropenic fever    -- Resolved   -- Blood cultures no growth from 6/24/17, 7/4/17, 7/7/17, 7/9/17, 7/15/17  -- Complicated antimicrobial treatment history as follows:   - Cefepime: (started 6/24 - discontinued 6/28) restarted on 7/4/17 - 7/23   - Ampotericin B started 7/9-stopped 7/19;    - Vancomycin (restarted 7/15 - stopped 7/17)  -- Now on PPX antimicrobial agents alone, with bactrim, voriconazole,  acyclovir, and ciprofloxacin  -- Appreciate ID assistance. They have now signed off.  -- Multiple possible sources. CXR showed pneumonia 7/15; CXR 7/17 - Significant patchy air space consolidation identified bilaterally slightly more on the left side.  Slight improvement as compared to the previous study.  Left-sided pleural effusion. On 2-3 L via NC  -- CT chest/abdomen/pelvis reveals bilateral groundglass opacities as well as a left lower-lobe consolidation;  -- ID recommended repeating CT chest 7/19, which showed: Limited assessment. No acute central pulmonary thromboembolism. Peripheral pulmonary emboli are not excluded. Interval development of bilateral moderate pleural effusions. Trace pericardial effusion. Interval progression of bilateral airspace disease with more extensive areas of consolidation bilaterally, in a similar central distribution to the groundglass opacities previously noted, with an upper lobe predominance.   -- Pulm was consulted for a BAL which was done 7/12, cytology pending from BAL; aspergillus <0.500; KOH was negative for yeast/fungus   -- Pulmonary re-consulted, now s/p thoracentesis on 7/22 with cultures NGTD, negative for cytology         Stroke of unknown etiology    -- Stable  -- Neurology following, appreciate assistance  -- Several imaging studies have been performed, including CT head 7/21, MRI brain 7/21, and CTA head and neck 7/21 with interpreting  radiologists citing concern for infarction of the right lentiform nucleus and anterior limb of the internal capsule   -- Neurology reviewed the images and were first concerned about possible embolic activity given his arrhythmia earlier this admission, now concerned about the possibility of fungal CNS infection, though after discussion with ID he has essentially completed a therapeutic course with ambisome and is now on voriconazole, recommending against additional prophylactic CNS fungal coverage  -- Continue atorvastatin. Not a candidate for any sort of anticoagulation or antiplatelet agents given transfusion-dependent thrombocytopenia        Lower extremity edema    -- Stable, likely iatrogenic from fluids  -- IV fluids stopped  -- Net Negative 3 L from 7/18 - down 11 lbs from 7/18 - 40 mg of lasix BID - given on 7/18   -- Monitoring I/Os        Insomnia    -- Improving  -- Ramelteon and zyprexa not tolerated  -- Continue phenergan 12.5 mg nightly for insomnia, which seems to help without causing excessive sedation or confusion        Chronic bilateral low back pain without sciatica    -- Thought to be related to Neupogen  -- Continue zyrtec, lidocaine patch   -- Cautiously resuming opioid analgesics, including dilaudid PRN        Electrolyte abnormality    -- Ordered prn electrolyte replacement per protocol   -- Hypophosphatemia replaced; he appears to be running persistently low.         Anxiety    -- Holding xanax PRN due to confusion  -- Consulted hem/onc psych, will need to follow-up with them as an outpatient when discharged  -- patient denies wanting to follow up in patient            VTE Risk Mitigation         Ordered     Place sequential compression device  Until discontinued      06/20/17 2005     High Risk of VTE  Once      06/20/17 1901          Disposition: Pending home oxygen.     Asia Melgar NP  Bone Marrow Transplant  [facility]

## 2017-07-29 NOTE — SUBJECTIVE & OBJECTIVE
Subjective:     Interval History:   - Day 33 FLAG-BETZY - day 14 marrow showed remission  - MRI abnormal - vascular surgery following - on statin  - VSS Afebrile.   - Reports subjective improvement in SOB after thoracentesis done 7/26/17  - Continued jaw pain but no new complaints of chest pain or abdo pains  - Appetite much improved  - Depression noted - denied hem/onc psych   - Slept well, no other complaints    Objective:     Vital Signs (Most Recent):  Temp: 98.1 °F (36.7 °C) (07/29/17 0730)  Pulse: 94 (07/29/17 0730)  Resp: 18 (07/29/17 0730)  BP: 117/72 (07/29/17 0730)  SpO2: (!) 94 % (07/29/17 0950) Vital Signs (24h Range):  Temp:  [97.9 °F (36.6 °C)-98.5 °F (36.9 °C)] 98.1 °F (36.7 °C)  Pulse:  [] 94  Resp:  [18-20] 18  SpO2:  [88 %-95 %] 94 %  BP: (110-146)/(61-74) 117/72     Weight: 84.9 kg (187 lb 2.7 oz)  Body mass index is 25.38 kg/m².  Body surface area is 2.08 meters squared.    ECOG SCORE         [unfilled]    Intake/Output - Last 3 Shifts       07/27 0700 - 07/28 0659 07/28 0700 - 07/29 0659 07/29 0700 - 07/30 0659    P.O. 750 1160 480    I.V. (mL/kg) 2618.3 (31) 2451.7 (28.9) 293 (3.5)    Blood       IV Piggyback 500      Total Intake(mL/kg) 3868.3 (45.8) 3611.7 (42.5) 773 (9.1)    Urine (mL/kg/hr) 1975 (1) 1725 (0.8) 250 (0.9)    Stool 0 (0) 0 (0)     Total Output 1975 1725 250    Net +1893.3 +1886.7 +523           Stool Occurrence 0 x 2 x           Physical Exam   Constitutional: He is oriented to person, place, and time. He appears well-developed and well-nourished. No distress. Nasal cannula in place.   HENT:   Head: Normocephalic and atraumatic.   Mouth/Throat: Oropharynx is clear and moist. No oropharyngeal exudate.   Eyes: Pupils are equal, round, and reactive to light.   Cardiovascular: Normal rate and regular rhythm.    Pulmonary/Chest: Effort normal and breath sounds normal. No respiratory distress.   +mild basilar crackles  - no wheeze   Abdominal: Soft. Bowel sounds are normal. He  exhibits no distension. There is no tenderness.   Musculoskeletal: He exhibits no edema.   Neurological: He is alert and oriented to person, place, and time.   Skin: Skin is warm and dry. No rash noted. No erythema.   Left chest wall oquendo in place, no bleeding noted today   Psychiatric: He has a normal mood and affect.   Anxious and Depression    Nursing note and vitals reviewed.      Significant Labs:   CBC:     Recent Labs  Lab 07/28/17  0345 07/29/17  0518   WBC 7.58 6.65   HGB 7.6* 7.6*   HCT 22.7* 23.3*   PLT 17* 22*   , CMP:     Recent Labs  Lab 07/27/17  1441 07/28/17  0345 07/29/17  0518    140 140   K 4.0 4.3 4.4    110 111*   CO2 26 25 24   * 119* 113*   BUN 9 10 15   CREATININE 0.7 0.8 0.8   CALCIUM 6.9* 7.5* 7.7*   PROT  --  4.4* 4.4*   ALBUMIN  --  2.1* 2.2*   BILITOT  --  0.7 0.6   ALKPHOS  --  296* 286*   AST  --  14 34   ALT  --  14 28   ANIONGAP 3* 5* 5*   EGFRNONAA >60.0 >60.0 >60.0   ,   Recent Lab Results       07/29/17  0518      Albumin 2.2(L)     Alkaline Phosphatase 286(H)     ALT 28     Anion Gap 5(L)     Aniso Slight     AST 34     Baso # 0.01     Basophil% 0.2     Total Bilirubin 0.6  Comment:  For infants and newborns, interpretation of results should be based  on gestational age, weight and in agreement with clinical  observations.  Premature Infant recommended reference ranges:  Up to 24 hours.............<8.0 mg/dL  Up to 48 hours............<12.0 mg/dL  3-5 days..................<15.0 mg/dL  6-29 days.................<15.0 mg/dL       BUN, Bld 15     Calcium 7.7(L)     Chloride 111(H)     CO2 24     Creatinine 0.8     Differential Method Automated     eGFR if African American >60.0     eGFR if non  >60.0  Comment:  Calculation used to obtain the estimated glomerular filtration  rate (eGFR) is the CKD-EPI equation. Since race is unknown   in our information system, the eGFR values for   -American and Non--American patients are given    for each creatinine result.       Eos # 0.0     Eosinophil% 0.0     Glucose 113(H)     Gran # 5.0     Gran% 76.8(H)     Group & Rh O POS     Hematocrit 23.3(L)     Hemoglobin 7.6(L)     Hypo Occasional     INDIRECT ANDREA NEG     Lymph # 1.0     Lymph% 15.6(L)     Magnesium 1.6     MCH 29.6     MCHC 32.6     MCV 91     Mono # 0.5     Mono% 7.4     MPV 12.5     Ovalocytes Occasional     Phosphorus 2.4(L)     Platelet Estimate Decreased(A)     Platelets 22  Comment:  plt   critical result(s) called and verbal readback obtained from   yennifer swift rn, 07/29/2017 08:29  (LL)     Poik Slight     Poly Occasional     Potassium 4.4     Total Protein 4.4(L)     RBC 2.57(L)     RDW 15.3(H)     Sodium 140     WBC 6.65        and All pertinent labs from the last 24 hours have been reviewed.    Diagnostic Results:  I have reviewed all pertinent imaging results/findings within the past 24 hours.

## 2017-07-29 NOTE — ASSESSMENT & PLAN NOTE
-- Relapsed AML - peripheral blood shows 30% blasts on admit  -- 2D echo shows 60% EF  -- Mazariegos placed, appears to be in the azygous vein, likely from repositioning. General surgery to replace 7/24. 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 Blast of 62%  -- Awaiting mutation analysis and cytogenetics; FLT-3 negative     -- Jaw pain and pain in right cranial nerve 7 distribution with persistent, severe headaches concerning for CNS involvement - underwent IT chemotherapy on 6/23. Flow negative for disease. CSF LDH was 29  -- Had previous reaction to MEC (etoposide caused full body rash). Did not qualify for the Tolero trial due to receiving IT chemotherapy    -- Day 33 of FLAG-BETZY   -- Day 14 bone marrow biopsy done 7/10 - hypocellular with no evidence of residual disease  -- Future plans for DLI (8/14/17?)  -- hold lumbar puncture due to decrease in nausea

## 2017-07-29 NOTE — ASSESSMENT & PLAN NOTE
-- Marginally improved  -- White blood cell count is 6.65 k/uL  -- hemoglobin 7.6 g/dL; platelet count is 22 k/uL   -- Following with daily CBC and transfusing for hemoglobin < 7 g/dL, platelets < 10 k/uL  -- Anticipate the need for more aggressive platelet transfusion prior to surgery

## 2017-07-29 NOTE — ASSESSMENT & PLAN NOTE
- scheduled zofran with PRN compazine  - dietary consult placed   - drank a protein drink 7/27  - much improved and now tolerating diet

## 2017-07-29 NOTE — ASSESSMENT & PLAN NOTE
-- Pulm re-consulted 7/20  -- s/p Thoracentesis - cytology negative   -- repeat CXR 7/26 shows Ill-defined patchy airspace consolidation bilaterally more on the left side identified.  Pleural effusion slightly more on the left.  -- Pulm re-consulted 7/26  --s/p Thoracentesis 7/26/17 with 1.3L removed  -- refused CT of chest  -- trial of steroids per pulmonary, appreciate recs

## 2017-07-29 NOTE — PLAN OF CARE
Problem: Patient Care Overview  Goal: Plan of Care Review  Outcome: Ongoing (interventions implemented as appropriate)  Pt involved in plan of care and communicating needs throughout shift. Family visited with patient.  Up as tolerated. Tolerating diet, voiding without difficulty. IVF continued. 1L O2 sating 93%- desats without oxygen. Magnesium and NaPhos replacements given IV. Dilaudid given once for generalized pain. Compazine given this am before morning medications. Plan for DC soon and DLI outpatient on 8/14.  All vitals stable; no acute events this shift.  Pt remaining free from falls or injury throughout shift; bed in lowest position; call light within reach; pt instructed to call for assistance as needed. Q1h rounding on patient. Will continue to monitor.

## 2017-07-29 NOTE — ASSESSMENT & PLAN NOTE
-- +320 days s/p Flu/Bu/ATG MUD allogeneic transplant for high risk AML after his second IDAC (6/20/16 - 6/24/16) after a prolonged hospitalization (2/5/16 - 3/21/16) for induction with 7&3 and reinduction with MEC to remission and IDAC (4/4/16 - 4/9/16)  -- Continue ppx acyclovir, renally dosed. We have discussed antifungal coverage with infectious disease. Discontinued itraconzaole (7/09) was on ambisome, now on voriconazole  -- Chimerics from marrow done 6/21 show CD3 of 90% donor and 10% recipient, but CD34 of 5% donor and 95% recipient   -- Chimers from 7/10 shows 70% recipient and 30% donor - NOT SORTED  -- Plans for DLI in future (8/14/17?)

## 2017-07-29 NOTE — PROGRESS NOTES
[facility]  Hematology  Bone Marrow Transplant  Progress Note    Patient Name: Paul E Sistrunk  Admission Date: 6/20/2017  Hospital Length of Stay: 39 days  Code Status: Full Code    Subjective:     Interval History:   - Day 33 FLAG-BETZY - day 14 marrow showed remission  - MRI abnormal - vascular surgery following - on statin  - VSS Afebrile.   - Reports subjective improvement in SOB after thoracentesis done 7/26/17  - Continued jaw pain but no new complaints of chest pain or abdo pains  - Appetite much improved  - Depression noted - denied hem/onc psych   - Slept well, no other complaints    Objective:     Vital Signs (Most Recent):  Temp: 98.1 °F (36.7 °C) (07/29/17 0730)  Pulse: 94 (07/29/17 0730)  Resp: 18 (07/29/17 0730)  BP: 117/72 (07/29/17 0730)  SpO2: (!) 94 % (07/29/17 0950) Vital Signs (24h Range):  Temp:  [97.9 °F (36.6 °C)-98.5 °F (36.9 °C)] 98.1 °F (36.7 °C)  Pulse:  [] 94  Resp:  [18-20] 18  SpO2:  [88 %-95 %] 94 %  BP: (110-146)/(61-74) 117/72     Weight: 84.9 kg (187 lb 2.7 oz)  Body mass index is 25.38 kg/m².  Body surface area is 2.08 meters squared.    ECOG SCORE         [unfilled]    Intake/Output - Last 3 Shifts       07/27 0700 - 07/28 0659 07/28 0700 - 07/29 0659 07/29 0700 - 07/30 0659    P.O. 750 1160 480    I.V. (mL/kg) 2618.3 (31) 2451.7 (28.9) 293 (3.5)    Blood       IV Piggyback 500      Total Intake(mL/kg) 3868.3 (45.8) 3611.7 (42.5) 773 (9.1)    Urine (mL/kg/hr) 1975 (1) 1725 (0.8) 250 (0.9)    Stool 0 (0) 0 (0)     Total Output 1975 1725 250    Net +1893.3 +1886.7 +523           Stool Occurrence 0 x 2 x           Physical Exam   Constitutional: He is oriented to person, place, and time. He appears well-developed and well-nourished. No distress. Nasal cannula in place.   HENT:   Head: Normocephalic and atraumatic.   Mouth/Throat: Oropharynx is clear and moist. No oropharyngeal exudate.   Eyes: Pupils are equal, round, and reactive to light.   Cardiovascular:  Normal rate and regular rhythm.    Pulmonary/Chest: Effort normal and breath sounds normal. No respiratory distress.   +mild basilar crackles  - no wheeze   Abdominal: Soft. Bowel sounds are normal. He exhibits no distension. There is no tenderness.   Musculoskeletal: He exhibits no edema.   Neurological: He is alert and oriented to person, place, and time.   Skin: Skin is warm and dry. No rash noted. No erythema.   Left chest wall oquendo in place, no bleeding noted today   Psychiatric: He has a normal mood and affect.   Anxious and Depression    Nursing note and vitals reviewed.      Significant Labs:   CBC:     Recent Labs  Lab 07/28/17  0345 07/29/17  0518   WBC 7.58 6.65   HGB 7.6* 7.6*   HCT 22.7* 23.3*   PLT 17* 22*   , CMP:     Recent Labs  Lab 07/27/17  1441 07/28/17 0345 07/29/17  0518    140 140   K 4.0 4.3 4.4    110 111*   CO2 26 25 24   * 119* 113*   BUN 9 10 15   CREATININE 0.7 0.8 0.8   CALCIUM 6.9* 7.5* 7.7*   PROT  --  4.4* 4.4*   ALBUMIN  --  2.1* 2.2*   BILITOT  --  0.7 0.6   ALKPHOS  --  296* 286*   AST  --  14 34   ALT  --  14 28   ANIONGAP 3* 5* 5*   EGFRNONAA >60.0 >60.0 >60.0   ,   Recent Lab Results       07/29/17 0518      Albumin 2.2(L)     Alkaline Phosphatase 286(H)     ALT 28     Anion Gap 5(L)     Aniso Slight     AST 34     Baso # 0.01     Basophil% 0.2     Total Bilirubin 0.6  Comment:  For infants and newborns, interpretation of results should be based  on gestational age, weight and in agreement with clinical  observations.  Premature Infant recommended reference ranges:  Up to 24 hours.............<8.0 mg/dL  Up to 48 hours............<12.0 mg/dL  3-5 days..................<15.0 mg/dL  6-29 days.................<15.0 mg/dL       BUN, Bld 15     Calcium 7.7(L)     Chloride 111(H)     CO2 24     Creatinine 0.8     Differential Method Automated     eGFR if African American >60.0     eGFR if non  >60.0  Comment:  Calculation used to obtain the  estimated glomerular filtration  rate (eGFR) is the CKD-EPI equation. Since race is unknown   in our information system, the eGFR values for   -American and Non--American patients are given   for each creatinine result.       Eos # 0.0     Eosinophil% 0.0     Glucose 113(H)     Gran # 5.0     Gran% 76.8(H)     Group & Rh O POS     Hematocrit 23.3(L)     Hemoglobin 7.6(L)     Hypo Occasional     INDIRECT ANDREA NEG     Lymph # 1.0     Lymph% 15.6(L)     Magnesium 1.6     MCH 29.6     MCHC 32.6     MCV 91     Mono # 0.5     Mono% 7.4     MPV 12.5     Ovalocytes Occasional     Phosphorus 2.4(L)     Platelet Estimate Decreased(A)     Platelets 22  Comment:  plt   critical result(s) called and verbal readback obtained from   yennifer swift rn, 07/29/2017 08:29  (LL)     Poik Slight     Poly Occasional     Potassium 4.4     Total Protein 4.4(L)     RBC 2.57(L)     RDW 15.3(H)     Sodium 140     WBC 6.65        and All pertinent labs from the last 24 hours have been reviewed.    Diagnostic Results:  I have reviewed all pertinent imaging results/findings within the past 24 hours.    Assessment/Plan:     Acute respiratory failure with hypoxia    - 1 L NC saturation 88-95%  - continue to wean        CINV (chemotherapy-induced nausea and vomiting)    - scheduled zofran with PRN compazine  - dietary consult placed   - drank a protein drink 7/27  - much improved and now tolerating diet        Stroke of unknown etiology    -- Stable  -- Neurology following, appreciate assistance  -- Several imaging studies have been performed, including CT head 7/21, MRI brain 7/21, and CTA head and neck 7/21 with interpreting radiologists citing concern for infarction of the right lentiform nucleus and anterior limb of the internal capsule   -- Neurology reviewed the images and were first concerned about possible embolic activity given his arrhythmia earlier this admission, now concerned about the possibility of fungal CNS infection,  though after discussion with ID he has essentially completed a therapeutic course with ambisome and is now on voriconazole, recommending against additional prophylactic CNS fungal coverage  -- Continue atorvastatin. Not a candidate for any sort of anticoagulation or antiplatelet agents given transfusion-dependent thrombocytopenia        Delirium    -- Improving  -- Typically worse at night; speaks nonsense occasionally during the day; has threatened to leave AMA at night a few times, takes off oxygen at night and has disconnected pump  -- CT of head revealed acute stroke, for which neurology is now following. Not thought to be the main contributor to AMS, rather the cause is likely multifactorial with contribution from medications and his prolonged hospitalization        Pleural effusion    -- Pulm re-consulted 7/20  -- s/p Thoracentesis - cytology negative   -- repeat CXR 7/26 shows Ill-defined patchy airspace consolidation bilaterally more on the left side identified.  Pleural effusion slightly more on the left.  -- Pulm re-consulted 7/26  --s/p Thoracentesis 7/26/17 with 1.3L removed  -- refused CT of chest  -- trial of steroids per pulmonary, appreciate recs        Lower extremity edema    -- Stable, likely iatrogenic from fluids  -- IV fluids stopped  -- Net Negative 3 L from 7/18 - down 11 lbs from 7/18 - 40 mg of lasix BID - given on 7/18   -- Monitoring I/Os        Supraventricular tachycardia    -- Stable  -- Supraventricular tachycardia on tele and EKG. Started 7/13/17 8pm with HR between 160-170  -- Patient was cardioverted by cardiology and started on metoprolol and flecainide.  -- Treating underlying cause: infection and malignancy  -- Pharmacologic nuclear stress test originally ordered; cardiology following with tentative plans for a cardiac CTA  -- PVCs seen on EKG on 7/26 - corrected electrolytes         Insomnia    -- Improving  -- Ramelteon and zyprexa not tolerated  -- Continue phenergan 12.5 mg  nightly for insomnia, which seems to help without causing excessive sedation or confusion        Chronic bilateral low back pain without sciatica    -- Thought to be related to Neupogen  -- Continue zyrtec, lidocaine patch   -- Cautiously resuming opioid analgesics, including dilaudid PRN        Electrolyte abnormality    -- Ordered prn electrolyte replacement per protocol   -- Hypophosphatemia replaced; he appears to be running persistently low.         Neutropenic fever    -- Resolved   -- Blood cultures no growth from 6/24/17, 7/4/17, 7/7/17, 7/9/17, 7/15/17  -- Complicated antimicrobial treatment history as follows:   - Cefepime: (started 6/24 - discontinued 6/28) restarted on 7/4/17 - 7/23   - Ampotericin B started 7/9-stopped 7/19;    - Vancomycin (restarted 7/15 - stopped 7/17)  -- Now on PPX antimicrobial agents alone, with bactrim, voriconazole,  acyclovir, and ciprofloxacin  -- Appreciate ID assistance. They have now signed off.  -- Multiple possible sources. CXR showed pneumonia 7/15; CXR 7/17 - Significant patchy air space consolidation identified bilaterally slightly more on the left side.  Slight improvement as compared to the previous study.  Left-sided pleural effusion. On 2-3 L via NC  -- CT chest/abdomen/pelvis reveals bilateral groundglass opacities as well as a left lower-lobe consolidation;  -- ID recommended repeating CT chest 7/19, which showed: Limited assessment. No acute central pulmonary thromboembolism. Peripheral pulmonary emboli are not excluded. Interval development of bilateral moderate pleural effusions. Trace pericardial effusion. Interval progression of bilateral airspace disease with more extensive areas of consolidation bilaterally, in a similar central distribution to the groundglass opacities previously noted, with an upper lobe predominance.   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 We have discussed antifungal coverage with infectious disease. Discontinued itraconzaole (7/09) was on ambisome, now on voriconazole  -- Chimerics from marrow done 6/21 show CD3 of 90% donor and 10% recipient, but CD34 of 5% donor and 95% recipient   -- Chimers from 7/10 shows 70% recipient and 30% donor - NOT SORTED  -- Plans for DLI in future (8/14/17?)        Pancytopenia due to antineoplastic chemotherapy    -- Marginally improved  -- White blood cell count is 6.65 k/uL  -- hemoglobin 7.6 g/dL; platelet count is 22 k/uL   -- Following with daily CBC and transfusing for hemoglobin < 7 g/dL, platelets < 10 k/uL  -- Anticipate the need for more aggressive platelet transfusion prior to surgery        HIV disease    -- CD4 low at 11.4%, Absolute CD4 469 and HIV RNA not detected from 6/21  -- As per ID: continue triumeq. 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 General surgery to replace 7/24. (NPO at [facility] ordered)  -- BM biopsy results - consistent with relapsed non-M3 acute myeloid leukemia with  monocytic differentiation. Blast of 62%  -- Awaiting mutation analysis and cytogenetics; FLT-3 negative     -- Jaw pain and pain in right cranial nerve 7 distribution with persistent, severe headaches concerning for CNS involvement - underwent IT chemotherapy on 6/23. Flow negative for disease. CSF LDH was 29  -- Had previous reaction to MEC (etoposide caused full body rash). Did not qualify for the Tolero trial due to receiving IT chemotherapy    -- Day 33 of FLAG-BETZY   -- Day 14 bone marrow biopsy done 7/10 - hypocellular with no evidence of residual disease  -- Future plans for DLI (8/14/17?)  -- hold lumbar puncture due to decrease in nausea             VTE Risk Mitigation         Ordered     Place sequential compression device  Until discontinued      06/20/17 2005     High Risk of VTE  Once      06/20/17 1901          Disposition: pending clinical improvement, possibly Monday     Remington Byrd MD  Bone Marrow Transplant  [facility]

## 2017-07-29 NOTE — PLAN OF CARE
Problem: Patient Care Overview  Goal: Individualization & Mutuality  1. Left chest oquendo catheter dsg change q Monday and PRN.  2. Wean oxygen if possible - currently 1L via NC  3. Encourage meals and boost due to decreased appetite.     Outcome: Ongoing (interventions implemented as appropriate)  1. Cluster care for rest

## 2017-07-29 NOTE — PLAN OF CARE
Problem: Patient Care Overview  Goal: Plan of Care Review  Outcome: Ongoing (interventions implemented as appropriate)  Patient aa&ox3. Calm and cooperative during my shift, remains on continous oxygen. Remains on IVF's. Reported pain twice this shift and received 1mg IV dilaudid. Patient having coughing episodes received tessalon pearls.  patient is receiving scheduled nausea meds. Mucinex given and tessalon pearls given. Voiding in urinal. No injuries or falls thus far my shift. Will continue to monitor. Patient remains on tele.

## 2017-07-30 NOTE — SIGNIFICANT EVENT
Called by telemetry stating that patient was running V-tach. Patient has had 3 runs of V-tach since 1638 ranging from 12-14 beats each time. Dr. Byrd called by resident Dr. Nava and Dr. Nava at bedside. Repeat labs & EKG ordered. Dr. Byrd at bedside.   Patient had another 2 runs of V-tach while MD at bedside. Patient does feel flutter/racing/skipping beat when V-tach present. Dr. Byrd/Dr. Nava will contact cardiology. Will continue to monitor. Sonia Reyes 7/30/2017 4:53 PM

## 2017-07-30 NOTE — ASSESSMENT & PLAN NOTE
-- Relapsed AML - peripheral blood shows 30% blasts on admit  -- 2D echo shows 60% EF  -- Mazariegos placed, appears to be in the azygous vein, likely from repositioning. General surgery to replace 7/24. 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 Blast of 62%  -- Awaiting mutation analysis and cytogenetics; FLT-3 negative     -- Jaw pain and pain in right cranial nerve 7 distribution with persistent, severe headaches concerning for CNS involvement - underwent IT chemotherapy on 6/23. Flow negative for disease. CSF LDH was 29  -- Had previous reaction to MEC (etoposide caused full body rash). Did not qualify for the Tolero trial due to receiving IT chemotherapy    -- Day 34 of FLAG-BETZY   -- Day 14 bone marrow biopsy done 7/10 - hypocellular with no evidence of residual disease  -- Future plans for DLI (8/14/17?)  -- LP deferred given resolution of n/v

## 2017-07-30 NOTE — PROGRESS NOTES
[facility]  Hematology  Bone Marrow Transplant  Progress Note    Patient Name: Paul E Sistrunk  Admission Date: 6/20/2017  Hospital Length of Stay: 40 days  Code Status: Full Code    Subjective:     Interval History:   - Day 34 FLAG-BETZY - day 14 marrow showed remission  - MRI abnormal - evaluated by vascular neurology- on statin  - VSS Afebrile.   - respiratory status remains stable but unable to completely wean off of oxygen  - Continued jaw pain but no new complaints of chest pain or abdo pains  - Appetite much improved  - Depression noted - denied hem/onc psych   - sleeping well, no other complaints    Objective:     Vital Signs (Most Recent):  Temp: 98 °F (36.7 °C) (07/30/17 1330)  Pulse: 84 (07/30/17 1330)  Resp: 18 (07/30/17 1330)  BP: 102/77 (07/30/17 1330)  SpO2: (!) 92 % (07/30/17 1330) Vital Signs (24h Range):  Temp:  [97.9 °F (36.6 °C)-98.5 °F (36.9 °C)] 98 °F (36.7 °C)  Pulse:  [] 84  Resp:  [18-20] 18  SpO2:  [86 %-95 %] 92 %  BP: (102-136)/(64-77) 102/77     Weight: 86.5 kg (190 lb 11.2 oz)  Body mass index is 25.86 kg/m².  Body surface area is 2.1 meters squared.    ECOG SCORE         [unfilled]    Intake/Output - Last 3 Shifts       07/28 0700 - 07/29 0659 07/29 0700 - 07/30 0659 07/30 0700 - 07/31 0659    P.O. 1160 1980 360    I.V. (mL/kg) 2451.7 (28.9) 1943.7 (22.5) 416.7 (4.8)    Blood   350    IV Piggyback  388 250    Total Intake(mL/kg) 3611.7 (42.5) 4311.7 (49.8) 1376.7 (15.9)    Urine (mL/kg/hr) 1725 (0.8) 3075 (1.5) 1025 (1.7)    Stool 0 (0) 0 (0)     Total Output 1725 3075 1025    Net +1886.7 +1236.7 +351.7           Stool Occurrence 2 x 0 x           Physical Exam   Constitutional: He is oriented to person, place, and time. He appears well-developed and well-nourished. No distress. Nasal cannula in place.   HENT:   Head: Normocephalic and atraumatic.   Mouth/Throat: Oropharynx is clear and moist. No oropharyngeal exudate.   Eyes: Pupils are equal, round, and reactive  to light.   Cardiovascular: Normal rate and regular rhythm.    Pulmonary/Chest: Effort normal and breath sounds normal. No respiratory distress.   - decrease in breath sounds in base  - no wheeze   Abdominal: Soft. Bowel sounds are normal. He exhibits no distension. There is no tenderness.   Musculoskeletal: He exhibits no edema.   Neurological: He is alert and oriented to person, place, and time.   Skin: Skin is warm and dry. No rash noted. No erythema.   Left chest wall oquendo in place, no bleeding noted today   Psychiatric: He has a normal mood and affect.   Nursing note and vitals reviewed.      Significant Labs:   CBC:     Recent Labs  Lab 07/29/17 0518 07/30/17 0429   WBC 6.65 3.26*   HGB 7.6* 7.0*   HCT 23.3* 21.7*   PLT 22* 23*   , CMP:     Recent Labs  Lab 07/29/17 0518 07/30/17 0429    142   K 4.4 4.5   * 111*   CO2 24 25   * 103   BUN 15 16   CREATININE 0.8 0.8   CALCIUM 7.7* 7.3*   PROT 4.4* 4.1*   ALBUMIN 2.2* 2.1*   BILITOT 0.6 0.6   ALKPHOS 286* 271*   AST 34 60*   ALT 28 51*   ANIONGAP 5* 6*   EGFRNONAA >60.0 >60.0   ,   Recent Lab Results       07/30/17 0429      Albumin 2.1(L)     Alkaline Phosphatase 271(H)     ALT 51(H)     Anion Gap 6(L)     Aniso Slight     AST 60(H)     BANDS 1.0     Baso # CANCELED  Comment:  Result canceled by the ancillary     Basophil% 0.0     Total Bilirubin 0.6  Comment:  For infants and newborns, interpretation of results should be based  on gestational age, weight and in agreement with clinical  observations.  Premature Infant recommended reference ranges:  Up to 24 hours.............<8.0 mg/dL  Up to 48 hours............<12.0 mg/dL  3-5 days..................<15.0 mg/dL  6-29 days.................<15.0 mg/dL       BUN, Bld 16     Calcium 7.3(L)     Chloride 111(H)     CO2 25     Creatinine 0.8     Differential Method Manual     eGFR if African American >60.0     eGFR if non  >60.0  Comment:  Calculation used to obtain the  estimated glomerular filtration  rate (eGFR) is the CKD-EPI equation. Since race is unknown   in our information system, the eGFR values for   -American and Non--American patients are given   for each creatinine result.       Eos # CANCELED  Comment:  Result canceled by the ancillary     Eosinophil% 0.0     Glucose 103     Gran% 96.0(H)     Hematocrit 21.7(L)     Hemoglobin 7.0(L)     Hypo Occasional     Lymph # CANCELED  Comment:  Result canceled by the ancillary     Lymph% 0.0(L)     Magnesium 1.6     MCH 29.7     MCHC 32.3     MCV 92     Mono # CANCELED  Comment:  Result canceled by the ancillary     Mono% 3.0(L)     MPV SEE COMMENT  Comment:  Result not available.     Ovalocytes Occasional     Phosphorus 2.6(L)     Platelets 23  Comment:  PLT  critical result(s) called and verbal readback obtained from   NURSE CALEB , 07/30/2017 05:21  (LL)     Poik Slight     Poly Occasional     Potassium 4.5     Total Protein 4.1(L)     RBC 2.36(L)     RDW 15.4(H)     Sodium 142     WBC 3.26(L)        and All pertinent labs from the last 24 hours have been reviewed.    Diagnostic Results:  I have reviewed all pertinent imaging results/findings within the past 24 hours.    Assessment/Plan:     Acute respiratory failure with hypoxia    - satting well on 1L NC   - difficulty weaning to room air  - continue to wean        CINV (chemotherapy-induced nausea and vomiting)    - scheduled zofran with PRN compazine  - dietary consult placed   - drank a protein drink 7/27  - much improved and now tolerating diet        Stroke of unknown etiology    -- Stable  -- Neurology following, appreciate assistance  -- Several imaging studies have been performed, including CT head 7/21, MRI brain 7/21, and CTA head and neck 7/21 with interpreting radiologists citing concern for infarction of the right lentiform nucleus and anterior limb of the internal capsule   -- Neurology reviewed the images and were first concerned about  possible embolic activity given his arrhythmia earlier this admission, now concerned about the possibility of fungal CNS infection, though after discussion with ID he has essentially completed a therapeutic course with ambisome and is now on voriconazole, recommending against additional prophylactic CNS fungal coverage  -- Continue atorvastatin. Not a candidate for any sort of anticoagulation or antiplatelet agents given transfusion-dependent thrombocytopenia        Delirium    -- Resolved  -- Typically worse at night; speaks nonsense occasionally during the day; has threatened to leave AMA at night a few times, takes off oxygen at night and has disconnected pump  -- CT of head revealed acute stroke, for which neurology is now following. Not thought to be the main contributor to AMS, rather the cause is likely multifactorial with contribution from medications and his prolonged hospitalization        Pleural effusion    -- Pulm re-consulted 7/20  -- s/p Thoracentesis - cytology negative   -- repeat CXR 7/26 shows Ill-defined patchy airspace consolidation bilaterally more on the left side identified.  Pleural effusion slightly more on the left.  -- Pulm re-consulted 7/26  --s/p Thoracentesis 7/26/17 with 1.3L removed  -- refused CT of chest  -- trial of steroids per pulmonary, appreciate recs        Lower extremity edema    -- Stable, likely iatrogenic from fluids  -- IV fluids stopped  -- Net Negative 3 L from 7/18 - down 11 lbs from 7/18 - 40 mg of lasix BID - given on 7/18   -- Monitoring I/Os        Supraventricular tachycardia    -- Stable  -- Supraventricular tachycardia on tele and EKG. Started 7/13/17 8pm with HR between 160-170  -- Patient was cardioverted by cardiology and started on metoprolol and flecainide.  -- Treating underlying cause: infection and malignancy  -- Pharmacologic nuclear stress test originally ordered; cardiology following with tentative plans for a cardiac CTA  -- PVCs seen on EKG on  7/26 - corrected electrolytes         Insomnia    -- Improving  -- Ramelteon and zyprexa not tolerated  -- Continue phenergan 12.5 mg nightly for insomnia, which seems to help without causing excessive sedation or confusion        Chronic bilateral low back pain without sciatica    -- Thought to be related to Neupogen  -- Continue zyrtec, lidocaine patch   -- Cautiously resuming opioid analgesics, including dilaudid PRN        Electrolyte abnormality    -- Ordered prn electrolyte replacement per protocol   -- Hypophosphatemia replaced; he appears to be running persistently low.         Neutropenic fever    -- Resolved   -- Blood cultures no growth from 6/24/17, 7/4/17, 7/7/17, 7/9/17, 7/15/17  -- Complicated antimicrobial treatment history as follows:   - Cefepime: (started 6/24 - discontinued 6/28) restarted on 7/4/17 - 7/23   - Ampotericin B started 7/9-stopped 7/19;    - Vancomycin (restarted 7/15 - stopped 7/17)  -- Now on PPX antimicrobial agents alone, with bactrim, voriconazole,  acyclovir, and ciprofloxacin  -- Appreciate ID assistance. They have now signed off.  -- Multiple possible sources. CXR showed pneumonia 7/15; CXR 7/17 - Significant patchy air space consolidation identified bilaterally slightly more on the left side.  Slight improvement as compared to the previous study.  Left-sided pleural effusion. On 2-3 L via NC  -- CT chest/abdomen/pelvis reveals bilateral groundglass opacities as well as a left lower-lobe consolidation;  -- ID recommended repeating CT chest 7/19, which showed: Limited assessment. No acute central pulmonary thromboembolism. Peripheral pulmonary emboli are not excluded. Interval development of bilateral moderate pleural effusions. Trace pericardial effusion. Interval progression of bilateral airspace disease with more extensive areas of consolidation bilaterally, in a similar central distribution to the groundglass opacities previously noted, with an upper lobe predominance.    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 We have discussed antifungal coverage with infectious disease. Discontinued itraconzaole (7/09) was on ambisome, now on voriconazole  -- Chimerics from marrow done 6/21 show CD3 of 90% donor and 10% recipient, but CD34 of 5% donor and 95% recipient   -- Chimers from 7/10 shows 70% recipient and 30% donor - NOT SORTED  -- Plans for DLI in future (8/14/17?)        Pancytopenia due to antineoplastic chemotherapy    -- improved  -- White blood cell count is 3.26 k/uL, ANC 3129  -- hemoglobin 7.0 g/dL; platelet count is 23 k/uL   -- transfuse 1u pRBC for symptomatic anemia  -- Following with daily CBC and transfusing for hemoglobin < 7 g/dL, platelets < 10 k/uL        HIV disease    -- CD4 low at 11.4%, Absolute CD4 469 and HIV RNA not detected from 6/21  -- As per ID: continue triumeq. 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 General surgery to replace 7/24. 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 Blast of 62%  -- Awaiting mutation analysis and cytogenetics; FLT-3 negative     -- Jaw pain and pain in right cranial nerve 7 distribution with persistent, severe headaches concerning for CNS involvement - underwent IT chemotherapy on 6/23. Flow negative for disease. CSF LDH was 29  -- Had previous reaction to MEC (etoposide caused full body rash). Did not qualify for the Tolero trial due to receiving IT chemotherapy    -- Day 34 of FLAG-BETZY   -- Day 14 bone marrow biopsy done 7/10 - hypocellular with no evidence of residual disease  -- Future plans for DLI (8/14/17?)  -- LP deferred given resolution of n/v            VTE Risk Mitigation         Ordered     Place sequential compression device  Until discontinued      06/20/17 2005     High Risk of VTE  Once      06/20/17 1901          Disposition: pending respiratory status, hopefully wean off o2 in the next 1-2 days    Remington Byrd MD  Bone Marrow Transplant  [facility]

## 2017-07-30 NOTE — ASSESSMENT & PLAN NOTE
-- Resolved  -- Typically worse at night; speaks nonsense occasionally during the day; has threatened to leave AMA at night a few times, takes off oxygen at night and has disconnected pump  -- CT of head revealed acute stroke, for which neurology is now following. Not thought to be the main contributor to AMS, rather the cause is likely multifactorial with contribution from medications and his prolonged hospitalization

## 2017-07-30 NOTE — PLAN OF CARE
Problem: Patient Care Overview  Goal: Plan of Care Review  Outcome: Ongoing (interventions implemented as appropriate)  Pt involved in plan of care and communicating needs throughout shift. Up as tolerated. Tolerating diet, voiding without difficulty. IVF continued. 1L O2 sating 93%- desats without oxygen. Dilaudid given twice for generalized pain. Plan for DC soon and DLI outpatient on 8/14.  All vitals stable; no acute events this shift.  Pt remaining free from falls or injury throughout shift; bed in lowest position; call light within reach; pt instructed to call for assistance as needed. Will continue to monitor. Per Dr. Byrd try to wean oxygen off patient.

## 2017-07-30 NOTE — ASSESSMENT & PLAN NOTE
-- improved  -- White blood cell count is 3.26 k/uL, ANC 3129  -- hemoglobin 7.0 g/dL; platelet count is 23 k/uL   -- transfuse 1u pRBC for symptomatic anemia  -- Following with daily CBC and transfusing for hemoglobin < 7 g/dL, platelets < 10 k/uL

## 2017-07-30 NOTE — SUBJECTIVE & OBJECTIVE
Subjective:     Interval History:   - Day 34 FLAG-BETZY - day 14 marrow showed remission  - MRI abnormal - evaluated by vascular neurology- on statin  - VSS Afebrile.   - respiratory status remains stable but unable to completely wean off of oxygen  - Continued jaw pain but no new complaints of chest pain or abdo pains  - Appetite much improved  - Depression noted - denied hem/onc psych   - sleeping well, no other complaints    Objective:     Vital Signs (Most Recent):  Temp: 98 °F (36.7 °C) (07/30/17 1330)  Pulse: 84 (07/30/17 1330)  Resp: 18 (07/30/17 1330)  BP: 102/77 (07/30/17 1330)  SpO2: (!) 92 % (07/30/17 1330) Vital Signs (24h Range):  Temp:  [97.9 °F (36.6 °C)-98.5 °F (36.9 °C)] 98 °F (36.7 °C)  Pulse:  [] 84  Resp:  [18-20] 18  SpO2:  [86 %-95 %] 92 %  BP: (102-136)/(64-77) 102/77     Weight: 86.5 kg (190 lb 11.2 oz)  Body mass index is 25.86 kg/m².  Body surface area is 2.1 meters squared.    ECOG SCORE         [unfilled]    Intake/Output - Last 3 Shifts       07/28 0700 - 07/29 0659 07/29 0700 - 07/30 0659 07/30 0700 - 07/31 0659    P.O. 1160 1980 360    I.V. (mL/kg) 2451.7 (28.9) 1943.7 (22.5) 416.7 (4.8)    Blood   350    IV Piggyback  388 250    Total Intake(mL/kg) 3611.7 (42.5) 4311.7 (49.8) 1376.7 (15.9)    Urine (mL/kg/hr) 1725 (0.8) 3075 (1.5) 1025 (1.7)    Stool 0 (0) 0 (0)     Total Output 1725 3075 1025    Net +1886.7 +1236.7 +351.7           Stool Occurrence 2 x 0 x           Physical Exam   Constitutional: He is oriented to person, place, and time. He appears well-developed and well-nourished. No distress. Nasal cannula in place.   HENT:   Head: Normocephalic and atraumatic.   Mouth/Throat: Oropharynx is clear and moist. No oropharyngeal exudate.   Eyes: Pupils are equal, round, and reactive to light.   Cardiovascular: Normal rate and regular rhythm.    Pulmonary/Chest: Effort normal and breath sounds normal. No respiratory distress.   - decrease in breath sounds in base  - no wheeze    Abdominal: Soft. Bowel sounds are normal. He exhibits no distension. There is no tenderness.   Musculoskeletal: He exhibits no edema.   Neurological: He is alert and oriented to person, place, and time.   Skin: Skin is warm and dry. No rash noted. No erythema.   Left chest wall oquendo in place, no bleeding noted today   Psychiatric: He has a normal mood and affect.   Nursing note and vitals reviewed.      Significant Labs:   CBC:     Recent Labs  Lab 07/29/17 0518 07/30/17 0429   WBC 6.65 3.26*   HGB 7.6* 7.0*   HCT 23.3* 21.7*   PLT 22* 23*   , CMP:     Recent Labs  Lab 07/29/17 0518 07/30/17 0429    142   K 4.4 4.5   * 111*   CO2 24 25   * 103   BUN 15 16   CREATININE 0.8 0.8   CALCIUM 7.7* 7.3*   PROT 4.4* 4.1*   ALBUMIN 2.2* 2.1*   BILITOT 0.6 0.6   ALKPHOS 286* 271*   AST 34 60*   ALT 28 51*   ANIONGAP 5* 6*   EGFRNONAA >60.0 >60.0   ,   Recent Lab Results       07/30/17 0429      Albumin 2.1(L)     Alkaline Phosphatase 271(H)     ALT 51(H)     Anion Gap 6(L)     Aniso Slight     AST 60(H)     BANDS 1.0     Baso # CANCELED  Comment:  Result canceled by the ancillary     Basophil% 0.0     Total Bilirubin 0.6  Comment:  For infants and newborns, interpretation of results should be based  on gestational age, weight and in agreement with clinical  observations.  Premature Infant recommended reference ranges:  Up to 24 hours.............<8.0 mg/dL  Up to 48 hours............<12.0 mg/dL  3-5 days..................<15.0 mg/dL  6-29 days.................<15.0 mg/dL       BUN, Bld 16     Calcium 7.3(L)     Chloride 111(H)     CO2 25     Creatinine 0.8     Differential Method Manual     eGFR if African American >60.0     eGFR if non  >60.0  Comment:  Calculation used to obtain the estimated glomerular filtration  rate (eGFR) is the CKD-EPI equation. Since race is unknown   in our information system, the eGFR values for   -American and Non--American patients are  given   for each creatinine result.       Eos # CANCELED  Comment:  Result canceled by the ancillary     Eosinophil% 0.0     Glucose 103     Gran% 96.0(H)     Hematocrit 21.7(L)     Hemoglobin 7.0(L)     Hypo Occasional     Lymph # CANCELED  Comment:  Result canceled by the ancillary     Lymph% 0.0(L)     Magnesium 1.6     MCH 29.7     MCHC 32.3     MCV 92     Mono # CANCELED  Comment:  Result canceled by the ancillary     Mono% 3.0(L)     MPV SEE COMMENT  Comment:  Result not available.     Ovalocytes Occasional     Phosphorus 2.6(L)     Platelets 23  Comment:  PLT  critical result(s) called and verbal readback obtained from   ROSARIO BARRONNURSE , 07/30/2017 05:21  (LL)     Poik Slight     Poly Occasional     Potassium 4.5     Total Protein 4.1(L)     RBC 2.36(L)     RDW 15.4(H)     Sodium 142     WBC 3.26(L)        and All pertinent labs from the last 24 hours have been reviewed.    Diagnostic Results:  I have reviewed all pertinent imaging results/findings within the past 24 hours.

## 2017-07-30 NOTE — ASSESSMENT & PLAN NOTE
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 We have discussed antifungal coverage with infectious disease. Discontinued itraconzaole (7/09) was on ambisome, now on voriconazole  -- Chimerics from marrow done 6/21 show CD3 of 90% donor and 10% recipient, but CD34 of 5% donor and 95% recipient   -- Chimers from 7/10 shows 70% recipient and 30% donor - NOT SORTED  -- Plans for DLI in future (8/14/17?)

## 2017-07-30 NOTE — PLAN OF CARE
Problem: Patient Care Overview  Goal: Plan of Care Review  Outcome: Ongoing (interventions implemented as appropriate)  Afebrile. Neutropenic precautions maintained. IVF at 100ml/hr. Magnesium & phosphorus replaced. Patient received 1 unit of PRBCs. Attempted to wean O2 multiple times today but patient desats on room air with activity. Currently on 1L O2. Telemetry reading NSR. Fair appetite. Patient remains free from falls.  Fall precautions in place.  Bed in low position, wheels locked, side rails up x2, nonskid socks on, and call light within reach.  Patient encouraged to call for assistance with ambulation if needed. Compazine given this morning prior to AM medications. PRN dilaudid given x2 for chin pain with some relief. Purposeful rounding done hourly. Will continue to monitor. Sonia Reyes 7/30/2017 3:54 PM

## 2017-07-30 NOTE — CONSULTS
"      Cardiology Consult Note   Attending Physician: Gabriela Howe MD  Reason for Consult: Non-sustained VT      HPI:   56 y.o. 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 Not on AC due to thrombocytopenia.     Consulted today for runs of Non-sustained Vt. Potassium at goal and Magnesium repleted. Echo on 7/20 showed EF 55-60 no diastolic dysfunction, RV enlargement with normal systolic function, no valvular disease. Patient states that he feels a slight "fluterring" in his chest, but denies chest discomfort, syncope, presyncope, vision changes, SOB, diaphoeresis. States that he has felt similar symptoms for a "long time".  Magnesium noted to be less than 2.   Previous EKGs demonstrated RBBB, and subsequent LBBB.    ROS:    Constitution: Negative for fever, chills, weight loss or gain.   HENT: Negative for sore throat, rhinorrhea, or headache.  Eyes: Negative for blurred or double vision.   Cardiovascular: See above  Pulmonary: Negative for SOB   Gastrointestinal: Negative for abdominal pain, nausea, vomiting, or diarrhea.   : Negative for dysuria.   Neurological: Negative for focal weakness or sensory changes.  PMH:     Past Medical History:   Diagnosis Date    Cancer 2/2016    ALL    HIV infection     Pancytopenia due to antineoplastic chemotherapy 7/4/2016     Past Surgical History:   Procedure Laterality Date    CYSTOSCOPY       Allergies:     Review of patient's allergies indicates:   Allergen Reactions    Etoposide Rash "     Medications:     No current facility-administered medications on file prior to encounter.      Current Outpatient Prescriptions on File Prior to Encounter   Medication Sig Dispense Refill    abacavir-dolutegravir-lamivud (TRIUMEQ) 600- mg Tab Take 1 tablet by mouth once daily. 30 tablet 2    acyclovir (ZOVIRAX) 800 MG Tab Take 1 tablet (800 mg total) by mouth 2 (two) times daily. 60 tablet 11    alprazolam (XANAX) 0.5 MG tablet Take 1 tablet (0.5 mg total) by mouth 2 (two) times daily as needed for Insomnia or Anxiety. 60 tablet 0    butalbital-acetaminophen-caffeine -40 mg (FIORICET, ESGIC) -40 mg per tablet   0    finasteride (PROSCAR) 5 mg tablet Take 1 tablet (5 mg total) by mouth once daily. 30 tablet 1    HYDROmorphone (DILAUDID) 2 MG tablet Take 1 tablet (2 mg total) by mouth every 4 (four) hours as needed for Pain. 60 tablet 0    itraconazole (SPORANOX) 10 mg/mL Soln Take 2 mLs (20 mg total) by mouth 3 (three) times daily. 1800 mL 0    loperamide (IMODIUM) 2 mg capsule Take 2 mg by mouth daily as needed for Diarrhea.      magnesium oxide (MAG-OX) 400 mg tablet TAKE THREE TABLETS BY MOUTH THREE TIMES A  tablet 1    ondansetron (ZOFRAN) 8 MG tablet Take 1 tablet (8 mg total) by mouth every 8 (eight) hours as needed for Nausea. 30 tablet 1    pantoprazole (PROTONIX) 40 MG tablet Take 1 tablet (40 mg total) by mouth once daily. 30 tablet 11    promethazine (PHENERGAN) 12.5 MG Tab Take 1 tablet (12.5 mg total) by mouth every 6 (six) hours as needed (nausea). 30 tablet 2    tacrolimus (PROGRAF) 0.5 MG Cap Take 1 capsule (0.5 mg total) by mouth once daily. 120 capsule 11    tamsulosin (FLOMAX) 0.4 mg Cp24 Take 1 capsule (0.4 mg total) by mouth every other day. 15 capsule 1    zolpidem (AMBIEN) 5 MG Tab Take 1 tablet (5 mg total) by mouth nightly as needed. 30 tablet 1       Inpatient Medications   Continuous Infusions:   sodium chloride 0.9% 100 mL/hr at 07/30/17 3395      Scheduled Meds:   abacavir  600 mg Oral Daily    acyclovir  400 mg Oral BID    atorvastatin  20 mg Oral Daily    cetirizine  10 mg Oral Daily    dolutegravir  50 mg Oral Daily    finasteride  5 mg Oral Daily    flecainide  50 mg Oral Q12H    guaifenesin  600 mg Oral BID    lamivudine  300 mg Oral Daily    metoprolol tartrate  25 mg Oral BID    morphine  30 mg Oral Q12H    ondansetron  8 mg Intravenous Q6H    pantoprazole  40 mg Oral Daily    predniSONE  60 mg Oral Daily    Followed by    [START ON 8/3/2017] predniSONE  40 mg Oral Daily    promethazine  12.5 mg Oral QHS    sulfamethoxazole-trimethoprim 800-160mg  1 tablet Oral Every Mon, Wed, Fri    tamsulosin  0.4 mg Oral Every other day    voriconazole  300 mg Oral BID     PRN Meds:acetaminophen, alteplase, benzonatate, butalbital-acetaminophen-caffeine -40 mg, diphenhydrAMINE, heparin, porcine (PF), HYDROmorphone, lorazepam, magnesium sulfate IVPB, magnesium sulfate IVPB, potassium chloride **AND** potassium chloride **AND** potassium chloride, prochlorperazine, senna-docusate 8.6-50 mg, sodium chloride 0.9%, sodium chloride 0.9%, sodium phosphate IVPB, sodium phosphate IVPB, sodium phosphate IVPB     Social History:     Social History   Substance Use Topics    Smoking status: Never Smoker    Smokeless tobacco: Not on file    Alcohol use No     Family History:     Family History   Problem Relation Age of Onset    Cancer Father      Physical Exam:     Vitals:  Temp:  [97.9 °F (36.6 °C)-98.5 °F (36.9 °C)]   Pulse:  []   Resp:  [18-20]   BP: (102-136)/(66-77)   SpO2:  [86 %-97 %]  on RA I/O's:    Intake/Output Summary (Last 24 hours) at 07/30/17 1710  Last data filed at 07/30/17 1621   Gross per 24 hour   Intake          3408.34 ml   Output             3550 ml   Net          -141.66 ml        Constitutional: NAD, conversant  HEENT: Sclera anicteric, PERRLA, EOMI  Neck: No JVD, no carotid bruits  CV: RRR, S1 S2 no murmurs   Pulm:  CTAB, no wheezes, rales, or ronchi  GI: Abdomen soft, NTND, +BS  Extremities: No LE edema, warm and well perfused  Skin: No ecchymosis, erythema, or ulcers  Psych: AOx3, appropriate affect  Neuro: CNII-XII intact, no focal deficits    Labs:       Recent Labs  Lab 07/28/17 0345 07/29/17 0518 07/30/17  0429    140 142   K 4.3 4.4 4.5    111* 111*   CO2 25 24 25   BUN 10 15 16   CREATININE 0.8 0.8 0.8   ANIONGAP 5* 5* 6*       Recent Labs  Lab 07/28/17 0345 07/29/17 0518 07/30/17  0429   AST 14 34 60*   ALT 14 28 51*   ALKPHOS 296* 286* 271*   BILITOT 0.7 0.6 0.6   ALBUMIN 2.1* 2.2* 2.1*     No results for input(s): TROPONINI, BNP in the last 168 hours.   Recent Labs  Lab 07/28/17 0345 07/29/17 0518 07/30/17  0429   WBC 7.58 6.65 3.26*   HGB 7.6* 7.6* 7.0*   HCT 22.7* 23.3* 21.7*   PLT 17* 22* 23*   GRAN 86.0* 76.8*  5.0 96.0*       Recent Labs  Lab 07/28/17  0345   INR 1.2     Lab Results   Component Value Date    CHOL 188 07/02/2015    HDL 48 07/02/2015    LDLCALC 121.4 07/02/2015    TRIG 246 (H) 09/30/2016     No results found for: HGBA1C     Micro:  Blood Cultures  Lab Results   Component Value Date    LABBLOO No growth after 5 days. 07/15/2017    LABBLOO No growth after 5 days. 07/15/2017    LABBLOO No growth after 5 days. 07/13/2017    LABBLOO No growth after 5 days. 07/13/2017    LABBLOO No growth after 5 days. 07/09/2017     Urine Cultures  Lab Results   Component Value Date    LABURIN No growth 07/15/2017    LABURIN No growth 07/09/2017    LABURIN No growth 07/07/2017    LABURIN No growth 07/04/2017    LABURIN No growth 06/24/2017       Imaging:        EF   Date Value Ref Range Status   07/20/2017 55 55 - 65    06/21/2017 60 55 - 65    08/15/2016 60 55 - 65    02/05/2016 55 55 - 65        EKG: EKG  with 7 seconds of NSVT noted on EKG  There is discordance in the precordial leads, and a R on T that initiates the NSVT.    Telemetry: Multiple runs of NSVT x6, with longest episode 7  seconds. 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   Cardiology initially consulted for AFL, started on s/p DCCV on Flecanide and Lopressor, with a Normal EF on recent Echo.   Now consulted for NSVT, which likely occurred in the setting of prolonged QTC with an R on T that initiated NSVT. Patient asymptomatic.    Now with runs of NSVT x 6 today.    EKG  with 7 seconds of NSVT noted on EKG  There is discordance in the precordial leads. R on T that initiates the NSVT. AV dissociation.  Plan:     -Recommend to keep K >4 and Mg>2   -Recommend to D/c Flecanide in the setting of prolonged QTC  -Recommend to avoid as much as possible QT prolonging agents such as flecanide, zofran, promethazine.   -Please check Mg and K daily   -Recommend to get daily EKGs to monitor QTC  -Consider EP consult in the AM         Signed:  Ramon Koehler DO  Cardiology Fellow

## 2017-07-31 PROBLEM — I47.20 V-TACH: Status: ACTIVE | Noted: 2017-01-01

## 2017-07-31 NOTE — PHYSICIAN QUERY
PT Name: Paul E Sistrunk  MR #: [id number]     Physician Query Form - Documentation Clarification      CDS/: Darshana Salmeron RN, CCDS               Contact information: [email or web address]     This form is a permanent document in the medical record.     Query Date: July 31, 2017    By submitting this query, we are merely seeking further clarification of documentation. Please utilize your independent clinical judgment when addressing the question(s) below.    The Medical record reflects the following:    Supporting Clinical Findings Location in Medical Record   - Reports subjective improvement in SOB after thoracentesis done 7/26/17    Vital Signs (24h Range):  Temp:  [97.9 °F (36.6 °C)-98.9 °F (37.2 °C)] 98.5 °F (36.9 °C)  Pulse:  [] 106  Resp:  [16-18] 18  SpO2:  [90 %-95 %] 95 %  BP: (108-143)/(54-73) 115/54    Pulmonary/Chest: Effort normal and breath sounds normal. No respiratory distress.   +decreased bs in base  - no wheeze     AML (acute myeloid leukemia) in relapse    Pleural effusion  -- Pulm re-consulted 7/20  -- s/p Thoracentesis - cytology negative   -- repeat CXR 7/26 shows Ill-defined patchy airspace consolidation bilaterally more on the left side identified.  Pleural effusion slightly more on the left.  -- Pulm re-consulted 7/26  --s/p Thoracentesis 7/26/17 with 1.3L removed  -- refused CT of chest  -- plan for trial of steroids per pulmonary, appreciate recs    Acute respiratory failure with hypoxia  - 2 L NC saturation 90-94% Bone Marrow Transplant progress note 07/28    Bone Marrow Transplant progress note 07/28              Bone Marrow Transplant progress note 07/28        Bone Marrow Transplant progress note 07/28    Bone Marrow Transplant progress note 07/28                    Bone Marrow Transplant progress note 07/28                                                                                        Doctor, Please specify diagnosis or diagnoses associated with above clinical  findings.  Please further clarify acute respiratory failure with hypoxia.    Provider Use Only      [ X Ruled in    [   ] Ruled out    [   ] Other (Specify) ______________                                                                                                                 [  ] Clinically undetermined

## 2017-07-31 NOTE — SUBJECTIVE & OBJECTIVE
Past Medical History:   Diagnosis Date    Cancer 2/2016    ALL    HIV infection     Pancytopenia due to antineoplastic chemotherapy 7/4/2016       Past Surgical History:   Procedure Laterality Date    CYSTOSCOPY         Review of patient's allergies indicates:   Allergen Reactions    Etoposide Rash       No current facility-administered medications on file prior to encounter.      Current Outpatient Prescriptions on File Prior to Encounter   Medication Sig    abacavir-dolutegravir-lamivud (TRIUMEQ) 600- mg Tab Take 1 tablet by mouth once daily.    acyclovir (ZOVIRAX) 800 MG Tab Take 1 tablet (800 mg total) by mouth 2 (two) times daily.    alprazolam (XANAX) 0.5 MG tablet Take 1 tablet (0.5 mg total) by mouth 2 (two) times daily as needed for Insomnia or Anxiety.    butalbital-acetaminophen-caffeine -40 mg (FIORICET, ESGIC) -40 mg per tablet     finasteride (PROSCAR) 5 mg tablet Take 1 tablet (5 mg total) by mouth once daily.    HYDROmorphone (DILAUDID) 2 MG tablet Take 1 tablet (2 mg total) by mouth every 4 (four) hours as needed for Pain.    itraconazole (SPORANOX) 10 mg/mL Soln Take 2 mLs (20 mg total) by mouth 3 (three) times daily.    loperamide (IMODIUM) 2 mg capsule Take 2 mg by mouth daily as needed for Diarrhea.    magnesium oxide (MAG-OX) 400 mg tablet TAKE THREE TABLETS BY MOUTH THREE TIMES A DAY    ondansetron (ZOFRAN) 8 MG tablet Take 1 tablet (8 mg total) by mouth every 8 (eight) hours as needed for Nausea.    pantoprazole (PROTONIX) 40 MG tablet Take 1 tablet (40 mg total) by mouth once daily.    promethazine (PHENERGAN) 12.5 MG Tab Take 1 tablet (12.5 mg total) by mouth every 6 (six) hours as needed (nausea).    tacrolimus (PROGRAF) 0.5 MG Cap Take 1 capsule (0.5 mg total) by mouth once daily.    tamsulosin (FLOMAX) 0.4 mg Cp24 Take 1 capsule (0.4 mg total) by mouth every other day.    zolpidem (AMBIEN) 5 MG Tab Take 1 tablet (5 mg total) by mouth nightly as  needed.     Family History     Problem Relation (Age of Onset)    Cancer Father        Social History Main Topics    Smoking status: Never Smoker    Smokeless tobacco: Not on file    Alcohol use No    Drug use: No    Sexual activity: Not on file     Review of Systems   Constitution: Negative.   HENT: Negative.    Eyes: Negative.    Cardiovascular: Negative.         Heart fluttering sensation   Respiratory: Negative.    Endocrine: Negative.    Skin: Negative.    Musculoskeletal: Negative.    Gastrointestinal: Negative.    Genitourinary: Negative.    Neurological: Negative.      Objective:     Vital Signs (Most Recent):  Temp: 98.2 °F (36.8 °C) (07/31/17 1518)  Pulse: 94 (07/31/17 1518)  Resp: 20 (07/31/17 1518)  BP: 132/67 (07/31/17 1518)  SpO2: (!) 93 % (07/31/17 1518) Vital Signs (24h Range):  Temp:  [97.9 °F (36.6 °C)-98.2 °F (36.8 °C)] 98.2 °F (36.8 °C)  Pulse:  [] 94  Resp:  [16-20] 20  SpO2:  [92 %-97 %] 93 %  BP: (124-140)/(65-77) 132/67     Weight: 86.5 kg (190 lb 11.2 oz)  Body mass index is 25.86 kg/m².    SpO2: (!) 93 %  O2 Device (Oxygen Therapy): nasal cannula w/ humidification    Physical Exam   Constitutional: He is oriented to person, place, and time. He appears well-developed and well-nourished.   HENT:   Head: Normocephalic and atraumatic.   Eyes: Conjunctivae and EOM are normal. Pupils are equal, round, and reactive to light.   Neck: Normal range of motion. Neck supple. No JVD present.   Cardiovascular: Normal rate, regular rhythm and normal heart sounds.  Exam reveals no gallop and no friction rub.    No murmur heard.  Pulmonary/Chest: Effort normal and breath sounds normal. No respiratory distress. He has no wheezes. He has no rales. He exhibits no tenderness.   Abdominal: Soft. Bowel sounds are normal. He exhibits no distension. There is no tenderness.   Musculoskeletal: Normal range of motion. He exhibits no edema or tenderness.   Neurological: He is alert and oriented to person,  place, and time.   Skin: Skin is warm and dry. No erythema. No pallor.       Significant Labs:   EP:   Recent Labs  Lab 07/30/17 0429 07/30/17 1700 07/30/17 2103 07/31/17 0315    140 140 139  139   K 4.5 4.5 4.3 4.2  4.2   * 108 109 107  107   CO2 25 25 24 26  26    131* 154* 103  103   BUN 16 16 15 14  14   CREATININE 0.8 0.7 0.8 0.7  0.7   CALCIUM 7.3* 7.4* 7.3* 7.2*  7.2*   PROT 4.1* 4.6*  --  4.4*  4.4*   ALBUMIN 2.1* 2.4*  --  2.3*  2.3*   BILITOT 0.6 0.7  --  0.7  0.7   ALKPHOS 271* 303*  --  272*  272*   AST 60* 67*  --  48*  48*   ALT 51* 61*  --  55*  55*   ANIONGAP 6* 7* 7* 6*  6*   ESTGFRAFRICA >60.0 >60.0 >60.0 >60.0  >60.0   EGFRNONAA >60.0 >60.0 >60.0 >60.0  >60.0   WBC 3.26* 3.50*  --  3.40*  3.40*   HGB 7.0* 8.7*  --  8.3*  8.3*   HCT 21.7* 26.1*  --  25.3*  25.3*   PLT 23* 31*  --  32*  32*   INR  --   --   --  1.2   , Blood Culture: No results for input(s): LABBLOO in the last 48 hours., CMP:   Recent Labs  Lab 07/30/17 0429 07/30/17 1700 07/30/17 2103 07/31/17 0315    140 140 139  139   K 4.5 4.5 4.3 4.2  4.2   * 108 109 107  107   CO2 25 25 24 26  26    131* 154* 103  103   BUN 16 16 15 14  14   CREATININE 0.8 0.7 0.8 0.7  0.7   CALCIUM 7.3* 7.4* 7.3* 7.2*  7.2*   PROT 4.1* 4.6*  --  4.4*  4.4*   ALBUMIN 2.1* 2.4*  --  2.3*  2.3*   BILITOT 0.6 0.7  --  0.7  0.7   ALKPHOS 271* 303*  --  272*  272*   AST 60* 67*  --  48*  48*   ALT 51* 61*  --  55*  55*   ANIONGAP 6* 7* 7* 6*  6*   ESTGFRAFRICA >60.0 >60.0 >60.0 >60.0  >60.0   EGFRNONAA >60.0 >60.0 >60.0 >60.0  >60.0   , CBC:   Recent Labs  Lab 07/30/17  0429 07/30/17  1700 07/31/17  0315   WBC 3.26* 3.50* 3.40*  3.40*   HGB 7.0* 8.7* 8.3*  8.3*   HCT 21.7* 26.1* 25.3*  25.3*   PLT 23* 31* 32*  32*    and INR:   Recent Labs  Lab 07/31/17  0315   INR 1.2       Significant Imaging: Echocardiogram:   2D echo with color flow doppler:   Results for orders placed or  performed during the hospital encounter of 06/20/17   2D echo with color flow doppler   Result Value Ref Range    EF 60 55 - 65    Diastolic Dysfunction No     Aortic Valve Regurgitation TRIVIAL     Est. PA Systolic Pressure 23.25     Mitral Valve Mobility NORMAL     Tricuspid Valve Regurgitation TRIVIAL

## 2017-07-31 NOTE — PROGRESS NOTES
Dr. Bolden at bedside to evaluate patient, patient currently running V-tach while MD at bedside, present on monitor and patient reports he is feeling his heart racing. Informed Dr. Bolden that cardiology did see patient early today, Dr. Bolden said yes they did and they recommended that beta blocker be increased. Patient currently on PO lopressor 25mg, this was given but patient still having runs of V-tach, no orders as of this time were given to increase the dose. Patient remains on tele and oxygen. Dr. Bolden gave no new orders at this time to just call him if needed. Will continue to monitor.

## 2017-07-31 NOTE — ASSESSMENT & PLAN NOTE
- scheduled zofran with PRN compazine  - dietary consult placed   - much improved and now tolerating diet

## 2017-07-31 NOTE — ASSESSMENT & PLAN NOTE
-- Relapsed AML - peripheral blood shows 30% blasts on admit  -- 2D echo shows 60% EF  -- Mazariegos placed, appears to be in the azygous vein, likely from repositioning. General surgery to replace 7/24. 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 Blast of 62%  -- Awaiting mutation analysis and cytogenetics; FLT-3 negative     -- Jaw pain and pain in right cranial nerve 7 distribution with persistent, severe headaches concerning for CNS involvement - underwent IT chemotherapy on 6/23. Flow negative for disease. CSF LDH was 29  -- Had previous reaction to MEC (etoposide caused full body rash). Did not qualify for the Tolero trial due to receiving IT chemotherapy    -- Day 35 of FLAG-BETZY   -- Day 14 bone marrow biopsy done 7/10 - hypocellular with no evidence of residual disease  -- Future plans for DLI (8/14/17?)  -- LP deferred given resolution of n/v

## 2017-07-31 NOTE — PLAN OF CARE
Problem: Patient Care Overview  Goal: Plan of Care Review  Outcome: Ongoing (interventions implemented as appropriate)  Pt involved in plan of care and communicating needs throughout shift. Up as tolerated. Tolerating diet, voiding without difficulty. IVF continued. 2L O2 sating 93%- desats without oxygen. Patient had several runs of V-tach my shift, medications adjustments were made and magnesium IV replaced and patient is now in NSR to Sinus Tach. Patient was symptomatic during these episodes would get sob and feel heart racing.  Dilaudid given twice for generalized pain. Plan for DC soon and DLI outpatient on 8/14.  Pt remaining free from falls or injury throughout shift; bed in lowest position; call light within reach; pt instructed to call for assistance as needed. Will continue to monitor.

## 2017-07-31 NOTE — SUBJECTIVE & OBJECTIVE
Subjective:     Interval History:   - Day 35 FLAG-BETZY - day 14 marrow showed remission  - MRI abnormal - evaluated by vascular neurology- on statin  - VSS Afebrile.   - 10-20 runs of Vtach overnight - cardiology following, EP consulted, EKG daily   - respiratory status remains stable but unable to completely wean off of oxygen - currently on 2 L  - Continued jaw pain but no new complaints   - Appetite much improved  - Depression noted - denied hem/onc psych   - sleeping well    Objective:     Vital Signs (Most Recent):  Temp: 97.9 °F (36.6 °C) (07/31/17 0724)  Pulse: 86 (07/31/17 0744)  Resp: 20 (07/31/17 0724)  BP: 135/73 (07/31/17 0724)  SpO2: 97 % (07/31/17 0744) Vital Signs (24h Range):  Temp:  [97.9 °F (36.6 °C)-98.5 °F (36.9 °C)] 97.9 °F (36.6 °C)  Pulse:  [] 86  Resp:  [18-20] 20  SpO2:  [86 %-97 %] 97 %  BP: (102-140)/(68-77) 135/73     Weight: 86.5 kg (190 lb 11.2 oz)  Body mass index is 25.86 kg/m².  Body surface area is 2.1 meters squared.    ECOG SCORE         [unfilled]    Intake/Output - Last 3 Shifts       07/29 0700 - 07/30 0659 07/30 0700 - 07/31 0659 07/31 0700 - 08/01 0659    P.O. 1980 760     I.V. (mL/kg) 1943.7 (22.5) 2236.7 (25.9) 141.7 (1.6)    Blood  350     IV Piggyback 388 250     Total Intake(mL/kg) 4311.7 (49.8) 3596.7 (41.6) 141.7 (1.6)    Urine (mL/kg/hr) 3075 (1.5) 3600 (1.7) 525 (2.5)    Stool 0 (0) 0 (0)     Total Output 3075 3600 525    Net +1236.7 -3.3 -383.3           Stool Occurrence 0 x 0 x           Physical Exam   Constitutional: He is oriented to person, place, and time. He appears well-developed and well-nourished. No distress. Nasal cannula in place.   HENT:   Head: Normocephalic and atraumatic.   Mouth/Throat: Oropharynx is clear and moist. No oropharyngeal exudate.   Eyes: Pupils are equal, round, and reactive to light.   Cardiovascular: Normal rate and regular rhythm.    Pulmonary/Chest: Effort normal and breath sounds normal. No respiratory distress.   - decrease  in breath sounds in base  - no wheeze   Abdominal: Soft. Bowel sounds are normal. He exhibits no distension. There is no tenderness.   Musculoskeletal: He exhibits no edema.   Neurological: He is alert and oriented to person, place, and time.   Skin: Skin is warm and dry. No rash noted. No erythema.   Left chest wall oquendo in place, no bleeding noted today   Psychiatric: He has a normal mood and affect.   Nursing note and vitals reviewed.      Significant Labs:   CBC:     Recent Labs  Lab 07/30/17 0429 07/30/17 1700 07/31/17 0315   WBC 3.26* 3.50* 3.40*  3.40*   HGB 7.0* 8.7* 8.3*  8.3*   HCT 21.7* 26.1* 25.3*  25.3*   PLT 23* 31* 32*  32*   , CMP:     Recent Labs  Lab 07/30/17 0429 07/30/17 1700 07/30/17 2103 07/31/17 0315    140 140 139  139   K 4.5 4.5 4.3 4.2  4.2   * 108 109 107  107   CO2 25 25 24 26  26    131* 154* 103  103   BUN 16 16 15 14  14   CREATININE 0.8 0.7 0.8 0.7  0.7   CALCIUM 7.3* 7.4* 7.3* 7.2*  7.2*   PROT 4.1* 4.6*  --  4.4*  4.4*   ALBUMIN 2.1* 2.4*  --  2.3*  2.3*   BILITOT 0.6 0.7  --  0.7  0.7   ALKPHOS 271* 303*  --  272*  272*   AST 60* 67*  --  48*  48*   ALT 51* 61*  --  55*  55*   ANIONGAP 6* 7* 7* 6*  6*   EGFRNONAA >60.0 >60.0 >60.0 >60.0  >60.0   ,   Recent Lab Results       07/31/17 0315 07/30/17 2103 07/30/17 1700      Albumin 2.3(L)  2.4(L)      2.3(L)       Alkaline Phosphatase 272(H)  303(H)      272(H)       ALT 55(H)  61(H)      55(H)       Anion Gap 6(L) 7(L) 7(L)      6(L)       Aniso Slight  Moderate      Slight       aPTT 23.8  Comment:  aPTT therapeutic range = 39-69 seconds       AST 48(H)  67(H)      48(H)       BANDS 1.0  1.0      1.0       Baso # CANCELED  Comment:  Result canceled by the ancillary  CANCELED  Comment:  Result canceled by the ancillary      CANCELED  Comment:  Result canceled by the ancillary       Basophil% 0.0  0.0      0.0       Total Bilirubin 0.7  Comment:  For infants and newborns,  interpretation of results should be based  on gestational age, weight and in agreement with clinical  observations.  Premature Infant recommended reference ranges:  Up to 24 hours.............<8.0 mg/dL  Up to 48 hours............<12.0 mg/dL  3-5 days..................<15.0 mg/dL  6-29 days.................<15.0 mg/dL    0.7  Comment:  For infants and newborns, interpretation of results should be based  on gestational age, weight and in agreement with clinical  observations.  Premature Infant recommended reference ranges:  Up to 24 hours.............<8.0 mg/dL  Up to 48 hours............<12.0 mg/dL  3-5 days..................<15.0 mg/dL  6-29 days.................<15.0 mg/dL        0.7  Comment:  For infants and newborns, interpretation of results should be based  on gestational age, weight and in agreement with clinical  observations.  Premature Infant recommended reference ranges:  Up to 24 hours.............<8.0 mg/dL  Up to 48 hours............<12.0 mg/dL  3-5 days..................<15.0 mg/dL  6-29 days.................<15.0 mg/dL         BUN, Bld 14 15 16      14       Calcium 7.2(L) 7.3(L) 7.4(L)      7.2(L)       Chloride 107 109 108      107       CO2 26 24 25      26       Creatinine 0.7 0.8 0.7      0.7       Differential Method Manual  Manual      Manual       eGFR if  >60.0 >60.0 >60.0      >60.0       eGFR if non  >60.0  Comment:  Calculation used to obtain the estimated glomerular filtration  rate (eGFR) is the CKD-EPI equation. Since race is unknown   in our information system, the eGFR values for   -American and Non--American patients are given   for each creatinine result.   >60.0  Comment:  Calculation used to obtain the estimated glomerular filtration  rate (eGFR) is the CKD-EPI equation. Since race is unknown   in our information system, the eGFR values for   -American and Non--American patients are given   for each creatinine result.    >60.0  Comment:  Calculation used to obtain the estimated glomerular filtration  rate (eGFR) is the CKD-EPI equation. Since race is unknown   in our information system, the eGFR values for   -American and Non--American patients are given   for each creatinine result.        >60.0  Comment:  Calculation used to obtain the estimated glomerular filtration  rate (eGFR) is the CKD-EPI equation. Since race is unknown   in our information system, the eGFR values for   -American and Non--American patients are given   for each creatinine result.         Eos # CANCELED  Comment:  Result canceled by the ancillary  CANCELED  Comment:  Result canceled by the ancillary      CANCELED  Comment:  Result canceled by the ancillary       Eosinophil% 0.0  0.0      0.0       Glucose 103 154(H) 131(H)      103       Gran #   CANCELED  Comment:  Result canceled by the ancillary     Gran% 85.0(H)  81.0(H)      85.0(H)       Hematocrit 25.3(L)  26.1(L)      25.3(L)       Hemoglobin 8.3(L)  8.7(L)      8.3(L)       Coumadin Monitoring INR 1.2  Comment:  Coumadin Therapy:  2.0 - 3.0 for INR for all indicators except mechanical heart valves  and antiphospholipid syndromes which should use 2.5 - 3.5.           Comment:  Results are increased in hemolyzed samples.        178  Comment:  Results are increased in hemolyzed samples.       Lymph # CANCELED  Comment:  Result canceled by the ancillary  CANCELED  Comment:  Result canceled by the ancillary      CANCELED  Comment:  Result canceled by the ancillary       Lymph% 9.0(L)  12.0(L)      9.0(L)       Magnesium 2.2 1.9 1.8      2.2       MCH 29.7  29.7      29.7       MCHC 32.8  33.3      32.8       MCV 91  89      91       Metamyelocytes 3.0  1.0      3.0       Mono # CANCELED  Comment:  Result canceled by the ancillary  CANCELED  Comment:  Result canceled by the ancillary      CANCELED  Comment:  Result canceled by the ancillary       Mono% 2.0(L)  2.0(L)       2.0(L)       MPV 11.4  12.0      11.4       Myelocytes   3.0     nRBC 8@L=100(A)  3(A)      8@L=100(A)       Ovalocytes   Occasional     Phosphorus 2.8  3.2      2.8       Platelet Estimate Decreased(A)  Decreased(A)      Decreased(A)       Platelets 32  Comment:  critical result(s) called and verbal readback obtained from   PLATELET COUNT CALLED TO IGNACIO Jeter RN, 07/31/2017 05:17  (LL)  31  Comment:  PLT critical result(s) called and verbal readback obtained from   KAREN PAEZ RN., 07/30/2017 18:01  (LL)      32  Comment:  critical result(s) called and verbal readback obtained from   PLATELET COUNT CALLED TO IGNACIO Jeter RN, 07/31/2017 05:17  (LL)       Poik   Slight     Poly Occasional  Occasional      Occasional       Potassium 4.2 4.3 4.5      4.2       Total Protein 4.4(L)  4.6(L)      4.4(L)       Protime 12.1       RBC 2.79(L)  2.93(L)      2.79(L)       RDW 15.7(H)  15.5(H)      15.7(H)       Sodium 139 140 140      139       Tear Drop Cells   Occasional     Uric Acid 3.4        3.4       WBC 3.40(L)  3.50(L)      3.40(L)          and All pertinent labs from the last 24 hours have been reviewed.    Diagnostic Results:  I have reviewed all pertinent imaging results/findings within the past 24 hours.

## 2017-07-31 NOTE — HPI
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  Course has been complicated by neutropenic fever in the setting of multiofocal PNA.  EP was originally consulted for AFL with RVR and was cardioverted on 7/15/17.  He was then placed on fleicanide 50mg po BID and lopressor 25mg po BID.  He has tolerated these medications well until yesterday when he was noted to be having what was thought to be NSVT with prolonged QTc making fleicanide a concern.  It was therefore stopped overnight.

## 2017-07-31 NOTE — PROGRESS NOTES
[facility]  Hematology  Bone Marrow Transplant  Progress Note    Patient Name: Paul E Sistrunk  Admission Date: 6/20/2017  Hospital Length of Stay: 41 days  Code Status: Full Code    Subjective:     Interval History:   - Day 35 FLAG-BETZY - day 14 marrow showed remission  - MRI abnormal - evaluated by vascular neurology- on statin  - VSS Afebrile.   - 10-20 runs of Vtach overnight - cardiology following, EP consulted, EKG daily   - respiratory status remains stable but unable to completely wean off of oxygen - currently on 2 L  - Continued jaw pain but no new complaints   - Appetite much improved  - Depression noted - denied hem/onc psych   - sleeping well    Objective:     Vital Signs (Most Recent):  Temp: 97.9 °F (36.6 °C) (07/31/17 0724)  Pulse: 86 (07/31/17 0744)  Resp: 20 (07/31/17 0724)  BP: 135/73 (07/31/17 0724)  SpO2: 97 % (07/31/17 0744) Vital Signs (24h Range):  Temp:  [97.9 °F (36.6 °C)-98.5 °F (36.9 °C)] 97.9 °F (36.6 °C)  Pulse:  [] 86  Resp:  [18-20] 20  SpO2:  [86 %-97 %] 97 %  BP: (102-140)/(68-77) 135/73     Weight: 86.5 kg (190 lb 11.2 oz)  Body mass index is 25.86 kg/m².  Body surface area is 2.1 meters squared.    ECOG SCORE         [unfilled]    Intake/Output - Last 3 Shifts       07/29 0700 - 07/30 0659 07/30 0700 - 07/31 0659 07/31 0700 - 08/01 0659    P.O. 1980 760     I.V. (mL/kg) 1943.7 (22.5) 2236.7 (25.9) 141.7 (1.6)    Blood  350     IV Piggyback 388 250     Total Intake(mL/kg) 4311.7 (49.8) 3596.7 (41.6) 141.7 (1.6)    Urine (mL/kg/hr) 3075 (1.5) 3600 (1.7) 525 (2.5)    Stool 0 (0) 0 (0)     Total Output 3075 3600 525    Net +1236.7 -3.3 -383.3           Stool Occurrence 0 x 0 x           Physical Exam   Constitutional: He is oriented to person, place, and time. He appears well-developed and well-nourished. No distress. Nasal cannula in place.   HENT:   Head: Normocephalic and atraumatic.   Mouth/Throat: Oropharynx is clear and moist. No oropharyngeal exudate.    Eyes: Pupils are equal, round, and reactive to light.   Cardiovascular: Normal rate and regular rhythm.    Pulmonary/Chest: Effort normal and breath sounds normal. No respiratory distress.   - decrease in breath sounds in base  - no wheeze   Abdominal: Soft. Bowel sounds are normal. He exhibits no distension. There is no tenderness.   Musculoskeletal: He exhibits no edema.   Neurological: He is alert and oriented to person, place, and time.   Skin: Skin is warm and dry. No rash noted. No erythema.   Left chest wall oquendo in place, no bleeding noted today   Psychiatric: He has a normal mood and affect.   Nursing note and vitals reviewed.      Significant Labs:   CBC:     Recent Labs  Lab 07/30/17 0429 07/30/17 1700 07/31/17 0315   WBC 3.26* 3.50* 3.40*  3.40*   HGB 7.0* 8.7* 8.3*  8.3*   HCT 21.7* 26.1* 25.3*  25.3*   PLT 23* 31* 32*  32*   , CMP:     Recent Labs  Lab 07/30/17 0429 07/30/17 1700 07/30/17 2103 07/31/17 0315    140 140 139  139   K 4.5 4.5 4.3 4.2  4.2   * 108 109 107  107   CO2 25 25 24 26  26    131* 154* 103  103   BUN 16 16 15 14  14   CREATININE 0.8 0.7 0.8 0.7  0.7   CALCIUM 7.3* 7.4* 7.3* 7.2*  7.2*   PROT 4.1* 4.6*  --  4.4*  4.4*   ALBUMIN 2.1* 2.4*  --  2.3*  2.3*   BILITOT 0.6 0.7  --  0.7  0.7   ALKPHOS 271* 303*  --  272*  272*   AST 60* 67*  --  48*  48*   ALT 51* 61*  --  55*  55*   ANIONGAP 6* 7* 7* 6*  6*   EGFRNONAA >60.0 >60.0 >60.0 >60.0  >60.0   ,   Recent Lab Results       07/31/17 0315 07/30/17 2103 07/30/17 1700      Albumin 2.3(L)  2.4(L)      2.3(L)       Alkaline Phosphatase 272(H)  303(H)      272(H)       ALT 55(H)  61(H)      55(H)       Anion Gap 6(L) 7(L) 7(L)      6(L)       Aniso Slight  Moderate      Slight       aPTT 23.8  Comment:  aPTT therapeutic range = 39-69 seconds       AST 48(H)  67(H)      48(H)       BANDS 1.0  1.0      1.0       Baso # CANCELED  Comment:  Result canceled by the ancillary   CANCELED  Comment:  Result canceled by the ancillary      CANCELED  Comment:  Result canceled by the ancillary       Basophil% 0.0  0.0      0.0       Total Bilirubin 0.7  Comment:  For infants and newborns, interpretation of results should be based  on gestational age, weight and in agreement with clinical  observations.  Premature Infant recommended reference ranges:  Up to 24 hours.............<8.0 mg/dL  Up to 48 hours............<12.0 mg/dL  3-5 days..................<15.0 mg/dL  6-29 days.................<15.0 mg/dL    0.7  Comment:  For infants and newborns, interpretation of results should be based  on gestational age, weight and in agreement with clinical  observations.  Premature Infant recommended reference ranges:  Up to 24 hours.............<8.0 mg/dL  Up to 48 hours............<12.0 mg/dL  3-5 days..................<15.0 mg/dL  6-29 days.................<15.0 mg/dL        0.7  Comment:  For infants and newborns, interpretation of results should be based  on gestational age, weight and in agreement with clinical  observations.  Premature Infant recommended reference ranges:  Up to 24 hours.............<8.0 mg/dL  Up to 48 hours............<12.0 mg/dL  3-5 days..................<15.0 mg/dL  6-29 days.................<15.0 mg/dL         BUN, Bld 14 15 16      14       Calcium 7.2(L) 7.3(L) 7.4(L)      7.2(L)       Chloride 107 109 108      107       CO2 26 24 25      26       Creatinine 0.7 0.8 0.7      0.7       Differential Method Manual  Manual      Manual       eGFR if  >60.0 >60.0 >60.0      >60.0       eGFR if non  >60.0  Comment:  Calculation used to obtain the estimated glomerular filtration  rate (eGFR) is the CKD-EPI equation. Since race is unknown   in our information system, the eGFR values for   -American and Non--American patients are given   for each creatinine result.   >60.0  Comment:  Calculation used to obtain the estimated glomerular  filtration  rate (eGFR) is the CKD-EPI equation. Since race is unknown   in our information system, the eGFR values for   -American and Non--American patients are given   for each creatinine result.   >60.0  Comment:  Calculation used to obtain the estimated glomerular filtration  rate (eGFR) is the CKD-EPI equation. Since race is unknown   in our information system, the eGFR values for   -American and Non--American patients are given   for each creatinine result.        >60.0  Comment:  Calculation used to obtain the estimated glomerular filtration  rate (eGFR) is the CKD-EPI equation. Since race is unknown   in our information system, the eGFR values for   -American and Non--American patients are given   for each creatinine result.         Eos # CANCELED  Comment:  Result canceled by the ancillary  CANCELED  Comment:  Result canceled by the ancillary      CANCELED  Comment:  Result canceled by the ancillary       Eosinophil% 0.0  0.0      0.0       Glucose 103 154(H) 131(H)      103       Gran #   CANCELED  Comment:  Result canceled by the ancillary     Gran% 85.0(H)  81.0(H)      85.0(H)       Hematocrit 25.3(L)  26.1(L)      25.3(L)       Hemoglobin 8.3(L)  8.7(L)      8.3(L)       Coumadin Monitoring INR 1.2  Comment:  Coumadin Therapy:  2.0 - 3.0 for INR for all indicators except mechanical heart valves  and antiphospholipid syndromes which should use 2.5 - 3.5.           Comment:  Results are increased in hemolyzed samples.        178  Comment:  Results are increased in hemolyzed samples.       Lymph # CANCELED  Comment:  Result canceled by the ancillary  CANCELED  Comment:  Result canceled by the ancillary      CANCELED  Comment:  Result canceled by the ancillary       Lymph% 9.0(L)  12.0(L)      9.0(L)       Magnesium 2.2 1.9 1.8      2.2       MCH 29.7  29.7      29.7       MCHC 32.8  33.3      32.8       MCV 91  89      91       Metamyelocytes 3.0  1.0       3.0       Mono # CANCELED  Comment:  Result canceled by the ancillary  CANCELED  Comment:  Result canceled by the ancillary      CANCELED  Comment:  Result canceled by the ancillary       Mono% 2.0(L)  2.0(L)      2.0(L)       MPV 11.4  12.0      11.4       Myelocytes   3.0     nRBC 8@L=100(A)  3(A)      8@L=100(A)       Ovalocytes   Occasional     Phosphorus 2.8  3.2      2.8       Platelet Estimate Decreased(A)  Decreased(A)      Decreased(A)       Platelets 32  Comment:  critical result(s) called and verbal readback obtained from   PLATELET COUNT CALLED TO IGNACIO Jeter RN, 07/31/2017 05:17  (LL)  31  Comment:  PLT critical result(s) called and verbal readback obtained from   KAREN PAEZ RN., 07/30/2017 18:01  (LL)      32  Comment:  critical result(s) called and verbal readback obtained from   PLATELET COUNT CALLED TO IGNACIO Jeter RN, 07/31/2017 05:17  (LL)       Poik   Slight     Poly Occasional  Occasional      Occasional       Potassium 4.2 4.3 4.5      4.2       Total Protein 4.4(L)  4.6(L)      4.4(L)       Protime 12.1       RBC 2.79(L)  2.93(L)      2.79(L)       RDW 15.7(H)  15.5(H)      15.7(H)       Sodium 139 140 140      139       Tear Drop Cells   Occasional     Uric Acid 3.4        3.4       WBC 3.40(L)  3.50(L)      3.40(L)          and All pertinent labs from the last 24 hours have been reviewed.    Diagnostic Results:  I have reviewed all pertinent imaging results/findings within the past 24 hours.    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 General surgery to replace 7/24. 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 Blast of 62%  -- Awaiting mutation analysis and cytogenetics; FLT-3 negative     -- Jaw pain and  pain in right cranial nerve 7 distribution with persistent, severe headaches concerning for CNS involvement - underwent IT chemotherapy on 6/23. Flow negative for disease. CSF LDH was 29  -- Had previous reaction to MEC (etoposide caused full body rash). Did not qualify for the Tolero trial due to receiving IT chemotherapy    -- Day 35 of FLAG-BETZY   -- Day 14 bone marrow biopsy done 7/10 - hypocellular with no evidence of residual disease  -- Future plans for DLI (8/14/17?)  -- LP deferred given resolution of n/v        S/P allogeneic bone marrow transplant    -- +322 days s/p Flu/Bu/ATG MUD allogeneic transplant for high risk AML after his second IDAC (6/20/16 - 6/24/16) after a prolonged hospitalization (2/5/16 - 3/21/16) for induction with 7&3 and reinduction with MEC to remission and IDAC (4/4/16 - 4/9/16)  -- Continue ppx acyclovir, renally dosed. We have discussed antifungal coverage with infectious disease. Discontinued itraconzaole (7/09) was on ambisome, now on voriconazole  -- Chimerics from marrow done 6/21 show CD3 of 90% donor and 10% recipient, but CD34 of 5% donor and 95% recipient   -- Chimers from 7/10 shows 70% recipient and 30% donor - NOT SORTED  -- Plans for DLI in future         V-tach    - runs of V-Tach overnight 10-20 beats  - cardiology consulted; appreciate recs  - EP consulted today per recommendation of cardiology  - daily EKGs         HIV disease    -- CD4 low at 11.4%, Absolute CD4 469 and HIV RNA not detected from 6/21  -- As per ID: continue triumeq. Will set up ID follow-up upon discharge        Pleural effusion    -- Pulm re-consulted 7/20  -- s/p Thoracentesis - cytology negative   -- repeat CXR 7/26 shows Ill-defined patchy airspace consolidation bilaterally more on the left side identified.  Pleural effusion slightly more on the left.  -- Pulm re-consulted 7/26  --s/p Thoracentesis 7/26/17 with 1.3L removed  -- refused CT of chest  -- trial of steroids per pulmonary, appreciate  recs        Acute respiratory failure with hypoxia    - satting well on 1L NC   - difficulty weaning to room air  - continue to wean        CINV (chemotherapy-induced nausea and vomiting)    - scheduled zofran with PRN compazine  - dietary consult placed   - much improved and now tolerating diet        Pancytopenia due to antineoplastic chemotherapy    -- improved  -- White blood cell count is 3.40 k/uL, ANC 2900  -- hemoglobin 8.3 g/dL; platelet count is 32 k/uL   -- Following with daily CBC and transfusing for hemoglobin < 7 g/dL, platelets < 10 k/uL        Delirium    -- Resolved  -- Typically worse at night; speaks nonsense occasionally during the day; has threatened to leave AMA at night a few times, takes off oxygen at night and has disconnected pump  -- CT of head revealed acute stroke, for which neurology is now following. Not thought to be the main contributor to AMS, rather the cause is likely multifactorial with contribution from medications and his prolonged hospitalization        Prostate hypertrophy    -- Stable  -- Continue home flomax        Supraventricular tachycardia    -- Stable  -- Supraventricular tachycardia on tele and EKG. Started 7/13/17 8pm with HR between 160-170  -- Patient was cardioverted by cardiology and started on metoprolol and flecainide.  -- Treating underlying cause: infection and malignancy  -- Pharmacologic nuclear stress test originally ordered; cardiology following with tentative plans for a cardiac CTA  -- PVCs seen on EKG on 7/26 - corrected electrolytes         Neutropenic fever    -- Resolved   -- Blood cultures no growth from 6/24/17, 7/4/17, 7/7/17, 7/9/17, 7/15/17  -- Complicated antimicrobial treatment history as follows:   - Cefepime: (started 6/24 - discontinued 6/28) restarted on 7/4/17 - 7/23   - Ampotericin B started 7/9-stopped 7/19;    - Vancomycin (restarted 7/15 - stopped 7/17)  -- Now on PPX antimicrobial agents alone, with bactrim, voriconazole,   acyclovir, and ciprofloxacin  -- Appreciate ID assistance. They have now signed off.  -- Multiple possible sources. CXR showed pneumonia 7/15; CXR 7/17 - Significant patchy air space consolidation identified bilaterally slightly more on the left side.  Slight improvement as compared to the previous study.  Left-sided pleural effusion. On 2-3 L via NC  -- CT chest/abdomen/pelvis reveals bilateral groundglass opacities as well as a left lower-lobe consolidation;  -- ID recommended repeating CT chest 7/19, which showed: Limited assessment. No acute central pulmonary thromboembolism. Peripheral pulmonary emboli are not excluded. Interval development of bilateral moderate pleural effusions. Trace pericardial effusion. Interval progression of bilateral airspace disease with more extensive areas of consolidation bilaterally, in a similar central distribution to the groundglass opacities previously noted, with an upper lobe predominance.   -- Pulm was consulted for a BAL which was done 7/12, cytology pending from BAL; aspergillus <0.500; KOH was negative for yeast/fungus   -- Pulmonary re-consulted, now s/p thoracentesis on 7/22 with cultures NGTD, negative for cytology         Stroke of unknown etiology    -- Stable  -- Neurology following, appreciate assistance  -- Several imaging studies have been performed, including CT head 7/21, MRI brain 7/21, and CTA head and neck 7/21 with interpreting radiologists citing concern for infarction of the right lentiform nucleus and anterior limb of the internal capsule   -- Neurology reviewed the images and were first concerned about possible embolic activity given his arrhythmia earlier this admission, now concerned about the possibility of fungal CNS infection, though after discussion with ID he has essentially completed a therapeutic course with ambisome and is now on voriconazole, recommending against additional prophylactic CNS fungal coverage  -- Continue atorvastatin. Not a  candidate for any sort of anticoagulation or antiplatelet agents given transfusion-dependent thrombocytopenia        Lower extremity edema    -- Stable, likely iatrogenic from fluids  -- IV fluids stopped  -- Net Negative 3 L from 7/18 - down 11 lbs from 7/18 - 40 mg of lasix BID - given on 7/18   -- Monitoring I/Os        Insomnia    -- Improving  -- Ramelteon and zyprexa not tolerated  -- Continue phenergan 12.5 mg nightly for insomnia, which seems to help without causing excessive sedation or confusion        Chronic bilateral low back pain without sciatica    -- Thought to be related to Neupogen  -- Continue zyrtec, lidocaine patch   -- Cautiously resuming opioid analgesics, including dilaudid PRN        Electrolyte abnormality    -- Ordered prn electrolyte replacement per protocol   -- Hypophosphatemia replaced; he appears to be running persistently low.         Anxiety    -- Holding xanax PRN due to confusion  -- Consulted hem/onc psych, will need to follow-up with them as an outpatient when discharged  -- patient denies wanting to follow up in patient            VTE Risk Mitigation         Ordered     Place sequential compression device  Until discontinued      06/20/17 2005     High Risk of VTE  Once      06/20/17 1901          Disposition: Pending cardiology issues.    Asia Melgar, NP  Bone Marrow Transplant  [facility]

## 2017-07-31 NOTE — ASSESSMENT & PLAN NOTE
ekg's and tele reviewed  Noted to be 2:1 atrial flutter with a HR of 170 which then converted to aflutter with aberrancy and LBBB morphology  Would increase fleicanide to 100mg po BID  Continue lopressor 25mg po BID  Would not ablate at this point given acuity of condition  Patient is asymptomatic at this time  Thrombocytopenia prevents anticoagulation  CHADSVASc nonetheless is 0  Continue with daily EKG

## 2017-07-31 NOTE — CONSULTS
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  Course has been complicated by neutropenic fever in the setting of multiofocal PNA.  EP was originally consulted for AFL with RVR and was cardioverted on 7/15/17.  He was then placed on fleicanide 50mg po BID and lopressor 25mg po BID.  He has tolerated these medications well until yesterday when he was noted to be having what was thought to be NSVT with prolonged QTc making fleicanide a concern.  It was therefore stopped overnight.    Past Medical History:   Diagnosis Date    Cancer 2/2016    ALL    HIV infection     Pancytopenia due to antineoplastic chemotherapy 7/4/2016       Past Surgical History:   Procedure Laterality Date    CYSTOSCOPY         Review of patient's allergies indicates:   Allergen Reactions    Etoposide Rash       No current facility-administered medications on file prior to encounter.      Current Outpatient Prescriptions on File Prior to Encounter   Medication Sig    abacavir-dolutegravir-lamivud (TRIUMEQ) 600- mg Tab Take 1 tablet by mouth once daily.    acyclovir (ZOVIRAX) 800 MG Tab Take 1 tablet (800 mg total) by mouth 2 (two) times daily.    alprazolam (XANAX) 0.5 MG tablet Take 1 tablet (0.5 mg total) by mouth 2 (two) times daily as needed for Insomnia or Anxiety.    butalbital-acetaminophen-caffeine -40 mg (FIORICET, ESGIC) -40 mg per tablet     finasteride (PROSCAR) 5 mg tablet Take 1 tablet (5 mg total) by mouth once daily.    HYDROmorphone (DILAUDID) 2 MG tablet Take 1 tablet (2 mg total) by mouth every 4  (four) hours as needed for Pain.    itraconazole (SPORANOX) 10 mg/mL Soln Take 2 mLs (20 mg total) by mouth 3 (three) times daily.    loperamide (IMODIUM) 2 mg capsule Take 2 mg by mouth daily as needed for Diarrhea.    magnesium oxide (MAG-OX) 400 mg tablet TAKE THREE TABLETS BY MOUTH THREE TIMES A DAY    ondansetron (ZOFRAN) 8 MG tablet Take 1 tablet (8 mg total) by mouth every 8 (eight) hours as needed for Nausea.    pantoprazole (PROTONIX) 40 MG tablet Take 1 tablet (40 mg total) by mouth once daily.    promethazine (PHENERGAN) 12.5 MG Tab Take 1 tablet (12.5 mg total) by mouth every 6 (six) hours as needed (nausea).    tacrolimus (PROGRAF) 0.5 MG Cap Take 1 capsule (0.5 mg total) by mouth once daily.    tamsulosin (FLOMAX) 0.4 mg Cp24 Take 1 capsule (0.4 mg total) by mouth every other day.    zolpidem (AMBIEN) 5 MG Tab Take 1 tablet (5 mg total) by mouth nightly as needed.     Family History     Problem Relation (Age of Onset)    Cancer Father        Social History Main Topics    Smoking status: Never Smoker    Smokeless tobacco: Not on file    Alcohol use No    Drug use: No    Sexual activity: Not on file     Review of Systems   Constitution: Negative.   HENT: Negative.    Eyes: Negative.    Cardiovascular: Negative.         Heart fluttering sensation   Respiratory: Negative.    Endocrine: Negative.    Skin: Negative.    Musculoskeletal: Negative.    Gastrointestinal: Negative.    Genitourinary: Negative.    Neurological: Negative.      Objective:     Vital Signs (Most Recent):  Temp: 98.2 °F (36.8 °C) (07/31/17 1518)  Pulse: 94 (07/31/17 1518)  Resp: 20 (07/31/17 1518)  BP: 132/67 (07/31/17 1518)  SpO2: (!) 93 % (07/31/17 1518) Vital Signs (24h Range):  Temp:  [97.9 °F (36.6 °C)-98.2 °F (36.8 °C)] 98.2 °F (36.8 °C)  Pulse:  [] 94  Resp:  [16-20] 20  SpO2:  [92 %-97 %] 93 %  BP: (124-140)/(65-77) 132/67     Weight: 86.5 kg (190 lb 11.2 oz)  Body mass index is 25.86 kg/m².    SpO2: (!) 93  %  O2 Device (Oxygen Therapy): nasal cannula w/ humidification    Physical Exam   Constitutional: He is oriented to person, place, and time. He appears well-developed and well-nourished.   HENT:   Head: Normocephalic and atraumatic.   Eyes: Conjunctivae and EOM are normal. Pupils are equal, round, and reactive to light.   Neck: Normal range of motion. Neck supple. No JVD present.   Cardiovascular: Normal rate, regular rhythm and normal heart sounds.  Exam reveals no gallop and no friction rub.    No murmur heard.  Pulmonary/Chest: Effort normal and breath sounds normal. No respiratory distress. He has no wheezes. He has no rales. He exhibits no tenderness.   Abdominal: Soft. Bowel sounds are normal. He exhibits no distension. There is no tenderness.   Musculoskeletal: Normal range of motion. He exhibits no edema or tenderness.   Neurological: He is alert and oriented to person, place, and time.   Skin: Skin is warm and dry. No erythema. No pallor.       Significant Labs:   EP:   Recent Labs  Lab 07/30/17  0429 07/30/17  1700 07/30/17  2103 07/31/17  0315    140 140 139  139   K 4.5 4.5 4.3 4.2  4.2   * 108 109 107  107   CO2 25 25 24 26  26    131* 154* 103  103   BUN 16 16 15 14  14   CREATININE 0.8 0.7 0.8 0.7  0.7   CALCIUM 7.3* 7.4* 7.3* 7.2*  7.2*   PROT 4.1* 4.6*  --  4.4*  4.4*   ALBUMIN 2.1* 2.4*  --  2.3*  2.3*   BILITOT 0.6 0.7  --  0.7  0.7   ALKPHOS 271* 303*  --  272*  272*   AST 60* 67*  --  48*  48*   ALT 51* 61*  --  55*  55*   ANIONGAP 6* 7* 7* 6*  6*   ESTGFRAFRICA >60.0 >60.0 >60.0 >60.0  >60.0   EGFRNONAA >60.0 >60.0 >60.0 >60.0  >60.0   WBC 3.26* 3.50*  --  3.40*  3.40*   HGB 7.0* 8.7*  --  8.3*  8.3*   HCT 21.7* 26.1*  --  25.3*  25.3*   PLT 23* 31*  --  32*  32*   INR  --   --   --  1.2   , Blood Culture: No results for input(s): LABBLOO in the last 48 hours., CMP:   Recent Labs  Lab 07/30/17  0429 07/30/17  1700 07/30/17  2103 07/31/17  0315     140 140 139  139   K 4.5 4.5 4.3 4.2  4.2   * 108 109 107  107   CO2 25 25 24 26  26    131* 154* 103  103   BUN 16 16 15 14  14   CREATININE 0.8 0.7 0.8 0.7  0.7   CALCIUM 7.3* 7.4* 7.3* 7.2*  7.2*   PROT 4.1* 4.6*  --  4.4*  4.4*   ALBUMIN 2.1* 2.4*  --  2.3*  2.3*   BILITOT 0.6 0.7  --  0.7  0.7   ALKPHOS 271* 303*  --  272*  272*   AST 60* 67*  --  48*  48*   ALT 51* 61*  --  55*  55*   ANIONGAP 6* 7* 7* 6*  6*   ESTGFRAFRICA >60.0 >60.0 >60.0 >60.0  >60.0   EGFRNONAA >60.0 >60.0 >60.0 >60.0  >60.0   , CBC:   Recent Labs  Lab 07/30/17  0429 07/30/17  1700 07/31/17  0315   WBC 3.26* 3.50* 3.40*  3.40*   HGB 7.0* 8.7* 8.3*  8.3*   HCT 21.7* 26.1* 25.3*  25.3*   PLT 23* 31* 32*  32*    and INR:   Recent Labs  Lab 07/31/17  0315   INR 1.2       Significant Imaging: Echocardiogram:   2D echo with color flow doppler:   Results for orders placed or performed during the hospital encounter of 06/20/17   2D echo with color flow doppler   Result Value Ref Range    EF 60 55 - 65    Diastolic Dysfunction No     Aortic Valve Regurgitation TRIVIAL     Est. PA Systolic Pressure 23.25     Mitral Valve Mobility NORMAL     Tricuspid Valve Regurgitation TRIVIAL      Assessment and Plan:     Supraventricular tachycardia    ekg's and tele reviewed  Noted to be 2:1 atrial flutter with a HR of 170 which then converted to aflutter with aberrancy and LBBB morphology  Would increase fleicanide to 100mg po BID  Continue lopressor 25mg po BID  Would not ablate at this point given acuity of condition  Patient is asymptomatic at this time  Thrombocytopenia prevents anticoagulation  CHADSVASc nonetheless is 0  Continue with daily EKG              Thank you for your consult. We will follow along with you. Please contact us if you have any additional questions.    Aleksey Whitehead MD  Cardiac Electrophysiology  [facility]

## 2017-07-31 NOTE — PLAN OF CARE
Problem: Patient Care Overview  Goal: Individualization & Mutuality  1. Left chest oquendo catheter dsg change q Monday and PRN.  2. Wean oxygen if possible - currently 1L via NC  3. Encourage meals and boost due to decreased appetite.      07/21/17 2926   Individualization   Patient Specific Goals to get some sleep   Patient Specific Interventions frequently thirsty - keep water/ice in room

## 2017-07-31 NOTE — PROGRESS NOTES
Notified dr. Bolden of results of BMP and magnesium. Order written to given dose of magnesium IV. Will continue to monitor.

## 2017-07-31 NOTE — PROGRESS NOTES
Contacted Dr. Byrd about patient continuing to run 10-20 beats of V-tach on a regular basis every 1-3 minutes then going back into NSR. Patient is symptomatic at the time it is happening. Informed Dr. Byrd that Dr. Bolden came to bedside and saw patient but gave no new orders only to keep monitoring. Dr. Byrd will contact him again to follow up on this patient and to call cardiology for recommendations. Will continue to monitor.

## 2017-07-31 NOTE — ASSESSMENT & PLAN NOTE
- runs of V-Tach overnight 10-20 beats  - cardiology consulted; appreciate recs  - EP consulted today per recommendation of cardiology  - daily EKGs

## 2017-07-31 NOTE — PLAN OF CARE
Problem: Patient Care Overview  Goal: Plan of Care Review  Outcome: Ongoing (interventions implemented as appropriate)  Afebrile. NSR today on telemetry (no V-tach) although patient does become tachycardic with activity (110-120bpm). Continues on 2L O2. Ambulates independently in room. Patient remains free from falls.  Fall precautions in place.  Bed in low position, wheels locked, side rails up x2, nonskid socks on, and call light within reach.  Patient encouraged to call for assistance with ambulation if needed. PRN compazine given prior to AM meds. PRN dilaudid given x1 for chin/jaw pain. PRN ativan given x1 for anxiety r/t recent cardiac events. IVF at 100ml/hr. Mazariegos dressing change done today. Purposeful rounding done hourly. Will continue to monitor. Sonia Reyes 7/31/2017 3:38 PM

## 2017-07-31 NOTE — PROGRESS NOTES
"Dr. Byrd called and updated that patient continuing to have multiple runs of V-tach ranging 8-15 beats at a time in the 150s. Patient does feel like "heart is racing" when V-tach occurs. Dr. Byrd will have resident contact cardiology for their recommendations. JIN Corey updated and will continue care. Sonia Reyes 7/30/2017   "

## 2017-07-31 NOTE — ASSESSMENT & PLAN NOTE
-- improved  -- White blood cell count is 3.40 k/uL, ANC 2900  -- hemoglobin 8.3 g/dL; platelet count is 32 k/uL   -- Following with daily CBC and transfusing for hemoglobin < 7 g/dL, platelets < 10 k/uL

## 2017-07-31 NOTE — PROGRESS NOTES
Progress Note    Admit Date: 6/20/2017   LOS: 41 days     SUBJECTIVE:     Follow-up For: RR LBBB vs Atypical  AVRT    Interval: 10-20 beat runs,  by ~30 secs of what was suspected to be a NSVT. Patient states that he had never had a run of his heart like yesterday evening.He denies any palpitations, SOB today. States his chest is sore today on the lower right, tender to palpation.  However on review of the ekg, questionable as obvious LBBB present w/ what appeared to be synchronous P waves to QRS complexes. Querying RR LBBB vs Atypical AVRT.     Scheduled Meds:   abacavir  600 mg Oral Daily    acyclovir  400 mg Oral BID    atorvastatin  20 mg Oral Daily    dolutegravir  50 mg Oral Daily    finasteride  5 mg Oral Daily    guaifenesin  600 mg Oral BID    lamivudine  300 mg Oral Daily    metoprolol tartrate  25 mg Oral BID    morphine  30 mg Oral Q12H    pantoprazole  40 mg Oral Daily    predniSONE  60 mg Oral Daily    Followed by    [START ON 8/3/2017] predniSONE  40 mg Oral Daily    promethazine  12.5 mg Oral QHS    sulfamethoxazole-trimethoprim 800-160mg  1 tablet Oral Every Mon, Wed, Fri    tamsulosin  0.4 mg Oral Every other day    voriconazole  300 mg Oral BID     Continuous Infusions:   sodium chloride 0.9% 100 mL/hr at 07/30/17 1621     PRN Meds:acetaminophen, alteplase, benzonatate, butalbital-acetaminophen-caffeine -40 mg, diphenhydrAMINE, heparin, porcine (PF), HYDROmorphone, lorazepam, magnesium sulfate IVPB, magnesium sulfate IVPB, potassium chloride **AND** potassium chloride **AND** potassium chloride, prochlorperazine, senna-docusate 8.6-50 mg, sodium chloride 0.9%, sodium chloride 0.9%, sodium phosphate IVPB, sodium phosphate IVPB, sodium phosphate IVPB    Review of patient's allergies indicates:   Allergen Reactions    Etoposide Rash       Review of Systems  Constitution: Negative for fever, chills, weight loss or gain.   HENT: Negative for sore throat, rhinorrhea, or  headache.  Eyes: Negative for blurred or double vision.   Cardiovascular: See above  Pulmonary: Negative for SOB   Gastrointestinal: Negative for abdominal pain, nausea, vomiting, or diarrhea.   : Negative for dysuria.   Neurological: Negative for focal weakness or sensory changes.    OBJECTIVE:     Vital Signs (Most Recent)  Temp: 97.9 °F (36.6 °C) (07/31/17 1223)  Pulse: 85 (07/31/17 1223)  Resp: 16 (07/31/17 1223)  BP: 124/65 (07/31/17 1223)  SpO2: (!) 94 % (07/31/17 1350)    Vital Signs Range (Last 24H):  Temp:  [97.9 °F (36.6 °C)-98.2 °F (36.8 °C)]   Pulse:  []   Resp:  [16-20]   BP: (124-140)/(65-77)   SpO2:  [87 %-97 %]     I & O (Last 24H):  Intake/Output Summary (Last 24 hours) at 07/31/17 1412  Last data filed at 07/31/17 1300   Gross per 24 hour   Intake             3160 ml   Output             4150 ml   Net             -990 ml     Physical Exam:  Constitutional: NAD, conversant  HEENT: Sclera anicteric, PERRLA, EOMI  Neck: No JVD, no carotid bruits  CV: RRR, S1 S2 no murmurs   Pulm: CTAB, no wheezes, rales, or ronchi  GI: Abdomen soft, NTND, +BS  Extremities: No LE edema, warm and well perfused  Skin: No ecchymosis, erythema, or ulcers  Psych: AOx3, appropriate affect  Neuro: CNII-XII intact, no focal deficits    Laboratory:  CBC:   Recent Labs  Lab 07/31/17  0315   WBC 3.40*  3.40*   RBC 2.79*  2.79*   HGB 8.3*  8.3*   HCT 25.3*  25.3*   PLT 32*  32*   MCV 91  91   MCH 29.7  29.7   MCHC 32.8  32.8     BMP:   Recent Labs  Lab 07/31/17  0315     103     139   K 4.2  4.2     107   CO2 26  26   BUN 14  14   CREATININE 0.7  0.7   CALCIUM 7.2*  7.2*   MG 2.2  2.2     Cardiac markers:     Diagnostic Results: EKG today - Showed no ventricular tachycardia      ASSESSMENT/PLAN:     56 y/o M well-controlled HIV, 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 Now consulted for NSVT    - which likely occurred in the setting of prolonged QTC with an R on T that initiated NSVT.  - Possible NSVT x 6 yesterday.    - EKG  with 7 seconds of abnormal rhythm noted on EKG  - There is discordance in the precordial leads. R on T that initiates the NSVT. AV dissociation.   - Flecanide was d/c.   - Patient received Mg replacement overnight.   - However, upon reviewing the strip we are questioning whether this is RR LBBB vs Atypical AVRT    Plan:     - Will show EKG to EP for second opinion   - Rec increasing metoprolol to 50mg BID  -Continue to keep K >4 and Mg>2   -Continue to avoid as much as possible QT prolonging agents such as flecanide, zofran, promethazine.   -Check Mg and K daily   -Continue daily EKGs to monitor QTC

## 2017-07-31 NOTE — ASSESSMENT & PLAN NOTE
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 We have discussed antifungal coverage with infectious disease. Discontinued itraconzaole (7/09) was on ambisome, now on voriconazole  -- Chimerics from marrow done 6/21 show CD3 of 90% donor and 10% recipient, but CD34 of 5% donor and 95% recipient   -- Chimers from 7/10 shows 70% recipient and 30% donor - NOT SORTED  -- Plans for DLI in future

## 2017-08-01 PROBLEM — I47.20 V-TACH: Status: RESOLVED | Noted: 2017-01-01 | Resolved: 2017-01-01

## 2017-08-01 NOTE — ASSESSMENT & PLAN NOTE
ekg's and tele reviewed  Noted to be aflutter with aberrancy and LBBB morphology  increased fleicanide to 100mg po BID  Would increase lopressor 50mg po BID  Would not ablate at this point given acuity of condition  Patient is asymptomatic at this time  Thrombocytopenia prevents anticoagulation  CHADSVASc nonetheless is 0  Continue with daily EKG

## 2017-08-01 NOTE — PROGRESS NOTES
Patient began having runs of SVT (10-15 beats at a times) again this evening at 1838. Dr. Nava called and updated. Patient not symptomatic at this time. STAT labs drawn. JIN Corey updated and will continue care. Will update MD if patient sustains or becomes symptomatic. Sonia Reyes 7/31/2017 7:03 PM

## 2017-08-01 NOTE — PROGRESS NOTES
Paged to come and evaluate rhythm strips of patient overnight developing reported NSVT. Longest on telemetry last 9 seconds at a HR of 156. EKG provided by primary team show 5-6 beats of WCT at a cycle length of 150-160 HR. The 9 seconds of WCT on telemetry is regular however on EKG it is irregular with a LBBB morphology. Patient states that he felt heart palpitations during this episode and not SOB or had any CP. Patient was not hypotensive during these episodes. He is in RRR and lungs clear to auscultation. Patient is currently without any complaints in his bed resting comfortably. Patient is now in NSR on telemetry. He is on flecainide 100 mg BID as per EP recs and lopressor 25 mg BID.     Please continue current regiment    Discussed with Dr. Little,    Quita Bourgeois MD, PGY-4  Cardiology fellow

## 2017-08-01 NOTE — ASSESSMENT & PLAN NOTE
-- improved  -- White blood cell count is 3.73 k/uL, ANC 2900  -- hemoglobin 8.2 g/dL; platelet count is 51 k/uL   -- Following with daily CBC and transfusing for hemoglobin < 7 g/dL, platelets < 10 k/uL

## 2017-08-01 NOTE — PLAN OF CARE
Problem: Patient Care Overview  Goal: Plan of Care Review  Outcome: Ongoing (interventions implemented as appropriate)  Pt AAOx4; independent and gets up to restroom without difficulty. Vital signs stable and pt remained afebrile throughout shift. Receiving IV pain medication approximately every 8 hours for chin/jaw pain which pt reports as improved.  Pt complains of boredom today, watching TV, on laptop and looking outside; stating he is just ready to go home.  Pt remained free from falls during shift.  Bed lowest position and locked.  Call light in reach.  Pt has no further needs at this time; will continue to monitor.

## 2017-08-01 NOTE — PROGRESS NOTES
EKG done and given to Dr. Vela, patient requesting his pain medication, ambulated to restroom without any difficulty. (0004) 8/1/17 patient is resting, asymptomatic as v-tach continues. Will continue to monitor.

## 2017-08-01 NOTE — ASSESSMENT & PLAN NOTE
-- Pulm re-consulted 7/20  -- s/p Thoracentesis - cytology negative   -- repeat CXR 7/26 shows Ill-defined patchy airspace consolidation bilaterally more on the left side identified.  Pleural effusion slightly more on the left.  -- Pulm re-consulted 7/26  --s/p Thoracentesis 7/26/17 with 1.3L removed  -- refused CT of chest  -- trial of steroids per pulmonary, appreciate recs, on taper

## 2017-08-01 NOTE — ASSESSMENT & PLAN NOTE
-- Improved  -- Ramelteon and zyprexa not tolerated  -- Continue phenergan 12.5 mg nightly for insomnia, which seems to help without causing excessive sedation or confusion

## 2017-08-01 NOTE — ASSESSMENT & PLAN NOTE
-- Neurology following, appreciate assistance  -- Several imaging studies have been performed, including CT head 7/21, MRI brain 7/21, and CTA head and neck 7/21 with interpreting radiologists citing concern for infarction of the right lentiform nucleus and anterior limb of the internal capsule   -- Neurology reviewed the images and were first concerned about possible embolic activity given his arrhythmia earlier this admission, now concerned about the possibility of fungal CNS infection, though after discussion with ID he has essentially completed a therapeutic course with ambisome and is now on voriconazole, recommending against additional prophylactic CNS fungal coverage  -- Continue atorvastatin. Not a candidate for any sort of anticoagulation or antiplatelet agents given transfusion-dependent thrombocytopenia

## 2017-08-01 NOTE — PROGRESS NOTES
[facility]  Adult Nutrition  Progress Note     SUMMARY      Recommendations     Recommendation/Intervention: 1. Continue Rx + Beneprotein supplement  2.  Honor food requests/preferences  3.  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  Also taking beneprotein with meals.   Nutrition Discharge Planning: Home on PO diet with adequate intake    Nutrition Prescription Ordered     Current Diet Order: Regular  Nutrition Order Comments: Beneprotein TID       Evaluation of Received Nutrients/Fluid Intake       Nutrition Risk Screen     Nutrition Risk Screen: no indicators present     Nutrition/Diet History     Patient Reported Diet/Restrictions/Preferences: general  Typical Food/Fluid Intake: decreased, eating small meals  Food Preferences: No cultural or Sabianist preferences  Supplemental Drinks or Food Habits: Beneprotein  Factors Affecting Nutritional Intake: decreased appetite, dry mouth, nausea/vomiting     Labs/Tests/Procedures/Meds        Pertinent Labs Reviewed: reviewed  Pertinent Labs Comments:  Pertinent Medications Reviewed: reviewed  Pertinent Medications Comments: IVF, acyclovir, statin, heparin, Mg, Kcl,     Physical Findings     Overall Physical Appearance: loss of muscle mass, generalized wasting  Tubes:  (central line)  Oral/Mouth Cavity: all teeth present (age appropriate)  Skin: intact     Anthropometrics     Temp: 98.5 °F (36.9 °C)  Height: 6' (182.9 cm)  Weight Method: Standard Scale  Weight: 84.8 kg (186 lb 13.4 oz)  Ideal Body Weight (IBW), Male: 178 lb  % Ideal Body  Weight, Male (lb): 103.37 lb  BMI (Calculated): 25  BMI Grade: 18.5-24.9 - normal  Weight Loss: unintentional  Usual Body Weight (UBW), k.6 kg     Estimated/Assessed Needs     Weight Used For Calorie Calculations: 83.5 kg (184 lb 1.4 oz)   Height (cm): 185.4 cm  Energy Calorie Requirements (kcal): 6557-3673  Energy Need Method: Kcal/kg   RMR ([place]-St. Jeor Equation): 1713  Weight Used For Protein Calculations: 83.5 kg (184 lb 1.4 oz)  Protein Requirements: 100-126  Fluid Need Method: RDA Method (or per MD)  RDA Method (mL): 2505     Assessment and Plan     P:  Increased nutrient needs  E:  Related to physiological needs-AML  S:  As evidenced by labs; patient interview  Status:  Ongoing       Monitor and Evaluation     Food and Nutrient Intake: energy intake, food and beverage intake  Food and Nutrient Adminstration: diet order  Physical Activity and Function: nutrition-related ADLs and IADLs  Anthropometric Measurements: weight, weight change  Biochemical Data, Medical Tests and Procedures:  (All  labs)  Nutrition-Focused Physical Findings: overall appearance, extremities, muscles and bones, skin     Nutrition Risk     Level of Risk:  (f/u 1x/wk)     Nutrition Follow-Up     RD Follow-up?: Yes

## 2017-08-01 NOTE — PLAN OF CARE
MDR's with Dr Howe.  Patient is day 36 of FLAG Lynette induction.  Patient was not d/c this weekend due to cardiac arrhymthias.  Patient also continues to require O2.  On 2L NC currently.  Cards following and EP consulted.  Medication changes made.  Will continue to follow for d/c needs.

## 2017-08-01 NOTE — ASSESSMENT & PLAN NOTE
-- Stable  -- Supraventricular tachycardia on tele and EKG. Started 7/13/17 8pm with HR between 160-170  -- Patient was cardioverted by cardiology and started on metoprolol and flecainide.  -- continue Flecainide 100 mg bid and increase Lopressor to 50 mg bid per EP  -- Treating underlying cause: infection and malignancy  -- Pharmacologic nuclear stress test originally ordered; cardiology following with tentative plans for a cardiac CTA  -- PVCs seen on EKG on 7/26 - corrected electrolytes  -- check Mag and K bid and replace to keep K at 4 and Mag at 2

## 2017-08-01 NOTE — ASSESSMENT & PLAN NOTE
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 We have discussed antifungal coverage with infectious disease. Discontinued itraconzaole (7/09) was on ambisome, now on voriconazole  -- Chimerics from marrow done 6/21 show CD3 of 90% donor and 10% recipient, but CD34 of 5% donor and 95% recipient   -- Chimers from 7/10 shows 70% recipient and 30% donor - NOT SORTED  -- Plans for DLI in future

## 2017-08-01 NOTE — SUBJECTIVE & OBJECTIVE
Subjective:     Interval History:   - Day 36 FLAG-BETZY - day 14 marrow showed remission  - MRI abnormal - evaluated by vascular neurology- on statin  - VSS Afebrile  - 9 seconds of SVT overnight, resolved this am  - cardiology saw patient, EP following, EKG daily   - respiratory status remains stable but unable to completely wean off of oxygen - currently on 2 L  - Continued jaw pain but no new complaints   - Appetite much improved  - Depression noted - denied hem/onc psych   - tired this am, states because he didn't sleep well last night     Objective:     Vital Signs (Most Recent):  Temp: 98.3 °F (36.8 °C) (08/01/17 1121)  Pulse: 82 (08/01/17 1121)  Resp: 17 (08/01/17 1121)  BP: 124/71 (08/01/17 1121)  SpO2: (!) 94 % (08/01/17 1121) Vital Signs (24h Range):  Temp:  [97.9 °F (36.6 °C)-98.3 °F (36.8 °C)] 98.3 °F (36.8 °C)  Pulse:  [] 82  Resp:  [16-20] 17  SpO2:  [92 %-96 %] 94 %  BP: (124-149)/(67-98) 124/71     Weight: 86.5 kg (190 lb 11.2 oz)  Body mass index is 25.86 kg/m².  Body surface area is 2.1 meters squared.    ECOG SCORE         Intake/Output - Last 3 Shifts       07/30 0700 - 07/31 0659 07/31 0700 - 08/01 0659 08/01 0700 - 08/02 0659    P.O. 760 560 240    I.V. (mL/kg) 2236.7 (25.9) 1550 (17.9) 1500 (17.3)    Blood 350      IV Piggyback 250      Total Intake(mL/kg) 3596.7 (41.6) 2110 (24.4) 1740 (20.1)    Urine (mL/kg/hr) 3600 (1.7) 4875 (2.3) 1500 (3)    Stool 0 (0) 0 (0) 0 (0)    Total Output 3600 4875 1500    Net -3.3 -2765 +240           Stool Occurrence 0 x 1 x 1 x          Physical Exam   Constitutional: He is oriented to person, place, and time. He appears well-developed and well-nourished. No distress.   HENT:   Head: Normocephalic.   Mouth/Throat: Oropharynx is clear and moist. No oropharyngeal exudate.   Eyes: Conjunctivae are normal. Pupils are equal, round, and reactive to light. No scleral icterus.   Neck: Normal range of motion. Neck supple. Normal range of motion present. No  thyromegaly present.   Cardiovascular: Normal rate, regular rhythm, normal heart sounds and intact distal pulses.    Pulmonary/Chest: Effort normal and breath sounds normal. No respiratory distress.   Decreased breath sounds RLL   Abdominal: Soft. Bowel sounds are normal. He exhibits no distension. There is no tenderness.   Musculoskeletal: Normal range of motion. He exhibits no edema or tenderness.   Lymphadenopathy:        Head (right side): No submandibular, no preauricular, no posterior auricular and no occipital adenopathy present.        Head (left side): No submandibular, no preauricular, no posterior auricular and no occipital adenopathy present.     He has no cervical adenopathy.     He has no axillary adenopathy.   Neurological: He is alert and oriented to person, place, and time. No cranial nerve deficit. Coordination normal.   Skin: Skin is warm and dry. No petechiae and no rash noted. No pallor.   Mazariegos intact with no redness or drainage   Psychiatric: He has a normal mood and affect. Thought content normal.   Vitals reviewed.      Significant Labs:   CBC:   Recent Labs  Lab 07/31/17 0315 07/31/17 1849 08/01/17  0355   WBC 3.40*  3.40* 2.46* 3.73*   HGB 8.3*  8.3* 8.0* 8.2*   HCT 25.3*  25.3* 24.5* 25.1*   PLT 32*  32* 41* 51*    and CMP:   Recent Labs  Lab 07/31/17 0315 07/31/17 1849 08/01/17  0355     139 138 141   K 4.2  4.2 4.0 4.0     107 107 108   CO2 26  26 25 25     103 177* 110   BUN 14  14 13 13   CREATININE 0.7  0.7 0.8 0.8   CALCIUM 7.2*  7.2* 7.2* 7.4*   PROT 4.4*  4.4* 4.3* 4.3*   ALBUMIN 2.3*  2.3* 2.3* 2.2*   BILITOT 0.7  0.7 0.6 0.5   ALKPHOS 272*  272* 263* 255*   AST 48*  48* 41* 35   ALT 55*  55* 49* 46*   ANIONGAP 6*  6* 6* 8   EGFRNONAA >60.0  >60.0 >60.0 >60.0       Diagnostic Results:  None

## 2017-08-01 NOTE — PROGRESS NOTES
[facility]  Cardiac Electrophysiology  Progress Note    Admission Date: 6/20/2017  Code Status: Full Code   Attending Physician: Gabriela Howe MD   Expected Discharge Date: 8/4/2017  Principal Problem:AML (acute myeloid leukemia) in relapse    Subjective:     Interval History: reucrrent atrial flutter with aberrancy (LBBB morphology) last night with light fluttering sensation in chest.     Review of Systems   Constitution: Negative.   HENT: Negative.    Eyes: Negative.    Cardiovascular: Negative.         Heart fluttering sensation   Respiratory: Negative.    Endocrine: Negative.    Skin: Negative.    Musculoskeletal: Negative.    Gastrointestinal: Negative.    Genitourinary: Negative.    Neurological: Negative.      Objective:     Vital Signs (Most Recent):  Temp: 98 °F (36.7 °C) (08/01/17 0745)  Pulse: 78 (08/01/17 0758)  Resp: 17 (08/01/17 0745)  BP: (!) 149/77 (08/01/17 0745)  SpO2: (!) 94 % (08/01/17 0745) Vital Signs (24h Range):  Temp:  [97.9 °F (36.6 °C)-98.2 °F (36.8 °C)] 98 °F (36.7 °C)  Pulse:  [] 78  Resp:  [16-20] 17  SpO2:  [92 %-96 %] 94 %  BP: (124-149)/(65-98) 149/77     Weight: 86.5 kg (190 lb 11.2 oz)  Body mass index is 25.86 kg/m².     SpO2: (!) 94 %  O2 Device (Oxygen Therapy): nasal cannula    Physical Exam   Constitutional: He is oriented to person, place, and time. He appears well-developed and well-nourished.   HENT:   Head: Normocephalic and atraumatic.   Eyes: Conjunctivae and EOM are normal. Pupils are equal, round, and reactive to light.   Neck: Normal range of motion. Neck supple. No JVD present.   Cardiovascular: Normal rate, regular rhythm and normal heart sounds.  Exam reveals no gallop and no friction rub.    No murmur heard.  Pulmonary/Chest: Effort normal and breath sounds normal. No respiratory distress. He has no wheezes. He has no rales. He exhibits no tenderness.   Abdominal: Soft. Bowel sounds are normal. He exhibits no distension. There is no  tenderness.   Musculoskeletal: Normal range of motion. He exhibits no edema or tenderness.   Neurological: He is alert and oriented to person, place, and time.   Skin: Skin is warm and dry. No erythema. No pallor.       Significant Labs:   EP:   Recent Labs  Lab 07/31/17 0315 07/31/17 1849 08/01/17  0355     139 138 141   K 4.2  4.2 4.0 4.0     107 107 108   CO2 26  26 25 25     103 177* 110   BUN 14  14 13 13   CREATININE 0.7  0.7 0.8 0.8   CALCIUM 7.2*  7.2* 7.2* 7.4*   PROT 4.4*  4.4* 4.3* 4.3*   ALBUMIN 2.3*  2.3* 2.3* 2.2*   BILITOT 0.7  0.7 0.6 0.5   ALKPHOS 272*  272* 263* 255*   AST 48*  48* 41* 35   ALT 55*  55* 49* 46*   ANIONGAP 6*  6* 6* 8   ESTGFRAFRICA >60.0  >60.0 >60.0 >60.0   EGFRNONAA >60.0  >60.0 >60.0 >60.0   WBC 3.40*  3.40* 2.46* 3.73*   HGB 8.3*  8.3* 8.0* 8.2*   HCT 25.3*  25.3* 24.5* 25.1*   PLT 32*  32* 41* 51*   INR 1.2  --  1.2   , CMP:   Recent Labs  Lab 07/31/17 0315 07/31/17 1849 08/01/17  0355     139 138 141   K 4.2  4.2 4.0 4.0     107 107 108   CO2 26  26 25 25     103 177* 110   BUN 14  14 13 13   CREATININE 0.7  0.7 0.8 0.8   CALCIUM 7.2*  7.2* 7.2* 7.4*   PROT 4.4*  4.4* 4.3* 4.3*   ALBUMIN 2.3*  2.3* 2.3* 2.2*   BILITOT 0.7  0.7 0.6 0.5   ALKPHOS 272*  272* 263* 255*   AST 48*  48* 41* 35   ALT 55*  55* 49* 46*   ANIONGAP 6*  6* 6* 8   ESTGFRAFRICA >60.0  >60.0 >60.0 >60.0   EGFRNONAA >60.0  >60.0 >60.0 >60.0    and CBC:   Recent Labs  Lab 07/31/17  0315 07/31/17  1849 08/01/17  0355   WBC 3.40*  3.40* 2.46* 3.73*   HGB 8.3*  8.3* 8.0* 8.2*   HCT 25.3*  25.3* 24.5* 25.1*   PLT 32*  32* 41* 51*       Significant Imaging: Echocardiogram:   2D echo with color flow doppler:   Results for orders placed or performed during the hospital encounter of 06/20/17   2D echo with color flow doppler   Result Value Ref Range    EF 60 55 - 65    Diastolic Dysfunction No     Aortic Valve Regurgitation TRIVIAL      Est. PA Systolic Pressure 23.25     Mitral Valve Mobility NORMAL     Tricuspid Valve Regurgitation TRIVIAL    Tele reviewed    Assessment and Plan:   55 yo male with well-controlled HIV, high-risk AML s/p chemo now admitted with recurrent AML and for re-induction.  Course has been complicated by neutropenic fever in the setting of multiofocal PNA.  EP was originally consulted for AFL with RVR and was cardioverted on 7/15/17.  He was then placed on fleicanide 50mg po BID and lopressor 25mg po BID.  He has tolerated these medications well until recently when he was noted to be having what was thought to be NSVT which we have understood to be aflutter with aberrancy and LBBB morphology.      Supraventricular tachycardia    ekg's and tele reviewed  Noted to be aflutter with aberrancy and LBBB morphology  increased fleicanide to 100mg po BID  If this does not work, would start patient on Amiodarone  Would increase lopressor 50mg po BID  Would not ablate at this point given acuity of condition  Patient is asymptomatic at this time  Thrombocytopenia prevents anticoagulation  CHADSVASc nonetheless is 0  Continue with daily EKG  Please note that this dysrhythmia is atrial in origin and not VT            Aleksey Whitehead MD  Cardiac Electrophysiology  [facility]

## 2017-08-01 NOTE — ASSESSMENT & PLAN NOTE
-- Relapsed AML - peripheral blood shows 30% blasts on admit  -- 2D echo shows 60% EF  -- Mazariegos placed, appears to be in the azygous vein, likely from repositioning. General surgery to replace 7/24. 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 Blast of 62%, 19 had a complex karyotype including 5q deletion, 7q deletion, 17p deletion, and one metaphase represented a near tetraploid subclone; FLT-3 negative     -- Jaw pain and pain in right cranial nerve 7 distribution with persistent, severe headaches concerning for CNS involvement - underwent IT chemotherapy on 6/23. Flow negative for disease. CSF LDH was 29  -- Had previous reaction to MEC (etoposide caused full body rash). Did not qualify for the Tolero trial due to receiving IT chemotherapy    -- Day 36 of FLAG-BETZY   -- Day 14 bone marrow biopsy done 7/10 - hypocellular with no evidence of residual disease  -- Future plans for DLI (8/14/17?)  -- LP deferred given resolution of n/v

## 2017-08-01 NOTE — PLAN OF CARE
Problem: Patient Care Overview  Goal: Plan of Care Review  Outcome: Ongoing (interventions implemented as appropriate)  Recommendations     Recommendation/Intervention: 1. Continue Rx + Beneprotein supplement  2.  Honor food requests/preferences  3.  RD to follow   Goals: PO intake =/> 85% EEN/EPN   Nutrition Goal Status:Ongoing   Communication of RD Recs: discussed with patient      Assessment and Plan     P:  Increased nutrient needs  E:  Related to physiological needs-AML  S:  As evidenced by labs; patient interview  Status:  Ongoing

## 2017-08-01 NOTE — SUBJECTIVE & OBJECTIVE
Interval History: reucrrent atrial flutter with aberrancy (LBBB morphology) last night with light fluttering sensation in chest.     Review of Systems   Constitution: Negative.   HENT: Negative.    Eyes: Negative.    Cardiovascular: Negative.         Heart fluttering sensation   Respiratory: Negative.    Endocrine: Negative.    Skin: Negative.    Musculoskeletal: Negative.    Gastrointestinal: Negative.    Genitourinary: Negative.    Neurological: Negative.      Objective:     Vital Signs (Most Recent):  Temp: 98 °F (36.7 °C) (08/01/17 0745)  Pulse: 78 (08/01/17 0758)  Resp: 17 (08/01/17 0745)  BP: (!) 149/77 (08/01/17 0745)  SpO2: (!) 94 % (08/01/17 0745) Vital Signs (24h Range):  Temp:  [97.9 °F (36.6 °C)-98.2 °F (36.8 °C)] 98 °F (36.7 °C)  Pulse:  [] 78  Resp:  [16-20] 17  SpO2:  [92 %-96 %] 94 %  BP: (124-149)/(65-98) 149/77     Weight: 86.5 kg (190 lb 11.2 oz)  Body mass index is 25.86 kg/m².     SpO2: (!) 94 %  O2 Device (Oxygen Therapy): nasal cannula    Physical Exam   Constitutional: He is oriented to person, place, and time. He appears well-developed and well-nourished.   HENT:   Head: Normocephalic and atraumatic.   Eyes: Conjunctivae and EOM are normal. Pupils are equal, round, and reactive to light.   Neck: Normal range of motion. Neck supple. No JVD present.   Cardiovascular: Normal rate, regular rhythm and normal heart sounds.  Exam reveals no gallop and no friction rub.    No murmur heard.  Pulmonary/Chest: Effort normal and breath sounds normal. No respiratory distress. He has no wheezes. He has no rales. He exhibits no tenderness.   Abdominal: Soft. Bowel sounds are normal. He exhibits no distension. There is no tenderness.   Musculoskeletal: Normal range of motion. He exhibits no edema or tenderness.   Neurological: He is alert and oriented to person, place, and time.   Skin: Skin is warm and dry. No erythema. No pallor.       Significant Labs:   EP:   Recent Labs  Lab 07/31/17  0310  07/31/17  1849 08/01/17  0355     139 138 141   K 4.2  4.2 4.0 4.0     107 107 108   CO2 26  26 25 25     103 177* 110   BUN 14  14 13 13   CREATININE 0.7  0.7 0.8 0.8   CALCIUM 7.2*  7.2* 7.2* 7.4*   PROT 4.4*  4.4* 4.3* 4.3*   ALBUMIN 2.3*  2.3* 2.3* 2.2*   BILITOT 0.7  0.7 0.6 0.5   ALKPHOS 272*  272* 263* 255*   AST 48*  48* 41* 35   ALT 55*  55* 49* 46*   ANIONGAP 6*  6* 6* 8   ESTGFRAFRICA >60.0  >60.0 >60.0 >60.0   EGFRNONAA >60.0  >60.0 >60.0 >60.0   WBC 3.40*  3.40* 2.46* 3.73*   HGB 8.3*  8.3* 8.0* 8.2*   HCT 25.3*  25.3* 24.5* 25.1*   PLT 32*  32* 41* 51*   INR 1.2  --  1.2   , CMP:   Recent Labs  Lab 07/31/17 0315 07/31/17 1849 08/01/17  0355     139 138 141   K 4.2  4.2 4.0 4.0     107 107 108   CO2 26  26 25 25     103 177* 110   BUN 14  14 13 13   CREATININE 0.7  0.7 0.8 0.8   CALCIUM 7.2*  7.2* 7.2* 7.4*   PROT 4.4*  4.4* 4.3* 4.3*   ALBUMIN 2.3*  2.3* 2.3* 2.2*   BILITOT 0.7  0.7 0.6 0.5   ALKPHOS 272*  272* 263* 255*   AST 48*  48* 41* 35   ALT 55*  55* 49* 46*   ANIONGAP 6*  6* 6* 8   ESTGFRAFRICA >60.0  >60.0 >60.0 >60.0   EGFRNONAA >60.0  >60.0 >60.0 >60.0    and CBC:   Recent Labs  Lab 07/31/17  0315 07/31/17  1849 08/01/17  0355   WBC 3.40*  3.40* 2.46* 3.73*   HGB 8.3*  8.3* 8.0* 8.2*   HCT 25.3*  25.3* 24.5* 25.1*   PLT 32*  32* 41* 51*       Significant Imaging: Echocardiogram:   2D echo with color flow doppler:   Results for orders placed or performed during the hospital encounter of 06/20/17   2D echo with color flow doppler   Result Value Ref Range    EF 60 55 - 65    Diastolic Dysfunction No     Aortic Valve Regurgitation TRIVIAL     Est. PA Systolic Pressure 23.25     Mitral Valve Mobility NORMAL     Tricuspid Valve Regurgitation TRIVIAL    Tele reviewed

## 2017-08-01 NOTE — PROGRESS NOTES
Notified dr. way patient is running 48-50 beats of v-tach, patient asymptomatic and sleeping, easily awakens. Informed that magnesium, flecanide and lopressor given, will consult cards, and EKG ordered. (11:15) dr. way at bedside to exam patient. Will continue to monitor.

## 2017-08-01 NOTE — PROGRESS NOTES
[facility]  Hematology  Bone Marrow Transplant  Progress Note    Patient Name: Paul E Sistrunk  Admission Date: 6/20/2017  Hospital Length of Stay: 42 days  Code Status: Full Code    Subjective:     Interval History:   - Day 36 FLAG-BETZY - day 14 marrow showed remission  - MRI abnormal - evaluated by vascular neurology- on statin  - VSS Afebrile  - 9 seconds of SVT overnight, resolved this am  - cardiology saw patient, EP following, EKG daily   - respiratory status remains stable but unable to completely wean off of oxygen - currently on 2 L  - Continued jaw pain but no new complaints   - Appetite much improved  - Depression noted - denied hem/onc psych   - tired this am, states because he didn't sleep well last night     Objective:     Vital Signs (Most Recent):  Temp: 98.3 °F (36.8 °C) (08/01/17 1121)  Pulse: 82 (08/01/17 1121)  Resp: 17 (08/01/17 1121)  BP: 124/71 (08/01/17 1121)  SpO2: (!) 94 % (08/01/17 1121) Vital Signs (24h Range):  Temp:  [97.9 °F (36.6 °C)-98.3 °F (36.8 °C)] 98.3 °F (36.8 °C)  Pulse:  [] 82  Resp:  [16-20] 17  SpO2:  [92 %-96 %] 94 %  BP: (124-149)/(67-98) 124/71     Weight: 86.5 kg (190 lb 11.2 oz)  Body mass index is 25.86 kg/m².  Body surface area is 2.1 meters squared.    ECOG SCORE         Intake/Output - Last 3 Shifts       07/30 0700 - 07/31 0659 07/31 0700 - 08/01 0659 08/01 0700 - 08/02 0659    P.O. 760 560 240    I.V. (mL/kg) 2236.7 (25.9) 1550 (17.9) 1500 (17.3)    Blood 350      IV Piggyback 250      Total Intake(mL/kg) 3596.7 (41.6) 2110 (24.4) 1740 (20.1)    Urine (mL/kg/hr) 3600 (1.7) 4875 (2.3) 1500 (3)    Stool 0 (0) 0 (0) 0 (0)    Total Output 3600 4804 1500    Net -3.3 -2765 +240           Stool Occurrence 0 x 1 x 1 x          Physical Exam   Constitutional: He is oriented to person, place, and time. He appears well-developed and well-nourished. No distress.   HENT:   Head: Normocephalic.   Mouth/Throat: Oropharynx is clear and moist. No  oropharyngeal exudate.   Eyes: Conjunctivae are normal. Pupils are equal, round, and reactive to light. No scleral icterus.   Neck: Normal range of motion. Neck supple. Normal range of motion present. No thyromegaly present.   Cardiovascular: Normal rate, regular rhythm, normal heart sounds and intact distal pulses.    Pulmonary/Chest: Effort normal and breath sounds normal. No respiratory distress.   Decreased breath sounds RLL   Abdominal: Soft. Bowel sounds are normal. He exhibits no distension. There is no tenderness.   Musculoskeletal: Normal range of motion. He exhibits no edema or tenderness.   Lymphadenopathy:        Head (right side): No submandibular, no preauricular, no posterior auricular and no occipital adenopathy present.        Head (left side): No submandibular, no preauricular, no posterior auricular and no occipital adenopathy present.     He has no cervical adenopathy.     He has no axillary adenopathy.   Neurological: He is alert and oriented to person, place, and time. No cranial nerve deficit. Coordination normal.   Skin: Skin is warm and dry. No petechiae and no rash noted. No pallor.   Mazariegos intact with no redness or drainage   Psychiatric: He has a normal mood and affect. Thought content normal.   Vitals reviewed.      Significant Labs:   CBC:   Recent Labs  Lab 07/31/17 0315 07/31/17 1849 08/01/17  0355   WBC 3.40*  3.40* 2.46* 3.73*   HGB 8.3*  8.3* 8.0* 8.2*   HCT 25.3*  25.3* 24.5* 25.1*   PLT 32*  32* 41* 51*    and CMP:   Recent Labs  Lab 07/31/17 0315 07/31/17 1849 08/01/17  0355     139 138 141   K 4.2  4.2 4.0 4.0     107 107 108   CO2 26  26 25 25     103 177* 110   BUN 14  14 13 13   CREATININE 0.7  0.7 0.8 0.8   CALCIUM 7.2*  7.2* 7.2* 7.4*   PROT 4.4*  4.4* 4.3* 4.3*   ALBUMIN 2.3*  2.3* 2.3* 2.2*   BILITOT 0.7  0.7 0.6 0.5   ALKPHOS 272*  272* 263* 255*   AST 48*  48* 41* 35   ALT 55*  55* 49* 46*   ANIONGAP 6*  6* 6* 8   EGFRNONAA  >60.0  >60.0 >60.0 >60.0       Diagnostic Results:  None    Assessment/Plan:     * 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 General surgery to replace 7/24. 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 Blast of 62%, 19 had a complex karyotype including 5q deletion, 7q deletion, 17p deletion, and one metaphase represented a near tetraploid subclone; FLT-3 negative     -- Jaw pain and pain in right cranial nerve 7 distribution with persistent, severe headaches concerning for CNS involvement - underwent IT chemotherapy on 6/23. Flow negative for disease. CSF LDH was 29  -- Had previous reaction to MEC (etoposide caused full body rash). Did not qualify for the Tolero trial due to receiving IT chemotherapy    -- Day 36 of FLAG-BETZY   -- Day 14 bone marrow biopsy done 7/10 - hypocellular with no evidence of residual disease  -- Future plans for DLI (8/14/17?)  -- LP deferred given resolution of n/v        Acute respiratory failure with hypoxia    - satting well on 2L NC   - difficulty weaning to room air  - continue to attempt to wean        S/P allogeneic bone marrow transplant    -- +323 days s/p Flu/Bu/ATG MUD allogeneic transplant for high risk AML after his second IDAC (6/20/16 - 6/24/16) after a prolonged hospitalization (2/5/16 - 3/21/16) for induction with 7&3 and reinduction with MEC to remission and IDAC (4/4/16 - 4/9/16)  -- Continue ppx acyclovir, renally dosed. We have discussed antifungal coverage with infectious disease. Discontinued itraconzaole (7/09) was on ambisome, now on voriconazole  -- Chimerics from marrow done 6/21 show CD3 of 90% donor and 10% recipient, but CD34 of 5% donor and 95% recipient   -- Chimers from 7/10 shows 70% recipient and 30%  donor - NOT SORTED  -- Plans for DLI in future         HIV disease    -- CD4 low at 11.4%, Absolute CD4 469 and HIV RNA not detected from 6/21  -- As per ID: continue triumeq. Will set up ID follow-up upon discharge        Chronic bilateral low back pain without sciatica    -- Thought to be related to Neupogen  -- Continue zyrtec, lidocaine patch   -- Cautiously resuming opioid analgesics, including dilaudid PRN        Pancytopenia due to antineoplastic chemotherapy    -- improved  -- White blood cell count is 3.73 k/uL, ANC 2900  -- hemoglobin 8.2 g/dL; platelet count is 51 k/uL   -- Following with daily CBC and transfusing for hemoglobin < 7 g/dL, platelets < 10 k/uL        Pleural effusion    -- Pulm re-consulted 7/20  -- s/p Thoracentesis - cytology negative   -- repeat CXR 7/26 shows Ill-defined patchy airspace consolidation bilaterally more on the left side identified.  Pleural effusion slightly more on the left.  -- Pulm re-consulted 7/26  --s/p Thoracentesis 7/26/17 with 1.3L removed  -- refused CT of chest  -- trial of steroids per pulmonary, appreciate recs, on taper        Stroke of unknown etiology    -- Neurology following, appreciate assistance  -- Several imaging studies have been performed, including CT head 7/21, MRI brain 7/21, and CTA head and neck 7/21 with interpreting radiologists citing concern for infarction of the right lentiform nucleus and anterior limb of the internal capsule   -- Neurology reviewed the images and were first concerned about possible embolic activity given his arrhythmia earlier this admission, now concerned about the possibility of fungal CNS infection, though after discussion with ID he has essentially completed a therapeutic course with ambisome and is now on voriconazole, recommending against additional prophylactic CNS fungal coverage  -- Continue atorvastatin. Not a candidate for any sort of anticoagulation or antiplatelet agents given transfusion-dependent  thrombocytopenia        Neutropenic fever    -- Resolved   -- Blood cultures no growth from 6/24/17, 7/4/17, 7/7/17, 7/9/17, 7/15/17  -- Complicated antimicrobial treatment history as follows:   - Cefepime: (started 6/24 - discontinued 6/28) restarted on 7/4/17 - 7/23   - Ampotericin B started 7/9-stopped 7/19;    - Vancomycin (restarted 7/15 - stopped 7/17)  -- Now on PPX antimicrobial agents alone, with bactrim, voriconazole,  acyclovir, and ciprofloxacin  -- Appreciate ID assistance. They have now signed off.  -- Multiple possible sources. CXR showed pneumonia 7/15; CXR 7/17 - Significant patchy air space consolidation identified bilaterally slightly more on the left side.  Slight improvement as compared to the previous study.  Left-sided pleural effusion. On 2-3 L via NC  -- CT chest/abdomen/pelvis reveals bilateral groundglass opacities as well as a left lower-lobe consolidation;  -- ID recommended repeating CT chest 7/19, which showed: Limited assessment. No acute central pulmonary thromboembolism. Peripheral pulmonary emboli are not excluded. Interval development of bilateral moderate pleural effusions. Trace pericardial effusion. Interval progression of bilateral airspace disease with more extensive areas of consolidation bilaterally, in a similar central distribution to the groundglass opacities previously noted, with an upper lobe predominance.   -- Pulm was consulted for a BAL which was done 7/12, cytology pending from BAL; aspergillus <0.500; KOH was negative for yeast/fungus   -- Pulmonary re-consulted, now s/p thoracentesis on 7/22 with cultures NGTD, negative for cytology         Anxiety    -- Holding xanax PRN due to confusion  -- Consulted hem/onc psych, will need to follow-up with them as an outpatient when discharged  -- patient denies wanting to follow up in patient        Prostate hypertrophy    -- Continue home flomax        CINV (chemotherapy-induced nausea and vomiting)    - scheduled zofran  with PRN compazine  - dietary consult placed   - much improved and now tolerating diet        Delirium    -- Resolved  -- Typically worse at night; speaks nonsense occasionally during the day; has threatened to leave AMA at night a few times, takes off oxygen at night and has disconnected pump  -- CT of head revealed acute stroke, for which neurology is now following. Not thought to be the main contributor to AMS, rather the cause is likely multifactorial with contribution from medications and his prolonged hospitalization        Lower extremity edema    -- Stable, likely iatrogenic from fluids  -- IV fluids stopped  -- Net Negative 3 L from 7/18 - down 11 lbs from 7/18 - 40 mg of lasix BID - given on 7/18   -- Monitoring I/Os        Supraventricular tachycardia    -- Stable  -- Supraventricular tachycardia on tele and EKG. Started 7/13/17 8pm with HR between 160-170  -- Patient was cardioverted by cardiology and started on metoprolol and flecainide.  -- continue Flecainide 100 mg bid and increase Lopressor to 50 mg bid per EP  -- Treating underlying cause: infection and malignancy  -- Pharmacologic nuclear stress test originally ordered; cardiology following with tentative plans for a cardiac CTA  -- PVCs seen on EKG on 7/26 - corrected electrolytes  -- check Mag and K bid and replace to keep K at 4 and Mag at 2         Insomnia    -- Improved  -- Ramelteon and zyprexa not tolerated  -- Continue phenergan 12.5 mg nightly for insomnia, which seems to help without causing excessive sedation or confusion        Electrolyte abnormality    -- Ordered prn electrolyte replacement per protocol   -- Hypophosphatemia replaced; he appears to be running persistently low.             VTE Risk Mitigation         Ordered     Place sequential compression device  Until discontinued      06/20/17 2005     High Risk of VTE  Once      06/20/17 1901          Disposition: pending resolution of cardiac arrhythmias     Arminda Olivares,  NP  Bone Marrow Transplant  [facility]

## 2017-08-02 NOTE — NURSING
Road Test:    Oxygen: Pt tolerated well on 1L O2; going home with oxygen, to be delivered to bedside before d/c  Ambulation:  Pt up ad matt.  Devices:  Pt going home with Amzariegos, both lumens heparin locked.  Tolerates (diet):  Pt tolerates a regular diet.  Elimination:  Pt voids without distress.  Self care:  Pt performs self care without assistance.  Teaching:  Written and verbal discharge teaching given.

## 2017-08-02 NOTE — NURSING
Home Oxygen Evaluation    Date Performed: 2017    1) Patient's Home O2 Sat on room air, while at rest: 92%        If O2 sats on room air at rest are 88% or below, patient qualifies. No additional testing needed. Document N/A in steps 2 and 3. If 89% or above, complete steps 2.      2) Patient's O2 Sat on room air while exercisin%        If O2 sats on room air while exercising remain 89% or above patient does not qualify, no further testing needed Document N/A in step 3. If O2 sats on room air while exercising are 88% or below, continue to step 3.      3) Patient's O2 Sat while exercising on O2: 88 at 2 LPM         (Must show improvement from #2 for patients to qualify)    If O2 sats improve on oxygen, patient qualifies for portable oxygen. If not, the patient does not qualify.

## 2017-08-02 NOTE — SUBJECTIVE & OBJECTIVE
Subjective:     Interval History:   - Day 37 FLAG-BETZY - day 14 marrow showed remission  - MRI abnormal - evaluated by vascular neurology- on statin  - VSS Afebrile  - NSR overnight   - respiratory status remains stable but unable to completely wean off of oxygen - currently on 1 L  - Continued jaw pain but no new complaints   - Appetite much improved  - feels good this am, wants to go home    Objective:     Vital Signs (Most Recent):  Temp: 98.1 °F (36.7 °C) (08/02/17 1144)  Pulse: 77 (08/02/17 1144)  Resp: 18 (08/02/17 1144)  BP: 125/70 (08/02/17 1144)  SpO2: (!) 94 % (08/02/17 1144) Vital Signs (24h Range):  Temp:  [97.7 °F (36.5 °C)-98.4 °F (36.9 °C)] 98.1 °F (36.7 °C)  Pulse:  [70-88] 77  Resp:  [18-20] 18  SpO2:  [93 %-98 %] 94 %  BP: (112-156)/(70-81) 125/70     Weight: 82.7 kg (182 lb 6.9 oz)  Body mass index is 24.74 kg/m².  Body surface area is 2.05 meters squared.    ECOG SCORE         Intake/Output - Last 3 Shifts       07/31 0700 - 08/01 0659 08/01 0700 - 08/02 0659 08/02 0700 - 08/03 0659    P.O. 560 680     I.V. (mL/kg) 1550 (17.9) 3411.7 (41.3)     Blood       IV Piggyback  100     Total Intake(mL/kg) 2110 (24.4) 4191.7 (50.7)     Urine (mL/kg/hr) 4875 (2.3) 6050 (3)     Stool 0 (0) 0 (0)     Total Output 4875 6050      Net -2765 -1858.3             Stool Occurrence 1 x 1 x 0 x          Physical Exam   Constitutional: He is oriented to person, place, and time. He appears well-developed and well-nourished. No distress.   HENT:   Head: Normocephalic.   Mouth/Throat: Oropharynx is clear and moist. No oropharyngeal exudate.   Eyes: Conjunctivae are normal. Pupils are equal, round, and reactive to light. No scleral icterus.   Neck: Normal range of motion. Neck supple. Normal range of motion present. No thyromegaly present.   Cardiovascular: Normal rate, regular rhythm, normal heart sounds and intact distal pulses.    Pulmonary/Chest: Effort normal and breath sounds normal. No respiratory distress.    Decreased left base   Abdominal: Soft. Bowel sounds are normal. He exhibits no distension. There is no tenderness.   Musculoskeletal: Normal range of motion. He exhibits no edema or tenderness.   Lymphadenopathy:        Head (right side): No submandibular, no preauricular, no posterior auricular and no occipital adenopathy present.        Head (left side): No submandibular, no preauricular, no posterior auricular and no occipital adenopathy present.     He has no cervical adenopathy.     He has no axillary adenopathy.   Neurological: He is alert and oriented to person, place, and time. No cranial nerve deficit. Coordination normal.   Skin: Skin is warm and dry. No petechiae and no rash noted. No pallor.   Psychiatric: He has a normal mood and affect. Thought content normal.   Vitals reviewed.      Significant Labs:   CBC:   Recent Labs  Lab 07/31/17  1849 08/01/17  0355 08/02/17  0400   WBC 2.46* 3.73* 3.29*   HGB 8.0* 8.2* 8.4*   HCT 24.5* 25.1* 25.9*   PLT 41* 51* 66*   , CMP:   Recent Labs  Lab 08/01/17  0355 08/01/17  1609 08/02/17  0400    140 142   K 4.0 3.9 3.8    108 107   CO2 25 24 28    175* 82   BUN 13 13 13   CREATININE 0.8 0.8 0.7   CALCIUM 7.4* 7.3* 7.4*   PROT 4.3* 4.5* 4.5*   ALBUMIN 2.2* 2.4* 2.3*   BILITOT 0.5 0.6 0.6   ALKPHOS 255* 260* 241*   AST 35 40 45*   ALT 46* 47* 52*   ANIONGAP 8 8 7*   EGFRNONAA >60.0 >60.0 >60.0    and Coagulation:   Recent Labs  Lab 08/01/17  0355   INR 1.2   APTT 24.5       Diagnostic Results:  I have reviewed all pertinent imaging results/findings within the past 24 hours.

## 2017-08-02 NOTE — PROGRESS NOTES
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   HENT: Negative.    Eyes: Negative.    Cardiovascular: Negative.    Respiratory: Negative.    Endocrine: Negative.    Skin: Negative.    Musculoskeletal: Negative.    Gastrointestinal: Negative.    Genitourinary: Negative.    Neurological: Negative.      Objective:     Vital Signs (Most Recent):  Temp: 98.1 °F (36.7 °C) (08/02/17 0828)  Pulse: 88 (08/02/17 0828)  Resp: 18 (08/02/17 0828)  BP: 112/71 (08/02/17 0828)  SpO2: 95 % (08/02/17 0828) Vital Signs (24h Range):  Temp:  [97.7 °F (36.5 °C)-98.4 °F (36.9 °C)] 98.1 °F (36.7 °C)  Pulse:  [70-88] 88  Resp:  [17-20] 18  SpO2:  [93 %-98 %] 95 %  BP: (112-156)/(71-81) 112/71     Weight: 82.7 kg (182 lb 6.9 oz)  Body mass index is 24.74 kg/m².     SpO2: 95 %  O2 Device (Oxygen Therapy): nasal cannula    Physical Exam   Constitutional: He is oriented to person, place, and time. He appears well-developed and well-nourished.   HENT:   Head: Normocephalic and atraumatic.   Eyes: Conjunctivae and EOM are normal. Pupils are equal, round, and reactive to light.   Neck: Normal range of motion. Neck supple. No JVD present.   Cardiovascular: Normal rate, regular rhythm and normal heart sounds.  Exam reveals no gallop and no friction rub.    No murmur heard.  Pulmonary/Chest: Effort normal and breath sounds normal. No respiratory distress. He has no wheezes. He has no rales. He exhibits no tenderness.   Abdominal: Soft. Bowel sounds are normal. He exhibits no distension. There is no tenderness.   Musculoskeletal: Normal range of motion. He exhibits no edema or tenderness.   Neurological: He is alert and oriented to person,  place, and time.   Skin: Skin is warm and dry. No erythema. No pallor.       Significant Labs:   EP:   Recent Labs  Lab 07/31/17  1849 08/01/17  0355 08/01/17  1609 08/02/17  0400    141 140 142   K 4.0 4.0 3.9 3.8    108 108 107   CO2 25 25 24 28   * 110 175* 82   BUN 13 13 13 13   CREATININE 0.8 0.8 0.8 0.7   CALCIUM 7.2* 7.4* 7.3* 7.4*   PROT 4.3* 4.3* 4.5* 4.5*   ALBUMIN 2.3* 2.2* 2.4* 2.3*   BILITOT 0.6 0.5 0.6 0.6   ALKPHOS 263* 255* 260* 241*   AST 41* 35 40 45*   ALT 49* 46* 47* 52*   ANIONGAP 6* 8 8 7*   ESTGFRAFRICA >60.0 >60.0 >60.0 >60.0   EGFRNONAA >60.0 >60.0 >60.0 >60.0   WBC 2.46* 3.73*  --  3.29*   HGB 8.0* 8.2*  --  8.4*   HCT 24.5* 25.1*  --  25.9*   PLT 41* 51*  --  66*   INR  --  1.2  --   --    , CMP:   Recent Labs  Lab 08/01/17  0355 08/01/17  1609 08/02/17  0400    140 142   K 4.0 3.9 3.8    108 107   CO2 25 24 28    175* 82   BUN 13 13 13   CREATININE 0.8 0.8 0.7   CALCIUM 7.4* 7.3* 7.4*   PROT 4.3* 4.5* 4.5*   ALBUMIN 2.2* 2.4* 2.3*   BILITOT 0.5 0.6 0.6   ALKPHOS 255* 260* 241*   AST 35 40 45*   ALT 46* 47* 52*   ANIONGAP 8 8 7*   ESTGFRAFRICA >60.0 >60.0 >60.0   EGFRNONAA >60.0 >60.0 >60.0   , CBC:   Recent Labs  Lab 07/31/17  1849 08/01/17  0355 08/02/17  0400   WBC 2.46* 3.73* 3.29*   HGB 8.0* 8.2* 8.4*   HCT 24.5* 25.1* 25.9*   PLT 41* 51* 66*    and INR:   Recent Labs  Lab 08/01/17  0355   INR 1.2       Significant Imaging: Echocardiogram:   2D echo with color flow doppler:   Results for orders placed or performed during the hospital encounter of 06/20/17   2D echo with color flow doppler   Result Value Ref Range    EF 60 55 - 65    Diastolic Dysfunction No     Aortic Valve Regurgitation TRIVIAL     Est. PA Systolic Pressure 23.25     Mitral Valve Mobility NORMAL     Tricuspid Valve Regurgitation TRIVIAL      Assessment and Plan:   57 yo male with well-controlled HIV, high-risk AML s/p chemo now admitted with recurrent AML and for re-induction.   Course has been complicated by neutropenic fever in the setting of multiofocal PNA.  EP was originally consulted for AFL with RVR and was cardioverted on 7/15/17.  He was then placed on fleicanide 50mg po BID and lopressor 25mg po BID.  He has tolerated these medications well until he was again noted to be having what was thought to be NSVT with prolonged QTc making fleicanide a concern.      Supraventricular tachycardia    ekg's and tele reviewed  Noted to be aflutter with aberrancy and LBBB morphology  increased fleicanide to 100mg po BID  increased lopressor 50mg po BID  Patient has remained in sinus overnight and is asymptomatic at this time  Thrombocytopenia prevents anticoagulation  CHADSVASc nonetheless is 0  To be dc'ed today  Follow up in EP clinic in 2-4 weeks coordinated with other [facility] appointments            Aleksey Whitehead MD  Cardiac Electrophysiology  [facility]

## 2017-08-02 NOTE — PLAN OF CARE
Problem: Patient Care Overview  Goal: Plan of Care Review  Outcome: Ongoing (interventions implemented as appropriate)  Pt involved in plan of care and communicating needs throughout shift. Up as tolerated. Tolerating diet, voiding without difficulty. IVF continued. Oxygen titrated down to 1L NC patient sats remained 93-95% on that level.  Patient on tele and remained in NSR through entire shift. Lopressor dose was increased yesterday.   Dilaudid given twice for generalized pain. Plan for DC soon and DLI outpatient on 8/14.  Pt remaining free from falls or injury throughout shift; bed in lowest position; call light within reach; pt instructed to call for assistance as needed. Will continue to monitor.

## 2017-08-02 NOTE — PROGRESS NOTES
Consult received to arrange for home oxygen. Referral for O2 made to [facility] ([phone]). Spoke with both Shania ([id number]) and Rupinder. Authorization received from insurance and oxygen to be delivered to pts. Room prior to dc. Pt. Aware of the plan and in agreement. Will follow.

## 2017-08-02 NOTE — ASSESSMENT & PLAN NOTE
ekg's and tele reviewed  Noted to be aflutter with aberrancy and LBBB morphology  increased fleicanide to 100mg po BID  increased lopressor 50mg po BID  Patient has remained in sinus overnight and is asymptomatic at this time  Thrombocytopenia prevents anticoagulation  CHADSVASc nonetheless is 0  To be dc'ed today  Follow up in EP clinic in 2-4 weeks coordinated with other [facility] appointments

## 2017-08-02 NOTE — ASSESSMENT & PLAN NOTE
-- Stable  -- Supraventricular tachycardia on tele and EKG. Started 7/13/17 8pm with HR between 160-170  -- Patient was cardioverted by cardiology and started on metoprolol and flecainide.  -- continue Flecainide 100 mg bid and increase Lopressor to 50 mg bid per EP  -- Treating underlying cause: infection and malignancy  -- Pharmacologic nuclear stress test originally ordered; cardiology following with tentative plans for a cardiac CTA  -- PVCs seen on EKG on 7/26 - corrected electrolytes  -- check Mag and K bid and replace to keep K at 4 and Mag at 2   -- NSR since 8/1

## 2017-08-02 NOTE — ASSESSMENT & PLAN NOTE
-- improved  -- White blood cell count is 3.29 k/uL, ANC 2600  -- hemoglobin 8.4 g/dL; platelet count is 66 k/uL   -- Following with daily CBC and transfusing for hemoglobin < 7 g/dL, platelets < 10 k/uL

## 2017-08-02 NOTE — PROGRESS NOTES
[facility]  Hematology  Bone Marrow Transplant  Progress Note    Patient Name: Paul E Sistrunk  Admission Date: 6/20/2017  Hospital Length of Stay: 43 days  Code Status: Full Code    Subjective:     Interval History:   - Day 37 FLAG-BETZY - day 14 marrow showed remission  - MRI abnormal - evaluated by vascular neurology- on statin  - VSS Afebrile  - NSR overnight   - respiratory status remains stable but unable to completely wean off of oxygen - currently on 1 L  - Continued jaw pain but no new complaints   - Appetite much improved  - feels good this am, wants to go home    Objective:     Vital Signs (Most Recent):  Temp: 98.1 °F (36.7 °C) (08/02/17 1144)  Pulse: 77 (08/02/17 1144)  Resp: 18 (08/02/17 1144)  BP: 125/70 (08/02/17 1144)  SpO2: (!) 94 % (08/02/17 1144) Vital Signs (24h Range):  Temp:  [97.7 °F (36.5 °C)-98.4 °F (36.9 °C)] 98.1 °F (36.7 °C)  Pulse:  [70-88] 77  Resp:  [18-20] 18  SpO2:  [93 %-98 %] 94 %  BP: (112-156)/(70-81) 125/70     Weight: 82.7 kg (182 lb 6.9 oz)  Body mass index is 24.74 kg/m².  Body surface area is 2.05 meters squared.    ECOG SCORE         Intake/Output - Last 3 Shifts       07/31 0700 - 08/01 0659 08/01 0700 - 08/02 0659 08/02 0700 - 08/03 0659    P.O. 560 680     I.V. (mL/kg) 1550 (17.9) 3411.7 (41.3)     Blood       IV Piggyback  100     Total Intake(mL/kg) 2110 (24.4) 4191.7 (50.7)     Urine (mL/kg/hr) 4875 (2.3) 6050 (3)     Stool 0 (0) 0 (0)     Total Output 4875 6050      Net -2765 -1858.3             Stool Occurrence 1 x 1 x 0 x          Physical Exam   Constitutional: He is oriented to person, place, and time. He appears well-developed and well-nourished. No distress.   HENT:   Head: Normocephalic.   Mouth/Throat: Oropharynx is clear and moist. No oropharyngeal exudate.   Eyes: Conjunctivae are normal. Pupils are equal, round, and reactive to light. No scleral icterus.   Neck: Normal range of motion. Neck supple. Normal range of motion present. No  thyromegaly present.   Cardiovascular: Normal rate, regular rhythm, normal heart sounds and intact distal pulses.    Pulmonary/Chest: Effort normal and breath sounds normal. No respiratory distress.   Decreased left base   Abdominal: Soft. Bowel sounds are normal. He exhibits no distension. There is no tenderness.   Musculoskeletal: Normal range of motion. He exhibits no edema or tenderness.   Lymphadenopathy:        Head (right side): No submandibular, no preauricular, no posterior auricular and no occipital adenopathy present.        Head (left side): No submandibular, no preauricular, no posterior auricular and no occipital adenopathy present.     He has no cervical adenopathy.     He has no axillary adenopathy.   Neurological: He is alert and oriented to person, place, and time. No cranial nerve deficit. Coordination normal.   Skin: Skin is warm and dry. No petechiae and no rash noted. No pallor.   Psychiatric: He has a normal mood and affect. Thought content normal.   Vitals reviewed.      Significant Labs:   CBC:   Recent Labs  Lab 07/31/17  1849 08/01/17  0355 08/02/17  0400   WBC 2.46* 3.73* 3.29*   HGB 8.0* 8.2* 8.4*   HCT 24.5* 25.1* 25.9*   PLT 41* 51* 66*   , CMP:   Recent Labs  Lab 08/01/17  0355 08/01/17  1609 08/02/17  0400    140 142   K 4.0 3.9 3.8    108 107   CO2 25 24 28    175* 82   BUN 13 13 13   CREATININE 0.8 0.8 0.7   CALCIUM 7.4* 7.3* 7.4*   PROT 4.3* 4.5* 4.5*   ALBUMIN 2.2* 2.4* 2.3*   BILITOT 0.5 0.6 0.6   ALKPHOS 255* 260* 241*   AST 35 40 45*   ALT 46* 47* 52*   ANIONGAP 8 8 7*   EGFRNONAA >60.0 >60.0 >60.0    and Coagulation:   Recent Labs  Lab 08/01/17  0355   INR 1.2   APTT 24.5       Diagnostic Results:  I have reviewed all pertinent imaging results/findings within the past 24 hours.    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 General surgery replaced 7/24.   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 Blast of 62%, 19 had a complex karyotype including 5q deletion, 7q deletion, 17p deletion, and one metaphase represented a near tetraploid subclone; FLT-3 negative     -- Jaw pain and pain in right cranial nerve 7 distribution with persistent, severe headaches concerning for CNS involvement - underwent IT chemotherapy on 6/23. Flow negative for disease. CSF LDH was 29  -- Had previous reaction to MEC (etoposide caused full body rash). Did not qualify for the Tolero trial due to receiving IT chemotherapy    -- Day 37 of FLAG-BETZY   -- Day 14 bone marrow biopsy done 7/10 - hypocellular with no evidence of residual disease  -- Future plans for DLI (8/14/17?)  -- LP deferred given resolution of n/v        Acute respiratory failure with hypoxia    - satting well on 1L NC   - difficulty weaning to room air  - desats with activity        S/P allogeneic bone marrow transplant    -- +323 days s/p Flu/Bu/ATG MUD allogeneic transplant for high risk AML after his second IDAC (6/20/16 - 6/24/16) after a prolonged hospitalization (2/5/16 - 3/21/16) for induction with 7&3 and reinduction with MEC to remission and IDAC (4/4/16 - 4/9/16)  -- Continue ppx acyclovir, renally dosed. We have discussed antifungal coverage with infectious disease. Discontinued itraconzaole (7/09) was on ambisome, now on voriconazole  -- Chimerics from marrow done 6/21 show CD3 of 90% donor and 10% recipient, but CD34 of 5% donor and 95% recipient   -- Chimers from 7/10 shows 70% recipient and 30% donor - NOT SORTED  -- Plans for DLI in future         HIV disease    -- CD4 low at 11.4%, Absolute CD4 469 and HIV RNA not detected from 6/21  -- As per ID: continue triumeq. Will set up ID follow-up upon discharge        Chronic bilateral low back pain without sciatica     -- Thought to be related to Neupogen--improved  -- Continue zyrtec, lidocaine patch   -- Cautiously resuming opioid analgesics, including dilaudid PRN        Pancytopenia due to antineoplastic chemotherapy    -- improved  -- White blood cell count is 3.29 k/uL, ANC 2600  -- hemoglobin 8.4 g/dL; platelet count is 66 k/uL   -- Following with daily CBC and transfusing for hemoglobin < 7 g/dL, platelets < 10 k/uL        Pleural effusion    -- Pulm re-consulted 7/20  -- s/p Thoracentesis - cytology negative   -- repeat CXR 7/26 shows Ill-defined patchy airspace consolidation bilaterally more on the left side identified.  Pleural effusion slightly more on the left.  -- Pulm re-consulted 7/26  --s/p Thoracentesis 7/26/17 with 1.3L removed  -- refused CT of chest  -- trial of steroids per pulmonary, appreciate recs, on taper. Will discharge on Prednisone 40 mg daily  -- repeat chest x-ray today prior to discharge        Stroke of unknown etiology    -- Neurology following, appreciate assistance  -- Several imaging studies have been performed, including CT head 7/21, MRI brain 7/21, and CTA head and neck 7/21 with interpreting radiologists citing concern for infarction of the right lentiform nucleus and anterior limb of the internal capsule   -- Neurology reviewed the images and were first concerned about possible embolic activity given his arrhythmia earlier this admission, now concerned about the possibility of fungal CNS infection, though after discussion with ID he has essentially completed a therapeutic course with ambisome and is now on voriconazole, recommending against additional prophylactic CNS fungal coverage  -- Continue atorvastatin. Not a candidate for any sort of anticoagulation or antiplatelet agents given transfusion-dependent thrombocytopenia        Neutropenic fever    -- Resolved   -- Blood cultures no growth from 6/24/17, 7/4/17, 7/7/17, 7/9/17, 7/15/17  -- Complicated antimicrobial treatment history  as follows:   - Cefepime: (started 6/24 - discontinued 6/28) restarted on 7/4/17 - 7/23   - Ampotericin B started 7/9-stopped 7/19;    - Vancomycin (restarted 7/15 - stopped 7/17)  -- Now on PPX antimicrobial agents alone, with bactrim, voriconazole,  acyclovir, and ciprofloxacin  -- Appreciate ID assistance. They have now signed off.  -- Multiple possible sources. CXR showed pneumonia 7/15; CXR 7/17 - Significant patchy air space consolidation identified bilaterally slightly more on the left side.  Slight improvement as compared to the previous study.  Left-sided pleural effusion. On 2-3 L via NC  -- CT chest/abdomen/pelvis reveals bilateral groundglass opacities as well as a left lower-lobe consolidation;  -- ID recommended repeating CT chest 7/19, which showed: Limited assessment. No acute central pulmonary thromboembolism. Peripheral pulmonary emboli are not excluded. Interval development of bilateral moderate pleural effusions. Trace pericardial effusion. Interval progression of bilateral airspace disease with more extensive areas of consolidation bilaterally, in a similar central distribution to the groundglass opacities previously noted, with an upper lobe predominance.   -- Pulm was consulted for a BAL which was done 7/12, cytology pending from BAL; aspergillus <0.500; KOH was negative for yeast/fungus   -- Pulmonary re-consulted, now s/p thoracentesis on 7/22 with cultures NGTD, negative for cytology   -- repeat chest x-ray today        Anxiety    -- Holding xanax PRN due to confusion  -- Consulted hem/onc psych, will need to follow-up with them as an outpatient when discharged  -- patient denies wanting to follow up in patient        Prostate hypertrophy    -- Continue home flomax        CINV (chemotherapy-induced nausea and vomiting)    - scheduled zofran with PRN compazine  - much improved and now tolerating diet        Delirium    -- Resolved  -- Typically worse at night; speaks nonsense occasionally  during the day; has threatened to leave AMA at night a few times, takes off oxygen at night and has disconnected pump  -- CT of head revealed acute stroke, for which neurology is now following. Not thought to be the main contributor to AMS, rather the cause is likely multifactorial with contribution from medications and his prolonged hospitalization        Lower extremity edema    -- Stable, likely iatrogenic from fluids  -- IV fluids stopped  -- Net Negative 3 L from 7/18 - down 11 lbs from 7/18 - 40 mg of lasix BID - given on 7/18   -- Monitoring I/Os        Supraventricular tachycardia    -- Stable  -- Supraventricular tachycardia on tele and EKG. Started 7/13/17 8pm with HR between 160-170  -- Patient was cardioverted by cardiology and started on metoprolol and flecainide.  -- continue Flecainide 100 mg bid and increase Lopressor to 50 mg bid per EP  -- Treating underlying cause: infection and malignancy  -- Pharmacologic nuclear stress test originally ordered; cardiology following with tentative plans for a cardiac CTA  -- PVCs seen on EKG on 7/26 - corrected electrolytes  -- check Mag and K bid and replace to keep K at 4 and Mag at 2   -- NSR since 8/1        Insomnia    -- Improved  -- Ramelteon and zyprexa not tolerated  -- Continue phenergan 12.5 mg nightly for insomnia, which seems to help without causing excessive sedation or confusion        Electrolyte abnormality    -- Ordered prn electrolyte replacement per protocol   -- phos and mag replaced today to keep K 4 and Mag 2            VTE Risk Mitigation         Ordered     Place sequential compression device  Until discontinued      06/20/17 2005     High Risk of VTE  Once      06/20/17 1901          Disposition: Discharge home today with O2    Arminda Olivares NP  Bone Marrow Transplant  [facility]

## 2017-08-02 NOTE — ASSESSMENT & PLAN NOTE
-- Thought to be related to Neupogen--improved  -- Continue zyrtec, lidocaine patch   -- Cautiously resuming opioid analgesics, including dilaudid PRN

## 2017-08-02 NOTE — ASSESSMENT & PLAN NOTE
-- Resolved   -- Blood cultures no growth from 6/24/17, 7/4/17, 7/7/17, 7/9/17, 7/15/17  -- Complicated antimicrobial treatment history as follows:   - Cefepime: (started 6/24 - discontinued 6/28) restarted on 7/4/17 - 7/23   - Ampotericin B started 7/9-stopped 7/19;    - Vancomycin (restarted 7/15 - stopped 7/17)  -- Now on PPX antimicrobial agents alone, with bactrim, voriconazole,  acyclovir, and ciprofloxacin  -- Appreciate ID assistance. They have now signed off.  -- Multiple possible sources. CXR showed pneumonia 7/15; CXR 7/17 - Significant patchy air space consolidation identified bilaterally slightly more on the left side.  Slight improvement as compared to the previous study.  Left-sided pleural effusion. On 2-3 L via NC  -- CT chest/abdomen/pelvis reveals bilateral groundglass opacities as well as a left lower-lobe consolidation;  -- ID recommended repeating CT chest 7/19, which showed: Limited assessment. No acute central pulmonary thromboembolism. Peripheral pulmonary emboli are not excluded. Interval development of bilateral moderate pleural effusions. Trace pericardial effusion. Interval progression of bilateral airspace disease with more extensive areas of consolidation bilaterally, in a similar central distribution to the groundglass opacities previously noted, with an upper lobe predominance.   -- Pulm was consulted for a BAL which was done 7/12, cytology pending from BAL; aspergillus <0.500; KOH was negative for yeast/fungus   -- Pulmonary re-consulted, now s/p thoracentesis on 7/22 with cultures NGTD, negative for cytology   -- repeat chest x-ray today

## 2017-08-02 NOTE — SUBJECTIVE & OBJECTIVE
Interval History: sinus overnight    Review of Systems   Constitution: Negative.   HENT: Negative.    Eyes: Negative.    Cardiovascular: Negative.    Respiratory: Negative.    Endocrine: Negative.    Skin: Negative.    Musculoskeletal: Negative.    Gastrointestinal: Negative.    Genitourinary: Negative.    Neurological: Negative.      Objective:     Vital Signs (Most Recent):  Temp: 98.1 °F (36.7 °C) (08/02/17 0828)  Pulse: 88 (08/02/17 0828)  Resp: 18 (08/02/17 0828)  BP: 112/71 (08/02/17 0828)  SpO2: 95 % (08/02/17 0828) Vital Signs (24h Range):  Temp:  [97.7 °F (36.5 °C)-98.4 °F (36.9 °C)] 98.1 °F (36.7 °C)  Pulse:  [70-88] 88  Resp:  [17-20] 18  SpO2:  [93 %-98 %] 95 %  BP: (112-156)/(71-81) 112/71     Weight: 82.7 kg (182 lb 6.9 oz)  Body mass index is 24.74 kg/m².     SpO2: 95 %  O2 Device (Oxygen Therapy): nasal cannula    Physical Exam   Constitutional: He is oriented to person, place, and time. He appears well-developed and well-nourished.   HENT:   Head: Normocephalic and atraumatic.   Eyes: Conjunctivae and EOM are normal. Pupils are equal, round, and reactive to light.   Neck: Normal range of motion. Neck supple. No JVD present.   Cardiovascular: Normal rate, regular rhythm and normal heart sounds.  Exam reveals no gallop and no friction rub.    No murmur heard.  Pulmonary/Chest: Effort normal and breath sounds normal. No respiratory distress. He has no wheezes. He has no rales. He exhibits no tenderness.   Abdominal: Soft. Bowel sounds are normal. He exhibits no distension. There is no tenderness.   Musculoskeletal: Normal range of motion. He exhibits no edema or tenderness.   Neurological: He is alert and oriented to person, place, and time.   Skin: Skin is warm and dry. No erythema. No pallor.       Significant Labs:   EP:   Recent Labs  Lab 07/31/17  1849 08/01/17  0355 08/01/17  1609 08/02/17  0400    141 140 142   K 4.0 4.0 3.9 3.8    108 108 107   CO2 25 25 24 28   * 110 175* 82    BUN 13 13 13 13   CREATININE 0.8 0.8 0.8 0.7   CALCIUM 7.2* 7.4* 7.3* 7.4*   PROT 4.3* 4.3* 4.5* 4.5*   ALBUMIN 2.3* 2.2* 2.4* 2.3*   BILITOT 0.6 0.5 0.6 0.6   ALKPHOS 263* 255* 260* 241*   AST 41* 35 40 45*   ALT 49* 46* 47* 52*   ANIONGAP 6* 8 8 7*   ESTGFRAFRICA >60.0 >60.0 >60.0 >60.0   EGFRNONAA >60.0 >60.0 >60.0 >60.0   WBC 2.46* 3.73*  --  3.29*   HGB 8.0* 8.2*  --  8.4*   HCT 24.5* 25.1*  --  25.9*   PLT 41* 51*  --  66*   INR  --  1.2  --   --    , CMP:   Recent Labs  Lab 08/01/17  0355 08/01/17  1609 08/02/17  0400    140 142   K 4.0 3.9 3.8    108 107   CO2 25 24 28    175* 82   BUN 13 13 13   CREATININE 0.8 0.8 0.7   CALCIUM 7.4* 7.3* 7.4*   PROT 4.3* 4.5* 4.5*   ALBUMIN 2.2* 2.4* 2.3*   BILITOT 0.5 0.6 0.6   ALKPHOS 255* 260* 241*   AST 35 40 45*   ALT 46* 47* 52*   ANIONGAP 8 8 7*   ESTGFRAFRICA >60.0 >60.0 >60.0   EGFRNONAA >60.0 >60.0 >60.0   , CBC:   Recent Labs  Lab 07/31/17  1849 08/01/17  0355 08/02/17  0400   WBC 2.46* 3.73* 3.29*   HGB 8.0* 8.2* 8.4*   HCT 24.5* 25.1* 25.9*   PLT 41* 51* 66*    and INR:   Recent Labs  Lab 08/01/17  0355   INR 1.2       Significant Imaging: Echocardiogram:   2D echo with color flow doppler:   Results for orders placed or performed during the hospital encounter of 06/20/17   2D echo with color flow doppler   Result Value Ref Range    EF 60 55 - 65    Diastolic Dysfunction No     Aortic Valve Regurgitation TRIVIAL     Est. PA Systolic Pressure 23.25     Mitral Valve Mobility NORMAL     Tricuspid Valve Regurgitation TRIVIAL

## 2017-08-02 NOTE — ASSESSMENT & PLAN NOTE
-- Ordered prn electrolyte replacement per protocol   -- phos and mag replaced today to keep K 4 and Mag 2

## 2017-08-02 NOTE — ASSESSMENT & PLAN NOTE
-- Relapsed AML - peripheral blood shows 30% blasts on admit  -- 2D echo shows 60% EF  -- Mazariegos placed, appears to be in the azygous vein, likely from repositioning. General surgery replaced 7/24.   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 Blast of 62%, 19 had a complex karyotype including 5q deletion, 7q deletion, 17p deletion, and one metaphase represented a near tetraploid subclone; FLT-3 negative     -- Jaw pain and pain in right cranial nerve 7 distribution with persistent, severe headaches concerning for CNS involvement - underwent IT chemotherapy on 6/23. Flow negative for disease. CSF LDH was 29  -- Had previous reaction to MEC (etoposide caused full body rash). Did not qualify for the Tolero trial due to receiving IT chemotherapy    -- Day 37 of FLAG-BETZY   -- Day 14 bone marrow biopsy done 7/10 - hypocellular with no evidence of residual disease  -- Future plans for DLI (8/14/17?)  -- LP deferred given resolution of n/v

## 2017-08-02 NOTE — DISCHARGE SUMMARY
[facility]  Hematology  Bone Marrow Transplant  Discharge Summary      Patient Name: Paul E Sistrunk  MRN: [id number]  Admission Date: 6/20/2017  Hospital Length of Stay: 43 days  Discharge Date and Time: 8/2/2017  4:40 PM  Attending Physician: Gabriela Howe MD   Discharging Provider: Arminda Olivares NP  Primary Care Provider: Edgar Pinon MD    HPI: Mr. Paul E Sistrunk is a 56 year old gentleman with PMH of AML (now in relapse), HIV, anxiety, here for induction of relapsing AML. He had been in good health and overall feeling well until the weekend when he went traveling to [place] with some friends and noticed his legs were hurting and his chin had gone numb. Under the advice of [facility] he went to a local hospital to be evaluated for DVT and was found not to have any. Since then he has continued having this leg pain and numbness though it had been waxing and waning and he was feeling well enough that he almost did not attend his scheduled clinic visit on the day of admission.      In clinic labs were drawn and resulted c/w relapse of AML.           Oncologic history:  (from Dr. Atkinson note, 6/20/17)    KPS: 90% Mr. 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      Since his last visit he has developed severe leg pain and chin numbness over the weekend along with rising WBC, anemia, thrombocytopenia and uric acid of 10.6 with blasts in his blood today.  He clearly has relapsing AML.  He had some pain at his great toes at his last visit which may have been related to increasing uric acid even then.     No fever or sign of infection.  His urine flow is ok on Flomax to help with known prostate enlargement but fading toward 36 not 48 hours.  Mild dry eyes and using eye drops.  Dry mouth  improved on lower itraconazole.     His day 100 restaging marrow showed a 20-30% cellular marrow with trilineage hematopoiesis and no evidence of leukemia.  Cytogenetics 46,XY[13] and chimerism 90% donor.       AML History:  He originally presented in early 2/2016 with low platelets to his HIV provider and he had noted evolving fatigue and weight loss over the previous 2 months.  Bone marrow biopsy 2/8/16 showed a 90% cellular marrow with 50% blasts and background dysplasia.  His cytogenetics were complex with 18 metaphases with numeric and structural abnormalities including a 5q deletion, a 17p deletion (TP53 deletion), plus 1 to 18 double minutes, and 2 metaphases were a tetraploid subclone. FISH studies also confirmed the aforementioned deletions and indicated MYC amplification which are likely presented by double minutes.     He underwent standard induction therapy with 7&3 but his day 14 marrow done 2/22 showed a 40-80% cellular marrow with 35% blasts.  Hence, he began reinduction with MEC on 2/24/16 and repeat marrow done 3/9/16 showed only a 5% cellular marrow with only 5-6% CD34+ blasts.  He developed a rash and severe rectal pain complicating his therapy, though these have resolved now (finishing a steroid taper for his rash).     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  Cytogenetics 46,XY[20].  Hence, he achieved a complete remission prior to allogeneic transplant.  His transplant course (9/5 - 10/1) was as expected with significant mucositis related to conditioning and low counts with some GI issues which all resolved on count recovery.  No unexpected complications.  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     Procedure(s) (LRB):  INSERTION-CATHETER-HARRISON, LEFT  sub clavian (Left)     Hospital Course:    -- Relapsed AML - peripheral blood shows 30% blasts on admit  -- 2D echo shows 60% EF  --  Mazariegos placed on 6/21, appears to be in the azygous vein, likely from repositioning. General surgery replaced 7/24.   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 Blast of 62%, 19 had a complex karyotype including 5q deletion, 7q deletion, 17p deletion, and one metaphase represented a near tetraploid subclone; FLT-3 negative     -- Jaw pain and pain in right cranial nerve 7 distribution with persistent, severe headaches concerning for CNS involvement - underwent IT chemotherapy on 6/23. Flow negative for disease. CSF LDH was 29  -- Had previous reaction to MEC (etoposide caused full body rash). 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 We have discussed antifungal coverage with infectious disease. Discontinued itraconzaole (7/09) was on ambisome, now on voriconazole  -- Chimerics from marrow done 6/21 show CD3 of 90% donor and 10% recipient, but CD34 of 5% donor and 95% recipient   -- Chimers from 7/10 shows 70% recipient and 30% donor - NOT SORTED  -- Plans for DLI in future              HIV disease     -- CD4 low at 11.4%, Absolute CD4 469 and HIV RNA not detected from 6/21  -- As per ID: continue triumeq. Will set up ID follow-up upon discharge     Pancytopenia due to antineoplastic chemotherapy     -- improved  -- transfused for hemoglobin < 7 g/dL, platelets < 10 k/uL         Pleural effusion     -- Pulm re-consulted 7/20  -- s/p Thoracentesis -  cytology negative   -- repeat CXR 7/26 shows Ill-defined patchy airspace consolidation bilaterally more on the left side identified.  Pleural effusion slightly more on the left.  -- Pulm re-consulted 7/26  --s/p Thoracentesis 7/26/17 with 1.3L removed  -- refused CT of chest  -- trial of steroids per pulmonary, appreciate recs, on taper. Will discharge on Prednisone 40 mg daily  -- repeat chest x-ray today prior to discharge--bilateral patchy areas of pulmonary scar with upward retraction of the diaphragm.  Heart size is normal.  This could be chronic infection, or sarcoidosis.  Please see the prior CT report dated 7/5/2017.     Neutropenic fever     -- Resolved   -- Blood cultures no growth from 6/24/17, 7/4/17, 7/7/17, 7/9/17, 7/15/17  -- Complicated antimicrobial treatment history as follows:                        - Cefepime: (started 6/24 - discontinued 6/28) restarted on 7/4/17 - 7/23                        - Ampotericin B started 7/9-stopped 7/19;                         - Vancomycin (restarted 7/15 - stopped 7/17)  -- Now on PPX antimicrobial agents alone, with bactrim, voriconazole,  acyclovir, and ciprofloxacin  -- Appreciate ID assistance. They have now signed off.  -- Multiple possible sources. CXR showed pneumonia 7/15; CXR 7/17 - Significant patchy air space consolidation identified bilaterally slightly more on the left side.  Slight improvement as compared to the previous study.  Left-sided pleural effusion. On 2-3 L via NC  -- CT chest/abdomen/pelvis reveals bilateral groundglass opacities as well as a left lower-lobe consolidation;  -- ID recommended repeating CT chest 7/19, which showed: Limited assessment. No acute central pulmonary thromboembolism. Peripheral pulmonary emboli are not excluded. Interval development of bilateral moderate pleural effusions. Trace pericardial effusion. Interval progression of bilateral airspace disease with more extensive areas of consolidation bilaterally, in a  similar central distribution to the groundglass opacities previously noted, with an upper lobe predominance.   -- Pulm was consulted for a BAL which was done 7/12, cytology pending from BAL; aspergillus <0.500; KOH was negative for yeast/fungus   -- Pulmonary re-consulted, now s/p thoracentesis on 7/22 with cultures NGTD, negative for cytology   -- repeat chest x-ray today--results as above         Acute respiratory failure with hypoxia     - satting well on 1L NC   - difficulty weaning to room air, sats 78% on RA with activity  - will discharge with home O2     Stroke of unknown etiology     -- Neurology following, appreciate assistance  -- Several imaging studies have been performed, including CT head 7/21, MRI brain 7/21, and CTA head and neck 7/21 with interpreting radiologists citing concern for infarction of the right lentiform nucleus and anterior limb of the internal capsule   -- Neurology reviewed the images and were first concerned about possible embolic activity given his arrhythmia earlier this admission, now concerned about the possibility of fungal CNS infection, though after discussion with ID he has essentially completed a therapeutic course with ambisome and is now on voriconazole, recommending against additional prophylactic CNS fungal coverage  -- Continue atorvastatin. Not a candidate for any sort of anticoagulation or antiplatelet agents given transfusion-dependent thrombocytopenia     Delirium     -- Resolved  -- Typically worse at night; speaks nonsense occasionally during the day; has threatened to leave AMA at night a few times, takes off oxygen at night and has disconnected pump  -- CT of head revealed acute stroke, for which neurology is now following. Not thought to be the main contributor to AMS, rather the cause is likely multifactorial with contribution from medications and his prolonged hospitalization     Supraventricular tachycardia     -- Stable  -- Supraventricular tachycardia on  tele and EKG. Started 7/13/17 8pm with HR between 160-170  -- Patient was cardioverted by cardiology and started on metoprolol and flecainide.  -- continue Flecainide 100 mg bid and increase Lopressor to 50 mg bid per EP  -- Treating underlying cause: infection and malignancy  -- Pharmacologic nuclear stress test originally ordered; cardiology following with tentative plans for a cardiac CTA  -- PVCs seen on EKG on 7/26 - corrected electrolytes  -- check Mag and K bid and replace to keep K at 4 and Mag at 2   -- NSR since 8/1  --discharge on Flecainide and Lopressor with cardiology follow-up         CINV (chemotherapy-induced nausea and vomiting)     - scheduled zofran with PRN compazine  - much improved and now tolerating diet     Anxiety     -- Holding xanax PRN due to confusion  -- Consulted hem/onc psych, will need to follow-up with them as an outpatient when discharged  -- patient denies wanting to follow up in patient     Prostate hypertrophy     -- Continue home flomax     Electrolyte abnormality     -- given prn electrolyte replacement per protocol   -- phos and mag replaced today to keep K 4 and Mag 2     Chronic bilateral low back pain without sciatica     -- Thought to be related to Neupogen--improved  -- Continue zyrtec, lidocaine patch   -- Cautiously resuming opioid analgesics, including dilaudid PRN         HIV disease     -- CD4 low at 11.4%, Absolute CD4 469 and HIV RNA not detected from 6/21  -- As per ID: continue triumeq. ID follow-up upon discharge                    Consults         Status Ordering Provider     Inpatient consult to Cardiology  Once     Provider:  (Not yet assigned)    Completed TL VIRGEN     Inpatient consult to Cardiology  Once     Provider:  (Not yet assigned)    Completed VAISHALI BONDS     Inpatient consult to Dietary  Once     Provider:  (Not yet assigned)    Completed ELSA COSBY     Inpatient consult to Electrophysiology  Once     Provider:  (Not yet  assigned)    Completed DOUBLEDAY, ELSA S.     Inpatient consult to General Surgery  Once     Provider:  (Not yet assigned)    Completed DOUBLEDAY, ELSA S.     Inpatient consult to Hematology/Oncology Psychology  Once     Provider:  (Not yet assigned)    Completed DOUBLEDAY, ELSA S.     Inpatient consult to Infectious Diseases  Once     Provider:  (Not yet assigned)    Completed LAWRENCE MARTINEZ     Inpatient consult to Infectious Diseases  Once     Provider:  (Not yet assigned)    Completed THEPPOTE, DELMI S.     Inpatient consult to Neurology  Once     Provider:  (Not yet assigned)    Completed FATOUMATA HERNANDEZ     Inpatient consult to Pulmonology  Once     Provider:  (Not yet assigned)    Completed THEPPOTE, DELMI S.     Inpatient consult to Pulmonology  Once     Provider:  (Not yet assigned)    Completed DOUBLEDAY, ELSA S.     Inpatient consult to Pulmonology  Once     Provider:  (Not yet assigned)    Completed DOUBLEDAY, ELSA S.     Inpatient consult to Vascular (Stroke) Neurology  Once     Provider:  (Not yet assigned)    Completed JOSÉ MANUEL DEVLIN          Significant Diagnostic Studies: chest x-ray, CT lumbar spine, MRI brain, CTA chest, CTA head and neck    Pending Diagnostic Studies:     Procedure Component Value Units Date/Time    CBC auto differential [[id number]] Collected:  07/20/17 0500    Order Status:  Sent Lab Status:  In process Updated:  07/20/17 0501    Specimen:  Blood from Blood     Comprehensive metabolic panel [[id number]] Collected:  07/20/17 0500    Order Status:  Sent Lab Status:  In process Updated:  07/20/17 0501    Specimen:  Blood from Blood     Lactate dehydrogenase [[id number]] Collected:  07/21/17 1223    Order Status:  Sent Lab Status:  In process Updated:  07/21/17 1224    Specimen:  Blood from Blood     Lactate dehydrogenase [[id number]] Collected:  07/20/17 0500    Order Status:  Sent Lab Status:  In process Updated:  07/20/17 0501    Specimen:  Blood from Blood      Magnesium [[id number]] Collected:  07/20/17 0500    Order Status:  Sent Lab Status:  In process Updated:  07/20/17 0501    Specimen:  Blood from Blood     Medical Cytology [[id number]] Collected:  07/12/17 1058    Order Status:  Sent Lab Status:  No result     Specimen:  Bronchial Alveolar Lavage (BAL)     Phosphorus [[id number]] Collected:  07/20/17 0500    Order Status:  Sent Lab Status:  In process Updated:  07/20/17 0501    Specimen:  Blood from Blood     Uric acid [[id number]] Collected:  07/20/17 0500    Order Status:  Sent Lab Status:  In process Updated:  07/20/17 0501    Specimen:  Blood from Blood     Voriconazole [[id number]] Collected:  08/02/17 0826    Order Status:  Sent Lab Status:  In process Updated:  08/02/17 0858    Specimen:  Blood from Blood     X-Ray Chest PA And Lateral [[id number]] Resulted:  08/02/17 1113    Order Status:  Sent Lab Status:  In process Updated:  08/02/17 1120        Final Active Diagnoses:    Diagnosis Date Noted POA    PRINCIPAL PROBLEM:  AML (acute myeloid leukemia) in relapse [C92.02] 06/20/2017 Yes    Acute respiratory failure with hypoxia [J96.01] 07/28/2017 No    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] 10/04/2016 Not Applicable    HIV disease [B20] 07/02/2015 Yes    Chronic bilateral low back pain without sciatica [M54.5, G89.29] 06/28/2017 No    Pancytopenia due to antineoplastic chemotherapy [D61.810, T45.1X5A] 02/05/2016 Yes    Pleural effusion [J90] 07/20/2017 No    Stroke of unknown etiology [I63.9] 07/21/2017 No    Neutropenic fever [D70.9, R50.81] 06/26/2017 No    Anxiety [F41.9] 12/28/2016 Yes    Prostate hypertrophy [N40.0] 07/02/2015 Yes    Pulmonary infiltrates on CXR [R91.8] 07/27/2017 Yes    CINV (chemotherapy-induced nausea and vomiting) [R11.2, T45.1X5A] 07/24/2017 No    Delirium [R41.0] 07/21/2017 No    Supraventricular tachycardia [I47.1] 07/14/2017 No    Insomnia [G47.00] 07/13/2017 No    Electrolyte abnormality [E87.8] 06/26/2017 Yes       Problems Resolved During this Admission:    Diagnosis Date Noted Date Resolved POA    NICOLE (acute kidney injury) [N17.9] 06/20/2017 07/08/2017 Yes    Thrombocytopenia [D69.6] 06/26/2017 07/24/2017 Yes    V-tach [I47.2] 07/31/2017 08/01/2017 No    Elevated liver enzymes [R74.8] 07/06/2017 07/08/2017 No    Acute hypernatremia [E87.0] 07/06/2017 07/08/2017 Yes    Facial numbness [R20.0] 07/02/2017 07/23/2017 No    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 Shin on Friday 8/4    Follow Up:  Follow-up Information     INJECTION, [facility] INFUSION On 8/1/2017.    Why:  Labs- 1:15pm           Ramin Sihn MD On 8/1/2017.    Specialty:  Hematology and Oncology  Why:  follow up- 2:20pm  Contact information:  [address] [facility] [id number]  [phone]                 Patient Instructions:     OXYGEN FOR HOME USE   Order Specific Question Answer Comments   Liter Flow 2    Duration Continuous    Qualifying SpO2: 02 at 79% RA    Testing done at: Exercise/Activity    Route nasal cannula    Device home concentrator with portable unit    Length of need (in months): 99 mos    Height: 6' (1.829 m)    Weight: 82.7 kg (182 lb 6.9 oz)    Alternative treatment measures have been tried or considered and deemed clinically ineffective. Yes      CD4 T-Benton Cells   Standing Status: Future  Standing Exp. Date: 08/20/18     HIV RNA, quantitative, PCR   Standing Status: Future  Standing Exp. Date: 08/20/18     CBC auto differential   Standing Status: Future  Standing Exp. Date: 10/01/18     Comprehensive metabolic panel   Standing Status: Future  Standing Exp. Date: 10/01/18     Magnesium   Standing Status: Future  Standing Exp. Date: 10/01/18     Phosphorus   Standing Status: Future  Standing Exp. Date: 10/01/18        Medications:  Reconciled Home Medications:   Current Discharge Medication List      START taking these medications    Details   atorvastatin (LIPITOR) 20 MG tablet Take 1 tablet (20 mg total) by mouth once daily.  Qty: 90 tablet, Refills: 3      benzonatate (TESSALON) 100 MG capsule Take 1 capsule (100 mg total) by mouth 3 (three) times daily as needed for Cough.  Qty: 30 capsule, Refills: 1      flecainide (TAMBOCOR) 100 MG Tab Take 1 tablet (100 mg total) by mouth every 12 (twelve) hours.  Qty: 180 tablet, Refills: 3      guaifenesin (MUCINEX) 600 mg 12 hr tablet Take 1 tablet (600 mg total) by mouth 2 (two) times daily.      lorazepam (ATIVAN) 1 MG tablet Take 1 tablet (1 mg total) by mouth every 8 (eight) hours as needed for Anxiety.  Qty: 30 tablet, Refills: 0      metoprolol tartrate (LOPRESSOR) 50 MG tablet Take 1 tablet (50 mg total) by mouth 2 (two) times daily.  Qty: 180 tablet, Refills: 3      morphine (MS CONTIN) 30 MG 12 hr tablet Take 1 tablet (30 mg total) by mouth every 12 (twelve) hours.  Qty: 60 tablet, Refills: 0      predniSONE (DELTASONE) 20 MG tablet Take 2 tablets (40 mg total) by mouth once daily.  Qty: 60 tablet, Refills: 1      sulfamethoxazole-trimethoprim 800-160mg (BACTRIM DS) 800-160 mg Tab Take 1 tablet by mouth every Mon, Wed, Fri.  Qty: 36 tablet, Refills: 0      voriconazole (VFEND) 200 MG Tab Take 1 and one half tablet (300 mg) every 12 hours  Qty: 90 tablet, Refills: 2         CONTINUE these medications which have CHANGED    Details   HYDROmorphone (DILAUDID) 2 MG tablet Take 1 tablet (2 mg total) by mouth every 6 (six) hours as needed for Pain.  Qty: 60 tablet, Refills: 0    Associated Diagnoses: AML (acute myelogenous leukemia); AML (acute myeloblastic leukemia); Prostate hypertrophy; HIV infection; Thrombocytopenia; Cancer associated pain; CKD (chronic kidney disease), stage IV; Neutropenic fever; Hematuria; Acute maxillary sinusitis, recurrence not specified; Renal  insufficiency; Erectile dysfunction, unspecified erectile dysfunction type; Neutropenia; Histoplasma capsulatum infection; Histoplasmosis pneumonia; Hypokalemia         CONTINUE these medications which have NOT CHANGED    Details   abacavir-dolutegravir-lamivud (TRIUMEQ) 600- mg Tab Take 1 tablet by mouth once daily.  Qty: 30 tablet, Refills: 2    Associated Diagnoses: HIV (human immunodeficiency virus infection)      acyclovir (ZOVIRAX) 800 MG Tab Take 1 tablet (800 mg total) by mouth 2 (two) times daily.  Qty: 60 tablet, Refills: 11      butalbital-acetaminophen-caffeine -40 mg (FIORICET, ESGIC) -40 mg per tablet Refills: 0      finasteride (PROSCAR) 5 mg tablet Take 1 tablet (5 mg total) by mouth once daily.  Qty: 30 tablet, Refills: 1    Associated Diagnoses: Prostate hypertrophy      loperamide (IMODIUM) 2 mg capsule Take 2 mg by mouth daily as needed for Diarrhea.      ondansetron (ZOFRAN) 8 MG tablet Take 1 tablet (8 mg total) by mouth every 8 (eight) hours as needed for Nausea.  Qty: 30 tablet, Refills: 1      pantoprazole (PROTONIX) 40 MG tablet Take 1 tablet (40 mg total) by mouth once daily.  Qty: 30 tablet, Refills: 11      promethazine (PHENERGAN) 12.5 MG Tab Take 1 tablet (12.5 mg total) by mouth every 6 (six) hours as needed (nausea).  Qty: 30 tablet, Refills: 2      tamsulosin (FLOMAX) 0.4 mg Cp24 Take 1 capsule (0.4 mg total) by mouth every other day.  Qty: 15 capsule, Refills: 1    Associated Diagnoses: Prostate hypertrophy             Arminda Olivares, NP  Bone Marrow Transplant  [facility]

## 2017-08-02 NOTE — PLAN OF CARE
MDR's with Dr Howe.  Planning for potential d/c this afternoon pending home O2 authorization.  No HH/DME needs.  Line care to be performed in clinic.  Next dressing change/flush due 8/9.  Follow up BMT and cards appointments made.  Patient agrees with the d/c plan.  Will continue to follow.      Future Appointments  Date Time Provider Department Center   8/4/2017 2:00 PM INJECTION, [facility]   8/4/2017 3:00 PM Ramin Shin MD [facility] BM GE[facility]   8/7/2017 9:45 AM EKG, APPT [facility] EKG [facility]   8/7/2017 10:30 AM SLOAN Ferris [facility] ARRHYTH [facility]        08/02/17 1302   Final Note   Assessment Type Final Discharge Note   Discharge Disposition Home   Discharge planning education complete? Yes   What phone number can be called within the next 1-3 days to see how you are doing after discharge? ([phone])   Hospital Follow Up  Appt(s) scheduled? Yes   Discharge plans and expectations educations in teach back method with documentation complete? Yes   Discharge/Hospital Encounter Summary to (non-[facility]) PCP n/a   Referral to Outpatient Case Management complete? n/a   Referral to / orders for Home Health Complete? n/a   30 day supply of medicines given at discharge, if documented non-compliance / non-adherence? n/a   Any social issues identified prior to discharge? No   Did you assess the readiness or willingness of the family or caregiver to support self management of care? Yes

## 2017-08-02 NOTE — ASSESSMENT & PLAN NOTE
-- Pulm re-consulted 7/20  -- s/p Thoracentesis - cytology negative   -- repeat CXR 7/26 shows Ill-defined patchy airspace consolidation bilaterally more on the left side identified.  Pleural effusion slightly more on the left.  -- Pulm re-consulted 7/26  --s/p Thoracentesis 7/26/17 with 1.3L removed  -- refused CT of chest  -- trial of steroids per pulmonary, appreciate recs, on taper. Will discharge on Prednisone 40 mg daily  -- repeat chest x-ray today prior to discharge

## 2017-08-04 NOTE — TELEPHONE ENCOUNTER
----- Message from Sachin Ruano sent at 8/4/2017 10:28 AM CDT -----  Contact: PT  PT would like to reschedule his appt due to not feeling well.     Please call [phone]

## 2017-08-07 PROBLEM — Z92.89 HISTORY OF CARDIOVERSION: Status: ACTIVE | Noted: 2017-01-01

## 2017-08-07 PROBLEM — D70.9 NEUTROPENIC FEVER: Status: RESOLVED | Noted: 2017-01-01 | Resolved: 2017-01-01

## 2017-08-07 PROBLEM — T45.1X5A ANEMIA DUE TO CHEMOTHERAPY: Status: ACTIVE | Noted: 2017-01-01

## 2017-08-07 PROBLEM — D64.81 ANEMIA DUE TO CHEMOTHERAPY: Status: ACTIVE | Noted: 2017-01-01

## 2017-08-07 PROBLEM — R50.81 NEUTROPENIC FEVER: Status: RESOLVED | Noted: 2017-01-01 | Resolved: 2017-01-01

## 2017-08-07 NOTE — PROGRESS NOTES
Subjective:    Patient ID:  Paul E Sistrunk is a 57 y.o. male who presents for follow-up of SVT.     Paul E Sistrunk is a patient of Dr. De Los Santos.    HPI     Mr. Sistrunk is a 57 y.o. 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 He was recently [place] to [facility] [place] ([place]/20/17) from clinic 2/2 AML relapse. At the time, he was noted to be in symptomatic SVT (170s), with associated SOB and anxiety. An Echo at the time (06/21/17) revealed an EF of 60%. Mr. Sistrunk underwent a DCCV (07/14/17), and was discharged on flecainide and Lopressor, and extensive multidisciplinary follow-ups.     Since discharge, Mr. Sistrunk notes increased fatigue and weakness, as well as occasional difficulty sleeping 2/2 anxiety. He denies SVT recurrence, chest pain, SOB/GALEANO, dizziness, palpitations, or syncope. He does attribute his lower energy in part, to Lopressor.     I reviewed today's ECG which demonstrated NSR at 72 bpm; , , and QTc 466.    Review of Systems   Constitution: Positive for weakness and malaise/fatigue. Negative for diaphoresis.   HENT: Negative for headaches.    Eyes: Negative for double vision.   Cardiovascular: Negative for chest pain, dyspnea on exertion, irregular heartbeat, near-syncope, palpitations and syncope.   Respiratory: Negative for shortness of breath.    Skin: Negative.    Musculoskeletal: Negative.    Neurological: Negative for dizziness and light-headedness.   Psychiatric/Behavioral: Negative for altered mental status. The patient has insomnia and is nervous/anxious.         Objective:    Physical Exam   Constitutional: He is oriented to person, place, and time. He appears well-developed and well-nourished.   HENT:   Head: Normocephalic and atraumatic.   Eyes: Pupils are equal, round, and reactive to light.   Cardiovascular: Normal rate and regular rhythm.    Pulmonary/Chest: Effort normal.   Musculoskeletal: Normal range of motion.    Neurological: He is alert and oriented to person, place, and time.   Vitals reviewed.        Assessment:       1. Supraventricular tachycardia    2. Stroke of unknown etiology    3. History of cardioversion    4. 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 S/P allogeneic bone marrow transplant    6. HIV disease         Plan:       In summary, Mr. Sistrunk is a 57 y.o. 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 Mr. Sistrunk is doing well from a rhythm perspective without clinical SVT recurrence since discharge; on flecainide and Lopressor.     Decrease Lopressor to 25 mg PO BID, given significant fatigue, as well as hypotension (BP today 108/62 before medication; per pt request).   Continue flecainide.   Follow up in clinic in 8 weeks, sooner as needed.     Zaina Arellano, [place], APRN, FNP-C    EP ATTENDING ADDENDUM:    I have personally seen, taken the history and examined the patient. I agree with the NP whose note reflects my findings and plan.    Plan is to hold the course. He will see me in 8 weeks, at which time we will talk further about available options for SVT/atrial flutter including continuing antiarrhythmics vs ablation.    Thank you for allowing me to participate in the care of this patient. Please do not hesitate to call me with any questions or concerns.    Pascual De Los Santos MD

## 2017-08-07 NOTE — PROGRESS NOTES
C/C  Severe leg pain, numbness in chin area, patient stated he went to the ER in [place].  Subjective:       Patient ID: Paul E Sistrunk is a 57 y.o. male.    Chief Complaint: Follow-up and Leukemia    HPI  KPS: 90% Mr. Sistrunk is here 329 days post Flu/Bu/ATG MUD allogeneic transplant for high risk AML after his second IDAC (6/20 - 6/24) after a prolonged hospitalization (2/5 - 3/21) for induction with 7&3 and reinduction with MEC to remission and IDAC (4/4 - 4/9).     Since his last visit he has developed severe leg pain and chin numbness over the weekend along with rising WBC, anemia, thrombocytopenia and uric acid of 10.6 with blasts in his blood today.  He clearly has relapsing AML.  He had some pain at his great toes at his last visit which may have been related to increasing uric acid even then.    His day 100 restaging marrow showed a 20-30% cellular marrow with trilineage hematopoiesis and no evidence of leukemia.  Cytogenetics 46,XY[13] and chimerism 90% donor.      AML History:  He originally presented in early 2/2016 with low platelets to his HIV provider and he had noted evolving fatigue and weight loss over the previous 2 months.  Bone marrow biopsy 2/8/16 showed a 90% cellular marrow with 50% blasts and background dysplasia.  His cytogenetics were complex with 18 metaphases with numeric and structural abnormalities including a 5q deletion, a 17p deletion (TP53 deletion), plus 1 to 18 double minutes, and 2 metaphases were a tetraploid subclone. FISH studies also confirmed the aforementioned deletions and indicated MYC amplification which are likely presented by double minutes.    He underwent standard induction therapy with 7&3 but his day 14 marrow done 2/22 showed a 40-80% cellular marrow with 35% blasts.  Hence, he began reinduction with MEC on 2/24/16 and repeat marrow done 3/9/16 showed only a 5% cellular marrow with only 5-6% CD34+ blasts.  He developed a rash and severe rectal pain complicating  his therapy, though these have resolved now (finishing a steroid taper for his rash).    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  Cytogenetics 46,XY[20].  Hence, he achieved a complete remission prior to allogeneic transplant.  His transplant course (9/5 - 10/1) was as expected with significant mucositis related to conditioning and low counts with some GI issues which all resolved on count recovery.  No unexpected complications.  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     HIV: Viral load 303K on 2/6/16 and now on Triumeq with Bactrim and acyclovir prophylaxis  Histoplasmosis: Multiple pulmonary nodules seen on chest CT during his recent hospital stay and he was found to be urine histo antigen positive.  Now on itraconazole after a course of amphotericin B.     Hospital Course: Relapsed AML - peripheral blood shows 30% blasts on admit. 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 Blast of 62%, 19 had a complex karyotype including 5q deletion, 7q deletion, 17p deletion, and one metaphase represented a near tetraploid subclone; FLT-3 negative. Jaw pain and pain in right cranial nerve 7 distribution with persistent, severe headaches concerning for CNS involvement - underwent IT chemotherapy on 6/23. Flow negative for disease. Received FLAG-BETZY re induction, day 37 at discharge. Day 14 bone marrow biopsy done 7/10 - hypocellular with no evidence of residual disease. Chimerics from marrow done 6/21 show CD3 of 90% donor and 10% recipient, but CD34 of 5% donor and 95% recipient     Pulmonology : BAL which was done 7/12, cytology pending from BAL; aspergillus <0.500; KOH was negative for yeast/fungusThoracentesis on 7/22 - cytology negative; repeat CXR 7/26 shows Ill-defined patchy airspace consolidation bilaterally more on the left side identified. Second  Thoracentesis 7/26/17 with 1.3L removed. Trial of steroids per pulmonary discharged on Prednisone 40 mg daily    ID: Complicated antimicrobial treatment history as follows: - Cefepime: (started 6/24 - discontinued 6/28) restarted on 7/4/17 - 7/23 - Ampotericin B started 7/9-stopped 7/19; - Vancomycin (restarted 7/15 - stopped 7/17)   CXR showed pneumonia 7/15; CXR 7/17 - Significant patchy air space consolidation identified bilaterally slightly more on the left side. CT chest/abdomen/pelvis reveals bilateral groundglass opacities as well as a left lower-lobe consolidation.     Neurology: Several imaging studies have been performed, including CT head 7/21, MRI brain 7/21, and CTA head and neck 7/21 with interpreting radiologists citing concern for infarction of the right lentiform nucleus and anterior limb of the internal capsule.  Continue atorvastatin.     Cardiology: Supraventricular tachycardia on tele and EKG. Started 7/13/17 8pm with HR between 160-170. Patient was cardioverted by cardiology and started on metoprolol and flecainide. Continue Flecainide 100 mg bid and increase Lopressor to 50 mg bid.     Today: Presents for follow up after FLAG-BETZY reinduction chemotherapy for relapsed AML after an MUD stem cell transplant. Today complains of continued facial numbness, now with constipation and hemorrhoids - both due to pain medication. Eating well. Energy level returning. Followed up with cardiology today.      Review of Systems   Constitutional: Positive for fatigue and decreased weight. Negative for activity change, appetite change, fever.   HENT: Negative for dental problem, postnasal drip, sore throat and trouble swallowing.  Numb chin  Eyes:  Negative  Respiratory: Negative for cough, choking and shortness of breath.    Cardiovascular: Negative for chest pain and leg swelling.   Gastrointestinal: Negative for abdominal pain, blood in stool, diarrhea and nausea. Positive for constipation.    Genitourinary:  Negative for dysuria, frequency and hematuria.   Musculoskeletal: Increased bony pain at lower extremities and low back; resolving.   Skin: Negative for Rash.  Neurological: Negative for tremors, weakness, light-headedness and headaches.   Hematological: Negative for adenopathy.   Psychiatric/Behavioral: Positive for sleep disturbance. Negative for confusion and dysphoric mood. The patient is nervous/anxious (Improved).        Objective:      Physical Exam   Constitutional: He is oriented to person, place, and time. He appears well-developed and well-nourished. No distress.   HENT:   Head: Normocephalic and atraumatic.   Mouth/Throat: Oropharynx is clear and moist. No oropharyngeal exudate.   Eyes: Conjunctivae are normal. Pupils are equal, round, and reactive to light.   Neck: Neck supple.   Cardiovascular: Normal rate and regular rhythm.    Pulmonary/Chest: Effort normal and breath sounds normal. He has no rales.   Abdominal: Soft. Bowel sounds are normal. He exhibits no mass. There is no splenomegaly. There is no tenderness.   Musculoskeletal: Normal range of motion. He exhibits no edema.   Lymphadenopathy:     He has no cervical adenopathy.     He has no axillary adenopathy.   Neurological: He is alert and oriented to person, place, and time.   Skin: Skin is warm and dry. Rash negative.   Psychiatric: He has a normal mood and affect. Thought content normal.   Vitals reviewed.      Assessment:       1. AML (acute myeloid leukemia) in relapse    2. S/P allogeneic bone marrow transplant    3. HIV disease    4. Anxiety    5. Insomnia, unspecified type    6. Anemia due to chemotherapy        Plan:       Graft/AML:  Full male MUD match, O+ into O+.  Relapsing AML at day 329 post transplant with bone pain and chin numbness related to marrow expansion and WBC 24K (51% blasts), hemoglobin 12.2 and platelets 59K.  Day 35 marrow documenting complete remission with normal cytogenetics and 95% chimerism.  Day 64 peripheral  blood chimerism showed 95% CD3 and 100% CD33 donor chimerism.  Day 100 marrow showed a 20-30% cellular biopsy with no evidence of residual leukemia, increased storage iron and adequate megakaryocytes with normal cytogenetics and 90% donor chimerism.  Given his risk profile (p53 especially and double induction) his relapse is not unanticipated.     Given his previous regimens and MEC (previous reaction to etoposide) intolerance, not a candidate for Tolero study, FLAG-BETZY given and .See hospital course as above.     Relapsed AML: Re-induced with FLAG-BETZY. Day 14 marrow showed hypocellular bone marrow with no definite morphologic or immunophenotypic evidence of residual acute myeloid leukemia. Plan for DLI on 8/14.     GVHD: No GVHD. Chimerics from marrow done 6/21 show CD3 of 90% donor and 10% recipient, but CD34 of 5% donor and 95% recipient     ID:  CMV pos into CMV pos.  CMV negative 4/20/17.  Continues on Acyclovir and transitioned from itraconazole to voriconazole while in hospital.  Will continue to treat HIV therapy with Triumeq.     Cardiology: See above for history. Currently on Flecainide 100 mg bid and decreased Lopressor to 25 mg bid (8/7/17 per cardiology). Continue Lipitor.     Pulmonology: Received 60 mg of prednisone while in hospital; on taper at 40 mg daily, currently.     Renal/Prostate:  Increased urine flow on Flomax (every 36 hours) with creatinine 0.9.     Metab: Eating variably with albumin at 2.9.  Potassium 4.1. Magnesium 1.8.    Anxiety:  Increased anxiety about his health associated with family pressures.  He will need to follow up with Dr. Eli  Using xanax with relief.    Back Pain: Discussed with patient about possibly weaning MS Contin to 15 mg BID - will revisit next visit, per patient's request.     Patient seen and examined with collaborating physician Dr. Atkinson.      Asia Melgar NP  Hematology and BMT    Attending: Patient seen and examined with NP and agree with  assessment and plan above.  Now in remission with recovering counts post reinduction and will pursue DLI soon.  His nutritional status is improving with better oral intake at home.  RVBE

## 2017-08-07 NOTE — NURSING
Pt arrived for labs and dressing change from left chest double lumen oquendo.  Pt is not flushing at home as instructed.  Both lines with good blood return and flushes easily, labs sent.  Dressing and end caps changed.  Pt now going to next appt with Mom and appt calendar.

## 2017-08-10 NOTE — TELEPHONE ENCOUNTER
Called patient and told him to be here on Tuesday, Auguast 15th for his DLI.  All questions were answered and patient expressed understanding.  Chirag MPH, MT (ASCP)

## 2017-08-15 NOTE — LETTER
August 15, 2017        Edgar Pinon MD  [address]  [facility] [address]             [facility]  [address]  [facility] [address]  Phone: [phone]   Patient: Paul E Sistrunk   MR Number: [id number]   YOB: 1960   Date of Visit: 8/15/2017       Dear Dr. Pinon:    Thank you for referring Paul Sistrunk to me for evaluation. Below are the relevant portions of my assessment and plan of care.        1. HIV disease    2. S/P allogeneic bone marrow transplant    3. AML (acute myeloid leukemia) in relapse          Mr. Sistrunk his high risk AML with p53 abnormality relapsed at day 329 and given his previous [facility] experience with reaction to etoposide he underwent reinduction with FLAG-BETZY and restaging marrow 7/10/17 showed variable hypocellularity without increased blasts.  Cytogenetics showed one normal and one abnormal metaphase.      Though DLI for AML has not been as successful at maintaining remission in AML as it has in CML, it is his best hope for disease control now and I met with the patient and his family to discuss and obtain consent this morning.  He will receive level 1 DLI with total of 1 x 10exp7 CD3/kg from his unrelated donor who was harvested yesterday.  These were unmobilized cells.  He signed consent in the clinic and understands that there is only a 20% or so chance that this will control his disease long term.    His blood counts today at day 337 are stable with normal WBC and platelets and hemoglobin of 10.  Over 10 minutes I infused the 20 ml product after routine double identification.  He received tylenol premedication and tolerated the infusion without incident.      He and his mother understand that we are looking for signs of GVHD in the coming weeks and they are familiar with those signs.    He was also seen by ID this morning to discuss optimal prophylaxis to come.    Return to clinic in a week for follow up.    If you have  questions, please do not hesitate to call me. I look forward to following Leonides along with you.    Sincerely,      Raphael Atkinson MD           CC  Remington Virk MD

## 2017-08-15 NOTE — PATIENT INSTRUCTIONS
Drug Reaction: Allergic  You are having an allergic reaction to a drug you have taken. This causes an itchy rash and sometimes swelling of various parts of the body. It may also cause trouble swallowing or breathing. The rash may take a few hours or up to 2 weeks to go away. In the future, remember to tell your doctor about your allergy to this drug so that drugs of this type won't be used again.  Any medicine can cause an allergic reaction. However, penicillin and related drugs, sulfa drugs, aspirin, ibuprofen, and seizure medicines cause the most allergic reactions. Vaccines may also trigger allergies. People whose parents or siblings have allergies are at a higher risk of developing a drug allergy. Allergy testing may sometimes be required to determine the cause.  Symptoms may occur within minutes, hours, or even weeks after exposure to the drug. It can be a mild or severe reaction, or potentially life threatening. Most of us think of allergic reactions when we have a rash or itchy skin. Symptoms can include:  · Rash, hives, redness, welts, blisters  · Itching, burning, stinging, pain  · Dry, flaky, cracking, scaly skin  · Swelling of the face, lips or other parts of the body  · Fever.  Sometimes fever is the only symptom of a drug reaction.  In elderly people, the risk of fever increases with the number of drugs the older person takes.  More severe symptoms include:  · Trouble swallowing, feeling like your throat is closing  · Trouble breathing, wheezing  · Hoarse voice or trouble speaking  · Nausea, vomiting, diarrhea, stomach cramps  · Feeling faint or lightheaded, rapid heart rate  Home care    The goal of treatment is to help relieve the symptoms, and get you feeling better. Mild to moderate drug reactions usually respond quickly to antihistamines and steroids. The rash will usually fade over several days, but can sometimes last a couple of weeks. Over the next couple of days, there may be times when it is  gets a little worse, and then better again. Here are some things to do:  · Throw the drug away and do not take it again. The next reaction may be much worse.  · Add this drug reaction to your electronic medical record.  · When getting a new medicine, always tell the healthcare provider that you are allergic to this drug. Make certain they write it down in your medical record.  · Avoid tight clothing and anything that heats up your skin (hot showers/baths, direct sunlight) since heat will make itching worse.  · An ice pack (ice cubes in a plastic bag, wrapped in a thin towel, or a frozen bag of peas) will relieve local areas of intense itching and redness.  Don't put ice directly on the skin.  · Avoid scratching which may worsen the reaction, damage your skin and lead to an infection.  · Oral Benadryl (diphenhydramine) is an antihistamine available at drug and grocery stores. Unless a prescription antihistamine was given, Benadryl may be used to reduce itching if large areas of the skin are involved. It may make you sleepy, so be careful using it in the daytime or when going to school, working, or driving. [Note: Do not use Benadryl if you have glaucoma or if you are a man with trouble urinating due to an enlarged prostate.]  There are other antihistamines that cause less drowsiness and are a good alternative for daytime use. Ask your pharmacist for suggestions.  · Do not use Benadryl cream on your skin, because in some people it can cause a further reaction, and make you allergic to Benadryl.  · Calamine lotion or oatmeal baths sometimes help with itching.  Follow-up care  Follow up with your healthcare provider, or as advised if your symptoms do not continue to improve or they get worse.  Call 911  Call 911 if any of these occur:  · Trouble breathing or swallowing, wheezing  · New or worsening swelling in the mouth, throat, or tongue  · Hoarse voice or trouble speaking   · Fainting or loss of consciousness  · Rapid  heart rate  · Low blood pressure  · Feeling of doom  · Nausea, vomiting, abdominal pain, diarrhea  When to seek medical advice  Call your healthcare provider right away if any of these occur:  · Shortness of breath  · Increased swelling in the face, eyelids, or lips  · Dizziness, weakness  · Continuing or recurring symptoms  · Spreading areas of itching, redness or swelling  · Signs of infection:  ¨ Spreading redness  ¨ Increased pain or swelling  ¨ Fever (1 degree above your normal temperature) lasting for 24 to 48 hours Or, whatever your healthcare provider told you to report based on your medical condition  ¨ Colored fluid draining from the inflamed area  Date Last Reviewed: 7/30/2015 © 2000-2016 [employer]. [address] [place] [address], [place] [address]. All rights reserved. This information is not intended as a substitute for professional medical care. Always follow your healthcare professional's instructions.

## 2017-08-15 NOTE — NURSING
0950-Donor Leukocyte Infusion given per Dr. Atkinson at chairside slow IV push over 10 minutes. Patient tolerated procedure well. Denies any distress and no signs of adverse reaction noted. Vital signs stable.

## 2017-08-15 NOTE — PLAN OF CARE
Problem: Patient Care Overview  Goal: Plan of Care Review  Outcome: Ongoing (interventions implemented as appropriate)  Pt. Tolerated treatment well. Discharged without complaints or signs of adverse effects. Patient states that Dr. Atkinson staff calls him to inform him of upcoming appointments as they are scheduled. No appts scheduled at present. Patient knows to call to inquire about appts if none scheduled w/i next few days.

## 2017-08-15 NOTE — PROGRESS NOTES
Subjective:       Patient ID: Paul E Sistrunk is a 57 y.o. male.    Chief Complaint: No chief complaint on file.    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  He is feeling well and ready to proceed with therapy.  No signs or symptoms of GVHD and he is active, eating well and gaining weight.    Consent obtained this morning.      Review of Systems   Constitutional: Negative for appetite change, fatigue and fever.        Improved, eating well and gaining weight   HENT: Negative for mouth sores and sore throat.    Respiratory: Negative for cough and shortness of breath.    Cardiovascular: Negative for chest pain and leg swelling.   Gastrointestinal: Negative for abdominal pain, constipation, diarrhea and nausea.   Genitourinary: Negative for dysuria, frequency and hematuria.   Musculoskeletal: Negative for back pain and myalgias.   Neurological: Negative for tremors, numbness and headaches.   Hematological: Negative for adenopathy.   Psychiatric/Behavioral: Negative for confusion. The patient is not nervous/anxious.        Objective:      Physical Exam   Constitutional: He is oriented to person, place, and time. He appears well-developed and well-nourished. No distress.   Central line appears well at left chest wall.   HENT:   Head: Normocephalic and atraumatic.   Mouth/Throat: Oropharynx is clear and moist. No oropharyngeal exudate.   Eyes: Conjunctivae are normal. Pupils are equal, round, and reactive to light.   Neck: Neck supple.   Cardiovascular: Normal rate and regular rhythm.    Pulmonary/Chest: Effort normal and breath sounds normal. He has no rales.   Abdominal: Soft. Bowel sounds are normal. He exhibits no mass. There is no splenomegaly. There is no tenderness.   Musculoskeletal: Normal range of motion. He exhibits no edema.   Lymphadenopathy:     He has no cervical adenopathy.     He has no axillary adenopathy.        Right: No  inguinal adenopathy present.        Left: No inguinal adenopathy present.   Neurological: He is alert and oriented to person, place, and time.   Skin: Skin is warm and dry. No rash noted.   Psychiatric: He has a normal mood and affect. Thought content normal.       Assessment:       1. HIV disease    2. S/P allogeneic bone marrow transplant    3. 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 Sistrunk his high risk AML with p53 abnormality relapsed at day 329 and given his previous [facility] experience with reaction to etoposide he underwent reinduction with FLAG-BETZY and restaging marrow 7/10/17 showed variable hypocellularity without increased blasts.  Cytogenetics showed one normal and one abnormal metaphase.      Though DLI for AML has not been as successful at maintaining remission in AML as it has in CML, it is his best hope for disease control now and I met with the patient and his family to discuss and obtain consent this morning.  He will receive level 1 DLI with total of 1 x 10exp7 CD3/kg from his unrelated donor who was harvested yesterday.  These were unmobilized cells.  He signed consent in the clinic and understands that there is only a 20% or so chance that this will control his disease long term.    His blood counts today at day 337 are stable with normal WBC and platelets and hemoglobin of 10.  Over 10 minutes I infused the 20 ml product after routine double identification.  He received tylenol premedication and tolerated the infusion without incident.      He and his mother understand that we are looking for signs of GVHD in the coming weeks and they are familiar with those signs.    He was also seen by ID this morning to discuss optimal prophylaxis to come.    Return to clinic in a week for follow up.

## 2017-08-18 NOTE — TELEPHONE ENCOUNTER
----- Message from Riana Macias sent at 8/18/2017  3:07 PM CDT -----  Contact: Pt   She is not here today handle it Danae.  ----- Message -----  From: Danae Wild RN  Sent: 8/18/2017   1:23 PM  To: Brigida Odonnell        ----- Message -----  From: Nemo Wong  Sent: 8/18/2017  12:49 PM  To: Demetrio Lim Staff    Pt would like to be called back regarding an appointment on the 5th floor of the [facility] to have his dressing change.  I spoke with someone in [facility] and was told to send your office a message to have that scheduled.      Please call pt at [phone].        Thanks!

## 2017-08-18 NOTE — TELEPHONE ENCOUNTER
Called patient and discussed that he needs his Mazariegos port flushed and dressing change. He requests an appointment on Wednesday morning. Discussed with patient that he has an appointment for Wednesday morning, but patient should come in at 8 for 5th floor appointment, then see Dr. Atkinson at 8:40 for his appointment, rather than his scheduled 09:30 injection time. Patient verbalizes understanding.

## 2017-08-23 NOTE — TELEPHONE ENCOUNTER
confirmed shipping with patient  for triumeq to ship out 8/24 to arrive 8/25 with patient consent,patient states few doses remaining, no missed doses, no new medications or allergies and has no questions for a pharmacist at this time. shipping address confirmed.     Katie Hondroulis [facility]- Refill Technician  Phone: [phone]

## 2017-08-23 NOTE — PROGRESS NOTES
C/C  Severe leg pain, numbness in chin area, patient stated he went to the ER in [place].  Subjective:       Patient ID: Paul E Sistrunk is a 57 y.o. male.    Chief Complaint: No chief complaint on file.    HPI  KPS: 90% Mr. Sistrunk is here 345 days post Flu/Bu/ATG MUD allogeneic transplant for high risk AML after his second IDAC (6/20 - 6/24) after a prolonged hospitalization (2/5 - 3/21) for induction with 7&3 and reinduction with MEC to remission and IDAC (4/4 - 4/9). He is now one week post level 1 DLI (1 x 10exp7 CD3/kg CD3 cells from his MUD).    Since his DLI, he has been feeling well without new GI issues or rash.  He is still tapering from steroids now on 40 mg to drop to 20 mg today (for his previous pulmonary issues).  He denies serious pain and is weaning from narcotics as well.  His oral intake has improved and he is gaining weight.  Some hemorrhoids related to narcotic induced constipation.    AML History:  He originally presented in early 2/2016 with low platelets to his HIV provider and he had noted evolving fatigue and weight loss over the previous 2 months.  Bone marrow biopsy 2/8/16 showed a 90% cellular marrow with 50% blasts and background dysplasia.  His cytogenetics were complex with 18 metaphases with numeric and structural abnormalities including a 5q deletion, a 17p deletion (TP53 deletion), plus 1 to 18 double minutes, and 2 metaphases were a tetraploid subclone. FISH studies also confirmed the aforementioned deletions and indicated MYC amplification which are likely presented by double minutes.    He underwent standard induction therapy with 7&3 but his day 14 marrow done 2/22 showed a 40-80% cellular marrow with 35% blasts.  Hence, he began reinduction with MEC on 2/24/16 and repeat marrow done 3/9/16 showed only a 5% cellular marrow with only 5-6% CD34+ blasts.  He developed a rash and severe rectal pain complicating his therapy, though these have resolved now (finishing a steroid taper  for his rash).    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  Cytogenetics 46,XY[20].  Hence, he achieved a complete remission prior to allogeneic transplant.  His transplant course (9/5 - 10/1) was as expected with significant mucositis related to conditioning and low counts with some GI issues which all resolved on count recovery.  No unexpected complications.  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     HIV: Viral load 303K on 2/6/16 and now on Triumeq with Bactrim and acyclovir prophylaxis  Histoplasmosis: Multiple pulmonary nodules seen on chest CT during his recent hospital stay and he was found to be urine histo antigen positive.  Now on itraconazole after a course of amphotericin B.     Hospital Course: Relapsed AML - peripheral blood shows 30% blasts on admit. 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 Blast of 62%, 19 had a complex karyotype including 5q deletion, 7q deletion, 17p deletion, and one metaphase represented a near tetraploid subclone; FLT-3 negative. Jaw pain and pain in right cranial nerve 7 distribution with persistent, severe headaches concerning for CNS involvement - underwent IT chemotherapy on 6/23. Flow negative for disease. Received FLAG-BETZY re induction, day 37 at discharge. Day 14 bone marrow biopsy done 7/10 - hypocellular with no evidence of residual disease. Chimerics from marrow done 6/21 show CD3 of 90% donor and 10% recipient, but CD34 of 5% donor and 95% recipient     His day 100 restaging marrow showed a 20-30% cellular marrow with trilineage hematopoiesis and no evidence of leukemia.  Cytogenetics 46,XY[13] and chimerism 90% donor.      Pulmonology : BAL which was done 7/12, cytology pending from BAL; aspergillus <0.500; KOH was negative for yeast/fungusThoracentesis on 7/22 - cytology negative; repeat CXR 7/26  shows Ill-defined patchy airspace consolidation bilaterally more on the left side identified. Second Thoracentesis 7/26/17 with 1.3L removed. Trial of steroids per pulmonary discharged on Prednisone 40 mg daily    ID: Complicated antimicrobial treatment history as follows: - Cefepime: (started 6/24 - discontinued 6/28) restarted on 7/4/17 - 7/23 - Ampotericin B started 7/9-stopped 7/19; - Vancomycin (restarted 7/15 - stopped 7/17)   CXR showed pneumonia 7/15; CXR 7/17 - Significant patchy air space consolidation identified bilaterally slightly more on the left side. CT chest/abdomen/pelvis reveals bilateral groundglass opacities as well as a left lower-lobe consolidation.     Neurology: Several imaging studies have been performed, including CT head 7/21, MRI brain 7/21, and CTA head and neck 7/21 with interpreting radiologists citing concern for infarction of the right lentiform nucleus and anterior limb of the internal capsule.  Continue atorvastatin.     Cardiology: Supraventricular tachycardia on tele and EKG. Started 7/13/17 8pm with HR between 160-170. Patient was cardioverted by cardiology and started on metoprolol and flecainide. Continue Flecainide 100 mg bid and increase Lopressor to 50 mg bid.     Today: Presents for follow up after FLAG-BETZY reinduction chemotherapy for relapsed AML after an MUD stem cell transplant. Today complains of continued facial numbness, now with constipation and hemorrhoids - both due to pain medication. Eating well. Energy level returning. Followed up with cardiology today.      Review of Systems   Constitutional: Positive for fatigue but increasing weight. Negative for activity change, appetite change, fever.   HENT: Negative for dental problem, postnasal drip, sore throat and trouble swallowing.  Numb chin  Eyes:  Negative  Respiratory: Negative for cough, choking and shortness of breath.    Cardiovascular: Negative for chest pain and leg swelling.   Gastrointestinal: Negative  for abdominal pain, blood in stool, diarrhea and nausea. Positive for constipation and hemorrhoids.    Genitourinary: Negative for dysuria, frequency and hematuria.   Musculoskeletal: Increased bony pain at lower extremities and low back; much improved.   Skin: Negative for Rash.  Neurological: Negative for tremors, weakness, light-headedness and headaches.   Hematological: Negative for adenopathy.   Psychiatric/Behavioral: Positive for sleep disturbance. Negative for confusion and dysphoric mood. The patient is nervous/anxious (Improved).        Objective:      Physical Exam   Constitutional: He is oriented to person, place, and time. He appears well-developed and well-nourished. No distress.   HENT:   Head: Normocephalic and atraumatic.   Mouth/Throat: Oropharynx is clear and moist. No oropharyngeal exudate.   Eyes: Conjunctivae are normal. Pupils are equal, round, and reactive to light.   Neck: Neck supple.   Cardiovascular: Normal rate and regular rhythm.    Pulmonary/Chest: Effort normal and breath sounds normal. He has no rales.   Abdominal: Soft. Bowel sounds are normal. He exhibits no mass. There is no splenomegaly. There is no tenderness.   Musculoskeletal: Normal range of motion. He exhibits no edema.   Lymphadenopathy:     He has no cervical adenopathy.     He has no axillary adenopathy.   Neurological: He is alert and oriented to person, place, and time.   Skin: Skin is warm and dry. Rash negative.   Psychiatric: He has a normal mood and affect. Thought content normal.   Vitals reviewed.      Assessment:       1. Anxiety    2. Histoplasma capsulatum infection    3. S/P allogeneic bone marrow transplant    4. 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 Hemorrhoids, unspecified hemorrhoid type        Plan:       Graft/AML:  Full male MUD match, O+ into O+.  Relapsing AML at day 329 post transplant now day 7 post DLI after reinduction with FLAG-BETZY.  Given his risk profile (p53 especially and double  induction) his relapse is not unanticipated.  Chimerics from marrow done 6/21 show CD3 of 90% donor and 10% recipient, but CD34 of 5% donor and 95% recipient.  Will recheck peripheral chimerism next week.  Weaning from steroids quickly.    WBC 11K, hemoglobin 11.3 and platelets 184K.    GVHD: No GVHD.      ID:  CMV pos into CMV pos.  CMV negative 4/20/17 pending today.  Continues on Acyclovir and transitioned from itraconazole to voriconazole while in hospital.  Will continue to treat HIV therapy with Triumeq.     Cardiology: See above for history. Currently on Flecainide 100 mg bid and decreased Lopressor to 25 mg bid (8/7/17 per cardiology). Continue Lipitor.     Pulmonology: Received 60 mg of prednisone while in hospital; on taper at 20 mg daily today and 10 mg next week.  No new pulmonary symptoms.    Renal/Prostate:  Increased urine flow on Flomax (every 36 hours) with creatinine 1.     Metab: Eating improved with albumin at 3.1.  Potassium 4.    Anxiety:  Increased anxiety about his health associated with family pressures.  He will need to follow up with Dr. Eli  Using xanax occasionally with relief.    Back Pain: Weaning MS Contin to 15 mg BID and potentially down to QD over the week - will revisit next week and hope to wean totally soon.  He will use topical cream for hemorrhoids. He will start exercise regimen as well.    All of his questions were answered in the clinic today.

## 2017-08-23 NOTE — NURSING
Patient here for dressing change and blood draw from left chest CVC-red line with good blood return-blood to lab-old dressing removed-no redness at site-area cleaned and new dressing applied to site-both lines flushed-patient tolerated well.

## 2017-08-23 NOTE — LETTER
August 23, 2017        Edgar Pinon MD  [address]  [facility] [address]             [facility]  [address]  [facility] [address]  Phone: [phone]   Patient: Paul E Sistrunk   MR Number: [id number]   YOB: 1960   Date of Visit: 8/23/2017       Dear Dr. Pinon:    Thank you for referring Paul Sistrunk to me for evaluation. Below are the relevant portions of my assessment and plan of care.        1. Anxiety    2. Histoplasma capsulatum infection    3. S/P allogeneic bone marrow transplant    4. 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 Hemorrhoids, unspecified hemorrhoid type          Graft/AML:  Full male MUD match, O+ into O+.  Relapsing AML at day 329 post transplant now day 7 post DLI after reinduction with FLAG-BETZY.  Given his risk profile (p53 especially and double induction) his relapse is not unanticipated.  Chimerics from marrow done 6/21 show CD3 of 90% donor and 10% recipient, but CD34 of 5% donor and 95% recipient.  Will recheck peripheral chimerism next week.  Weaning from steroids quickly.    GVHD: No GVHD.      ID:  CMV pos into CMV pos.  CMV negative 4/20/17 pending today.  Continues on Acyclovir and transitioned from itraconazole to voriconazole while in hospital.  Will continue to treat HIV therapy with Triumeq.     Cardiology: See above for history. Currently on Flecainide 100 mg bid and decreased Lopressor to 25 mg bid (8/7/17 per cardiology). Continue Lipitor.     Pulmonology: Received 60 mg of prednisone while in hospital; on taper at 20 mg daily today and 10 mg next week.  No new pulmonary symptoms.    Renal/Prostate:  Increased urine flow on Flomax (every 36 hours) with creatinine 1.     Metab: Eating improved with albumin at 3.1.  Potassium 4.    Anxiety:  Increased anxiety about his health associated with family pressures.  He will need to follow up with Dr. Eli  Using xanax occasionally with relief.    Back Pain:  Weaning MS Contin to 15 mg BID and potentially down to QD over the week - will revisit next week and hope to wean totally soon.  He will use topical cream for hemorrhoids. He will start exercise regimen as well.    All of his questions were answered in the clinic today.    If you have questions, please do not hesitate to call me. I look forward to following Leonides along with you.    Sincerely,      Raphael Atkinson MD           CC  Remington Virk MD

## 2017-08-30 PROBLEM — R41.0 DELIRIUM: Status: RESOLVED | Noted: 2017-01-01 | Resolved: 2017-01-01

## 2017-08-30 PROBLEM — R60.0 LOWER EXTREMITY EDEMA: Status: RESOLVED | Noted: 2017-01-01 | Resolved: 2017-01-01

## 2017-08-30 PROBLEM — I47.10 SUPRAVENTRICULAR TACHYCARDIA: Status: RESOLVED | Noted: 2017-01-01 | Resolved: 2017-01-01

## 2017-08-30 PROBLEM — J96.01 ACUTE RESPIRATORY FAILURE WITH HYPOXIA: Status: RESOLVED | Noted: 2017-01-01 | Resolved: 2017-01-01

## 2017-08-30 PROBLEM — J90 PLEURAL EFFUSION: Status: RESOLVED | Noted: 2017-01-01 | Resolved: 2017-01-01

## 2017-08-30 PROBLEM — G89.29 CHRONIC BILATERAL LOW BACK PAIN WITHOUT SCIATICA: Status: RESOLVED | Noted: 2017-01-01 | Resolved: 2017-01-01

## 2017-08-30 PROBLEM — M54.50 CHRONIC BILATERAL LOW BACK PAIN WITHOUT SCIATICA: Status: RESOLVED | Noted: 2017-01-01 | Resolved: 2017-01-01

## 2017-08-30 NOTE — PROGRESS NOTES
C/C  Severe leg pain, numbness in chin area, patient stated he went to the ER in [place].  Subjective:       Patient ID: Paul E Sistrunk is a 57 y.o. male.    Chief Complaint: Follow-up and Leukemia    HPI  KPS: 90% Mr. 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 He is now two weeks post level 1 DLI (1 x 10exp7 CD3/kg CD3 cells from his MUD).    Today: Since his DLI, he has been feeling well without new GI issues or rash.  He is still tapering from steroids now on 20 mg to drop to 5 mg today (for his previous pulmonary issues) x 1 week then transition to hydrocortisone.  He denies serious pain and is weaning from narcotics as well, will decrease to 10 mg daily.  His oral intake has improved and he is gaining weight.  Hemorrhoids resolved; constipation taking a stool softener with laxative - which helps. Also notes dry mouth with he uses biotene for. Denies nausea, vomiting, SOB, coughing and chest pain. Energy level is slowly resolving.     AML History:  He originally presented in early 2/2016 with low platelets to his HIV provider and he had noted evolving fatigue and weight loss over the previous 2 months.  Bone marrow biopsy 2/8/16 showed a 90% cellular marrow with 50% blasts and background dysplasia.  His cytogenetics were complex with 18 metaphases with numeric and structural abnormalities including a 5q deletion, a 17p deletion (TP53 deletion), plus 1 to 18 double minutes, and 2 metaphases were a tetraploid subclone. FISH studies also confirmed the aforementioned deletions and indicated MYC amplification which are likely presented by double minutes.    He underwent standard induction therapy with 7&3 but his day 14 marrow done 2/22 showed a 40-80% cellular marrow with 35% blasts.  Hence, he began reinduction  with MEC on 2/24/16 and repeat marrow done 3/9/16 showed only a 5% cellular marrow with only 5-6% CD34+ blasts.  He developed a rash and severe rectal pain complicating his therapy, though these have resolved now (finishing a steroid taper for his rash).    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  Cytogenetics 46,XY[20].  Hence, he achieved a complete remission prior to allogeneic transplant.  His transplant course (9/5 - 10/1) was as expected with significant mucositis related to conditioning and low counts with some GI issues which all resolved on count recovery.  No unexpected complications.  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     HIV: Viral load 303K on 2/6/16 and now on Triumeq with Bactrim and acyclovir prophylaxis  Histoplasmosis: Multiple pulmonary nodules seen on chest CT during his recent hospital stay and he was found to be urine histo antigen positive.  Now on itraconazole after a course of amphotericin B.     Hospital Course: Relapsed AML - peripheral blood shows 30% blasts on admit. 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 Blast of 62%, 19 had a complex karyotype including 5q deletion, 7q deletion, 17p deletion, and one metaphase represented a near tetraploid subclone; FLT-3 negative. Jaw pain and pain in right cranial nerve 7 distribution with persistent, severe headaches concerning for CNS involvement - underwent IT chemotherapy on 6/23. Flow negative for disease. Received FLAG-BETZY re induction, day 37 at discharge. Day 14 bone marrow biopsy done 7/10 - hypocellular with no evidence of residual disease. Chimerics from marrow done 6/21 show CD3 of 90% donor and 10% recipient, but CD34 of 5% donor and 95% recipient     His day 100 restaging marrow showed a 20-30% cellular marrow with trilineage hematopoiesis and no evidence of  leukemia.  Cytogenetics 46,XY[13] and chimerism 90% donor.      Pulmonology : BAL which was done 7/12, cytology pending from BAL; aspergillus <0.500; KOH was negative for yeast/fungusThoracentesis on 7/22 - cytology negative; repeat CXR 7/26 shows Ill-defined patchy airspace consolidation bilaterally more on the left side identified. Second Thoracentesis 7/26/17 with 1.3L removed. Trial of steroids per pulmonary discharged on Prednisone 40 mg daily    ID: Complicated antimicrobial treatment history as follows: - Cefepime: (started 6/24 - discontinued 6/28) restarted on 7/4/17 - 7/23 - Ampotericin B started 7/9-stopped 7/19; - Vancomycin (restarted 7/15 - stopped 7/17)   CXR showed pneumonia 7/15; CXR 7/17 - Significant patchy air space consolidation identified bilaterally slightly more on the left side. CT chest/abdomen/pelvis reveals bilateral groundglass opacities as well as a left lower-lobe consolidation.     Neurology: Several imaging studies have been performed, including CT head 7/21, MRI brain 7/21, and CTA head and neck 7/21 with interpreting radiologists citing concern for infarction of the right lentiform nucleus and anterior limb of the internal capsule.  Continue atorvastatin.     Cardiology: Supraventricular tachycardia on tele and EKG. Started 7/13/17 8pm with HR between 160-170. Patient was cardioverted by cardiology and started on metoprolol and flecainide. Continue Flecainide 100 mg bid and increase Lopressor to 50 mg bid.       Review of Systems   Constitutional: Positive for fatigue but increasing weight. Negative for activity change, appetite change, fever.   HENT: Negative for dental problem, postnasal drip, sore throat and trouble swallowing.  Numb chin  Eyes:  Negative  Respiratory: Negative for cough, choking and shortness of breath.    Cardiovascular: Negative for chest pain and leg swelling.   Gastrointestinal: Negative for abdominal pain, blood in stool, diarrhea and nausea. Positive for  constipation and hemorrhoids.    Genitourinary: Negative for dysuria, frequency and hematuria.   Musculoskeletal: Low back pain rare.   Skin: Negative for Rash.  Neurological: Negative for tremors, weakness, light-headedness and headaches.   Hematological: Negative for adenopathy.   Psychiatric/Behavioral: Positive for sleep disturbance. Negative for confusion and dysphoric mood. The patient is nervous/anxious (Improved).        Objective:      Physical Exam   Constitutional: He is oriented to person, place, and time. He appears well-developed and well-nourished. No distress.   HENT:   Head: Normocephalic and atraumatic.   Mouth/Throat: Oropharynx is clear and moist. No oropharyngeal exudate.   Eyes: Conjunctivae are normal. Pupils are equal, round, and reactive to light.   Neck: Neck supple.   Cardiovascular: Normal rate and regular rhythm.    Pulmonary/Chest: Effort normal and breath sounds normal. He has no rales.   Abdominal: Soft. Bowel sounds are normal. He exhibits no mass. There is no splenomegaly. There is no tenderness.   Musculoskeletal: Normal range of motion. He exhibits no edema.   Lymphadenopathy:     He has no cervical adenopathy.     He has no axillary adenopathy.   Neurological: He is alert and oriented to person, place, and time.   Skin: Skin is warm and dry. Rash negative.   Psychiatric: He has a normal mood and affect. Thought content normal.   Vitals reviewed.      Assessment:       1. S/P allogeneic bone marrow transplant    2. Acute myeloid leukemia in remission    3. HIV disease    4. Anemia due to chemotherapy        Plan:       Graft/AML:  Full male MUD match, O+ into O+.  Relapsing AML at day 352 post transplant now day 14 post DLI after reinduction with FLAG-BETZY.  Given his risk profile (p53 especially and double induction) his relapse is not unanticipated.  Chimerics from marrow done 6/21 show CD3 of 90% donor and 10% recipient, but CD34 of 5% donor and 95% recipient.  Peripheral  chimerism in process from 8/30.  Weaning from steroids quickly - will decrease to 5 mg daily today then transition to hydrocortisone. Given high risk disease he will start on vidaza for at least 2 cycles.      WBC 14.2K, hemoglobin 11.7 and platelets 148K.    GVHD: No GVHD.      ID:  CMV pos into CMV pos.  CMV negative 8/23/17.  Continues on Acyclovir and transitioned from itraconazole to voriconazole while in hospital.  Will continue to treat HIV therapy with Triumeq.     Cardiology: See above for history. Currently on Flecainide 100 mg bid and decreased Lopressor to 25 mg bid (8/7/17 per cardiology). Continue Lipitor.     Pulmonology: Received 60 mg of prednisone while in hospital; on taper at 20 mg daily will decrease to 5 mg for 1 week then transition to hydrocortisone 10 mg BID x 1 week; then 10mg AM and 5 mg PM x 1 week; then 5 mg BID x 1 week, then 5 mg daily x 1 week.  No new pulmonary symptoms.    Renal/Prostate:  Increased urine flow on Flomax (every 36 hours) with creatinine 1.2.     Metab: Eating improved with albumin at 3.0.  Potassium 3.9.    Anxiety:  Increased anxiety about his health associated with family pressures.  He will need to follow up with Dr. Eli  Using ativan occasionally with relief.    Back Pain: Weaning MS Contin to 15 mg BID and will wean down to daily starting today.  Hemorrhoids resolved. He will start exercise regimen as well.    All of his questions were answered in the clinic today.    Patient seen and examined with collaborating physician Dr. Atkinson.      Asia Melgar NP  Hematology and BMT    Attending: Patient seen and examined with NP and agree with assessment and plan above.  High risk relapsed AML now back in remission and 14 days post DLI.  Agree with rapid taper of steroids and given high risk he will start on azacitidine to help suppress his leukemic clone as we wait for the full effect of DLI over the coming weeks.  Falling platelet count is worrisome.   Elevated WBC likely still related to steroids.  He is feeling well and gaining weight and no evidence of GVHD to date.  He will start azacitidine on 9/1 and continue weekly follow up.  RVBE

## 2017-08-30 NOTE — NURSING
Patient here for blood draw and dressing change on left chest CVC line-red line with good blood return-blood to lab-both lines flushed-old dressing removed-no redness or swelling at insertion site-area cleaned and new dressing applied-patient tolerated well.

## 2017-08-31 NOTE — TELEPHONE ENCOUNTER
Patient wanted to report to Dr. Atkinson that he has a headache today and temperature of 99.8. Unsure if he should keep Vidaza appointment-- patient is s/p bone marrow transplant. Discussed to call back or go to ER if patient's temperature reaches 100.4 or above. Will forward to Dr. Atkinson. Patient verbalizes understanding.

## 2017-08-31 NOTE — TELEPHONE ENCOUNTER
----- Message from Magalis Vital sent at 8/31/2017  3:26 PM CDT -----  Contact: Pt Paul Sistrunk [phone]  Pt is requesting a call back from the nurse regarding symptoms he is experiencing and whether he should keep the infusion appt.    Pt may be reached at [phone].    Thank you.    LC

## 2017-09-01 NOTE — PLAN OF CARE
Problem: Patient Care Overview  Goal: Plan of Care Review  Outcome: Ongoing (interventions implemented as appropriate)  See nurse notes. Pt tolerated hydration well. Vital signs remained the same post hydration, as they were pre hydration. Pt stated he felt a little better. Many questions were answered to pt and mother's satisfaction. Reviewed side effects of weaning off of Prednisone. Pt to call MD if needed anything over the weekend or go to ER. Pt and mother verbalized understanding. D/C to home with slightly unsteady gait, holding on to mother.

## 2017-09-01 NOTE — PATIENT INSTRUCTIONS
Discharge Instructions for Chemotherapy  Your healthcare provider prescribed a type of medicine therapy for you called chemotherapy. Healthcare providers prescribe chemotherapy for many different types of illnesses, including cancer. There are many types of chemotherapy. This sheet provides general guidelines on how you can take care of yourself after your chemotherapy.  Mouth care  Dont be discouraged if you get mouth sores, even if you are following all your healthcare providers instructions. Many people get mouth sores as a side effect of chemotherapy. Heres what you can do to prevent mouth sores:  · Keep your mouth clean. Brush your teeth with a soft-bristle toothbrush after every meal.  · Ask if you should use a toothpaste with fluoride, or a mixture of 1 teaspoon of salt in 8-ounces of water to brush your teeth.   · Use an oral swab or special soft toothbrush if your gums bleed during regular brushing.  · Don't use dental floss if it causes your gums to bleed.  · Use any mouthwashes given to you as directed.  · If you cant tolerate regular methods, use salt and baking soda to clean your mouth. Mix 1 teaspoon of salt and 1 teaspoon of baking soda into an 8-ounce glass of warm water. Swish and spit.  · If you wear dentures, you may be told to wear them only when you eat, ask your healthcare provider. Clean dentures twice a day and soak in antimicrobial solution when you aren't wearing them. Rinse your mouth after each meal.   · Watch your mouth and tongue for white patches. This may be a sign of a type of yeast infection (thrush), a common side effect of chemotherapy. Be sure to tell your healthcare provider about these patches. Medicine can be prescribed to treat it.  Other home care  Here's what else you can do:  · Try to exercise. Exercise keeps you strong and keeps your heart and lungs active. Walking and yoga are good types of exercise.   · Keep clean. During chemotherapy, your body cant fight  infection very well. Take short baths or showers.  ¨ Wash your hands before you eat and after going to the bathroom.  ¨ Use moisturizing soap. Chemotherapy can make your skin dry.  ¨ Apply moisturizing lotion several times a day to help relieve dry skin.  ¨ Dont take very hot or very cold showers or baths.  · Dont be surprised if your chemotherapy causes slight burns to your skin--usually on the hands and feet. Some medicines used in high doses cause this to happen. Ask for a special cream to help relieve the burn and protect your skin.  · Avoid people who are sick with illnesses and diseases you could catch, such as colds, flu, measles, or chicken pox as well as people who have recently had vaccinations for these illnesses.   · Let your healthcare provider know if your throat is sore. You may have an infection that needs treatment.  · Remember, many patients feel sick and lose their appetites during treatment. Eat small meals several times a day to keep your strength up:  ¨ Choose bland foods with little taste or smell if you are reacting strongly to food.  ¨ Be sure to cook all food thoroughly. This kills bacteria and helps you avoid infection.  ¨ Eat foods that are soft. Soft foods are less likely to cause stomach irritation.  ¨ Try to eat a variety of foods for a well-balanced diet. Drink plenty of fluids and eat foods with fiber to avoid constipation.      When to call your healthcare provider  Call your healthcare provider right away if you have any of the following:  · Unexplained bleeding  · Trouble concentrating  · Ongoing fatigue  · Shortness of breath, wheezing, trouble breathing, or bad cough  · Rapid, irregular heartbeat, or chest pain  · Dizziness, lightheadedness  · Constant feeling of being cold  · Hives or a cut or rash that swells, turns red, feels hot or painful, or begins to ooze  · Burning when you urinate  · Fever of 100.4°F (38°C) or higher, or chills   Date Last Reviewed: 5/1/2016  ©  4301-9383 [employer]. [address] [place] [address]. All rights reserved. This information is not intended as a substitute for professional medical care. Always follow your healthcare professional's instructions.        Azacitidine suspension for injection (subcutaneous use)  What is this medicine?  AZACITIDINE (ay za CAM sakshi gaitan) is a chemotherapy drug. This medicine reduces the growth of cancer cells and can suppress the immune system. It is used for treating myelodysplastic syndrome or some types of leukemia.  How should I use this medicine?  This medicine is for injection under the skin. It is administered in a hospital or clinic by a specially trained health care professional.  Talk to your pediatrician regarding the use of this medicine in children. While this drug may be prescribed for selected conditions, precautions do apply.  What side effects may I notice from receiving this medicine?  Side effects that you should report to your doctor or health care professional as soon as possible:  · allergic reactions like skin rash, itching or hives, swelling of the face, lips, or tongue  · low blood counts - this medicine may decrease the number of white blood cells, red blood cells and platelets. You may be at increased risk for infections and bleeding.  · signs of infection - fever or chills, cough, sore throat, pain or difficulty passing urine  · signs of decreased platelets or bleeding - bruising, pinpoint red spots on the skin, black, tarry stools, blood in the urine  · signs of decreased red blood cells - unusually weak or tired, fainting spells, lightheadedness  · reactions at the injection site including redness, pain, itching, or bruising  · breathing problems  · changes in vision  · fever  · mouth sores  · stomach pain  · vomiting  Side effects that usually do not require medical attention (report to your doctor or health care professional if they continue or are  bothersome):  · constipation  · diarrhea  · loss of appetite  · nausea  · pain or redness at the injection site  · weak or tired  What may interact with this medicine?  Interactions have not been studied.  Give your health care provider a list of all the medicines, herbs, non-prescription drugs, or dietary supplements you use. Also tell them if you smoke, drink alcohol, or use illegal drugs. Some items may interact with your medicine.  What if I miss a dose?  It is important not to miss your dose. Call your doctor or health care professional if you are unable to keep an appointment.  Where should I keep my medicine?  This drug is given in a hospital or clinic and will not be stored at home.  What should I tell my health care provider before I take this medicine?  They need to know if you have any of these conditions:  · infection (especially a virus infection such as chickenpox, cold sores, or herpes)  · kidney disease  · liver disease  · liver tumors  · an unusual or allergic reaction to azacitidine, mannitol, other medicines, foods, dyes, or preservatives  · pregnant or trying to get pregnant  · breast-feeding  What should I watch for while using this medicine?  Visit your doctor for checks on your progress. This drug may make you feel generally unwell. This is not uncommon, as chemotherapy can affect healthy cells as well as cancer cells. Report any side effects. Continue your course of treatment even though you feel ill unless your doctor tells you to stop.  In some cases, you may be given additional medicines to help with side effects. Follow all directions for their use.  Call your doctor or health care professional for advice if you get a fever, chills or sore throat, or other symptoms of a cold or flu. Do not treat yourself. This drug decreases your body's ability to fight infections. Try to avoid being around people who are sick.  This medicine may increase your risk to bruise or bleed. Call your doctor or  health care professional if you notice any unusual bleeding.  You may need blood work done while you are taking this medicine.  Do not become pregnant while taking this medicine. Women should inform their doctor if they wish to become pregnant or think they might be pregnant. Men should not father a child while taking this medicine. There is a potential for serious side effects to an unborn child. Talk to your health care professional or pharmacist for more information. Do not breast-feed an infant while taking this medicine.  Both men and women must use effective birth control with this medicine.  Date Last Reviewed:   NOTE:This sheet is a summary. It may not cover all possible information. If you have questions about this medicine, talk to your doctor, pharmacist, or health care provider. Copyright© 2016 [employer]        Orthostatic Low Blood Pressure (Hypotension)  A blood pressure reading is made up of 2 numbers There is a top number over a bottom number. The top number is the systolic pressure. The bottom number is the diastolic pressure. A normal blood pressure is less than 120 over less than 80. Low blood pressure (hypotension) is a blood pressure that is less than what is normal for you.  Orthostatic hypotension is a type of low blood pressure that occurs only when you change position from lying to standing. It can cause dizziness, lightheadedness, or fainting.  Medicines can cause orthostatic hypotension. These include:  · High blood pressure medicines  · Water pills (diuretics)  · Some heart medicines  · Some antidepressants  · Pain, anxiety, sedative, and sleeping medicines  Other causes include:  · Dehydration from vomiting, diarrhea, or not getting enough fluids  · Severe infection  · High fever  · Blood loss. This could be bleeding from the stomach or intestines.  Treatment will depend on what is causing your low blood pressure.  Home care  Follow these guidelines when caring for yourself at  home:  · Rest until your symptoms get better.  · Change positions slowly from lying to standing. When getting out of bed, sit on the side of the bed with your legs down for at least 30 seconds before standing. This gives your body time to adjust to the position change.  · Follow the treatment plan described by your health care provider.  Follow-up care  Follow up with your health care provider, or as advised.  When to seek medical advice  Call your health care provider right away if any of these occur:  · Dizziness, lightheadedness, or fainting  · Black or red color in your stools or vomit  · Diarrhea or vomiting that doesnt go away  · You arent able to eat or drink  · Fever of 100.4°F (38°C) or higher, or as directed by your health care provider  · Burning when you urinate  · Foul-smelling urine  Date Last Reviewed: 11/25/2014  © 9959-3164 [employer]. [address] [place] [address]. All rights reserved. This information is not intended as a substitute for professional medical care. Always follow your healthcare professional's instructions.        Preventing Falls in the Home  As you get older, falls are more likely for many reasons. One reason is because your reaction time slows. Your muscles and joints may also get stiffer, making them less flexible and therefore more prone to injury. Illness, medicines, and vision changes can also affect your balance. This increases your risk of falling. A fall could leave you unable to live on your own. To make your home safer, follow these tips:   Floors  Make floors safer by doing the following:   · Put nonskid pads under area rugs.  · Remove throw rugs.  · Replace worn floor coverings.  · Tack carpets firmly to each step on carpeted stairs. Put nonskid strips on the edges of uncarpeted stairs.  · Keep floors and stairs free of clutter and cords.  · Arrange furniture so there are clear pathways.  · Clean up any spills right away.  Bathrooms  Make  bathrooms safer by doing the following:   ·   Install grab bars in the tub or shower.  · Apply nonskid strips or put a nonskid rubber mat in the tub or shower.  · Sit on a bath chair to bathe.  · Use bathmats with nonskid backing.  Lighting  Tips to light your way:   · Keep a flashlight in each room.  · Put a nightlight along the pathway between the bedroom and the bathroom.  Date Last Reviewed: 8/1/2016 © 2000-2016 [employer]. [address] [place] [address]. All rights reserved. This information is not intended as a substitute for professional medical care. Always follow your healthcare professional's instructions.        Preventing Falls: Are You At Risk of Falling?  As you get older, you're not as steady on your feet as you once were. And you may have health problems you didn't have when you were younger. So, it's not surprising that older people are more likely to trip and fall. Falling can be very serious. It can change your overall health and quality of life. That's why it's important to be aware of your own risk of falling.    The dangers of falling  Falls are one of the main causes of injury in people over age 65. An older person who falls may take longer to get better than a younger person. And, after a fall, an older person is more likely to have problems that don't go away. So, preventing falls can help you avoid serious health problems.  Are you at risk of falling?  Answer these questions to rate your level of risk.  · Are you a woman?  · Have you fallen or stumbled in the last year?  · Are you over age 65?  · Are you ever dizzy or lightheaded with standing?  · Do you have a hard time getting in and out of the bathtub or on and off the toilet?  · Do you lean on objects to help you get around? Or do you use a cane or walker?  · Do you have vision or hearing problems? For example, do you need new glasses or hearing aids?  · Do you have 2 or more long-lasting (chronic) medical  "conditions?  · Do you take 3 or more medicines?  · Have you felt depressed recently?  · Have you had more trouble with your memory in recent months?  · Are there hazards in your home that might cause you to fall, such as loose rugs or poor lighting?  · Do you have a pet that jumps on you or might trip you?  · Have you stopped getting regular exercise?  · Do you have diabetes?   · Do you have a neurologic disease, such as Parkinson or Alzheimer disease?   · Do you drink alcohol?  · Do you wear athletic shoes or slippers, or go barefoot at home?  You can help prevent falls  If you answered "yes" to any of the above questions, you should take steps to reduce your risk of a fall. Monitoring health conditions and keeping walkways in your home free of clutter are just 2 ways. Changing is sometimes easier said than done. But keep in mind that even small changes can make you less likely to fall.  The fear of falling  It's normal to be scared of falling, especially if you've fallen before. But being afraid can actually make you more likely to fall. This is because:  · Fear might cause you to become less active. Being less active can lead to a loss of strength and balance.  · Fear can lead to isolation from others, depression, or the use of more medicines or alcohol. And all these things make falling even more likely.  To break the cycle, learn more about ways to avoid falling. As you take control, you may find yourself feeling less afraid.   Date Last Reviewed: 6/12/2015  © 1148-8702 [employer]. [address] [place] [address], [place] [address]. All rights reserved. This information is not intended as a substitute for professional medical care. Always follow your healthcare professional's instructions.        Exercises to Prevent Falls  Certain types of exercises may help make you less likely to fall. Try the ones below. Or do other exercises that your health care provider suggests. Depending on your health, you may " "need to start slowly. Don't let that stop you. Even small amounts of exercise can help you. Be sure to talk to your health care provider before starting any exercise program.    Improve balance  Many types of exercise can help improve balance. Isaac chi and yoga are good examples. Here's another one to try. You can do it anytime and almost anywhere.  · Stand next to a counter or solid support.  · Push yourself up onto your tiptoes.  · Hold for 5 seconds. If you start to lose your balance, hold on to the counter.  · Rest and repeat 5 times. Work up to holding for 20 to 30 seconds, if you can.    Increase flexibility  Being more flexible makes it easier for you to move around safely. Try exercises like the seated hamstring stretch.  · Sit in a chair and put one foot on a stool.  · Straighten your leg and reach with both hands down either side of your leg. Reach as far down your leg as you can.  · Hold for about 20 seconds.  · Go back to the starting position. Then repeat 5 times. Switch legs.    Build strength  "Resistance" exercises help build strength. You can do them without equipment. Or you can use weights, elastic bands, or special machines. One such exercise is called the biceps curl. You can hold a 1-pound weight or even a can of soup. Do this exercise at least 3 times a week. Strive for every day.  · Sit up straight in a chair.  · Keep your elbow close to your body and your wrist straight.  · Bend your arm, moving your hand up to your shoulder. Then slowly lower your arm.  · Repeat 5 times. Switch to the other arm.    Build your staying power  Aerobic exercises make your heart and lungs stronger so you can keep moving longer. Walking and swimming are two of the best types of exercises you can do. Using a stationary bike is great, too. Find an aerobic exercise that you enjoy. Start slowly and build up. Even 5 minutes is helpful. Aim for a goal of 30 minutes, at least 3 times a week. You don't have to " do 30 minutes in 1 session. Break it up and walk a little throughout the day.     More helpful tips  · Start easy. Slowly work up to doing more.  · Talk with your health care provider about the best exercises for you.  · Call senior centers or health clubs about exercise programs.  · If needed, have a family member watch you walk every so often to check your stability.  · Exercise with a friend. Choose an activity you both enjoy.  · Consider kourtney chi or yoga to strengthen your balance.  · Try exercises that you can do anytime, anywhere. Here are 2 examples. Have someone with you when you first try these:  ¨ Practice walking by placing 1 foot right in front of the other.  ¨ Stand up and sit down 10 times. Repeat this throughout the day.   Date Last Reviewed: 6/13/2015 © 2000-2016 [employer]. [address] [place] [address], [place] [address]. All rights reserved. This information is not intended as a substitute for professional medical care. Always follow your healthcare professional's instructions.        Oncology: Preventing Infections  Chemotherapy can make your body less able to fight off infection. This happens because treatment reduces the number of white blood cells (infection fighters) in your body. To help prevent infections, follow the tips below.     Scrub your hands with soap and warm water for at least 15 seconds.   Know your john  The john is the time during your chemotherapy cycle when you have the fewest white blood cells. The length of your john and when it occurs depend on the drugs you are taking. Each drug has its own john. Talk with your doctor or nurse about your john period. Then take extra precautions to prevent infection at that time.  Protect yourself  · Keep your hands clean. To reduce your risk of infection, bathe daily and wash your hands often throughout the day. For best results, lather them with soap for at least 15 seconds. Wash your hands before eating, after spending time  in public places, and after using the bathroom.  · Avoid some foods. Limit your risk. Dont eat uncooked or undercooked meat or fish. You may also be told not to eat raw vegetables or thin-skinned fruits during your john.  · Reduce your risk of illness. During this time your body is less able to fight off colds, measles, and other illnesses. Stay away from anyone who has a fever or an infection and avoid large crowds during your john.  · Wear gloves. Make it harder for infection to enter your body. Wear gloves when you work around germs and dirt. Have someone else clean a pets tank, cage, or litter box.  · Avoid cutting yourself. Protect your feet from injury and germs by not walking barefoot.  Medications can help you to  · Prevent and treat infection. Antibiotics work in this way by attacking and killing the germs that cause infection.  · Trigger new cell growth. These medications cause your body to make new white blood cells. Neupogen is an example of such a drug.  Talk with your doctor about optimal medications. In some circumstances, your doctor may advise you to avoid Tylenol and other NSAIDs for pain relief because these drugs can mask a fever if you have neutropenia. Talk with your doctor about medications for pain control if needed during your john period.  When to seek medical advice  Contact your doctor right away if you have any of the following:  · Temperature of 100.4ºF (38ºC) or higher.  · Burning when you urinate.  · Severe coughing or sore throat.  · Shortness of breath, sweating, or chills.  · Pain, especially near an open wound or catheter site.   Date Last Reviewed: 1/3/2016  © 4703-6322 [employer]. [address] [place] [address], [place] [address]. All rights reserved. This information is not intended as a substitute for professional medical care. Always follow your healthcare professional's instructions.        Managing Fatigue     Family members can help with meals and chores  around the house.   Fatigue is common. It can be caused by worry, lack of sleep, or poor appetite. Fatigue can also be a sign of anemia, a shortage of red blood cells. You might need medical treatment for anemia. The tips below can help you feel better.  Conserving energy  · Keep track of the times of day when you are most tired and plan around them. For instance, if you are more tired in the afternoon, try to get tasks done in the morning.  · Decide which tasks are most important. Do those first.  · Pass tasks along to others when you need to. Ask for help.  · Accept help when its offered. Tell people what they can do to help. For instance, you may need someone to fix a meal, fold clothes, or put gas in your car.  · Plan rest times. You may want to take a nap each day. Just sitting quietly for a few minutes can make you feel more rested.  What you can do to feel better  · Relax before you try to sleep. Take a bath or read for a while.  · Form a sleep pattern. Go to bed at the same time each night and get up at the same time each morning.  · Eat well. Choose foods from all of the food groups each day.  · Exercise. Take a brisk walk to help increase your energy.  · Avoid caffeine and alcohol. Drink plenty of water or fruit juices instead.  Treating anemia  If you begin to feel more tired than normal, tell your doctor. Fatigue could be a sign of anemia. This problem is fairly common in cancer patients, especially during chemotherapy and radiation treatments. If your red blood cell count is too low, you may get a blood transfusion. In some cases, you may need medicine to increase the number of red blood cells your body makes.  When to call your healthcare provider  Call your healthcare provider if you have:  · Shortness of breath or chest pain  · A dizzy feeling when you get up from lying or sitting down  · Paler skin than normal  · Extreme tiredness that is not helped by sleep   Date Last Reviewed: 1/3/2016  ©  "4850-7762 [employer]. [address] [place] [address], [place] [address]. All rights reserved. This information is not intended as a substitute for professional medical care. Always follow your healthcare professional's instructions.        How to Control Nausea and Vomiting     Taken before meals, medicines can help ease nausea.    Nausea is feeling that you need to throw up. Throwing up occurs when your body forces food that is in your stomach out through your mouth. Nausea and vomiting are symptoms that are caused by many things. They can happen when a condition or disease, medicine, medical treatment, or a poisonous substance affects the area in your brain that controls vomiting. Some conditions or diseases can cause nausea, abdominal pain or cramps, and vomiting. The symptoms can be mild and go away by themselves. Other symptoms can be serious. You will need to see your healthcare provider for these.  Nausea and vomiting are common. They can be caused by many things. These include:  · "Stomach flu" (gastroenteritis)  · Food poisoning  · Stomach pain (gastritis)  · Blockages  They can also be caused by a head injury, an infection in the brain or inside the ear, or migraines. Other common causes of nausea and vomiting include:  · Brain tumor  · Brain bruise  · Motion sickness  · Drugs. These include alcohol, pain medicines such as morphine, and cancer medicines.  · Toxins. These are poisonous things like plants or liquids that are swallowed by accident.  · Advanced types of cancer  · Movement problems (psychogenic problems)  · Extra pressure in the fluid that surrounds the brain and spinal cord (elevated intracranial pressure)     Nausea and vomiting are also common side effects of chemotherapy and radiation therapy. Side effects happen when treatment changes some normal cells as well as cancer cells. In this case, the cells lining your stomach and the part of your brain that controls vomiting are affected. " Other more serious causes of vomiting may be hard to find early in the illness.     When to seek medical advice  Call your healthcare provider right away if you have the following:  · Nausea or vomiting that lasts 24 hours or more  · Trouble keeping fluids down   Medicines can help  Nausea or vomiting can often be prevented or controlled with medicines (antiemetics). Your doctor may give you antiemetics before or after treatment if you are getting chemotherapy or other medical treatments that cause nausea or vomiting.  Eating tips  · If you have medicines to control nausea, take them before meals as directed.  · Avoid fatty or greasy foods while nauseated.  · Eat small meals slowly throughout the day.  · Ask someone to sit with you while you eat to keep you from thinking about feeling nauseated.  · Eat foods at room temperature or colder to avoid strong smells.  · Eat dry foods, such as toast, crackers, or pretzels. Also eat cool, light foods, such as applesauce, and bland foods, such as oatmeal or skinned chicken.   Other ways to feel better  · Get a little fresh air. Take a short walk.  · Talk to a friend, listen to music, or watch TV.  · Take a few deep, slow breaths.  · Eat by candlelight or in surroundings that you find relaxing.  · Use a technique, such as guided imagery, to help you relax. Imagine yourself in a beautiful, restful scene. Or daydream about the place youd most like to be.  Date Last Reviewed: 1/6/2016  © 0295-5620 [employer]. [address] [place] [address], [place] [address]. All rights reserved. This information is not intended as a substitute for professional medical care. Always follow your healthcare professional's instructions.        Prednisone tablets  What is this medicine?  PREDNISONE (PRED ni sone) is a corticosteroid. It is commonly used to treat inflammation of the skin, joints, lungs, and other organs. Common conditions treated include asthma, allergies, and arthritis. It is  also used for other conditions, such as blood disorders and diseases of the adrenal glands.  How should I use this medicine?  Take this medicine by mouth with a glass of water. Follow the directions on the prescription label. Take this medicine with food. If you are taking this medicine once a day, take it in the morning. Do not take more medicine than you are told to take. Do not suddenly stop taking your medicine because you may develop a severe reaction. Your doctor will tell you how much medicine to take. If your doctor wants you to stop the medicine, the dose may be slowly lowered over time to avoid any side effects.  Talk to your pediatrician regarding the use of this medicine in children. Special care may be needed.  What side effects may I notice from receiving this medicine?  Side effects that you should report to your doctor or health care professional as soon as possible:  · allergic reactions like skin rash, itching or hives, swelling of the face, lips, or tongue  · changes in emotions or moods  · changes in vision  · depressed mood  · eye pain  · fever or chills, cough, sore throat, pain or difficulty passing urine  · increased thirst  · swelling of ankles, feet  Side effects that usually do not require medical attention (report to your doctor or health care professional if they continue or are bothersome):  · confusion, excitement, restlessness  · headache  · nausea, vomiting  · skin problems, acne, thin and shiny skin  · trouble sleeping  · weight gain  What may interact with this medicine?  Do not take this medicine with any of the following medications:  · metyrapone  · mifepristone  This medicine may also interact with the following medications:  · aminoglutethimide  · amphotericin B  · aspirin and aspirin-like medicines  · barbiturates  · certain medicines for diabetes, like glipizide or glyburide  · cholestyramine  · cholinesterase  inhibitors  · cyclosporine  · digoxin  · diuretics  · ephedrine  · female hormones, like estrogens and birth control pills  · isoniazid  · ketoconazole  · NSAIDS, medicines for pain and inflammation, like ibuprofen or naproxen  · phenytoin  · rifampin  · toxoids  · vaccines  · warfarin  What if I miss a dose?  If you miss a dose, take it as soon as you can. If it is almost time for your next dose, talk to your doctor or health care professional. You may need to miss a dose or take an extra dose. Do not take double or extra doses without advice.  Where should I keep my medicine?  Keep out of the reach of children.  Store at room temperature between 15 and 30 degrees C (59 and 86 degrees F). Protect from light. Keep container tightly closed. Throw away any unused medicine after the expiration date.  What should I tell my health care provider before I take this medicine?  They need to know if you have any of these conditions:  · Cushing's syndrome  · diabetes  · glaucoma  · heart disease  · high blood pressure  · infection (especially a virus infection such as chickenpox, cold sores, or herpes)  · kidney disease  · liver disease  · mental illness  · myasthenia gravis  · osteoporosis  · seizures  · stomach or intestine problems  · thyroid disease  · an unusual or allergic reaction to lactose, prednisone, other medicines, foods, dyes, or preservatives  · pregnant or trying to get pregnant  · breast-feeding  What should I watch for while using this medicine?  Visit your doctor or health care professional for regular checks on your progress. If you are taking this medicine over a prolonged period, carry an identification card with your name and address, the type and dose of your medicine, and your doctor's name and address.  This medicine may increase your risk of getting an infection. Tell your doctor or health care professional if you are around anyone with measles or chickenpox, or if you develop sores or blisters that  do not heal properly.  If you are going to have surgery, tell your doctor or health care professional that you have taken this medicine within the last twelve months.  Ask your doctor or health care professional about your diet. You may need to lower the amount of salt you eat.  This medicine may affect blood sugar levels. If you have diabetes, check with your doctor or health care professional before you change your diet or the dose of your diabetic medicine.  Date Last Reviewed:   NOTE:This sheet is a summary. It may not cover all possible information. If you have questions about this medicine, talk to your doctor, pharmacist, or health care provider. Copyright© 2016 [employer]

## 2017-09-01 NOTE — NURSING
@ 1235 Pt arrived with unsteady gait, and hypotension. Pt states had headache yesterday, temperature ran .0. Pt states had night sweats and chills last night. Pt states feels dizzy when ambulating and changing positions. Pt teaching on orthostatic hypotension done. Encouraged pt to keep a log of blood pressure readings several times per day, especially prior to taking BP medications at home. Encouraged pt to call MD prior to taking BP medications if pressure is low or having symptoms. Encouraged pt to bring BP log to next MD appointment. Reviewed medications with pt and mother. Pt teaching on signs and symptoms of infections, falls precautions, and reviewed Vidaza side effects. Pt states spoke with Dr. Ewing's office yesterday regarding coming for first dose of Vidaza today. Pt states was told to go to ER if temperature was 100.4 or higher otherwise come for Vidaza today. Pt denies headache today. Pt states had nausea prior to arrival and took Zofran. Pt states malaise and fatigue. Pt and mother concerned about pt taking too many medications at this time. Pt and mother encouraged to address those concerns with Dr. Atkinson at appointment next week. Pt and mother verbalized understanding of above. Handouts will be given with AVS.    @ 1300 Dr. Atkinson notified of above. Have pt sit in chair for 1 hour, will probably treat after rechecking VS in one hour. No other orders per v/o read back Dr. Atkinson. Pt and mother notified and agreed.    @ 1400 Dr. Atkinson notified of new VS. Give pt 500 ml of normal saline IV over 2 hours today, delay Vidaza until Tuesday, 9/5/17, if pt becomes symptomatic or temp over 100.4, or any signs and symptoms of infection arise, have pt call or go to ER. per v/o read back Dr. Atkinson. Pt and mother notified and verbalized understanding and agreed to hydration today.

## 2017-09-01 NOTE — PROGRESS NOTES
Nutrition: Met with pt per JIN Lopez in infusions request regarding Vidaza treatment. Pt informed me he has already been educated regarding Vidaza however he wanted the education information. Provided pt with information regarding nausea MGT, anemia, and food safety. Discussed information with patient. Will assist if and when needed. Cherie Jules,MS, RD ,LDN

## 2017-09-02 NOTE — TELEPHONE ENCOUNTER
"    Reason for Disposition   [1] Fall in systolic BP > 20 mm Hg from normal AND [2] dizzy, lightheaded, or weak    Answer Assessment - Initial Assessment Questions  1. BLOOD PRESSURE: "What is the blood pressure?" "Did you take at least two measurements 5 minutes apart?"      95/60 HR 93  2. ONSET: "When did you take your blood pressure?"      Just now  3. HOW: "How did you obtain the blood pressure?" (e.g., visiting nurse, automatic home BP monitor)      Home bp monitor  4. HISTORY: "Do you have a history of low blood pressure?" "What is your blood pressure normally?"      no  5. MEDICATIONS: "Are you taking any medications for blood pressure?" If yes: "Have they been changed recently?"      Yes, no changes  6. PULSE RATE: "Do you know what your pulse rate is?"       93  7. OTHER SYMPTOMS: "Have you been sick recently?" "Have you had a recent injury?"      Did not assess  8. PREGNANCY: "Is there any chance you are pregnant?" "When was your last menstrual period?"      n/a    Protocols used: ST LOW BLOOD PRESSURE-A-    Patient called and states that he is feeling really weak and received fluids at the infusion center yesterday. bp 95/60 HR 93, so patient advised to go to ED. He verbalized understanding.   "

## 2017-09-05 NOTE — TELEPHONE ENCOUNTER
Called Pt to discuss recommendations of DR De Los Santos. Pt insist that his B/P systolic will never be 110 because of the medications he is taking. Advised to hold this pm and resume in the morning. Take B/P first then take decreased dose of 12,5mg BID of metoprolol and see how he tolerates. Pt voiced understanding. He will call davian levin.

## 2017-09-05 NOTE — TELEPHONE ENCOUNTER
----- Message from Asia Lion RN sent at 9/5/2017  1:58 PM CDT -----  Contact: patient called      ----- Message -----  From: Iman Whitaker MA  Sent: 9/5/2017   1:07 PM  To: [facility] Staff    Please call the patient at home he need to talk to you about his medications and his blood pressure. 85/60. Thank you

## 2017-09-05 NOTE — TELEPHONE ENCOUNTER
Returned Pt's call. He is having problems with his B/P . This morning it was 85/60. At last visit his metoprolol was decreased from 50mg BID to 25mg BID. He was also told that the morphine he was taken was problems contributing to his B/P being low. He has totally stopped all morphine. He has been instructed to not take his afternoon dose of metoprolol if his B/P systolic is not >110. This morning he took his metoprolol with a B/P of 85/60. His B/P now is 101/60 and his HR is 96. He is extremely tired. Advised to lie on sofa and elevate feet on 2-3 pillows and to hydrate to get B/P up. Pt voiced understanding. Advised I would discuss his low B/P with Dr De Los Santos.

## 2017-09-05 NOTE — TELEPHONE ENCOUNTER
----- Message from Pascual De Los Santos MD sent at 9/5/2017  5:06 PM CDT -----  Tell him to drop to 12.5 mg bid. He is scheduled to see me in a few weeks too.    GP  ----- Message -----  From: Linda Panda RN  Sent: 9/5/2017   4:59 PM  To: Pascual De Los Santos MD    Pt. is having problems with his B/P . This morning it was 85/60. At last visit his metoprolol was decreased from 50mg BID to 25mg BID. He was also told that the morphine he was taking could be contributing to his B/P being low. He has totally stopped all morphine. He has been instructed to hold his afternoon dose of metoprolol if his B/P systolic is not >110. This morning he took his metoprolol with a B/P of 85/60. His B/P now is 101/60 and his HR is 96. He is extremely tired. Advised to lie down  and elevate feet on 2-3 pillows and to hydrate to get B/P up. Pt voiced understanding. Advised I would discuss his low B/P with Dr De Los Santos.

## 2017-09-06 NOTE — NURSING
Patient here for blood draw and dressing change on left chest CVC-red line with good blood return-blood to lab-lines flushed-old dressing removed-no redness at site-area cleaned and new dressing applied over site-patient tolerated well.

## 2017-09-06 NOTE — PROGRESS NOTES
C/C  Severe leg pain, numbness in chin area, patient stated he went to the ER in [place].  Subjective:       Patient ID: Paul E Sistrunk is a 57 y.o. male.    Chief Complaint: Leukemia (s/p allo SCT and DLI)    HPI  KPS: 90% Mr. 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 He is now three weeks post level 1 DLI (1 x 10exp7 CD3/kg CD3 cells from his MUD).    Today: Since his DLI, he has been feeling well without new GI issues or rash. He is still tapering from steroids, completed prednisone 5 mg yesterday (for his previous pulmonary issues)  And will transition to hydrocortisone today. He denies pain and has weaned himself off from narcotics now.  His oral intake has again decreased (due to appetite and extreme fatigue) and he is losing weight again. Also notes dry mouth with he uses biotene for. Denies nausea, vomiting, SOB, coughing and chest pain. Energy level is very low again. Pt has been experiencing low blood pressures and is in contact with his cardiologist. Reports the highest temperature at home he has had was 100.2. Denies hard chills. Reports some night sweats. Today, temp is 99.4.     AML History:  He originally presented in early 2/2016 with low platelets to his HIV provider and he had noted evolving fatigue and weight loss over the previous 2 months.  Bone marrow biopsy 2/8/16 showed a 90% cellular marrow with 50% blasts and background dysplasia.  His cytogenetics were complex with 18 metaphases with numeric and structural abnormalities including a 5q deletion, a 17p deletion (TP53 deletion), plus 1 to 18 double minutes, and 2 metaphases were a tetraploid subclone. FISH studies also confirmed the aforementioned deletions and indicated MYC amplification which are likely presented by double  minutes.    He underwent standard induction therapy with 7&3 but his day 14 marrow done 2/22 showed a 40-80% cellular marrow with 35% blasts.  Hence, he began reinduction with MEC on 2/24/16 and repeat marrow done 3/9/16 showed only a 5% cellular marrow with only 5-6% CD34+ blasts.  He developed a rash and severe rectal pain complicating his therapy, though these have resolved now (finishing a steroid taper for his rash).    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  Cytogenetics 46,XY[20].  Hence, he achieved a complete remission prior to allogeneic transplant.  His transplant course (9/5 - 10/1) was as expected with significant mucositis related to conditioning and low counts with some GI issues which all resolved on count recovery.  No unexpected complications.  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     HIV: Viral load 303K on 2/6/16 and now on Triumeq with Bactrim and acyclovir prophylaxis  Histoplasmosis: Multiple pulmonary nodules seen on chest CT during his recent hospital stay and he was found to be urine histo antigen positive.  Now on itraconazole after a course of amphotericin B.     Hospital Course: Relapsed AML - peripheral blood shows 30% blasts on admit. 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 Blast of 62%, 19 had a complex karyotype including 5q deletion, 7q deletion, 17p deletion, and one metaphase represented a near tetraploid subclone; FLT-3 negative. Jaw pain and pain in right cranial nerve 7 distribution with persistent, severe headaches concerning for CNS involvement - underwent IT chemotherapy on 6/23. Flow negative for disease. Received FLAG-BETZY re induction, day 37 at discharge. Day 14 bone marrow biopsy done 7/10 - hypocellular with no evidence of residual disease. Chimerics from marrow done 6/21 show CD3 of 90% donor  and 10% recipient, but CD34 of 5% donor and 95% recipient     His day 100 restaging marrow showed a 20-30% cellular marrow with trilineage hematopoiesis and no evidence of leukemia.  Cytogenetics 46,XY[13] and chimerism 90% donor.      Pulmonology : BAL which was done 7/12, cytology pending from BAL; aspergillus <0.500; KOH was negative for yeast/fungusThoracentesis on 7/22 - cytology negative; repeat CXR 7/26 shows Ill-defined patchy airspace consolidation bilaterally more on the left side identified. Second Thoracentesis 7/26/17 with 1.3L removed. Trial of steroids per pulmonary discharged on Prednisone 40 mg daily    ID: Complicated antimicrobial treatment history as follows: - Cefepime: (started 6/24 - discontinued 6/28) restarted on 7/4/17 - 7/23 - Ampotericin B started 7/9-stopped 7/19; - Vancomycin (restarted 7/15 - stopped 7/17)   CXR showed pneumonia 7/15; CXR 7/17 - Significant patchy air space consolidation identified bilaterally slightly more on the left side. CT chest/abdomen/pelvis reveals bilateral groundglass opacities as well as a left lower-lobe consolidation.     Neurology: Several imaging studies have been performed, including CT head 7/21, MRI brain 7/21, and CTA head and neck 7/21 with interpreting radiologists citing concern for infarction of the right lentiform nucleus and anterior limb of the internal capsule.  Continue atorvastatin.     Cardiology: Supraventricular tachycardia on tele and EKG. Started 7/13/17 8pm with HR between 160-170. Patient was cardioverted by cardiology and started on metoprolol and flecainide. Continue Flecainide 100 mg bid and increase Lopressor to 50 mg bid (lopressor decreased to 25 mg). He will see cardiology today (9/6/17) for medication adjustments.       Review of Systems   Constitutional: Positive for extreme fatigue and some weight loss again. Negative for activity change, appetite change, fever.   HENT: Negative for dental problem, postnasal drip, sore  throat and trouble swallowing.  Numb chin  Eyes:  Negative  Respiratory: Negative for cough, choking and shortness of breath.    Cardiovascular: Negative for chest pain and leg swelling. Reports low BPs  Gastrointestinal: Negative for abdominal pain, blood in stool. Denies nausea. Reports some diarrhea yesterday, resolved with imodium. Constipation resolved and hemorrhoids improved.  Genitourinary: Negative for dysuria, frequency and hematuria.   Musculoskeletal: Low back pain imroved  Skin: Negative for Rash.  Neurological: Negative for tremors, weakness, light-headedness and headaches.   Hematological: Negative for adenopathy.   Psychiatric/Behavioral: Negative for confusion and dysphoric mood. The patient is nervous/anxious (Improved).        Objective:      Physical Exam   Constitutional: He is oriented to person, place, and time. He appears well-developed and well-nourished. No distress.   HENT:   Head: Normocephalic and atraumatic.   Mouth/Throat: Oropharynx is clear and moist. No oropharyngeal exudate.   Eyes: Conjunctivae are normal. Pupils are equal, round, and reactive to light.   Neck: Neck supple.   Cardiovascular: Tachycardia and hypotension  Pulmonary/Chest: Effort normal and breath sounds normal. He has no rales.   Abdominal: Soft. Bowel sounds are normal. He exhibits no mass. There is no splenomegaly. There is no tenderness.   Musculoskeletal: Normal range of motion. He exhibits no edema.   Lymphadenopathy:     He has no cervical adenopathy.     He has no axillary adenopathy.   Neurological: He is alert and oriented to person, place, and time.   Skin: Skin is warm and dry. Rash negative.   Psychiatric: He has a normal mood and affect. Thought content normal.   Vitals reviewed.      Assessment:       1. AML (acute myeloid leukemia) in relapse    2. S/P allogeneic bone marrow transplant    3. Anemia due to chemotherapy    4. HIV disease    5. Histoplasma capsulatum infection    6. CINV  (chemotherapy-induced nausea and vomiting)    7. History of cardioversion        Plan:       Graft/AML:  Full male MUD match, O+ into O+.  Relapsing AML at day 359 post transplant now day +21 post DLI after reinduction with FLAG-BETZY.  Given his risk profile (p53 especially and double induction) his relapse is not unanticipated.  Chimerics from marrow done 6/21 show CD3 of 90% donor and 10% recipient, but CD34 of 5% donor and 95% recipient.  Peripheral chimerism from 8/30/17 show CD3 95% donor and CD33 100% donor.  Weaning from steroids quickly -completed 5 mg daily yesterday and will transition to hydrocortisone today (9/6/17). Given high risk disease he started vidaza for at least 2 cycles (9/5/17). He was unable to start on 9/1/17, as his BP was too low and instead received IVFs.    WBC 6.74, hemoglobin 12.1 and platelets 125K.    GVHD: No signs of GVHD noted.    ID:  CMV pos into CMV pos.  CMV negative 8/23/17.  Continues on Acyclovir and transitioned from itraconazole to voriconazole while in hospital.  Will continue to treat HIV therapy with Triumeq.     Cardiology: See above for history. Currently on Flecainide 100 mg bid and decreased Lopressor to 25 mg bid (8/7/17 per cardiology). Continue Lipitor. Will see cardiology today (9/6/17) after visit here. Does not appear to need IVFs today, but possibly an adjustment in medications.    Pulmonology: Received 60 mg of prednisone while in hospital; on taper at 20 mg daily, decreased to 5 mg for 1 week, and now will transition to hydrocortisone 10 mg BID x 1 week; then 10mg AM and 5 mg PM x 1 week; then 5 mg BID x 1 week, then 5 mg daily x 1 week.  No new pulmonary symptoms.    Renal/Prostate:  Increased urine flow on Flomax (every 36 hours) with creatinine 1.2.     Metab: Eating had previously improved, but now decreased again. With albumin up to 3.1.  Potassium 4.0    Anxiety:  Increased anxiety about his health associated with family pressures (improving).  He  will need to follow up with Dr. Eli  Using ativan occasionally with relief.    Back Pain: Weaned off MS Contin now.  Hemorrhoids resolved. He will start exercise regimen as well.    All of his questions were answered in the clinic today. He will see cardiology now. F/U with Dr. Atkinson scheduled for 1 week from now (9/13/17).    Patient seen and examined with collaborating physician Dr. Atkinson.    Cheryl Carrera, ANA, NP  Hematology/Oncology    Attending: Patient seen and examined with NP and agree with assessment and plan above.  He has now started azacitidine to help prevent relapse as we await greater T-cell response post DLI.  Lower blood pressure likely related to medications and he will pursue with cardiology.  Eating better now and no signs of GVHD to date.  RVBE

## 2017-09-06 NOTE — TELEPHONE ENCOUNTER
Called to see Pt in the lobby today. He had another appt and wanted to stop by to discuss his B/P. B/P today is 97/64 . Pt hasn't taken metoprolol for 1 day.     Spoke with Dr De Los Santos and updated on Pt. He recommends stopping the metoprolol and letting the B/P stabilize.  He may continue the flecainide.    Discussed stopping the metoprolol and continuing the flecainide with the Pt.  He would like to try to restart the metoprolol once his B/P gets back to normal. He doesn't want his HR that high. Advised when he starts back to start at a low dose of 12.5mg BID and see how he tolerates it for a couple weeks before trying to get to 25mg BID. Pt voiced understanding.

## 2017-09-07 PROBLEM — R79.89 ELEVATED TROPONIN: Status: ACTIVE | Noted: 2017-01-01

## 2017-09-07 PROBLEM — R07.9 CHEST PAIN WITH MINIMAL RISK FOR CARDIAC ETIOLOGY: Status: ACTIVE | Noted: 2017-01-01

## 2017-09-07 PROBLEM — A41.9 SEPSIS: Status: ACTIVE | Noted: 2017-01-01

## 2017-09-07 PROBLEM — R50.9 FEVER: Status: ACTIVE | Noted: 2017-01-01

## 2017-09-07 PROBLEM — R00.0 TACHYCARDIA: Status: ACTIVE | Noted: 2017-01-01

## 2017-09-09 PROBLEM — R11.0 NAUSEA: Status: ACTIVE | Noted: 2017-01-01

## 2017-09-11 NOTE — TELEPHONE ENCOUNTER
Phone call from patient stating he has been discharged from the hospital on the [place].  Patient states the doc there stated he did not need antibiotics but needs probiotics.  Patient states still has some diarrhea and abdominal pain and little appetite and some nausea.  Encouraged patient to drink fluids, take nausea and pain meds as prescribed.  Take Lomotil 1 after every loose stool not to exceed 8 tabs in 24 hours.  Encouraged him to avoid fruit juice and eat a soft bland diet, avoid caffeine, fatty foods and dairy products.  Encouraged cold foods with protein choices.  Instructed him to come here to the ER if he has a temp of 100.4 or above tonight or tomorrow.  Reminded him of his appt on Wed. With Dr. Atkinson.  States he will be here for the appt.  Dr. Atkinson aware of patient's status.    AURORA Flores RN, BMTCN

## 2017-09-13 PROBLEM — T45.1X5A CHEMOTHERAPY-INDUCED THROMBOCYTOPENIA: Chronic | Status: ACTIVE | Noted: 2017-01-01

## 2017-09-13 PROBLEM — K59.1 FUNCTIONAL DIARRHEA: Status: ACTIVE | Noted: 2017-01-01

## 2017-09-13 PROBLEM — D69.59 CHEMOTHERAPY-INDUCED THROMBOCYTOPENIA: Chronic | Status: ACTIVE | Noted: 2017-01-01

## 2017-09-13 PROBLEM — E87.6 HYPOKALEMIA: Status: ACTIVE | Noted: 2017-01-01

## 2017-09-13 NOTE — HPI
57 y.o. 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 He was taken to [facility] 1 week ago and was given antibiotics . Associated sx include rapid HR, low BP, and feeling extremely weak, abdominal pain and cramps. He denies any rhinorrhea, sore throat, cough, or burning during urination.     KPS: 90% Mr. 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 He is now 4 weeks post level 1 DLI (1 x 10exp7 CD3/kg CD3 cells from his MUD).     From Dr Atkinson clinic visit 9/6/17: Since his DLI, he has been feeling well without new GI issues or rash. He is still tapering from steroids, completed prednisone 5 mg yesterday (for his previous pulmonary issues)  And will transition to hydrocortisone today. He denies pain and has weaned himself off from narcotics now.  His oral intake has again decreased (due to appetite and extreme fatigue) and he is losing weight again. Also notes dry mouth with he uses biotene for. Denies nausea, vomiting, SOB, coughing and chest pain. Energy level is very low again. Pt has been experiencing low blood pressures and is in contact with his cardiologist. Reports the highest temperature at home he has had was 100.2. Denies hard chills. Reports some night sweats. Today, temp is 99.4.      AML History:  He originally presented in early 2/2016 with low platelets to his HIV provider and he had noted evolving fatigue and weight loss over the previous 2 months.  Bone marrow biopsy 2/8/16 showed a 90% cellular marrow with 50% blasts and background dysplasia.  His cytogenetics were complex with 18 metaphases  with numeric and structural abnormalities including a 5q deletion, a 17p deletion (TP53 deletion), plus 1 to 18 double minutes, and 2 metaphases were a tetraploid subclone. FISH studies also confirmed the aforementioned deletions and indicated MYC amplification which are likely presented by double minutes.     He underwent standard induction therapy with 7&3 but his day 14 marrow done 2/22 showed a 40-80% cellular marrow with 35% blasts.  Hence, he began reinduction with MEC on 2/24/16 and repeat marrow done 3/9/16 showed only a 5% cellular marrow with only 5-6% CD34+ blasts.  He developed a rash and severe rectal pain complicating his therapy, though these have resolved now (finishing a steroid taper for his rash).     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  Cytogenetics 46,XY[20].  Hence, he achieved a complete remission prior to allogeneic transplant.  His transplant course (9/5 - 10/1) was as expected with significant mucositis related to conditioning and low counts with some GI issues which all resolved on count recovery.  No unexpected complications.  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     Relapsed AML in June 2017- peripheral blood shows 30% blasts on admit. 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 Blast of 62%, 19 had a complex karyotype including 5q deletion, 7q deletion, 17p deletion, and one metaphase represented a near tetraploid subclone; FLT-3 negative. Jaw pain and pain in right cranial nerve 7 distribution with persistent, severe headaches concerning for CNS involvement - underwent IT chemotherapy on 6/23. Flow negative for disease. Received FLAG-BETZY re induction, day 37 at discharge. Day 14 bone marrow biopsy done 7/10 - hypocellular with no evidence of residual disease. Chimerics from marrow done 6/21 show CD3 of 90% donor  and 10% recipient, but CD34 of 5% donor and 95% recipient Patient under DLI infusion in 8/14/17.    Histoplasmosis: Multiple pulmonary nodules seen on chest CT during his recent hospital stay and he was found to be urine histo antigen positive.  Now on itraconazole after a course of amphotericin B.     Pulmonology : BAL which was done 7/12, cytology pending from BAL; aspergillus <0.500; KOH was negative for yeast/fungusThoracentesis on 7/22 - cytology negative; repeat CXR 7/26 shows Ill-defined patchy airspace consolidation bilaterally more on the left side identified. Second Thoracentesis 7/26/17 with 1.3L removed. Trial of steroids per pulmonary discharged on Prednisone 40 mg daily     ID: Complicated antimicrobial treatment history as follows: - Cefepime: (started 6/24 - discontinued 6/28) restarted on 7/4/17 - 7/23 - Ampotericin B started 7/9-stopped 7/19; - Vancomycin (restarted 7/15 - stopped 7/17)   CXR showed pneumonia 7/15; CXR 7/17 - Significant patchy air space consolidation identified bilaterally slightly more on the left side. CT chest/abdomen/pelvis reveals bilateral groundglass opacities as well as a left lower-lobe consolidation.      Neurology: Several imaging studies have been performed, including CT head 7/21, MRI brain 7/21, and CTA head and neck 7/21 with interpreting radiologists citing concern for infarction of the right lentiform nucleus and anterior limb of the internal capsule.  Continue atorvastatin.      Cardiology: Supraventricular tachycardia on tele and EKG. Started 7/13/17 8pm with HR between 160-170. Patient was cardioverted by cardiology and started on metoprolol and flecainide. Continue Flecainide 100 mg bid and increase Lopressor to 50 mg bid (lopressor decreased to 25 mg). He will see cardiology today (9/6/17) for medication adjustments.

## 2017-09-13 NOTE — H&P
[facility]  Hematology  Bone Marrow Transplant  H&P    Transplant Diagnosis: AML  Transplant Type: Matched Unrelated Donor    Conditioning Regimen: Flu/Bu/ATG  GVHD Prophylaxis: ATG, Cellcept, [place]  Date of Transplant: 9/12/2016  Stem Cell Dose/Source: MUD,  1 DLI (1 x 10exp7 CD3/kg CD3 cells from his MUD) on 8/14  Patient CMV/ABO: O+/CMV+  Donor CMV/ABO: O+/CMV+    Subjective:     Principal Problem: Sepsis    HPI: 57 y.o. 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 He was taken to [facility] 1 week ago and was given antibiotics . Associated sx include rapid HR, low BP, and feeling extremely weak, abdominal pain and cramps. He denies any rhinorrhea, sore throat, cough, or burning during urination.     KPS: 90% Mr. 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 He is now  4 weeks post level 1 DLI (1 x 10exp7 CD3/kg CD3 cells from his MUD).     From Dr Atkinson clinic visit 9/6/17: Since his DLI, he has been feeling well without new GI issues or rash. He is still tapering from steroids, completed prednisone 5 mg yesterday (for his previous pulmonary issues)  And will transition to hydrocortisone today. He denies pain and has weaned himself off from narcotics now.  His oral intake has again decreased (due to appetite and extreme fatigue) and he is losing weight again. Also notes dry mouth with he uses biotene for. Denies nausea, vomiting, SOB, coughing and chest pain. Energy level is very low again. Pt has been experiencing low blood pressures and is in contact with his cardiologist. Reports the  highest temperature at home he has had was 100.2. Denies hard chills. Reports some night sweats. Today, temp is 99.4.      AML History:  He originally presented in early 2/2016 with low platelets to his HIV provider and he had noted evolving fatigue and weight loss over the previous 2 months.  Bone marrow biopsy 2/8/16 showed a 90% cellular marrow with 50% blasts and background dysplasia.  His cytogenetics were complex with 18 metaphases with numeric and structural abnormalities including a 5q deletion, a 17p deletion (TP53 deletion), plus 1 to 18 double minutes, and 2 metaphases were a tetraploid subclone. FISH studies also confirmed the aforementioned deletions and indicated MYC amplification which are likely presented by double minutes.     He underwent standard induction therapy with 7&3 but his day 14 marrow done 2/22 showed a 40-80% cellular marrow with 35% blasts.  Hence, he began reinduction with MEC on 2/24/16 and repeat marrow done 3/9/16 showed only a 5% cellular marrow with only 5-6% CD34+ blasts.  He developed a rash and severe rectal pain complicating his therapy, though these have resolved now (finishing a steroid taper for his rash).     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  Cytogenetics 46,XY[20].  Hence, he achieved a complete remission prior to allogeneic transplant.  His transplant course (9/5 - 10/1) was as expected with significant mucositis related to conditioning and low counts with some GI issues which all resolved on count recovery.  No unexpected complications.  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     Relapsed AML in June 2017- peripheral blood shows 30% blasts on admit. 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 Blast of 62%, 19 had a complex karyotype including 5q deletion, 7q deletion, 17p deletion, and one  metaphase represented a near tetraploid subclone; FLT-3 negative. Jaw pain and pain in right cranial nerve 7 distribution with persistent, severe headaches concerning for CNS involvement - underwent IT chemotherapy on 6/23. Flow negative for disease. Received FLAG-BETZY re induction, day 37 at discharge. Day 14 bone marrow biopsy done 7/10 - hypocellular with no evidence of residual disease. Chimerics from marrow done 6/21 show CD3 of 90% donor and 10% recipient, but CD34 of 5% donor and 95% recipient Patient under DLI infusion in 8/14/17.    Histoplasmosis: Multiple pulmonary nodules seen on chest CT during his recent hospital stay and he was found to be urine histo antigen positive.  Now on itraconazole after a course of amphotericin B.     Pulmonology : BAL which was done 7/12, cytology pending from BAL; aspergillus <0.500; KOH was negative for yeast/fungusThoracentesis on 7/22 - cytology negative; repeat CXR 7/26 shows Ill-defined patchy airspace consolidation bilaterally more on the left side identified. Second Thoracentesis 7/26/17 with 1.3L removed. Trial of steroids per pulmonary discharged on Prednisone 40 mg daily     ID: Complicated antimicrobial treatment history as follows: - Cefepime: (started 6/24 - discontinued 6/28) restarted on 7/4/17 - 7/23 - Ampotericin B started 7/9-stopped 7/19; - Vancomycin (restarted 7/15 - stopped 7/17)   CXR showed pneumonia 7/15; CXR 7/17 - Significant patchy air space consolidation identified bilaterally slightly more on the left side. CT chest/abdomen/pelvis reveals bilateral groundglass opacities as well as a left lower-lobe consolidation.      Neurology: Several imaging studies have been performed, including CT head 7/21, MRI brain 7/21, and CTA head and neck 7/21 with interpreting radiologists citing concern for infarction of the right lentiform nucleus and anterior limb of the internal capsule.  Continue atorvastatin.      Cardiology: Supraventricular tachycardia on  tele and EKG. Started 7/13/17 8pm with HR between 160-170. Patient was cardioverted by cardiology and started on metoprolol and flecainide. Continue Flecainide 100 mg bid and increase Lopressor to 50 mg bid (lopressor decreased to 25 mg). He will see cardiology today (9/6/17) for medication adjustments.           Patient information was obtained from patient, past medical records and ER records.     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 He is now 4 weeks post level 1 DLI (1 x 10exp7 CD3/kg CD3 cells from his MUD).     From Dr Atkinson clinic visit 9/6/17: Since his DLI, he has been feeling well without new GI issues or rash. He is still tapering from steroids, completed prednisone 5 mg yesterday (for his previous pulmonary issues)  And will transition to hydrocortisone today. He denies pain and has weaned himself off from narcotics now.  His oral intake has again decreased (due to appetite and extreme fatigue) and he is losing weight again. Also notes dry mouth with he uses biotene for. Denies nausea, vomiting, SOB, coughing and chest pain. Energy level is very low again. Pt has been experiencing low blood pressures and is in contact with his cardiologist. Reports the highest temperature at home he has had was 100.2. Denies hard chills. Reports some night sweats. Today, temp is 99.4.      AML History:  He originally presented in early 2/2016 with low platelets to his HIV provider and he had noted evolving fatigue and weight loss over the previous 2 months.  Bone marrow biopsy 2/8/16 showed a 90% cellular marrow with 50% blasts and background dysplasia.  His cytogenetics were complex with 18 metaphases with numeric and structural abnormalities including a 5q deletion, a 17p deletion (TP53 deletion), plus 1  to 18 double minutes, and 2 metaphases were a tetraploid subclone. FISH studies also confirmed the aforementioned deletions and indicated MYC amplification which are likely presented by double minutes.     He underwent standard induction therapy with 7&3 but his day 14 marrow done 2/22 showed a 40-80% cellular marrow with 35% blasts.  Hence, he began reinduction with MEC on 2/24/16 and repeat marrow done 3/9/16 showed only a 5% cellular marrow with only 5-6% CD34+ blasts.  He developed a rash and severe rectal pain complicating his therapy, though these have resolved now (finishing a steroid taper for his rash).     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  Cytogenetics 46,XY[20].  Hence, he achieved a complete remission prior to allogeneic transplant.  His transplant course (9/5 - 10/1) was as expected with significant mucositis related to conditioning and low counts with some GI issues which all resolved on count recovery.  No unexpected complications.  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      Relapsed AML in June 2017- peripheral blood shows 30% blasts on admit. 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 Blast of 62%, 19 had a complex karyotype including 5q deletion, 7q deletion, 17p deletion, and one metaphase represented a near tetraploid subclone; FLT-3 negative. Jaw pain and pain in right cranial nerve 7 distribution with persistent, severe headaches concerning for CNS involvement - underwent IT chemotherapy on 6/23. Flow negative for disease. Received FLAG-BETZY re induction, day 37 at discharge. Day 14 bone marrow biopsy done 7/10 - hypocellular with no evidence of residual disease. Chimerics from marrow done 6/21 show CD3 of 90% donor and 10% recipient, but CD34 of 5% donor and 95% recipient Patient under DLI infusion in  8/14/17.       Prescriptions Prior to Admission   Medication Sig Dispense Refill Last Dose    abacavir-dolutegravir-lamivud (TRIUMEQ) 600- mg Tab Take 1 tablet by mouth once daily. 30 tablet 2 Taking    acyclovir (ZOVIRAX) 800 MG Tab Take 1 tablet (800 mg total) by mouth 2 (two) times daily. 60 tablet 11 Taking    aspirin (ECOTRIN) 81 MG EC tablet Take 1 tablet (81 mg total) by mouth once daily.  0     atorvastatin (LIPITOR) 20 MG tablet Take 1 tablet (20 mg total) by mouth once daily. 90 tablet 3 Taking    AZACITIDINE (VIDAZA INJ) Inject as directed.   Past Week    butalbital-acetaminophen-caffeine -40 mg (FIORICET, ESGIC) -40 mg per tablet   0 Taking    finasteride (PROSCAR) 5 mg tablet Take 1 tablet (5 mg total) by mouth once daily. 30 tablet 1 Taking    flecainide (TAMBOCOR) 100 MG Tab Take 1 tablet (100 mg total) by mouth every 12 (twelve) hours. 180 tablet 3 Taking    HYDROmorphone (DILAUDID) 2 MG tablet Take 1 tablet (2 mg total) by mouth every 6 (six) hours as needed for Pain. 60 tablet 0 Taking    L.acidophil,parac-S.therm-Bif. (RISAQUAD) Cap capsule Take 1 capsule by mouth once daily.       loperamide (IMODIUM) 2 mg capsule Take 2 mg by mouth daily as needed for Diarrhea.   Taking    lorazepam (ATIVAN) 1 MG tablet Take 1 tablet (1 mg total) by mouth every 8 (eight) hours as needed for Anxiety. 30 tablet 0 Taking    morphine (MS CONTIN) 30 MG 12 hr tablet Take 30 mg by mouth 2 (two) times daily.       ondansetron (ZOFRAN) 8 MG tablet Take 1 tablet (8 mg total) by mouth every 8 (eight) hours as needed for Nausea. 30 tablet 1 Taking    pantoprazole (PROTONIX) 40 MG tablet Take 1 tablet (40 mg total) by mouth once daily. 30 tablet 11 Taking    promethazine (PHENERGAN) 12.5 MG Tab Take 1 tablet (12.5 mg total) by mouth every 6 (six) hours as needed (nausea). 30 tablet 2 Taking    tamsulosin (FLOMAX) 0.4 mg Cp24 take 1 capsule by mouth once daily (Patient taking differently:  take 1 capsule by mouth every 36 hours) 30 capsule 11 Taking    voriconazole (VFEND) 200 MG Tab Take 1 and one half tablet (300 mg) every 12 hours 90 tablet 2 Taking    hydrocortisone (ANUSOL-HC) 25 mg suppository Place 1 suppository (25 mg total) rectally 2 (two) times daily. 12 suppository 1 Taking    hydrocortisone (CORTEF) 5 MG Tab Start 9/6. Take 10 mg twice a day x 1 week, then 10 mg AM & 5 mg PM x 1 week; then 5 mg twice a day x 1 week; then 5 mg in AM only x 1 week. 70 tablet 0 Unknown    metoprolol tartrate (LOPRESSOR) 25 MG tablet Take 1 tablet (25 mg total) by mouth 2 (two) times daily. 180 tablet 3 Not Taking    sulfamethoxazole-trimethoprim 800-160mg (BACTRIM DS) 800-160 mg Tab Take 1 tablet by mouth every Mon, Wed, Fri.    Taking       Etoposide     Past Medical History:   Diagnosis Date    Cancer 2/2016    ALL    HIV infection     Pancytopenia due to antineoplastic chemotherapy 7/4/2016     Past Surgical History:   Procedure Laterality Date    CYSTOSCOPY       Family History     Problem Relation (Age of Onset)    Cancer Father        Social History Main Topics    Smoking status: Never Smoker    Smokeless tobacco: Never Used    Alcohol use No    Drug use: No    Sexual activity: Not on file       Review of Systems   Constitutional: Positive for fever. Negative for activity change, appetite change, chills, diaphoresis, fatigue and unexpected weight change.   HENT: Negative for congestion, dental problem, mouth sores, nosebleeds, postnasal drip, rhinorrhea, sinus pressure, sore throat and trouble swallowing.    Eyes: Negative for photophobia and visual disturbance.   Respiratory: Negative for cough and shortness of breath.    Cardiovascular: Negative for chest pain, palpitations and leg swelling.   Gastrointestinal: Positive for abdominal pain, diarrhea and nausea. Negative for abdominal distention, blood in stool, constipation and vomiting.   Genitourinary: Negative for dysuria, frequency,  hematuria and urgency.   Musculoskeletal: Negative for arthralgias, back pain and myalgias.   Skin: Negative for pallor and rash.   Allergic/Immunologic: Negative for immunocompromised state.   Neurological: Positive for weakness. Negative for dizziness, syncope, numbness and headaches.   Hematological: Negative for adenopathy. Does not bruise/bleed easily.   Psychiatric/Behavioral: Negative for confusion. The patient is not nervous/anxious.      Objective:     Vital Signs (Most Recent):  Temp: 97.8 °F (36.6 °C) (09/13/17 0706)  Pulse: (!) 123 (09/13/17 0745)  Resp: 18 (09/13/17 0706)  BP: (!) 101/58 (09/13/17 0706)  SpO2: 99 % (09/13/17 0706) Vital Signs (24h Range):  Temp:  [97.8 °F (36.6 °C)-99.6 °F (37.6 °C)] 97.8 °F (36.6 °C)  Pulse:  [118-150] 123  Resp:  [18-23] 18  SpO2:  [96 %-99 %] 99 %  BP: ()/(51-63) 101/58     Weight: 77.1 kg (170 lb)  Body mass index is 22.43 kg/m².  Body surface area is 1.99 meters squared.    ECOG SCORE           Lines/Drains/Airways     Central Venous Catheter Line                 Percutaneous Central Line Insertion/Assessment - double lumen  -- days         Tunneled Central Line Insertion/Assessment - Double Lumen  07/24/17 1522 left subclavian 50 days          Peripheral Intravenous Line                 Peripheral IV - Single Lumen 09/13/17 0330 Right Antecubital less than 1 day         Peripheral IV - Single Lumen 09/13/17 0347 Right Wrist less than 1 day                Physical Exam   Constitutional: He is oriented to person, place, and time. He appears well-developed and well-nourished. No distress.   HENT:   Head: Normocephalic.   Mouth/Throat: No oropharyngeal exudate.   Dry mouth   Eyes: Conjunctivae are normal. Pupils are equal, round, and reactive to light. No scleral icterus.   Neck: Normal range of motion. Neck supple. Normal range of motion present. No thyromegaly present.   Cardiovascular: Normal rate, regular rhythm, normal heart sounds and intact distal pulses.     Pulmonary/Chest: Effort normal and breath sounds normal. No respiratory distress.   Abdominal: Soft. Bowel sounds are normal. He exhibits no distension. There is tenderness.   Musculoskeletal: Normal range of motion. He exhibits no edema or tenderness.   Lymphadenopathy:        Head (right side): No submental, no submandibular, no preauricular, no posterior auricular and no occipital adenopathy present.        Head (left side): No submental, no submandibular, no preauricular, no posterior auricular and no occipital adenopathy present.     He has no cervical adenopathy.     He has no axillary adenopathy.   Neurological: He is alert and oriented to person, place, and time. No cranial nerve deficit. Coordination normal.   Skin: Skin is warm and dry. No petechiae and no rash noted. No pallor.   Psychiatric: He has a normal mood and affect. Thought content normal.   Vitals reviewed.      Significant Labs:   CBC:   Recent Labs  Lab 09/13/17  0330   WBC 6.09   HGB 12.9*   HCT 38.0*   PLT 74*   , CMP:   Recent Labs  Lab 09/13/17  0330      K 2.7*      CO2 14*   *   BUN 21*   CREATININE 1.3   CALCIUM 8.1*   PROT 4.5*   ALBUMIN 2.0*   BILITOT 0.6   ALKPHOS 78   AST 12   ALT 7*   ANIONGAP 14   EGFRNONAA >60.0   , Coagulation:   Recent Labs  Lab 09/13/17  0330   INR 1.3*   , LDH: No results for input(s): LDHCSF, BFSOURCE in the last 48 hours. and Urine Studies: No results for input(s): COLORU, APPEARANCEUA, PHUR, SPECGRAV, PROTEINUA, GLUCUA, KETONESU, BILIRUBINUA, OCCULTUA, NITRITE, UROBILINOGEN, LEUKOCYTESUR, RBCUA, WBCUA, BACTERIA, SQUAMEPITHEL, HYALINECASTS in the last 48 hours.    Invalid input(s): WRIGHTSUR    Diagnostic Results:  I have reviewed all pertinent imaging results/findings within the past 24 hours.    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 Blast of 62%, 19 had a complex karyotype including 5q deletion, 7q deletion, 17p deletion, and one metaphase represented a near tetraploid subclone; FLT-3 negative     -- Had previous reaction to MEC (etoposide caused full body rash).   -- Re-induced with FLAG-BETZY   -- Day 14 bone marrow biopsy done 7/10 - hypocellular with no evidence of residual disease  -- Post DLI on 8/14/17  -- currently receiving Vidaza for 2 cycles given high risk disease per Dr. Atkinson note        S/P allogeneic bone marrow transplant    -- +366 days s/p Flu/Bu/ATG MUD allogeneic transplant for high risk AML after his second IDAC (6/20/16 - 6/24/16) after a prolonged hospitalization (2/5/16 - 3/21/16) for induction with 7&3 and reinduction with MEC to remission and IDAC (4/4/16 - 4/9/16)  -- Chimerics from marrow done 6/21 show CD3 of 90% donor and 10% recipient, but CD34 of 5% donor and 95% recipient   -- Chimers from 7/10 shows 70% recipient and 30% donor - NOT SORTED  -- Relpased 6/2017 and reinduced with FLAG BETZY  -- DLI 8/14/17, day +31          HIV disease       --  Continue triumeq. Will set up ID follow-up upon discharge             Functional diarrhea    - infectious vs GVHD  - C. Diff pending, stool cultures and OCP pending  - hold antidiarrheals studies result  - abdominal cramps and pain, will obtain CT abdomen/pelvis        CINV (chemotherapy-induced nausea and vomiting)     - zofran and Ativan PRN             Anemia due to chemotherapy    - hgb stable at 12.9 gm/dl         Chemotherapy-induced thrombocytopenia    - due to transplant and chemotherapy   - platelet count stable at 74,000        Hypokalemia    - Potassium 2.7  - received 8 K-riders this am            VTE Risk Mitigation         Ordered     enoxaparin injection 40 mg  Daily     Route:  Subcutaneous        09/13/17 0714     High Risk of VTE  Once      09/13/17 0714     Place RANJIT hose  Until discontinued      09/13/17 0613          Disposition: pending fever work-up and resolution of fever    Arminda Olivares NP  Bone Marrow Transplant  Hematology  [facility]

## 2017-09-13 NOTE — ASSESSMENT & PLAN NOTE
- infectious vs GVHD  - C. Diff pending, stool cultures and OCP pending  - hold antidiarrheals studies result  - abdominal cramps and pain, will obtain CT abdomen/pelvis

## 2017-09-13 NOTE — ASSESSMENT & PLAN NOTE
-- +366 days s/p Flu/Bu/ATG MUD allogeneic transplant for high risk AML after his second IDAC (6/20/16 - 6/24/16) after a prolonged hospitalization (2/5/16 - 3/21/16) for induction with 7&3 and reinduction with MEC to remission and IDAC (4/4/16 - 4/9/16)  -- Chimerics from marrow done 6/21 show CD3 of 90% donor and 10% recipient, but CD34 of 5% donor and 95% recipient   -- Chimers from 7/10 shows 70% recipient and 30% donor - NOT SORTED  -- Relpased 6/2017 and reinduced with FLAG BETZY  -- DLI 8/14/17, day +31

## 2017-09-13 NOTE — ASSESSMENT & PLAN NOTE
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 Blast of 62%, 19 had a complex karyotype including 5q deletion, 7q deletion, 17p deletion, and one metaphase represented a near tetraploid subclone; FLT-3 negative     -- Had previous reaction to MEC (etoposide caused full body rash).   -- Re-induced with FLAG-BETZY   -- Day 14 bone marrow biopsy done 7/10 - hypocellular with no evidence of residual disease  -- Post DLI on 8/14/17  -- currently receiving Vidaza for 2 cycles given high risk disease per Dr. Atkinson note

## 2017-09-13 NOTE — HOSPITAL COURSE
9/13: Patient admitted from the ED. Stool studies in process. Patient started Cefepime and Vanc and continues Vori, CT abdomen ordered. Electrolytes replaced. On Zofran and Ativan for nausea.   9/18: Flex sig done 9/15, c/w GVHD vs viral cause. CMV pending and path pending. 1 L stool output/1 L mixed output. Afebrile/tachycardic. On IV Methylprednisolone and po Budesonide.  9/19: stool output much improved, 200 cc out in the past 24 hours. Remains tachycardic (sinus tach on last EKG). Pain and appetite improving.   9/20: 1 episode of nausea overnight, 1 episode of diarrhea this am, started Valcyte 9/19 for CMV on stain from EGD, path showed grade II-III GVHD, methylprednisolone decreased to 40 mg bid (from 80 mg bid).  9/21: Afib with RVR this am, seen by CCS. Restarted home Flecainide 2L NS boluses given. Remained with HR in 180's around lunch time and seen by CCS and cardiology. Heparin gtt started and Metoprolol increased to 50 mg bid.  9/22: cardioverted at 9 pm yesterday at the bedside for 's and BP 66/47. ST after. Transferred to IVU. Was started on 2 pressors which were dc'd at 2 am and BP stable. Sepsis work-up intiated on ICU and started empiric Cefepime and Vancomycin. Continues on Heparin gtt. Patient with multiple liquid BM's in the past 24 hours. Received albumin and lasix overnight.   9/25/17: Patient stepped down to BMT. He remains on zosyn and vancomycin. He is on voriconazole and valganciclovir (repeat CMV in process) Last 24 hours with 2 L of stool output. Started on remicade weekly on 9/23. He does complain of a cough today. Wound care to look at skin breakdown on back.   9/26/17: Day 44 DLI, stool output 900 cc + 1 unmeasured, 1 bloody stool overnight. Acquiring Ibrutinib. Medications adjusted today by cardiology. BC with likely contaminant, Vanc dc'd. Continued Zosyn.   9/27/17: Day 45 DLI, confused overnight and lethargic this am. 2.6 L stool output overnight. Awaiting Ibrutinib.  Electrolyte abnormalities this am and creatinine up to 1.5 this am. Thrush noted to mouth and clortrimazole started.  9/28/17: Day 46 DLI. Significantly improved appearance from yesterday. 3.5 L stool output over the last 24 hours. Ibrutinib script sent to [facility]. Creatinine down to 1.2 after hydration yesterday.   9/29: Day 47 DLI. 3.6 L stool output over the past 24 hours. Creatinine improved to normal. Continues IVF at 125. Bili improved to 2.4 today (from 3.3).

## 2017-09-13 NOTE — ASSESSMENT & PLAN NOTE
- Temp of 102 at home, not neutropenic  - recently admitted for fevers at OSH and treated with antibiotics  - BC pending, UA pending, CXR with no acute findings, scarring in the RIGHT upper lobe and LEFT base similar to prior  - stool studies and CT abdomen ordered given nausea and diarrhea  - influenza negative  - On cefepime and Vanc and voriconazole (history of fungal lung infection)

## 2017-09-13 NOTE — PROGRESS NOTES
Admit Assessment    Patient Identification  Paul E S[address]   :  1960  Admit Date:  2017  Attending Provider:  Gabriela Howe MD              Referral:   Pt was admitted to  with a diagnosis of Sepsis, and was admitted this hospital stay due to Tachycardia [R00.0]  Sepsis, due to unspecified organism [A41.9]  Hypotension, unspecified hypotension type [I95.9].   is involved was referred to the Social Work Department via routine referral.  Patient presents as a 57 y.o. year old single male.    Persons interviewed: patient    Living Situation:  Pt. Resides at home alone. He states that his mother is his emergency contact (Latoya Sistrunk-[phone]). Pts. brother also lives nearby and can assist as needed.      Resides at  [address] [place] [address] [facility] [address], phone: [phone] (home).           Current or Past Agencies and Description of Services/Supplies    DME  Agency Name: none  Agency Phone Number: n/a        Home Health  Agency Name: none  Agency Phone Number: n/a  Services: n/a    IV Infusion  Agency Name: [facility] (in the past)  Agency Phone Number: [phone]    Nutrition: oral    Outpatient Pharmacy:     [facility] [place] [address] [facility]   [facility] [address]  Phone: [phone] Fax: [phone]    [facility] [id number] [facility] [address]  Phone: [phone] Fax: [phone]    [facility] [address] [facility]  [address] [facility] [address]  Phone: [phone] Fax: [phone]      Patient Preference of agencies include: [facility]    Patient/Caregiver informed of right to choose providers or agencies.  Patient provides permission to release any necessary information to [facility] and to [facility] agencies as needed to  facilitate patient care, treatment planning, and patient discharge planning.  Written and verbal resources provided.      Coping: pt. States that he has not been feeling well. Appears defeated with recent hospital stay. Family very supportive          Adjustment to Diagnosis and Treatment: appropriate      Emotional/Behavioral/Cognitive Issues: none noted            History/Current Symptoms of Anxiety/Depression: Yes  History/Current Substance Use:   Social History     Social History Main Topics    Smoking status: Never Smoker    Smokeless tobacco: Never Used    Alcohol use No    Drug use: No    Sexual activity: Not on file       Indications of Abuse/Neglect: No:   Abuse Screen  Do You Feel Unsafe at Home, Work or School?: no    Financial:  Payor/Plan Subscr  Sex Relation Sub. Ins. ID Effective Group Num                            Other identified concerns/needs: emotional support      Plan: to return home with help from family as needed. Pt. States that his mother will be able to assist as needed.    Interventions/Referrals: none at the present     Patient/caregiver engaged in treatment planning process.     providing psychosocial and supportive counseling, resources, education, assistance and discharge planning as appropriate.  Patient/caregiver state understanding of  available resources,  following, remains available. Provided pt. With roula'er phone # and has been encouraged to call if needed. Will follow.

## 2017-09-13 NOTE — ED PROVIDER NOTES
Encounter Date: 9/13/2017    SCRIBE #1 NOTE: I, Elis Sampson, am scribing for, and in the presence of,  Dr. Mirza. I have scribed the entire note.       History     Chief Complaint   Patient presents with    Emesis     Pt reports nausea/vomiting/diarrhea x1 week. Pt has been running low grade fever x1 week. Pt reports that temp was 102F at home tonight. Pt took 1g of tylenol at 0130.     Diarrhea    Fever Post Chemo     Time patient was seen by the provider: 3:33 AM      The patient is a 57 y.o. male with hx of: HIV and cancer that presents to the ED with a complaint of fever of 102 degrees that onset today after having one week of vomiting and diarrhea. He was taken to [facility] 1 week ago and was given antibiotics and started a round of Vidaza shots, he returned home yesterday. Associated sx include rapid HR, low BP, and feeling extremely weak. He denies any rhinorrhea, sore throat, cough, or burning during urination. He has not vomited in the last 14 hours.         The history is provided by the patient, medical records and a relative.     Review of patient's allergies indicates:   Allergen Reactions    Etoposide Rash     Past Medical History:   Diagnosis Date    Cancer 2/2016    ALL    HIV infection     Pancytopenia due to antineoplastic chemotherapy 7/4/2016     Past Surgical History:   Procedure Laterality Date    CYSTOSCOPY       Family History   Problem Relation Age of Onset    Cancer Father      Social History   Substance Use Topics    Smoking status: Never Smoker    Smokeless tobacco: Never Used    Alcohol use No     Review of Systems   Constitutional: Positive for fever.   HENT: Negative for nosebleeds, rhinorrhea and sore throat.    Eyes: Negative for visual disturbance.   Respiratory: Negative for cough and shortness of breath.    Cardiovascular: Negative for chest pain.   Gastrointestinal: Positive for diarrhea, nausea and vomiting.   Genitourinary: Negative for dysuria and  hematuria.   Musculoskeletal: Negative for back pain.   Skin: Negative for rash.   Neurological: Positive for weakness.       Physical Exam     Initial Vitals [09/13/17 0312]   BP Pulse Resp Temp SpO2   (!) 87/52 (!) 150 18 99.3 °F (37.4 °C) 99 %      MAP       63.67         Physical Exam    Nursing note and vitals reviewed.  Constitutional: He appears well-developed and well-nourished. He is not diaphoretic.   Unwell appearing.    HENT:   Head: Normocephalic and atraumatic.   Dry mucous membranes   Eyes: EOM are normal. Pupils are equal, round, and reactive to light.   Neck: Normal range of motion. Neck supple. No JVD present.   Cardiovascular: Regular rhythm, normal heart sounds and intact distal pulses. Tachycardia present.    Pulmonary/Chest: Breath sounds normal. No respiratory distress. He has no wheezes. He has no rhonchi. He has no rales. He exhibits no tenderness.   Left anterior chest wall port and line with no surrounding erythema. No tenderness.   Abdominal: There is tenderness. There is no rebound and no guarding.   Diffuse lower abdominal tenderness   Musculoskeletal: Normal range of motion. He exhibits no edema.   No peripheral edema   Lymphadenopathy:     He has no cervical adenopathy.   Neurological: He is alert and oriented to person, place, and time. No cranial nerve deficit or sensory deficit.   Skin: Skin is warm and dry.         ED Course   Procedures  Labs Reviewed   CBC W/ AUTO DIFFERENTIAL - Abnormal; Notable for the following:        Result Value    RBC 4.02 (*)     Hemoglobin 12.9 (*)     Hematocrit 38.0 (*)     MCH 32.1 (*)     RDW 19.3 (*)     Platelets 74 (*)     Gran% 36.0 (*)     Platelet Estimate Decreased (*)     All other components within normal limits   COMPREHENSIVE METABOLIC PANEL - Abnormal; Notable for the following:     Potassium 2.7 (*)     CO2 14 (*)     Glucose 132 (*)     BUN, Bld 21 (*)     Calcium 8.1 (*)     Total Protein 4.5 (*)     Albumin 2.0 (*)     ALT 7 (*)      All other components within normal limits   PROTIME-INR - Abnormal; Notable for the following:     Prothrombin Time 13.7 (*)     INR 1.3 (*)     All other components within normal limits   PROCALCITONIN - Abnormal; Notable for the following:     Procalcitonin 1.09 (*)     All other components within normal limits   TROPONIN I - Abnormal; Notable for the following:     Troponin I 0.037 (*)     All other components within normal limits   CULTURE, BLOOD   CULTURE, BLOOD   LACTIC ACID, PLASMA   INFLUENZA A AND B ANTIGEN   URINALYSIS, REFLEX TO URINE CULTURE     EKG Readings: (Independently Interpreted)   EKG: Supraventricular tachycardia at 156, no ST elevations or depressions       X-Rays:   Independently Interpreted Readings:   Other Readings:  Chest x-ray: No acute process    Medical Decision Making:   History:   Old Medical Records: I decided to obtain old medical records.  Old Records Summarized: records from previous admission(s).       <> Summary of Records: Recent admission to [facility] for similar episode  Initial Assessment:   Emergent evaluation of 57-year-old male presenting with fever, tachycardia, hypotension.  Patient has a history of AML currently on chemotherapy.  Concern is for sepsis, acute kidney injury, electrolyte derangement.  Labs ordered and reviewed.  Significant for hypokalemia.  No evidence of neutropenia.  Hemoglobin stable.  UA currently pending.     Heart rates improved after 2 L of IV fluids to 120s.  Blood pressures improved from 87 systolic to 110 systolic.  He appears improved after IV hydration.  Patient was also covered with broad-spectrum antibiotics.    Case discussed with oncology, Dr. Crockett.  Patient will be admitted for further treatment and evaluation to Dr. Howe's service.  I have discussed plan with patient and family at bedside, they express understanding.            Scribe Attestation:   Scribe #1: I performed the above scribed service and the documentation  accurately describes the services I performed. I attest to the accuracy of the note.    Attending Attestation:           Physician Attestation for Scribe:  Physician Attestation Statement for Scribe #1: I, Dr. Mirza, reviewed documentation, as scribed by Elis Sampson in my presence, and it is both accurate and complete.                 ED Course      Clinical Impression:   The primary encounter diagnosis was Sepsis, due to unspecified organism. Diagnoses of Tachycardia and Hypotension, unspecified hypotension type were also pertinent to this visit.    Disposition:   Disposition: Admitted  Condition: Darrin Mirza MD  09/13/17 0640

## 2017-09-13 NOTE — SUBJECTIVE & OBJECTIVE
Patient information was obtained from patient, past medical records and ER records.     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 He is now 4 weeks post level 1 DLI (1 x 10exp7 CD3/kg CD3 cells from his MUD).     From Dr Atkinson clinic visit 9/6/17: Since his DLI, he has been feeling well without new GI issues or rash. He is still tapering from steroids, completed prednisone 5 mg yesterday (for his previous pulmonary issues)  And will transition to hydrocortisone today. He denies pain and has weaned himself off from narcotics now.  His oral intake has again decreased (due to appetite and extreme fatigue) and he is losing weight again. Also notes dry mouth with he uses biotene for. Denies nausea, vomiting, SOB, coughing and chest pain. Energy level is very low again. Pt has been experiencing low blood pressures and is in contact with his cardiologist. Reports the highest temperature at home he has had was 100.2. Denies hard chills. Reports some night sweats. Today, temp is 99.4.      AML History:  He originally presented in early 2/2016 with low platelets to his HIV provider and he had noted evolving fatigue and weight loss over the previous 2 months.  Bone marrow biopsy 2/8/16 showed a 90% cellular marrow with 50% blasts and background dysplasia.  His cytogenetics were complex with 18 metaphases with numeric and structural abnormalities including a 5q deletion, a 17p deletion (TP53 deletion), plus 1 to 18 double minutes, and 2 metaphases were a tetraploid subclone. FISH studies also confirmed the aforementioned deletions and indicated MYC amplification which are likely presented by double minutes.     He underwent standard induction therapy with 7&3 but his day 14 marrow done 2/22 showed a 40-80% cellular  marrow with 35% blasts.  Hence, he began reinduction with MEC on 2/24/16 and repeat marrow done 3/9/16 showed only a 5% cellular marrow with only 5-6% CD34+ blasts.  He developed a rash and severe rectal pain complicating his therapy, though these have resolved now (finishing a steroid taper for his rash).     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  Cytogenetics 46,XY[20].  Hence, he achieved a complete remission prior to allogeneic transplant.  His transplant course (9/5 - 10/1) was as expected with significant mucositis related to conditioning and low counts with some GI issues which all resolved on count recovery.  No unexpected complications.  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      Relapsed AML in June 2017- peripheral blood shows 30% blasts on admit. 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 Blast of 62%, 19 had a complex karyotype including 5q deletion, 7q deletion, 17p deletion, and one metaphase represented a near tetraploid subclone; FLT-3 negative. Jaw pain and pain in right cranial nerve 7 distribution with persistent, severe headaches concerning for CNS involvement - underwent IT chemotherapy on 6/23. Flow negative for disease. Received FLAG-BETZY re induction, day 37 at discharge. Day 14 bone marrow biopsy done 7/10 - hypocellular with no evidence of residual disease. Chimerics from marrow done 6/21 show CD3 of 90% donor and 10% recipient, but CD34 of 5% donor and 95% recipient Patient under DLI infusion in 8/14/17.       Prescriptions Prior to Admission   Medication Sig Dispense Refill Last Dose    abacavir-dolutegravir-lamivud (TRIUMEQ) 600- mg Tab Take 1 tablet by mouth once daily. 30 tablet 2 Taking    acyclovir (ZOVIRAX) 800 MG Tab Take 1 tablet (800 mg total) by mouth 2 (two) times daily. 60 tablet 11 Taking     aspirin (ECOTRIN) 81 MG EC tablet Take 1 tablet (81 mg total) by mouth once daily.  0     atorvastatin (LIPITOR) 20 MG tablet Take 1 tablet (20 mg total) by mouth once daily. 90 tablet 3 Taking    AZACITIDINE (VIDAZA INJ) Inject as directed.   Past Week    butalbital-acetaminophen-caffeine -40 mg (FIORICET, ESGIC) -40 mg per tablet   0 Taking    finasteride (PROSCAR) 5 mg tablet Take 1 tablet (5 mg total) by mouth once daily. 30 tablet 1 Taking    flecainide (TAMBOCOR) 100 MG Tab Take 1 tablet (100 mg total) by mouth every 12 (twelve) hours. 180 tablet 3 Taking    HYDROmorphone (DILAUDID) 2 MG tablet Take 1 tablet (2 mg total) by mouth every 6 (six) hours as needed for Pain. 60 tablet 0 Taking    L.acidophil,parac-S.therm-Bif. (RISAQUAD) Cap capsule Take 1 capsule by mouth once daily.       loperamide (IMODIUM) 2 mg capsule Take 2 mg by mouth daily as needed for Diarrhea.   Taking    lorazepam (ATIVAN) 1 MG tablet Take 1 tablet (1 mg total) by mouth every 8 (eight) hours as needed for Anxiety. 30 tablet 0 Taking    morphine (MS CONTIN) 30 MG 12 hr tablet Take 30 mg by mouth 2 (two) times daily.       ondansetron (ZOFRAN) 8 MG tablet Take 1 tablet (8 mg total) by mouth every 8 (eight) hours as needed for Nausea. 30 tablet 1 Taking    pantoprazole (PROTONIX) 40 MG tablet Take 1 tablet (40 mg total) by mouth once daily. 30 tablet 11 Taking    promethazine (PHENERGAN) 12.5 MG Tab Take 1 tablet (12.5 mg total) by mouth every 6 (six) hours as needed (nausea). 30 tablet 2 Taking    tamsulosin (FLOMAX) 0.4 mg Cp24 take 1 capsule by mouth once daily (Patient taking differently: take 1 capsule by mouth every 36 hours) 30 capsule 11 Taking    voriconazole (VFEND) 200 MG Tab Take 1 and one half tablet (300 mg) every 12 hours 90 tablet 2 Taking    hydrocortisone (ANUSOL-HC) 25 mg suppository Place 1 suppository (25 mg total) rectally 2 (two) times daily. 12 suppository 1 Taking    hydrocortisone  (CORTEF) 5 MG Tab Start 9/6. Take 10 mg twice a day x 1 week, then 10 mg AM & 5 mg PM x 1 week; then 5 mg twice a day x 1 week; then 5 mg in AM only x 1 week. 70 tablet 0 Unknown    metoprolol tartrate (LOPRESSOR) 25 MG tablet Take 1 tablet (25 mg total) by mouth 2 (two) times daily. 180 tablet 3 Not Taking    sulfamethoxazole-trimethoprim 800-160mg (BACTRIM DS) 800-160 mg Tab Take 1 tablet by mouth every Mon, Wed, Fri.    Taking       Etoposide     Past Medical History:   Diagnosis Date    Cancer 2/2016    ALL    HIV infection     Pancytopenia due to antineoplastic chemotherapy 7/4/2016     Past Surgical History:   Procedure Laterality Date    CYSTOSCOPY       Family History     Problem Relation (Age of Onset)    Cancer Father        Social History Main Topics    Smoking status: Never Smoker    Smokeless tobacco: Never Used    Alcohol use No    Drug use: No    Sexual activity: Not on file       Review of Systems   Constitutional: Positive for fever. Negative for activity change, appetite change, chills, diaphoresis, fatigue and unexpected weight change.   HENT: Negative for congestion, dental problem, mouth sores, nosebleeds, postnasal drip, rhinorrhea, sinus pressure, sore throat and trouble swallowing.    Eyes: Negative for photophobia and visual disturbance.   Respiratory: Negative for cough and shortness of breath.    Cardiovascular: Negative for chest pain, palpitations and leg swelling.   Gastrointestinal: Positive for abdominal pain, diarrhea and nausea. Negative for abdominal distention, blood in stool, constipation and vomiting.   Genitourinary: Negative for dysuria, frequency, hematuria and urgency.   Musculoskeletal: Negative for arthralgias, back pain and myalgias.   Skin: Negative for pallor and rash.   Allergic/Immunologic: Negative for immunocompromised state.   Neurological: Positive for weakness. Negative for dizziness, syncope, numbness and headaches.   Hematological: Negative for  adenopathy. Does not bruise/bleed easily.   Psychiatric/Behavioral: Negative for confusion. The patient is not nervous/anxious.      Objective:     Vital Signs (Most Recent):  Temp: 97.8 °F (36.6 °C) (09/13/17 0706)  Pulse: (!) 123 (09/13/17 0745)  Resp: 18 (09/13/17 0706)  BP: (!) 101/58 (09/13/17 0706)  SpO2: 99 % (09/13/17 0706) Vital Signs (24h Range):  Temp:  [97.8 °F (36.6 °C)-99.6 °F (37.6 °C)] 97.8 °F (36.6 °C)  Pulse:  [118-150] 123  Resp:  [18-23] 18  SpO2:  [96 %-99 %] 99 %  BP: ()/(51-63) 101/58     Weight: 77.1 kg (170 lb)  Body mass index is 22.43 kg/m².  Body surface area is 1.99 meters squared.    ECOG SCORE           Lines/Drains/Airways     Central Venous Catheter Line                 Percutaneous Central Line Insertion/Assessment - double lumen  -- days         Tunneled Central Line Insertion/Assessment - Double Lumen  07/24/17 1522 left subclavian 50 days          Peripheral Intravenous Line                 Peripheral IV - Single Lumen 09/13/17 0330 Right Antecubital less than 1 day         Peripheral IV - Single Lumen 09/13/17 0347 Right Wrist less than 1 day                Physical Exam   Constitutional: He is oriented to person, place, and time. He appears well-developed and well-nourished. No distress.   HENT:   Head: Normocephalic.   Mouth/Throat: No oropharyngeal exudate.   Dry mouth   Eyes: Conjunctivae are normal. Pupils are equal, round, and reactive to light. No scleral icterus.   Neck: Normal range of motion. Neck supple. Normal range of motion present. No thyromegaly present.   Cardiovascular: Normal rate, regular rhythm, normal heart sounds and intact distal pulses.    Pulmonary/Chest: Effort normal and breath sounds normal. No respiratory distress.   Abdominal: Soft. Bowel sounds are normal. He exhibits no distension. There is tenderness.   Musculoskeletal: Normal range of motion. He exhibits no edema or tenderness.   Lymphadenopathy:        Head (right side): No submental, no  submandibular, no preauricular, no posterior auricular and no occipital adenopathy present.        Head (left side): No submental, no submandibular, no preauricular, no posterior auricular and no occipital adenopathy present.     He has no cervical adenopathy.     He has no axillary adenopathy.   Neurological: He is alert and oriented to person, place, and time. No cranial nerve deficit. Coordination normal.   Skin: Skin is warm and dry. No petechiae and no rash noted. No pallor.   Psychiatric: He has a normal mood and affect. Thought content normal.   Vitals reviewed.      Significant Labs:   CBC:   Recent Labs  Lab 09/13/17 0330   WBC 6.09   HGB 12.9*   HCT 38.0*   PLT 74*   , CMP:   Recent Labs  Lab 09/13/17 0330      K 2.7*      CO2 14*   *   BUN 21*   CREATININE 1.3   CALCIUM 8.1*   PROT 4.5*   ALBUMIN 2.0*   BILITOT 0.6   ALKPHOS 78   AST 12   ALT 7*   ANIONGAP 14   EGFRNONAA >60.0   , Coagulation:   Recent Labs  Lab 09/13/17 0330   INR 1.3*   , LDH: No results for input(s): LDHCSF, BFSOURCE in the last 48 hours. and Urine Studies: No results for input(s): COLORU, APPEARANCEUA, PHUR, SPECGRAV, PROTEINUA, GLUCUA, KETONESU, BILIRUBINUA, OCCULTUA, NITRITE, UROBILINOGEN, LEUKOCYTESUR, RBCUA, WBCUA, BACTERIA, SQUAMEPITHEL, HYALINECASTS in the last 48 hours.    Invalid input(s): [facility]    Diagnostic Results:  I have reviewed all pertinent imaging results/findings within the past 24 hours.

## 2017-09-13 NOTE — PLAN OF CARE
Problem: Patient Care Overview  Goal: Plan of Care Review  Outcome: Ongoing (interventions implemented as appropriate)  POC reviewed with patient; understanding verbalized. Pt received IV K and Mg replacement this shift. He has also received Cefepime and dose 2 of Vanc. Pt complains of stomach pain and has received PRN Dilaudid when available. He has also received AtivanX1 and ZofranX1 this shift. Cdiff sample sent to lab and contact isolation precautions initiated. Lactic acid drawn. Pt remains on tele in sinus tach. 2L O2. CT of abdomen and pelvis to be done this afternoon. Pt. with nonskid footwear on, bed in lowest position, and locked with bed rails up x2.  Pt. instructed to call prior to getting OOB; he is very weak.  Pt. has call light and personal items within reach. VSS and afebrile this shift. All questions and concerns address at this time. Will continue to monitor.

## 2017-09-14 PROBLEM — E83.39 HYPOPHOSPHATEMIA: Status: ACTIVE | Noted: 2017-01-01

## 2017-09-14 PROBLEM — E83.42 HYPOMAGNESEMIA: Status: ACTIVE | Noted: 2017-01-01

## 2017-09-14 NOTE — ASSESSMENT & PLAN NOTE
- no acute issues. Continue triumeq. Will set up an appointment with infectious disease upon discharge.

## 2017-09-14 NOTE — PLAN OF CARE
MDR's with Dr Hoew.  Patient has a pmhx of a MUD tx and now day+31 s/p DLI.  Admitted for sepsis.  Infectious workup in progress.  On IV abx.  Patient c/o abdominal pain and diarrhea.  GI consulted for a flex sig to r/o GVHD.  IV steroids started.  Will continue to follow for d/c needs.

## 2017-09-14 NOTE — PLAN OF CARE
Problem: Patient Care Overview  Goal: Plan of Care Review  Outcome: Ongoing (interventions implemented as appropriate)  POC reviewed with pt. Understanding verbalized. Pt received IV KCl and Mg replacements this shift. He has also received Cefepime and dose 1 of Vanc. Pt remains on tele-ST. Pt will be on a clear liquid diet today, and NPO at midnight for a sigmoidoscopy. Pt aware. Pt with nonskid footwear on, bed in lowest position, and locked with bed rails up x2.  Pt. instructed to call prior to getting OOB; he is very weak.  Pt. has call light and personal items within reach. Will continue to monitor.

## 2017-09-14 NOTE — ASSESSMENT & PLAN NOTE
"- infectious vs GVHD  - c diff testing is negative.  - CT abdomen reveals "diffuse abnormal appearance of the small and large bowel demonstrating fluid filled mildly prominent loops with associated submucosal edema and mucosal enhancement."   - will discuss with team.  "

## 2017-09-14 NOTE — ASSESSMENT & PLAN NOTE
"- Temp of 102 at home, not neutropenic  - Tmax over past 24 hours was 100.2  - c diff testing is negative  - cultures are negative so far.  - CT abdomen reveals "diffuse abnormal appearance of the small and large bowel demonstrating fluid filled mildly prominent loops with associated submucosal edema and mucosal enhancement."   - continue cefepime and vancomycin    "

## 2017-09-14 NOTE — SUBJECTIVE & OBJECTIVE
Past Medical History:   Diagnosis Date    Cancer 2/2016    ALL    HIV infection     Pancytopenia due to antineoplastic chemotherapy 7/4/2016       Past Surgical History:   Procedure Laterality Date    CYSTOSCOPY         Review of patient's allergies indicates:   Allergen Reactions    Etoposide Rash     Family History     Problem Relation (Age of Onset)    Cancer Father        Social History Main Topics    Smoking status: Never Smoker    Smokeless tobacco: Never Used    Alcohol use No    Drug use: No    Sexual activity: Not on file     Review of Systems   Constitutional: Positive for activity change, appetite change and fatigue.   HENT: Negative.    Respiratory: Negative.    Cardiovascular: Negative.    Gastrointestinal: Positive for abdominal pain, diarrhea, nausea and vomiting. Negative for abdominal distention, anal bleeding, blood in stool, constipation and rectal pain.   Endocrine: Negative.    Genitourinary: Negative.    Musculoskeletal: Negative.      Objective:     Vital Signs (Most Recent):  Temp: 98.3 °F (36.8 °C) (09/14/17 0733)  Pulse: (!) 112 (09/14/17 1100)  Resp: 18 (09/14/17 0733)  BP: 105/60 (09/14/17 0733)  SpO2: 98 % (09/14/17 0733) Vital Signs (24h Range):  Temp:  [98.3 °F (36.8 °C)-100.2 °F (37.9 °C)] 98.3 °F (36.8 °C)  Pulse:  [108-136] 112  Resp:  [18] 18  SpO2:  [96 %-98 %] 98 %  BP: ()/(60-63) 105/60     Weight: 76.9 kg (169 lb 8.5 oz) (09/14/17 0700)  Body mass index is 22.37 kg/m².      Intake/Output Summary (Last 24 hours) at 09/14/17 1147  Last data filed at 09/14/17 0945   Gross per 24 hour   Intake             1420 ml   Output                0 ml   Net             1420 ml       Lines/Drains/Airways     Central Venous Catheter Line                 Tunneled Central Line Insertion/Assessment - Double Lumen  07/24/17 1522 left subclavian 51 days          Peripheral Intravenous Line                 Peripheral IV - Single Lumen 09/13/17 0347 Right Wrist 1 day                 Physical Exam   Constitutional: He is oriented to person, place, and time. No distress.   HENT:   Head: Normocephalic.   Dry mucous membranes   Eyes: Pupils are equal, round, and reactive to light.   Neck: Normal range of motion.   Cardiovascular: Normal rate and regular rhythm.    Pulmonary/Chest: Effort normal and breath sounds normal.   Abdominal: Soft. Bowel sounds are normal.   Musculoskeletal: Normal range of motion. He exhibits edema.   Neurological: He is alert and oriented to person, place, and time.   Skin: Skin is warm. He is not diaphoretic.       Significant Labs:  CBC:   Recent Labs  Lab 09/13/17 0330 09/14/17 0445   WBC 6.09 3.92   HGB 12.9* 11.1*   HCT 38.0* 32.2*   PLT 74* 59*     CMP:   Recent Labs  Lab 09/14/17 0445   GLU 99   CALCIUM 7.5*   ALBUMIN 1.6*   PROT 3.7*   *   K 3.3*   CO2 13*   *   BUN 23*   CREATININE 1.0   ALKPHOS 62   ALT 5*   AST 8*   BILITOT 0.6     Coagulation:   Recent Labs  Lab 09/13/17 0330   INR 1.3*     Significant Imaging:  Imaging results within the past 24 hours have been reviewed.

## 2017-09-14 NOTE — ANESTHESIA PREPROCEDURE EVALUATION
09/14/2017  Pre-operative evaluation for Procedure(s) (LRB):  SIGMOIDOSCOPY-FLEXIBLE (N/A)    Paul E Sistrunk is a 57 y.o. male with a hx of HIV on HAART and AML relapses s/p stem cell transplantation 1 year ago who presents for ongoing diarrhea associated with non bilious emesis and inability to tolerate solid foods.      Undergone previous MAC w/o complications    R wrist 20g  L SC DL tunneled cath    Patient Active Problem List   Diagnosis    Prostate hypertrophy    HIV disease    Erectile dysfunction    Histoplasma capsulatum infection    S/P allogeneic bone marrow transplant    Anxiety    AML (acute myeloid leukemia) in relapse    AML (acute myeloblastic leukemia)    Electrolyte abnormality    Thrombocytopenia    Insomnia    Stroke of unknown etiology    Vascular catheter dysfunction    CINV (chemotherapy-induced nausea and vomiting)    Pulmonary infiltrates on CXR    Anemia due to chemotherapy    History of cardioversion    Sepsis    Tachycardia    Elevated troponin    Chest pain with minimal risk for cardiac etiology    Fever    Nausea    Chemotherapy-induced thrombocytopenia    Hypokalemia    Functional diarrhea    Hypomagnesemia    Hypophosphatemia       Review of patient's allergies indicates:   Allergen Reactions    Etoposide Rash       No current facility-administered medications on file prior to encounter.      Current Outpatient Prescriptions on File Prior to Encounter   Medication Sig Dispense Refill    abacavir-dolutegravir-lamivud (TRIUMEQ) 600- mg Tab Take 1 tablet by mouth once daily. 30 tablet 2    acyclovir (ZOVIRAX) 800 MG Tab Take 1 tablet (800 mg total) by mouth 2 (two) times daily. 60 tablet 11    aspirin (ECOTRIN) 81 MG EC tablet Take 1 tablet (81 mg total) by mouth once daily.  0    atorvastatin (LIPITOR) 20 MG tablet Take 1 tablet (20 mg  total) by mouth once daily. 90 tablet 3    butalbital-acetaminophen-caffeine -40 mg (FIORICET, ESGIC) -40 mg per tablet   0    finasteride (PROSCAR) 5 mg tablet Take 1 tablet (5 mg total) by mouth once daily. 30 tablet 1    flecainide (TAMBOCOR) 100 MG Tab Take 1 tablet (100 mg total) by mouth every 12 (twelve) hours. 180 tablet 3    HYDROmorphone (DILAUDID) 2 MG tablet Take 1 tablet (2 mg total) by mouth every 6 (six) hours as needed for Pain. 60 tablet 0    L.acidophil,parac-S.therm-Bif. (RISAQUAD) Cap capsule Take 1 capsule by mouth once daily.      loperamide (IMODIUM) 2 mg capsule Take 2 mg by mouth daily as needed for Diarrhea.      lorazepam (ATIVAN) 1 MG tablet Take 1 tablet (1 mg total) by mouth every 8 (eight) hours as needed for Anxiety. 30 tablet 0    ondansetron (ZOFRAN) 8 MG tablet Take 1 tablet (8 mg total) by mouth every 8 (eight) hours as needed for Nausea. 30 tablet 1    pantoprazole (PROTONIX) 40 MG tablet Take 1 tablet (40 mg total) by mouth once daily. 30 tablet 11    promethazine (PHENERGAN) 12.5 MG Tab Take 1 tablet (12.5 mg total) by mouth every 6 (six) hours as needed (nausea). 30 tablet 2    tamsulosin (FLOMAX) 0.4 mg Cp24 take 1 capsule by mouth once daily (Patient taking differently: take 1 capsule by mouth every 36 hours) 30 capsule 11    voriconazole (VFEND) 200 MG Tab Take 1 and one half tablet (300 mg) every 12 hours 90 tablet 2    hydrocortisone (ANUSOL-HC) 25 mg suppository Place 1 suppository (25 mg total) rectally 2 (two) times daily. 12 suppository 1    hydrocortisone (CORTEF) 5 MG Tab Start 9/6. Take 10 mg twice a day x 1 week, then 10 mg AM & 5 mg PM x 1 week; then 5 mg twice a day x 1 week; then 5 mg in AM only x 1 week. 70 tablet 0    metoprolol tartrate (LOPRESSOR) 25 MG tablet Take 1 tablet (25 mg total) by mouth 2 (two) times daily. 180 tablet 3    sulfamethoxazole-trimethoprim 800-160mg (BACTRIM DS) 800-160 mg Tab Take 1 tablet by mouth every  Mon, Wed, Fri.          Past Surgical History:   Procedure Laterality Date    CYSTOSCOPY         Social History     Social History    Marital status: Single     Spouse name: N/A    Number of children: 1    Years of education: N/A     Occupational History    Not on file.     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; Father  of AML 2016         Vital Signs Range (Last 24H):  Temp:  [36.8 °C (98.3 °F)-37.1 °C (98.8 °F)]   Pulse:  [108-160]   Resp:  [18]   BP: ()/(60-63)   SpO2:  [96 %-98 %]       CBC:   Recent Labs      170  175   WBC  6.09  3.92   RBC  4.02*  3.46*   HGB  12.9*  11.1*   HCT  38.0*  32.2*   PLT  74*  59*   MCV  95  93   MCH  32.1*  32.1*   MCHC  33.9  34.5       CMP:   Recent Labs      170  175   NA  137  134*   K  2.7*  3.3*   CL  109  113*   CO2  14*  13*   BUN  21*  23*   CREATININE  1.3  1.0   GLU  132*  99   MG  1.8  1.6   PHOS   --   2.2*   CALCIUM  8.1*  7.5*   ALBUMIN  2.0*  1.6*   PROT  4.5*  3.7*   ALKPHOS  78  62   ALT  7*  5*   AST  12  8*   BILITOT  0.6  0.6       INR  Recent Labs      17   INR  1.3*       Diagnostic Studies:  On admission stool studies were collected and so far he is negative for C diff and E coli but 1 stool culture is pending. He also had a CT scan last night which showed:    1. Diffuse abnormal appearance of the small and large bowel demonstrating fluid filled mildly prominent loops with associated submucosal edema and mucosal enhancement. In this patient with  reported history of prior bone marrow transplant, this may represent acute rrnep-rdrvxi-eind disease. Additional considerations include other infectious or inflammatory etiologies with ischemia thought less likely. No evidence of free intraperitoneal air or portal venous gas.  2. Small bilateral pleural effusions.  3. Probable bilateral renal cysts.  4. Innumerable small soft tissue nodules within the posterior subcutaneous soft tissues. These may relate to prior injection sites and clinical correlation is advised.    EKG:  Vent. Rate : 142 BPM     Atrial Rate : 142 BPM     P-R Int : 174 ms          QRS Dur : 108 ms      QT Int : 328 ms       P-R-T Axes : 000 -01 051 degrees     QTc Int : 504 ms    Sinus tachycardia  Right ventricular conduction delay  Nonspecific ST and T wave abnormality  When compared with ECG of 13-SEP-2017 03:22,  The axis Shifted left  T wave amplitude has decreased in Inferior leads  Confirmed by Roberto Mcdonald MD ([id number]) on 9/13/2017 5:25:47 PM    Referred By: [email or web address]   SELF           Confirmed By:Roberto Mcdonald MD    2D Echo:  CONCLUSIONS     1 - Normal left ventricular systolic function (EF 55-60%).     2 - Right ventricular enlargement with normal systolic function.     3 - Normal left ventricular diastolic function.     4 - No valvular abnormalities or evidence of endocarditis.     5 - Bilateral pleural effusion.     6 - Within the left pleural effusion, adjacent to the LV apex, there are mobile masses concerning for malignancy.  See clips 23-29 and 54.     This document has been electronically    SIGNED BY: Darell Loya MD On: 07/20/2017 15:47      Anesthesia Evaluation    I have reviewed the Patient Summary Reports.     I have reviewed the Medications.     Review of Systems  Anesthesia Hx:  No problems with previous Anesthesia  History of prior surgery of interest to airway management or planning: Denies Family Hx of Anesthesia complications.   Denies Personal Hx of Anesthesia  complications.   Hematology/Oncology:         -- Anemia: Hematology Comments: HIV  S/p allogenic stemcell transplant Current/Recent Cancer. Other (see Oncology comments) Oncology Comments: AML s/p allogenic stemcell transplant     EENT/Dental:EENT/Dental Normal   Cardiovascular:  Cardiovascular Normal     Pulmonary:  Pulmonary Normal    Renal/:  Renal/ Normal     Hepatic/GI:  Hepatic/GI Normal    Neurological:   CVA    Endocrine:  Endocrine Normal    Psych:  Psychiatric Normal           Physical Exam  General:  Well nourished    Airway/Jaw/Neck:  Airway Findings: Mouth Opening: Normal Tongue: Normal  General Airway Assessment: Adult  Mallampati: II  TM Distance: Normal, at least 6 cm      Dental:  Dental Findings: In tact   Chest/Lungs:  Chest/Lungs Findings: Normal Respiratory Rate     Heart/Vascular:  Heart Findings: Rate: Tachycardia  Rhythm: Regular Rhythm  Vascular Findings:  Vascular Access: Peripheral IV(s), Port-a-Cath        Mental Status:  Mental Status Findings:  Cooperative, Alert and Oriented         Anesthesia Plan  Type of Anesthesia, risks & benefits discussed:  Anesthesia Type:  general  Patient's Preference:   Intra-op Monitoring Plan: standard ASA monitors  Intra-op Monitoring Plan Comments:   Post Op Pain Control Plan:   Post Op Pain Control Plan Comments:   Induction:   IV  Beta Blocker:  Patient is not currently on a Beta-Blocker (No further documentation required).       Informed Consent: Patient understands risks and agrees with Anesthesia plan.  Questions answered. Anesthesia consent signed with patient.  ASA Score: 3     Day of Surgery Review of History & Physical:    H&P update referred to the surgeon.     Anesthesia Plan Notes: Hx of asymptomatic tachycardia.  Regular rhythm today.  Bp stable.  Plan propofol infusion.        Ready For Surgery From Anesthesia Perspective.

## 2017-09-14 NOTE — ASSESSMENT & PLAN NOTE
-- +367 days s/p Flu/Bu/ATG MUD allogeneic transplant for high risk AML after his second IDAC (6/20/16 - 6/24/16) after a prolonged hospitalization (2/5/16 - 3/21/16) for induction with 7&3 and reinduction with MEC to remission and IDAC (4/4/16 - 4/9/16)  -- Chimerics from marrow done 6/21 show CD3 of 90% donor and 10% recipient, but CD34 of 5% donor and 95% recipient   -- Chimers from 7/10 shows 70% recipient and 30% donor - NOT SORTED  -- Relpased 6/2017 and reinduced with FLAG BETZY  -- DLI 8/14/17, day +32

## 2017-09-14 NOTE — HPI
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     Patient states that last week he received Vidaza on Tuesday, Wednesday, and Thursday. On Wednesday he began to experience non-bloody diarrhea (3-5  BM per day) associated with nausea and bilious emesis. Furthermore he is unable to tolerate solids. His current diet consists of water, sprite, and jellow.    On admission stool studies were collected and so far he is negative for C diff and E coli but 1 stool culture is pending. He also had a CT scan last night which showed:  1. Diffuse abnormal appearance of the small and large bowel demonstrating fluid filled mildly prominent loops with associated submucosal edema and mucosal enhancement. In this patient with reported history of prior bone marrow transplant, this may represent acute fqalm-fvbooc-docf disease. Additional considerations include other infectious or inflammatory etiologies with ischemia thought less likely. No evidence of free intraperitoneal air or portal venous gas.  2. Small bilateral pleural effusions.  3. Probable bilateral renal cysts.  4. Innumerable small soft tissue nodules within the posterior subcutaneous soft tissues. These may relate to prior injection sites and clinical correlation is advised.

## 2017-09-14 NOTE — SIGNIFICANT EVENT
Critical Care Outreach (CORE)  Critical Care Medicine  Consult Note      CORE Metrics:     Admit Date: 2017  LOS: 1  Code Status: Full Code   CC: Artificial Intelligence Alert   Date of Consult: 2017  : 1960  Age: 57 y.o.  Weight:   Wt Readings from Last 1 Encounters:   17 76.9 kg (169 lb 8.5 oz)     Race:   Sex: male  Bed: [address] [place] [address] A:   MRN: [id number]  RRT Indication(s): Artificial Intelligence Alert  Time CORE Call Received: 12:17  Time CORE at Bedside: 12:19  Was the patient discharged from an ICU this admission? No   Was the patient discharged from a PACU within last 24 hours? No  Did the patient receive conscious sedation/general anesthesia within last 24 hours? No  Was the patient in the ED within the past 24 hours? No  Was the patient started on NIPPV within the past 24 hours? No  Did this progress into an ARC or CPA: No  Attending Physician: Gabriela Howe MD  Primary Service: [facility] HEMATOLOGY BMT  Illness Category: Medical Non-Cardiac  Consult Requested By: Gabriela Howe MD   Disposition: Stay on floor    SUBJECTIVE:     History of Present Illness: Pt with hx of AML and HIV who presents to [facility] with fever, N/V/D x 1 week. Thought to have sepsis vs GVHD. A rapid response was triggered via Epic automatically. Pt denies complaint.       Past Medical History:   Diagnosis Date    Cancer 2016    ALL    HIV infection     Pancytopenia due to antineoplastic chemotherapy 2016      Past Surgical History:   Procedure Laterality Date    CYSTOSCOPY        Review of patient's allergies indicates:   Allergen Reactions    Etoposide Rash       Family History   Problem Relation Age of Onset    Cancer Father       Social History   Substance Use Topics    Smoking status: Never Smoker    Smokeless tobacco: Never Used    Alcohol use No         Inpatient Medications:  Continuous Infusions:   sodium chloride 0.9% 100 mL/hr at 17 2133      Scheduled Meds:   abacavir  600 mg Oral Daily     And    dolutegravir  50 mg Oral Daily    And    lamivudine  300 mg Oral Daily    acyclovir  800 mg Oral BID    atorvastatin  20 mg Oral Daily    ceFEPime (MAXIPIME) IVPB  2 g Intravenous Q12H    methylPREDNISolone sodium succinate  80 mg Intravenous Daily    morphine  30 mg Oral BID    pantoprazole  40 mg Oral Daily    potassium chloride  10 mEq Intravenous Q1H    potassium, sodium phosphates  1 packet Oral QID (AC & HS)    sulfamethoxazole-trimethoprim 800-160mg  1 tablet Oral Every Mon, Wed, Fri    vancomycin (VANCOCIN) IVPB  15 mg/kg Intravenous Q12H    voriconazole  300 mg Oral BID      PRN Meds:.HYDROmorphone, lorazepam, ondansetron     Review of Systems:  Constitutional: See HPI. Denies weight loss, chills, or weakness.  Eyes: Denies changes in vision.  ENT: Denies dysphagia, nasal discharge, ear pain or discharge.  Cardiovascular: Denies chest pain, palpitations, orthopnea, or claudication.  Respiratory: Denies shortness of breath, cough, hemoptysis, or wheezing.  GI: See HPI.   : Denies dysuria, incontinence, or hematuria.  Musculoskeletal: Denies joint pain or myalgias.  Skin/breast: Denies rashes, lumps, lesions, or discharge.  Neurologic: Denies headache, dizziness, vertigo, or paresthesias.  Psychiatric: Denies changes in mood or hallucinations.  Endocrine: Denies polyuria, polydipsia, heat/cold intolerance.  Hematologic/Lymph: Denies lymphadenopathy, easy bruising or easy bleeding.  Allergic/Immunologic: Denies rash, rhinitis.     OBJECTIVE:     Vital Signs (Most Recent):  Temp: 98.3 °F (36.8 °C) (09/14/17 0733)  Pulse: (!) 160 (09/14/17 1212)  Resp: 18 (09/14/17 0733)  BP: 105/60 (09/14/17 0733)  SpO2: 98 % (09/14/17 0733) Vital Signs (24h Range):  Temp:  [98.3 °F (36.8 °C)-100.2 °F (37.9 °C)] 98.3 °F (36.8 °C)  Pulse:  [108-160] 160  Resp:  [18] 18  SpO2:  [96 %-98 %] 98 %  BP: ()/(60-63) 105/60     I & O (24h):    Intake/Output Summary (Last 24 hours) at 09/14/17 1229  Last  data filed at 09/14/17 0945   Gross per 24 hour   Intake             1420 ml   Output                0 ml   Net             1420 ml        Physical Exam:  GA: Alert, comfortable, no acute distress.   HEENT: Adequate dentition. No visible oropharyngeal abnormalities. No scleral icterus or JVD. No visible thyromegally.   Pulmonary: Chest wall symmetric. No accessory muscle use. No abnormalities on percussion. No tenderness to palpation. Clear to auscultation A/P/L bilaterally. No wheezing, crackles, or rhonchi.  Cardiac: Tachycardic. RRR S1 & S2 w/o rubs/murmurs/gallops. No lower extremity edema.   Abdominal: Bowel sounds present x 4. No appreciable hepatosplenomegaly.  Skin: No jaundice, rashes, or visible lesions.  Lymphatic: No cervical or inguinal lymphadenopathy.  Musculoskeletal: Moves all extremities 5/5.  Extremities: No clubbing or cyanosis.  Neuro:  --GCS: E4 V5 M56  --Mental Status:  A&O x 4  Lines/Drains/Airway:       Tunneled Central Line Insertion/Assessment - Double Lumen  07/24/17 1522 left subclavian (Active)   Dressing biopatch in place;dressing dry and intact 9/14/2017  7:33 AM   Distal Patency/Care flushed w/o difficulty;normal saline locked 9/14/2017  7:33 AM   Proximal Patency/Care flushed w/o difficulty;infusing 9/14/2017  7:33 AM   Line Interventions blood specimen obtained and sent to lab 9/14/2017  4:43 AM   Daily Line Review Performed 9/14/2017  7:33 AM           Nutrition:  Current Diet Order   Procedures    Diet clear liquid    Diet NPO         Labs:  CBC/Anemia Profile:     Recent Labs  Lab 09/10/17  0430 09/13/17  0330 09/14/17  0445   WBC 3.98 6.09 3.92   HGB 10.1* 12.9* 11.1*   HCT 30.4* 38.0* 32.2*   PLT 82* 74* 59*   MCV 99* 95 93   RDW 19.7* 19.3* 19.2*        Coags:     Recent Labs  Lab 09/07/17 1911 09/13/17  0330   INR 1.1 1.3*   APTT 27.5  --         Chemistries:     Recent Labs  Lab 09/07/17 1911 09/08/17  0129 09/10/17  0430 09/13/17  0330 09/14/17  0445   * 134*  134* 137 134*   K 4.0 3.7 3.4* 2.7* 3.3*    108 110 109 113*   CO2 24 22 20* 14* 13*   BUN 29* 25* 15 21* 23*   CREATININE 1.33 1.20 0.87 1.3 1.0   CALCIUM 9.1 8.4 8.1* 8.1* 7.5*   PROT 6.3 5.1*  --  4.5* 3.7*   BILITOT 0.6 0.5  --  0.6 0.6   ALKPHOS 115 84  --  78 62   ALT 33 37  --  7* 5*   AST 34 26  --  12 8*   MG 2.0 1.9  --  1.8 1.6   PHOS 4.2  --   --   --  2.2*        Urine Studies:   Lab Results   Component Value Date    COLORU Yellow 09/08/2017    APPEARANCEUA Clear 09/08/2017    PHUR 6.0 09/08/2017    SPECGRAV >=1.030 (A) 09/08/2017    PROTEINUA Negative 09/08/2017    GLUCUA Negative 09/08/2017    KETONESU Negative 09/08/2017    BILIRUBINUA Negative 09/08/2017    OCCULTUA Negative 09/08/2017    NITRITE Negative 09/08/2017    UROBILINOGEN Negative 09/08/2017    LEUKOCYTESUR Negative 09/08/2017    RBCUA 2 07/15/2017    WBCUA 1 07/15/2017    BACTERIA None 07/15/2017    SQUAMEPITHEL 0 07/15/2017    HYALINECASTS 0 07/15/2017        Lactic Acid:   Lab Results   Component Value Date    LACTATE 1.5 09/13/2017    LACTATE 2.2 09/13/2017    LACTATE 1.0 09/07/2017        Cardiac Enzymes:   Recent Labs      09/13/17   0330   TROPONINI  0.037*        Arterial Blood Gas:   No results for input(s): PH, PCO2, HCO3, POCSATURATED, BE in the last 24 hours.    Invalid input(s):  PO2     Microbiology Results (last 7 days)     Procedure Component Value Units Date/Time    Stool culture [[id number]] Collected:  09/13/17 1230    Order Status:  Completed Specimen:  Stool from Stool Updated:  09/14/17 1209     Stool Culture Culture in progress    E. coli 0157 antigen [[id number]] Collected:  09/13/17 1230    Order Status:  Completed Specimen:  Stool from Stool Updated:  09/14/17 1052     Shiga Toxin 1 E.coli Negative     Shiga Toxin 2 E.coli Negative    Blood culture x two cultures. Draw prior to antibiotics. [[id number]] Collected:  09/13/17 0330    Order Status:  Completed Specimen:  Blood from Peripheral, Antecubital, Right  Updated:  09/14/17 0622     Blood Culture, Routine No Growth to date     Blood Culture, Routine No Growth to date    Narrative:       Aerobic and anaerobic    Blood culture x two cultures. Draw prior to antibiotics. [[id number]] Collected:  09/13/17 0347    Order Status:  Completed Specimen:  Blood from Peripheral, Forearm, Right Updated:  09/14/17 0622     Blood Culture, Routine No Growth to date     Blood Culture, Routine No Growth to date    Narrative:       Aerobic and anaerobic    Clostridium difficile EIA [[id number]] Collected:  09/13/17 1230    Order Status:  Completed Specimen:  Stool from Stool Updated:  09/13/17 2140     C. diff Antigen Negative     C difficile Toxins A+B, EIA Negative     Comment: Testing not recommended for children <24 months old.             Imaging:       Diagnostics/Interventions by CORE:     None    ASSESSMENT/PLAN:     Active Hospital Problems    Diagnosis    *Sepsis    Hypomagnesemia    Hypophosphatemia    Chemotherapy-induced thrombocytopenia    Hypokalemia    Functional diarrhea    Anemia due to chemotherapy    CINV (chemotherapy-induced nausea and vomiting)    AML (acute myeloid leukemia) in relapse    S/P allogeneic bone marrow transplant    Histoplasma capsulatum infection    HIV disease          Artificial Intelligence Alert:  --No care rendered.   --Would keep an eye on his bicarb (dropping) and HR as signs of worsening sepsis.   --Call back if needed.     SHIRA Gómez PA-C  Critical Care Medicine  [phone]

## 2017-09-14 NOTE — SUBJECTIVE & OBJECTIVE
Subjective:     Interval History:   - patient underwent CT abdomen/pelvis last night.   - Tmax was 100.2 at 1500 hrs on 9/13/17.  - he states that he is feeling somewhat better. He still complains of diffuse lower abdominal pain and diarrhea. He denies shortness of breath, chest pain, nausea, vomiting.    Objective:     Vital Signs (Most Recent):  Temp: 98.3 °F (36.8 °C) (09/14/17 0733)  Pulse: (!) 119 (09/14/17 0733)  Resp: 18 (09/14/17 0733)  BP: 105/60 (09/14/17 0733)  SpO2: 98 % (09/14/17 0733) Vital Signs (24h Range):  Temp:  [97.9 °F (36.6 °C)-100.2 °F (37.9 °C)] 98.3 °F (36.8 °C)  Pulse:  [108-140] 119  Resp:  [18] 18  SpO2:  [96 %-98 %] 98 %  BP: ()/(60-67) 105/60     Weight: 76.9 kg (169 lb 8.5 oz)  Body mass index is 22.37 kg/m².  Body surface area is 1.99 meters squared.    ECOG SCORE         [unfilled]    Intake/Output - Last 3 Shifts       09/12 0700 - 09/13 0659 09/13 0700 - 09/14 0659 09/14 0700 - 09/15 0659    P.O.  100     I.V. (mL/kg)  870 (11.3)     IV Piggyback  1350     Total Intake(mL/kg)  2320 (30.1)     Net   +2320             Urine Occurrence  2 x     Stool Occurrence  2 x 0 x          Physical Exam   Constitutional: He is oriented to person, place, and time. He appears well-developed and well-nourished. No distress.   HENT:   Head: Normocephalic.   Mouth/Throat: No oropharyngeal exudate.   Dry mouth   Eyes: Conjunctivae are normal. Pupils are equal, round, and reactive to light. No scleral icterus.   Neck: Normal range of motion. Neck supple. Normal range of motion present. No thyromegaly present.   Cardiovascular: Normal rate, regular rhythm, normal heart sounds and intact distal pulses.    Pulmonary/Chest: Effort normal and breath sounds normal. No respiratory distress.   Abdominal: Soft. Bowel sounds are normal. He exhibits no distension. There is tenderness.   Musculoskeletal: Normal range of motion. He exhibits no edema or tenderness.   Lymphadenopathy:        Head (right side):  No submental, no submandibular, no preauricular, no posterior auricular and no occipital adenopathy present.        Head (left side): No submental, no submandibular, no preauricular, no posterior auricular and no occipital adenopathy present.     He has no cervical adenopathy.     He has no axillary adenopathy.   Neurological: He is alert and oriented to person, place, and time. No cranial nerve deficit. Coordination normal.   Skin: Skin is warm and dry. No petechiae and no rash noted. No pallor.   Psychiatric: He has a normal mood and affect. Thought content normal.   Vitals reviewed.    Significant Labs:   Labs have been reviewed.    CT abdomen/pelvis (9/13/17):  1. Diffuse abnormal appearance of the small and large bowel demonstrating fluid filled mildly prominent loops with associated submucosal edema and mucosal enhancement. In this patient with reported history of prior bone marrow transplant, this may represent acute oqcem-ymvdib-gpau disease. Additional considerations include other infectious or inflammatory etiologies with ischemia thought less likely. No evidence of free intraperitoneal air or portal venous gas.    2. Small bilateral pleural effusions.    3. Probable bilateral renal cysts.    4. Innumerable small soft tissue nodules within the posterior subcutaneous soft tissues. These may relate to prior injection sites and clinical correlation is advised.

## 2017-09-14 NOTE — PROGRESS NOTES
[facility]  Hematology  Bone Marrow Transplant  Progress Note    Patient Name: Paul E Sistrunk  Admission Date: 9/13/2017  Hospital Length of Stay: 1 days  Code Status: Full Code    Subjective:     Interval History:   - patient underwent CT abdomen/pelvis last night.   - Tmax was 100.2 at 1500 hrs on 9/13/17.  - he states that he is feeling somewhat better. He still complains of diffuse lower abdominal pain and diarrhea. He denies shortness of breath, chest pain, nausea, vomiting.    Objective:     Vital Signs (Most Recent):  Temp: 98.3 °F (36.8 °C) (09/14/17 0733)  Pulse: (!) 119 (09/14/17 0733)  Resp: 18 (09/14/17 0733)  BP: 105/60 (09/14/17 0733)  SpO2: 98 % (09/14/17 0733) Vital Signs (24h Range):  Temp:  [97.9 °F (36.6 °C)-100.2 °F (37.9 °C)] 98.3 °F (36.8 °C)  Pulse:  [108-140] 119  Resp:  [18] 18  SpO2:  [96 %-98 %] 98 %  BP: ()/(60-67) 105/60     Weight: 76.9 kg (169 lb 8.5 oz)  Body mass index is 22.37 kg/m².  Body surface area is 1.99 meters squared.    ECOG SCORE         [unfilled]    Intake/Output - Last 3 Shifts       09/12 0700 - 09/13 0659 09/13 0700 - 09/14 0659 09/14 0700 - 09/15 0659    P.O.  100     I.V. (mL/kg)  870 (11.3)     IV Piggyback  1350     Total Intake(mL/kg)  2320 (30.1)     Net   +2320             Urine Occurrence  2 x     Stool Occurrence  2 x 0 x          Physical Exam   Constitutional: He is oriented to person, place, and time. He appears well-developed and well-nourished. No distress.   HENT:   Head: Normocephalic.   Mouth/Throat: No oropharyngeal exudate.   Dry mouth   Eyes: Conjunctivae are normal. Pupils are equal, round, and reactive to light. No scleral icterus.   Neck: Normal range of motion. Neck supple. Normal range of motion present. No thyromegaly present.   Cardiovascular: Normal rate, regular rhythm, normal heart sounds and intact distal pulses.    Pulmonary/Chest: Effort normal and breath sounds normal. No respiratory distress.   Abdominal:  "Soft. Bowel sounds are normal. He exhibits no distension. There is tenderness.   Musculoskeletal: Normal range of motion. He exhibits no edema or tenderness.   Lymphadenopathy:        Head (right side): No submental, no submandibular, no preauricular, no posterior auricular and no occipital adenopathy present.        Head (left side): No submental, no submandibular, no preauricular, no posterior auricular and no occipital adenopathy present.     He has no cervical adenopathy.     He has no axillary adenopathy.   Neurological: He is alert and oriented to person, place, and time. No cranial nerve deficit. Coordination normal.   Skin: Skin is warm and dry. No petechiae and no rash noted. No pallor.   Psychiatric: He has a normal mood and affect. Thought content normal.   Vitals reviewed.    Significant Labs:   Labs have been reviewed.    CT abdomen/pelvis (9/13/17):  1. Diffuse abnormal appearance of the small and large bowel demonstrating fluid filled mildly prominent loops with associated submucosal edema and mucosal enhancement. In this patient with reported history of prior bone marrow transplant, this may represent acute xufvg-oeheuj-kghl disease. Additional considerations include other infectious or inflammatory etiologies with ischemia thought less likely. No evidence of free intraperitoneal air or portal venous gas.    2. Small bilateral pleural effusions.    3. Probable bilateral renal cysts.    4. Innumerable small soft tissue nodules within the posterior subcutaneous soft tissues. These may relate to prior injection sites and clinical correlation is advised.    Assessment/Plan:     * Sepsis    - Temp of 102 at home, not neutropenic  - Tmax over past 24 hours was 100.2  - c diff testing is negative  - cultures are negative so far.  - CT abdomen reveals "diffuse abnormal appearance of the small and large bowel demonstrating fluid filled mildly prominent loops with associated submucosal edema and mucosal " "enhancement."   - continue cefepime and vancomycin          AML (acute myeloid leukemia) in relapse    -- Relapsed AML - peripheral blood shows 30% blasts 6/2017   -- BM biopsy results - consistent with 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 Blast of 62%, 19 had a complex karyotype including 5q deletion, 7q deletion, 17p deletion, and one metaphase represented a near tetraploid subclone; FLT-3 negative     -- Had previous reaction to MEC (etoposide caused full body rash).   -- Re-induced with FLAG-BETZY   -- Day 14 bone marrow biopsy done 7/10 - hypocellular with no evidence of residual disease  -- Post DLI on 8/14/17  -- currently receiving Vidaza for 2 cycles given high risk disease per Dr. Atkinson note        S/P allogeneic bone marrow transplant    -- +367 days s/p Flu/Bu/ATG MUD allogeneic transplant for high risk AML after his second IDAC (6/20/16 - 6/24/16) after a prolonged hospitalization (2/5/16 - 3/21/16) for induction with 7&3 and reinduction with MEC to remission and IDAC (4/4/16 - 4/9/16)  -- Chimerics from marrow done 6/21 show CD3 of 90% donor and 10% recipient, but CD34 of 5% donor and 95% recipient   -- Chimers from 7/10 shows 70% recipient and 30% donor - NOT SORTED  -- Relpased 6/2017 and reinduced with FLAG BETZY  -- DLI 8/14/17, day +32          HIV disease    - no acute issues. Continue triumeq. Will set up an appointment with infectious disease upon discharge.          Chemotherapy-induced thrombocytopenia    - due to transplant and chemotherapy   - platelet count today is 59 k/uL  - continue to monitor.        Anemia due to chemotherapy    - hemoglobin today is 11.1 g/dL  - continue to monitor.        Hypokalemia    - Potassium 3.3  - I will order replacement today.        CINV (chemotherapy-induced nausea and vomiting)    - no acute issues  - continue as-need antiemetics          Hypophosphatemia    - I have ordered replacement.        Hypomagnesemia    - I have " "ordered replacement.        Functional diarrhea    - infectious vs GVHD  - c diff testing is negative.  - CT abdomen reveals "diffuse abnormal appearance of the small and large bowel demonstrating fluid filled mildly prominent loops with associated submucosal edema and mucosal enhancement."   - will discuss with team.            VTE Risk Mitigation         Ordered     High Risk of VTE  Once      09/13/17 0714     Place RANJIT hose  Until discontinued      09/13/17 0613        Disposition: continue antibiotics. Follow up cultures. Consider further evaluation of gastrointestinal system.    Edilson Crockett MD  Bone Marrow Transplant  [facility]      "

## 2017-09-14 NOTE — PROGRESS NOTES
Spoke with MD Nick with BMT about pt going in and out of ST [facility]. MD's are all aware. No new orders at present.

## 2017-09-14 NOTE — CONSULTS
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     Patient states that last week he received Vidaza on Tuesday, Wednesday, and Thursday. On Wednesday he began to experience non-bloody diarrhea (3-5  BM per day) associated with nausea and bilious emesis. Furthermore he is unable to tolerate solids. His current diet consists of water, sprite, and jellow.    On admission stool studies were collected and so far he is negative for C diff and E coli but 1 stool culture is pending. He also had a CT scan last night which showed:  1. Diffuse abnormal appearance of the small and large bowel demonstrating fluid filled mildly prominent loops with associated submucosal edema and mucosal enhancement. In this patient with reported history of prior bone marrow transplant, this may represent acute qrwvg-eplukr-mpmi disease. Additional considerations include other infectious or inflammatory etiologies with ischemia thought less likely. No evidence of free intraperitoneal air or portal venous gas.  2. Small bilateral pleural effusions.  3. Probable bilateral renal cysts.  4. Innumerable small soft tissue nodules within the posterior subcutaneous soft tissues. These may relate to prior injection sites and clinical correlation is advised.          Past Medical History:   Diagnosis Date    Cancer 2/2016     ALL    HIV infection     Pancytopenia due to antineoplastic chemotherapy 7/4/2016       Past Surgical History:   Procedure Laterality Date    CYSTOSCOPY         Review of patient's allergies indicates:   Allergen Reactions    Etoposide Rash     Family History     Problem Relation (Age of Onset)    Cancer Father        Social History Main Topics    Smoking status: Never Smoker    Smokeless tobacco: Never Used    Alcohol use No    Drug use: No    Sexual activity: Not on file     Review of Systems   Constitutional: Positive for activity change, appetite change and fatigue.   HENT: Negative.    Respiratory: Negative.    Cardiovascular: Negative.    Gastrointestinal: Positive for abdominal pain, diarrhea, nausea and vomiting. Negative for abdominal distention, anal bleeding, blood in stool, constipation and rectal pain.   Endocrine: Negative.    Genitourinary: Negative.    Musculoskeletal: Negative.      Objective:     Vital Signs (Most Recent):  Temp: 98.3 °F (36.8 °C) (09/14/17 0733)  Pulse: (!) 112 (09/14/17 1100)  Resp: 18 (09/14/17 0733)  BP: 105/60 (09/14/17 0733)  SpO2: 98 % (09/14/17 0733) Vital Signs (24h Range):  Temp:  [98.3 °F (36.8 °C)-100.2 °F (37.9 °C)] 98.3 °F (36.8 °C)  Pulse:  [108-136] 112  Resp:  [18] 18  SpO2:  [96 %-98 %] 98 %  BP: ()/(60-63) 105/60     Weight: 76.9 kg (169 lb 8.5 oz) (09/14/17 0700)  Body mass index is 22.37 kg/m².      Intake/Output Summary (Last 24 hours) at 09/14/17 1147  Last data filed at 09/14/17 0945   Gross per 24 hour   Intake             1420 ml   Output                0 ml   Net             1420 ml       Lines/Drains/Airways     Central Venous Catheter Line                 Tunneled Central Line Insertion/Assessment - Double Lumen  07/24/17 1522 left subclavian 51 days          Peripheral Intravenous Line                 Peripheral IV - Single Lumen 09/13/17 0347 Right Wrist 1 day                Physical Exam   Constitutional: He is oriented to person,  place, and time. No distress.   HENT:   Head: Normocephalic.   Dry mucous membranes   Eyes: Pupils are equal, round, and reactive to light.   Neck: Normal range of motion.   Cardiovascular: Normal rate and regular rhythm.    Pulmonary/Chest: Effort normal and breath sounds normal.   Abdominal: Soft. Bowel sounds are normal.   Musculoskeletal: Normal range of motion. He exhibits edema.   Neurological: He is alert and oriented to person, place, and time.   Skin: Skin is warm. He is not diaphoretic.       Significant Labs:  CBC:   Recent Labs  Lab 09/13/17  0330 09/14/17 0445   WBC 6.09 3.92   HGB 12.9* 11.1*   HCT 38.0* 32.2*   PLT 74* 59*     CMP:   Recent Labs  Lab 09/14/17 0445   GLU 99   CALCIUM 7.5*   ALBUMIN 1.6*   PROT 3.7*   *   K 3.3*   CO2 13*   *   BUN 23*   CREATININE 1.0   ALKPHOS 62   ALT 5*   AST 8*   BILITOT 0.6     Coagulation:   Recent Labs  Lab 09/13/17 0330   INR 1.3*     Significant Imaging:  Imaging results within the past 24 hours have been reviewed.    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 Patient continues to have nausea/emesis/diarrhea yet at this point stool studies have been negative with CT scan showing non-specific findings. Diarrhea could be secondary to Vidaza versus ?GVHD.    Plan:  -CLD diet now and NPO after MN  -Flex sig in the morning (patient will need to enema prior to procedure-will be ordered by GI fellow)            Thank you for your consult. I will follow-up with patient. Please contact us if you have any additional questions.    Francis Hdz M.D.  Gastroenterology Fellow, PGY-IV  Pager: [phone]  [facility]  I was present with the fellow during the above evaluation, including history and exam.  I discussed the case with the  fellow and agree with the findings and plan as documented in the fellow's note.

## 2017-09-14 NOTE — ASSESSMENT & PLAN NOTE
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 Patient continues to have nausea/emesis/diarrhea yet at this point stool studies have been negative with CT scan showing non-specific findings. Diarrhea could be secondary to Vidaza versus ?GVHD.    Plan:  -CLD diet now and NPO after MN  -Flex sig in the morning (patient will need to enema prior to procedure-will be ordered by GI fellow)

## 2017-09-14 NOTE — PROGRESS NOTES
Pt still c/o of pain, N/V. Pt has been told that we need a urine sample, but he says he keeps forgetting, and just flushes, even thought he cup is in the bathroom. Will continue trying to get sample.

## 2017-09-15 PROBLEM — E83.42 HYPOMAGNESEMIA: Status: RESOLVED | Noted: 2017-01-01 | Resolved: 2017-01-01

## 2017-09-15 PROBLEM — E83.39 HYPOPHOSPHATEMIA: Status: RESOLVED | Noted: 2017-01-01 | Resolved: 2017-01-01

## 2017-09-15 PROBLEM — D89.813 GRAFT-VERSUS-HOST DISEASE: Status: ACTIVE | Noted: 2017-01-01

## 2017-09-15 PROBLEM — E87.6 HYPOKALEMIA: Status: RESOLVED | Noted: 2017-01-01 | Resolved: 2017-01-01

## 2017-09-15 NOTE — PATIENT INSTRUCTIONS
Discharge Summary/Instructions after an Endoscopic Procedure  Patient Name: Paul Sistrunk  Patient MRN: [id number]  Patient YOB: 1960  Friday, September 15, 2017  Daniel Green MD  RESTRICTIONS:  During your procedure today, you received medications for sedation.  These   medications may affect your judgment, balance and coordination.  Therefore,   for 24 hours, you have the following restrictions:   - DO NOT drive a car, operate machinery, make legal/financial decisions,   sign important papers or drink alcohol.    ACTIVITY:  The following day: return to full activity including work, except no heavy   lifting, straining or running for 3 days if polyps were removed.  DIET:  Eat and drink normally unless instructed otherwise.  TREATMENT FOR COMMON SIDE EFFECTS:  - Mild abdominal pain, belching, bloating or excessive gas: rest, eat   lightly and use a heating pad.  - Sore Throat: treat with throat lozenges and/or gargle with warm salt   water.  SYMPTOMS TO WATCH FOR AND REPORT TO YOUR PHYSICIAN:  1. Abdominal pain or bloating, other than gas cramps.  2. Chest pain.  3. Back pain.  4. Chills or fever occurring within 24 hours after the procedure.  5. Rectal bleeding, which would show as bright red, maroon, or black stools.   (A tablespoon of blood from the rectum is not serious, especially if   hemorrhoids are present.)  6. Vomiting.  7. Weakness or dizziness.  8. Because air was used during the procedure, expelling large amounts of air   from your rectum or belching is normal.  9. If a bowel prep was taken, you may not have a bowel movement for 1-3   days.  This is normal.  GO DIRECTLY TO THE EMERGENCY ROOM IF YOU HAVE ANY OF THE FOLLOWING:   Difficulty breathing   Chills and/or fever over 101 F   Persistent vomiting and/or vomiting blood   Severe abdominal pain   Severe chest pain   Black, tarry stools   Bleeding- more than one tablespoon  Your doctor recommends these additional instructions:  If  any biopsies were taken, your doctors clinic will call you in 1 to 2   weeks with any results.  Resume your previous diet.   Continue your present medications.   We are waiting for your pathology results.   Return to your referring physician.  For questions, problems or results please call your physician - Daniel Green MD at Work:  [phone].  [facility], EMERGENCY ROOM PHONE NUMBER: [phone]  IF A COMPLICATION OR EMERGENCY SITUATION ARISES AND YOU ARE UNABLE TO REACH   YOUR PHYSICIAN - GO DIRECTLY TO THE EMERGENCY ROOM.  Daniel Green MD  9/15/2017 12:50:06 PM  This report has been verified and signed electronically.

## 2017-09-15 NOTE — ASSESSMENT & PLAN NOTE
- we are concerned about acute jtnis-yfasbc-zfgu disease given that he received donor lymphocytes in August 2017. We have consulted gastroenterology, who will hopefully perform a flexible sigmoidoscopy today.  - continue methylprednisolone 80mg IV daily (started 9/14/17).  - since initiation of steroids, his diarrhea has improved.  - continue to monitor.

## 2017-09-15 NOTE — ANESTHESIA POSTPROCEDURE EVALUATION
"Anesthesia Post Evaluation    Patient: Paul E Sistrunk    Procedure(s) Performed: Procedure(s) (LRB):  SIGMOIDOSCOPY-FLEXIBLE (N/A)    Final Anesthesia Type: general  Patient location during evaluation: [facility]  Patient participation: Yes- Able to Participate  Level of consciousness: awake and alert  Post-procedure vital signs: reviewed and stable  Pain management: adequate  Airway patency: patent  PONV status at discharge: No PONV  Anesthetic complications: no      Cardiovascular status: stable  Respiratory status: unassisted and spontaneous ventilation  Hydration status: euvolemic  Follow-up not needed.        Visit Vitals  /71   Pulse 109   Temp 36.3 °C (97.4 °F) (Temporal)   Resp 18   Ht 6' 1" (1.854 m)   Wt 80.6 kg (177 lb 11.1 oz)   SpO2 99%   BMI 23.44 kg/m²       Pain/Arik Score: Pain Assessment Performed: Yes (9/15/2017  1:10 PM)  Presence of Pain: denies (9/15/2017  1:10 PM)  Pain Rating Prior to Med Admin: 8 (9/15/2017  6:00 AM)  Pain Rating Post Med Admin: 5 (9/15/2017  6:30 AM)  Arik Score: 10 (9/15/2017  1:10 PM)      "

## 2017-09-15 NOTE — TREATMENT PLAN
Treatment Plan  09/15/2017  2:26 PM    Flex sig performed today  Impression:             - Minimal liquid stool in the recto-sigmoid colon and in the descending colon.  - Erythematous mucosa in the recto-sigmoid colon. Biopsied to rule out viral infection and GVHD.    Recommendation:         - Return patient to hospital acuna for ongoing care.  - Resume previous diet.  - Continue present medications.  - Await pathology results.  - Return to referring physician.    At this point will sign off, please call us back is any additional questions or concerns.    Francis Hdz M.D.  Gastroenterology Fellow, PGY-IV  Pager: [phone]  [facility]

## 2017-09-15 NOTE — TRANSFER OF CARE
"Anesthesia Transfer of Care Note    Patient: Paul E Sistrunk    Procedure(s) Performed: Procedure(s) (LRB):  SIGMOIDOSCOPY-FLEXIBLE (N/A)    Patient location: PACU    Anesthesia Type: general    Transport from OR: Transported from OR on room air with adequate spontaneous ventilation    Post pain: adequate analgesia    Post assessment: no apparent anesthetic complications    Post vital signs: stable    Level of consciousness: awake, alert and oriented    Nausea/Vomiting: no nausea/vomiting    Complications: none    Transfer of care protocol was followed      Last vitals:   Visit Vitals  /68 (BP Location: Right arm, Patient Position: Lying)   Pulse (!) 115   Temp 36.4 °C (97.5 °F) (Oral)   Resp 18   Ht 6' 1" (1.854 m)   Wt 80.6 kg (177 lb 11.1 oz)   SpO2 98%   BMI 23.44 kg/m²     "

## 2017-09-15 NOTE — ASSESSMENT & PLAN NOTE
-- +368 days s/p Flu/Bu/ATG MUD allogeneic transplant for high risk AML after his second IDAC (6/20/16 - 6/24/16) after a prolonged hospitalization (2/5/16 - 3/21/16) for induction with 7&3 and reinduction with MEC to remission and IDAC (4/4/16 - 4/9/16)  -- Chimerics from marrow done 6/21 show CD3 of 90% donor and 10% recipient, but CD34 of 5% donor and 95% recipient   -- Chimers from 7/10 shows 70% recipient and 30% donor - NOT SORTED  -- Relpased 6/2017 and reinduced with FLAG BETZY  -- DLI 8/14/17, day +33

## 2017-09-15 NOTE — PROGRESS NOTES
[facility]  Hematology  Bone Marrow Transplant  Progress Note    Patient Name: Paul E Sistrunk  Admission Date: 9/13/2017  Hospital Length of Stay: 2 days  Code Status: Full Code    Subjective:     Interval History:   - patient states improvement in diarrhea over past 24 hours (since initiation of steroids). 4 stools were documented over past 24 hours. He complains of generalized weakness.  - He denies shortness of breath, chest pain, nausea, vomiting, constipation.    Objective:     Vital Signs (Most Recent):  Temp: 97.1 °F (36.2 °C) (09/15/17 0427)  Pulse: (!) 115 (09/15/17 0709)  Resp: 18 (09/15/17 0427)  BP: 120/70 (09/15/17 0427)  SpO2: 95 % (09/15/17 0427) Vital Signs (24h Range):  Temp:  [97.1 °F (36.2 °C)-98.6 °F (37 °C)] 97.1 °F (36.2 °C)  Pulse:  [112-160] 115  Resp:  [18-20] 18  SpO2:  [94 %-98 %] 95 %  BP: (105-120)/(60-75) 120/70     Weight: 80.6 kg (177 lb 11.1 oz)  Body mass index is 23.44 kg/m².  Body surface area is 2.04 meters squared.    ECOG SCORE         [unfilled]    Intake/Output - Last 3 Shifts       09/13 0700 - 09/14 0659 09/14 0700 - 09/15 0659 09/15 0700 - 09/16 0659    P.O. 100 1560     I.V. (mL/kg) 870 (11.3)      IV Piggyback 1350 900     Total Intake(mL/kg) 2320 (30.1) 2460 (30.5)     Net +2320 +2460             Urine Occurrence 2 x 6 x     Stool Occurrence 2 x 4 x           Physical Exam   Constitutional: He is oriented to person, place, and time. No distress.   HENT:   Head: Normocephalic.   Eyes: Pupils are equal, round, and reactive to light.   Neck: Normal range of motion.   Cardiovascular: Normal rate and regular rhythm.    Pulmonary/Chest: Effort normal and breath sounds normal.   Abdominal: Soft. Bowel sounds are normal.   Musculoskeletal: Normal range of motion.   Neurological: He is alert and oriented to person, place, and time.   Skin: Skin is warm. He is not diaphoretic.     Significant Labs:   Labs have been reviewed.    Assessment/Plan:     * Sepsis     "- afebrile for 36 hours.  - c diff testing is negative  - cultures are negative so far.  - CT abdomen reveals "diffuse abnormal appearance of the small and large bowel demonstrating fluid filled mildly prominent loops with associated submucosal edema and mucosal enhancement."   - we are concerned about acute cxsme-wgorqa-pmhx disease given that he received donor lymphocytes in August 2017. We have consulted gastroenterology, who will hopefully perform a flexible sigmoidoscopy today.  - continue cefepime. I have discontinued vancomycin.          Xjvbl-skyixe-ppvq disease    - we are concerned about acute rbnxg-ukpsyz-wllw disease given that he received donor lymphocytes in August 2017. We have consulted gastroenterology, who will hopefully perform a flexible sigmoidoscopy today.  - continue methylprednisolone 80mg IV daily (started 9/14/17).  - since initiation of steroids, his diarrhea has improved.  - continue to monitor.        AML (acute myeloid leukemia) in relapse    -- Relapsed AML - peripheral blood shows 30% blasts 6/2017   -- BM biopsy results - consistent with 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 Blast of 62%, 19 had a complex karyotype including 5q deletion, 7q deletion, 17p deletion, and one metaphase represented a near tetraploid subclone; FLT-3 negative     -- Had previous reaction to MEC (etoposide caused full body rash).   -- Re-induced with FLAG-BETZY   -- Day 14 bone marrow biopsy done 7/10 - hypocellular with no evidence of residual disease  -- Post DLI on 8/14/17  -- currently receiving Vidaza for 2 cycles given high risk disease per Dr. Atkinson note        S/P allogeneic bone marrow transplant    -- +368 days s/p Flu/Bu/ATG MUD allogeneic transplant for high risk AML after his second IDAC (6/20/16 - 6/24/16) after a prolonged hospitalization (2/5/16 - 3/21/16) for induction with 7&3 and reinduction with MEC to remission and IDAC (4/4/16 - 4/9/16)  -- Chimerics from " marrow done 6/21 show CD3 of 90% donor and 10% recipient, but CD34 of 5% donor and 95% recipient   -- Chimers from 7/10 shows 70% recipient and 30% donor - NOT SORTED  -- Relpased 6/2017 and reinduced with FLAG BETZY  -- DLI 8/14/17, day +33          HIV disease    - no acute issues. Continue triumeq. Will set up an appointment with infectious disease upon discharge.          Chemotherapy-induced thrombocytopenia    - due to transplant and chemotherapy   - platelet count today is 56 k/uL  - continue to monitor.        Anemia due to chemotherapy    - hemoglobin today is 11.8 g/dL  - continue to monitor.        CINV (chemotherapy-induced nausea and vomiting)    - no acute issues  - continue as-need antiemetics            VTE Risk Mitigation         Ordered     High Risk of VTE  Once      09/13/17 0714     Place RANJIT hose  Until discontinued      09/13/17 0613        Disposition: continue methylprednisolone. Plan for flexible sigmoidoscopy today. Continue cefepime; discontinue vancomycin.    Edilson Crockett MD  Bone Marrow Transplant  [facility]

## 2017-09-15 NOTE — SUBJECTIVE & OBJECTIVE
Subjective:     Interval History:   - patient states improvement in diarrhea over past 24 hours (since initiation of steroids). 4 stools were documented over past 24 hours. He complains of generalized weakness.  - He denies shortness of breath, chest pain, nausea, vomiting, constipation.    Objective:     Vital Signs (Most Recent):  Temp: 97.1 °F (36.2 °C) (09/15/17 0427)  Pulse: (!) 115 (09/15/17 0709)  Resp: 18 (09/15/17 0427)  BP: 120/70 (09/15/17 0427)  SpO2: 95 % (09/15/17 0427) Vital Signs (24h Range):  Temp:  [97.1 °F (36.2 °C)-98.6 °F (37 °C)] 97.1 °F (36.2 °C)  Pulse:  [112-160] 115  Resp:  [18-20] 18  SpO2:  [94 %-98 %] 95 %  BP: (105-120)/(60-75) 120/70     Weight: 80.6 kg (177 lb 11.1 oz)  Body mass index is 23.44 kg/m².  Body surface area is 2.04 meters squared.    ECOG SCORE         [unfilled]    Intake/Output - Last 3 Shifts       09/13 0700 - 09/14 0659 09/14 0700 - 09/15 0659 09/15 0700 - 09/16 0659    P.O. 100 1560     I.V. (mL/kg) 870 (11.3)      IV Piggyback 1350 900     Total Intake(mL/kg) 2320 (30.1) 2460 (30.5)     Net +2320 +2460             Urine Occurrence 2 x 6 x     Stool Occurrence 2 x 4 x           Physical Exam   Constitutional: He is oriented to person, place, and time. No distress.   HENT:   Head: Normocephalic.   Eyes: Pupils are equal, round, and reactive to light.   Neck: Normal range of motion.   Cardiovascular: Normal rate and regular rhythm.    Pulmonary/Chest: Effort normal and breath sounds normal.   Abdominal: Soft. Bowel sounds are normal.   Musculoskeletal: Normal range of motion.   Neurological: He is alert and oriented to person, place, and time.   Skin: Skin is warm. He is not diaphoretic.     Significant Labs:   Labs have been reviewed.

## 2017-09-15 NOTE — NURSING TRANSFER
Nursing Transfer Note      9/15/2017     Transfer To: 822A    Transfer via stretcher    Transfer with cardiac monitoring    Transported by PCT    Medicines sent: none    Chart send with patient: Yes    Notified: JIN Moyer on floor.     Patient reassessed at: now and arrival to floor.     Upon arrival to floor: cardiac monitor applied, patient oriented to room, call bell in reach and bed in lowest position

## 2017-09-15 NOTE — PLAN OF CARE
Problem: Patient Care Overview  Goal: Plan of Care Review  Outcome: Ongoing (interventions implemented as appropriate)  Pt remained afebrile during night. Pt did complain of generalized pain, which was relieved with dilaudid 1 mg Iv. Pt stated he will be refusing sigmoidoscopy in AM, pt told to remain NPO. IVF dc'd during night. Pt remains on vanc and cefepime IV. AAXO4.  Thrombocytopenic precautions in place. Safety precautions maintained. Pt remained free from injury during night. Bed is in low and locked position with side rails up x 2. Call bell is within reach of pt.

## 2017-09-15 NOTE — ANESTHESIA RELEASE NOTE
"Anesthesia Release from PACU Note    Patient: Paul E Sistrunk    Procedure(s) Performed: Procedure(s) (LRB):  SIGMOIDOSCOPY-FLEXIBLE (N/A)    Anesthesia type: general    Post pain: Adequate analgesia    Post assessment: no apparent anesthetic complications    Last Vitals:   Visit Vitals  /71   Pulse 109   Temp 36.3 °C (97.4 °F) (Temporal)   Resp 18   Ht 6' 1" (1.854 m)   Wt 80.6 kg (177 lb 11.1 oz)   SpO2 99%   BMI 23.44 kg/m²       Post vital signs: stable    Level of consciousness: awake, alert  and oriented    Nausea/Vomiting: no nausea/no vomiting    Complications: none    Airway Patency: patent    Respiratory: unassisted    Cardiovascular: stable and blood pressure at baseline    Hydration: euvolemic  "

## 2017-09-15 NOTE — ASSESSMENT & PLAN NOTE
"- afebrile for 36 hours.  - c diff testing is negative  - cultures are negative so far.  - CT abdomen reveals "diffuse abnormal appearance of the small and large bowel demonstrating fluid filled mildly prominent loops with associated submucosal edema and mucosal enhancement."   - we are concerned about acute ejwjl-jagdlk-nwvs disease given that he received donor lymphocytes in August 2017. We have consulted gastroenterology, who will hopefully perform a flexible sigmoidoscopy today.  - continue cefepime. I have discontinued vancomycin.    "

## 2017-09-16 NOTE — PROGRESS NOTES
Nurse notified Dr. Byrd about pt increased weakness and confusion. Pt lowered himself to ground when going to bathroom at 0600 because of weakness. Bed alarm turned on. Md is going to see pt now. Nurse will continue to monitor.

## 2017-09-16 NOTE — ASSESSMENT & PLAN NOTE
"- remains afebrile for >36 hours.  - c diff testing is negative  - cultures are negative so far.  - CT abdomen reveals "diffuse abnormal appearance of the small and large bowel demonstrating fluid filled mildly prominent loops with associated submucosal edema and mucosal enhancement."   - concern for acute wgota-rahugb-wnkp disease given that he received donor lymphocytes in August 2017  - s/p flex sig 9/15/17 showing erythematous mucosa in the recto-sigmoid colon (biopsied)  - continue cefepime. (discontinued vancomycin on 9/15/17)  "

## 2017-09-16 NOTE — PROGRESS NOTES
[facility]  Hematology  Bone Marrow Transplant  Progress Note    Patient Name: Paul E Sistrunk  Admission Date: 9/13/2017  Hospital Length of Stay: 3 days  Code Status: Full Code    Subjective:     Interval History:   - patient reports continued diarrhea (4 documented but not quantified)   - patient endorses abdo pain with his BMs  - patient notes and fatigue/weakness  - patient collapsed this AM 2/2 his fatigue; no LOC, no head trauma  - patient reports continued n/v with meals  - He denies shortness of breath, chest pain, fevers/chills    Objective:     Vital Signs (Most Recent):  Temp: 98 °F (36.7 °C) (09/16/17 0800)  Pulse: (!) 115 (09/16/17 1100)  Resp: 18 (09/16/17 0800)  BP: 120/77 (09/16/17 0800)  SpO2: 99 % (09/16/17 0800) Vital Signs (24h Range):  Temp:  [97.3 °F (36.3 °C)-98 °F (36.7 °C)] 98 °F (36.7 °C)  Pulse:  [109-120] 115  Resp:  [16-19] 18  SpO2:  [98 %-100 %] 99 %  BP: ()/(67-77) 120/77     Weight: 81.9 kg (180 lb 8.9 oz)  Body mass index is 23.82 kg/m².  Body surface area is 2.05 meters squared.    ECOG SCORE         [unfilled]    Intake/Output - Last 3 Shifts       09/14 0700 - 09/15 0659 09/15 0700 - 09/16 0659 09/16 0700 - 09/17 0659    P.O. 1560 600     I.V. (mL/kg)  1953.3 (23.9)     IV Piggyback 900 100     Total Intake(mL/kg) 2460 (30.5) 2653.3 (32.4)     Urine (mL/kg/hr)  550 (0.3) 200 (0.4)    Stool  0 (0) 800 (1.7)    Total Output   550 1000    Net +2460 +2103.3 -1000           Urine Occurrence 6 x 5 x     Stool Occurrence 4 x 4 x 1 x          Physical Exam   Constitutional: He is oriented to person, place, and time. He appears well-developed. No distress.   HENT:   Head: Normocephalic.   Eyes: Pupils are equal, round, and reactive to light.   Neck: Normal range of motion.   Cardiovascular: Regular rhythm.    +tachycardic   Pulmonary/Chest: Effort normal and breath sounds normal.   Abdominal: Soft. Bowel sounds are normal.   Musculoskeletal: Normal range of  "motion.   Neurological: He is alert and oriented to person, place, and time.   Skin: Skin is warm and dry. He is not diaphoretic.     Significant Labs:   Lab Results   Component Value Date    WBC 2.33 (L) 09/16/2017    HGB 10.3 (L) 09/16/2017    HCT 30.0 (L) 09/16/2017    MCV 92 09/16/2017    PLT 60 (L) 09/16/2017     BMP  Lab Results   Component Value Date     (L) 09/16/2017    K 3.8 09/16/2017     (H) 09/16/2017    CO2 12 (L) 09/16/2017    BUN 45 (H) 09/16/2017    CREATININE 1.3 09/16/2017    CALCIUM 7.9 (L) 09/16/2017    ANIONGAP 7 (L) 09/16/2017    ESTGFRAFRICA >60.0 09/16/2017    EGFRNONAA >60.0 09/16/2017         Assessment/Plan:     * Sepsis    - remains afebrile for >36 hours.  - c diff testing is negative  - cultures are negative so far.  - CT abdomen reveals "diffuse abnormal appearance of the small and large bowel demonstrating fluid filled mildly prominent loops with associated submucosal edema and mucosal enhancement."   - concern for acute ahdai-wbdmxk-dudl disease given that he received donor lymphocytes in August 2017  - s/p flex sig 9/15/17 showing erythematous mucosa in the recto-sigmoid colon (biopsied)  - continue cefepime. (discontinued vancomycin on 9/15/17)        Ammhn-ixkgfh-dgud disease    - we are concerned about acute iaqhi-rkhhrm-mshd disease given that he received donor lymphocytes in August 2017.   - s/p flex sig 9/15/17 showing erythematous mucosa in the recto-sigmoid colon (biopsied)  - continue methylprednisolone 80mg IV daily (started 9/14/17).  - CMV ordered, pending  - monitor stool output  - continue to monitor.        Chemotherapy-induced thrombocytopenia    - due to transplant and chemotherapy   - platelet count today is 60 k/uL  - continue to monitor.        Anemia due to chemotherapy    - hemoglobin today is 10.3 g/dL  - continue to monitor.        CINV (chemotherapy-induced nausea and vomiting)    - continue as-need antiemetics          AML (acute myeloid " leukemia) in relapse    -- Relapsed AML - peripheral blood shows 30% blasts 6/2017   -- BM biopsy results - consistent with 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 Blast of 62%, 19 had a complex karyotype including 5q deletion, 7q deletion, 17p deletion, and one metaphase represented a near tetraploid subclone; FLT-3 negative     -- Had previous reaction to MEC (etoposide caused full body rash).   -- Re-induced with FLAG-BETZY   -- Day 14 bone marrow biopsy done 7/10 - hypocellular with no evidence of residual disease  -- Post DLI on 8/14/17  -- currently receiving Vidaza for 2 cycles given high risk disease per Dr. Atkinson note        S/P allogeneic bone marrow transplant    -- +369 days s/p Flu/Bu/ATG MUD allogeneic transplant for high risk AML after his second IDAC (6/20/16 - 6/24/16) after a prolonged hospitalization (2/5/16 - 3/21/16) for induction with 7&3 and reinduction with MEC to remission and IDAC (4/4/16 - 4/9/16)  -- Chimerics from marrow done 6/21 show CD3 of 90% donor and 10% recipient, but CD34 of 5% donor and 95% recipient   -- Chimers from 7/10 shows 70% recipient and 30% donor - NOT SORTED  -- Relpased 6/2017 and reinduced with FLAG BETZY  -- DLI 8/14/17, day +34        HIV disease    - no acute issues. Continue triumeq. Will set up an appointment with infectious disease upon discharge.              VTE Risk Mitigation         Ordered     High Risk of VTE  Once      09/13/17 0714     Place RANJIT hose  Until discontinued      09/13/17 0613          Disposition: pending clinical improvement    Remington Byrd MD  Bone Marrow Transplant  [facility]

## 2017-09-16 NOTE — PLAN OF CARE
Problem: Patient Care Overview  Goal: Plan of Care Review  Outcome: Ongoing (interventions implemented as appropriate)  Patient remains free from falls and injury this shift. Bed in low, locked position with call light in reach. Patient encouraged to call for assistance when getting out of bed. Patient verbalized understanding. Patient is independent and ambulatory. Patient tolerated clear liquid diet. Patient is on tele monitoring running tachy throughout the shift. Continuous NS IV fluid runnning at 100 ml/hr. Pt experienced some confusion during shift. Patient instructed to call when assistance is needed, pt verbalized understanding. Aseptic technique performed throughout shift. Afebrile.   All belongings within reach will continue to monitor.'

## 2017-09-16 NOTE — NURSING
Called Dr Mar to let her know pt was C diff neg 9/13/2017 after recent phone conversation to obtain diarrhea medication.

## 2017-09-16 NOTE — ASSESSMENT & PLAN NOTE
-- +369 days s/p Flu/Bu/ATG MUD allogeneic transplant for high risk AML after his second IDAC (6/20/16 - 6/24/16) after a prolonged hospitalization (2/5/16 - 3/21/16) for induction with 7&3 and reinduction with MEC to remission and IDAC (4/4/16 - 4/9/16)  -- Chimerics from marrow done 6/21 show CD3 of 90% donor and 10% recipient, but CD34 of 5% donor and 95% recipient   -- Chimers from 7/10 shows 70% recipient and 30% donor - NOT SORTED  -- Relpased 6/2017 and reinduced with FLAG BETZY  -- DLI 8/14/17, day +34

## 2017-09-16 NOTE — NURSING
Patient complained of diarrhea inquiring medication, contacted MD Diaz)via telephone for order, instructed to obtain stool specimen first to rule out C Diff.

## 2017-09-16 NOTE — ASSESSMENT & PLAN NOTE
- we are concerned about acute ytfkq-aurhde-vcwf disease given that he received donor lymphocytes in August 2017.   - s/p flex sig 9/15/17 showing erythematous mucosa in the recto-sigmoid colon (biopsied)  - continue methylprednisolone 80mg IV daily (started 9/14/17).  - CMV ordered, pending  - monitor stool output  - continue to monitor.

## 2017-09-16 NOTE — PLAN OF CARE
Problem: Patient Care Overview  Goal: Plan of Care Review  Outcome: Ongoing (interventions implemented as appropriate)  Side rails up x2; call bell in place; bed in lowest, locked position; skid proof socks on; no evidence of skin breakdown; care plan explained to patient; pt remains free of injury. Pt tolerated clears in small amount, voids and with BM x 3, ambulates with stand by assist. FS completed and returned to floor without incident. NS at 100 cc/hr, dilaudid iv given, pt tolerated po medications. VSS and afebrile.

## 2017-09-16 NOTE — NURSING
Patient experiencing new onset of confusion. Patient oriented to person but not time or place. Patient did receive ordered Dilaudid at 2000 and trazodone 2100 per MAR. Called [facility] team and notified Dr Voss of new findings, was instructed to take pt blood sugar and pt would be placed on delirium precautions and pain medications to be held until further notice. Side railed raised x2, bed alarm set, call light in reach, pt instructed to call for assistance as needed.

## 2017-09-16 NOTE — SUBJECTIVE & OBJECTIVE
Subjective:     Interval History:   - patient reports continued diarrhea (4 documented but not quantified)   - patient endorses abdo pain with his BMs  - patient notes and fatigue/weakness  - patient collapsed this AM 2/2 his fatigue; no LOC, no head trauma  - patient reports continued n/v with meals  - He denies shortness of breath, chest pain, fevers/chills    Objective:     Vital Signs (Most Recent):  Temp: 98 °F (36.7 °C) (09/16/17 0800)  Pulse: (!) 115 (09/16/17 1100)  Resp: 18 (09/16/17 0800)  BP: 120/77 (09/16/17 0800)  SpO2: 99 % (09/16/17 0800) Vital Signs (24h Range):  Temp:  [97.3 °F (36.3 °C)-98 °F (36.7 °C)] 98 °F (36.7 °C)  Pulse:  [109-120] 115  Resp:  [16-19] 18  SpO2:  [98 %-100 %] 99 %  BP: ()/(67-77) 120/77     Weight: 81.9 kg (180 lb 8.9 oz)  Body mass index is 23.82 kg/m².  Body surface area is 2.05 meters squared.    ECOG SCORE         [unfilled]    Intake/Output - Last 3 Shifts       09/14 0700 - 09/15 0659 09/15 0700 - 09/16 0659 09/16 0700 - 09/17 0659    P.O. 1560 600     I.V. (mL/kg)  1953.3 (23.9)     IV Piggyback 900 100     Total Intake(mL/kg) 2460 (30.5) 2653.3 (32.4)     Urine (mL/kg/hr)  550 (0.3) 200 (0.4)    Stool  0 (0) 800 (1.7)    Total Output   550 1000    Net +2460 +2103.3 -1000           Urine Occurrence 6 x 5 x     Stool Occurrence 4 x 4 x 1 x          Physical Exam   Constitutional: He is oriented to person, place, and time. He appears well-developed. No distress.   HENT:   Head: Normocephalic.   Eyes: Pupils are equal, round, and reactive to light.   Neck: Normal range of motion.   Cardiovascular: Regular rhythm.    +tachycardic   Pulmonary/Chest: Effort normal and breath sounds normal.   Abdominal: Soft. Bowel sounds are normal.   Musculoskeletal: Normal range of motion.   Neurological: He is alert and oriented to person, place, and time.   Skin: Skin is warm and dry. He is not diaphoretic.     Significant Labs:   Lab Results   Component Value Date    WBC 2.33 (L)  09/16/2017    HGB 10.3 (L) 09/16/2017    HCT 30.0 (L) 09/16/2017    MCV 92 09/16/2017    PLT 60 (L) 09/16/2017     BMP  Lab Results   Component Value Date     (L) 09/16/2017    K 3.8 09/16/2017     (H) 09/16/2017    CO2 12 (L) 09/16/2017    BUN 45 (H) 09/16/2017    CREATININE 1.3 09/16/2017    CALCIUM 7.9 (L) 09/16/2017    ANIONGAP 7 (L) 09/16/2017    ESTGFRAFRICA >60.0 09/16/2017    EGFRNONAA >60.0 09/16/2017

## 2017-09-17 PROBLEM — E87.20 METABOLIC ACIDOSIS, NORMAL ANION GAP (NAG): Status: ACTIVE | Noted: 2017-01-01

## 2017-09-17 NOTE — ASSESSMENT & PLAN NOTE
-- +370 days s/p Flu/Bu/ATG MUD allogeneic transplant for high risk AML after his second IDAC (6/20/16 - 6/24/16) after a prolonged hospitalization (2/5/16 - 3/21/16) for induction with 7&3 and reinduction with MEC to remission and IDAC (4/4/16 - 4/9/16)  -- Chimerics from marrow done 6/21 show CD3 of 90% donor and 10% recipient, but CD34 of 5% donor and 95% recipient   -- Chimers from 7/10 shows 70% recipient and 30% donor - NOT SORTED  -- Relpased 6/2017 and reinduced with FLAG BETZY  -- DLI 8/14/17, day +35

## 2017-09-17 NOTE — PROGRESS NOTES
[facility]  Hematology  Bone Marrow Transplant  Progress Note    Patient Name: Paul E Sistrunk  Admission Date: 9/13/2017  Hospital Length of Stay: 4 days  Code Status: Full Code    Subjective:     Interval History:   - patient reports continued diarrhea (1600ml + 4x unmeasured)   - patient continues with abdo pain with his BMs  - patient continues with fatigue/weakness  - remains afebrile but tachycardic  - patient reports continued n/v with no po intake  - denies shortness of breath, chest pain, fevers/chills  - IVFs changed from NS to LR overnight given worsening acidosis 2/2 diarrhea    Objective:     Vital Signs (Most Recent):  Temp: 98 °F (36.7 °C) (09/17/17 1142)  Pulse: (!) 160 (09/17/17 1142)  Resp: (!) 24 (09/17/17 1142)  BP: 117/65 (09/17/17 1142)  SpO2: 95 % (09/17/17 1142) Vital Signs (24h Range):  Temp:  [97.4 °F (36.3 °C)-98.1 °F (36.7 °C)] 98 °F (36.7 °C)  Pulse:  [117-160] 160  Resp:  [16-26] 24  SpO2:  [95 %-100 %] 95 %  BP: (115-131)/(65-77) 117/65     Weight: 80.7 kg (178 lb)  Body mass index is 23.48 kg/m².  Body surface area is 2.04 meters squared.    ECOG SCORE         [unfilled]    Intake/Output - Last 3 Shifts       09/15 0700 - 09/16 0659 09/16 0700 - 09/17 0659 09/17 0700 - 09/18 0659    P.O. 600 720     I.V. (mL/kg) 1953.3 (23.9) 900 (11.2) 480 (5.9)    IV Piggyback     Total Intake(mL/kg) 2653.3 (32.4) 2270 (28.1) 1480 (18.3)    Urine (mL/kg/hr) 550 (0.3) 2100 (1.1)     Stool 0 (0) 1600 (0.8) 1000 (1.6)    Total Output 550 3700 1000    Net +2103.3 -1430 +480           Urine Occurrence 5 x 4 x     Stool Occurrence 4 x 2 x           Physical Exam   Constitutional: He is oriented to person, place, and time. He appears well-developed.   +appears unconfortable   HENT:   Head: Normocephalic and atraumatic.   Eyes: Pupils are equal, round, and reactive to light.   Neck: Normal range of motion.   Cardiovascular: Regular rhythm.    +tachycardic   Pulmonary/Chest:  "Effort normal and breath sounds normal.   Abdominal: Soft. Bowel sounds are normal. There is tenderness.   Musculoskeletal: Normal range of motion.   Neurological: He is alert and oriented to person, place, and time.   Skin: Skin is warm and dry. He is not diaphoretic.     Significant Labs:   Lab Results   Component Value Date    WBC 3.57 (L) 09/17/2017    HGB 11.8 (L) 09/17/2017    HCT 34.7 (L) 09/17/2017    MCV 94 09/17/2017    PLT 59 (L) 09/17/2017     BMP  Lab Results   Component Value Date     (L) 09/17/2017    K 3.6 09/17/2017     (H) 09/17/2017    CO2 9 (LL) 09/17/2017    BUN 37 (H) 09/17/2017    CREATININE 1.2 09/17/2017    CALCIUM 8.0 (L) 09/17/2017    ANIONGAP 7 (L) 09/17/2017    ESTGFRAFRICA >60.0 09/17/2017    EGFRNONAA >60.0 09/17/2017         Assessment/Plan:     * Sepsis    - remains afebrile for >36 hours.  - c diff testing is negative  - cultures are negative so far.  - CT abdomen reveals "diffuse abnormal appearance of the small and large bowel demonstrating fluid filled mildly prominent loops with associated submucosal edema and mucosal enhancement."   - concern for acute bszcx-lhsbut-gdwt disease given that he received donor lymphocytes in August 2017  - s/p flex sig 9/15/17 showing erythematous mucosa in the recto-sigmoid colon (biopsied)  - discontinued vancomycin on 9/15/17  - discontinue cefepime today 9/17/17         AML (acute myeloid leukemia) in relapse    -- Relapsed AML - peripheral blood shows 30% blasts 6/2017   -- BM biopsy results - consistent with 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 Blast of 62%, 19 had a complex karyotype including 5q deletion, 7q deletion, 17p deletion, and one metaphase represented a near tetraploid subclone; FLT-3 negative     -- Had previous reaction to MEC (etoposide caused full body rash).   -- Re-induced with FLAG-BETZY   -- Day 14 bone marrow biopsy done 7/10 - hypocellular with no evidence of residual " disease  -- Post DLI on 8/14/17  -- currently receiving Vidaza for 2 cycles given high risk disease per Dr. Atkinson note        S/P allogeneic bone marrow transplant    -- +370 days s/p Flu/Bu/ATG MUD allogeneic transplant for high risk AML after his second IDAC (6/20/16 - 6/24/16) after a prolonged hospitalization (2/5/16 - 3/21/16) for induction with 7&3 and reinduction with MEC to remission and IDAC (4/4/16 - 4/9/16)  -- Chimerics from marrow done 6/21 show CD3 of 90% donor and 10% recipient, but CD34 of 5% donor and 95% recipient   -- Chimers from 7/10 shows 70% recipient and 30% donor - NOT SORTED  -- Relpased 6/2017 and reinduced with FLAG BETZY  -- DLI 8/14/17, day +35        Tsccv-nfsizc-kqsw disease    - we are concerned about acute ogxhp-rspkhj-hsry disease given that he received donor lymphocytes in August 2017.   - s/p flex sig 9/15/17 showing erythematous mucosa in the recto-sigmoid colon (biopsied)  - methylprednisolone 80mg IV daily started 9/14/17  - CMV ordered, pending  - will increase solumedrol to 80mg iv bid  - will add on budesonide 9mg daily  - monitor stool output  - continue to monitor.        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        Anemia due to chemotherapy    - hemoglobin today is 11.8 g/dL  - continue to monitor.        CINV (chemotherapy-induced nausea and vomiting)    - continue as-need antiemetics          HIV disease    - no acute issues. Continue triumeq. Will set up an appointment with infectious disease upon discharge.              VTE Risk Mitigation         Ordered     High Risk of VTE  Once      09/13/17 0714     Place RANJIT hose  Until discontinued      09/13/17 0613          Disposition: pending clinical  improvement    Remington Byrd MD  Bone Marrow Transplant  [facility]

## 2017-09-17 NOTE — ASSESSMENT & PLAN NOTE
- we are concerned about acute spurs-trxrjn-ofny disease given that he received donor lymphocytes in August 2017.   - s/p flex sig 9/15/17 showing erythematous mucosa in the recto-sigmoid colon (biopsied)  - methylprednisolone 80mg IV daily started 9/14/17  - CMV ordered, pending  - will increase solumedrol to 80mg iv bid  - will add on budesonide 9mg daily  - monitor stool output  - continue to monitor.

## 2017-09-17 NOTE — PLAN OF CARE
Problem: Patient Care Overview  Goal: Individualization & Mutuality  Outcome: Ongoing (interventions implemented as appropriate)  No acute events.  Remains tachycardic, MDs notified, IV fluid replacement.  VSS otherwise. AOx4.  1 BM this shift.   Continue with steroids.  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  Modesty maintained in all care.  POC d/w patient, mother, and partner.  Continue to monitor.     Problem: Fluid Volume Deficit (Adult)  Goal: Fluid/Electrolyte Balance  Patient will demonstrate the desired outcomes by discharge/transition of care.  Outcome: Ongoing (interventions implemented as appropriate)  Electrolytes being replaced by IV fluids

## 2017-09-17 NOTE — SUBJECTIVE & OBJECTIVE
Subjective:     Interval History:   - patient reports continued diarrhea (1600+ 4x)   - patient continues with abdo pain with his BMs  - patient continues with fatigue/weakness  - remains afebrile but tachycardic  - patient reports continued n/v with no po intake  - denies shortness of breath, chest pain, fevers/chills  - IVFs changed from NS to LR overnight given worsening acidosis 2/2 diarrhea    Objective:     Vital Signs (Most Recent):  Temp: 98 °F (36.7 °C) (09/17/17 1142)  Pulse: (!) 160 (09/17/17 1142)  Resp: (!) 24 (09/17/17 1142)  BP: 117/65 (09/17/17 1142)  SpO2: 95 % (09/17/17 1142) Vital Signs (24h Range):  Temp:  [97.4 °F (36.3 °C)-98.1 °F (36.7 °C)] 98 °F (36.7 °C)  Pulse:  [117-160] 160  Resp:  [16-26] 24  SpO2:  [95 %-100 %] 95 %  BP: (115-131)/(65-77) 117/65     Weight: 80.7 kg (178 lb)  Body mass index is 23.48 kg/m².  Body surface area is 2.04 meters squared.    ECOG SCORE         [unfilled]    Intake/Output - Last 3 Shifts       09/15 0700 - 09/16 0659 09/16 0700 - 09/17 0659 09/17 0700 - 09/18 0659    P.O. 600 720     I.V. (mL/kg) 1953.3 (23.9) 900 (11.2) 480 (5.9)    IV Piggyback     Total Intake(mL/kg) 2653.3 (32.4) 2270 (28.1) 1480 (18.3)    Urine (mL/kg/hr) 550 (0.3) 2100 (1.1)     Stool 0 (0) 1600 (0.8) 1000 (1.6)    Total Output 550 3700 1000    Net +2103.3 -1430 +480           Urine Occurrence 5 x 4 x     Stool Occurrence 4 x 2 x           Physical Exam   Constitutional: He is oriented to person, place, and time. He appears well-developed.   +appears unconfortable   HENT:   Head: Normocephalic and atraumatic.   Eyes: Pupils are equal, round, and reactive to light.   Neck: Normal range of motion.   Cardiovascular: Regular rhythm.    +tachycardic   Pulmonary/Chest: Effort normal and breath sounds normal.   Abdominal: Soft. Bowel sounds are normal. There is tenderness.   Musculoskeletal: Normal range of motion.   Neurological: He is alert and oriented to person, place, and time.    Skin: Skin is warm and dry. He is not diaphoretic.     Significant Labs:   Lab Results   Component Value Date    WBC 3.57 (L) 09/17/2017    HGB 11.8 (L) 09/17/2017    HCT 34.7 (L) 09/17/2017    MCV 94 09/17/2017    PLT 59 (L) 09/17/2017     BMP  Lab Results   Component Value Date     (L) 09/17/2017    K 3.6 09/17/2017     (H) 09/17/2017    CO2 9 (LL) 09/17/2017    BUN 37 (H) 09/17/2017    CREATININE 1.2 09/17/2017    CALCIUM 8.0 (L) 09/17/2017    ANIONGAP 7 (L) 09/17/2017    ESTGFRAFRICA >60.0 09/17/2017    EGFRNONAA >60.0 09/17/2017

## 2017-09-17 NOTE — ASSESSMENT & PLAN NOTE
"- remains afebrile for >36 hours.  - c diff testing is negative  - cultures are negative so far.  - CT abdomen reveals "diffuse abnormal appearance of the small and large bowel demonstrating fluid filled mildly prominent loops with associated submucosal edema and mucosal enhancement."   - concern for acute jvnyb-kquukq-buws disease given that he received donor lymphocytes in August 2017  - s/p flex sig 9/15/17 showing erythematous mucosa in the recto-sigmoid colon (biopsied)  - discontinued vancomycin on 9/15/17  - discontinue cefepime today 9/17/17   "

## 2017-09-17 NOTE — ASSESSMENT & PLAN NOTE
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

## 2017-09-17 NOTE — PROGRESS NOTES
Patient's HR noted to be 156 and asleep in bed.  Notified Dr. Byrd, additional liter of fluids to be ordered.

## 2017-09-17 NOTE — PLAN OF CARE
Problem: Patient Care Overview  Goal: Plan of Care Review  Outcome: Ongoing (interventions implemented as appropriate)  Patient remains free from falls and injury this shift. Bed in low, locked position with call light in reach. Bed alarm active. Patient encouraged to call for assistance when getting out of bed. Patient verbalized understanding. Patient complained of diarrhea, contacted MD via telephone obtained prn order for imodium. Patient complains of generalized abdomen pain throughout shift, pt given Dilaudid q4 as needed. Afebrile, aseptic technique throughout shift. Cefepime q12. Poor appetite, fluids intake encouraged. Tachycardic and short of breath upon ambulating. All belongings within reach will continue to monitor.'

## 2017-09-17 NOTE — PROGRESS NOTES
Nurse notified Dr. Mar about pt heart rate of 135 bpm. Cardiac monitoring remains in place. No orders at this time, nurse will continue to monitor pt.

## 2017-09-17 NOTE — PROGRESS NOTES
Nurse notified  about pt heart rate sustaining in the 140's. No new orders at this time. Nurse will continue to monitor pt.

## 2017-09-17 NOTE — NURSING
Spoke with Dr. Mar via telephone about pt request for diarrhea medication, MD said she would put in a order per MAR.

## 2017-09-18 NOTE — ASSESSMENT & PLAN NOTE
"- remains afebrile.  - c diff testing is negative  - cultures are negative so far.  - CT abdomen reveals "diffuse abnormal appearance of the small and large bowel demonstrating fluid filled mildly prominent loops with associated submucosal edema and mucosal enhancement."   - concern for acute xckry-clszyl-nxde disease given that he received donor lymphocytes in August 2017  - s/p flex sig 9/15/17 showing erythematous mucosa in the recto-sigmoid colon (biopsied)  - discontinued vancomycin on 9/15/17  - discontinue cefepime 9/17/17   "

## 2017-09-18 NOTE — ASSESSMENT & PLAN NOTE
- we are concerned about acute fhsnc-zphrbw-gqsc disease given that he received donor lymphocytes in August 2017.   - s/p flex sig 9/15/17 showing erythematous mucosa in the recto-sigmoid colon (biopsied)  - methylprednisolone 80mg IV daily started 9/14/17  - CMV ordered, pending  - solumedrol increased to 80mg iv bid on 9/17  - budesonide 9 mg 9/17

## 2017-09-18 NOTE — ASSESSMENT & PLAN NOTE
- no acute issues. Continue triumeq. Will set up an appointment with infectious disease upon discharge.  - now on IV steroids with history of fungal infection, will consult ID

## 2017-09-18 NOTE — CHAPLAIN
Pt has 2 close friends fighting cancer or with close relatives who are fighting cancer. Pt reflected that he is weak and not able to physically do things to help but he can offer presence and emotional support. In other words he can still make a positive difference in people's lives despite his health issues. Pt reflected that he still hangs on to his belief that God is with him on this journey.

## 2017-09-18 NOTE — PROGRESS NOTES
[facility]  Hematology  Bone Marrow Transplant  Progress Note    Patient Name: Paul E Sistrunk  Admission Date: 9/13/2017  Hospital Length of Stay: 5 days  Code Status: Full Code    Subjective:     Interval History:   - patient reports continued diarrhea (1000 ml + 1000 ml mixed output)   - abdominal pain and po intake improved  - remains afebrile but tachycardic    Objective:     Vital Signs (Most Recent):  Temp: 98.3 °F (36.8 °C) (09/18/17 1109)  Pulse: (!) 127 (09/18/17 1424)  Resp: 20 (09/18/17 1109)  BP: 116/77 (09/18/17 1109)  SpO2: 98 % (09/18/17 1109) Vital Signs (24h Range):  Temp:  [97.7 °F (36.5 °C)-98.7 °F (37.1 °C)] 98.3 °F (36.8 °C)  Pulse:  [117-142] 127  Resp:  [19-24] 20  SpO2:  [96 %-99 %] 98 %  BP: ()/(57-77) 116/77     Weight: 80.8 kg (178 lb 2.1 oz)  Body mass index is 23.5 kg/m².  Body surface area is 2.04 meters squared.    ECOG SCORE           Intake/Output - Last 3 Shifts       09/16 0700 - 09/17 0659 09/17 0700 - 09/18 0659 09/18 0700 - 09/19 0659    P.O. 720 180 300    I.V. (mL/kg) 900 (11.2) 1613.3 (20) 1300 (16.1)    IV Piggyback 650 2000     Total Intake(mL/kg) 2270 (28.1) 3793.3 (46.9) 1600 (19.8)    Urine (mL/kg/hr) 2100 (1.1) 0 (0) 450 (0.7)    Other  1000 (0.5)     Stool 1600 (0.8) 1000 (0.5) 0 (0)    Total Output 3700 2000 450    Net -1430 +1793.3 +1150           Urine Occurrence 4 x 1 x     Stool Occurrence 2 x  0 x          Physical Exam   Constitutional: He is oriented to person, place, and time. He appears well-developed and well-nourished. No distress.   HENT:   Head: Normocephalic.   Mouth/Throat: Oropharynx is clear and moist. No oropharyngeal exudate.   Eyes: Conjunctivae are normal. Pupils are equal, round, and reactive to light. No scleral icterus.   Neck: Normal range of motion. Neck supple. Normal range of motion present. No thyromegaly present.   Cardiovascular: Regular rhythm, normal heart sounds and intact distal pulses.    Tachycardic   "  Pulmonary/Chest: Effort normal and breath sounds normal. No respiratory distress.   Abdominal: Soft. Bowel sounds are normal. He exhibits no distension. There is tenderness.   Musculoskeletal: Normal range of motion. He exhibits no edema or tenderness.   Lymphadenopathy:     He has no cervical adenopathy.   Neurological: He is alert and oriented to person, place, and time. No cranial nerve deficit. Coordination normal.   Skin: Skin is warm and dry. No petechiae and no rash noted. No pallor.   Psychiatric: He has a normal mood and affect. Thought content normal.   Vitals reviewed.      Significant Labs:   CBC:   Recent Labs  Lab 09/17/17  0336 09/18/17  0413   WBC 3.57* 4.08   HGB 11.8* 9.9*   HCT 34.7* 28.3*   PLT 59* 45*    and CMP:   Recent Labs  Lab 09/17/17  0336 09/17/17  1742 09/18/17  0413   * 134* 136   K 3.6 4.7 3.9   * 118* 117*   CO2 9* 9* 13*    175* 155*   BUN 37* 32* 34*   CREATININE 1.2 1.1 1.2   CALCIUM 8.0* 7.4* 7.6*   PROT 4.0* 3.3* 3.3*   ALBUMIN 1.7* 1.3* 1.3*   BILITOT 1.4* 1.8* 2.3*   ALKPHOS 88 77 82   AST 14 11 13   ALT 6* 7* 8*   ANIONGAP 7* 7* 6*   EGFRNONAA >60.0 >60.0 >60.0       Diagnostic Results:  I have reviewed all pertinent imaging results/findings within the past 24 hours.    Assessment/Plan:     * Sepsis    - remains afebrile.  - c diff testing is negative  - cultures are negative so far.  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 Blast of 62%, 19 had a complex karyotype including 5q deletion, 7q deletion, 17p deletion, and one metaphase represented a near tetraploid subclone; FLT-3 negative     -- Had previous reaction to MEC (etoposide caused full body rash).   -- Re-induced with FLAG-BETZY   -- Day 14 bone marrow biopsy done 7/10 - hypocellular with no evidence of residual disease  -- Post DLI on 8/14/17  -- currently receiving Vidaza for 2 cycles given high risk disease per Dr. Atkinson note        S/P allogeneic bone marrow transplant    -- +371 days s/p Flu/Bu/ATG MUD allogeneic transplant for high risk AML after his second IDAC (6/20/16 - 6/24/16) after a prolonged hospitalization (2/5/16 - 3/21/16) for induction with 7&3 and reinduction with MEC to remission and IDAC (4/4/16 - 4/9/16)  -- Chimerics from marrow done 6/21 show CD3 of 90% donor and 10% recipient, but CD34 of 5% donor and 95% recipient   -- Chimers from 7/10 shows 70% recipient and 30% donor - NOT SORTED  -- Relpased 6/2017 and reinduced with FLAG BETZY  -- DLI 8/14/17, day +36        HIV disease    - no acute issues. Continue triumeq. Will set up an appointment with infectious disease upon discharge.  - now on IV steroids with history of fungal infection, will consult ID          CINV (chemotherapy-induced nausea and vomiting)    - continue as-need antiemetics          Anemia due to chemotherapy    - hemoglobin today is 9.9 g/dL  - continue to monitor.        Chemotherapy-induced thrombocytopenia    - due to transplant and chemotherapy   - platelet count today is 45 k/uL  - continue to monitor.        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   - s/p flex sig 9/15/17 showing erythematous mucosa in the recto-sigmoid colon (biopsied)  - methylprednisolone 80mg IV daily started 9/14/17  - CMV ordered, pending  - solumedrol increased to 80mg iv bid on 9/17  - budesonide 9 mg 9/17            VTE Risk Mitigation         Ordered     High Risk of VTE  Once      09/13/17 0714     Place RANJIT hose  Until discontinued      09/13/17 0613          Disposition: pending improvement in diarrhea and oral intake    Arminda Olivares NP  Bone Marrow Transplant  [facility]

## 2017-09-18 NOTE — HPI
56 y/o male with history of HIV, relapses AML post MUD allogeneic stem cell transplant 1 year ago and DLI 4 weeks ago currently receiving Vidaza; admitted on 9/13/17 due to fever of 102 after having one week of vomiting and diarrhea. On admission he also reported:feeling extremely weak, abdominal pain and cramps.    CT scan 9/13/17: Diffuse abnormal appearance of the small and large bowel demonstrating fluid filled mildly prominent loops with associated submucosal edema and mucosal enhancement. In this patient with reported history of prior bone marrow transplant, this may represent acute pvozq-hvwnep-ajfk disease. Additional considerations include other infectious or inflammatory etiologies with ischemia thought less likely. No evidence of free intraperitoneal air or portal venous gas.

## 2017-09-18 NOTE — CONSULTS
[facility]  Infectious Disease  Consult Note    Patient Name: Paul E Sistrunk  MRN: [id number]  Admission Date: 9/13/2017  Hospital Length of Stay: 5 days  Attending Physician: Gabriela Howe MD  Primary Care Provider: Edgar Pinon MD     Isolation Status: No active isolations    Patient information was obtained from patient, past medical records and ER records.      Consults  Assessment/Plan:     Hlvox-ffbhks-tqqg disease    56 y/o male with a history of HIV (on Triumeq, last viral load 6/21/17 non-detectable, CD 4 6/21/17 469); AML initially on 2/2016 (complicated by pulmonary Histoplasmosis);  post MUD allogeneic stem cell transplant 9/2016; relapse AML 6/20/17 s/p induction FLAG-BETZY; currently on Vidaza; admitted due to fever, vomiting and diarrhea, CT scan compatible with GVHD. No other source of infection suspected at this moment.  - continue Triumeq for HIV   - continue with voriconazole               Thank you for your consult. I will follow-up with patient. Please contact us if you have any additional questions.    Delgado Koenig MD  Infectious Disease Fellow, PGY-5  Pager: [phone]  [facility]        Subjective:     Principal Problem: Sepsis    HPI:  56 y/o male with history of HIV, relapses AML post MUD allogeneic stem cell transplant 1 year ago and DLI 4 weeks ago currently receiving Vidaza; admitted on 9/13/17 due to fever of 102 after having one week of vomiting and diarrhea. On admission he also reported:feeling extremely weak, abdominal pain and cramps.    CT scan 9/13/17: Diffuse abnormal appearance of the small and large bowel demonstrating fluid filled mildly prominent loops with associated submucosal edema and mucosal enhancement. In this patient with reported history of prior bone marrow transplant, this may represent acute olxqu-csphvm-xgxk disease. Additional considerations include other infectious or inflammatory etiologies with ischemia thought  less likely. No evidence of free intraperitoneal air or portal venous gas.    No new subjective & objective note has been filed under this hospital service since the last note was generated.

## 2017-09-18 NOTE — PLAN OF CARE
Problem: Patient Care Overview  Goal: Plan of Care Review  Outcome: Ongoing (interventions implemented as appropriate)  Pt remained afebrile during night. Pt did complain of generalized pain, which was relieved with dilaudid 1 mg Iv. 1 episode of diarrhea noted during night, imodium given. IVF and bicarb infusing.  AAXO4.  Pt remains on cardiac monitoring, pt running int eh 120's to 130's bpm. Thrombocytopenic precautions in place. Safety precautions maintained. Pt remained free from injury during night. Bed is in low and locked position with side rails up x 2. Call bell is within reach of pt.

## 2017-09-18 NOTE — ASSESSMENT & PLAN NOTE
-- +371 days s/p Flu/Bu/ATG MUD allogeneic transplant for high risk AML after his second IDAC (6/20/16 - 6/24/16) after a prolonged hospitalization (2/5/16 - 3/21/16) for induction with 7&3 and reinduction with MEC to remission and IDAC (4/4/16 - 4/9/16)  -- Chimerics from marrow done 6/21 show CD3 of 90% donor and 10% recipient, but CD34 of 5% donor and 95% recipient   -- Chimers from 7/10 shows 70% recipient and 30% donor - NOT SORTED  -- Relpased 6/2017 and reinduced with FLAG BETZY  -- DLI 8/14/17, day +36

## 2017-09-18 NOTE — SUBJECTIVE & OBJECTIVE
Subjective:     Interval History:   - patient reports continued diarrhea (1000 ml + 1000 ml mixed output)   - abdominal pain and po intake improved  - remains afebrile but tachycardic    Objective:     Vital Signs (Most Recent):  Temp: 98.3 °F (36.8 °C) (09/18/17 1109)  Pulse: (!) 127 (09/18/17 1424)  Resp: 20 (09/18/17 1109)  BP: 116/77 (09/18/17 1109)  SpO2: 98 % (09/18/17 1109) Vital Signs (24h Range):  Temp:  [97.7 °F (36.5 °C)-98.7 °F (37.1 °C)] 98.3 °F (36.8 °C)  Pulse:  [117-142] 127  Resp:  [19-24] 20  SpO2:  [96 %-99 %] 98 %  BP: ()/(57-77) 116/77     Weight: 80.8 kg (178 lb 2.1 oz)  Body mass index is 23.5 kg/m².  Body surface area is 2.04 meters squared.    ECOG SCORE           Intake/Output - Last 3 Shifts       09/16 0700 - 09/17 0659 09/17 0700 - 09/18 0659 09/18 0700 - 09/19 0659    P.O. 720 180 300    I.V. (mL/kg) 900 (11.2) 1613.3 (20) 1300 (16.1)    IV Piggyback 650 2000     Total Intake(mL/kg) 2270 (28.1) 3793.3 (46.9) 1600 (19.8)    Urine (mL/kg/hr) 2100 (1.1) 0 (0) 450 (0.7)    Other  1000 (0.5)     Stool 1600 (0.8) 1000 (0.5) 0 (0)    Total Output 3700 2000 450    Net -1430 +1793.3 +1150           Urine Occurrence 4 x 1 x     Stool Occurrence 2 x  0 x          Physical Exam   Constitutional: He is oriented to person, place, and time. He appears well-developed and well-nourished. No distress.   HENT:   Head: Normocephalic.   Mouth/Throat: Oropharynx is clear and moist. No oropharyngeal exudate.   Eyes: Conjunctivae are normal. Pupils are equal, round, and reactive to light. No scleral icterus.   Neck: Normal range of motion. Neck supple. Normal range of motion present. No thyromegaly present.   Cardiovascular: Regular rhythm, normal heart sounds and intact distal pulses.    Tachycardic    Pulmonary/Chest: Effort normal and breath sounds normal. No respiratory distress.   Abdominal: Soft. Bowel sounds are normal. He exhibits no distension. There is tenderness.   Musculoskeletal: Normal  range of motion. He exhibits no edema or tenderness.   Lymphadenopathy:     He has no cervical adenopathy.   Neurological: He is alert and oriented to person, place, and time. No cranial nerve deficit. Coordination normal.   Skin: Skin is warm and dry. No petechiae and no rash noted. No pallor.   Psychiatric: He has a normal mood and affect. Thought content normal.   Vitals reviewed.      Significant Labs:   CBC:   Recent Labs  Lab 09/17/17  0336 09/18/17  0413   WBC 3.57* 4.08   HGB 11.8* 9.9*   HCT 34.7* 28.3*   PLT 59* 45*    and CMP:   Recent Labs  Lab 09/17/17  0336 09/17/17  1742 09/18/17  0413   * 134* 136   K 3.6 4.7 3.9   * 118* 117*   CO2 9* 9* 13*    175* 155*   BUN 37* 32* 34*   CREATININE 1.2 1.1 1.2   CALCIUM 8.0* 7.4* 7.6*   PROT 4.0* 3.3* 3.3*   ALBUMIN 1.7* 1.3* 1.3*   BILITOT 1.4* 1.8* 2.3*   ALKPHOS 88 77 82   AST 14 11 13   ALT 6* 7* 8*   ANIONGAP 7* 7* 6*   EGFRNONAA >60.0 >60.0 >60.0       Diagnostic Results:  I have reviewed all pertinent imaging results/findings within the past 24 hours.

## 2017-09-18 NOTE — CONSULTS
[facility]  Infectious Disease  Consult Note    Patient Name: Paul E Sistrunk  MRN: [id number]  Admission Date: 9/13/2017  Hospital Length of Stay: 5 days  Attending Physician: Gabriela Howe MD  Primary Care Provider: Edgar Pinon MD     Isolation Status: No active isolations        Inpatient consult to Infectious Diseases  Consult performed by: RUTH FRYE  Consult ordered by: NILDA TAMAYO  Reason for consult: post allo transplant history of HIV and fingal infection now on steroids for GVHD        Consult received. Full note to follow.    Please call for questions.    Thanks,  Ruth Tran, PGY -4  ID Fellow  Pager: [phone]

## 2017-09-18 NOTE — ASSESSMENT & PLAN NOTE
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

## 2017-09-18 NOTE — PLAN OF CARE
Problem: Patient Care Overview  Goal: Plan of Care Review  Outcome: Ongoing (interventions implemented as appropriate)  Pt involved in plan of care and communicating needs throughout shift.  Up to BSC with SBA; generalized weakness noted.  Pt c/o abdominal pain, nausea and diarrhea throughout shift; prn dilaudid, compazine, and imodium admin with good effect.  Pt tolerating small amounts of clears but poor appetite noted; encouraging small, frequent meals and rest.  Pt is voiding without difficulty.  Telemetry monitoring maintained; ST with HR 120s-130s.  ID consult placed. IV Nabicarb infusing throughout shift as ordered.  No acute events so far this shift.  Pt remaining free from falls or injury throughout shift; bed in lowest position; call light within reach.  Pt instructed to call for assistance as needed.  Q1H rounding done on pt.

## 2017-09-18 NOTE — ASSESSMENT & PLAN NOTE
56 y/o male with a history of HIV (on Triumeq, last viral load 6/21/17 non-detectable, CD 4 6/21/17 469); AML initially on 2/2016 (complicated by pulmonary Histoplasmosis);  post MUD allogeneic stem cell transplant 9/2016; relapse AML 6/20/17 s/p induction FLAG-BETZY; currently on Vidaza; admitted due to fever, vomiting and diarrhea, CT scan compatible with GVHD. No other source of infection suspected at this moment.  - continue Triumeq for HIV   - continue with voriconazole

## 2017-09-19 NOTE — PLAN OF CARE
MDR's with Dr Howe.  Patient GI symptoms r/t GVHD are improving with steroids.  Patient report no BM since yesterday.  On a clear liquid diet and NaHCO3 gtt.  Path from his flex sig on 9/15 is still pending.  Will continue to follow for d/c needs.

## 2017-09-19 NOTE — SUBJECTIVE & OBJECTIVE
Subjective:     Interval History:   - diarrhea improved (200 ml in the past 24 hours)   - abdominal pain and po intake improving  - remains afebrile but tachycardic    Objective:     Vital Signs (Most Recent):  Temp: 97.8 °F (36.6 °C) (09/19/17 0801)  Pulse: (!) 126 (09/19/17 0801)  Resp: 19 (09/19/17 0801)  BP: 133/76 (09/19/17 0801)  SpO2: 98 % (09/19/17 0801) Vital Signs (24h Range):  Temp:  [97.4 °F (36.3 °C)-98.3 °F (36.8 °C)] 97.8 °F (36.6 °C)  Pulse:  [118-132] 126  Resp:  [18-22] 19  SpO2:  [96 %-98 %] 98 %  BP: (116-148)/(73-77) 133/76     Weight: 82.1 kg (181 lb 1.6 oz)  Body mass index is 23.89 kg/m².  Body surface area is 2.06 meters squared.    ECOG SCORE           Intake/Output - Last 3 Shifts       09/17 0700 - 09/18 0659 09/18 0700 - 09/19 0659 09/19 0700 - 09/20 0659    P.O. 180 750     I.V. (mL/kg) 1613.3 (20) 2554.2 (31.1)     IV Piggyback 2000      Total Intake(mL/kg) 3793.3 (46.9) 3304.2 (40.2)     Urine (mL/kg/hr) 0 (0) 1600 (0.8)     Other 1000 (0.5)      Stool 1000 (0.5) 200 (0.1)     Total Output 2000 1800      Net +1793.3 +1504.2             Urine Occurrence 1 x      Stool Occurrence  1 x           Physical Exam   Constitutional: He is oriented to person, place, and time. He appears well-developed and well-nourished. No distress.   HENT:   Head: Normocephalic.   Mouth/Throat: No oropharyngeal exudate.   Dry mouth   Eyes: Conjunctivae are normal. Pupils are equal, round, and reactive to light. No scleral icterus.   Neck: Normal range of motion. Neck supple. Normal range of motion present. No thyromegaly present.   Cardiovascular: Normal rate, regular rhythm, normal heart sounds and intact distal pulses.    Pulmonary/Chest: Effort normal and breath sounds normal. No respiratory distress.   Abdominal: Soft. Bowel sounds are normal. He exhibits no distension. There is no tenderness.   Musculoskeletal: Normal range of motion. He exhibits no edema or tenderness.   Neurological: He is alert and  oriented to person, place, and time. No cranial nerve deficit. Coordination normal.   Skin: Skin is warm and dry. No petechiae and no rash noted. No pallor.   Psychiatric: He has a normal mood and affect. Thought content normal.   Vitals reviewed.      Significant Labs:   CBC:   Recent Labs  Lab 09/18/17  0413 09/19/17  0504   WBC 4.08 4.98   HGB 9.9* 10.0*   HCT 28.3* 28.5*   PLT 45* 42*    and CMP:   Recent Labs  Lab 09/17/17  1742 09/18/17  0413 09/19/17  0504   * 136 138   K 4.7 3.9 3.4*   * 117* 111*   CO2 9* 13* 21*   * 155* 143*   BUN 32* 34* 36*   CREATININE 1.1 1.2 1.0   CALCIUM 7.4* 7.6* 7.5*   PROT 3.3* 3.3* 3.4*   ALBUMIN 1.3* 1.3* 1.4*   BILITOT 1.8* 2.3* 2.4*   ALKPHOS 77 82 104   AST 11 13 19   ALT 7* 8* 13   ANIONGAP 7* 6* 6*   EGFRNONAA >60.0 >60.0 >60.0       Diagnostic Results:  None

## 2017-09-19 NOTE — ASSESSMENT & PLAN NOTE
-- +372 days s/p Flu/Bu/ATG MUD allogeneic transplant for high risk AML after his second IDAC (6/20/16 - 6/24/16) after a prolonged hospitalization (2/5/16 - 3/21/16) for induction with 7&3 and reinduction with MEC to remission and IDAC (4/4/16 - 4/9/16)  -- Chimerics from marrow done 6/21 show CD3 of 90% donor and 10% recipient, but CD34 of 5% donor and 95% recipient   -- Chimers from 7/10 shows 70% recipient and 30% donor - NOT SORTED  -- Relpased 6/2017 and reinduced with FLAG BETZY  -- DLI 8/14/17, day +37

## 2017-09-19 NOTE — ASSESSMENT & PLAN NOTE
- no acute issues. Continue triumeq  - now on IV steroids with history of fungal infection, ID consulted. Histoplasma antigen drawn and continue current ppx. ID has signed off

## 2017-09-19 NOTE — ASSESSMENT & PLAN NOTE
"- remains afebrile.  - c diff testing is negative  - cultures are negative so far.  - CT abdomen reveals "diffuse abnormal appearance of the small and large bowel demonstrating fluid filled mildly prominent loops with associated submucosal edema and mucosal enhancement."   - concern for acute skcwp-csrxwx-zlvc disease given that he received donor lymphocytes in August 2017  - s/p flex sig 9/15/17 showing erythematous mucosa in the recto-sigmoid colon (biopsied)  - discontinued vancomycin on 9/15/17  - discontinued cefepime 9/17/17   "

## 2017-09-19 NOTE — PROGRESS NOTES
[facility]  Hematology  Bone Marrow Transplant  Progress Note    Patient Name: Paul E Sistrunk  Admission Date: 9/13/2017  Hospital Length of Stay: 6 days  Code Status: Full Code    Subjective:     Interval History:   - diarrhea improved (200 ml in the past 24 hours)   - abdominal pain and po intake improving  - remains afebrile but tachycardic    Objective:     Vital Signs (Most Recent):  Temp: 97.8 °F (36.6 °C) (09/19/17 0801)  Pulse: (!) 126 (09/19/17 0801)  Resp: 19 (09/19/17 0801)  BP: 133/76 (09/19/17 0801)  SpO2: 98 % (09/19/17 0801) Vital Signs (24h Range):  Temp:  [97.4 °F (36.3 °C)-98.3 °F (36.8 °C)] 97.8 °F (36.6 °C)  Pulse:  [118-132] 126  Resp:  [18-22] 19  SpO2:  [96 %-98 %] 98 %  BP: (116-148)/(73-77) 133/76     Weight: 82.1 kg (181 lb 1.6 oz)  Body mass index is 23.89 kg/m².  Body surface area is 2.06 meters squared.    ECOG SCORE           Intake/Output - Last 3 Shifts       09/17 0700 - 09/18 0659 09/18 0700 - 09/19 0659 09/19 0700 - 09/20 0659    P.O. 180 750     I.V. (mL/kg) 1613.3 (20) 2554.2 (31.1)     IV Piggyback 2000      Total Intake(mL/kg) 3793.3 (46.9) 3304.2 (40.2)     Urine (mL/kg/hr) 0 (0) 1600 (0.8)     Other 1000 (0.5)      Stool 1000 (0.5) 200 (0.1)     Total Output 2000 1800      Net +1793.3 +1504.2             Urine Occurrence 1 x      Stool Occurrence  1 x           Physical Exam   Constitutional: He is oriented to person, place, and time. He appears well-developed and well-nourished. No distress.   HENT:   Head: Normocephalic.   Mouth/Throat: No oropharyngeal exudate.   Dry mouth   Eyes: Conjunctivae are normal. Pupils are equal, round, and reactive to light. No scleral icterus.   Neck: Normal range of motion. Neck supple. Normal range of motion present. No thyromegaly present.   Cardiovascular: Normal rate, regular rhythm, normal heart sounds and intact distal pulses.    Pulmonary/Chest: Effort normal and breath sounds normal. No respiratory distress.  "  Abdominal: Soft. Bowel sounds are normal. He exhibits no distension. There is no tenderness.   Musculoskeletal: Normal range of motion. He exhibits no edema or tenderness.   Neurological: He is alert and oriented to person, place, and time. No cranial nerve deficit. Coordination normal.   Skin: Skin is warm and dry. No petechiae and no rash noted. No pallor.   Psychiatric: He has a normal mood and affect. Thought content normal.   Vitals reviewed.      Significant Labs:   CBC:   Recent Labs  Lab 09/18/17  0413 09/19/17  0504   WBC 4.08 4.98   HGB 9.9* 10.0*   HCT 28.3* 28.5*   PLT 45* 42*    and CMP:   Recent Labs  Lab 09/17/17  1742 09/18/17  0413 09/19/17  0504   * 136 138   K 4.7 3.9 3.4*   * 117* 111*   CO2 9* 13* 21*   * 155* 143*   BUN 32* 34* 36*   CREATININE 1.1 1.2 1.0   CALCIUM 7.4* 7.6* 7.5*   PROT 3.3* 3.3* 3.4*   ALBUMIN 1.3* 1.3* 1.4*   BILITOT 1.8* 2.3* 2.4*   ALKPHOS 77 82 104   AST 11 13 19   ALT 7* 8* 13   ANIONGAP 7* 6* 6*   EGFRNONAA >60.0 >60.0 >60.0       Diagnostic Results:  None    Assessment/Plan:     * Sepsis    - remains afebrile.  - c diff testing is negative  - cultures are negative so far.  - CT abdomen reveals "diffuse abnormal appearance of the small and large bowel demonstrating fluid filled mildly prominent loops with associated submucosal edema and mucosal enhancement."   - concern for acute rxzcs-kkmjft-tond disease given that he received donor lymphocytes in August 2017  - s/p flex sig 9/15/17 showing erythematous mucosa in the recto-sigmoid colon (biopsied)  - discontinued vancomycin on 9/15/17  - discontinued cefepime 9/17/17         AML (acute myeloid leukemia) in relapse    -- Relapsed AML - peripheral blood shows 30% blasts 6/2017   -- BM biopsy results - consistent with 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 Blast of 62%, 19 had a complex karyotype including 5q deletion, 7q deletion, 17p deletion, and one metaphase " represented a near tetraploid subclone; FLT-3 negative     -- Had previous reaction to MEC (etoposide caused full body rash).   -- Re-induced with FLAG-BETZY   -- Day 14 bone marrow biopsy done 7/10 - hypocellular with no evidence of residual disease  -- Post DLI on 8/14/17  -- currently receiving Vidaza for 2 cycles given high risk disease per Dr. Atkinson note        S/P allogeneic bone marrow transplant    -- +372 days s/p Flu/Bu/ATG MUD allogeneic transplant for high risk AML after his second IDAC (6/20/16 - 6/24/16) after a prolonged hospitalization (2/5/16 - 3/21/16) for induction with 7&3 and reinduction with MEC to remission and IDAC (4/4/16 - 4/9/16)  -- Chimerics from marrow done 6/21 show CD3 of 90% donor and 10% recipient, but CD34 of 5% donor and 95% recipient   -- Chimers from 7/10 shows 70% recipient and 30% donor - NOT SORTED  -- Relpased 6/2017 and reinduced with FLAG BETZY  -- DLI 8/14/17, day +37        HIV disease    - no acute issues. Continue triumeq  - now on IV steroids with history of fungal infection, ID consulted. Histoplasma antigen drawn and continue current ppx. ID has signed off          CINV (chemotherapy-induced nausea and vomiting)    - improved  - continue as-need antiemetics          Anemia due to chemotherapy    - hemoglobin today is 10.0 g/dL  - continue to monitor.        Chemotherapy-induced thrombocytopenia    - due to transplant and chemotherapy   - platelet count today is 42 k/uL  - continue to monitor.        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   - s/p flex sig 9/15/17 showing erythematous mucosa in the recto-sigmoid colon (biopsied)  - methylprednisolone 80mg IV daily started 9/14/17  - CMV ordered, pending  - solumedrol  increased to 80mg iv bid on 9/17, diarrhea improving. Will continue for total 3 days then decrease by 50%  - budesonide 9 mg 9/17            VTE Risk Mitigation         Ordered     High Risk of VTE  Once      09/13/17 0714     Place RANJIT hose  Until discontinued      09/13/17 0613          Disposition: pending improvement in oral intake and completion of IV steroids    Arminda Olivares, NP  Bone Marrow Transplant  [facility]

## 2017-09-19 NOTE — PLAN OF CARE
Problem: Patient Care Overview  Goal: Plan of Care Review  Outcome: Ongoing (interventions implemented as appropriate)  Pt remained afebrile during night. Pt did complain of generalized pain, which was relieved with dilaudid 1 mg Iv. NO epidoes of diarrhea noted during night.. IVF and bicarb infusing.  AAXO4.  Pt remains on cardiac monitoring, pt running 120's to 130's bpm. Thrombocytopenic precautions in place. Safety precautions maintained. Pt remained free from injury during night. Bed is in low and locked position with side rails up x 2. Call bell is within reach of pt.

## 2017-09-19 NOTE — PLAN OF CARE
Problem: Patient Care Overview  Goal: Plan of Care Review  Outcome: Ongoing (interventions implemented as appropriate)  Pt involved in plan of care and communicating needs throughout shift.  Up to BSC with SBA; generalized weakness noted.  Pt c/o abdominal pain and intermittent nausea throughout shift; prn dilaudid, compazine, and zofran admin with good effect.  No diarrhea so far this shift.  Pt tolerating clear liquids, but poor appetite noted; encouraging small, frequent meals and rest.  Pt is voiding without difficulty; urine clear and tea-colored; adequate output noted.  Telemetry monitoring maintained; ST with HR in 120s.  IV Nabicarb infusing throughout shift as ordered; rate decreased overnight.  No acute events so far this shift.  Pt remaining free from falls or injury throughout shift; bed in lowest position; call light within reach.  Pt instructed to call for assistance as needed.  Q1H rounding done on pt.

## 2017-09-19 NOTE — ASSESSMENT & PLAN NOTE
- we are concerned about acute chtbp-cezgsf-bvro disease given that he received donor lymphocytes in August 2017.   - s/p flex sig 9/15/17 showing erythematous mucosa in the recto-sigmoid colon (biopsied)  - methylprednisolone 80mg IV daily started 9/14/17  - CMV ordered, pending  - solumedrol increased to 80mg iv bid on 9/17, diarrhea improving. Will continue for total 3 days then decrease by 50%  - budesonide 9 mg 9/17

## 2017-09-20 NOTE — SUBJECTIVE & OBJECTIVE
Subjective:     Interval History:   - diarrhea improved (1 BM in the past 24 hours)   - abdominal pain and po intake improving, 2L fluid intake past 24 hours  - nausea x 1 overnight  - remains afebrile but tachycardic    Objective:     Vital Signs (Most Recent):  Temp: 97.7 °F (36.5 °C) (09/20/17 0830)  Pulse: (!) 126 (09/20/17 1100)  Resp: 18 (09/20/17 0830)  BP: 121/70 (09/20/17 0830)  SpO2: 97 % (09/20/17 0830) Vital Signs (24h Range):  Temp:  [97.6 °F (36.4 °C)-98.1 °F (36.7 °C)] 97.7 °F (36.5 °C)  Pulse:  [123-129] 126  Resp:  [18-20] 18  SpO2:  [94 %-99 %] 97 %  BP: (118-135)/(70-76) 121/70     Weight: 84.2 kg (185 lb 9.6 oz)  Body mass index is 24.49 kg/m².  Body surface area is 2.08 meters squared.    ECOG SCORE           Intake/Output - Last 3 Shifts       09/18 0700 - 09/19 0659 09/19 0700 - 09/20 0659 09/20 0700 - 09/21 0659    P.O. 750 810 60    I.V. (mL/kg) 2554.2 (31.1) 1250 (14.8)     IV Piggyback       Total Intake(mL/kg) 3304.2 (40.2) 2060 (24.5) 60 (0.7)    Urine (mL/kg/hr) 1600 (0.8) 900 (0.4)     Other       Stool 200 (0.1) 0 (0)     Total Output 1800 900      Net +1504.2 +1160 +60           Urine Occurrence  1 x     Stool Occurrence 1 x 0 x 1 x          Physical Exam   Constitutional: He is oriented to person, place, and time. He appears well-developed and well-nourished. No distress.   HENT:   Head: Normocephalic.   Mouth/Throat: Oropharynx is clear and moist. No oropharyngeal exudate.   Eyes: Conjunctivae are normal. Pupils are equal, round, and reactive to light. No scleral icterus.   Neck: Normal range of motion. Neck supple. Normal range of motion present. No thyromegaly present.   Cardiovascular: Normal rate, regular rhythm, normal heart sounds and intact distal pulses.    Pulmonary/Chest: Effort normal and breath sounds normal. No respiratory distress.   Abdominal: Soft. Bowel sounds are normal. He exhibits no distension. There is no tenderness.   Musculoskeletal: Normal range of  motion. He exhibits no edema or tenderness.   Lymphadenopathy:     He has no cervical adenopathy.   Neurological: He is alert and oriented to person, place, and time. No cranial nerve deficit. Coordination normal.   Skin: Skin is warm and dry. No petechiae and no rash noted. No pallor.   Psychiatric: He has a normal mood and affect. Thought content normal.   Vitals reviewed.      Significant Labs:   CBC:   Recent Labs  Lab 09/19/17  0504 09/20/17  0455   WBC 4.98 6.57   HGB 10.0* 10.4*   HCT 28.5* 30.1*   PLT 42* 58*    and CMP:   Recent Labs  Lab 09/19/17  0504 09/20/17  0455    137   K 3.4* 3.6   * 106   CO2 21* 23   * 149*   BUN 36* 40*   CREATININE 1.0 1.0   CALCIUM 7.5* 7.3*   PROT 3.4* 3.5*   ALBUMIN 1.4* 1.5*   BILITOT 2.4* 2.3*   ALKPHOS 104 107   AST 19 21   ALT 13 15   ANIONGAP 6* 8   EGFRNONAA >60.0 >60.0       Diagnostic Results:  None

## 2017-09-20 NOTE — ASSESSMENT & PLAN NOTE
- no acute issues. Continue triumeq  - now on IV steroids with history of fungal infection, ID consulted. Histoplasma antigen done and continue current ppx. ID has signed off

## 2017-09-20 NOTE — PLAN OF CARE
Problem: Patient Care Overview  Goal: Plan of Care Review  Outcome: Ongoing (interventions implemented as appropriate)  Pt remained afebrile during night. Pt did complain of abdominal pain, which was relieved with dilaudid 1 mg Iv. No epidoes of diarrhea noted during night. Pt had one episode of nausea, with no vomiting noted.  Na bicarb infusing at 50 cc/hr.  AAXO4.  Pt remains on cardiac monitoring, pt running 120's to 130's bpm. Thrombocytopenic precautions in place. Safety precautions maintained. Pt remained free from injury during night. Bed is in low and locked position with side rails up x 2. Call bell is within reach of pt.

## 2017-09-20 NOTE — ASSESSMENT & PLAN NOTE
-- +373 days s/p Flu/Bu/ATG MUD allogeneic transplant for high risk AML after his second IDAC (6/20/16 - 6/24/16) after a prolonged hospitalization (2/5/16 - 3/21/16) for induction with 7&3 and reinduction with MEC to remission and IDAC (4/4/16 - 4/9/16)  -- Chimerics from marrow done 6/21 show CD3 of 90% donor and 10% recipient, but CD34 of 5% donor and 95% recipient   -- Chimers from 7/10 shows 70% recipient and 30% donor - NOT SORTED  -- Relpased 6/2017 and reinduced with FLAG BETZY  -- DLI 8/14/17, day +38

## 2017-09-20 NOTE — PROGRESS NOTES
[facility]  Adult Nutrition  Progress Note    SUMMARY     Recommendations    Recommendation/Intervention: ADAT to regular w/ ONS and Snacks. 2.Encourage PO intake >/= 75% EEN/EPN 3. If PO intake is <50% provide Boost and encourage HVP 1st w/ each meal. 4. RD to follow   Goals: pt to consume nutrients >/= 85% EEN/EPN   Nutrition Goal Status: goal not met  Communication of RD Recs: discussed on rounds    Reason for Assessment    Reason for Assessment: NPO/clear liquids x 5 days  Diagnosis:  (sepsis)  Relevant Medical History: AML, HIV   Interdisciplinary Rounds: attended  General Information Comments: pt reports consuming about 1/2 of a smoothie for dinner., tolerated , but stopped when he felt nausea coming on.   Nutrition Discharge Planning: Regular diet w/ po intake 75% EEN/ EPN     Nutrition Prescription Ordered    Current Diet Order: Full liq     Evaluation of Received Nutrients/Fluid Intake          Intake/Output Summary (Last 24 hours) at 09/20/17 1253  Last data filed at 09/20/17 1200   Gross per 24 hour   Intake          1508.33 ml   Output              900 ml   Net           608.33 ml       I/O: +1L Since Admit  Comments: LBM:9/19/17     % Intake of Estimated Energy Needs: 25 - 50 %  % Meal Intake: 75%     Nutrition Risk Screen     Nutrition Risk Screen: no indicators present    Nutrition/Diet History    Patient Reported Diet/Restrictions/Preferences: general  Typical Food/Fluid Intake: decreased PTA  Food Preferences: No cultural or Moravian preferences noted        Labs/Tests/Procedures/Meds       Pertinent Labs Reviewed: reviewed  Pertinent Labs Comments: WBC-6.57, H/H-10.4/30.1, Plts-58, BUN-40, Glu-149, Alb-1.5, T.steven  Pertinent Medications Reviewed: reviewed  Pertinent Medications Comments: Statin,     Physical Findings    Overall Physical Appearance: generalized wasting, edematous, loss of muscle mass, loss of subcutaneous fat, underweight, weak  Tubes:  (Central  "Line)  Oral/Mouth Cavity: all teeth present (age appropriate)  Skin: intact    Anthropometrics    Temp: 97.6 °F (36.4 °C)  Height: 6' 1" (185.4 cm)  Weight Method: Bed Scale  Weight: 84.2 kg (185 lb 9.6 oz)  Ideal Body Weight (IBW), Male: 184 lb  % Ideal Body Weight, Male (lb): 92.39 lb  BMI (Calculated): 22.5  BMI Grade: 18.5-24.9 - normal        Estimated/Assessed Needs    Weight Used For Calorie Calculations: 84.2 kg (185 lb 10 oz)   Energy Calorie Requirements (kcal): 1674-9904  Energy Need Method: Kcal/kg   RMR (Roosevelt-St. Jeor Equation): 1730.88  Weight Used For Protein Calculations: 84.2 kg (185 lb 10 oz)  Protein Requirements: 109-127g/d  Fluid Need Method: RDA Method (or per MD)   RDA Method (mL): 2526               Assessment and Plan    S/P allogeneic bone marrow transplant      Nutrition Problem  inadequate nutrient intake    Related to (etiology):   Physiological needs- AML s/p BMT    Signs and Symptoms (as evidenced by):   NPO/CL >5 days, Pt exhibits beginning signs of malnutrition    Interventions/Recommendations (treatment strategy):  Provide Pt with ONS boost  See recs above    Nutrition Diagnosis Status:   New                  Monitor and Evaluation    Food and Nutrient Intake: energy intake, food and beverage intake  Food and Nutrient Adminstration: diet order  Physical Activity and Function: nutrition-related ADLs and IADLs  Anthropometric Measurements: weight, weight change  Biochemical Data, Medical Tests and Procedures:  (All labs)  Nutrition-Focused Physical Findings: overall appearance    Nutrition Risk    Level of Risk:  (f/u 1x wk)    Nutrition Follow-Up    RD Follow-up?: Yes  "

## 2017-09-20 NOTE — PLAN OF CARE
Problem: Patient Care Overview  Goal: Plan of Care Review  Outcome: Ongoing (interventions implemented as appropriate)  Pt involved in plan of care and communicating needs throughout shift.  Up to BSC with SBA; generalized weakness noted.  Pt c/o abdominal pain and intermittent nausea throughout shift; prn dilaudid, compazine, and zofran admin with good effect. Pt has had 2 episodes of diarrhea; measuring all output as ordered; prn imodium admin.  Diet advanced to full liquids; Pt tolerating clears but poor appetite noted; encouraging small, frequent meals and rest.  Pt is voiding without difficulty; urine sample outstanding but urine continues to be mixed with stool; awaiting clean sample.  Telemetry monitoring maintained; ST with HR in 120s at rest and 140s with activity.   No acute events so far this shift.  Pt remaining free from falls or injury throughout shift; bed in lowest position; call light within reach.  Pt instructed to call for assistance as needed; bed alarm on for safety.  Q1H rounding done on pt

## 2017-09-20 NOTE — PROGRESS NOTES
[facility]  Hematology  Bone Marrow Transplant  Progress Note    Patient Name: Paul E Sistrunk  Admission Date: 9/13/2017  Hospital Length of Stay: 7 days  Code Status: Full Code    Subjective:     Interval History:   - diarrhea improved (1 BM in the past 24 hours)   - abdominal pain and po intake improving, 2L fluid intake past 24 hours  - nausea x 1 overnight  - remains afebrile but tachycardic    Objective:     Vital Signs (Most Recent):  Temp: 97.7 °F (36.5 °C) (09/20/17 0830)  Pulse: (!) 126 (09/20/17 1100)  Resp: 18 (09/20/17 0830)  BP: 121/70 (09/20/17 0830)  SpO2: 97 % (09/20/17 0830) Vital Signs (24h Range):  Temp:  [97.6 °F (36.4 °C)-98.1 °F (36.7 °C)] 97.7 °F (36.5 °C)  Pulse:  [123-129] 126  Resp:  [18-20] 18  SpO2:  [94 %-99 %] 97 %  BP: (118-135)/(70-76) 121/70     Weight: 84.2 kg (185 lb 9.6 oz)  Body mass index is 24.49 kg/m².  Body surface area is 2.08 meters squared.    ECOG SCORE           Intake/Output - Last 3 Shifts       09/18 0700 - 09/19 0659 09/19 0700 - 09/20 0659 09/20 0700 - 09/21 0659    P.O. 750 810 60    I.V. (mL/kg) 2554.2 (31.1) 1250 (14.8)     IV Piggyback       Total Intake(mL/kg) 3304.2 (40.2) 2060 (24.5) 60 (0.7)    Urine (mL/kg/hr) 1600 (0.8) 900 (0.4)     Other       Stool 200 (0.1) 0 (0)     Total Output 1800 900      Net +1504.2 +1160 +60           Urine Occurrence  1 x     Stool Occurrence 1 x 0 x 1 x          Physical Exam   Constitutional: He is oriented to person, place, and time. He appears well-developed and well-nourished. No distress.   HENT:   Head: Normocephalic.   Mouth/Throat: Oropharynx is clear and moist. No oropharyngeal exudate.   Eyes: Conjunctivae are normal. Pupils are equal, round, and reactive to light. No scleral icterus.   Neck: Normal range of motion. Neck supple. Normal range of motion present. No thyromegaly present.   Cardiovascular: Normal rate, regular rhythm, normal heart sounds and intact distal pulses.    Pulmonary/Chest:  "Effort normal and breath sounds normal. No respiratory distress.   Abdominal: Soft. Bowel sounds are normal. He exhibits no distension. There is no tenderness.   Musculoskeletal: Normal range of motion. He exhibits no edema or tenderness.   Lymphadenopathy:     He has no cervical adenopathy.   Neurological: He is alert and oriented to person, place, and time. No cranial nerve deficit. Coordination normal.   Skin: Skin is warm and dry. No petechiae and no rash noted. No pallor.   Psychiatric: He has a normal mood and affect. Thought content normal.   Vitals reviewed.      Significant Labs:   CBC:   Recent Labs  Lab 09/19/17  0504 09/20/17  0455   WBC 4.98 6.57   HGB 10.0* 10.4*   HCT 28.5* 30.1*   PLT 42* 58*    and CMP:   Recent Labs  Lab 09/19/17  0504 09/20/17  0455    137   K 3.4* 3.6   * 106   CO2 21* 23   * 149*   BUN 36* 40*   CREATININE 1.0 1.0   CALCIUM 7.5* 7.3*   PROT 3.4* 3.5*   ALBUMIN 1.4* 1.5*   BILITOT 2.4* 2.3*   ALKPHOS 104 107   AST 19 21   ALT 13 15   ANIONGAP 6* 8   EGFRNONAA >60.0 >60.0       Diagnostic Results:  None    Assessment/Plan:     * Sepsis    - remains afebrile.  - c diff testing is negative  - cultures are negative so far.  - CT abdomen reveals "diffuse abnormal appearance of the small and large bowel demonstrating fluid filled mildly prominent loops with associated submucosal edema and mucosal enhancement."   - concern for acute uuyfu-apmoaf-uaqt disease given that he received donor lymphocytes in August 2017  - s/p flex sig 9/15/17 showing erythematous mucosa in the recto-sigmoid colon (biopsied)  - discontinued vancomycin on 9/15/17  - discontinued cefepime 9/17/17         AML (acute myeloid leukemia) in relapse    -- Relapsed AML - peripheral blood shows 30% blasts 6/2017   -- BM biopsy results - consistent with 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 Blast of 62%, 19 had a complex karyotype including 5q deletion, 7q deletion, 17p " deletion, and one metaphase represented a near tetraploid subclone; FLT-3 negative     -- Had previous reaction to MEC (etoposide caused full body rash).   -- Re-induced with FLAG-BETZY   -- Day 14 bone marrow biopsy done 7/10 - hypocellular with no evidence of residual disease  -- Post DLI on 8/14/17  -- currently receiving Vidaza for 2 cycles given high risk disease per Dr. Atkinson note        S/P allogeneic bone marrow transplant    -- +373 days s/p Flu/Bu/ATG MUD allogeneic transplant for high risk AML after his second IDAC (6/20/16 - 6/24/16) after a prolonged hospitalization (2/5/16 - 3/21/16) for induction with 7&3 and reinduction with MEC to remission and IDAC (4/4/16 - 4/9/16)  -- Chimerics from marrow done 6/21 show CD3 of 90% donor and 10% recipient, but CD34 of 5% donor and 95% recipient   -- Chimers from 7/10 shows 70% recipient and 30% donor - NOT SORTED  -- Relpased 6/2017 and reinduced with FLAG BETZY  -- DLI 8/14/17, day +38        HIV disease    - no acute issues. Continue triumeq  - now on IV steroids with history of fungal infection, ID consulted. Histoplasma antigen done and continue current ppx. ID has signed off          CINV (chemotherapy-induced nausea and vomiting)    - continue as-need antiemetics          Anemia due to chemotherapy    - hemoglobin today is 10.4 g/dL  - continue to monitor.        Chemotherapy-induced thrombocytopenia    - due to transplant and chemotherapy   - platelet count today is 58 k/uL  - continue to monitor.        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   - s/p flex sig 9/15/17 showing erythematous mucosa in the recto-sigmoid colon (biopsied)  - methylprednisolone 80mg IV daily started 9/14/17  - CMV ordered,  pending  - solumedrol increased to 80mg iv bid on 9/17, diarrhea improved (1 episode in the past 24 hours). Will decrease to 40 mg bid)  - budesonide 9 mg 9/17            VTE Risk Mitigation         Ordered     High Risk of VTE  Once      09/13/17 0714     Place RANJIT hose  Until discontinued      09/13/17 0613          Disposition: pending weaning of IV steroids and improvement in oral intake    Arminda Olivares NP  Bone Marrow Transplant  [facility]

## 2017-09-20 NOTE — ASSESSMENT & PLAN NOTE
- we are concerned about acute budxc-bkfuky-oxmj disease given that he received donor lymphocytes in August 2017.   - s/p flex sig 9/15/17 showing erythematous mucosa in the recto-sigmoid colon (biopsied)  - methylprednisolone 80mg IV daily started 9/14/17  - CMV ordered, pending  - solumedrol increased to 80mg iv bid on 9/17, diarrhea improved (1 episode in the past 24 hours). Will decrease to 40 mg bid)  - budesonide 9 mg 9/17

## 2017-09-20 NOTE — ASSESSMENT & PLAN NOTE
Nutrition Problem  inadequate nutrient intake    Related to (etiology):   Physiological needs- AML s/p BMT    Signs and Symptoms (as evidenced by):   NPO/CL >5 days, Pt exhibits beginning signs of malnutrition    Interventions/Recommendations (treatment strategy):  Provide Pt with ONS boost  See recs above    Nutrition Diagnosis Status:   New

## 2017-09-20 NOTE — ASSESSMENT & PLAN NOTE
"- remains afebrile.  - c diff testing is negative  - cultures are negative so far.  - CT abdomen reveals "diffuse abnormal appearance of the small and large bowel demonstrating fluid filled mildly prominent loops with associated submucosal edema and mucosal enhancement."   - concern for acute caaiy-qossdc-oktq disease given that he received donor lymphocytes in August 2017  - s/p flex sig 9/15/17 showing erythematous mucosa in the recto-sigmoid colon (biopsied)  - discontinued vancomycin on 9/15/17  - discontinued cefepime 9/17/17   "

## 2017-09-21 PROBLEM — I48.91 ATRIAL FIBRILLATION WITH RAPID VENTRICULAR RESPONSE: Status: ACTIVE | Noted: 2017-01-01

## 2017-09-21 PROBLEM — I48.91 ATRIAL FIBRILLATION WITH RVR: Status: ACTIVE | Noted: 2017-01-01

## 2017-09-21 PROBLEM — D89.813 GRAFT-VERSUS-HOST DISEASE: Status: ACTIVE | Noted: 2017-01-01

## 2017-09-21 PROBLEM — E87.20 METABOLIC ACIDOSIS, NORMAL ANION GAP (NAG): Status: ACTIVE | Noted: 2017-01-01

## 2017-09-21 NOTE — ASSESSMENT & PLAN NOTE
"- remains afebrile.  - c diff testing is negative  - cultures are negative so far.  - CT abdomen reveals "diffuse abnormal appearance of the small and large bowel demonstrating fluid filled mildly prominent loops with associated submucosal edema and mucosal enhancement."   - concern for acute yylgo-rojwmk-fkgh disease given that he received donor lymphocytes in August 2017  - s/p flex sig 9/15/17 showing erythematous mucosa in the recto-sigmoid colon (biopsied)--GVHD and CMV  - discontinued vancomycin on 9/15/17  - discontinued cefepime 9/17/17   "

## 2017-09-21 NOTE — ASSESSMENT & PLAN NOTE
- HR up to 200 this am and hypotensive  - given 2.5 L of normal saline boluses today  - evaluated by CCS and cardiology  - restarted Flecainide 100 mg bid and metoprolol 25 bid (increased to 50 mg bid)  - Heparin gtt started per cardiology recs

## 2017-09-21 NOTE — PROGRESS NOTES
[facility]  Hematology  Bone Marrow Transplant  Progress Note    Patient Name: Paul E Sistrunk  Admission Date: 9/13/2017  Hospital Length of Stay: 8 days  Code Status: Full Code    Subjective:     Interval History:   - diarrhea (3.4L of stool in past 24 hours--some mixed with urine)   - abdominal pain stable and po intake stable, 1L fluid intake past 24 hours  - nausea with no vomiting  - remains afebrile but tachycardic  - HR up to 200, EKG showed A-fib given IV Lopressor and bolus, CCS and cardiology evaluated patient and restarted Flecainide and started Heparin gtt.      Objective:     Vital Signs (Most Recent):  Temp: 98.2 °F (36.8 °C) (09/21/17 1252)  Pulse: (!) 113 (09/21/17 1252)  Resp: 17 (09/21/17 1252)  BP: (!) 91/59 (09/21/17 1333)  SpO2: (!) 91 % (09/21/17 1252) Vital Signs (24h Range):  Temp:  [97.6 °F (36.4 °C)-98.2 °F (36.8 °C)] 98.2 °F (36.8 °C)  Pulse:  [105-185] 113  Resp:  [16-17] 17  SpO2:  [87 %-95 %] 91 %  BP: ()/(50-81) 91/59     Weight: 76.3 kg (168 lb 4.8 oz)  Body mass index is 22.2 kg/m².  Body surface area is 1.98 meters squared.    ECOG SCORE         Intake/Output - Last 3 Shifts       09/19 0700 - 09/20 0659 09/20 0700 - 09/21 0659 09/21 0700 - 09/22 0659    P.O. 810 1170 200    I.V. (mL/kg) 1250 (14.8)      IV Piggyback  1300 1000    Total Intake(mL/kg) 2060 (24.5) 2470 (29.3) 1200 (15.7)    Urine (mL/kg/hr) 900 (0.4) 50 (0) 600 (1.1)    Other   600 (1.1)    Stool 0 (0) 3350 (1.7) 0 (0)    Total Output 900 3400 1200    Net +1160 -930 0           Urine Occurrence 1 x 2 x 1 x    Stool Occurrence 0 x 2 x 1 x          Physical Exam   Constitutional: He is oriented to person, place, and time. He appears well-developed and well-nourished. No distress.   HENT:   Head: Normocephalic.   Mouth/Throat: No oropharyngeal exudate.   Dry mouth and hoarse voice   Eyes: Conjunctivae are normal. Pupils are equal, round, and reactive to light. No scleral icterus.   Neck: Normal  "range of motion. Neck supple. Normal range of motion present. No thyromegaly present.   Cardiovascular: Regular rhythm, normal heart sounds and intact distal pulses.    tachycardic   Pulmonary/Chest: Effort normal and breath sounds normal. No respiratory distress.   Abdominal: Soft. Bowel sounds are normal. He exhibits no distension. There is no tenderness.   Musculoskeletal: Normal range of motion. He exhibits no edema or tenderness.   Neurological: He is alert and oriented to person, place, and time. No cranial nerve deficit. Coordination normal.   Skin: Skin is warm and dry. No petechiae and no rash noted. No pallor.   Psychiatric: He has a normal mood and affect. Thought content normal.   Vitals reviewed.      Significant Labs:   CBC:   Recent Labs  Lab 09/20/17 0455 09/21/17 0222 09/21/17  1301   WBC 6.57 4.36  --    HGB 10.4* 10.4* 10.6*  10.6*   HCT 30.1* 31.1* 30.9*  30.9*   PLT 58* 60*  --     and CMP:   Recent Labs  Lab 09/20/17 0455 09/21/17 0222 09/21/17  1301    136 137   K 3.6 3.8 3.7    107 109   CO2 23 22* 22*   * 133* 114*   BUN 40* 49* 46*   CREATININE 1.0 1.4 1.3   CALCIUM 7.3* 7.7* 7.5*   PROT 3.5* 3.4* 3.3*   ALBUMIN 1.5* 1.5* 1.4*   BILITOT 2.3* 2.5* 2.5*   ALKPHOS 107 115 118   AST 21 26 28   ALT 15 14 16   ANIONGAP 8 7* 6*   EGFRNONAA >60.0 56.2* >60.0       Diagnostic Results:  I have reviewed and interpreted all pertinent imaging results/findings within the past 24 hours.  None    Assessment/Plan:     * Sepsis    - remains afebrile.  - c diff testing is negative  - cultures are negative so far.  - CT abdomen reveals "diffuse abnormal appearance of the small and large bowel demonstrating fluid filled mildly prominent loops with associated submucosal edema and mucosal enhancement."   - concern for acute plxyn-driqol-oyqr disease given that he received donor lymphocytes in August 2017  - s/p flex sig 9/15/17 showing erythematous mucosa in the recto-sigmoid colon " (biopsied)--GVHD and CMV  - discontinued vancomycin on 9/15/17  - discontinued cefepime 9/17/17         Qetsd-fcwdcn-pyrx disease    - we are concerned about acute vmxru-qgvuvz-yrwd disease given that he received donor lymphocytes in August 2017.   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 Blast of 62%, 19 had a complex karyotype including 5q deletion, 7q deletion, 17p deletion, and one metaphase represented a near tetraploid subclone; FLT-3 negative     -- Had previous reaction to MEC (etoposide caused full body rash).   -- Re-induced with FLAG-BETZY   -- Day 14 bone marrow biopsy done 7/10 - hypocellular with no evidence of residual disease  -- Post DLI on 8/14/17  -- currently receiving Vidaza for 2 cycles given high risk disease per Dr. Atkinson note        S/P allogeneic bone marrow transplant    -- +373 days s/p Flu/Bu/ATG MUD allogeneic transplant for high risk AML after his second IDAC (6/20/16 - 6/24/16) after a prolonged hospitalization (2/5/16 - 3/21/16) for induction with 7&3 and reinduction with MEC to remission and IDAC (4/4/16 - 4/9/16)  -- Chimerics from marrow done 6/21 show CD3 of 90% donor and 10% recipient, but CD34 of 5% donor and 95% recipient   -- Chimers from 7/10 shows 70% recipient and 30% donor - NOT SORTED  -- Relpased 6/2017 and reinduced with FLAG BETZY  -- DLI 8/14/17, day +39        HIV disease    - no acute issues. Continue triumeq  - now on IV steroids with  history of fungal infection, ID consulted. Histoplasma antigen done and continue current ppx. ID has signed off          Atrial fibrillation with rapid ventricular response    - HR up to 200 this am and hypotensive  - given 2.5 L of normal saline boluses today  - evaluated by CCS and cardiology  - restarted Flecainide 100 mg bid and metoprolol 25 bid (increased to 50 mg bid)  - Heparin gtt started per cardiology recs        Anemia due to chemotherapy    - hemoglobin today is 10.4 g/dL  - continue to monitor.        Chemotherapy-induced thrombocytopenia    - due to transplant and chemotherapy   - platelet count today is 60 k/uL  - continue to monitor.        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

## 2017-09-21 NOTE — HPI
Mr. 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  On admission, he was started on empiric antibiotics for possible sepsis.  Blood cultures ngtd, urinalysis non-infectious.  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  He was started on high dose methylprednisolone.  CMV PCR undetected on 9/16.  Histoplasma antigen pending.  HSV pending (sent to [place]).  ID evaluated on 9/18 and did not feel that there were any obvious uncontrolled infectious issues.  Empiric antibiotics discontinued.      MICU consulted 9/21 for atrial fibrillation with RVR (170-180) with SBP 80's.   Mr. Sistrunk was given metoprolol IV with some improvement.   Additionally, he was noted to be off flecanide since admission.  Cardiology was consulted by primary team.  He was asymptomatic and the decision was made for him to remain on the floor with BMT service.  Over the course of the morning, he was restarted on flecanide and oral metoprolol.  Intermittent episodes of hypotension noted, which improved with IVF boluses.   Mr. Sistrunk continues to have afib with RVR and borderline hypotension and was transferred to the ICU.  Prior to transfer, he developed unstable Afib RVR and was subsequently cardioverted.  He required vasopressors after cardioversion.

## 2017-09-21 NOTE — ASSESSMENT & PLAN NOTE
-- +373 days s/p Flu/Bu/ATG MUD allogeneic transplant for high risk AML after his second IDAC (6/20/16 - 6/24/16) after a prolonged hospitalization (2/5/16 - 3/21/16) for induction with 7&3 and reinduction with MEC to remission and IDAC (4/4/16 - 4/9/16)  -- Chimerics from marrow done 6/21 show CD3 of 90% donor and 10% recipient, but CD34 of 5% donor and 95% recipient   -- Chimers from 7/10 shows 70% recipient and 30% donor - NOT SORTED  -- Relpased 6/2017 and reinduced with FLAG BETZY  -- DLI 8/14/17, day +39

## 2017-09-21 NOTE — PROGRESS NOTES
Pt using BSC and HR dropped to 200s. Dr. Mar called to bedside. EKG done- A.fib. ICU consulted. Total of 10mg metoprolol given. SBP dropped to 70s. 1L bolus given. BP now stable at 103/60. Pt is still in A. Fib. With no other symptoms other feeling weak. ICU consult at bedside. Will monitor.

## 2017-09-21 NOTE — PLAN OF CARE
Problem: Patient Care Overview  Goal: Plan of Care Review  Outcome: Ongoing (interventions implemented as appropriate)  Patient remains free from falls and injury this shift. Bed in low, locked position with call bell in reach. Family at bedside. Bed alarm active. Patient encouraged to call for assistance when getting out of bed. Patient verbalized understanding. All belongings within reach. Difficulty maintaining adequate blood pressure and converted afib rhythm. SBP 90-70s/50s - -180s not rate controlled. Cardiology planning for cardioversion tomorrow. PO Lopressor dose increased & flecainide restarted. 2L boluses given total this shift to correct BP - short term improvement. Continues to have large liquid stools - imodium PRN. Not tolerating any solids; reports continuous nausea. Zofran scheduled with PRN compazine. PT/OT evaluation ordered. Dilaudid given for c/o abdominal pain. Thrombocytopenic precautions maintained. Afebrile. will continue to monitor.

## 2017-09-21 NOTE — ASSESSMENT & PLAN NOTE
--CHADS-vasc 2 (HTN + TIA)  --suspect being off flecanide in the setting of acute illness contributed to AF RVR.  --flecanide and metoprolol restarted 9/21  --emergently cardioverted 9/22 for unstable AF RVR.  Currently in SR-ST  --continue therapeutic anticoagulation.  INR supratherapeutic 5.1. Plan to restart heparin when INR decreases.   --followed by Dr. De Los Santos, previous cardioversion for SVT.

## 2017-09-21 NOTE — PHYSICIAN QUERY
"PT Name: Paul E Sistrunk  MR #: [id number]    Physician Query Form - Nutrition Clarification     CDS: Zaina De Santiago RN, CCDS         Contact information :[phone])     This form is a permanent document in the medical record.     Query Date: September 21, 2017    By submitting this query, we are merely seeking further clarification of documentation.. Please utilize your independent clinical judgment when addressing the question(s) below.    The Medical record contains the following:   Indicators  Supporting Clinical Findings Location in Medical Record   x % of Estimated Energy Intake over a time frame from p.o., TF, or TPN "% Intake of Estimated Energy Needs: 25 - 50 %  % Meal Intake: 75%" RD consult 9/20/17    Weight Status over a time frame     x Subcutaneous Fat and/or Muscle Loss "generalized wasting, edematous, loss of muscle mass, loss of subcutaneous fat, underweight, weak" RD consult 9/20/17   x Fluid Accumulation or Edema "Encouraged patient to advance diet today, he will try a smoothie- albumin is 1.5 and he is developing peripheral edema." Progress note 9/20/17    Reduced  Strength     x Wt / BMI / Usual Body Weight BMI 22.5 RD consult 9/20/17    Delayed Wound Healing / Failure to Thrive     x Acute or Chronic Illness "sepsis, AML, HIV disease, diarrhea" H&P 9/13/17    Medication     x Treatment "Recommendation/Intervention: ADAT to regular w/ ONS and Snacks. 2.Encourage PO intake >/= 75% EEN/EPN 3. If PO intake is <50% provide Boost and encourage HVP 1st w/ each meal. 4. RD to follow " RD consult 9/20/17   x Other "His oral intake has again decreased (due to appetite and extreme fatigue) and he is losing weight again."    "NPO/clear liquids x 5 days" H&P 9/13/17        RD consult 9/20/17     AND / SHOSHANA Clinical Characteristics (October 2011)  A minimum of two characteristics is recommended for diagnosing either moderate or severe malnutrition   Mild Malnutrition Moderate Malnutrition " Severe Malnutrition   Energy Intake from p.o., TF or TPN. < 75% intake of estimated energy needs for less than 7 days < 75% intake of estimated energy needs for greater than 7 days < 50% intake of estimated energy needs for > 5 days   Weight Loss 1-2% in 1 month  5% in 3 months  7.5% in 6 months  10% in 1 year 1-2 % in 1 week  5% in 1 month  7.5% in 3 months  10% in 6 months  20% in 1 year > 2% in 1 week  > 5% in 1 month  > 7.5% in 3 months  > 10% in 6 months  > 20% in 1 year   Physical Findings     None *Mild subcutaneous fat and/or muscle loss  *Mild fluid accumulation  *Stage II decubitus  *Surgical wound or non-healing wound *Mod/severe subcutaneous fat and/or muscle loss  *Mod/severe fluid accumulation  *Stage III or IV decubitus  *Non-healing surgical wound     Provider, please specify diagnosis or diagnoses associated with above clinical findings.    [ ] Mild Protein-Calorie Malnutrition  [ ] Moderate Protein-Calorie Malnutrition  [X ] Severe Protein-Calorie Malnutrition  [ ] Other Nutritional Diagnosis (please specify): ____________________________________  [ ] Other: ________________________________  [ ] Clinically Undetermined    Please document in your progress notes daily for the duration of treatment until resolved and include in your discharge summary.

## 2017-09-21 NOTE — ASSESSMENT & PLAN NOTE
--concern for acute bqqzs-rzeezo-btyp disease given that he received donor lymphocytes in August 2017.   - s/p flex sig 9/15/17 showing erythematous mucosa in the recto-sigmoid colon (biopsied)  - methylprednisolone 80mg IV daily   - CMV+

## 2017-09-21 NOTE — SUBJECTIVE & OBJECTIVE
Past Medical History:   Diagnosis Date    Cancer 2/2016    leukemia, AML    HIV infection     Pancytopenia due to antineoplastic chemotherapy 7/4/2016       Past Surgical History:   Procedure Laterality Date    CYSTOSCOPY         Review of patient's allergies indicates:   Allergen Reactions    Etoposide Rash       Family History     Problem Relation (Age of Onset)    Cancer Father        Social History Main Topics    Smoking status: Never Smoker    Smokeless tobacco: Never Used    Alcohol use No    Drug use: No    Sexual activity: Not on file      Review of Systems   Constitutional: Negative for activity change and appetite change.   HENT: Negative for congestion and facial swelling.    Eyes: Negative for discharge and itching.   Respiratory: Negative for apnea, chest tightness and shortness of breath.    Cardiovascular: Negative for chest pain and palpitations.   Gastrointestinal: Negative for abdominal distention, anal bleeding and blood in stool.   Endocrine: Negative for cold intolerance and heat intolerance.   Genitourinary: Negative for difficulty urinating and dysuria.   Skin: Negative for color change and pallor.   Neurological: Negative for dizziness and headaches.     Objective:     Vital Signs (Most Recent):  Temp: 97.8 °F (36.6 °C) (09/21/17 0313)  Pulse: (!) 141 (09/21/17 0557)  Resp: 16 (09/21/17 0313)  BP: 109/71 (09/21/17 0557)  SpO2: (!) 93 % (09/21/17 0313) Vital Signs (24h Range):  Temp:  [97.6 °F (36.4 °C)-98.1 °F (36.7 °C)] 97.8 °F (36.6 °C)  Pulse:  [123-180] 141  Resp:  [16-18] 16  SpO2:  [92 %-97 %] 93 %  BP: ()/(59-81) 109/71   Weight: 84.2 kg (185 lb 9.6 oz)  Body mass index is 24.49 kg/m².      Intake/Output Summary (Last 24 hours) at 09/21/17 0557  Last data filed at 09/21/17 0100   Gross per 24 hour   Intake              920 ml   Output             2400 ml   Net            -1480 ml       Physical Exam   Constitutional: He is oriented to person, place, and time. He appears  well-developed and well-nourished.   HENT:   Head: Normocephalic and atraumatic.   Eyes: Conjunctivae and EOM are normal.   Neck: No JVD present. No tracheal deviation present.   Cardiovascular: Normal heart sounds.  An irregularly irregular rhythm present. Tachycardia present.    Pulmonary/Chest: Effort normal and breath sounds normal.   Abdominal: Soft. Bowel sounds are normal.   Musculoskeletal: He exhibits no edema or deformity.   Neurological: He is alert and oriented to person, place, and time.   Skin: Skin is warm and dry.   Psychiatric: He has a normal mood and affect. His behavior is normal.       Vents:     Lines/Drains/Airways     Central Venous Catheter Line                 Tunneled Central Line Insertion/Assessment - Double Lumen  07/24/17 1522 left subclavian 58 days              Significant Labs:    CBC/Anemia Profile:    Recent Labs  Lab 09/20/17 0455 09/21/17 0222   WBC 6.57 4.36   HGB 10.4* 10.4*   HCT 30.1* 31.1*   PLT 58* 60*   MCV 93 95   RDW 18.6* 18.5*        Chemistries:    Recent Labs  Lab 09/20/17 0455 09/21/17 0222    136   K 3.6 3.8    107   CO2 23 22*   BUN 40* 49*   CREATININE 1.0 1.4   CALCIUM 7.3* 7.7*   ALBUMIN 1.5* 1.5*   PROT 3.5* 3.4*   BILITOT 2.3* 2.5*   ALKPHOS 107 115   ALT 15 14   AST 21 26   MG 1.9 2.0   PHOS 2.8 3.5     Significant Imaging:   No new imaging or procedures to review since prior assessment

## 2017-09-21 NOTE — CONSULTS
Please see consult note from Dr. Jolly at 0635 on 9/21/17.    CAROL RICKS, [facility]  Critical Care Medicine  [phone]

## 2017-09-21 NOTE — PROGRESS NOTES
09/21/17 1434   Vital Signs   Temp 97.3 °F (36.3 °C)   Temp src Oral   Pulse (!) 154   Heart Rate Source Monitor   Resp 14   SpO2 (!) 94 %   Flow (L/min) 2   O2 Device (Oxygen Therapy) nasal cannula   BP (!) 88/52   BP Location Right arm   BP Method Automatic   Patient Position Lying   Arminda KING NP notified of continued low BP 1hr post 500cc bolus. NP stated to still give additional Lopressor dose. Ordering another 500cc bolus. NP stated to only give IV Dilaudid if SBP > 90. Educated patient on pain med changes. Will continue to monitor.

## 2017-09-21 NOTE — ASSESSMENT & PLAN NOTE
Patient with history of SVT s/p cardioversion in 08/2017 who has sustained HR in the 130s-140s who acutely became hypotensive found to be in afib with RVR in the 180s  -Given 5mg Lopressor x2 and rate came down to around 140 which is baseline for this patient  -SBP remained low-normotensive in the 110s-120s; SpO2 95%  -patient denies any new symptoms of CP/SOB  -On chart review per cardiology, he is supposed to be takings flecanide 100mg BID which he has not been getting this hospitalization. Recommend restarting this medication vs. Amiodarone, but cardiology consult to address this need is recommended  -Recently weaned off of Lopressor 25mg BID  -No electrolyte abnormalities on most recent labs; no recent fevers (patient has been treated for sepsis this hospitalization, which has since resolved. Abx course completed.)  -Patient is asymptomatic at this time, and as such is medically appropriate to remain under the care of BMT on hospital floor

## 2017-09-21 NOTE — PLAN OF CARE
MDR's with Dr Howe.  Patient's stool OP increased in the last 24 hours.  Patient converted into afib with RVR and became hypotensive overnight.  Fluid boluses given and cards has been consulted.  PO intake low.  PT/OT eval placed.  Will continue to follow for d/c needs.

## 2017-09-21 NOTE — CONSULTS
"9/13/2017    [facility]: "history of SVT; followed by Dr De Los Santos"    HPI:  Paul E Sistrunk is a 57 y.o. 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 De Los Santos as an outpatient, as he had symptomatic (SOB) SVT (170s) in June 2017. An ECHO at the time revealed an EF 60%. He underwent DCCV 7/14/17 and was discharged on flecainide and Lopressor. He saw Dr. De Los Santos's clinic on 8/7 with fatigue and weakness. At that time, he was in NSR at 72 BPM. The patient was seen at the end of July in the hospital by EP who noted AT with LBBB aberrancy (flecainide was asked to be continued). If recurrence, amiodarone was asked to be started. Lopressor was decreased to 25 mg PO BID in clinic because the patient reported fatigue with the higher dose. Flecainide was continued. The patient is admitted to [facility] for sepsis in the setting of 102F temperature. He had a recent admission at OSH for fever and was given abx. The patient is on flecainide 100 mg PO BID and Lopressor 25 mg PO BID. The MICU was called this morning for asymptomatic AF with rate of 170-180. 5 mg IVP metoprolol slowed the rate down to the 150s. A second dose of metoprolol decreased the rate further to the 130s. His SBP improved from 80s to 120s. He denies CP. He was off flecainide since 9/13 until this morning.    PMH:  Past Medical History:   Diagnosis Date    Cancer 2/2016    leukemia, AML    HIV infection     Pancytopenia due to antineoplastic chemotherapy 7/4/2016       PSH:  Past Surgical History:   Procedure Laterality Date    CYSTOSCOPY         Family:  Family History   Problem Relation Age of Onset    Cancer Father        Social:  Social History     Social History    Marital status: Single     Spouse name: N/A    Number of children: 1    Years of education: N/A     Occupational History    Not on file.     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      Current Outpatient Prescriptions on File Prior to Encounter   Medication Sig Dispense Refill    abacavir-dolutegravir-lamivud (TRIUMEQ) 600- mg Tab Take 1 tablet by mouth once daily. 30 tablet 2    acyclovir (ZOVIRAX) 800 MG Tab Take 1 tablet (800 mg total) by mouth 2 (two) times daily. 60 tablet 11    aspirin (ECOTRIN) 81 MG EC tablet Take 1 tablet (81 mg total) by mouth once daily.  0    atorvastatin (LIPITOR) 20 MG tablet Take 1 tablet (20 mg total) by mouth once daily. 90 tablet 3    butalbital-acetaminophen-caffeine -40 mg (FIORICET, ESGIC) -40 mg per tablet   0    finasteride (PROSCAR) 5 mg tablet Take 1 tablet (5 mg total) by mouth once daily. 30 tablet 1    flecainide (TAMBOCOR) 100 MG Tab Take 1 tablet (100 mg total) by mouth every 12 (twelve) hours. 180 tablet 3    HYDROmorphone (DILAUDID) 2 MG tablet Take 1 tablet (2 mg total) by mouth every 6 (six) hours as needed for Pain. 60 tablet 0    L.acidophil,parac-S.therm-Bif. (RISAQUAD) Cap capsule Take 1 capsule by mouth once daily.      loperamide (IMODIUM) 2 mg capsule Take 2 mg by mouth daily as needed for Diarrhea.      lorazepam (ATIVAN) 1 MG tablet Take 1 tablet (1 mg total) by mouth every 8 (eight) hours as needed for Anxiety. 30 tablet 0    ondansetron (ZOFRAN) 8 MG tablet Take 1 tablet (8 mg total) by mouth every 8 (eight) hours as needed for Nausea. 30 tablet 1    pantoprazole (PROTONIX) 40 MG tablet Take 1 tablet (40 mg  total) by mouth once daily. 30 tablet 11    promethazine (PHENERGAN) 12.5 MG Tab Take 1 tablet (12.5 mg total) by mouth every 6 (six) hours as needed (nausea). 30 tablet 2    tamsulosin (FLOMAX) 0.4 mg Cp24 take 1 capsule by mouth once daily (Patient taking differently: take 1 capsule by mouth every 36 hours) 30 capsule 11    voriconazole (VFEND) 200 MG Tab Take 1 and one half tablet (300 mg) every 12 hours 90 tablet 2    hydrocortisone (ANUSOL-HC) 25 mg suppository Place 1 suppository (25 mg total) rectally 2 (two) times daily. 12 suppository 1    hydrocortisone (CORTEF) 5 MG Tab Start 9/6. Take 10 mg twice a day x 1 week, then 10 mg AM & 5 mg PM x 1 week; then 5 mg twice a day x 1 week; then 5 mg in AM only x 1 week. 70 tablet 0    metoprolol tartrate (LOPRESSOR) 25 MG tablet Take 1 tablet (25 mg total) by mouth 2 (two) times daily. 180 tablet 3    sulfamethoxazole-trimethoprim 800-160mg (BACTRIM DS) 800-160 mg Tab Take 1 tablet by mouth every Mon, Wed, Fri.          Allergies:  Review of patient's allergies indicates:   Allergen Reactions    Etoposide Rash       Tobacco, alcohol, drugs:  History   Smoking Status    Never Smoker   Smokeless Tobacco    Never Used     History   Alcohol Use No     History   Drug Use No       ROS:  Review of Systems   Constitution: Negative for chills and fever.   HENT: Negative for ear discharge.    Eyes: Negative for pain and visual disturbance.   Cardiovascular: Positive for palpitations. Negative for chest pain, dyspnea on exertion, irregular heartbeat, leg swelling, orthopnea, paroxysmal nocturnal dyspnea and syncope.   Respiratory: Negative for hemoptysis, shortness of breath and wheezing.    Skin: Negative for rash and suspicious lesions.   Musculoskeletal: Negative for joint pain and muscle weakness.   Gastrointestinal: Negative for abdominal pain, diarrhea, hematemesis, hematochezia, melena, nausea and vomiting.   Genitourinary: Negative for dysuria and frequency.    Neurological: Negative for focal weakness, headaches and tremors.   Psychiatric/Behavioral: Negative for altered mental status, suicidal ideas and thoughts of violence.       Vitals:  Temp: 97.6 °F (36.4 °C) (09/21/17 0817)  Pulse: (!) 143 (09/21/17 0843)  Resp: 16 (09/21/17 0817)  BP: 99/68 (09/21/17 0843)  SpO2: (!) 94 % (09/21/17 0817)  Temp:  [97.6 °F (36.4 °C)-98.1 °F (36.7 °C)]   Pulse:  [105-180]   Resp:  [16-17]   BP: ()/(56-81)   SpO2:  [92 %-95 %]     Ins/Outs:    Intake/Output Summary (Last 24 hours) at 09/21/17 0921  Last data filed at 09/21/17 0836   Gross per 24 hour   Intake             2410 ml   Output             3700 ml   Net            -1290 ml       PE:  Physical Exam   Constitutional: He is oriented to person, place, and time. He appears well-developed.   HENT:   Head: Normocephalic and atraumatic.   Eyes: EOM are normal.   Neck: Normal range of motion.   Cardiovascular: Normal heart sounds.  An irregularly irregular rhythm present. Tachycardia present.  Exam reveals no gallop and no friction rub.    No murmur heard.  Pulses:       Radial pulses are 2+ on the right side, and 2+ on the left side.   Pulmonary/Chest: Effort normal. He has no wheezes. He has no rales.   Abdominal: Soft. Bowel sounds are normal. He exhibits no distension. There is no tenderness. There is no rebound.   Musculoskeletal: Normal range of motion. He exhibits edema.   Neurological: He is alert and oriented to person, place, and time. He has normal strength. No cranial nerve deficit or sensory deficit.   Skin: Skin is warm.   Psychiatric: He has a normal mood and affect.        Labs:  CBC with Diff:     Recent Labs  Lab 09/19/17  0504 09/20/17  0455 09/21/17  0222   WBC 4.98 6.57 4.36   HGB 10.0* 10.4* 10.4*   HCT 28.5* 30.1* 31.1*   PLT 42* 58* 60*   LYMPH 3.0*  CANCELED 1.0*  CANCELED 1.0*   MONO 0.0*  CANCELED 1.0*  CANCELED 0.0*   EOSINOPHIL 0.0 0.0 0.0       COAG:  No results for input(s): APTT, INR, PTT in  the last 168 hours.    CMP:   Recent Labs  Lab 09/19/17  0504 09/20/17  0455 09/21/17  0222   * 149* 133*   CALCIUM 7.5* 7.3* 7.7*   ALBUMIN 1.4* 1.5* 1.5*   PROT 3.4* 3.5* 3.4*    137 136   K 3.4* 3.6 3.8   CO2 21* 23 22*   * 106 107   BUN 36* 40* 49*   CREATININE 1.0 1.0 1.4   ALKPHOS 104 107 115   ALT 13 15 14   AST 19 21 26   BILITOT 2.4* 2.3* 2.5*   MG 1.7 1.9 2.0   PHOS 2.6* 2.8 3.5     Estimated Creatinine Clearance: 64.3 mL/min (based on SCr of 1.4 mg/dL).    .No results for input(s): CPK, TROPONINI, MB, BNP in the last 168 hours.      Diagnostic Results:  Ejection Fractions   EF   Date Value Ref Range Status   07/20/2017 55 55 - 65    06/21/2017 60 55 - 65    08/15/2016 60 55 - 65         Assessment and Plan  Paul E Sistrunk is a 57 y.o. 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    AF with RVR  - [id number] = 2  - in the setting of sepsis and being off AAD  - c/w flecainide (restarted today after one week off)  - rate/rhythm control to be discussed on rounds  - c/w BB for goal HR <110  - start IV UFH    Javed Alberts MD

## 2017-09-21 NOTE — ASSESSMENT & PLAN NOTE
- we are concerned about acute woujq-ayaexr-jboh disease given that he received donor lymphocytes in August 2017.   - s/p flex sig 9/15/17 showing erythematous mucosa in the recto-sigmoid colon (biopsied)  - methylprednisolone 80mg IV daily started 9/14/17  - CMV ordered, pending  - solumedrol increased to 80mg iv bid on 9/17, diarrhea improved 9/20 and steroids decreased, will increase back to 80 mg bid today due to stool/mixed output of 3.4 L  - budesonide 9 mg 9/17

## 2017-09-21 NOTE — H&P
[facility]  Critical Care Medicine  Progress Note    Patient Name: Paul E Sistrunk  MRN: [id number]  Admission Date: 9/13/2017  Hospital Length of Stay: 8 days  Code Status: Full Code  Attending Provider: Gabriela Howe MD  Primary Care Provider: Edgar Pinon MD   Principal Problem: Sepsis    Subjective:     HPI:  57 year old with a history of HIV on HAART and relapses AML post MUD allogeneic stem cell transplant 1 year ago/DLI 4 weeks ago, history of SVT (s/p cardioversion in 08/2017) who was treated for sepsis earlier this admission, requiring ICU stay. Currently under the care of BMT for treatment of AML. Currently on voriconazole; bactrim, cefepime, vanc courses completed.     9/20 PM: [facility] was called to patients bedside for afib with a rate of 170-180. Patient was asymptomatic on assessment, he was hypotensive with SBP in the 80s. SpO2 in the upper 90s. He was treated with one dose of Lopressor 5mg push which slowed his afib from a rate of 176 to 150s. His SBP improved to the low 100s with this first dose. Patient was then given a second dose of 5mg lopressor and his rate slowed to 130s-140s which appears to be his baseline over the past few days. SBP improved to the 120s. Patient remained stable and without complaint throughout this event.     9/21 PM: Pt continues to stay in RVR throughout the day with -150/min. EP cardiology followed with recommendation for 9/22 AM cardioversion. Pt started on heparin gtt @ 1800. Pt's BP slowly decreased through the course of the day from  mmHg @ 0800 to 80 mmHg @ 1800. Given pt's high risk for decompensation, evolving hypotension, transferred to ICU for closer monitoring.    Hospital/ICU Course:  No notes on file    No new subjective & objective note has been filed under this hospital service since the last note was generated.    Assessment/Plan:     Cardiac/Vascular   Atrial fibrillation with rapid ventricular response    Patient with  history of SVT s/p cardioversion in 08/2017 who has sustained HR in the 130s-140s who acutely became hypotensive found to be in afib with RVR in the 180s  -Given 5mg Lopressor x2 and rate came down to around 140 which is baseline for this patient  -SBP remained low-normotensive in the 110s-120s; SpO2 95%  -patient denies any new symptoms of CP/SOB  -On chart review per cardiology, he is supposed to be takings flecanide 100mg BID which he has not been getting this hospitalization. Recommend restarting this medication vs. Amiodarone, but cardiology consult to address this need is recommended  -Recently weaned off of Lopressor 25mg BID  -No electrolyte abnormalities on most recent labs; no recent fevers (patient has been treated for sepsis this hospitalization, which has since resolved. Abx course completed.)  -Patient is asymptomatic at this time, and as such is medically appropriate to remain under the care of BMT on hospital floor         Atrial fibrillation with RVR    RVR x 12+ hours 9/21, restarted home dose flecainide without conversion to NSR. Pt has been on multiple doses of IV and PO lopressor as well with no conversion  - Transferred to CMICU 9/21 PM for closer observation given 9/21 slowly evolving hypotension, continued RVR, fatigue, high risk for deterioration / decompensation  - Plan for close monitoring + cardioversion in case of decompensation        ID   * Sepsis    New cultures 9/21  New labs 9/21 PM - f/u, start empiric abics if pt appears to still be septic    - discontinued vancomycin on 9/15/17  - discontinued cefepime 9/17/17   - See GVHD        HIV disease         - no acute issues. Continue triumeq  - now on IV steroids with history of fungal infection, ID consulted. Histoplasma antigen done and continue current ppx. ID has signed off             Immunology/Multi System   Xzsla-eekgja-xxvx disease    9/21 BMT note:    - we are concerned about acute beigg-zzawvx-cmhv disease given that he  received donor lymphocytes in August 2017.   - s/p flex sig 9/15/17 showing erythematous mucosa in the recto-sigmoid colon (biopsied)  - methylprednisolone 80mg IV daily started 9/14/17  - CMV ordered, pending  - solumedrol increased to 80mg iv bid on 9/17, diarrhea improved 9/20 and steroids decreased, will increase back to 80 mg bid today due to stool/mixed output of 3.4 L  - budesonide 9 mg 9/17           Hematology   Chemotherapy-induced thrombocytopenia    53K 9/21  Continue to monitor  On heparin gtt 9/21 for RVR        Oncology   Anemia due to chemotherapy    Hb 10.6, cont to monitor, transfuse if needed.        S/P allogeneic bone marrow transplant    From BMT note, dates updated   -- +373 days s/p Flu/Bu/ATG MUD allogeneic transplant for high risk AML after his second IDAC (6/20/16 - 6/24/16) after a prolonged hospitalization (2/5/16 - 3/21/16) for induction with 7&3 and reinduction with MEC to remission and IDAC (4/4/16 - 4/9/16)  -- Chimerics from marrow done 6/21 show CD3 of 90% donor and 10% recipient, but CD34 of 5% donor and 95% recipient   -- Chimers from 7/10 shows 70% recipient and 30% donor - NOT SORTED  -- Relpased 6/2017 and reinduced with FLAG BETZY  -- DLI 8/14/17, day +39             AML (acute myeloid leukemia) in relapse    BMT 9/21 note   -- Relapsed AML - peripheral blood shows 30% blasts 6/2017   -- BM biopsy results - consistent with 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 Blast of 62%, 19 had a complex karyotype including 5q deletion, 7q deletion, 17p deletion, and one metaphase represented a near tetraploid subclone; FLT-3 negative     -- Had previous reaction to MEC (etoposide caused full body rash).   -- Re-induced with FLAG-BETZY   -- Day 14 bone marrow biopsy done 7/10 - hypocellular with no evidence of residual disease  -- Post DLI on 8/14/17  -- currently receiving Vidaza for 2 cycles given high risk disease per Dr. Atkinson note             GI   CINV  (chemotherapy-induced nausea and vomiting)    Zofran PRN           Critical Care Daily Checklist:    A: Awake: RASS Goal/Actual Goal: RASS Goal: 0-->alert and calm  Actual: Bernard Agitation Sedation Scale (RASS): Alert and calm   B: Spontaneous Breathing Trial Performed?     C: SAT & SBT Coordinated?  N/A                      D: Delirium: CAM-ICU     E: Early Mobility Performed? Yes   F: Feeding Goal: Goals: pt to consume nutrients >/= 85% EEN/EPN   Status: Nutrition Goal Status: goal not met   Current Diet Order   Procedures    Diet full liquid    Diet NPO      AS: Analgesia/Sedation Dilaudid / lorazepam   T: Thromboembolic Prophylaxis Heparin gtt   H: HOB > 300 Yes   U: Stress Ulcer Prophylaxis (if needed) Famotidine   G: Glucose Control    B: Bowel Function Stool Occurrence: 1   I: Indwelling Catheter (Lines & Mendez) Necessity Tunneled L subclavian double lumen placed 7/24/2017   D: De-escalation of Antimicrobials/Pharmacotherapies Bactrim, valganciclovir, vori ppx; may add empiric abics if labs / clinical picture appears septic    Plan for the day/ETD Transfer to CMICU. Cardiovert if decompensates    Code Status:  Family/Goals of Care: Full Code         Janell Nina MD   PGY-1 Internal Medicine  [phone]    Critical Care Medicine  [facility]

## 2017-09-21 NOTE — ASSESSMENT & PLAN NOTE
--concern for possible new infection given leukopenia and shock overnight.  Shock currently resolved, weaned off vasopressors at 0100, 9/22.   --repeat blood cultures ngtd, UA pending.  Chest xray from 9/21 without focal consolidation or infiltrate.  Currently on room air.  --restarted empiric antibiotics (vancomycin + pip/tazo).    --continue valganciclovir + voriconazole + bactrim prophylaxis.   --previous quantiferon gold indeterminate (7/10/17).  Will send T spot.

## 2017-09-21 NOTE — PLAN OF CARE
Problem: Patient Care Overview  Goal: Plan of Care Review  Outcome: Ongoing (interventions implemented as appropriate)  Patient remains free from falls and injury this shift. Bed in low, locked position with call bell in reach. Bed alarm active. Patient encouraged to call for assistance when getting out of bed. Patient verbalized understanding. All belongings within reach. VS stable. Afebrile. Cardiac monitoring maintained, sinus tach (120-130) although when getting to BSC HR increases to 150. Had one loose BM overnight, imodium given twice. Complained of pain 7/10 dilaudid given q4hr. Pt is outstanding for urine sample, awaiting to collect. Will continue to monitor.

## 2017-09-21 NOTE — PHYSICIAN QUERY
"PT Name: Paul E Sistrunk  MR #: [id number]    Physician Query Form - Atrial Fibrillation Specificity     CDS: Zaina De Santiago RN, CCDS         Contact information :[phone]      This form is a permanent document in the medical record.     Query Date: September 21, 2017    By submitting this query, we are merely seeking further clarification of documentation. Please utilize your independent clinical judgment when addressing the question(s) below.    The medical record contains the following:   Indicators     Supporting Clinical Findings Location in Medical Record   x Atrial Fibrillation "AF with RVR   - [id number] = 2   - in the setting of sepsis and being off AAD   - c/w flecainide (restarted today after one week off)   - rate/rhythm control to be discussed on rounds   - c/w BB for goal HR <110   - start IV UFH"   Electrophysiology consult 9/21/17    EKG results     x Medication "The MICU was called this morning for asymptomatic AF with rate of 170-180. 5 mg IVP metoprolol slowed the rate down to the 150s. A second dose of metoprolol decreased the rate further to the 130s. His SBP improved from 80s to 120s. He denies CP. He was off flecainide since 9/13 until this morning." Electrophysiology consult 9/21/17    Treatment     x Other "PMH of SVT s/p DCCV"  " He underwent DCCV 7/14/17 and was discharged on flecainide and Lopressor" Electrophysiology consult 9/21/17       Provider, please further specify the Atrial Fibrillation diagnosis.    [  ] Chronic  [ x ] Paroxysmal  [  ] Permanent  [  ] Persistent  [  ] Other (please specify): ____________________________  [  ] Clinically Undetermined    Please document in your progress notes daily for the duration of treatment until resolved, and include in your discharge summary.    "

## 2017-09-21 NOTE — SUBJECTIVE & OBJECTIVE
Subjective:     Interval History:   - diarrhea (3.4L of stool in past 24 hours--some mixed with urine)   - abdominal pain stable and po intake stable, 1L fluid intake past 24 hours  - nausea with no vomiting  - remains afebrile but tachycardic  - HR up to 200, EKG showed A-fib given IV Lopressor and bolus, CCS and cardiology evaluated patient and restarted Flecainide and started Heparin gtt.      Objective:     Vital Signs (Most Recent):  Temp: 98.2 °F (36.8 °C) (09/21/17 1252)  Pulse: (!) 113 (09/21/17 1252)  Resp: 17 (09/21/17 1252)  BP: (!) 91/59 (09/21/17 1333)  SpO2: (!) 91 % (09/21/17 1252) Vital Signs (24h Range):  Temp:  [97.6 °F (36.4 °C)-98.2 °F (36.8 °C)] 98.2 °F (36.8 °C)  Pulse:  [105-185] 113  Resp:  [16-17] 17  SpO2:  [87 %-95 %] 91 %  BP: ()/(50-81) 91/59     Weight: 76.3 kg (168 lb 4.8 oz)  Body mass index is 22.2 kg/m².  Body surface area is 1.98 meters squared.    ECOG SCORE         Intake/Output - Last 3 Shifts       09/19 0700 - 09/20 0659 09/20 0700 - 09/21 0659 09/21 0700 - 09/22 0659    P.O. 810 1170 200    I.V. (mL/kg) 1250 (14.8)      IV Piggyback  1300 1000    Total Intake(mL/kg) 2060 (24.5) 2470 (29.3) 1200 (15.7)    Urine (mL/kg/hr) 900 (0.4) 50 (0) 600 (1.1)    Other   600 (1.1)    Stool 0 (0) 3350 (1.7) 0 (0)    Total Output 900 3400 1200    Net +1160 -930 0           Urine Occurrence 1 x 2 x 1 x    Stool Occurrence 0 x 2 x 1 x          Physical Exam   Constitutional: He is oriented to person, place, and time. He appears well-developed and well-nourished. No distress.   HENT:   Head: Normocephalic.   Mouth/Throat: No oropharyngeal exudate.   Dry mouth and hoarse voice   Eyes: Conjunctivae are normal. Pupils are equal, round, and reactive to light. No scleral icterus.   Neck: Normal range of motion. Neck supple. Normal range of motion present. No thyromegaly present.   Cardiovascular: Regular rhythm, normal heart sounds and intact distal pulses.    tachycardic   Pulmonary/Chest:  Effort normal and breath sounds normal. No respiratory distress.   Abdominal: Soft. Bowel sounds are normal. He exhibits no distension. There is no tenderness.   Musculoskeletal: Normal range of motion. He exhibits no edema or tenderness.   Neurological: He is alert and oriented to person, place, and time. No cranial nerve deficit. Coordination normal.   Skin: Skin is warm and dry. No petechiae and no rash noted. No pallor.   Psychiatric: He has a normal mood and affect. Thought content normal.   Vitals reviewed.      Significant Labs:   CBC:   Recent Labs  Lab 09/20/17 0455 09/21/17 0222 09/21/17  1301   WBC 6.57 4.36  --    HGB 10.4* 10.4* 10.6*  10.6*   HCT 30.1* 31.1* 30.9*  30.9*   PLT 58* 60*  --     and CMP:   Recent Labs  Lab 09/20/17 0455 09/21/17 0222 09/21/17  1301    136 137   K 3.6 3.8 3.7    107 109   CO2 23 22* 22*   * 133* 114*   BUN 40* 49* 46*   CREATININE 1.0 1.4 1.3   CALCIUM 7.3* 7.7* 7.5*   PROT 3.5* 3.4* 3.3*   ALBUMIN 1.5* 1.5* 1.4*   BILITOT 2.3* 2.5* 2.5*   ALKPHOS 107 115 118   AST 21 26 28   ALT 15 14 16   ANIONGAP 8 7* 6*   EGFRNONAA >60.0 56.2* >60.0       Diagnostic Results:  I have reviewed and interpreted all pertinent imaging results/findings within the past 24 hours.  None

## 2017-09-21 NOTE — PROGRESS NOTES
Dr. Romero notified of abnormal VS as below. MD gave an order for a 1L LR bolus to be given now. MD stated he is on his way to the bedside. Will continue to monitor.     09/21/17 0817   Vital Signs   Temp 97.6 °F (36.4 °C)   Temp src Oral   Pulse (!) 133   Heart Rate Source Monitor   Resp 16   SpO2 (!) 94 %   O2 Device (Oxygen Therapy) room air   BP (!) 77/56   MAP (mmHg) 63   BP Location Right arm   BP Method Automatic   Patient Position Sitting

## 2017-09-21 NOTE — SUBJECTIVE & OBJECTIVE
Past Medical History:   Diagnosis Date    Cancer 2/2016    leukemia, AML    HIV infection     Pancytopenia due to antineoplastic chemotherapy 7/4/2016       Past Surgical History:   Procedure Laterality Date    CYSTOSCOPY         Review of patient's allergies indicates:   Allergen Reactions    Etoposide Rash       Family History     Problem Relation (Age of Onset)    Cancer Father        Social History Main Topics    Smoking status: Never Smoker    Smokeless tobacco: Never Used    Alcohol use No    Drug use: No    Sexual activity: Not on file      Review of Systems   Constitutional: Positive for fatigue. Negative for activity change, chills and fever.   HENT: Negative for congestion and facial swelling.    Eyes: Negative for discharge and itching.   Respiratory: Negative for chest tightness and shortness of breath.    Cardiovascular: Negative for chest pain and palpitations.   Gastrointestinal: Positive for abdominal pain (right and left lower quadrant abdominal discomfort) and diarrhea. Negative for abdominal distention, blood in stool, nausea and vomiting.   Genitourinary: Negative for difficulty urinating, dysuria and hematuria.   Skin: Positive for pallor. Negative for color change and rash.   Neurological: Positive for weakness (generalized). Negative for dizziness and headaches.     Objective:     Vital Signs (Most Recent):  Temp: 97.3 °F (36.3 °C) (09/21/17 1434)  Pulse: (!) 140 (09/21/17 1500)  Resp: 14 (09/21/17 1434)  BP: (!) 78/54 (09/21/17 1743)  SpO2: (!) 94 % (09/21/17 1434) Vital Signs (24h Range):  Temp:  [97.3 °F (36.3 °C)-98.2 °F (36.8 °C)] 97.3 °F (36.3 °C)  Pulse:  [105-185] 140  Resp:  [14-17] 14  SpO2:  [87 %-95 %] 94 %  BP: ()/(50-81) 78/54   Weight: 82.5 kg (181 lb 14.1 oz)  Body mass index is 24 kg/m².      Intake/Output Summary (Last 24 hours) at 09/21/17 1819  Last data filed at 09/21/17 1540   Gross per 24 hour   Intake             4450 ml   Output             3775 ml    Net              675 ml       Physical Exam   Constitutional: He is oriented to person, place, and time. He appears well-developed and well-nourished. No distress.   HENT:   Head: Normocephalic.   Mouth/Throat: No oropharyngeal exudate.   Dry mouth and hoarse voice   Eyes: Conjunctivae are normal. Pupils are equal, round, and reactive to light. Scleral icterus (mild) is present.   Neck: Normal range of motion. Neck supple. Normal range of motion present. No thyromegaly present.   Cardiovascular: Regular rhythm, normal heart sounds and intact distal pulses.  Tachycardia present.    No murmur heard.  +4 bilateral lower extremity edema. Extremities warm to touch.    Pulmonary/Chest: Effort normal. No accessory muscle usage. No tachypnea. No respiratory distress. He has no decreased breath sounds. He has no wheezes. He has no rhonchi. He has no rales.   Abdominal: Soft. Bowel sounds are normal. He exhibits no distension. There is tenderness in the right lower quadrant and left lower quadrant.   Musculoskeletal: He exhibits edema. He exhibits no tenderness.   Left forearm edema.  No tenderness to palpation.  Pulses intact.  No signs of recent IVs or trauma.   Neurological: He is alert and oriented to person, place, and time. No cranial nerve deficit or sensory deficit. Coordination normal. GCS eye subscore is 4. GCS verbal subscore is 5. GCS motor subscore is 6.   Skin: Skin is warm and dry. No petechiae and no rash noted. He is not diaphoretic. There is pallor.   Psychiatric: He has a normal mood and affect. Thought content normal.   Nursing note and vitals reviewed.      Vents:     Lines/Drains/Airways     Central Venous Catheter Line                 Tunneled Central Line Insertion/Assessment - Double Lumen  07/24/17 1522 left subclavian 59 days              Significant Labs:    CBC/Anemia Profile:    Recent Labs  Lab 09/20/17  0455 09/21/17  0222 09/21/17  1301   WBC 6.57 4.36 2.60*  2.60*   HGB 10.4* 10.4* 10.6*   10.6*   HCT 30.1* 31.1* 30.9*  30.9*   PLT 58* 60* 53*  53*   MCV 93 95 95  95   RDW 18.6* 18.5* 18.6*  18.6*        Chemistries:    Recent Labs  Lab 09/20/17  0455 09/21/17  0222 09/21/17  1301    136 137   K 3.6 3.8 3.7    107 109   CO2 23 22* 22*   BUN 40* 49* 46*   CREATININE 1.0 1.4 1.3   CALCIUM 7.3* 7.7* 7.5*   ALBUMIN 1.5* 1.5* 1.4*   PROT 3.5* 3.4* 3.3*   BILITOT 2.3* 2.5* 2.5*   ALKPHOS 107 115 118   ALT 15 14 16   AST 21 26 28   MG 1.9 2.0 1.7   PHOS 2.8 3.5 2.9

## 2017-09-21 NOTE — ASSESSMENT & PLAN NOTE
--platelet count 74 on admission, currently 40.  --continue to monitor  --transfuse for plt level <10,000

## 2017-09-21 NOTE — CONSULTS
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 Currently under the care of BMT for treatment of AML. Currently on voriconazole; bactrim, cefepime, vanc courses completed.     [facility] was called to patients bedside for afib with a rate of 170-180. Patient was asymptomatic on assessment, he was hypotensive with SBP in the 80s. SpO2 in the upper 90s. He was treated with one dose of Lopressor 5mg push which slowed his afib from a rate of 176 to 150s. His SBP improved to the low 100s with this first dose. Patient was then given a second dose of 5mg lopressor and his rate slowed to 130s-140s which appears to be his baseline over the past few days. SBP improved to the 120s. Patient remained stable and without complaint throughout this event. Primary was seen at the bedside as well and were brought up to speed on what had occurred. Patient is medically appropriate to remain outside of critical care. Recommend cardiology consult to address new onset afib with RVR.     Hospital/ICU Course:  No notes on file    Past Medical History:   Diagnosis Date    Cancer 2/2016    leukemia, AML    HIV infection     Pancytopenia due to antineoplastic chemotherapy 7/4/2016       Past Surgical History:   Procedure Laterality Date    CYSTOSCOPY         Review of patient's allergies indicates:   Allergen Reactions    Etoposide Rash       Family History     Problem Relation (Age of Onset)    Cancer Father         Social History Main Topics    Smoking status: Never Smoker    Smokeless tobacco: Never Used    Alcohol use No    Drug use: No    Sexual activity: Not on file      Review of Systems   Constitutional: Negative for activity change and appetite change.   HENT: Negative for congestion and facial swelling.    Eyes: Negative for discharge and itching.   Respiratory: Negative for apnea, chest tightness and shortness of breath.    Cardiovascular: Negative for chest pain and palpitations.   Gastrointestinal: Negative for abdominal distention, anal bleeding and blood in stool.   Endocrine: Negative for cold intolerance and heat intolerance.   Genitourinary: Negative for difficulty urinating and dysuria.   Skin: Negative for color change and pallor.   Neurological: Negative for dizziness and headaches.     Objective:     Vital Signs (Most Recent):  Temp: 97.8 °F (36.6 °C) (09/21/17 0313)  Pulse: (!) 141 (09/21/17 0557)  Resp: 16 (09/21/17 0313)  BP: 109/71 (09/21/17 0557)  SpO2: (!) 93 % (09/21/17 0313) Vital Signs (24h Range):  Temp:  [97.6 °F (36.4 °C)-98.1 °F (36.7 °C)] 97.8 °F (36.6 °C)  Pulse:  [123-180] 141  Resp:  [16-18] 16  SpO2:  [92 %-97 %] 93 %  BP: ()/(59-81) 109/71   Weight: 84.2 kg (185 lb 9.6 oz)  Body mass index is 24.49 kg/m².      Intake/Output Summary (Last 24 hours) at 09/21/17 0557  Last data filed at 09/21/17 0100   Gross per 24 hour   Intake              920 ml   Output             2400 ml   Net            -1480 ml       Physical Exam   Constitutional: He is oriented to person, place, and time. He appears well-developed and well-nourished.   HENT:   Head: Normocephalic and atraumatic.   Eyes: Conjunctivae and EOM are normal.   Neck: No JVD present. No tracheal deviation present.   Cardiovascular: Normal heart sounds.  An irregularly irregular rhythm present. Tachycardia present.    Pulmonary/Chest: Effort normal and breath sounds normal.   Abdominal: Soft. Bowel sounds are normal.    Musculoskeletal: He exhibits no edema or deformity.   Neurological: He is alert and oriented to person, place, and time.   Skin: Skin is warm and dry.   Psychiatric: He has a normal mood and affect. His behavior is normal.       Vents:     Lines/Drains/Airways     Central Venous Catheter Line                 Tunneled Central Line Insertion/Assessment - Double Lumen  07/24/17 1522 left subclavian 58 days              Significant Labs:    CBC/Anemia Profile:    Recent Labs  Lab 09/20/17 0455 09/21/17 0222   WBC 6.57 4.36   HGB 10.4* 10.4*   HCT 30.1* 31.1*   PLT 58* 60*   MCV 93 95   RDW 18.6* 18.5*        Chemistries:    Recent Labs  Lab 09/20/17 0455 09/21/17 0222    136   K 3.6 3.8    107   CO2 23 22*   BUN 40* 49*   CREATININE 1.0 1.4   CALCIUM 7.3* 7.7*   ALBUMIN 1.5* 1.5*   PROT 3.5* 3.4*   BILITOT 2.3* 2.5*   ALKPHOS 107 115   ALT 15 14   AST 21 26   MG 1.9 2.0   PHOS 2.8 3.5     Significant Imaging:   No new imaging or procedures to review since prior assessment      Assessment/Plan:     Cardiac/Vascular   Atrial fibrillation with rapid ventricular response    Patient with history of SVT s/p cardioversion in 08/2017 who has sustained HR in the 130s-140s who acutely became hypotensive found to be in afib with RVR in the 180s  -Given 5mg Lopressor x2 and rate came down to around 140 which is baseline for this patient  -SBP remained low-normotensive in the 110s-120s; SpO2 95%  -patient denies any new symptoms of CP/SOB  -On chart review per cardiology, he is supposed to be takings flecanide 100mg BID which he has not been getting this hospitalization. Recommend restarting this medication vs. Amiodarone, but cardiology consult to address this need is recommended  -Recently weaned off of Lopressor 25mg BID  -No electrolyte abnormalities on most recent labs; no recent fevers (patient has been treated for sepsis this hospitalization, which has since resolved. Abx course completed.)  -Patient is  asymptomatic at this time, and as such is medically appropriate to remain under the care of BMT on hospital floor             Critical Care Daily Checklist:    A: Awake: RASS Goal/Actual Goal: RASS Goal: 0-->alert and calm  Actual: Bernard Agitation Sedation Scale (RASS): Alert and calm   B: Spontaneous Breathing Trial Performed?     C: SAT & SBT Coordinated?  N/A                      D: Delirium: CAM-ICU     E: Early Mobility Performed? Yes   F: Feeding Goal: Goals: pt to consume nutrients >/= 85% EEN/EPN   Status: Nutrition Goal Status: goal not met   Current Diet Order   Procedures    Diet full liquid      AS: Analgesia/Sedation N/A       T: Thromboembolic Prophylaxis N/A       H: HOB > 300 Yes   U: Stress Ulcer Prophylaxis (if needed) N/A       G: Glucose Control N/A       B: Bowel Function Stool Occurrence: 1   I: Indwelling Catheter (Lines & Mendez) Necessity N/A       D: De-escalation of Antimicrobials/Pharmacotherapies N/A        Plan for the day/ETD N/A        Code Status:  Family/Goals of Care: Full Code  N/A           Thank you for your consult. I will sign off. Please contact us if you have any additional questions.     Case discussed with Pulmonary Critical Care Fellow, MD Remington Winkler MD  Critical Care Medicine  [facility]

## 2017-09-22 PROBLEM — C92.01 AML (ACUTE MYELOID LEUKEMIA) IN REMISSION: Status: ACTIVE | Noted: 2017-01-01

## 2017-09-22 PROBLEM — M79.89 LEFT ARM SWELLING: Status: ACTIVE | Noted: 2017-01-01

## 2017-09-22 PROBLEM — D68.9 COAGULOPATHY: Status: ACTIVE | Noted: 2017-01-01

## 2017-09-22 NOTE — SUBJECTIVE & OBJECTIVE
Subjective:     Interval History:  - Patient seen in ICU  - Cardioverted at 9 pm yesterday at the bedside for 's and BP 66/47. ST after. Transferred to IVU. Was started on 2 pressors which were dc'd at 2 am and BP stable.   - Sepsis work-up intiated on ICU and started empiric Cefepime and Vancomycin. Continues on Heparin gtt. Received albumin and lasix overnight.   - Patient continues Lopressor 25 mg bid and Flecainide 100 mg bid  -  this am, sinus tachy  .- diarrhea (2300 cc stool/urine mixed and 5 unmeasured BM's in the past 24 hours)   - remains afebrile      Objective:     Vital Signs (Most Recent):  Temp: 98.3 °F (36.8 °C) (09/22/17 0500)  Pulse: 109 (09/22/17 1100)  Resp: 11 (09/22/17 1100)  BP: (!) 105/56 (09/22/17 1100)  SpO2: 98 % (09/22/17 1100) Vital Signs (24h Range):  Temp:  [97.5 °F (36.4 °C)-98.7 °F (37.1 °C)] 98.3 °F (36.8 °C)  Pulse:  [] 109  Resp:  [9-37] 11  SpO2:  [92 %-100 %] 98 %  BP: ()/() 105/56     Weight: 82.5 kg (181 lb 14.1 oz)  Body mass index is 24 kg/m².  Body surface area is 2.06 meters squared.    ECOG SCORE           Intake/Output - Last 3 Shifts       09/20 0700 - 09/21 0659 09/21 0700 - 09/22 0659 09/22 0700 - 09/23 0659    P.O. 1170 400     I.V. (mL/kg)  216.5 (2.6)     IV Piggyback 1300 2450 250    Total Intake(mL/kg) 2470 (29.3) 3066.5 (37.2) 250 (3)    Urine (mL/kg/hr) 50 (0) 2790 (1.4) 275 (0.4)    Other  2325 (1.2)     Stool 3350 (1.7) 0 (0)     Total Output 3400 5115 275    Net -930 -2048.6 -25           Urine Occurrence 2 x 5 x 1 x    Stool Occurrence 2 x 5 x           Physical Exam   Constitutional: He is oriented to person, place, and time. He appears well-developed and well-nourished. No distress.   HENT:   Head: Normocephalic.   Mouth/Throat: Oropharynx is clear and moist. No oropharyngeal exudate.   Dry mouth   Eyes: Conjunctivae are normal. Pupils are equal, round, and reactive to light. No scleral icterus.   Neck: Normal range of  motion. Neck supple. Normal range of motion present. No thyromegaly present.   Cardiovascular: Regular rhythm, normal heart sounds and intact distal pulses.    Tachycardic    Pulmonary/Chest: Effort normal and breath sounds normal. No respiratory distress.   Abdominal: Soft. Bowel sounds are normal. He exhibits no distension. There is no tenderness.   Musculoskeletal: Normal range of motion. He exhibits edema. He exhibits no tenderness.   Lymphadenopathy:        Head (right side): No submandibular, no preauricular, no posterior auricular and no occipital adenopathy present.        Head (left side): No submandibular, no preauricular, no posterior auricular and no occipital adenopathy present.     He has no cervical adenopathy.     He has no axillary adenopathy.   Neurological: He is alert and oriented to person, place, and time. No cranial nerve deficit. Coordination normal.   Skin: Skin is warm and dry. No petechiae and no rash noted. No pallor.   Psychiatric: He has a normal mood and affect. Thought content normal.   Vitals reviewed.      Significant Labs:   CBC:   Recent Labs  Lab 09/21/17  1301 09/22/17  0200 09/22/17  1208   WBC 2.60*  2.60* 2.01* 2.07*   HGB 10.6*  10.6* 8.7* 8.4*   HCT 30.9*  30.9* 25.2* 25.0*   PLT 53*  53* 40* SEE COMMENT    and CMP:   Recent Labs  Lab 09/21/17  0222 09/21/17  1301 09/22/17  0200 09/22/17  0829    137 137 137   K 3.8 3.7 3.7 3.3*    109 106 107   CO2 22* 22* 17* 20*   * 114* 110 123*   BUN 49* 46* 45* 41*   CREATININE 1.4 1.3 1.3 1.3   CALCIUM 7.7* 7.5* 7.7* 7.5*   PROT 3.4* 3.3* 4.0*  --    ALBUMIN 1.5* 1.4* 2.3*  --    BILITOT 2.5* 2.5* 2.8*  --    ALKPHOS 115 118 95  --    AST 26 28 30  --    ALT 14 16 10  --    ANIONGAP 7* 6* 14 10   EGFRNONAA 56.2* >60.0 >60.0 >60.0       Diagnostic Results:  I have reviewed and interpreted all pertinent imaging results/findings within the past 24 hours.

## 2017-09-22 NOTE — ASSESSMENT & PLAN NOTE
"- remains afebrile.  - c diff testing is negative  - cultures are negative   - CT abdomen reveals "diffuse abnormal appearance of the small and large bowel demonstrating fluid filled mildly prominent loops with associated submucosal edema and mucosal enhancement."   - concern for acute [id number] disease given that he received donor lymphocytes in August 2017  - s/p flex sig 9/15/17 showing erythematous mucosa in the recto-sigmoid colon (biopsied)--GVHD and CMV  - discontinued vancomycin on 9/15/17  - discontinued cefepime 9/17/17   - sepsis work-up repeated 9/21 due to hypotension and tachycardia, repeat cultures done, chest x-ray negative. Cefepime and Vancomycin restarted in ICU  "

## 2017-09-22 NOTE — PLAN OF CARE
Problem: Patient Care Overview  Goal: Plan of Care Review  Outcome: Ongoing (interventions implemented as appropriate)  Plan of care reviewed with patient and family/friends at bedside.  Patient was an upgrade from oncology for unstable a.fib rvr with rate 200s and hypotension.  Cardioverted on oncology then transferred to CMICU.  Required Vasopressin and Phenylephrine infusions to maintain MAP > 65, but was able to wean off by 0200.  HR now ST, regular rate in low 100s.  Continues to have liquid BMs.  Administered albumin with lasix with urine output 2.2 L.  All questions and concerns addressed.

## 2017-09-22 NOTE — PROGRESS NOTES
Patient transferred to ICU bed 3080 via bed with rapid response team, see rapid response sheet for all info done during response. Family at bedside and taken down to ICU waiting area all belongings sent with family. Report called to Stephanie ABDI.

## 2017-09-22 NOTE — ASSESSMENT & PLAN NOTE
--INR 1.3 on admission and 8.2 on 9/21.  Suspect secondary to poor nutrition and passive congestion.   --LFTs within normal limits except elevated T bili. Fibrinogen and Haptoglobin wnl  --recent CT Abdomen without liver abnormalities.   --INR 5.1 today  --daily INR level

## 2017-09-22 NOTE — PROGRESS NOTES
[facility]  Critical Care Medicine  Progress Note    Patient Name: Paul E Sistrunk  MRN: [id number]  Admission Date: 9/13/2017  Hospital Length of Stay: 9 days  Code Status: Full Code  Attending Provider: Alee Vyas MD  Primary Care Provider: Edgar Pinon MD   Principal Problem: GVHD    Subjective:     HPI:  Mr. 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  On admission, he was started on empiric antibiotics for possible sepsis.  Blood cultures ngtd, urinalysis non-infectious.  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  He was started on high dose methylprednisolone.  CMV PCR undetected on 9/16.  Histoplasma antigen pending.  HSV pending (sent to [place]).  ID evaluated on 9/18 and did not feel that there were any obvious uncontrolled infectious issues.  Empiric antibiotics discontinued.      MICU consulted 9/21 for atrial fibrillation with RVR (170-180) with SBP 80's.   Mr. Sistrunk was given metoprolol IV with some improvement.   Additionally, he was noted to be off flecanide since admission.  Cardiology was consulted by primary team.  He was asymptomatic and the decision was made for him to remain on the floor with BMT service.  Over the course of the morning of 9/21, he was restarted on flecanide and oral metoprolol.  Intermittent episodes of hypotension noted, which improved with IVF boluses.    Mr. Sistrunk continued to have Afib RVR and borderline hypotension and was transferred to the ICU.  Prior to transfer, he developed unstable Afib RVR and was subsequently cardioverted.  He required vasopressors after cardioversion.       Hospital/ICU Course:  Mr. Sistrunk was transferred to the ICU on 9/21 for shock following atrial fibrillation with RVR requiring cardioversion.  He was pan cultures and started on empiric antibiotic (vancomycin and pip/tazo).  Overnight, he was weaned off vasopressors and remains in SR-ST.    Past Medical History:   Diagnosis Date    Cancer 2/2016    leukemia, AML    HIV infection     Pancytopenia due to antineoplastic chemotherapy 7/4/2016       Past Surgical History:   Procedure Laterality Date    CYSTOSCOPY         Review of patient's allergies indicates:   Allergen Reactions    Etoposide Rash       Family History     Problem Relation (Age of Onset)    Cancer Father        Social History Main Topics    Smoking status: Never Smoker    Smokeless tobacco: Never Used    Alcohol use No    Drug use: No    Sexual activity: Not on file      Review of Systems   Constitutional: Positive for fatigue. Negative for activity change, chills and fever.   HENT: Negative for congestion and facial swelling.    Eyes: Negative for discharge and itching.   Respiratory: Negative for chest tightness and shortness of breath.    Cardiovascular: Negative for chest pain and palpitations.   Gastrointestinal: Positive for abdominal pain (right and left lower quadrant abdominal discomfort) and diarrhea. Negative for abdominal distention, blood in stool, nausea and vomiting.   Genitourinary: Negative for difficulty urinating, dysuria and hematuria.   Skin: Positive for pallor. Negative for color change and rash.   Neurological: Positive for weakness (generalized). Negative for dizziness and headaches.     Objective:     Vital Signs (Most Recent):  Temp: 97.3 °F (36.3 °C) (09/21/17 1434)  Pulse: (!) 140 (09/21/17 1500)  Resp: 14 (09/21/17 1434)  BP: (!) 78/54 (09/21/17 1743)  SpO2: (!) 94 % (09/21/17 1434) Vital Signs (24h Range):  Temp:  [97.3 °F (36.3 °C)-98.2 °F (36.8 °C)] 97.3 °F (36.3 °C)  Pulse:  [105-185] 140  Resp:  [14-17]  14  SpO2:  [87 %-95 %] 94 %  BP: ()/(50-81) 78/54   Weight: 82.5 kg (181 lb 14.1 oz)  Body mass index is 24 kg/m².      Intake/Output Summary (Last 24 hours) at 09/21/17 1819  Last data filed at 09/21/17 1540   Gross per 24 hour   Intake             4450 ml   Output             3775 ml   Net              675 ml       Physical Exam   Constitutional: He is oriented to person, place, and time. No acute distress.   HENT:   Head: Normocephalic.   Mouth/Throat: No oropharyngeal exudate.   Eyes: Conjunctivae are normal. Pupils are equal, round, and reactive to light. Scleral icterus (mild) is present.   Neck: Normal range of motion. Neck supple. Normal range of motion present. No thyromegaly present.   Cardiovascular: Regular rhythm, normal heart sounds and intact distal pulses.  Tachycardia present.    No murmur heard.  +4 bilateral lower extremity edema. Extremities warm to touch.    Pulmonary/Chest: Effort normal. No accessory muscle usage. No tachypnea. No respiratory distress. He has no decreased breath sounds. He has no wheezes. He has no rhonchi. He has no rales.   Abdominal: Soft. Bowel sounds are normal. He exhibits no distension. There is tenderness in the right lower quadrant and left lower quadrant.   Musculoskeletal: He exhibits edema. He exhibits no tenderness.   Left forearm edema.  No tenderness to palpation.  Pulses intact.  No signs of recent IVs or trauma.   Neurological: He is alert and oriented to person, place, and time. No cranial nerve deficit or sensory deficit. Coordination normal. GCS eye subscore is 4. GCS verbal subscore is 5. GCS motor subscore is 6.   Skin: Skin is warm and dry. No petechiae and no rash noted. He is not diaphoretic. There is pallor.   Psychiatric: He has a normal mood and affect. Thought content normal.   Nursing note and vitals reviewed.      Vents:     Lines/Drains/Airways     Central Venous Catheter Line                 Tunneled Central Line Insertion/Assessment -  Double Lumen  07/24/17 1522 left subclavian 59 days              Significant Labs:    CBC/Anemia Profile:    Recent Labs  Lab 09/20/17  0455 09/21/17 0222 09/21/17  1301   WBC 6.57 4.36 2.60*  2.60*   HGB 10.4* 10.4* 10.6*  10.6*   HCT 30.1* 31.1* 30.9*  30.9*   PLT 58* 60* 53*  53*   MCV 93 95 95  95   RDW 18.6* 18.5* 18.6*  18.6*        Chemistries:    Recent Labs  Lab 09/20/17  0455 09/21/17 0222 09/21/17  1301    136 137   K 3.6 3.8 3.7    107 109   CO2 23 22* 22*   BUN 40* 49* 46*   CREATININE 1.0 1.4 1.3   CALCIUM 7.3* 7.7* 7.5*   ALBUMIN 1.5* 1.5* 1.4*   PROT 3.5* 3.4* 3.3*   BILITOT 2.3* 2.5* 2.5*   ALKPHOS 107 115 118   ALT 15 14 16   AST 21 26 28   MG 1.9 2.0 1.7   PHOS 2.8 3.5 2.9         Assessment/Plan:     Cardiac/Vascular   Atrial fibrillation with RVR    --now in -ST  --CHADS-vasc 3 (HTN + TIA)  --suspect being off flecanide in the setting of acute illness contributed to AF RVR.  --flecanide and metoprolol restarted 9/21  --emergently cardioverted 9/22 for unstable AF RVR.  Currently in -ST  --continue therapeutic anticoagulation.  INR supratherapeutic 5.1. Plan to restart heparin when INR decreases.   --followed by Dr. De Los Santos, previous cardioversion for SVT.                   Renal/   Metabolic acidosis, normal anion gap (NAG)    --appears mixed AG and NAGMA likely from uremia and bicarb wasting from diarrhea.   --lactate wnl  --continue to monitor.         ID   * Sepsis    --concern for possible new infection given leukopenia and shock overnight.  Shock currently resolved, weaned off vasopressors at 0100, 9/22.   --repeat blood cultures ngtd, UA pending.  Chest xray from 9/21 without focal consolidation or infiltrate.  Currently on room air.  --restarted empiric antibiotics (vancomycin + pip/tazo).    --c diff and stool cultures negative.  --continue valganciclovir + voriconazole + bactrim prophylaxis.   --previous quantiferon gold indeterminate (7/10/17).  Will send T  spot.  --histoplasma antigen pending.  --HSV pending (sent to [place])        HIV disease    --continue HAART regimen          Immunology/Multi System   Inmmm-pyqhnx-jwwz disease       --concern for acute ikowf-agqgqf-hrds disease given that he received donor lymphocytes in August 2017.   - s/p flex sig 9/15/17 showing erythematous mucosa in the recto-sigmoid colon (biopsied)  - methylprednisolone 80mg IV daily   - CMV immunostain positive 9/15, CMV PCR undetected on 9/16.              Hematology   Coagulopathy    --INR 1.3 on admission and 8.2 on 9/21.  Suspect secondary to poor nutrition and passive congestion.   --LFTs within normal limits except elevated T bili. Fibrinogen and Haptoglobin wnl  --recent CT Abdomen without liver abnormalities.   --INR 5.1 today  --daily INR level        Chemotherapy-induced thrombocytopenia    --platelet count 74 on admission, currently 40.  --continue to monitor  --transfuse for plt level <10,000        Oncology   Anemia due to chemotherapy    --no signs of active bleeding.    --monitor cbc        AML (acute myeloid leukemia) in relapse    Per BMT   -- Relapsed AML - peripheral blood shows 30% blasts 6/2017   -- BM biopsy results - consistent with 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 Blast of 62%, 19 had a complex karyotype including 5q deletion, 7q deletion, 17p deletion, and one metaphase represented a near tetraploid subclone; FLT-3 negative     -- Had previous reaction to MEC (etoposide caused full body rash).   -- Re-induced with FLAG-BETZY   -- Day 14 bone marrow biopsy done 7/10 - hypocellular with no evidence of residual disease  -- Post DLI on 8/14/17  -- currently receiving Vidaza for 2 cycles given high risk disease per Dr. Atkinson note             S/P allogeneic bone marrow transplant    --see above          GI   CINV (chemotherapy-induced nausea and vomiting)    --continue prn antiemetics         Orthopedic   Left arm swelling     --swollen, non-tender. No signs of recent IV or trauma.   --obtain ultrasound to evaluate for DVT           Critical Care Daily Checklist:    A: Awake: RASS Goal/Actual Goal: RASS Goal: 0-->alert and calm  Actual: Bernard Agitation Sedation Scale (RASS): Alert   B: Spontaneous Breathing Trial Performed?  n/a   C: SAT & SBT Coordinated?  n/a                   D: Delirium: CAM-ICU Overall CAM-ICU: Negative   E: Early Mobility Performed? Yes   F: Feeding Goal: Goals: pt to consume nutrients >/= 85% EEN/EPN   Status: Nutrition Goal Status: goal not met   Current Diet Order   Procedures    Diet full liquid      AS: Analgesia/Sedation Oral morphine   T: Thromboembolic Prophylaxis SCDs, no chemical VTE given supra therapeutic INR   H: HOB > 300 Yes   U: Stress Ulcer Prophylaxis (if needed)    G: Glucose Control With goal   B: Bowel Function Stool Occurrence: 1   I: Indwelling Catheter (Lines & Mendez) Necessity Left subclavian tunneled line.    D: De-escalation of Antimicrobials/Pharmacotherapies Continue current regimen    Plan for the day/ETD Supportive care    Code Status:  Family/Goals of Care: Full Code       Mr. Sistrunk updated on plan of care.     Discussed with Dr. Vyas.  Plan for stepdown this afternoon if remains hemodynamically stable.     I spent >70 minutes reviewing patient records, examining, and counseling the patient with greater than 50% of the time spent with direct patient care and coordination.        Meaghan Hernandez NP  Critical Care Medicine  [facility]

## 2017-09-22 NOTE — PROGRESS NOTES
[facility]  Hematology  Bone Marrow Transplant  Progress Note    Patient Name: Paul E Sistrunk  Admission Date: 9/13/2017  Hospital Length of Stay: 9 days  Code Status: Full Code    Subjective:     Interval History:  - Patient seen in ICU  - Cardioverted at 9 pm yesterday at the bedside for 's and BP 66/47. ST after. Transferred to IVU. Was started on 2 pressors which were dc'd at 2 am and BP stable.   - Sepsis work-up intiated on ICU and started empiric Cefepime and Vancomycin. Continues on Heparin gtt. Received albumin and lasix overnight.   - Patient continues Lopressor 25 mg bid and Flecainide 100 mg bid  -  this am, sinus tachy  .- diarrhea (2300 cc stool/urine mixed and 5 unmeasured BM's in the past 24 hours)   - remains afebrile      Objective:     Vital Signs (Most Recent):  Temp: 98.3 °F (36.8 °C) (09/22/17 0500)  Pulse: 109 (09/22/17 1100)  Resp: 11 (09/22/17 1100)  BP: (!) 105/56 (09/22/17 1100)  SpO2: 98 % (09/22/17 1100) Vital Signs (24h Range):  Temp:  [97.5 °F (36.4 °C)-98.7 °F (37.1 °C)] 98.3 °F (36.8 °C)  Pulse:  [] 109  Resp:  [9-37] 11  SpO2:  [92 %-100 %] 98 %  BP: ()/() 105/56     Weight: 82.5 kg (181 lb 14.1 oz)  Body mass index is 24 kg/m².  Body surface area is 2.06 meters squared.    ECOG SCORE           Intake/Output - Last 3 Shifts       09/20 0700 - 09/21 0659 09/21 0700 - 09/22 0659 09/22 0700 - 09/23 0659    P.O. 1170 400     I.V. (mL/kg)  216.5 (2.6)     IV Piggyback 1300 2450 250    Total Intake(mL/kg) 2470 (29.3) 3066.5 (37.2) 250 (3)    Urine (mL/kg/hr) 50 (0) 2790 (1.4) 275 (0.4)    Other  2325 (1.2)     Stool 3350 (1.7) 0 (0)     Total Output 3400 5115 275    Net -930 -2048.6 -25           Urine Occurrence 2 x 5 x 1 x    Stool Occurrence 2 x 5 x           Physical Exam   Constitutional: He is oriented to person, place, and time. He appears well-developed and well-nourished. No distress.   HENT:   Head: Normocephalic.    Mouth/Throat: Oropharynx is clear and moist. No oropharyngeal exudate.   Dry mouth   Eyes: Conjunctivae are normal. Pupils are equal, round, and reactive to light. No scleral icterus.   Neck: Normal range of motion. Neck supple. Normal range of motion present. No thyromegaly present.   Cardiovascular: Regular rhythm, normal heart sounds and intact distal pulses.    Tachycardic    Pulmonary/Chest: Effort normal and breath sounds normal. No respiratory distress.   Abdominal: Soft. Bowel sounds are normal. He exhibits no distension. There is no tenderness.   Musculoskeletal: Normal range of motion. He exhibits edema. He exhibits no tenderness.   Lymphadenopathy:        Head (right side): No submandibular, no preauricular, no posterior auricular and no occipital adenopathy present.        Head (left side): No submandibular, no preauricular, no posterior auricular and no occipital adenopathy present.     He has no cervical adenopathy.     He has no axillary adenopathy.   Neurological: He is alert and oriented to person, place, and time. No cranial nerve deficit. Coordination normal.   Skin: Skin is warm and dry. No petechiae and no rash noted. No pallor.   Psychiatric: He has a normal mood and affect. Thought content normal.   Vitals reviewed.      Significant Labs:   CBC:   Recent Labs  Lab 09/21/17  1301 09/22/17  0200 09/22/17  1208   WBC 2.60*  2.60* 2.01* 2.07*   HGB 10.6*  10.6* 8.7* 8.4*   HCT 30.9*  30.9* 25.2* 25.0*   PLT 53*  53* 40* SEE COMMENT    and CMP:   Recent Labs  Lab 09/21/17  0222 09/21/17  1301 09/22/17  0200 09/22/17  0829    137 137 137   K 3.8 3.7 3.7 3.3*    109 106 107   CO2 22* 22* 17* 20*   * 114* 110 123*   BUN 49* 46* 45* 41*   CREATININE 1.4 1.3 1.3 1.3   CALCIUM 7.7* 7.5* 7.7* 7.5*   PROT 3.4* 3.3* 4.0*  --    ALBUMIN 1.5* 1.4* 2.3*  --    BILITOT 2.5* 2.5* 2.8*  --    ALKPHOS 115 118 95  --    AST 26 28 30  --    ALT 14 16 10  --    ANIONGAP 7* 6* 14 10  "  EGFRNONAA 56.2* >60.0 >60.0 >60.0       Diagnostic Results:  I have reviewed and interpreted all pertinent imaging results/findings within the past 24 hours.    Assessment/Plan:     * Sepsis    - remains afebrile.  - c diff testing is negative  - cultures are negative   - CT abdomen reveals "diffuse abnormal appearance of the small and large bowel demonstrating fluid filled mildly prominent loops with associated submucosal edema and mucosal enhancement."   - concern for acute nepim-pyfaqu-gktz disease given that he received donor lymphocytes in August 2017  - s/p flex sig 9/15/17 showing erythematous mucosa in the recto-sigmoid colon (biopsied)--GVHD and CMV  - discontinued vancomycin on 9/15/17  - discontinued cefepime 9/17/17   - sepsis work-up repeated 9/21 due to hypotension and tachycardia, repeat cultures done, chest x-ray negative. Cefepime and Vancomycin restarted in ICU        Xhmcb-omnooa-ghhf disease    - we are concerned about acute hruvv-aehthr-nvnp disease given that he received donor lymphocytes in August 2017.   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 Blast of 62%, 19 had a complex karyotype including 5q deletion, 7q deletion, 17p deletion, and one metaphase represented a near tetraploid subclone; FLT-3 negative     -- Had previous reaction to MEC (etoposide caused full body rash).   -- Re-induced with FLAG-BETZY   -- Day 14 bone marrow biopsy done 7/10 " - hypocellular with no evidence of residual disease  -- Post DLI on 8/14/17  -- currently receiving Vidaza for 2 cycles given high risk disease per Dr. Atkinson note        S/P allogeneic bone marrow transplant    -- +374 days s/p Flu/Bu/ATG MUD allogeneic transplant for high risk AML after his second IDAC (6/20/16 - 6/24/16) after a prolonged hospitalization (2/5/16 - 3/21/16) for induction with 7&3 and reinduction with MEC to remission and IDAC (4/4/16 - 4/9/16)  -- Chimerics from marrow done 6/21 show CD3 of 90% donor and 10% recipient, but CD34 of 5% donor and 95% recipient   -- Chimers from 7/10 shows 70% recipient and 30% donor - NOT SORTED  -- Relpased 6/2017 and reinduced with FLAG BETZY  -- DLI 8/14/17, day +40        HIV disease    - no acute issues. Continue triumeq  - now on IV steroids with history of fungal infection, ID consulted. Histoplasma antigen done and continue current ppx. ID has signed off          Atrial fibrillation with rapid ventricular response    - patient cardioverted at the bedside overnight, in ST now and HR improved  - transferred to ICU  - restarted Flecainide 100 mg bid and metoprolol 25 bid   - started on Vasopressin and Norepinephrine gtt and now stopped with improvement in BP  - On Heparin gtt        Anemia due to chemotherapy    - hemoglobin today is 8.7 g/dL  - continue to monitor.        Chemotherapy-induced thrombocytopenia    - due to transplant and chemotherapy   - platelet count today is 40 k/uL  - continue to monitor.        CINV (chemotherapy-induced nausea and vomiting)    - continue as-need antiemetics and Zofran ATC          Metabolic acidosis, normal anion gap (NAG)    - likely due to diarrhea related to underlying GVHD  - bicarb gtt started 9/17  - continue to monitor            VTE Risk Mitigation         Ordered     Place sequential compression device  Until discontinued      09/22/17 1532     High Risk of VTE  Once      09/13/17 0714     Place RANJIT hose  Until  discontinued      09/13/17 0613          Disposition: step down to BMT, likely tomorrow    Arminda Olivares NP  Bone Marrow Transplant  [facility]

## 2017-09-22 NOTE — HOSPITAL COURSE
Mr. Sistrunk was transferred to the ICU on 9/21 for shock following atrial fibrillation with RVR requiring cardioversion.  He was pan cultures and started on empiric antibiotic (vancomycin and pip/tazo).  Overnight, he was weaned off vasopressors and remains in SR-ST.    10/3 Patient was progressively more hypoxic with increasing oxygen needs with transition that evening from 3L nasal cannula to 5L nasal cannula to Venti-Mask with FiO2 of 50%. CXR was attained which was demonstrable for left middle lobe pneumonia. Patient was started on IV cefepime and vancomycin and patient was given 500 ml NS bolus. Patient was visited later in the evening with noted worsening work of breathing and use of abdominal accessory muscles. Stated he was more short of breath and physical examination was demonstrable now for bilateral inspiratory crackles. Transferred to ICU for closer monitoring.

## 2017-09-22 NOTE — PT/OT/SLP PROGRESS
Occupational Therapy      Paul E Sistrunk  MRN: [id number]    Patient not seen today secondary to Other (Comment) (ECHO). Will follow-up.    ZULY Israel  9/22/2017

## 2017-09-22 NOTE — ASSESSMENT & PLAN NOTE
--appears mixed AG and NAGMA likely from uremia and bicarb wasting from diarrhea.   --lactate wnl  --continue to monitor.

## 2017-09-22 NOTE — PROGRESS NOTES
Rapid response called around 8pm for hypotension and a.fib with RVR. Upon arrival, blood pressure was 66/47 on right arm, heart rate was 124, rhythm was a.fib. SpO2 85% on 2L NC. Patient alert and following commands. Dr. Haney with critical care at bedside. O2 increased to 2 L NC, SpO2 93%. Patient given 100 mcg snehal bump, blood pressure re-assessed at 75/51, heart rate 117. Additional 300 mcg of snehal given IV pus, blood pressure re-assessed at 76/56, heart rate 123. Labs drawn, blood culture obtained. Vaso gtt initiated at 0.04 units/min and snehal-synephrine gtt initiated and titrated to keep SBP >100. Patient remained in afib, heart rate in the 130-140's. Patient given 25 mcg fentanyl IVpush and 1 mg versed IV push per Dr. Haney's order. Patient cardioverted transcutaneously x1 at 120J. Patient then converted to sinus tachycardia, heart rate 109, blood presssure 109/66. Patient transferred to CMICU, blood glass obtained, oxygen titrated to 1 L NC, SpO2 100%. EKG obtained. Report given to Clarence Rodriguez RN.

## 2017-09-22 NOTE — SIGNIFICANT EVENT
ICU doctors at bedside - awaiting ICU bed to transfer patient -   20:00 ICU MD's request rapid response call - call placed   20:10 ICU rapid response team at the bedside - BP 66/47           Plan for cardioversion - NSG supervisor at bedside  20:15 Heparin gtt off per MD's           ICU nurse started vasopressin gtt and                     Phenylephrine gtt - VS monitored - ICU nurse and MD's remain at bedside - All medications given by nag supervisor and ICU nurse. Code cart placed at bedside  20:56 Cardioversion per ICU doctors.   21:03 Patient transferred by ICU team to room 3080 - report given by Leora VELEZ RN

## 2017-09-22 NOTE — ASSESSMENT & PLAN NOTE
- patient cardioverted at the bedside overnight, in ST now and HR improved  - transferred to ICU  - restarted Flecainide 100 mg bid and metoprolol 25 bid   - started on Vasopressin and Norepinephrine gtt and now stopped with improvement in BP  - On Heparin gtt

## 2017-09-22 NOTE — PLAN OF CARE
Problem: Patient Care Overview  Goal: Individualization & Mutuality  Outcome: Ongoing (interventions implemented as appropriate)  1. Cluster care for rest

## 2017-09-22 NOTE — PROGRESS NOTES
LOS: 9 days     24h events and S:  Paul E Sistrunk is a 57 y.o. male who was emergently cardioverted overnight for unstable AF. He is now ST 100s. SBP 90s.    O:  Vitals:  Temp: 98.7 °F (37.1 °C) (09/22/17 0000)  Pulse: 100 (09/22/17 0130)  Resp: 10 (09/22/17 0130)  BP: (!) 101/56 (09/22/17 0130)  SpO2: 99 % (09/22/17 0130)    Ins/Outs:    Intake/Output Summary (Last 24 hours) at 09/22/17 0354  Last data filed at 09/22/17 0200   Gross per 24 hour   Intake          4566.45 ml   Output             4515 ml   Net            51.45 ml       Physical Exam:  General: alert and oriented, conversant  Heart: tachycardia, regular, no r/g appreciated  Lungs: CTAB  Abdomen: soft, NT, ND, +BS  Vascular: 2+ radial pulse, no edema    Medications:   abacavir  600 mg Oral Daily    And    dolutegravir  50 mg Oral Daily    And    lamivudine  300 mg Oral Daily    atorvastatin  20 mg Oral Daily    budesonide  9 mg Oral Daily    flecainide  100 mg Oral Q12H    lidocaine HCL 10 mg/ml (1%)  1 mL Other Once    methylPREDNISolone sodium succinate  80 mg Intravenous Q12H    metoprolol tartrate  50 mg Oral BID    morphine  30 mg Oral BID    ondansetron  8 mg Intravenous Q8H    pantoprazole  40 mg Oral Daily    piperacillin-tazobactam 4.5 g in dextrose 5 % 100 mL IVPB (ready to mix system)  4.5 g Intravenous Q8H    saliva substitute combo no.2  30 mL Mucous Membrane QID    sulfamethoxazole-trimethoprim 800-160mg  1 tablet Oral Every Mon, Wed, Fri    valganciclovir  900 mg Oral Daily    vancomycin (VANCOCIN) IVPB  15 mg/kg Intravenous Q12H    voriconazole  300 mg Oral BID      heparin (porcine) in D5W Stopped (09/21/17 2010)    phenylephrine Stopped (09/22/17 0000)    vasopressin (PITRESSIN) infusion Stopped (09/22/17 0200)     HYDROmorphone, loperamide, lorazepam, prochlorperazine    Review of patient's allergies indicates:   Allergen Reactions    Etoposide Rash       Labs:  CBC with Diff:     Recent Labs  Lab  09/20/17  0455 09/21/17 0222 09/21/17  1301   WBC 6.57 4.36 2.60*  2.60*   HGB 10.4* 10.4* 10.6*  10.6*   HCT 30.1* 31.1* 30.9*  30.9*   PLT 58* 60* 53*  53*   LYMPH 1.0*  CANCELED 1.0* 4.0*  4.0*  CANCELED  CANCELED   MONO 1.0*  CANCELED 0.0* 3.0*  3.0*  CANCELED  CANCELED   EOSINOPHIL 0.0 0.0 0.0  0.0       COAG:    Recent Labs  Lab 09/21/17  1837 09/21/17 2057   APTT  --  >150.0*   INR 8.2* 7.3*       CMP:   Recent Labs  Lab 09/20/17 0455 09/21/17 0222 09/21/17  1301   * 133* 114*   CALCIUM 7.3* 7.7* 7.5*   ALBUMIN 1.5* 1.5* 1.4*   PROT 3.5* 3.4* 3.3*    136 137   K 3.6 3.8 3.7   CO2 23 22* 22*    107 109   BUN 40* 49* 46*   CREATININE 1.0 1.4 1.3   ALKPHOS 107 115 118   ALT 15 14 16   AST 21 26 28   BILITOT 2.3* 2.5* 2.5*   MG 1.9 2.0 1.7   PHOS 2.8 3.5 2.9     Estimated Creatinine Clearance: 72.6 mL/min (based on SCr of 1.3 mg/dL).    .No results for input(s): CPK, TROPONINI, MB, BNP in the last 168 hours.      Diagnostic Results:  Ejection Fractions   EF   Date Value Ref Range Status   07/20/2017 55 55 - 65    06/21/2017 60 55 - 65    08/15/2016 60 55 - 65         Infectious disease labs:  Microbiology Results (last 7 days)     Procedure Component Value Units Date/Time    Blood culture [[id number]] Collected:  09/21/17 6089    Order Status:  Completed Specimen:  Blood Updated:  09/22/17 0315     Blood Culture, Routine No Growth to date    Narrative:       Blood cultures from 2 different sites. 4 bottles total.  Please draw before starting antibiotics.    Blood culture [[id number]] Collected:  09/21/17 1909    Order Status:  Completed Specimen:  Blood Updated:  09/22/17 0315     Blood Culture, Routine No Growth to date    Narrative:       Blood cultures x 2 different sites. 4 bottles total. Please  draw cultures before administering antibiotics.    Blood culture [[id number]] Collected:  09/21/17 2057    Order Status:  Sent Specimen:  Blood from Line, Subclavian, Left Updated:   09/21/17 2206    Blood culture [[id number]]     Order Status:  Canceled Specimen:  Blood     Blood culture [[id number]]     Order Status:  Canceled Specimen:  Blood     Blood culture [[id number]]     Order Status:  Canceled Specimen:  Blood     Urine culture [[id number]]     Order Status:  Canceled Specimen:  Urine from Urine     Stool culture [[id number]] Collected:  09/13/17 1230    Order Status:  Completed Specimen:  Stool from Stool Updated:  09/18/17 1307     Stool Culture No Salmonella,Shigella,Vibrio,Campylobacter,Yersinia isolated.    Blood culture x two cultures. Draw prior to antibiotics. [[id number]] Collected:  09/13/17 0330    Order Status:  Completed Specimen:  Blood from Peripheral, Antecubital, Right Updated:  09/18/17 0622     Blood Culture, Routine No growth after 5 days.    Narrative:       Aerobic and anaerobic    Blood culture x two cultures. Draw prior to antibiotics. [[id number]] Collected:  09/13/17 0347    Order Status:  Completed Specimen:  Blood from Peripheral, Forearm, Right Updated:  09/18/17 0622     Blood Culture, Routine No growth after 5 days.    Narrative:       Aerobic and anaerobic          Assessment and Plan:  Paul E Sistrunk is a 57 y.o. 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     AF with RVR  - emergently cardioverted overnight; now in SR  - BBK1OH5-VPNU = 2  - RVR in the setting of sepsis and being off AAD  - c/w flecainide (restarted today after one week off)  - recommend to keep patient therapeutically anticoagulated  - INR supra-therapeutic currently     Javed Alberts MD

## 2017-09-22 NOTE — ASSESSMENT & PLAN NOTE
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

## 2017-09-23 NOTE — PLAN OF CARE
Problem: Physical Therapy Goal  Goal: Physical Therapy Goal  Goals to be met by: 10/7/17     Patient will increase functional independence with mobility by performin. Supine to sit with MInimal Assistance.  2. Sit to supine with MInimal Assistance.  3. Sit to stand transfer with Minimal Assistance.  4. Bed to chair transfer with Minimal Assistance using Rolling Walker.  5. Gait  x 50 feet with Minimal Assistance using Rolling Walker.   6. Lower extremity exercise program x 20 reps per handout, with assistance as needed.    Outcome: Ongoing (interventions implemented as appropriate)  PT goals established.

## 2017-09-23 NOTE — CARE UPDATE
Patient stepped down overnight. Spoke to Dr Flores (BMT) to ensure primary team was aware he was in bed 8086  BMT will see him today.

## 2017-09-23 NOTE — PT/OT/SLP EVAL
"Physical Therapy  Evaluation    Paul E Sistrunk   MRN: [id number]   Admitting Diagnosis: Sepsis    PT Received On: 09/23/17  PT Start Time: 1111     PT Stop Time: 1222    PT Total Time (min): 71 min       Billable Minutes:  Evaluation 17 and Therapeutic Activity 54    Diagnosis: Sepsis      Past Medical History:   Diagnosis Date    Cancer 2/2016    leukemia, AML    HIV infection     Pancytopenia due to antineoplastic chemotherapy 7/4/2016      Past Surgical History:   Procedure Laterality Date    CYSTOSCOPY         Referring NP: Meaghan Hernandez NP  Date referred to PT: 9/22/17    General Precautions: Standard, fall, anti-coagulation medicine  Orthopedic Precautions: N/A   Braces: N/A       Do you have any cultural, spiritual, Voodoo conflicts, given your current situation?: no    Patient History:  Lives With: alone  Living Arrangements: house  Home Layout: Able to live on 1st floor  Stair Railings at Home: none  Transportation Available: car, family or friend will provide  Equipment Currently Used at Home: none  DME owned (not currently used): none    Previous Level of Function:  Ambulation Skills: independent  Transfer Skills: independent  ADL Skills: needs assist (A with bathing and dressing)  Work/Leisure Activity: independent    Subjective:  Communicated with nursing prior to session.  "My mouth hurts."    Chief Complaint: Mouth pain  Patient goals/Family goal: To increase patient's time spent out of the bed    Pain/Comfort  Pain Rating 1: 8/10  Location 1: mouth  Pain Addressed 1:  (Nsg present, pain meds taken orally at beginning of tx session)      Objective:   Patient found with: telemetry, peripheral IV, central line     Cognitive Exam:  Oriented to: Person, Place, Time and Situation - however pt was confused at times, and difficult to arouse    Follows Commands/attention: Inattentive and Follows one-step commands: approx 25%  Communication: dysarthria  Safety awareness/insight to disability: " impaired    Physical Exam:  Postural examination/scapula alignment: Rounded shoulder    Skin integrity: Sacral wound - barrier cream applied, nursing notified  Edema: Pitting B LEs R>L, B forearms    Sensation:   Impaired  light/touch B LEs, difficulty with sensory testing, pt able to respond to PTs questions approx 25%    Upper Extremity Range of Motion:  Right Upper Extremity: WFL  Left Upper Extremity: WFL    Upper Extremity Strength:  Right Upper Extremity: Deficits: 3+/5 sh flex, 4/5 elb flex/ext  Left Upper Extremity: Deficits: 3+/5 sh flex, 4/5 elb flex/ext    Lower Extremity Range of Motion:  Right Lower Extremity: WFL  Left Lower Extremity: WFL    Lower Extremity Strength:  Right Lower Extremity: Deficits: 3+/5 hip flex and DF, 4/5 kn flex/ext  Left Lower Extremity: Deficits: 3+/5 hip flex and DF, 4/5 kn flex/ext     Fine motor coordination:  Impaired  Left hand, finger to nose  , Right hand, finger to nose  , RLE heel shin   and LLE heel shin      Gross motor coordination: WFL - no tremors or ataxia noted    Functional Mobility:  Bed Mobility:  Scooting/Bridging: Stand by Assistance (To scoot ant sitting EOB)  Supine to Sit: Moderate Assistance (Pt able to elevate trunk with SBA, with HOB elevate by 35 deg)    Transfers:  Sit <> Stand Assistance: Moderate Assistance (Pt perf 1x from bed, 3xs from BSC.  With ed each trial for hand placement on armrests or bed to stand.  Verbal cuing for reach back to sit safely, pt perf 0%)  Sit <> Stand Assistive Device: Rolling Walker  Bed <> Chair Technique: Stand Pivot  Bed <> Chair Assistance: Moderate Assistance (Lacks eccentric control to sit, requires A and verbal cuing to complete pivot prior to sitting)  Bed <> Chair Assistive Device: Rolling Walker    Gait:   Gait Distance: Pt amb 10'  Assistance 1: Moderate assistance  Gait Assistive Device: Rolling walker  Gait Pattern: swing-through gait  Gait Deviation(s): decreased vandana, forward lean, increased time in  double stance, decreased step length    Balance:   Static Sit: FAIR-: Maintains without assist but inconsistent   Dynamic Sit: FAIR: Cannot move trunk without losing balance  Static Stand: POOR+: Needs MINIMAL assist to maintain  Dynamic stand: POOR: N/A    Therapeutic Activities and Exercises:  Whiteboard updated  Toileting: perf 3xs, pt lost control of bowels each time he stood, with maxA, pt able to assist with hygiene by maintaining static stance with RW with Sage  UB/LB dressing: perf with pt 2xs sec to loss of control of bowel, pt perf with maxA    AM-PAC 6 CLICK MOBILITY  How much help from another person does this patient currently need?   1 = Unable, Total/Dependent Assistance  2 = A lot, Maximum/Moderate Assistance  3 = A little, Minimum/Contact Guard/Supervision  4 = None, Modified Allen/Independent    Turning over in bed (including adjusting bedclothes, sheets and blankets)?: 2  Sitting down on and standing up from a chair with arms (e.g., wheelchair, bedside commode, etc.): 2  Moving from lying on back to sitting on the side of the bed?: 2  Moving to and from a bed to a chair (including a wheelchair)?: 2  Need to walk in hospital room?: 2  Climbing 3-5 steps with a railing?: 1  Total Score: 11     AM-PAC Raw Score CMS G-Code Modifier Level of Impairment Assistance   6 % Total / Unable   7 - 9 CM 80 - 100% Maximal Assist   10 - 14 CL 60 - 80% Moderate Assist   15 - 19 CK 40 - 60% Moderate Assist   20 - 22 CJ 20 - 40% Minimal Assist   23 CI 1-20% SBA / CGA   24 CH 0% Independent/ Mod I     Patient left up in chair with all lines intact, call button in reach, nursing notified and family present.      Comorbidities and personal factors that affect the PT plan of care or the patient's ability to participate or progress with therapy:  1. AML  2. CINV  3. 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 A fib  6. Chemotherapy induced Thrombocytopenia      Clinical Presentation:  stable and/or uncomplicated, evolving/changing characteristics or unstable/unpredictable characteristics  AxO x 4  8/10 mouth pain  Decreased arousal: eyes closed throughout tx session    Confused    Level of Complexity:     High Complexity:   · At least 3 or more personal factors or comorbidities that impact the plan of care  · Examination addressing 4 or more body structures and functions, activity limitations, and/or participation restrictions  · Clinical presentation with unstable or unpredictable characteristics        Assessment:   Paul E Sistrunk is a 57 y.o. male with a medical diagnosis of Sepsis.  According to family, pt has been in the bed for over one week during ICU hospitalization.  Pt currently presents as very weak and difficult to arouse.  Presentation was complicated further, as pt has a bowel movement every time he stands.  Therefore, increased time was taken to facilitate transfers to[place]from [facility], to facilitate bowel and bladder hygiene and to assist with dressing in stance and sit.  Pt tolerated tx session, requires multiple verbal stimuli to arouse but was agreeable to sit in recliner throughout the day, rather than to stay in bed.  Pt will require skilled PT services to improve functional transfers and gait.      Rehab identified problem list/impairments: Rehab identified problem list/impairments: weakness, impaired endurance, impaired self care skills, impaired functional mobilty, gait instability, impaired balance, impaired sensation, decreased lower extremity function, decreased upper extremity function, pain, decreased safety awareness, impaired coordination, edema, impaired cardiopulmonary response to activity    Rehab potential is poor.    Activity tolerance: Poor    Discharge recommendations: Discharge Facility/Level Of Care Needs: rehabilitation facility     Barriers to discharge: Barriers to Discharge: Decreased caregiver support    Equipment recommendations: Equipment Needed After  Discharge: walker, rolling, wheelchair, shower chair, 3-in-1 commode     GOALS:    Physical Therapy Goals        Problem: Physical Therapy Goal    Goal Priority Disciplines Outcome Goal Variances Interventions   Physical Therapy Goal     PT/OT, PT Ongoing (interventions implemented as appropriate)     Description:  Goals to be met by: 10/7/17     Patient will increase functional independence with mobility by performin. Supine to sit with MInimal Assistance.  2. Sit to supine with MInimal Assistance.  3. Sit to stand transfer with Minimal Assistance.  4. Bed to chair transfer with Minimal Assistance using Rolling Walker.  5. Gait  x 50 feet with Minimal Assistance using Rolling Walker.   6. Lower extremity exercise program x 20 reps per handout, with assistance as needed.                      PLAN:    Patient to be seen 5 x/week to address the above listed problems via gait training, therapeutic activities, therapeutic exercises, neuromuscular re-education  Plan of Care expires: 10/23/17  Plan of Care reviewed with: patient, friend, mother          Joyce Brarin, PT  2017

## 2017-09-23 NOTE — ASSESSMENT & PLAN NOTE
- patient cardioverted at the bedside overnight, in ST now and HR improved  - transferred to ICU  - restarted Flecainide 100 mg bid and metoprolol 25 bid   - started on Vasopressin and Norepinephrine gtt and now stopped with improvement in BP

## 2017-09-23 NOTE — NURSING TRANSFER
Nursing Transfer Note      9/23/2017     Transfer From: 3080  Transfer to: 8086    Transfer via bed    Transfer with cardiac monitoring    Transported by Brunilda RN    Chart send with patient: Yes    Notified: friend    Upon arrival to floor: cardiac monitor applied, patient oriented to room, call bell in reach and bed in lowest position

## 2017-09-23 NOTE — NURSING
Pt arrived to unit floor.  Oriented pt to room. Educated pt on use of call bell.  Pt verbalize understanding to call when needed assistance. VSS no acute distress noted.  Will continue to monitor.

## 2017-09-23 NOTE — PROGRESS NOTES
Spoke with BMT MD that pt manual bp is 80/52, hr 114, pt is disoriented to place. Md is coming to assess pt. Pt also had incontinence of brown watery stool. He followed verbal commands to assist in cleaning.

## 2017-09-23 NOTE — SUBJECTIVE & OBJECTIVE
Subjective:     Interval History:  - Patient seen in ICU  - Cardioverted 9/21 at the bedside for 's and BP 66/47. ST after. Transferred to IVU. Was started on 2 pressors which were dc'd at 2 am 9/22 and BP stable.   - Sepsis work-up intiated on ICU and started empiric Cefepime and Vancomycin.  - Stepped down to floor this am   - Patient continues Lopressor 25 mg bid and Flecainide 100 mg bid  - HR   .- diarrhea profuse and uncontrolled.   - remains afebrile  - Confused after dilaudid this am   - gm + resembling staph in blood from 9/21      Objective:     Vital Signs (Most Recent):  Temp: 97.6 °F (36.4 °C) (09/23/17 0800)  Pulse: 108 (09/23/17 1500)  Resp: 15 (09/23/17 0815)  BP: (!) 80/52 (09/23/17 0815)  SpO2: 97 % (09/23/17 0800) Vital Signs (24h Range):  Temp:  [97.6 °F (36.4 °C)-98.3 °F (36.8 °C)] 97.6 °F (36.4 °C)  Pulse:  [105-119] 108  Resp:  [11-18] 15  SpO2:  [96 %-99 %] 97 %  BP: ()/(50-66) 80/52     Weight: 79.3 kg (174 lb 13.2 oz)  Body mass index is 23.07 kg/m².  Body surface area is 2.02 meters squared.    ECOG SCORE           Intake/Output - Last 3 Shifts       09/21 0700 - 09/22 0659 09/22 0700 - 09/23 0659 09/23 0700 - 09/24 0659    P.O. 400 180     I.V. (mL/kg) 216.5 (2.6)      IV Piggyback 2450 700     Total Intake(mL/kg) 3066.5 (37.2) 880 (11.1)     Urine (mL/kg/hr) 2790 (1.4) 850 (0.4)     Other 2325 (1.2)      Stool 0 (0) 0 (0)     Total Output 5115 850      Net -2048.6 +30             Urine Occurrence 5 x 2 x     Stool Occurrence 5 x 4 x 1 x          Physical Exam   Constitutional: He is oriented to person, place, and time. He appears well-developed and well-nourished. No distress.   HENT:   Head: Normocephalic.   Mouth/Throat: Oropharynx is clear and moist. No oropharyngeal exudate.   Dry mouth   Eyes: Conjunctivae are normal. Pupils are equal, round, and reactive to light. No scleral icterus.   Neck: Normal range of motion. Neck supple. Normal range of motion present. No  thyromegaly present.   Cardiovascular: Regular rhythm, normal heart sounds and intact distal pulses.    Tachycardic    Pulmonary/Chest: Effort normal and breath sounds normal. No respiratory distress.   Abdominal: Soft. Bowel sounds are normal. He exhibits no distension. There is no tenderness.   Musculoskeletal: Normal range of motion. He exhibits edema. He exhibits no tenderness.   Lymphadenopathy:        Head (right side): No submandibular, no preauricular, no posterior auricular and no occipital adenopathy present.        Head (left side): No submandibular, no preauricular, no posterior auricular and no occipital adenopathy present.     He has no cervical adenopathy.     He has no axillary adenopathy.   Neurological: He is alert and oriented to person, place, and time. No cranial nerve deficit. Coordination normal.   Skin: Skin is warm and dry. No petechiae and no rash noted. No pallor.   Psychiatric: He has a normal mood and affect. Thought content normal.   Vitals reviewed.      Significant Labs:   CBC:     Recent Labs  Lab 09/22/17  1208 09/22/17  1505 09/23/17  0500   WBC 2.07* 2.32* 2.51*   HGB 8.4* 8.3* 9.1*   HCT 25.0* 24.1* 26.5*   PLT SEE COMMENT 31* 42*    and CMP:     Recent Labs  Lab 09/22/17  0200 09/22/17  0829 09/23/17  0500    137 133*   K 3.7 3.3* 3.7    107 104   CO2 17* 20* 21*    123* 119*   BUN 45* 41* 42*   CREATININE 1.3 1.3 1.4   CALCIUM 7.7* 7.5* 7.7*   PROT 4.0*  --  3.7*   ALBUMIN 2.3*  --  1.8*   BILITOT 2.8*  --  3.3*   ALKPHOS 95  --  121   AST 30  --  39   ALT 10  --  15   ANIONGAP 14 10 8   EGFRNONAA >60.0 >60.0 56.2*       Diagnostic Results:  I have reviewed and interpreted all pertinent imaging results/findings within the past 24 hours.

## 2017-09-23 NOTE — PLAN OF CARE
Problem: Patient Care Overview  Goal: Plan of Care Review  Outcome: Ongoing (interventions implemented as appropriate)  VSS, afebrile.  Pt very somnolent, arouses to voice.  C/o chin pain with relief to current pain regimen.  Pt wearing briefs incontinent of BM.  1BM with 1 urine occurrence noted.  Pt noted to have quarter size reddened nonblanchable spot around sacrum area.  Wound care consults placed.  Frequent repositioning provided for pt.     Fall risk precautions initiated.  Pt in lowest bed position setting, lighting adjusted, pt to wear nonskid socks when ambulating, side rails up x2.  Pt remain free from falls during shift.  Call light within reach. Will continue to monitor.

## 2017-09-23 NOTE — PT/OT/SLP PROGRESS
Physical Therapy      Paul E Sistrunk  MRN: [id number]    Patient not seen today secondary to  (pt getting echo performed and then with MD care). Will follow-up as appropriate.    Dee Garcia, PT   9/22/2017

## 2017-09-23 NOTE — RESIDENT HANDOFF
Handoff     Primary Team: [facility] [place] [facility]ITICAL CARE MEDICINE Room Number: 3080/3080 A     Patient Name: Paul E Sistrunk MRN: [id number]     Date of Birth: [id number] Allergies: Etoposide     Age: 57 y.o. Admit Date: 9/13/2017     Sex: male  BMI: Body mass index is 24 kg/m².     Code Status: Full Code        Illness Level (current clinical status): Watcher - No    Reason for Admission: Sepsis    Brief HPI (pertinent PMH and diagnosis or differential diagnosis):   Mr. 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  On admission, he was started on empiric antibiotics for possible sepsis.  Blood cultures ngtd, urinalysis non-infectious.  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  He was started on high dose methylprednisolone.  CMV PCR undetected on 9/16.  Histoplasma antigen pending.  HSV pending (sent to [place]).  ID evaluated on 9/18 and did not feel that there were any obvious uncontrolled infectious issues.  Empiric antibiotics discontinued.      MICU consulted 9/21 for atrial fibrillation with RVR (170-180) with SBP 80's.   Mr. Sistrunk was given metoprolol IV with some improvement.   Additionally, he was noted to be off flecanide since admission.  Cardiology was consulted by primary team.  He was asymptomatic and the decision was made for him to remain on the floor with BMT service.  Over the course of the morning of 9/21, he was restarted on flecanide and oral metoprolol.  Intermittent episodes of hypotension noted, which improved with IVF boluses.    Mr. Sistrunk continued to have Afib RVR and borderline hypotension and was transferred to the ICU.  Prior to transfer,  he developed unstable Afib RVR and was subsequently cardioverted.  He required vasopressors after cardioversion.     Hospital Course (updated, brief assessment by system or problem, significant events):     The patient was transferred to the ICU on 09/21 for electrical cardioversion following atrial fibrillation with RVR. The patient was pan-cultured and started on vancomycin and pip/jose. The patient was then weaned off vasopressors and has remained in sinus rhythm.     Tasks (specific, using if-then statements):   Follow blood and urine cultures.  Continue HAART.  Continue methylprednisolone 80MG IV daily in setting of concern for GVHD  Trend INR, restart anticoagulation once no longer supratherapeutic     Discharge Disposition: Admitted as an Inpatient

## 2017-09-23 NOTE — ASSESSMENT & PLAN NOTE
-- +374 days s/p Flu/Bu/ATG MUD allogeneic transplant for high risk AML after his second IDAC (6/20/16 - 6/24/16) after a prolonged hospitalization (2/5/16 - 3/21/16) for induction with 7&3 and reinduction with MEC to remission and IDAC (4/4/16 - 4/9/16)  -- Chimerics from marrow done 6/21 show CD3 of 90% donor and 10% recipient, but CD34 of 5% donor and 95% recipient   -- Chimers from 7/10 shows 70% recipient and 30% donor - NOT SORTED  -- Relpased 6/2017 and reinduced with FLAG BETZY  -- DLI 8/14/17, day +41

## 2017-09-23 NOTE — CARE UPDATE
Spoke to BMT fellow Dr. Flores to confirm transfer of patient to floor.    Marcos Duarte MD  [phone]

## 2017-09-23 NOTE — CONSULTS
Consulted for TPN reccs (TPN being considered due to pt not eating x 1 week).     Recommend Clinimix 5/20 @ goal rate of 85mL/hr + 20% IVLE to provide 2227 kcal, 102g protein, and 2040 mL fluid. Initiate at 10 mL/hr and increase by 20 mL/hr q 4hr until goal rate is reached. This will provide 86% estimated energy needs and 86% estimated protein needs.     Noted new ordered PPN + IVFE. Clinimix 4.25/10 @ 50 mL/hr + IVFE will provide 1112kcal, 51g protein. 43% estimated energy needs and 43% estimated protein needs.     Continue oral diet as tolerated, encouraging intake. RD to follow up as previously scheduled.     Thank you.   Gill Fernandez, SUSANN

## 2017-09-23 NOTE — PROGRESS NOTES
[facility]  Hematology  Bone Marrow Transplant  Progress Note    Patient Name: Paul E Sistrunk  Admission Date: 9/13/2017  Hospital Length of Stay: 10 days  Code Status: Full Code    Subjective:     Interval History:  - Patient seen in ICU  - Cardioverted 9/21 at the bedside for 's and BP 66/47. ST after. Transferred to IVU. Was started on 2 pressors which were dc'd at 2 am 9/22 and BP stable.   - Sepsis work-up intiated on ICU and started empiric Cefepime and Vancomycin.  - Stepped down to floor this am   - Patient continues Lopressor 25 mg bid and Flecainide 100 mg bid  - HR   .- diarrhea profuse and uncontrolled.   - remains afebrile  - Confused after dilaudid this am   - gm + resembling staph in blood from 9/21      Objective:     Vital Signs (Most Recent):  Temp: 97.6 °F (36.4 °C) (09/23/17 0800)  Pulse: 108 (09/23/17 1500)  Resp: 15 (09/23/17 0815)  BP: (!) 80/52 (09/23/17 0815)  SpO2: 97 % (09/23/17 0800) Vital Signs (24h Range):  Temp:  [97.6 °F (36.4 °C)-98.3 °F (36.8 °C)] 97.6 °F (36.4 °C)  Pulse:  [105-119] 108  Resp:  [11-18] 15  SpO2:  [96 %-99 %] 97 %  BP: ()/(50-66) 80/52     Weight: 79.3 kg (174 lb 13.2 oz)  Body mass index is 23.07 kg/m².  Body surface area is 2.02 meters squared.    ECOG SCORE           Intake/Output - Last 3 Shifts       09/21 0700 - 09/22 0659 09/22 0700 - 09/23 0659 09/23 0700 - 09/24 0659    P.O. 400 180     I.V. (mL/kg) 216.5 (2.6)      IV Piggyback 2450 700     Total Intake(mL/kg) 3066.5 (37.2) 880 (11.1)     Urine (mL/kg/hr) 2790 (1.4) 850 (0.4)     Other 2325 (1.2)      Stool 0 (0) 0 (0)     Total Output 5115 850      Net -2048.6 +30             Urine Occurrence 5 x 2 x     Stool Occurrence 5 x 4 x 1 x          Physical Exam   Constitutional: He is oriented to person, place, and time. He appears well-developed and well-nourished. No distress.   HENT:   Head: Normocephalic.   Mouth/Throat: Oropharynx is clear and moist. No oropharyngeal  exudate.   Dry mouth   Eyes: Conjunctivae are normal. Pupils are equal, round, and reactive to light. No scleral icterus.   Neck: Normal range of motion. Neck supple. Normal range of motion present. No thyromegaly present.   Cardiovascular: Regular rhythm, normal heart sounds and intact distal pulses.    Tachycardic    Pulmonary/Chest: Effort normal and breath sounds normal. No respiratory distress.   Abdominal: Soft. Bowel sounds are normal. He exhibits no distension. There is no tenderness.   Musculoskeletal: Normal range of motion. He exhibits edema. He exhibits no tenderness.   Lymphadenopathy:        Head (right side): No submandibular, no preauricular, no posterior auricular and no occipital adenopathy present.        Head (left side): No submandibular, no preauricular, no posterior auricular and no occipital adenopathy present.     He has no cervical adenopathy.     He has no axillary adenopathy.   Neurological: He is alert and oriented to person, place, and time. No cranial nerve deficit. Coordination normal.   Skin: Skin is warm and dry. No petechiae and no rash noted. No pallor.   Psychiatric: He has a normal mood and affect. Thought content normal.   Vitals reviewed.      Significant Labs:   CBC:     Recent Labs  Lab 09/22/17  1208 09/22/17  1505 09/23/17  0500   WBC 2.07* 2.32* 2.51*   HGB 8.4* 8.3* 9.1*   HCT 25.0* 24.1* 26.5*   PLT SEE COMMENT 31* 42*    and CMP:     Recent Labs  Lab 09/22/17  0200 09/22/17  0829 09/23/17  0500    137 133*   K 3.7 3.3* 3.7    107 104   CO2 17* 20* 21*    123* 119*   BUN 45* 41* 42*   CREATININE 1.3 1.3 1.4   CALCIUM 7.7* 7.5* 7.7*   PROT 4.0*  --  3.7*   ALBUMIN 2.3*  --  1.8*   BILITOT 2.8*  --  3.3*   ALKPHOS 95  --  121   AST 30  --  39   ALT 10  --  15   ANIONGAP 14 10 8   EGFRNONAA >60.0 >60.0 56.2*       Diagnostic Results:  I have reviewed and interpreted all pertinent imaging results/findings within the past 24 hours.    Assessment/Plan:  "    * Sepsis    - remains afebrile.  - c diff testing is negative  - cultures are negative   - CT abdomen reveals "diffuse abnormal appearance of the small and large bowel demonstrating fluid filled mildly prominent loops with associated submucosal edema and mucosal enhancement."   - concern for acute jjwlb-irttsk-taul disease given that he received donor lymphocytes in August 2017  - s/p flex sig 9/15/17 showing erythematous mucosa in the recto-sigmoid colon (biopsied)--GVHD and CMV  - discontinued vancomycin on 9/15/17  - discontinued cefepime 9/17/17   - sepsis work-up repeated 9/21 due to hypotension and tachycardia, repeat cultures done, chest x-ray negative. Cefepime and Vancomycin restarted   - Possible staph in blood from 9/21        Coagulopathy    - Malnourished, possible VitK deficient. Will replace and reassess.   - Start PPN        Atrial fibrillation with rapid ventricular response    - patient cardioverted at the bedside overnight, in ST now and HR improved  - transferred to ICU  - restarted Flecainide 100 mg bid and metoprolol 25 bid   - started on Vasopressin and Norepinephrine gtt and now stopped with improvement in BP          Metabolic acidosis, normal anion gap (NAG)    - likely due to diarrhea related to underlying GVHD  - bicarb gtt started 9/17  - continue to monitor        Empbr-vraixl-nfky disease    - we are concerned about acute lbiii-ibtkvv-wpoc disease given that he received donor lymphocytes in August 2017.   - s/p flex sig 9/15/17 showing erythematous mucosa in the recto-sigmoid colon (biopsied)  - methylprednisolone 80mg IV daily started 9/14/17  - serum CMV negative, positive on GI biopsy stain  - solumedrol increased to 80mg iv bid on 9/17, diarrhea improved 9/20 and steroids decreased, increased back to 80 mg bid 9/21 due to increased stool output   - budesonide 9 mg 9/17  - Added Remicade 9/23/17 (400 mg weekly)        Chemotherapy-induced thrombocytopenia    - due to transplant " and chemotherapy   - platelet count today is 42 k/uL  - continue to monitor.        Anemia due to chemotherapy    - hemoglobin today is 8.7 g/dL  - continue to monitor.        CINV (chemotherapy-induced nausea and vomiting)    - continue as-need antiemetics and Zofran ATC          AML (acute myeloid leukemia) in remission    -- Relapsed AML - peripheral blood shows 30% blasts 6/2017   -- BM biopsy results - consistent with 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 Blast of 62%, 19 had a complex karyotype including 5q deletion, 7q deletion, 17p deletion, and one metaphase represented a near tetraploid subclone; FLT-3 negative     -- Had previous reaction to MEC (etoposide caused full body rash).   -- Re-induced with FLAG-BETZY   -- Day 14 bone marrow biopsy done 7/10 - hypocellular with no evidence of residual disease  -- Post DLI on 8/14/17  -- currently receiving Vidaza for 2 cycles given high risk disease per Dr. Atkinson note        S/P allogeneic bone marrow transplant    -- +374 days s/p Flu/Bu/ATG MUD allogeneic transplant for high risk AML after his second IDAC (6/20/16 - 6/24/16) after a prolonged hospitalization (2/5/16 - 3/21/16) for induction with 7&3 and reinduction with MEC to remission and IDAC (4/4/16 - 4/9/16)  -- Chimerics from marrow done 6/21 show CD3 of 90% donor and 10% recipient, but CD34 of 5% donor and 95% recipient   -- Chimers from 7/10 shows 70% recipient and 30% donor - NOT SORTED  -- Relpased 6/2017 and reinduced with FLAG BETZY  -- DLI 8/14/17, day +41        Histoplasma capsulatum infection    - on vori 300mg BID        HIV disease    - no acute issues. Continue triumeq  - now on IV steroids with history of fungal infection, ID consulted. Histoplasma antigen done and continue current ppx. ID has signed off              VTE Risk Mitigation         Ordered     Place sequential compression device  Until discontinued      09/22/17 1532     High Risk of VTE  Once       09/13/17 0714     Place RANJIT hose  Until discontinued      09/13/17 0613          Disposition: Avoid sedating drugs today. Assess repsosne to Remicade. Cont supportive care    Catalina Flores MD  Bone Marrow Transplant  [facility]

## 2017-09-23 NOTE — ASSESSMENT & PLAN NOTE
"- remains afebrile.  - c diff testing is negative  - cultures are negative   - CT abdomen reveals "diffuse abnormal appearance of the small and large bowel demonstrating fluid filled mildly prominent loops with associated submucosal edema and mucosal enhancement."   - concern for acute htcbf-woxjmu-ekbk disease given that he received donor lymphocytes in August 2017  - s/p flex sig 9/15/17 showing erythematous mucosa in the recto-sigmoid colon (biopsied)--GVHD and CMV  - discontinued vancomycin on 9/15/17  - discontinued cefepime 9/17/17   - sepsis work-up repeated 9/21 due to hypotension and tachycardia, repeat cultures done, chest x-ray negative. Cefepime and Vancomycin restarted   - Possible staph in blood from 9/21  "

## 2017-09-23 NOTE — ASSESSMENT & PLAN NOTE
- we are concerned about acute jslag-emqfdr-wxty disease given that he received donor lymphocytes in August 2017.   - s/p flex sig 9/15/17 showing erythematous mucosa in the recto-sigmoid colon (biopsied)  - methylprednisolone 80mg IV daily started 9/14/17  - serum CMV negative, positive on GI biopsy stain  - solumedrol increased to 80mg iv bid on 9/17, diarrhea improved 9/20 and steroids decreased, increased back to 80 mg bid 9/21 due to increased stool output   - budesonide 9 mg 9/17  - Added Remicade 9/23/17 (400 mg weekly)

## 2017-09-24 PROBLEM — E87.20 METABOLIC ACIDOSIS, NORMAL ANION GAP (NAG): Status: RESOLVED | Noted: 2017-01-01 | Resolved: 2017-01-01

## 2017-09-24 NOTE — PROGRESS NOTES
[facility]  Hematology  Bone Marrow Transplant  Progress Note    Patient Name: Paul E Sistrunk  Admission Date: 9/13/2017  Hospital Length of Stay: 11 days  Code Status: Full Code    Subjective:     Interval History:   - Cardioverted 9/21 at the bedside for 's and BP 66/47. ST after. Transferred to ICU. Was started on 2 pressors which were dc'd at 2 am 9/22 and BP stable.   - Stepped down to floor on 9/23/17.  - he complains of continued diarrhea (stool output not documented). He complains of fatigue and generalized weakness.    Objective:     Vital Signs (Most Recent):  Temp: 97.5 °F (36.4 °C) (09/24/17 0811)  Pulse: (!) 111 (09/24/17 0811)  Resp: 15 (09/24/17 0811)  BP: 96/60 (09/24/17 0811)  SpO2: 95 % (09/24/17 0811) Vital Signs (24h Range):  Temp:  [97.2 °F (36.2 °C)-97.7 °F (36.5 °C)] 97.5 °F (36.4 °C)  Pulse:  [102-128] 111  Resp:  [14-20] 15  SpO2:  [94 %-97 %] 95 %  BP: ()/(56-66) 96/60     Weight: 76.5 kg (168 lb 10.4 oz)  Body mass index is 22.25 kg/m².  Body surface area is 1.98 meters squared.    ECOG SCORE         [unfilled]    Intake/Output - Last 3 Shifts       09/22 0700 - 09/23 0659 09/23 0700 - 09/24 0659 09/24 0700 - 09/25 0659    P.O. 180 180     I.V. (mL/kg) 50 (0.6)      IV Piggyback 700 800     TPN  805.8     Total Intake(mL/kg) 930 (11.7) 1785.8 (23.3)     Urine (mL/kg/hr) 850 (0.4) 300 (0.2)     Other       Stool 0 (0) 0 (0)     Total Output 850 300      Net +80 +1485.8             Urine Occurrence 2 x      Stool Occurrence 4 x 3 x           Physical Exam   Constitutional: He is oriented to person, place, and time. He appears well-developed and well-nourished. No distress.   HENT:   Head: Normocephalic.   Mouth/Throat: Oropharynx is clear and moist. No oropharyngeal exudate.   Dry mouth   Eyes: Conjunctivae are normal. Pupils are equal, round, and reactive to light. No scleral icterus.   Neck: Normal range of motion. Neck supple. Normal range of motion present.  "No thyromegaly present.   Cardiovascular: Regular rhythm, normal heart sounds and intact distal pulses.    Tachycardic    Pulmonary/Chest: Effort normal and breath sounds normal. No respiratory distress.   Abdominal: Soft. Bowel sounds are normal. He exhibits no distension. There is no tenderness.   Musculoskeletal: Normal range of motion. He exhibits edema. He exhibits no tenderness.   Lymphadenopathy:        Head (right side): No submandibular, no preauricular, no posterior auricular and no occipital adenopathy present.        Head (left side): No submandibular, no preauricular, no posterior auricular and no occipital adenopathy present.     He has no cervical adenopathy.     He has no axillary adenopathy.   Neurological: He is alert and oriented to person, place, and time. No cranial nerve deficit. Coordination normal.   Skin: Skin is warm and dry. No petechiae and no rash noted. No pallor.   Psychiatric: He has a normal mood and affect. Thought content normal.   Vitals reviewed.    Significant Labs:   Labs have been reviewed.    Assessment/Plan:     * Sepsis    - remains afebrile.  - c diff testing is negative  - cultures are negative   - CT abdomen reveals "diffuse abnormal appearance of the small and large bowel demonstrating fluid filled mildly prominent loops with associated submucosal edema and mucosal enhancement."   - concern for acute wkmfb-vwalil-zpun disease given that he received donor lymphocytes in August 2017  - s/p flex sig 9/15/17 showing erythematous mucosa in the recto-sigmoid colon (biopsied)--GVHD and CMV  - discontinued vancomycin on 9/15/17  - discontinued cefepime 9/17/17   - sepsis work-up repeated 9/21 due to hypotension and tachycardia, repeat cultures done, chest x-ray negative. Cefepime and Vancomycin restarted. Cefepime was switched to zosyn.  - Possible staph in blood from 9/21.   - continue zosyn and vancomycin for now.        Ngtaf-teydqy-dokn disease    - we are concerned about " acute qlwjs-kkqube-vnlk disease given that he received donor lymphocytes in August 2017.   - s/p flex sig 9/15/17 showing erythematous mucosa in the recto-sigmoid colon (biopsied)  - methylprednisolone 80mg IV daily started 9/14/17  - serum CMV negative, positive on GI biopsy stain  - solumedrol increased to 80mg iv bid on 9/17, diarrhea improved 9/20 and steroids decreased, increased back to 80 mg bid 9/21 due to increased stool output   - budesonide 9 mg 9/17  - Added Remicade 9/23/17 (400 mg weekly)  - we request accurate stool output measurements.  - we have added scheduled lomotil today.        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 Blast of 62%, 19 had a complex karyotype including 5q deletion, 7q deletion, 17p deletion, and one metaphase represented a near tetraploid subclone; FLT-3 negative     -- Had previous reaction to MEC (etoposide caused full body rash).   -- Re-induced with FLAG-BETZY   -- Day 14 bone marrow biopsy done 7/10 - hypocellular with no evidence of residual disease  -- Post DLI on 8/14/17  -- currently receiving Vidaza for 2 cycles given high risk disease per Dr. Atkinson note        S/P allogeneic bone marrow transplant    -- +375 days s/p Flu/Bu/ATG MUD allogeneic transplant for high risk AML after his second IDAC (6/20/16 - 6/24/16) after a prolonged hospitalization (2/5/16 - 3/21/16) for induction with 7&3 and reinduction with MEC to remission and IDAC (4/4/16 - 4/9/16)  -- Chimerics from marrow done 6/21 show CD3 of 90% donor and 10% recipient, but CD34 of 5% donor and 95% recipient   -- Chimers from 7/10 shows 70% recipient and 30% donor - NOT SORTED  -- Relpased 6/2017 and reinduced with FLAG BETZY  -- DLI 8/14/17, day +42        HIV disease    - no acute issues. Continue triumeq  - now on IV steroids with history  of fungal infection, ID consulted. Histoplasma antigen done and continue current ppx. ID has signed off          Histoplasma capsulatum infection    - on voriconazole 300mg BID        Chemotherapy-induced thrombocytopenia    - due to transplant and chemotherapy   - platelet count today is 46 k/uL  - continue to monitor.        Anemia due to chemotherapy    - hemoglobin today is 8.6 g/dL  - continue to monitor.        CINV (chemotherapy-induced nausea and vomiting)    - continue as-need antiemetics and Zofran ATC          Coagulopathy    - Malnourished, possible VitK deficient. Will replace and reassess.   - Start total parenteral nutrition.        Atrial fibrillation with rapid ventricular response    - continue flecainide 100 mg bid and metoprolol 25 bid   - continue to monitor.              VTE Risk Mitigation         Ordered     Place sequential compression device  Until discontinued      09/22/17 1532     High Risk of VTE  Once      09/13/17 0714     Place RANJIT hose  Until discontinued      09/13/17 0613        Disposition: add lomotil for diarrhea. Monitor stool output. Continue supportive care. Continue vancomycin and zosyn.    Edilson Crockett MD  Bone Marrow Transplant  [facility]

## 2017-09-24 NOTE — PROGRESS NOTES
Notified BMT team that patient's telemetry showing he is in V Tach. Patient lying comfortably in bed with no reported complaints. Have rechecked the leads and all are on correctly.

## 2017-09-24 NOTE — ASSESSMENT & PLAN NOTE
"- remains afebrile.  - c diff testing is negative  - cultures are negative   - CT abdomen reveals "diffuse abnormal appearance of the small and large bowel demonstrating fluid filled mildly prominent loops with associated submucosal edema and mucosal enhancement."   - concern for acute znimj-krqjnd-xtvi disease given that he received donor lymphocytes in August 2017  - s/p flex sig 9/15/17 showing erythematous mucosa in the recto-sigmoid colon (biopsied)--GVHD and CMV  - discontinued vancomycin on 9/15/17  - discontinued cefepime 9/17/17   - sepsis work-up repeated 9/21 due to hypotension and tachycardia, repeat cultures done, chest x-ray negative. Cefepime and Vancomycin restarted. Cefepime was switched to zosyn.  - Possible staph in blood from 9/21.   - continue zosyn and vancomycin for now.  "

## 2017-09-24 NOTE — SUBJECTIVE & OBJECTIVE
Subjective:     Interval History:   - Cardioverted 9/21 at the bedside for 's and BP 66/47. ST after. Transferred to ICU. Was started on 2 pressors which were dc'd at 2 am 9/22 and BP stable.   - Stepped down to floor on 9/23/17.  - he complains of continued diarrhea (stool output not documented). He complains of fatigue and generalized weakness.    Objective:     Vital Signs (Most Recent):  Temp: 97.5 °F (36.4 °C) (09/24/17 0811)  Pulse: (!) 111 (09/24/17 0811)  Resp: 15 (09/24/17 0811)  BP: 96/60 (09/24/17 0811)  SpO2: 95 % (09/24/17 0811) Vital Signs (24h Range):  Temp:  [97.2 °F (36.2 °C)-97.7 °F (36.5 °C)] 97.5 °F (36.4 °C)  Pulse:  [102-128] 111  Resp:  [14-20] 15  SpO2:  [94 %-97 %] 95 %  BP: ()/(56-66) 96/60     Weight: 76.5 kg (168 lb 10.4 oz)  Body mass index is 22.25 kg/m².  Body surface area is 1.98 meters squared.    ECOG SCORE         [unfilled]    Intake/Output - Last 3 Shifts       09/22 0700 - 09/23 0659 09/23 0700 - 09/24 0659 09/24 0700 - 09/25 0659    P.O. 180 180     I.V. (mL/kg) 50 (0.6)      IV Piggyback 700 800     TPN  805.8     Total Intake(mL/kg) 930 (11.7) 1785.8 (23.3)     Urine (mL/kg/hr) 850 (0.4) 300 (0.2)     Other       Stool 0 (0) 0 (0)     Total Output 850 300      Net +80 +1485.8             Urine Occurrence 2 x      Stool Occurrence 4 x 3 x           Physical Exam   Constitutional: He is oriented to person, place, and time. He appears well-developed and well-nourished. No distress.   HENT:   Head: Normocephalic.   Mouth/Throat: Oropharynx is clear and moist. No oropharyngeal exudate.   Dry mouth   Eyes: Conjunctivae are normal. Pupils are equal, round, and reactive to light. No scleral icterus.   Neck: Normal range of motion. Neck supple. Normal range of motion present. No thyromegaly present.   Cardiovascular: Regular rhythm, normal heart sounds and intact distal pulses.    Tachycardic    Pulmonary/Chest: Effort normal and breath sounds normal. No respiratory  distress.   Abdominal: Soft. Bowel sounds are normal. He exhibits no distension. There is no tenderness.   Musculoskeletal: Normal range of motion. He exhibits edema. He exhibits no tenderness.   Lymphadenopathy:        Head (right side): No submandibular, no preauricular, no posterior auricular and no occipital adenopathy present.        Head (left side): No submandibular, no preauricular, no posterior auricular and no occipital adenopathy present.     He has no cervical adenopathy.     He has no axillary adenopathy.   Neurological: He is alert and oriented to person, place, and time. No cranial nerve deficit. Coordination normal.   Skin: Skin is warm and dry. No petechiae and no rash noted. No pallor.   Psychiatric: He has a normal mood and affect. Thought content normal.   Vitals reviewed.    Significant Labs:   Labs have been reviewed.

## 2017-09-24 NOTE — ASSESSMENT & PLAN NOTE
- Malnourished, possible VitK deficient. Will replace and reassess.   - Start total parenteral nutrition.

## 2017-09-24 NOTE — PLAN OF CARE
Problem: Patient Care Overview  Goal: Plan of Care Review  Outcome: Ongoing (interventions implemented as appropriate)  Patient's VSS except for heart rate for shift. Patient is much more alert today and has spent majority of shift awake. He is fully oriented. Given extended release morphine for pain. Dilaudid still held. Patient's telemetry monitor has again been saying patient is going in and out of V Tach. Informed BMT who came to see patient and saw that he was asymptomatic, sitting up in bed watching television in NAD. Spoke with telemetry who noticed pattern and stated that patient's first degree block may be distorting true reading of sinus tachycardia. EKG run again which showed sinus tachycardia with 1st degree block. Patient has had up to 8 liquid bowel incontinence episodes. Spoke with patient who was amenable to a fecal tube. Inserted around 1600. Patient does report discomfort at site. Medicated with dilaudid. Will continue to monitor. Patient is taking in a bit more fluids today and took all of his pills independently and within one hour. Patient free from falls and injury. Turned every 2 hours to prevent further sacral breakdown.

## 2017-09-24 NOTE — PLAN OF CARE
Problem: Patient Care Overview  Goal: Plan of Care Review  Outcome: Ongoing (interventions implemented as appropriate)  Patient extremely somnolent throughout shift. Blood pressure in 80's over 50's at 8 am vitals. Notified BMT team. Told to hold any further doses of dilaudid secondary to hypotension and altered arousability. BP up to normal values by noon vitals. Patient is having liquid diarrhea which he cannot control. Did confess that one of the reasons he wants to keep taking MScontin is so that it will constipate him. Explained to patient and family that opioids appear to be oversedating patient. Patient received first dose of Remicade today with no adverse effects. Also received 10 mg of Vit K. Patient also receiving Zosyn and Vancomycin. Patient started on Clinimix in afternoon due to decreased appetite and very poor intake. Patient is AAOx4 when alert. States that he can take pills, but only one at a time. Took all day to get patient to take pills. Patient can move a bit in bed. Sat up chair after assistance from PT to get there. Sat up for about 6 hours. Otherwise free from falls, injury, and skin breakdown.

## 2017-09-24 NOTE — ASSESSMENT & PLAN NOTE
- we are concerned about acute amjgg-jmvrng-eloh disease given that he received donor lymphocytes in August 2017.   - s/p flex sig 9/15/17 showing erythematous mucosa in the recto-sigmoid colon (biopsied)  - methylprednisolone 80mg IV daily started 9/14/17  - serum CMV negative, positive on GI biopsy stain  - solumedrol increased to 80mg iv bid on 9/17, diarrhea improved 9/20 and steroids decreased, increased back to 80 mg bid 9/21 due to increased stool output   - budesonide 9 mg 9/17  - Added Remicade 9/23/17 (400 mg weekly)  - we request accurate stool output measurements.  - we have added scheduled lomotil today.

## 2017-09-24 NOTE — ASSESSMENT & PLAN NOTE
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

## 2017-09-24 NOTE — PROGRESS NOTES
Spoke with Dr. Osuna regarding pt change in heart rhythm.  Pt was tachycardic (not a change) and was throwing over 100 PVCs.  Pt was assessed and pt was asymptomatic , VSS, no chest pain, SOB, or other symptoms.  Nurse let MD know that morning labs were about to be drawn and that electrolytes were already ordered and suggested an EKG, MD agreed.  EKG showed sinus tach with 1st degree AV block.  Nurse notified MD again regarding results and she stated that she was going to take a look at the EKG herself and pts chart.  Dr. Osuna also came to bedside to check on pt (currently in room now).

## 2017-09-25 PROBLEM — L30.8 DERMATITIS ASSOCIATED WITH MOISTURE: Status: ACTIVE | Noted: 2017-01-01

## 2017-09-25 PROBLEM — I48.91 ATRIAL FIBRILLATION WITH RVR: Status: RESOLVED | Noted: 2017-01-01 | Resolved: 2017-01-01

## 2017-09-25 NOTE — PROGRESS NOTES
"[facility]  Hematology  Bone Marrow Transplant  Progress Note    Patient Name: Paul E Sistrunk  Admission Date: 9/13/2017  Hospital Length of Stay: 12 days  Code Status: Full Code  No new subjective & objective note has been filed under this hospital service since the last note was generated.    Assessment/Plan:     * Sepsis    - remains afebrile.  - c diff testing is negative from 9/13   - cultures are negative   - CT abdomen reveals "diffuse abnormal appearance of the small and large bowel demonstrating fluid filled mildly prominent loops with associated submucosal edema and mucosal enhancement."   - concern for acute jwfpa-xfctjy-reey disease given that he received donor lymphocytes in August 2017  - s/p flex sig 9/15/17 showing erythematous mucosa in the recto-sigmoid colon (biopsied)--GVHD and CMV  - discontinued vancomycin on 9/15/17  - discontinued cefepime 9/17/17   - sepsis work-up repeated 9/21 due to hypotension and tachycardia, repeat cultures done, chest x-ray negative. Cefepime and Vancomycin restarted. Cefepime was switched to zosyn.  - Possible staph in blood from 9/21.   - continue zosyn and vancomycin for now.        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 Blast of 62%, 19 had a complex karyotype including 5q deletion, 7q deletion, 17p deletion, and one metaphase represented a near tetraploid subclone; FLT-3 negative     -- Had previous reaction to MEC (etoposide caused full body rash).   -- Re-induced with FLAG-BETZY   -- Day 14 bone marrow biopsy done 7/10 - hypocellular with no evidence of residual disease  -- Post DLI on 8/14/17  -- currently receiving Vidaza for 2 cycles given high risk disease per Dr. Atkinson note (has only received cycle 1)        Gpedt-obaqaj-manr disease    - we are concerned about " acute versus chronic nfrpf-mfmnsy-dinj disease given that he received donor lymphocytes in August 2017.   - s/p flex sig 9/15/17 showing erythematous mucosa in the recto-sigmoid colon (biopsied)  - serum CMV negative (repeat in process from 9/25), positive on GI biopsy stain - continue valganciclovir   - solumedrol increased to 80mg iv bid on 9/14/17, diarrhea improved 9/20 and steroids decreased, increased back to 80 mg bid 9/21 due to increased stool output   - budesonide 9 mg 9/17  - Remicade started 9/23/17 (400 mg weekly)  - accurate stool output measurements. 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 Continue triumeq  - now on IV steroids with history of fungal infection, ID consulted. Histoplasma antigen done and continue current ppx. ID has signed off          CINV (chemotherapy-induced nausea and vomiting)    - continue as-need antiemetics and Zofran ATC          Coagulopathy    - Malnourished, possible VitK deficient.   - Continue total parenteral nutrition.        Atrial fibrillation with rapid ventricular response    - continue flecainide 100 mg bid and metoprolol 25 bid   - per cardiology patient is in sinus tachycardia - okay to hold metoprolol when hypotensive   - continue to monitor.           Chemotherapy-induced thrombocytopenia    - due to transplant and chemotherapy   - platelet count today is 42 k/uL  - continue to monitor.        Anemia due to chemotherapy    - hemoglobin today is 9.1 g/dL  - continue to monitor.        Histoplasma capsulatum infection    - on voriconazole 300mg BID            VTE Risk Mitigation         Ordered     Place sequential compression device  Until discontinued      09/22/17 1532     High Risk of VTE  Once      09/13/17 0714     Place RANJIT hose  Until discontinued      09/13/17 0613          Disposition: Pending resolution of GVHD.     Asia Melgar, NP  Bone Marrow Transplant  [facility]

## 2017-09-25 NOTE — PROGRESS NOTES
Dr Fontaine notified of pts low BP and and elevated HR, pt with c/o pain and need for pain medication. MD stated would write new orders.

## 2017-09-25 NOTE — ASSESSMENT & PLAN NOTE
- we are concerned about acute versus chronic yfzlu-gulvbf-pxzg disease given that he received donor lymphocytes in August 2017.   - s/p flex sig 9/15/17 showing erythematous mucosa in the recto-sigmoid colon (biopsied)  - serum CMV negative (repeat in process from 9/25), positive on GI biopsy stain - continue valganciclovir   - solumedrol increased to 80mg iv bid on 9/14/17, diarrhea improved 9/20 and steroids decreased, increased back to 80 mg bid 9/21 due to increased stool output   - budesonide 9 mg 9/17  - Remicade started 9/23/17 (400 mg weekly)  - accurate stool output measurements. Last 24 hours 900 cc stool output   - continue scheduled lomotil   - Ibrutnib ordered, dose reduced to 280 mg daily and sent to [facility] today

## 2017-09-25 NOTE — PT/OT/SLP PROGRESS
Physical Therapy      Paul E Sistrunk  MRN: [id number]    Patient not seen today secondary to  (NSG hold). -120.   sitting EOB with regularly irregular rhythm.  NSG hold for cardio consult.  Will follow-up with pt at the next scheduled tx session.    Joyce Ochoa, PT

## 2017-09-25 NOTE — NURSING
BMT team into see. Notified of dark red liquid stool to FMS and Lopressor was held this am due to SBP=96. Orders to follow.

## 2017-09-25 NOTE — PLAN OF CARE
Problem: Occupational Therapy Goal  Goal: Occupational Therapy Goal  Goals to be met by: 10/9/17     Patient will increase functional independence with ADLs by performing:    UE Dressing with Stand-by Assistance.  LE Dressing with Stand-by Assistance.  Grooming while EOB with Stand-by Assistance.  Toileting from bedside commode with Minimal Assistance for hygiene and clothing management.   Supine to sit with Stand-by Assistance.  Toilet transfer to bedside commode with Contact Guard Assistance.    Outcome: Ongoing (interventions implemented as appropriate)  Initial eval completed   POC implemented and goals set     Tala Morales OT  9/25/2017

## 2017-09-25 NOTE — ASSESSMENT & PLAN NOTE
- continue flecainide 100 mg bid and metoprolol 25 bid   - per cardiology patient is in sinus tachycardia - okay to hold metoprolol when hypotensive   - continue to monitor.

## 2017-09-25 NOTE — PT/OT/SLP EVAL
Occupational Therapy  Evaluation    Paul E Sistrunk   MRN: [id number]   Admitting Diagnosis: Sepsis    OT Date of Treatment: 09/25/17   OT Start Time: 1049 Therapist returned at 1442  OT Stop Time: 1053 Therapist ended at 1453  OT Total Time (min): 4 min + 11 min = 14 total time     Billable Minutes:  Evaluation 14    Diagnosis: Sepsis       Past Medical History:   Diagnosis Date    Cancer 2/2016    leukemia, AML    HIV infection     Pancytopenia due to antineoplastic chemotherapy 7/4/2016      Past Surgical History:   Procedure Laterality Date    CYSTOSCOPY         Referring physician: Meaghan Hernandez NP  Date referred to OT: 9/22/17    General Precautions: Standard, fall  Orthopedic Precautions: N/A  Braces: N/A    Do you have any cultural, spiritual, Sikhism conflicts, given your current situation?: None stated      Patient History:  Living Environment  Lives With: alone  Living Arrangements: house  Home Layout: Able to live on 1st floor  Stair Railings at Home: none  Transportation Available: car, family or friend will provide  Living Environment Comment: Pt reports he lives alone in a [facility] with no LEN. PTA, pt requires (A) for bathing and dressing at this time. PTA, pt was (I) with functional mobility. Pt reports his mother and brother are able to assist pt upon d/c. Pt does not drive or work   Equipment Currently Used at Home: none    Prior level of function:   Bed Mobility/Transfers: independent  Grooming: independent  Bathing: needs assist  Upper Body Dressing: needs assist  Lower Body Dressing: needs assist  Toileting: independent  Homemaking Responsibilities: Yes  Driving License: No  Mode of Transportation: Car, Family     Dominant hand: right    Subjective:  Communicated with RN prior to session.  Pt agreeable to therapy session   Chief Complaint: pain, decreased strength  Patient/Family stated goals: to regain strength, return home     Pain/Comfort  Pain Rating 1: 9/10  Location - Orientation 1:  "generalized  Location 1:  ("all over" )  Pain Addressed 1: Cessation of Activity, Nurse notified  Pain Rating Post-Intervention 1: 9/10    Objective:  Patient found with: telemetry, peripheral IV, central line    Cognitive Exam:  Oriented to: Person, Place and Situation  Follows Commands/attention: Follows two-step commands  Communication: clear/fluent  Memory:  No Deficits noted  Safety awareness/insight to disability: impaired  Coping skills/emotional control: Appropriate to situation    Visual/perceptual:  Intact    Physical Exam:  Postural examination/scapula alignment: Rounded shoulder and Head forward  Skin integrity: Visible skin intact  Edema: Mild L UE near elbow     Sensation:   Intact    Upper Extremity Range of Motion:  Right Upper Extremity: WFL  Left Upper Extremity: WFL    Upper Extremity Strength:  Right Upper Extremity: Deficits: grossly 3+/5   Left Upper Extremity: Deficits: grossly 3+/5    Strength: fair     Fine motor coordination:   Intact    Gross motor coordination: WFL    Functional Mobility:  Bed Mobility:  Rolling/Turning to Left: Contact guard assistance, With side rail  Supine to Sit: Minimum Assistance, WIth side rail (HOB slightly elevated )  Sit to Supine: Minimum Assistance, With leg lift, With side rail    Transfers:  Sit <> Stand Assistance: Other (see comments) (Pt not appropriate to stand at this time. Pt's  sitting EOB )  Toilet Transfer Assistance: Activity Did not Occur    Functional Ambulation: Pt unable perform this date. Therapy session discontinued after pt sitting EOB and HR at 140.     Activities of Daily Living:  Feeding Level of Assistance: Activity did not occur    UE Dressing Level of Assistance: Maximum assistance, Independent (to don gown in front while supine in bed )    LE Dressing Level of Assistance: Moderate assistance (Pt observed to bring B LEs into figure four postion sitting EOB simulating donning/doffing socks. Pt was assisted back to bed " "secondary to HR at 140 sitting EOB )           Toileting Level of Assistance: Total assistance (fecal incontinence  )        Balance:   Static Sit: FAIR: Maintains without assist, but unable to take any challenges   Dynamic Sit: FAIR+: Maintains balance through MINIMAL excursions of active trunk motion    Therapeutic Activities and Exercises:  Pt educated by therapist on:   - Pt educated on role of OT, POC, and goals for therapy.     - Importance of OOB ax's with staff member assistance and sitting OOB daily   - Pt completed ADLs and functional mobility for treatment session as noted above   -Pt verbalized understanding. Pt expressed no further concerns/questions.  - RN notified therapist of pt's HR and suggested for herapy session discontinued after pt's HR elevated to 140 when sitting EOB.     AM-PAC 6 CLICK ADL  How much help from another person does this patient currently need?  1 = Unable, Total/Dependent Assistance  2 = A lot, Maximum/Moderate Assistance  3 = A little, Minimum/Contact Guard/Supervision  4 = None, Modified [place]/Independent    Putting on and taking off regular lower body clothing? : 2  Bathing (including washing, rinsing, drying)?: 1  Toileting, which includes using toilet, bedpan, or urinal? : 2  Putting on and taking off regular upper body clothing?: 2  Taking care of personal grooming such as brushing teeth?: 3  Eating meals?: 3  Total Score: 13    AM-PAC Raw Score CMS "G-Code Modifier Level of Impairment Assistance   6 % Total / Unable   7 - 9 CM 80 - 100% Maximal Assist   10-14 CL 60 - 80% Moderate Assist   15 - 19 CK 40 - 60% Moderate Assist   20 - 22 CJ 20 - 40% Minimal Assist   23 CI 1-20% SBA / CGA   24 CH 0% Independent/ Mod I       Patient left supine with all lines intact, call button in reach, RN notified and pt's mother  present    Assessment:  Paul E Sistrunk is a 57 y.o. male with a medical diagnosis of Sepsis and presents with the below stated deficits. " Pt was attempted to be seen in AM for therapy evaluation, session limited due to pt's pain 9/10 and requested for therapist to return later. Therapist returned in PM and upon sitting EOB, pt's HR elevated to 140 in which therapy session was ended. Pt's OOB mobility limited this date due to elevated HR and pain. Pt will continue to benefit from skilled OT in order to maximize safety and (I) with all functional mobility, functional tasks, and self-care tasks.     Rehab identified problem list/impairments: Rehab identified problem list/impairments: weakness, impaired endurance, impaired self care skills, impaired functional mobilty, gait instability, impaired balance, decreased upper extremity function, decreased lower extremity function, pain, impaired cardiopulmonary response to activity, decreased safety awareness    Rehab potential is good.    Activity tolerance: Fair    Discharge recommendations: Discharge Facility/Level Of Care Needs: rehabilitation facility     Barriers to discharge: Barriers to Discharge: Decreased caregiver support    Equipment recommendations: walker, rolling, 3-in-1 commode, shower chair     GOALS:    Occupational Therapy Goals        Problem: Occupational Therapy Goal    Goal Priority Disciplines Outcome Interventions   Occupational Therapy Goal     OT, PT/OT Ongoing (interventions implemented as appropriate)    Description:  Goals to be met by: 10/9/17     Patient will increase functional independence with ADLs by performing:    UE Dressing with Stand-by Assistance.  LE Dressing with Stand-by Assistance.  Grooming while EOB with Stand-by Assistance.  Toileting from bedside commode with Minimal Assistance for hygiene and clothing management.   Supine to sit with Stand-by Assistance.  Toilet transfer to bedside commode with Contact Guard Assistance.                      PLAN:  Patient to be seen 4 x/week to address the above listed problems via self-care/home management, therapeutic  activities, therapeutic exercises, community/work re-entry  Plan of Care expires: 10/25/17  Plan of Care reviewed with: patient, mother         Tala Morales, OT  09/25/2017

## 2017-09-25 NOTE — PLAN OF CARE
MDR's with Dr Atkinson.  Patient was stepped down from ICU on 9/23.  Stool OP remains increased despite immunosuppressants.  Ibrutnib sent to OP pharmacy and awaiting auth.  PO intake low and on TPN/lipids.  IV Zosyn and Vanc continued.  D/c pending improvement in acute GVHD symptoms.  Will continue to follow for d/c needs.

## 2017-09-25 NOTE — PROGRESS NOTES
Dr Estrella made aware of ongoing blood in fecal management system,elevated HR and low BP. Md wrote new orders for CBC.

## 2017-09-25 NOTE — SUBJECTIVE & OBJECTIVE
Subjective:     Interval History:  - Cardioverted 9/21 at the bedside for 's and BP 66/47. ST after. Transferred to ICU. Was started on 2 pressors which were dc'd at 2 am 9/22 and BP stable.   - Stepped down to floor on 9/23/17. Arrhythmia team curb sided - stated patient is in sinus tachycardia - there is an underlying condition causing the tachycardia and not the A. Fib. Okay to hold metoprolol for hypotension as hemodynamics is most important.   - He complains of continued diarrhea (stool noted to be red); 2 L over the last 24 hours with 6 bowel movements documented. He complains of a cough, fatigue and generalized weakness.    Objective:     Vital Signs (Most Recent):  Temp: 98.1 °F (36.7 °C) (09/25/17 1205)  Pulse: (!) 119 (09/25/17 1205)  Resp: 18 (09/25/17 1205)  BP: 101/62 (09/25/17 1205)  SpO2: (!) 92 % (09/25/17 1205) Vital Signs (24h Range):  Temp:  [97.7 °F (36.5 °C)-98.3 °F (36.8 °C)] 98.1 °F (36.7 °C)  Pulse:  [111-123] 119  Resp:  [16-20] 18  SpO2:  [92 %-99 %] 92 %  BP: ()/(60-68) 101/62     Weight: 76.1 kg (167 lb 12.3 oz)  Body mass index is 22.13 kg/m².  Body surface area is 1.98 meters squared.    ECOG SCORE         [unfilled]    Intake/Output - Last 3 Shifts       09/23 0700 - 09/24 0659 09/24 0700 - 09/25 0659 09/25 0700 - 09/26 0659    P.O. 180 810     I.V. (mL/kg)       IV Piggyback 800 800     .8 780.8     Total Intake(mL/kg) 1785.8 (23.3) 2390.8 (31.4)     Urine (mL/kg/hr) 300 (0.2) 975 (0.5)     Stool 0 (0) 2000 (1.1)     Total Output 300 2975      Net +1485.8 -584.2             Urine Occurrence  1 x     Stool Occurrence 3 x 6 x           Physical Exam   Constitutional: He is oriented to person, place, and time. He appears ill.   Malnourished   HENT:   Head: Normocephalic and atraumatic.   Dry mouth noted   Eyes: Pupils are equal, round, and reactive to light.   Cardiovascular: Regular rhythm and normal heart sounds.  Tachycardia present.    Pulmonary/Chest: Effort  normal and breath sounds normal.   Abdominal: Soft. Bowel sounds are normal. He exhibits no distension. There is no tenderness.   Musculoskeletal: He exhibits no edema.   Neurological: He is oriented to person, place, and time.   Drowsy during exam; easily awakened; held MS contin this morning    Nursing note and vitals reviewed.      Significant Labs:   CBC:   Recent Labs  Lab 09/24/17 0450 09/25/17  0530   WBC 2.76* 4.22   HGB 8.6* 9.1*   HCT 23.7* 25.8*   PLT 46* 42*   , CMP:   Recent Labs  Lab 09/24/17 0450 09/25/17  0530   * 131*   K 4.2 4.4    103   CO2 20* 22*   * 162*   BUN 55* 53*   CREATININE 1.6* 1.3   CALCIUM 7.6* 7.6*   PROT 4.6* 3.6*   ALBUMIN 1.6* 1.6*   BILITOT 3.1* 3.3*   ALKPHOS 147* 178*   AST 46* 48*   ALT 17 20   ANIONGAP 8 6*   EGFRNONAA 47.8* >60.0   ,   Recent Lab Results       09/25/17  0530      Albumin 1.6(L)     Alkaline Phosphatase 178(H)     ALT 20     Anion Gap 6(L)     Aniso Slight     AST 48(H)     BANDS 1.0     Baso # CANCELED  Comment:  Result canceled by the ancillary     Basophil% 0.0     Total Bilirubin 3.3  Comment:  For infants and newborns, interpretation of results should be based  on gestational age, weight and in agreement with clinical  observations.  Premature Infant recommended reference ranges:  Up to 24 hours.............<8.0 mg/dL  Up to 48 hours............<12.0 mg/dL  3-5 days..................<15.0 mg/dL  6-29 days.................<15.0 mg/dL  (H)     BUN, Bld 53(H)     Calcium 7.6(L)     Chloride 103     CO2 22(L)     Creatinine 1.3     Differential Method Manual     eGFR if African American >60.0     eGFR if non  >60.0  Comment:  Calculation used to obtain the estimated glomerular filtration  rate (eGFR) is the CKD-EPI equation. Since race is unknown   in our information system, the eGFR values for   -American and Non--American patients are given   for each creatinine result.       Eos #  CANCELED  Comment:  Result canceled by the ancillary     Eosinophil% 0.0     Large/Giant Platelets Present     Glucose 162(H)     Gran% 91.0(H)     Hematocrit 25.8(L)     Hemoglobin 9.1(L)     Coumadin Monitoring INR 1.1  Comment:  Coumadin Therapy:  2.0 - 3.0 for INR for all indicators except mechanical heart valves  and antiphospholipid syndromes which should use 2.5 - 3.5.       Lymph # CANCELED  Comment:  Result canceled by the ancillary     Lymph% 2.0(L)     Magnesium 1.9     MCH 31.5(H)     MCHC 35.3     MCV 89     Mono # CANCELED  Comment:  Result canceled by the ancillary     Mono% 6.0     MPV SEE COMMENT  Comment:  Result not available.     Phosphorus 3.6     Platelet Estimate Decreased(A)     Platelets 42(L)     Poly Occasional     Potassium 4.4     Total Protein 3.6(L)     Protime 12.0     RBC 2.89(L)     RDW 19.0(H)     Schistocytes Present     Sodium 131(L)     WBC 4.22        and All pertinent labs from the last 24 hours have been reviewed.    Diagnostic Results:  None

## 2017-09-25 NOTE — ASSESSMENT & PLAN NOTE
- we are concerned about acute versus chronic axznb-iikfah-sdhb disease given that he received donor lymphocytes in August 2017.   - s/p flex sig 9/15/17 showing erythematous mucosa in the recto-sigmoid colon (biopsied)  - serum CMV negative (repeat in process from 9/25), positive on GI biopsy stain - continue valganciclovir   - solumedrol increased to 80mg iv bid on 9/14/17, diarrhea improved 9/20 and steroids decreased, increased back to 80 mg bid 9/21 due to increased stool output   - budesonide 9 mg 9/17  - Remicade started 9/23/17 (400 mg weekly)  - accurate stool output measurements. Last 24 hours 2L stool output   - continue scheduled lomotil

## 2017-09-25 NOTE — PLAN OF CARE
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   Outcome: Ongoing (interventions implemented as appropriate)  -pt is free of falls/injuries during shift  -pt is a 2x assist and is OOB with PO/OT  -pt knows to call if assistance is needed     Problem: Pressure Ulcer Risk (Jimbo Scale) (Adult,Obstetrics,Pediatric)  Goal: Skin Integrity  Patient will demonstrate the desired outcomes by discharge/transition of care.   Outcome: Ongoing (interventions implemented as appropriate)  -pt has stage 2 pressure ulcer on sacral area  -pt being reminded to reposition self in bed  -barrier cream is applied   -wound care consult is ordered

## 2017-09-25 NOTE — NURSING
Transferred to room 823 via hospital bed with mother. Wound care RN evaluated pt. Air mattress for bed and chair placed at BS. All meds transferred. Pt tolerated well.

## 2017-09-25 NOTE — ASSESSMENT & PLAN NOTE
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 Blast of 62%, 19 had a complex karyotype including 5q deletion, 7q deletion, 17p deletion, and one metaphase represented a near tetraploid subclone; FLT-3 negative     -- Had previous reaction to MEC (etoposide caused full body rash).   -- Re-induced with FLAG-BETZY   -- Day 14 bone marrow biopsy done 7/10 - hypocellular with no evidence of residual disease  -- Post DLI on 8/14/17  -- currently receiving Vidaza for 2 cycles given high risk disease per Dr. Atkinson note (has only received cycle 1)

## 2017-09-25 NOTE — PROGRESS NOTES
LOS: 12 days     24h events and S:  Paul E Sistrunk is a 57 y.o. male whom EP was previously following for AF with RVR. He was restarted on Flecanide on 9/21. He was emergently cardioverted in the evening of 9/21 due to hypotension and AF with RVR. He was transferred out of the MICU on 9/23 and has had SBPs in the 90s. Multiple doses of Metoprolol have been held starting on 9/23.    O:  Vitals:  Temp: 98.1 °F (36.7 °C) (09/25/17 1205)  Pulse: (!) 122 (09/25/17 1445)  Resp: 18 (09/25/17 1205)  BP: 101/62 (09/25/17 1205)  SpO2: (!) 92 % (09/25/17 1205)    Ins/Outs:    Intake/Output Summary (Last 24 hours) at 09/25/17 1503  Last data filed at 09/25/17 0600   Gross per 24 hour   Intake          1678.34 ml   Output             2675 ml   Net          -996.66 ml       Physical Exam:  General: alert and oriented, conversant  Heart: tachycardia, regular, no r/g appreciated  Lungs: CTAB  Abdomen: soft, NT, ND, +BS  Vascular: 2+ radial pulse, no edema    Medications:   abacavir  600 mg Oral Daily    And    dolutegravir  50 mg Oral Daily    And    lamivudine  300 mg Oral Daily    atorvastatin  20 mg Oral Daily    budesonide  9 mg Oral Daily    diphenoxylate-atropine 2.5-0.025 mg  1 tablet Oral QID    flecainide  100 mg Oral Q12H    inflixamab (REMICADE) IVPB  400 mg Intravenous Weekly    lidocaine HCL 10 mg/ml (1%)  1 mL Other Once    methylPREDNISolone sodium succinate  80 mg Intravenous Q12H    metoprolol tartrate  25 mg Oral BID    morphine  30 mg Oral BID    ondansetron  8 mg Intravenous Q8H    pantoprazole  40 mg Oral Daily    piperacillin-tazobactam 4.5 g in dextrose 5 % 100 mL IVPB (ready to mix system)  4.5 g Intravenous Q8H    saliva substitute combo no.2  30 mL Mucous Membrane QID    sulfamethoxazole-trimethoprim 800-160mg  1 tablet Oral Every Mon, Wed, Fri    valganciclovir  900 mg Oral Daily    vancomycin (VANCOCIN) IVPB  15 mg/kg Intravenous Q12H    voriconazole  300 mg Oral BID      Amino  acid 4.25% - dextrose 10% (CLINIMIX-E) solution with additives (1L provides 42.5 gm AA, 100 gm CHO (340 kcal/L dextrose), Na 35, K 30, Mg 5, Ca 4.5, Acetate 70, Cl 39, Phos 15) 50 mL/hr at 09/24/17 2314     HYDROmorphone, loperamide, lorazepam, prochlorperazine    Review of patient's allergies indicates:   Allergen Reactions    Etoposide Rash       Labs:  CBC with Diff:     Recent Labs  Lab 09/23/17  0500 09/24/17  0450 09/25/17  0530   WBC 2.51* 2.76* 4.22   HGB 9.1* 8.6* 9.1*   HCT 26.5* 23.7* 25.8*   PLT 42* 46* 42*   LYMPH 5.8*  CANCELED 10.0* 2.0*  CANCELED   MONO 0.0*  CANCELED 8.0 6.0  CANCELED   EOSINOPHIL 0.0 0.0 0.0       COAG:    Recent Labs  Lab 09/21/17 2057  09/23/17  0500 09/23/17  0633 09/24/17  0450 09/25/17  0530   APTT >150.0*  --   --  46.4*  --   --    INR 7.3*  < > 5.7*  --  1.1 1.1   < > = values in this interval not displayed.    CMP:     Recent Labs  Lab 09/23/17  0500 09/24/17  0450 09/25/17  0530   * 165* 162*   CALCIUM 7.7* 7.6* 7.6*   ALBUMIN 1.8* 1.6* 1.6*   PROT 3.7* 4.6* 3.6*   * 130* 131*   K 3.7 4.2 4.4   CO2 21* 20* 22*    102 103   BUN 42* 55* 53*   CREATININE 1.4 1.6* 1.3   ALKPHOS 121 147* 178*   ALT 15 17 20   AST 39 46* 48*   BILITOT 3.3* 3.1* 3.3*   MG 2.2 2.6 1.9   PHOS 3.3 3.2 3.6     Estimated Creatinine Clearance: 69.1 mL/min (based on SCr of 1.3 mg/dL).    .No results for input(s): CPK, TROPONINI, MB, BNP in the last 168 hours.      Diagnostic Results:  Ejection Fractions   EF   Date Value Ref Range Status   07/20/2017 55 55 - 65    06/21/2017 60 55 - 65    08/15/2016 60 55 - 65         Infectious disease labs:  Microbiology Results (last 7 days)     Procedure Component Value Units Date/Time    Blood culture [[id number]] Collected:  09/21/17 2057    Order Status:  Completed Specimen:  Blood from Line, Subclavian, Left Updated:  09/25/17 1157     Blood Culture, Routine Gram stain cortney bottle: Gram positive cocci in clusters resembling Staph       Blood Culture, Routine Results called to and read back by: Brian Dominique RN  09/23/2017  05:17     Blood Culture, Routine Gram stain aer bottle: Gram positive cocci in clusters resembling Staph      Blood Culture, Routine 09/23/2017  06:12     Blood Culture, Routine --     COAGULASE-NEGATIVE STAPHYLOCOCCUS SPECIES  Organism is a probable contaminant      Blood culture [[id number]] Collected:  09/21/17 1909    Order Status:  Completed Specimen:  Blood Updated:  09/24/17 2212     Blood Culture, Routine No Growth to date     Blood Culture, Routine No Growth to date     Blood Culture, Routine No Growth to date     Blood Culture, Routine No Growth to date    Narrative:       Blood cultures from 2 different sites. 4 bottles total.  Please draw before starting antibiotics.    Blood culture [[id number]] Collected:  09/21/17 1909    Order Status:  Completed Specimen:  Blood Updated:  09/24/17 2212     Blood Culture, Routine No Growth to date     Blood Culture, Routine No Growth to date     Blood Culture, Routine No Growth to date     Blood Culture, Routine No Growth to date    Narrative:       Blood cultures x 2 different sites. 4 bottles total. Please  draw cultures before administering antibiotics.    Blood culture [[id number]]     Order Status:  Canceled Specimen:  Blood     Blood culture [[id number]]     Order Status:  Canceled Specimen:  Blood     Blood culture [[id number]]     Order Status:  Canceled Specimen:  Blood     Urine culture [[id number]]     Order Status:  Canceled Specimen:  Urine from Urine           Assessment and Plan:  Paul E Sistrunk is a 57 y.o. male with PMH of SVT s/p DCCV, CVA (unknown etiology), AML in relapse s/p allogenic BMT, and HIV for whom we were originally consulted for AF with RVR. He was emergently cardioverted at the bedside overnight on 9/21 due to hypotension and AF with RVR. He is currently in sinus tachycardia with LBBB. EP was asked to see patient as primary team would like to know if beta  blocker can be held in the setting of hypotension.     AF with RVR  - now in SR  - [id number] = 2  - Sinus tachycardia is a normal compensatory physiological mechanism 2/2 to a stress process, such as sepsis and acute GvH disease. Continue treatment as per primary team. AVN blockers will not be able to suppress sinus tachycardia as long as there is a physiological process driving this.  - D/C Flecainide  - Decrease Lopressor dose to 12.5 mg BID and administer if pressure tolerates. If primary team is concerned about hypotension, it is perfectly acceptable to hold this low dose of beta-blocker  - recommend to keep patient therapeutically anticoagulated although may not be currently possible with thrombocytopenia    Thank you for this interesting consult. Please call for any additional questions.    Kj Milton MD, MPH  PGY-V  Cardiovascular Disease Fellow

## 2017-09-25 NOTE — PROGRESS NOTES
Pt with abnormal EKG pattern and HR in 120s at rest and 142 with sitting at BS with the support of OT. Pt with bloody brown stool with fecal management system in place. CY Betancourt notified, she stated that Dr Atkinson was aware of the bloody stool no new orders received. And that a cardiology consult was placed and she would attempt to reach them. CY Betancourt also aware of the Lopressor held earlier while pt was in TSU and that the BP remained low.

## 2017-09-25 NOTE — ASSESSMENT & PLAN NOTE
-- +376 days s/p Flu/Bu/ATG MUD allogeneic transplant for high risk AML after his second IDAC (6/20/16 - 6/24/16) after a prolonged hospitalization (2/5/16 - 3/21/16) for induction with 7&3 and reinduction with MEC to remission and IDAC (4/4/16 - 4/9/16)  -- Chimerics from marrow done 6/21 show CD3 of 90% donor and 10% recipient, but CD34 of 5% donor and 95% recipient   -- Chimers from 7/10 shows 70% recipient and 30% donor - NOT SORTED  -- Relpased 6/2017 and reinduced with FLAG BETZY  -- DLI 8/14/17, day +43

## 2017-09-25 NOTE — ASSESSMENT & PLAN NOTE
"- remains afebrile.  - c diff testing is negative from 9/13   - cultures are negative   - CT abdomen reveals "diffuse abnormal appearance of the small and large bowel demonstrating fluid filled mildly prominent loops with associated submucosal edema and mucosal enhancement."   - concern for acute cssok-igvsep-aisu disease given that he received donor lymphocytes in August 2017  - s/p flex sig 9/15/17 showing erythematous mucosa in the recto-sigmoid colon (biopsied)--GVHD and CMV  - discontinued vancomycin on 9/15/17  - discontinued cefepime 9/17/17   - sepsis work-up repeated 9/21 due to hypotension and tachycardia, repeat cultures done, chest x-ray negative. Cefepime and Vancomycin restarted. Cefepime was switched to zosyn.  - Possible staph in blood from 9/21.   - continue zosyn and vancomycin for now.  "

## 2017-09-26 NOTE — TELEPHONE ENCOUNTER
[facility] received prescription for Imbruvica 420mg daily and another for 280mg daily.  Benefits investigation required.

## 2017-09-26 NOTE — PROGRESS NOTES
[facility]  Hematology  Bone Marrow Transplant  Progress Note    Patient Name: Paul E Sistrunk  Admission Date: 9/13/2017  Hospital Length of Stay: 13 days  Code Status: Full Code    Subjective:     Interval History:   - Day 44 post DLI  - rectal tube in place with 900 cc of stool output past 24 hours  - afebrile, VSS  - On Zosyn and Vanc.  - HR improved and medications adjusted per cardiology    Objective:     Vital Signs (Most Recent):  Temp: 98.1 °F (36.7 °C) (09/26/17 1117)  Pulse: 105 (09/26/17 1125)  Resp: 15 (09/26/17 1117)  BP: 93/64 (09/26/17 1117)  SpO2: (!) 91 % (09/26/17 1117) Vital Signs (24h Range):  Temp:  [97.5 °F (36.4 °C)-98.1 °F (36.7 °C)] 98.1 °F (36.7 °C)  Pulse:  [] 105  Resp:  [15-18] 15  SpO2:  [90 %-96 %] 91 %  BP: ()/(60-68) 93/64     Weight: 76.1 kg (167 lb 12.3 oz)  Body mass index is 22.13 kg/m².  Body surface area is 1.98 meters squared.    ECOG SCORE           Intake/Output - Last 3 Shifts       09/24 0700 - 09/25 0659 09/25 0700 - 09/26 0659 09/26 0700 - 09/27 0659    P.O. 810 510     IV Piggyback 800 450     .8 600     Total Intake(mL/kg) 2390.8 (31.4) 1560 (20.5)     Urine (mL/kg/hr) 975 (0.5) 1850 (1)     Stool 2000 (1.1) 900 (0.5) 1000 (2.1)    Total Output 2975 2750 1000    Net -584.2 -1190 -1000           Urine Occurrence 1 x      Stool Occurrence 6 x 1 x           Physical Exam   Constitutional: He is oriented to person, place, and time. He appears well-developed and well-nourished. No distress.   HENT:   Head: Normocephalic.   Mouth/Throat: No oropharyngeal exudate.   Very dry mouth   Eyes: Conjunctivae are normal. Pupils are equal, round, and reactive to light. No scleral icterus.   Neck: Normal range of motion. Neck supple. Normal range of motion present. No thyromegaly present.   Cardiovascular: Normal rate, regular rhythm, normal heart sounds and intact distal pulses.    Pulmonary/Chest: Effort normal and breath sounds normal. No  "respiratory distress.   Abdominal: Soft. Bowel sounds are normal. He exhibits no distension. There is no tenderness.   Musculoskeletal: Normal range of motion. He exhibits edema. He exhibits no tenderness.   Neurological: He is alert and oriented to person, place, and time. No cranial nerve deficit. Coordination normal.   Skin: Skin is warm and dry. No petechiae and no rash noted. No pallor.   Psychiatric: He has a normal mood and affect. Thought content normal.   Vitals reviewed.      Significant Labs:   CBC:   Recent Labs  Lab 09/25/17  0530 09/25/17  1722 09/26/17  0530   WBC 4.22 4.27 6.01   HGB 9.1* 8.7* 8.8*   HCT 25.8* 25.3* 26.0*   PLT 42* 48* 44*    and CMP:   Recent Labs  Lab 09/25/17  0530 09/26/17  0530   * 129*   K 4.4 4.8    101   CO2 22* 23   * 162*   BUN 53* 51*   CREATININE 1.3 1.2   CALCIUM 7.6* 7.5*   PROT 3.6* 3.5*   ALBUMIN 1.6* 1.5*   BILITOT 3.3* 3.0*   ALKPHOS 178* 163*   AST 48* 39   ALT 20 18   ANIONGAP 6* 5*   EGFRNONAA >60.0 >60.0       Diagnostic Results:  I have reviewed all pertinent imaging results/findings within the past 24 hours.    Assessment/Plan:     * Sepsis    - remains afebrile.  - c diff testing is negative from 9/13   - cultures are negative   - CT abdomen reveals "diffuse abnormal appearance of the small and large bowel demonstrating fluid filled mildly prominent loops with associated submucosal edema and mucosal enhancement."   - concern for acute mabik-wdbulc-rolo disease given that he received donor lymphocytes in August 2017  - s/p flex sig 9/15/17 showing erythematous mucosa in the recto-sigmoid colon (biopsied)--GVHD and CMV  - discontinued vancomycin on 9/15/17  - discontinued cefepime 9/17/17   - sepsis work-up repeated 9/21 due to hypotension and tachycardia, repeat cultures done, chest x-ray negative. Cefepime and Vancomycin restarted. Cefepime was switched to zosyn.  - Possible staph in blood from 9/21.   - continue zosyn for now and stop Vanc " today.        Ptbks-qiwloy-aixm disease    - we are concerned about acute versus chronic jeutn-wnqtit-leld disease given that he received donor lymphocytes in August 2017.   - s/p flex sig 9/15/17 showing erythematous mucosa in the recto-sigmoid colon (biopsied)  - serum CMV negative (repeat in process from 9/25), positive on GI biopsy stain - continue valganciclovir   - solumedrol increased to 80mg iv bid on 9/14/17, diarrhea improved 9/20 and steroids decreased, increased back to 80 mg bid 9/21 due to increased stool output   - budesonide 9 mg 9/17  - Remicade started 9/23/17 (400 mg weekly)  - accurate stool output measurements. 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 Blast of 62%, 19 had a complex karyotype including 5q deletion, 7q deletion, 17p deletion, and one metaphase represented a near tetraploid subclone; FLT-3 negative     -- Had previous reaction to MEC (etoposide caused full body rash).   -- Re-induced with FLAG-BETZY   -- Day 14 bone marrow biopsy done 7/10 - hypocellular with no evidence of residual disease  -- Post DLI on 8/14/17  -- currently receiving Vidaza for 2 cycles given high risk disease per Dr. Atkinson note (has only received cycle 1)        S/P allogeneic bone marrow transplant    -- +377 days s/p Flu/Bu/ATG MUD allogeneic transplant for high risk AML after his second IDAC (6/20/16 - 6/24/16) after a prolonged hospitalization (2/5/16 - 3/21/16) for induction with 7&3 and reinduction with MEC to remission and IDAC (4/4/16 - 4/9/16)  -- Chimerics from marrow done 6/21 show CD3 of 90% donor and 10% recipient, but CD34 of 5% donor and 95% recipient   --  Chimers from 7/10 shows 70% recipient and 30% donor - NOT SORTED  -- Relpased 6/2017 and reinduced with FLAG BETZY  -- DLI 8/14/17, day +44        HIV disease    - no acute issues. Continue triumeq  - now on IV steroids with history of fungal infection, ID consulted. Histoplasma antigen done and continue current ppx. ID has signed off          Atrial fibrillation with rapid ventricular response    - flecainide decreased to 50 mg bid and metoprolol to 12.5 mg bid per cardiology this am   - repeat EKG done this am, sinus tachycardia           Anemia due to chemotherapy    - hemoglobin today is 8.8 g/dL  - continue to monitor.        Chemotherapy-induced thrombocytopenia    - due to transplant and chemotherapy   - platelet count today is 44 k/uL  - continue to monitor.        CINV (chemotherapy-induced nausea and vomiting)    - continue as-need antiemetics and Zofran ATC          Coagulopathy    - Malnourished, possible VitK deficient.   - Continue total parenteral nutrition.        Left arm swelling    - ultrasound done and negative        Histoplasma capsulatum infection    - on voriconazole 300mg BID            VTE Risk Mitigation         Ordered     Place sequential compression device  Until discontinued      09/22/17 1532     High Risk of VTE  Once      09/13/17 0714     Place RANJIT hose  Until discontinued      09/13/17 0613          Disposition: pending improvement of GVHD    Arminda Olivares NP  Bone Marrow Transplant  [facility]

## 2017-09-26 NOTE — ASSESSMENT & PLAN NOTE
"- remains afebrile.  - c diff testing is negative from 9/13   - cultures are negative   - CT abdomen reveals "diffuse abnormal appearance of the small and large bowel demonstrating fluid filled mildly prominent loops with associated submucosal edema and mucosal enhancement."   - concern for acute podpj-gcagjw-oavw disease given that he received donor lymphocytes in August 2017  - s/p flex sig 9/15/17 showing erythematous mucosa in the recto-sigmoid colon (biopsied)--GVHD and CMV  - discontinued vancomycin on 9/15/17  - discontinued cefepime 9/17/17   - sepsis work-up repeated 9/21 due to hypotension and tachycardia, repeat cultures done, chest x-ray negative. Cefepime and Vancomycin restarted. Cefepime was switched to zosyn.  - Possible staph in blood from 9/21.   - continue zosyn for now and stop Vanc today.  "

## 2017-09-26 NOTE — ASSESSMENT & PLAN NOTE
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

## 2017-09-26 NOTE — TELEPHONE ENCOUNTER
----- Message from Ciaran Jorge MD sent at 9/25/2017  4:10 PM CDT -----  Aida - please tell Leonides this flex sign bx are negative for HSV.    Supplemental Diagnosis  The diagnoses remain the same. This supplemental is issued to report the findings of HSV immunostains  performed at [facility] with appropriate controls.  HSV-1 and 2: Negative  ASR  (Electronically Signed: 2017-09-25 15:33:24 )

## 2017-09-26 NOTE — ASSESSMENT & PLAN NOTE
- flecainide decreased to 50 mg bid and metoprolol to 12.5 mg bid per cardiology this am   - repeat EKG done this am, sinus tachycardia

## 2017-09-26 NOTE — PLAN OF CARE
Problem: Physical Therapy Goal  Goal: Physical Therapy Goal  Goals to be met by: 10/7/17     Patient will increase functional independence with mobility by performin. Supine to sit with MInimal Assistance.  2. Sit to supine with MInimal Assistance.  3. Sit to stand transfer with Minimal Assistance. - GOAL MET  4. Bed to chair transfer with Minimal Assistance using Rolling Walker.  5. Gait  x 50 feet with Minimal Assistance using Rolling Walker.   6. Lower extremity exercise program x 20 reps per handout, with assistance as needed.     Outcome: Ongoing (interventions implemented as appropriate)  Pt achieved 1/6 goals.

## 2017-09-26 NOTE — PLAN OF CARE
Problem: Patient Care Overview  Goal: Plan of Care Review  Outcome: Ongoing (interventions implemented as appropriate)  Side rails up x2; call bell in place; bed in lowest, locked position; skid proof socks on; no evidence of skin breakdown; care plan explained to patient; pt remains free of injury. Pt with poor appetite, turned in bed and repositioned with assist. Voids green brown urine per urinal, fecal management system secured, pt draining bloody stool to collection bag, BMT aware. Pt with vancomycin and zosyn given. Clinimex infusing at 50 cc/hr. Telemetry monitoring in progress with ST at rest, pt sat up with OT at side of bed. HR increased to 140s. Cardiology consult ordered. Pt remains with low BP and elevated HR, RR wnl, 02 sats 90-92% 2 L NC applied. Pt with complaint of pain tramadol given.

## 2017-09-26 NOTE — TELEPHONE ENCOUNTER
----- Message from Ciaran Jorge MD sent at 9/25/2017  4:10 PM CDT -----  Aida - please tell Leonides this flex sign bx are negative for HSV.    Supplemental Diagnosis  The diagnoses remain the same. This supplemental is issued to report the findings of HSV immunostains  performed at [facility] with appropriate controls.  HSV-1 and 2: Negative  ASR  (Electronically Signed: 2017-09-25 15:33:24 )

## 2017-09-26 NOTE — PT/OT/SLP PROGRESS
"Physical Therapy  Treatment    Paul E Sistrunk   MRN: [id number]   Admitting Diagnosis: Sepsis    PT Received On: 09/26/17  PT Start Time: 1434     PT Stop Time: 1514    PT Total Time (min): 40 min       Billable Minutes:  Therapeutic Activity 28 and Therapeutic Exercise 12    Treatment Type: Treatment  PT/PTA: PT             General Precautions: Standard, fall  Orthopedic Precautions: N/A   Braces: N/A    Do you have any cultural, spiritual, Yazdanism conflicts, given your current situation?: no    Subjective:  Communicated with nursing prior to session.  "[place]" - When asked where we were going.  Pt was very lethargic with decreased alertness to environment.  Eyes closed throughout tx session, with multiple verbal cues to stimulate pt  "I'm falling." - Pt became worried during transfer training    Pain/Comfort  Pain Rating 1:  (Pt did not quantify, asked multiple times)  Location 1: mouth  Pain Addressed 1: Nurse notified, Pre-medicate for activity    Objective:   Patient found with: peripheral IV, bowel management system, central line    Functional Mobility:  Bed Mobility:   Rolling/Turning to Left: Minimum assistance (With use of bed rails)  Scooting/Bridging: Minimum Assistance (To scoot ant sitting EOB)  Supine to Sit: Maximum Assistance (Pt assisted with transfer by moving B LEs L to the EOB, with inc time to perf sec to decreased alertness)    Transfers:  Sit <> Stand Assistance: Minimum Assistance (With ed and verbal cuing for hand placement on bed)  Sit <> Stand Assistive Device: No Assistive Device  Bed <> Chair Technique: Stand Pivot  Bed <> Chair Assistance: Moderate Assistance (With pt holding PT for support, with A for wt shifting and small steps during pivot)  Bed <> Chair Assistive Device: No Assistive Device    Gait:   Gait Distance: Pt took multiple small steps during pivot transfer  Assistance 1: Moderate assistance  Gait Assistive Device: No device  Gait Pattern: swing-to gait  Gait " Deviation(s): decreased vandana, increased time in double stance, decreased step length, decreased stride length, forward lean, decreased toe-to-floor clearance, decreased weight-shifting ability    Balance:   Static Sit: FAIR+: Able to take MINIMAL challenges from all directions  Dynamic Sit: FAIR+: Maintains balance through MINIMAL excursions of active trunk motion  Static Stand: POOR+: Needs MINIMAL assist to maintain  Dynamic stand: POOR: N/A     Therapeutic Activities and Exercises:  Whiteboard updated  Ankle pumps: 20 reps x 2 sets, with Sage to perf, 1st set: pt perf 12 reps, 8 reps with resting break   SAQs: 10 reps each LE perf individually, in long sit, with Sage to perf  Hip abd/add: 8 reps x 2 sets, in long sit, with Sage to perf  SKTC: 8 reps x 2 sets, each LE perf individually, with resistance provided into kn ext, with modA to perf     AM-PAC 6 CLICK MOBILITY  How much help from another person does this patient currently need?   1 = Unable, Total/Dependent Assistance  2 = A lot, Maximum/Moderate Assistance  3 = A little, Minimum/Contact Guard/Supervision  4 = None, Modified [place]/Independent    Turning over in bed (including adjusting bedclothes, sheets and blankets)?: 3  Sitting down on and standing up from a chair with arms (e.g., wheelchair, bedside commode, etc.): 3  Moving from lying on back to sitting on the side of the bed?: 2  Moving to and from a bed to a chair (including a wheelchair)?: 2  Need to walk in hospital room?: 2  Climbing 3-5 steps with a railing?: 1  Total Score: 13    AM-PAC Raw Score CMS G-Code Modifier Level of Impairment Assistance   6 % Total / Unable   7 - 9 CM 80 - 100% Maximal Assist   10 - 14 CL 60 - 80% Moderate Assist   15 - 19 CK 40 - 60% Moderate Assist   20 - 22 CJ 20 - 40% Minimal Assist   23 CI 1-20% SBA / CGA   24 CH 0% Independent/ Mod I     Patient left up in chair with all lines intact, call button in reach, nursing notified and mother  present.    Assessment:  Paul E Sistrunk is a 57 y.o. male with a medical diagnosis of Sepsis.  Pt is a complicated case, with multiple co-morbidities.  Currently presenting as difficult to arouse, though pt is able to follow verbal cuing approx 75% of the time with inc time given for pt to perf.  Pt does seem more alert after some time sitting in chair, rather than in bed.  Pt ed on HEP, with mother present, asked mother to facilitate perf of therex when PT is not present 3xs/day.  Pt is unable to perf therex without assistance at this time, and pt is not self motivated or aware enough to initiate on own.  During transfer training, pt is able to hold weight with LEs, no evidence of knee buckling or LOB.  Continued recommendation for rehab placement upon discharge.  Pt will require cont skilled PT services to improve functional transfers and to facilitate a return to gait.      Rehab identified problem list/impairments: Rehab identified problem list/impairments: weakness, impaired endurance, impaired cardiopulmonary response to activity, pain, decreased safety awareness, decreased lower extremity function, decreased upper extremity function, impaired self care skills, impaired functional mobilty, gait instability, impaired balance    Rehab potential is poor.    Activity tolerance: Poor    Discharge recommendations: Discharge Facility/Level Of Care Needs: rehabilitation facility     Barriers to discharge: Barriers to Discharge: Decreased caregiver support    Equipment recommendations: Equipment Needed After Discharge: walker, rolling, 3-in-1 commode, shower chair     GOALS:    Physical Therapy Goals        Problem: Physical Therapy Goal    Goal Priority Disciplines Outcome Goal Variances Interventions   Physical Therapy Goal     PT/OT, PT Ongoing (interventions implemented as appropriate)     Description:  Goals to be met by: 10/7/17     Patient will increase functional independence with mobility by performin.  Supine to sit with MInimal Assistance.  2. Sit to supine with MInimal Assistance.  3. Sit to stand transfer with Minimal Assistance. - GOAL MET  4. Bed to chair transfer with Minimal Assistance using Rolling Walker.  5. Gait  x 50 feet with Minimal Assistance using Rolling Walker.   6. Lower extremity exercise program x 20 reps per handout, with assistance as needed.                       PLAN:    Patient to be seen 5 x/week  to address the above listed problems via gait training, therapeutic activities, therapeutic exercises, neuromuscular re-education  Plan of Care expires: 10/23/17  Plan of Care reviewed with: patient, mother         Joyce Ochoa, PT  09/26/2017

## 2017-09-26 NOTE — PROGRESS NOTES
LOS: 13 days     24h events and S:  Paul E Sistrunk is a 57 y.o. male with  overnight. No events otherwise.     O:  Vitals:  Temp: 98.3 °F (36.8 °C) (09/26/17 1645)  Pulse: 110 (09/26/17 1645)  Resp: 16 (09/26/17 1645)  BP: 104/66 (09/26/17 1645)  SpO2: (!) 92 % (09/26/17 1645)    Ins/Outs:    Intake/Output Summary (Last 24 hours) at 09/26/17 1833  Last data filed at 09/26/17 1800   Gross per 24 hour   Intake           1007.5 ml   Output             3875 ml   Net          -2867.5 ml       Physical Exam:  General: alert and oriented, conversant  Heart: tachycardic, regular, no r/g appreciated  Lungs: CTAB  Abdomen: soft, NT, ND, +BS  Vascular: 2+ radial pulse, no edema    Medications:   abacavir  600 mg Oral Daily    And    dolutegravir  50 mg Oral Daily    And    lamivudine  300 mg Oral Daily    atorvastatin  20 mg Oral Daily    budesonide  9 mg Oral Daily    diphenoxylate-atropine 2.5-0.025 mg  1 tablet Oral QID    flecainide  50 mg Oral Q12H    inflixamab (REMICADE) IVPB  400 mg Intravenous Weekly    lidocaine HCL 10 mg/ml (1%)  1 mL Other Once    methylPREDNISolone sodium succinate  80 mg Intravenous Q12H    metoprolol tartrate  12.5 mg Oral BID    morphine  30 mg Oral BID    ondansetron  8 mg Intravenous Q8H    pantoprazole  40 mg Oral Daily    piperacillin-tazobactam 4.5 g in dextrose 5 % 100 mL IVPB (ready to mix system)  4.5 g Intravenous Q8H    saliva substitute combo no.2  30 mL Mucous Membrane QID    sulfamethoxazole-trimethoprim 800-160mg  1 tablet Oral Every Mon, Wed, Fri    valganciclovir  900 mg Oral Daily    voriconazole  300 mg Oral BID      Amino acid 4.25% - dextrose 10% (CLINIMIX-E) solution with additives (1L provides 42.5 gm AA, 100 gm CHO (340 kcal/L dextrose), Na 35, K 30, Mg 5, Ca 4.5, Acetate 70, Cl 39, Phos 15) 50 mL/hr at 09/26/17 1539     HYDROmorphone, loperamide, lorazepam, prochlorperazine, tramadol    Review of patient's allergies indicates:   Allergen  Reactions    Etoposide Rash       Labs:  CBC with Diff:     Recent Labs  Lab 09/25/17  0530 09/25/17  1722 09/26/17  0530   WBC 4.22 4.27 6.01   HGB 9.1* 8.7* 8.8*   HCT 25.8* 25.3* 26.0*   PLT 42* 48* 44*   LYMPH 2.0*  CANCELED 5.0*  CANCELED 1.0*  CANCELED   MONO 6.0  CANCELED 1.0*  CANCELED 2.0*  CANCELED   EOSINOPHIL 0.0 0.0 0.0       COAG:    Recent Labs  Lab 09/21/17 2057 09/23/17  0633 09/24/17  0450 09/25/17  0530 09/26/17  0530   APTT >150.0*  --  46.4*  --   --   --    INR 7.3*  < >  --  1.1 1.1 1.1   < > = values in this interval not displayed.    CMP:   Recent Labs  Lab 09/24/17 0450 09/25/17  0530 09/26/17  0530   * 162* 162*   CALCIUM 7.6* 7.6* 7.5*   ALBUMIN 1.6* 1.6* 1.5*   PROT 4.6* 3.6* 3.5*   * 131* 129*   K 4.2 4.4 4.8   CO2 20* 22* 23    103 101   BUN 55* 53* 51*   CREATININE 1.6* 1.3 1.2   ALKPHOS 147* 178* 163*   ALT 17 20 18   AST 46* 48* 39   BILITOT 3.1* 3.3* 3.0*   MG 2.6 1.9 1.8   PHOS 3.2 3.6 3.4     Estimated Creatinine Clearance: 74.9 mL/min (based on SCr of 1.2 mg/dL).    .No results for input(s): CPK, TROPONINI, MB, BNP in the last 168 hours.      Diagnostic Results:  Ejection Fractions   EF   Date Value Ref Range Status   07/20/2017 55 55 - 65    06/21/2017 60 55 - 65    08/15/2016 60 55 - 65         Infectious disease labs:  Microbiology Results (last 7 days)     Procedure Component Value Units Date/Time    Blood culture [[id number]] Collected:  09/21/17 1909    Order Status:  Completed Specimen:  Blood Updated:  09/25/17 2212     Blood Culture, Routine No Growth to date     Blood Culture, Routine No Growth to date     Blood Culture, Routine No Growth to date     Blood Culture, Routine No Growth to date     Blood Culture, Routine No Growth to date    Narrative:       Blood cultures from 2 different sites. 4 bottles total.  Please draw before starting antibiotics.    Blood culture [[id number]] Collected:  09/21/17 1909    Order Status:  Completed Specimen:   Blood Updated:  09/25/17 2212     Blood Culture, Routine No Growth to date     Blood Culture, Routine No Growth to date     Blood Culture, Routine No Growth to date     Blood Culture, Routine No Growth to date     Blood Culture, Routine No Growth to date    Narrative:       Blood cultures x 2 different sites. 4 bottles total. Please  draw cultures before administering antibiotics.    Blood culture [[id number]] Collected:  09/21/17 2057    Order Status:  Completed Specimen:  Blood from Line, Subclavian, Left Updated:  09/25/17 1157     Blood Culture, Routine Gram stain cortney bottle: Gram positive cocci in clusters resembling Staph      Blood Culture, Routine Results called to and read back by: Brian Dominique RN  09/23/2017  05:17     Blood Culture, Routine Gram stain aer bottle: Gram positive cocci in clusters resembling Staph      Blood Culture, Routine 09/23/2017  06:12     Blood Culture, Routine --     COAGULASE-NEGATIVE STAPHYLOCOCCUS SPECIES  Organism is a probable contaminant      Blood culture [[id number]]     Order Status:  Canceled Specimen:  Blood     Blood culture [[id number]]     Order Status:  Canceled Specimen:  Blood     Blood culture [[id number]]     Order Status:  Canceled Specimen:  Blood     Urine culture [[id number]]     Order Status:  Canceled Specimen:  Urine from Urine           Assessment and Plan:  Paul E Sistrunk is a 57 y.o. 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 EP is back on board in the setting of ST with new LBBB in the setting of flecainide use.      AF with RVR  - now in ST; recent e/o ST with e/o LBBB morphology   - emergently cardioverted overnight 9/21-9/22  - repeat ECG with IVCD  - [id number] = 2  - decrease PO flecainide to 50 mg BID  - recommend to keep patient therapeutically anticoagulated  - recommend decreasing PO metoprolol to 12.5 mg BID     Javed Alberts MD

## 2017-09-26 NOTE — PLAN OF CARE
Problem: Patient Care Overview  Goal: Plan of Care Review  Outcome: Ongoing (interventions implemented as appropriate)  Pt is AAOx3.Pt needs max assistance ( x2 people)Pt requires total care. Pt not able to perform ADL's. Pt turned often. Telly monitoring on going. Standard precautions maintained.  Fall, pressure ulcer, infection precautions maintained. Pt denies pian and/or discomfort at this time.NAD noted.FMS in place bloody stool noted service is aware. Depression noted. Pt remains injury and fall free, non skid footwear donned, call light within reach, personal items within reach, bed in low/locked position, pt able to voice needs all needs voiced have been met at this time.

## 2017-09-26 NOTE — PROGRESS NOTES
Seen for skin concerns while on TSU.     09/25/17 1000       Wound 09/25/17 0800 Moisture associated dermatitis medial gluteal cleft   Date First Assessed/Time First Assessed: 09/25/17 0800   Pre-existing: No  Wound Type: Moisture associated dermatitis  Orientation: medial  Location: gluteal cleft   Wound Image    Wound WDL ex   Dressing Appearance no dressing   Drainage Amount none   Drainage Characteristics/Odor no odor   Wound Base yellow;pink;moist   Periwound Area denuded;redness   Wound Edges open   Wound Length (cm) 2   Wound Width (cm) 1   Depth (cm) 0.1   Non-staged Wound Description Partial thickness   Cleansed W/ soap and water   Interventions barrier applied  (Critic aid thick)       Wound 09/25/17 0800 Moisture associated dermatitis gluteal   Date First Assessed/Time First Assessed: 09/25/17 0800   Pre-existing: No  Wound Type: Moisture associated dermatitis  Side: Right  Location: gluteal   Wound WDL ex   Dressing Appearance no dressing;no drainage   Drainage Amount none   Wound Base clean;moist;pink   Periwound Area denuded;redness   Wound Length (cm) 3   Wound Width (cm) 1.5   Depth (cm) 0.1   Non-staged Wound Description Partial thickness   Cleansed W/ soap and water   Interventions barrier applied  (Critici aid thick)       Wound 09/25/17 0800 Moisture associated dermatitis gluteal   Date First Assessed/Time First Assessed: 09/25/17 0800   Pre-existing: No  Wound Type: Moisture associated dermatitis  Side: Left  Location: gluteal   Wound WDL ex   Dressing Appearance no drainage;no dressing   Drainage Amount none   Drainage Characteristics/Odor no odor   Wound Base clean;moist;pink   Periwound Area redness;denuded   Wound Edges open   Wound Length (cm) 3   Wound Width (cm) 2   Depth (cm) 0.1   Non-staged Wound Description Partial thickness   Cleansed W/ soap and water   Interventions barrier applied  (critic aid thick )   Safety Interventions   Patient Rounds visualized patient     Has moisture  associated skin damage to bilateral buttocks and gluteal crease with flexiseal device in place to contain caustic liquid stool. Thick critic aid past in use to affected skin . Patient is on a low air loss bed and will place on a chair cushion as well.  Alicia Vital RN CWON  [id number]

## 2017-09-26 NOTE — PLAN OF CARE
Problem: Patient Care Overview  Goal: Plan of Care Review  Outcome: Ongoing (interventions implemented as appropriate)  Pt involved in plan of care and communicating needs throughout shift as able; pt oriented x 4 but very lethargic and slow to respond; unreliable to make needs known; bed alarm on at all times for safety.  Pt able to assist with turning and repositioning; up to chair this afternoon with PT.  Air mattress overlay placed on bed.  Pt c/o pain to mouth and abdomen; prn dilaudid admin with good effect.   TPN infusing throughout shift as ordered; pt on full liquid diet but unable to eat d/t lethargy and nausea.  PRN compazine admin x 1 and scheduled zofran admin with good effect.  Pt is voiding without difficulty; urine dark brendan/green.  IV zosyn admin as ordered; vanc d/c'd today.  Telemetry monitoring maintained; ST with HR in 100s at rest; 120s-130s with activity.  VSS; no acute events so far this shift.  Pt remaining free from falls or injury throughout shift; bed in lowest position; call light within reach.  Pt instructed to call for assistance as needed; bed alarm on.  Q1H rounding done on pt.

## 2017-09-26 NOTE — SUBJECTIVE & OBJECTIVE
Subjective:     Interval History:   - Day 44 post DLI  - rectal tube in place with 900 cc of stool output past 24 hours  - afebrile, VSS  - On Zosyn and Vanc.  - HR improved and medications adjusted per cardiology    Objective:     Vital Signs (Most Recent):  Temp: 98.1 °F (36.7 °C) (09/26/17 1117)  Pulse: 105 (09/26/17 1125)  Resp: 15 (09/26/17 1117)  BP: 93/64 (09/26/17 1117)  SpO2: (!) 91 % (09/26/17 1117) Vital Signs (24h Range):  Temp:  [97.5 °F (36.4 °C)-98.1 °F (36.7 °C)] 98.1 °F (36.7 °C)  Pulse:  [] 105  Resp:  [15-18] 15  SpO2:  [90 %-96 %] 91 %  BP: ()/(60-68) 93/64     Weight: 76.1 kg (167 lb 12.3 oz)  Body mass index is 22.13 kg/m².  Body surface area is 1.98 meters squared.    ECOG SCORE           Intake/Output - Last 3 Shifts       09/24 0700 - 09/25 0659 09/25 0700 - 09/26 0659 09/26 0700 - 09/27 0659    P.O. 810 510     IV Piggyback 800 450     .8 600     Total Intake(mL/kg) 2390.8 (31.4) 1560 (20.5)     Urine (mL/kg/hr) 975 (0.5) 1850 (1)     Stool 2000 (1.1) 900 (0.5) 1000 (2.1)    Total Output 2975 2750 1000    Net -584.2 -1190 -1000           Urine Occurrence 1 x      Stool Occurrence 6 x 1 x           Physical Exam   Constitutional: He is oriented to person, place, and time. He appears well-developed and well-nourished. No distress.   HENT:   Head: Normocephalic.   Mouth/Throat: No oropharyngeal exudate.   Very dry mouth   Eyes: Conjunctivae are normal. Pupils are equal, round, and reactive to light. No scleral icterus.   Neck: Normal range of motion. Neck supple. Normal range of motion present. No thyromegaly present.   Cardiovascular: Normal rate, regular rhythm, normal heart sounds and intact distal pulses.    Pulmonary/Chest: Effort normal and breath sounds normal. No respiratory distress.   Abdominal: Soft. Bowel sounds are normal. He exhibits no distension. There is no tenderness.   Musculoskeletal: Normal range of motion. He exhibits edema. He exhibits no tenderness.    Neurological: He is alert and oriented to person, place, and time. No cranial nerve deficit. Coordination normal.   Skin: Skin is warm and dry. No petechiae and no rash noted. No pallor.   Psychiatric: He has a normal mood and affect. Thought content normal.   Vitals reviewed.      Significant Labs:   CBC:   Recent Labs  Lab 09/25/17  0530 09/25/17  1722 09/26/17  0530   WBC 4.22 4.27 6.01   HGB 9.1* 8.7* 8.8*   HCT 25.8* 25.3* 26.0*   PLT 42* 48* 44*    and CMP:   Recent Labs  Lab 09/25/17  0530 09/26/17  0530   * 129*   K 4.4 4.8    101   CO2 22* 23   * 162*   BUN 53* 51*   CREATININE 1.3 1.2   CALCIUM 7.6* 7.5*   PROT 3.6* 3.5*   ALBUMIN 1.6* 1.5*   BILITOT 3.3* 3.0*   ALKPHOS 178* 163*   AST 48* 39   ALT 20 18   ANIONGAP 6* 5*   EGFRNONAA >60.0 >60.0       Diagnostic Results:  I have reviewed all pertinent imaging results/findings within the past 24 hours.

## 2017-09-27 PROBLEM — B37.0 ORAL CANDIDA: Status: ACTIVE | Noted: 2017-01-01

## 2017-09-27 PROBLEM — E87.8 ELECTROLYTE ABNORMALITY: Status: ACTIVE | Noted: 2017-01-01

## 2017-09-27 NOTE — ASSESSMENT & PLAN NOTE
"- remains afebrile.  - c diff testing is negative from 9/13   - cultures are negative   - CT abdomen reveals "diffuse abnormal appearance of the small and large bowel demonstrating fluid filled mildly prominent loops with associated submucosal edema and mucosal enhancement."   - concern for acute dmzds-ofwpaf-glgb disease given that he received donor lymphocytes in August 2017  - s/p flex sig 9/15/17 showing erythematous mucosa in the recto-sigmoid colon (biopsied)--GVHD and CMV  - discontinued vancomycin on 9/15/17  - discontinued cefepime 9/17/17   - sepsis work-up repeated 9/21 due to hypotension and tachycardia, repeat cultures done, chest x-ray negative. Cefepime and Vancomycin restarted. Cefepime was switched to zosyn.  - Possible staph in blood from 9/21.   - continue zosyn for now and Vanc stopped 9/26.  "

## 2017-09-27 NOTE — PROGRESS NOTES
LOS: 14 days     24h events and S:  Paul E Sistrunk is a 57 y.o. with  BPM. HR low 100s overnight.     O:  Vitals:  Temp: 97.6 °F (36.4 °C) (09/27/17 0753)  Pulse: 105 (09/27/17 0753)  Resp: 16 (09/27/17 0316)  BP: 107/72 (09/27/17 0753)  SpO2: 96 % (09/27/17 0753)    Ins/Outs:    Intake/Output Summary (Last 24 hours) at 09/27/17 0943  Last data filed at 09/27/17 0929   Gross per 24 hour   Intake          1208.67 ml   Output             3290 ml   Net         -2081.33 ml       Physical Exam:  General: alert and oriented, conversant  Heart: tachycardic, regular, no r/g appreciated  Lungs: CTAB  Abdomen: soft, NT, ND, +BS  Vascular: 2+ radial pulse, no edema    Medications:   abacavir  600 mg Oral Daily    And    dolutegravir  50 mg Oral Daily    And    lamivudine  300 mg Oral Daily    atorvastatin  20 mg Oral Daily    budesonide  9 mg Oral Daily    diphenoxylate-atropine 2.5-0.025 mg  1 tablet Oral QID    flecainide  50 mg Oral Q12H    inflixamab (REMICADE) IVPB  400 mg Intravenous Weekly    lidocaine HCL 10 mg/ml (1%)  1 mL Other Once    methylPREDNISolone sodium succinate  80 mg Intravenous Q12H    metoprolol tartrate  12.5 mg Oral BID    morphine  30 mg Oral BID    ondansetron  8 mg Intravenous Q8H    pantoprazole  40 mg Oral Daily    piperacillin-tazobactam 4.5 g in dextrose 5 % 100 mL IVPB (ready to mix system)  4.5 g Intravenous Q8H    saliva substitute combo no.2  30 mL Mucous Membrane QID    sodium chloride 0.9%  1,000 mL Intravenous Once    sulfamethoxazole-trimethoprim 800-160mg  1 tablet Oral Every Mon, Wed, Fri    valganciclovir  900 mg Oral Daily    voriconazole  300 mg Oral BID      sodium chloride 0.9% 200 mL/hr at 09/27/17 0904    Amino acid 4.25% - dextrose 10% (CLINIMIX-E) solution with additives (1L provides 42.5 gm AA, 100 gm CHO (340 kcal/L dextrose), Na 35, K 30, Mg 5, Ca 4.5, Acetate 70, Cl 39, Phos 15) 50 mL/hr at 09/26/17 1539     HYDROmorphone, loperamide,  lorazepam, prochlorperazine, tramadol    Review of patient's allergies indicates:   Allergen Reactions    Etoposide Rash       Labs:  CBC with Diff:     Recent Labs  Lab 09/25/17  1722 09/26/17  0530 09/27/17  0313   WBC 4.27 6.01 9.50   HGB 8.7* 8.8* 9.3*   HCT 25.3* 26.0* 26.8*   PLT 48* 44* 88*   LYMPH 5.0*  CANCELED 1.0*  CANCELED 0.0*   MONO 1.0*  CANCELED 2.0*  CANCELED 9.0   EOSINOPHIL 0.0 0.0 0.0       COAG:    Recent Labs  Lab 09/21/17 2057 09/23/17  0633  09/25/17  0530 09/26/17  0530 09/27/17 0313   APTT >150.0*  --  46.4*  --   --   --   --    INR 7.3*  < >  --   < > 1.1 1.1 1.1   < > = values in this interval not displayed.    CMP:   Recent Labs  Lab 09/25/17  0530 09/26/17  0530 09/27/17 0313   * 162* 165*   CALCIUM 7.6* 7.5* 7.6*   ALBUMIN 1.6* 1.5* 1.6*   PROT 3.6* 3.5* 3.7*   * 129* 128*   K 4.4 4.8 5.5*   CO2 22* 23 20*    101 99   BUN 53* 51* 66*   CREATININE 1.3 1.2 1.5*   ALKPHOS 178* 163* 196*   ALT 20 18 23   AST 48* 39 56*   BILITOT 3.3* 3.0* 3.1*   MG 1.9 1.8 1.9   PHOS 3.6 3.4 4.2     Estimated Creatinine Clearance: 60.9 mL/min (based on SCr of 1.5 mg/dL (H)).    .No results for input(s): CPK, TROPONINI, MB, BNP in the last 168 hours.      Diagnostic Results:  Ejection Fractions   EF   Date Value Ref Range Status   07/20/2017 55 55 - 65    06/21/2017 60 55 - 65    08/15/2016 60 55 - 65         Infectious disease labs:  Microbiology Results (last 7 days)     Procedure Component Value Units Date/Time    Blood culture [[id number]] Collected:  09/21/17 1909    Order Status:  Completed Specimen:  Blood Updated:  09/26/17 2212     Blood Culture, Routine No growth after 5 days.    Narrative:       Blood cultures x 2 different sites. 4 bottles total. Please  draw cultures before administering antibiotics.    Blood culture [[id number]] Collected:  09/21/17 1909    Order Status:  Completed Specimen:  Blood Updated:  09/26/17 2212     Blood Culture, Routine No growth after 5  days.    Narrative:       Blood cultures from 2 different sites. 4 bottles total.  Please draw before starting antibiotics.    Blood culture [[id number]] Collected:  09/21/17 2057    Order Status:  Completed Specimen:  Blood from Line, Subclavian, Left Updated:  09/25/17 1157     Blood Culture, Routine Gram stain cortney bottle: Gram positive cocci in clusters resembling Staph      Blood Culture, Routine Results called to and read back by: Brian Dominique RN  09/23/2017  05:17     Blood Culture, Routine Gram stain aer bottle: Gram positive cocci in clusters resembling Staph      Blood Culture, Routine 09/23/2017  06:12     Blood Culture, Routine --     COAGULASE-NEGATIVE STAPHYLOCOCCUS SPECIES  Organism is a probable contaminant      Blood culture [[id number]]     Order Status:  Canceled Specimen:  Blood     Blood culture [[id number]]     Order Status:  Canceled Specimen:  Blood     Blood culture [[id number]]     Order Status:  Canceled Specimen:  Blood     Urine culture [[id number]]     Order Status:  Canceled Specimen:  Urine from Urine           Assessment and Plan:  Paul E Sistrunk is a 57 y.o. 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 EP is back on board in the setting of ST with new LBBB in the setting of flecainide use.      AF with RVR  - now in ST; recent e/o ST with e/o LBBB morphology   - emergently cardioverted overnight 9/21-9/22  - repeat ECG with IVCD  - [id number] = 2  - decrease PO flecainide to 50 mg qD  - recommend to keep patient therapeutically anticoagulated  - increase PO metoprolol to 25 mg BID  - EKG in the morning     Javed Alberts MD

## 2017-09-27 NOTE — ASSESSMENT & PLAN NOTE
- flecainide decreased to 50 mg bid and metoprolol to 12.5 mg bid per cardiology 9/26  - repeat EKG done 9/26, sinus tachycardia. HR stable

## 2017-09-27 NOTE — ASSESSMENT & PLAN NOTE
-- +378 days s/p Flu/Bu/ATG MUD allogeneic transplant for high risk AML after his second IDAC (6/20/16 - 6/24/16) after a prolonged hospitalization (2/5/16 - 3/21/16) for induction with 7&3 and reinduction with MEC to remission and IDAC (4/4/16 - 4/9/16)  -- Chimerics from marrow done 6/21 show CD3 of 90% donor and 10% recipient, but CD34 of 5% donor and 95% recipient   -- Chimers from 7/10 shows 70% recipient and 30% donor - NOT SORTED  -- Relpased 6/2017 and reinduced with FLAG BETZY  -- DLI 8/14/17, day +45

## 2017-09-27 NOTE — SUBJECTIVE & OBJECTIVE
Subjective:     Interval History:   - Day 45 post DLI  - rectal tube in place with 2.4 L of stool output past 24 hours  - afebrile, VSS  - confused overnight and lethargic this am  - On Zosyn.  - HR improved, ST and medications and followed by cardiology  - creatinine up to 1.5 today and bilirubin 3.1     Objective:     Vital Signs (Most Recent):  Temp: 97.3 °F (36.3 °C) (09/27/17 1218)  Pulse: 105 (09/27/17 1218)  Resp: 17 (09/27/17 1218)  BP: (!) 97/57 (09/27/17 1218)  SpO2: 95 % (09/27/17 1218) Vital Signs (24h Range):  Temp:  [97.3 °F (36.3 °C)-98.3 °F (36.8 °C)] 97.3 °F (36.3 °C)  Pulse:  [104-110] 105  Resp:  [16-24] 17  SpO2:  [92 %-96 %] 95 %  BP: ()/(57-74) 97/57     Weight: 77.4 kg (170 lb 10.2 oz)  Body mass index is 22.51 kg/m².  Body surface area is 2 meters squared.    ECOG SCORE           Intake/Output - Last 3 Shifts       09/25 0700 - 09/26 0659 09/26 0700 - 09/27 0659 09/27 0700 - 09/28 0659    P.O. 510 420 125    I.V. (mL/kg)   472 (6.1)    IV Piggyback      484.5 417.2    Total Intake(mL/kg) 1560 (20.5) 1104.5 (14.3) 2114.2 (27.3)    Urine (mL/kg/hr) 1850 (1) 840 (0.5) 700 (1.6)    Stool 900 (0.5) 2675 (1.4) 775 (1.8)    Total Output 2750 3515 1475    Net -1190 -2410.5 +639.2           Stool Occurrence 1 x 1 x           Physical Exam   Constitutional: He is oriented to person, place, and time. He appears well-developed and well-nourished. He appears lethargic. He has a sickly appearance. No distress.   HENT:   Head: Normocephalic.   Mouth/Throat: No oropharyngeal exudate.   Dry mouth with thrush to the tongue   Eyes: Conjunctivae are normal. Pupils are equal, round, and reactive to light. No scleral icterus.   Neck: Normal range of motion. Neck supple. Normal range of motion present. No thyromegaly present.   Cardiovascular: Normal rate, regular rhythm, normal heart sounds and intact distal pulses.    Pulmonary/Chest: Effort normal and breath sounds normal. No  respiratory distress.   Abdominal: Soft. Bowel sounds are normal. He exhibits no distension. There is no tenderness.   Musculoskeletal: Normal range of motion. He exhibits edema. He exhibits no tenderness.   Neurological: He is oriented to person, place, and time. He appears lethargic. No cranial nerve deficit. Coordination normal.   Skin: Skin is warm and dry. No petechiae and no rash noted. No pallor.   Psychiatric: He has a normal mood and affect.   Vitals reviewed.      Significant Labs:   CBC:   Recent Labs  Lab 09/25/17  1722 09/26/17  0530 09/27/17  0313   WBC 4.27 6.01 9.50   HGB 8.7* 8.8* 9.3*   HCT 25.3* 26.0* 26.8*   PLT 48* 44* 88*    and CMP:   Recent Labs  Lab 09/26/17  0530 09/27/17 0313   * 128*   K 4.8 5.5*    99   CO2 23 20*   * 165*   BUN 51* 66*   CREATININE 1.2 1.5*   CALCIUM 7.5* 7.6*   PROT 3.5* 3.7*   ALBUMIN 1.5* 1.6*   BILITOT 3.0* 3.1*   ALKPHOS 163* 196*   AST 39 56*   ALT 18 23   ANIONGAP 5* 9   EGFRNONAA >60.0 51.7*       Diagnostic Results:  None

## 2017-09-27 NOTE — ASSESSMENT & PLAN NOTE
- Na 128, K 5.5 today  - will adjust TPN  - creatinine 1.5, likely volume depleted after high stool output  - will bolus 1 L NS and started IVF at 125 cc/hr  - repeat CMP/Mag/Phos this pm

## 2017-09-27 NOTE — PROGRESS NOTES
[facility]  Hematology  Bone Marrow Transplant  Progress Note    Patient Name: Paul E Sistrunk  Admission Date: 9/13/2017  Hospital Length of Stay: 14 days  Code Status: Full Code    Subjective:     Interval History:   - Day 45 post DLI  - rectal tube in place with 2.4 L of stool output past 24 hours  - afebrile, VSS  - confused overnight and lethargic this am  - On Zosyn.  - HR improved, ST and medications and followed by cardiology  - creatinine up to 1.5 today and bilirubin 3.1     Objective:     Vital Signs (Most Recent):  Temp: 97.3 °F (36.3 °C) (09/27/17 1218)  Pulse: 105 (09/27/17 1218)  Resp: 17 (09/27/17 1218)  BP: (!) 97/57 (09/27/17 1218)  SpO2: 95 % (09/27/17 1218) Vital Signs (24h Range):  Temp:  [97.3 °F (36.3 °C)-98.3 °F (36.8 °C)] 97.3 °F (36.3 °C)  Pulse:  [104-110] 105  Resp:  [16-24] 17  SpO2:  [92 %-96 %] 95 %  BP: ()/(57-74) 97/57     Weight: 77.4 kg (170 lb 10.2 oz)  Body mass index is 22.51 kg/m².  Body surface area is 2 meters squared.    ECOG SCORE           Intake/Output - Last 3 Shifts       09/25 0700 - 09/26 0659 09/26 0700 - 09/27 0659 09/27 0700 - 09/28 0659    P.O. 510 420 125    I.V. (mL/kg)   472 (6.1)    IV Piggyback      484.5 417.2    Total Intake(mL/kg) 1560 (20.5) 1104.5 (14.3) 2114.2 (27.3)    Urine (mL/kg/hr) 1850 (1) 840 (0.5) 700 (1.6)    Stool 900 (0.5) 2675 (1.4) 775 (1.8)    Total Output 2750 3515 1475    Net -1190 -2410.5 +639.2           Stool Occurrence 1 x 1 x           Physical Exam   Constitutional: He is oriented to person, place, and time. He appears well-developed and well-nourished. He appears lethargic. He has a sickly appearance. No distress.   HENT:   Head: Normocephalic.   Mouth/Throat: No oropharyngeal exudate.   Dry mouth with thrush to the tongue   Eyes: Conjunctivae are normal. Pupils are equal, round, and reactive to light. No scleral icterus.   Neck: Normal range of motion. Neck supple. Normal range of  "motion present. No thyromegaly present.   Cardiovascular: Normal rate, regular rhythm, normal heart sounds and intact distal pulses.    Pulmonary/Chest: Effort normal and breath sounds normal. No respiratory distress.   Abdominal: Soft. Bowel sounds are normal. He exhibits no distension. There is no tenderness.   Musculoskeletal: Normal range of motion. He exhibits edema. He exhibits no tenderness.   Neurological: He is oriented to person, place, and time. He appears lethargic. No cranial nerve deficit. Coordination normal.   Skin: Skin is warm and dry. No petechiae and no rash noted. No pallor.   Psychiatric: He has a normal mood and affect.   Vitals reviewed.      Significant Labs:   CBC:   Recent Labs  Lab 09/25/17  1722 09/26/17  0530 09/27/17  0313   WBC 4.27 6.01 9.50   HGB 8.7* 8.8* 9.3*   HCT 25.3* 26.0* 26.8*   PLT 48* 44* 88*    and CMP:   Recent Labs  Lab 09/26/17  0530 09/27/17  0313   * 128*   K 4.8 5.5*    99   CO2 23 20*   * 165*   BUN 51* 66*   CREATININE 1.2 1.5*   CALCIUM 7.5* 7.6*   PROT 3.5* 3.7*   ALBUMIN 1.5* 1.6*   BILITOT 3.0* 3.1*   ALKPHOS 163* 196*   AST 39 56*   ALT 18 23   ANIONGAP 5* 9   EGFRNONAA >60.0 51.7*       Diagnostic Results:  None    Assessment/Plan:     * Sepsis    - remains afebrile.  - c diff testing is negative from 9/13   - cultures are negative   - CT abdomen reveals "diffuse abnormal appearance of the small and large bowel demonstrating fluid filled mildly prominent loops with associated submucosal edema and mucosal enhancement."   - concern for acute cgdzu-kecsdf-gbjz disease given that he received donor lymphocytes in August 2017  - s/p flex sig 9/15/17 showing erythematous mucosa in the recto-sigmoid colon (biopsied)--GVHD and CMV  - discontinued vancomycin on 9/15/17  - discontinued cefepime 9/17/17   - sepsis work-up repeated 9/21 due to hypotension and tachycardia, repeat cultures done, chest x-ray negative. Cefepime and Vancomycin restarted. " Cefepime was switched to zosyn.  - Possible staph in blood from 9/21.   - continue zosyn for now and Vanc stopped 9/26.        Byksq-fibzho-olnj disease    - we are concerned about acute versus chronic apbuo-alpolu-uscc disease given that he received donor lymphocytes in August 2017.   - s/p flex sig 9/15/17 showing erythematous mucosa in the recto-sigmoid colon (biopsied)  - serum CMV negative (repeat 9/25 negative), positive on GI biopsy stain - continue valganciclovir   - solumedrol increased to 80mg iv bid on 9/14/17, diarrhea improved 9/20 and steroids decreased, increased back to 80 mg bid 9/21 due to increased stool output (day 13 of high dose steroids)   - budesonide 9 mg 9/17, will change to Beclomethasone today, script sent to [facility] drugs and family will   - Remicade started 9/23/17 (400 mg weekly)  - accurate stool output measurements. Last 24 hours 2.4 L stool output   - continue scheduled lomotil   - Ibrutnib ordered, dose reduced to 280 mg daily and sent to [facility] 9/26 awaiting approval        AML (acute myeloid leukemia) in remission    -- Relapsed AML - peripheral blood shows 30% blasts 6/2017   -- BM biopsy results - consistent with 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 Blast of 62%, 19 had a complex karyotype including 5q deletion, 7q deletion, 17p deletion, and one metaphase represented a near tetraploid subclone; FLT-3 negative     -- Had previous reaction to MEC (etoposide caused full body rash).   -- Re-induced with FLAG-BETZY   -- Day 14 bone marrow biopsy done 7/10 - hypocellular with no evidence of residual disease  -- Post DLI on 8/14/17  -- currently receiving Vidaza for 2 cycles given high risk disease per Dr. Atkinson note (has only received cycle 1)        S/P allogeneic bone marrow transplant    -- +378 days s/p Flu/Bu/ATG MUD allogeneic transplant for high risk AML after his second IDAC (6/20/16 - 6/24/16) after a prolonged  hospitalization (2/5/16 - 3/21/16) for induction with 7&3 and reinduction with MEC to remission and IDAC (4/4/16 - 4/9/16)  -- Chimerics from marrow done 6/21 show CD3 of 90% donor and 10% recipient, but CD34 of 5% donor and 95% recipient   -- Chimers from 7/10 shows 70% recipient and 30% donor - NOT SORTED  -- Relpased 6/2017 and reinduced with FLAG BETZY  -- DLI 8/14/17, day +45        HIV disease    - no acute issues. Continue triumeq  - now on IV steroids with history of fungal infection, ID consulted. Histoplasma antigen done and continue current ppx. ID has signed off          Atrial fibrillation with rapid ventricular response    - flecainide decreased to 50 mg bid and metoprolol to 12.5 mg bid per cardiology 9/26  - repeat EKG done 9/26, sinus tachycardia. HR stable          Anemia due to chemotherapy    - hemoglobin today is 9.3 g/dL  - continue to monitor.        Chemotherapy-induced thrombocytopenia    - due to transplant and chemotherapy   - platelet count today is 88 k/uL  - continue to monitor.        CINV (chemotherapy-induced nausea and vomiting)    - continue as-need antiemetics and Zofran ATC          Oral candida    - clortrimazole humberto         Electrolyte abnormality    - Na 128, K 5.5 today  - will adjust TPN  - creatinine 1.5, likely volume depleted after high stool output  - will bolus 1 L NS and started IVF at 125 cc/hr  - repeat CMP/Mag/Phos this pm        Dermatitis associated with moisture    - followed by wound care        Coagulopathy    - Malnourished, possible VitK deficient.   - Continue total parenteral nutrition, will adjust today based on electrolyte abnormalities.        Left arm swelling    - ultrasound done and negative        Histoplasma capsulatum infection    - on voriconazole 300mg BID            VTE Risk Mitigation         Ordered     Place sequential compression device  Until discontinued      09/22/17 1532     High Risk of VTE  Once      09/13/17 0714     Place RANJIT hose   Until discontinued      09/13/17 0613          Disposition: pending improvement in GVHD of the gut    Arminda Olivares NP  Bone Marrow Transplant  [facility]

## 2017-09-27 NOTE — PLAN OF CARE
Problem: Patient Care Overview  Goal: Plan of Care Review  Outcome: Ongoing (interventions implemented as appropriate)  POC reviewed with patient; understanding verbalized. Pt received 1L NA bolus and NS infusion initiated at 125mL/hr. He also received Zosyn. He remains on bedrest with the bed alarm on. Turn q2. He has sacral shearing. He remains on tele. He voids dark green urine in the urinal and has a flexiseal that has drained 1675mL thus far this shift. He is on a full liquid diet with a decreased appetite. 3+BLE edema. He complains of pain and has received 1mg Dilaudid PRN. Pt. with nonskid footwear on, bed in lowest position, and locked with bed rails up x2.  Pt. has call light and personal items within reach. VSS and afebrile this shift. All questions and concerns address at this time. Will continue to monitor.

## 2017-09-27 NOTE — TELEPHONE ENCOUNTER
[facility] received prescription for Imbruvica 280mg daily.  Prior authorization is required.      Patient's Estimated Creatinine Clearance: 70.3 mL/min (based on SCr of 1.3 mg/dL). Dosage adjustment is not required.     Dosage appropriate based on therapy for Chronic rzlvr-afumuv-tobw disease (cGVHD; refractory): Oral: 420 mg once daily; continue until cGVHD disease progression, recurrence of underlying malignancy, or unacceptable toxicity (Wilberto 2016). When cGVHD treatment is no longer required, discontinue ibrutinib based on medical assessment of patient.  Dose reduced to 280mg due to Voriconazole.    DDIs: medication list reviewed, vorizoncazole may increase serum concentration of Imbruvica - dose reduced to 280mg as recommened by  in GVHD.  Imbruvica may enhance antiplatelet activity of aspirin - monitor patient.

## 2017-09-27 NOTE — ASSESSMENT & PLAN NOTE
- we are concerned about acute versus chronic edjrq-prbtty-izjg disease given that he received donor lymphocytes in August 2017.   - s/p flex sig 9/15/17 showing erythematous mucosa in the recto-sigmoid colon (biopsied)  - serum CMV negative (repeat 9/25 negative), positive on GI biopsy stain - continue valganciclovir   - solumedrol increased to 80mg iv bid on 9/14/17, diarrhea improved 9/20 and steroids decreased, increased back to 80 mg bid 9/21 due to increased stool output (day 13 of high dose steroids)   - budesonide 9 mg 9/17, will change to Beclomethasone today, script sent to [facility] drugs and family will   - Remicade started 9/23/17 (400 mg weekly)  - accurate stool output measurements. Last 24 hours 2.4 L stool output   - continue scheduled lomotil   - Ibrutnib ordered, dose reduced to 280 mg daily and sent to [facility] 9/26 awaiting approval

## 2017-09-27 NOTE — ASSESSMENT & PLAN NOTE
- Malnourished, possible VitK deficient.   - Continue total parenteral nutrition, will adjust today based on electrolyte abnormalities.

## 2017-09-27 NOTE — ASSESSMENT & PLAN NOTE
Nutrition Problem  inadequate nutrient intake    Related to (etiology):   Physiological needs- AML s/p BMT    Signs and Symptoms (as evidenced by):   NPO/FL >7 days, Pt exhibits signs of malnutrition, TPN +6 days    Interventions/Recommendations (treatment strategy):  Provide Pt with ONS boost  See recs above    Nutrition Diagnosis Status:   Worsening

## 2017-09-27 NOTE — PROGRESS NOTES
[facility]  Adult Nutrition  Progress Note    SUMMARY     Recommendations    Recommendation/Intervention: ADAT to regular w/ ONS and Snacks. 2.Encourage PO intake >/= 75% EEN/EPN 3. If PO intake is <50% provide Boost and encourage HVP 1st w/ each meal. 4. RD to follow   Goals: pt to consume nutrients >/= 85% EEN/EPN   Nutrition Goal Status: goal not met  Communication of RD Recs: discussed on rounds    Reason for Assessment    Reason for Assessment: NPO/clear liquids x 5 days  Diagnosis:  (sepsis)  Relevant Medical History: AML, HIV   Interdisciplinary Rounds: attended  General Information Comments: Pt is very weak and would not engage in conversation w/ RD.  Talked to pts. Partner, States no PO intake. RD adjusting PPN to balance electrolytes  Nutrition Discharge Planning: Regular diet w/ po intake 75% EEN/ EPN     Nutrition Prescription Ordered    Current Diet Order: Full liq   Oral Nutrition Supplement: Wildberry     Evaluation of Received Nutrients/Fluid Intake     Parenteral Calories (kcal): 1112  Parenteral Protein (gm): 51  Parenteral Fluid (mL): 1200   Energy Calories Required: not meeting needs   Protein Required: meeting needs  GIR (Glucose Infusion Rate) (mg/kg/min): 1.07 mg/kg/min  Lipid Calories (kcals): 500 kcals  I/O: +4.4L since Admit     Intake/Output Summary (Last 24 hours) at 09/27/17 1136  Last data filed at 09/27/17 1008   Gross per 24 hour   Intake          1283.67 ml   Output             3490 ml   Net         -2206.33 ml       Fluid Required: not meeting needs  Comments: LBM:9/26     % Intake of Estimated Energy Needs: 25 - 50 %  % Meal Intake: NPO     Nutrition Risk Screen     Nutrition Risk Screen: no indicators present    Nutrition/Diet History    Patient Reported Diet/Restrictions/Preferences: general  Typical Food/Fluid Intake: decreased PTA  Food Preferences: No cultural or Latter-day preferences noted  Factors Affecting Nutritional Intake: diarrhea, NPO, decreased  "appetite    Labs/Tests/Procedures/Meds    Pertinent Labs Reviewed: reviewed  Pertinent Labs Comments: WBC-9.5, H/H-9.3/26.8, Plts-88, Na-128, K-5.5, CO2-20, BUN-66, Cr-1.5, Alb-1.6   Pertinent Medications Reviewed: reviewed  Pertinent Medications Comments: IVF, Statin     Physical Findings    Overall Physical Appearance: generalized wasting, edematous, loss of muscle mass, loss of subcutaneous fat, underweight, weak  Tubes:  (Central Line)  Oral/Mouth Cavity: all teeth present (age appropriate)  Skin: intact    Anthropometrics    Temp: 97.6 °F (36.4 °C)  Height: 6' 1" (185.4 cm)  Weight Method: Bed Scale  Weight: 77.4 kg (170 lb 10.2 oz)  Ideal Body Weight (IBW), Male: 184 lb  % Ideal Body Weight, Male (lb): 92.39 lb  BMI (Calculated): 22.5  BMI Grade: 18.5-24.9 - normal        Estimated/Assessed Needs    Weight Used For Calorie Calculations: 84.2 kg (185 lb 10 oz)   Energy Calorie Requirements (kcal): 3153-8562  Energy Need Method: Kcal/kg   RMR ([place]-St. Jeor Equation): 1730.88   Weight Used For Protein Calculations: 84.2 kg (185 lb 10 oz)  Protein Requirements: 109-127g/d  Fluid Need Method: RDA Method (or per MD)   RDA Method (mL): 2526     Assessment and Plan    S/P allogeneic bone marrow transplant      Nutrition Problem  inadequate nutrient intake    Related to (etiology):   Physiological needs- AML s/p BMT    Signs and Symptoms (as evidenced by):   NPO/FL >7 days, Pt exhibits signs of malnutrition, TPN +6 days    Interventions/Recommendations (treatment strategy):  Provide Pt with ONS boost  See recs above    Nutrition Diagnosis Status:   Worsening                  Monitor and Evaluation    Food and Nutrient Intake: energy intake, food and beverage intake  Food and Nutrient Administration: diet order  Physical Activity and Function: nutrition-related ADLs and IADLs  Anthropometric Measurements: weight, weight change  Biochemical Data, Medical Tests and Procedures:  (All labs)  Nutrition-Focused Physical " Findings: overall appearance    Nutrition Risk    Level of Risk:  (f/u 1x wk)    Nutrition Follow-Up    RD Follow-up?: Yes

## 2017-09-28 PROBLEM — R50.9 FEVER: Status: RESOLVED | Noted: 2017-01-01 | Resolved: 2017-01-01

## 2017-09-28 PROBLEM — R79.89 ELEVATED TROPONIN: Status: RESOLVED | Noted: 2017-01-01 | Resolved: 2017-01-01

## 2017-09-28 PROBLEM — R11.0 NAUSEA: Status: RESOLVED | Noted: 2017-01-01 | Resolved: 2017-01-01

## 2017-09-28 PROBLEM — E87.8 ELECTROLYTE ABNORMALITY: Status: RESOLVED | Noted: 2017-01-01 | Resolved: 2017-01-01

## 2017-09-28 PROBLEM — R00.0 TACHYCARDIA: Status: RESOLVED | Noted: 2017-01-01 | Resolved: 2017-01-01

## 2017-09-28 NOTE — ASSESSMENT & PLAN NOTE
- Na 132  - will adjust TPN  - creatinine 1.2, likely volume depleted after high stool output  - bolus 1 L NS on 9/27 and started IVF at 125 cc/hr

## 2017-09-28 NOTE — SUBJECTIVE & OBJECTIVE
Subjective:     Interval History:   - Day 46 post DLI  - rectal tube in place with 3.5  L of stool output past 24 hours  - afebrile, VSS  - Significantly improved mental status this morning.   - On Zosyn.  - HR improved, ST and medications and followed by cardiology  - creatinine down to 1.2 after hydration yesterday and bilirubin continues to increase now at 3.3     Objective:     Vital Signs (Most Recent):  Temp: 97.6 °F (36.4 °C) (09/28/17 1115)  Pulse: 109 (09/28/17 1115)  Resp: 18 (09/28/17 1115)  BP: (!) 108/59 (09/28/17 1115)  SpO2: 95 % (09/28/17 1115) Vital Signs (24h Range):  Temp:  [97.5 °F (36.4 °C)-97.6 °F (36.4 °C)] 97.6 °F (36.4 °C)  Pulse:  [] 109  Resp:  [18] 18  SpO2:  [92 %-98 %] 95 %  BP: ()/(59-74) 108/59     Weight: 75.5 kg (166 lb 7.2 oz)  Body mass index is 21.96 kg/m².  Body surface area is 1.97 meters squared.    ECOG SCORE         [unfilled]    Intake/Output - Last 3 Shifts       09/26 0700 - 09/27 0659 09/27 0700 - 09/28 0659 09/28 0700 - 09/29 0659    P.O. 420 225     I.V. (mL/kg)  2594.3 (34.4) 867.3 (11.5)    IV Piggyback 200 1300 100    .5 1107.2 414.9    Total Intake(mL/kg) 1104.5 (14.3) 5226.5 (69.2) 1382.2 (18.3)    Urine (mL/kg/hr) 840 (0.5) 2115 (1.2) 900 (1.9)    Stool 2675 (1.4) 3500 (1.9) 800 (1.7)    Total Output 3515 5615 1700    Net -2410.5 -388.5 -317.8           Stool Occurrence 1 x            Physical Exam   Constitutional: He appears well-developed. He appears lethargic. He is cooperative. He has a sickly appearance.   HENT:   Head: Normocephalic.   Mouth/Throat: Oropharyngeal exudate (Thrush noted) present.   Neck: Neck supple.   Cardiovascular: Normal rate, regular rhythm and normal heart sounds.    Pulmonary/Chest: Effort normal and breath sounds normal. No respiratory distress. He has no wheezes.   Abdominal: Soft. Bowel sounds are normal. He exhibits no distension. There is tenderness (intermittent ).   Musculoskeletal: He exhibits edema  (bilateral lower extremities ).   Neurological: He appears lethargic.   Awake and responsive during examination    Skin: Skin is warm and dry. No rash noted.   Psychiatric: He has a normal mood and affect. His behavior is normal.   Nursing note and vitals reviewed.      Significant Labs:   CBC:   Recent Labs  Lab 09/27/17 0313 09/28/17  0434   WBC 9.50 9.39   HGB 9.3* 8.5*   HCT 26.8* 23.9*   PLT 88* 73*    and CMP:   Recent Labs  Lab 09/27/17  0313 09/27/17  1430 09/28/17  0434   * 130* 132*   K 5.5* 5.2* 4.9   CL 99 103 105   CO2 20* 21* 20*   * 155* 190*   BUN 66* 59* 54*   CREATININE 1.5* 1.3 1.2   CALCIUM 7.6* 7.1* 7.2*   PROT 3.7* 3.4* 4.4*   ALBUMIN 1.6* 1.4* 1.4*   BILITOT 3.1* 3.2* 3.3*   ALKPHOS 196* 192* 237*   AST 56* 62* 81*   ALT 23 26 34   ANIONGAP 9 6* 7*   EGFRNONAA 51.7* >60.0 >60.0       Diagnostic Results:  None

## 2017-09-28 NOTE — ASSESSMENT & PLAN NOTE
-- +379 days s/p Flu/Bu/ATG MUD allogeneic transplant for high risk AML after his second IDAC (6/20/16 - 6/24/16) after a prolonged hospitalization (2/5/16 - 3/21/16) for induction with 7&3 and reinduction with MEC to remission and IDAC (4/4/16 - 4/9/16)  -- Chimerics from marrow done 6/21 show CD3 of 90% donor and 10% recipient, but CD34 of 5% donor and 95% recipient   -- Chimers from 7/10 shows 70% recipient and 30% donor - NOT SORTED  -- Relpased 6/2017 and reinduced with FLAG BETZY  -- DLI 8/14/17, day +46

## 2017-09-28 NOTE — PLAN OF CARE
Problem: Patient Care Overview  Goal: Plan of Care Review  Outcome: Ongoing (interventions implemented as appropriate)  Pt initiated on TPN and lipids this evening. On IVF and zosyn. Rectal tube intact with dark red liquid stool noted. Pt repositions self in bed. Bed alarm activated.     Plan of care reviewed with pt. Educated pt on any medications, and procedures that the patient would be receiving. Discussed vital sign and I&O routine. Allowed time for pt to ask any questions, and told the patient to call for any assistance. Bed low & locked, call light and personal belongings within reach.  Progressing towards discharge.

## 2017-09-28 NOTE — ASSESSMENT & PLAN NOTE
- we are concerned about acute versus chronic qaesu-hmptif-xaqs disease given that he received donor lymphocytes in August 2017.   - s/p flex sig 9/15/17 showing erythematous mucosa in the recto-sigmoid colon (biopsied)  - serum CMV negative (repeat 9/25 negative), positive on GI biopsy stain - continue valganciclovir   - solumedrol increased to 80mg iv bid on 9/14/17, diarrhea improved 9/20 and steroids decreased, increased back to 80 mg bid 9/21 due to increased stool output (day 14 of high dose steroids)   - budesonide 9 mg 9/17, will change to Beclomethasone today, script sent to [facility] drugs and family will   - Remicade started 9/23/17 (400 mg weekly)  - accurate stool output measurements. Last 24 hours 3.5 L stool output   - continue scheduled lomotil   - Ibrutnib ordered, dose reduced to 280 mg daily and sent to [facility] 9/26 awaiting approval

## 2017-09-28 NOTE — PLAN OF CARE
Problem: Patient Care Overview  Goal: Plan of Care Review  Outcome: Ongoing (interventions implemented as appropriate)  POC reviewed with patient; understanding verbalized. Pt C/O of pain and has received PRN Dilaudid when available. He remains on TPN at 50mL and NS at 125mL. He remains on bedrest with the bed alarm on. Pt is on tele and weaned off O2 this shift. He voids in the urinal, dark green/brown and has a flexi seal with 775mL output thus far this shift. He is on a full liquid diet with no appetite. He has sacral shearing and 3+ BLE edema. Pt. with nonskid footwear on, bed in lowest position, and locked with bed rails up x2.  Pt. has call light and personal items within reach. VSS and afebrile this shift. All questions and concerns address at this time. Will continue to monitor.

## 2017-09-28 NOTE — ASSESSMENT & PLAN NOTE
"- remains afebrile.  - c diff testing is negative from 9/13   - cultures are negative   - CT abdomen reveals "diffuse abnormal appearance of the small and large bowel demonstrating fluid filled mildly prominent loops with associated submucosal edema and mucosal enhancement."   - concern for acute oafsi-kzplgm-cdtg disease given that he received donor lymphocytes in August 2017  - s/p flex sig 9/15/17 showing erythematous mucosa in the recto-sigmoid colon (biopsied)--GVHD and CMV  - discontinued vancomycin on 9/15/17  - discontinued cefepime 9/17/17   - sepsis work-up repeated 9/21 due to hypotension and tachycardia, repeat cultures done, chest x-ray negative. Cefepime and Vancomycin restarted. Cefepime was switched to zosyn.  - Possible staph in blood from 9/21.   - continue zosyn for now and Vanc stopped 9/26.  "

## 2017-09-28 NOTE — PROGRESS NOTES
[facility]  Hematology  Bone Marrow Transplant  Progress Note    Patient Name: Paul E Sistrunk  Admission Date: 9/13/2017  Hospital Length of Stay: 15 days  Code Status: Full Code    Subjective:     Interval History:   - Day 46 post DLI  - rectal tube in place with 3.5  L of stool output past 24 hours  - afebrile, VSS  - Significantly improved mental status this morning.   - On Zosyn.  - HR improved, ST and medications and followed by cardiology  - creatinine down to 1.2 after hydration yesterday and bilirubin continues to increase now at 3.3     Objective:     Vital Signs (Most Recent):  Temp: 97.6 °F (36.4 °C) (09/28/17 1115)  Pulse: 109 (09/28/17 1115)  Resp: 18 (09/28/17 1115)  BP: (!) 108/59 (09/28/17 1115)  SpO2: 95 % (09/28/17 1115) Vital Signs (24h Range):  Temp:  [97.5 °F (36.4 °C)-97.6 °F (36.4 °C)] 97.6 °F (36.4 °C)  Pulse:  [] 109  Resp:  [18] 18  SpO2:  [92 %-98 %] 95 %  BP: ()/(59-74) 108/59     Weight: 75.5 kg (166 lb 7.2 oz)  Body mass index is 21.96 kg/m².  Body surface area is 1.97 meters squared.    ECOG SCORE         [unfilled]    Intake/Output - Last 3 Shifts       09/26 0700 - 09/27 0659 09/27 0700 - 09/28 0659 09/28 0700 - 09/29 0659    P.O. 420 225     I.V. (mL/kg)  2594.3 (34.4) 867.3 (11.5)    IV Piggyback 200 1300 100    .5 1107.2 414.9    Total Intake(mL/kg) 1104.5 (14.3) 5226.5 (69.2) 1382.2 (18.3)    Urine (mL/kg/hr) 840 (0.5) 2115 (1.2) 900 (1.9)    Stool 2675 (1.4) 3500 (1.9) 800 (1.7)    Total Output 3515 0331 1700    Net -2410.5 -388.5 -317.8           Stool Occurrence 1 x            Physical Exam   Constitutional: He appears well-developed. He appears lethargic. He is cooperative. He has a sickly appearance.   HENT:   Head: Normocephalic.   Mouth/Throat: Oropharyngeal exudate (Thrush noted) present.   Neck: Neck supple.   Cardiovascular: Normal rate, regular rhythm and normal heart sounds.    Pulmonary/Chest: Effort normal and breath sounds  "normal. No respiratory distress. He has no wheezes.   Abdominal: Soft. Bowel sounds are normal. He exhibits no distension. There is tenderness (intermittent ).   Musculoskeletal: He exhibits edema (bilateral lower extremities ).   Neurological: He appears lethargic.   Awake and responsive during examination    Skin: Skin is warm and dry. No rash noted.   Psychiatric: He has a normal mood and affect. His behavior is normal.   Nursing note and vitals reviewed.      Significant Labs:   CBC:   Recent Labs  Lab 09/27/17  0313 09/28/17  0434   WBC 9.50 9.39   HGB 9.3* 8.5*   HCT 26.8* 23.9*   PLT 88* 73*    and CMP:   Recent Labs  Lab 09/27/17  0313 09/27/17  1430 09/28/17  0434   * 130* 132*   K 5.5* 5.2* 4.9   CL 99 103 105   CO2 20* 21* 20*   * 155* 190*   BUN 66* 59* 54*   CREATININE 1.5* 1.3 1.2   CALCIUM 7.6* 7.1* 7.2*   PROT 3.7* 3.4* 4.4*   ALBUMIN 1.6* 1.4* 1.4*   BILITOT 3.1* 3.2* 3.3*   ALKPHOS 196* 192* 237*   AST 56* 62* 81*   ALT 23 26 34   ANIONGAP 9 6* 7*   EGFRNONAA 51.7* >60.0 >60.0       Diagnostic Results:  None    Assessment/Plan:     * Sepsis    - remains afebrile.  - c diff testing is negative from 9/13   - cultures are negative   - CT abdomen reveals "diffuse abnormal appearance of the small and large bowel demonstrating fluid filled mildly prominent loops with associated submucosal edema and mucosal enhancement."   - concern for acute dqvks-kskdxt-prpc disease given that he received donor lymphocytes in August 2017  - s/p flex sig 9/15/17 showing erythematous mucosa in the recto-sigmoid colon (biopsied)--GVHD and CMV  - discontinued vancomycin on 9/15/17  - discontinued cefepime 9/17/17   - sepsis work-up repeated 9/21 due to hypotension and tachycardia, repeat cultures done, chest x-ray negative. Cefepime and Vancomycin restarted. Cefepime was switched to zosyn.  - Possible staph in blood from 9/21.   - continue zosyn for now and Vanc stopped 9/26.        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 Blast of 62%, 19 had a complex karyotype including 5q deletion, 7q deletion, 17p deletion, and one metaphase represented a near tetraploid subclone; FLT-3 negative     -- Had previous reaction to MEC (etoposide caused full body rash).   -- Re-induced with FLAG-BETZY   -- Day 14 bone marrow biopsy done 7/10 - hypocellular with no evidence of residual disease  -- Post DLI on 8/14/17  -- currently receiving Vidaza for 2 cycles given high risk disease per Dr. Atkinson note (has only received cycle 1)        Tliun-mgzqqh-dzcs disease    - we are concerned about acute versus chronic bfnlz-giobde-gaez disease given that he received donor lymphocytes in August 2017.   - s/p flex sig 9/15/17 showing erythematous mucosa in the recto-sigmoid colon (biopsied)  - serum CMV negative (repeat 9/25 negative), positive on GI biopsy stain - continue valganciclovir   - solumedrol increased to 80mg iv bid on 9/14/17, diarrhea improved 9/20 and steroids decreased, increased back to 80 mg bid 9/21 due to increased stool output (day 14 of high dose steroids)   - budesonide 9 mg 9/17, will change to Beclomethasone today, script sent to [facility] drugs and family will   - Remicade started 9/23/17 (400 mg weekly)  - accurate stool output measurements. 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 Continue triumeq  - now on IV steroids with history of fungal infection, ID consulted. Histoplasma antigen done and continue current ppx. ID has signed off          CINV (chemotherapy-induced nausea and vomiting)    - continue as-need antiemetics and Zofran ATC          Oral candida    - clortrimazole humberto         Electrolyte abnormality    - Na 132  - will adjust TPN  - creatinine 1.2, likely volume depleted after high stool output  - bolus 1 L NS on 9/27 and started IVF at 125 cc/hr          Dermatitis associated with moisture    - followed by wound care        Coagulopathy    - Malnourished, possible VitK deficient.   - Continue total parenteral nutrition, will adjust today based on electrolyte abnormalities.        Left arm swelling    - ultrasound done and negative        Atrial fibrillation with rapid ventricular response    - flecainide decreased to 50 mg bid and metoprolol to 12.5 mg bid per cardiology 9/26  - repeat EKG done 9/26, sinus tachycardia. HR stable          Chemotherapy-induced thrombocytopenia    - due to transplant and chemotherapy   - platelet count today is 73 k/uL  - continue to monitor.        Anemia due to chemotherapy    - hemoglobin today is 8.5 g/dL  - continue to monitor.        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     Asia Melgar, NP  Bone Marrow Transplant  [facility]

## 2017-09-28 NOTE — SIGNIFICANT EVENT
Was called to evaluate ECG that was ordered this morning with concerning ST changes. Briefly this is a 57 year old gentleman with a hx of SVT s/p DCCV on fleicanide and lopressor as an outpatient, AML s/p BMT, HIV he presented her on 9/13 with sepsis then developed AF so cardiology was consulted. When admitted he was taken off fleicanide so this as well as sepsis was the likely etiology. Fleicanide was left at 50mg daily and metoprolol 25 mg BID and patient was to follow up with Dr. De Los Santos in 4 weeks. This am repeat ECG done and patient is have ST Elevations in inferior leads and ST Depressions in anterior lateral leads although he is not having and symptoms at this time. ST changes on ECG are concerning although are in the absence of chest pain. Will get STAT troponin and repeat ECG at this time.     Repeat ECG back to baseline   Will follow up up on troponin and would repeat troponin 4 hours later  Repeat ECG in 3 hours.     Discussed with Dr. Juan Jose Pereyra MD  Cardiology Fellow  Pager [phone]

## 2017-09-28 NOTE — SIGNIFICANT EVENT
09/28/2017  0430    Called to bedside regarding EKG; machine read states concern for STEMI. Patient denies CP, palpitations, SOB; states no prior history of MI, only arrhythmia history. He overall feels unchanged.    VS: BP 123s/74s  RR 18 SpO2 93% on RA.  Physical Exam:  Gen: NAD, A&Ox3  Pulm: CTAB  Card: Tachycardic, regular rhythm, normal S1/S2  Abd: Soft, NT/ND, +BS  Ext: Pulses intact all four extremities, 2+ edema BLE    Confirmed with EKG technician that lead placement was appropriate. EKG appears to show sinus tachycardia with nonspecific T-wave changes and nonspecific intraventricular block/?LBBB.    Discussed with cardiology; unclear EKG but lower suspicion for STEMI. Will check troponin, repeat EKG once again.    JAM Escalona MD   PGY-3  [phone]

## 2017-09-28 NOTE — PT/OT/SLP PROGRESS
"Occupational Therapy  Treatment    Paul E Sistrunk   MRN: [id number]   Admitting Diagnosis: Sepsis    OT Date of Treatment: 09/28/17   OT Start Time: 0910  OT Stop Time: 0952  OT Total Time (min): 42 min    Billable Minutes:  Self Care/Home Management 12 and Therapeutic Activity 26    General Precautions: Standard, fall  Orthopedic Precautions: N/A  Braces: N/A    Do you have any cultural, spiritual, Hinduism conflicts, given your current situation?: None stated     Subjective:  Communicated with RN prior to session.  Pt stated, " I can't promise anything but I'll try to get up". Pt agreeable to therapy session from encouragement from therapist.   Pain/Comfort  Pain Rating 1: 8/10  Location - Orientation 1: posterior  Location 1:  (buttock)  Pain Addressed 1: Reposition, Distraction, Cessation of Activity, Nurse notified  Pain Rating Post-Intervention 1: 5/10    Objective:  Patient found with: bowel management system, peripheral IV, central line   Pt found supine in bed. Pt agreeable to therapy session.      Functional Mobility:  Bed Mobility:  Rolling/Turning to Left: Moderate assistance, With side rail  Scooting/Bridging: Minimum Assistance, With side rail (towards EOB )  Supine to Sit: Maximum Assistance, WIth side rail (x 2 trials; A to bring B LEs off bed)  Sit to Supine: Minimum Assistance, With leg lift, With side rail    Transfers:   Sit <> Stand Assistance: Maximum Assistance (x 1 trial from EOB )  Sit <> Stand Assistive Device:  (assistance from therapist, B knee block)  Bed <> Chair Transfer Assistance:  (Due to increased buttock pain, pt did not prefer to sit in bedside chair at this time. )    Functional Ambulation: Pt unable to perform this date due to decreased functional mobility and decreased strength. Pt only stood at EOB x 1 trial with max A from therapist.     Activities of Daily Living:     UE Dressing Level of Assistance: Minimum assistance (to doff/don gown in front while supine in bed. Pt " requried encouragement from therapist to perform task. )    LE Dressing Level of Assistance: Set-up Assistance (Pt donned/doffed B  socks in long sitting with HOB elevated )    Grooming Position: other (sitting up in bed with HOB elevated, pt washed face with set-up A )  Grooming Level of Assistance: Stand by assistance  Toileting Where Assessed: Bed level  Toileting Level of Assistance: Stand by assistance (Pt voided into urinal at bed level with HOB elevated. Pt has fecal incontinence  )        Balance:   Static Sit: FAIR-: Maintains without assist but inconsistent   Dynamic Sit: POOR: N/A  Static Stand: 0: Needs MAXIMAL assist to maintain   Dynamic stand: 0: N/A    Therapeutic Activities and Exercises:  Pt educated by therapist on:   - Pt educated on role of OT, POC, and goals for therapy.     - Importance of OOB ax's with staff member assistance and importance of sitting OOB during the day.   - Pt completed ADLs and functional mobility for treatment session as noted above   -Pt verbalized understanding. Pt expressed no further concerns/questions.    Pt performed BUE HEP using yellow theraband with handout provided in order to work towards increasing UB strength/endurance and to maximize safety and (I) with mobility and self-care skills. Pt completed the following exercises with initial demonstration from therapist x1set x10 reps each: shoulder flexion, shoulder extension, B horizontal abduction, internal/external rotation, bicep curl, and tricep press. Pt required rest breaks in-between sets. Pt instructed to perform BUE HEP 2-3 times daily. Pt verbalized understanding.     Pt sat EOB x 2 trials in order to work towards increasing static/dynamic sitting balance.   · Trial 1: pt sat EOB for x 3 mins with min A for sitting balance. Pt initally felt extremely lightheaded and dizzy. Therapist instructed pt to take deep breaths with pursed lips.   · Trail 2: pt sat EOB for x 5 mins with CGA for static  "sitting balance. Pt expressed c/o pain in buttock when sitting EOB due to fecal incontinence . Pt repositioned self sitting EOB with CGA. Pt required v/c's for erect sitting posture and to keep head in neutral position. Pt performed x 1 sit <> stand and sat EOB after standing for x20 secs with max A from therapist. Pt requested to return back to bed.     AM-PAC 6 CLICK ADL   How much help from another person does this patient currently need?   1 = Unable, Total/Dependent Assistance  2 = A lot, Maximum/Moderate Assistance  3 = A little, Minimum/Contact Guard/Supervision  4 = None, Modified [place]/Independent    Putting on and taking off regular lower body clothing? : 3  Bathing (including washing, rinsing, drying)?: 2  Toileting, which includes using toilet, bedpan, or urinal? : 2  Putting on and taking off regular upper body clothing?: 3  Taking care of personal grooming such as brushing teeth?: 3  Eating meals?: 3  Total Score: 16     AM-PAC Raw Score CMS "G-Code Modifier Level of Impairment Assistance   6 % Total / Unable   7 - 8 CM 80 - 100% Maximal Assist   9-13 CL 60 - 80% Moderate Assist   14 - 19 CK 40 - 60% Moderate Assist   20 - 22 CJ 20 - 40% Minimal Assist   23 CI 1-20% SBA / CGA   24 CH 0% Independent/ Mod I       Patient left HOB elevated with all lines intact, call button in reach, RN notified    ASSESSMENT:  Paul E Sistrunk is a 57 y.o. male with a medical diagnosis of Sepsis and presents with the below stated impairment list. Pt tolerated session fair though is highly limited to perform OOB mobility due to pain, decreased strength/endurance, fatigue, and SOB. Pt HR fluctuated to low 130s when sitting EOB and decreased to high ~110 when supine in bed. Pt unable to safely transfer to bedside chair this date due to overall weakness and decreased endurance. Pt will continue to benefit from skilled OT to maximize overall safety and (I) with functional mobility, functional tasks, and " self-care tasks.     Rehab identified problem list/impairments: Rehab identified problem list/impairments: weakness, impaired endurance, impaired self care skills, impaired functional mobilty, gait instability, impaired balance, decreased upper extremity function, decreased lower extremity function, pain, decreased coordination    Rehab potential is good.    Activity tolerance: Fair    Discharge recommendations: Discharge Facility/Level Of Care Needs: rehabilitation facility     Barriers to discharge: Barriers to Discharge: Decreased caregiver support    Equipment recommendations: walker, rolling, bath bench, 3-in-1 commode     GOALS:    Occupational Therapy Goals        Problem: Occupational Therapy Goal    Goal Priority Disciplines Outcome Interventions   Occupational Therapy Goal     OT, PT/OT Ongoing (interventions implemented as appropriate)    Description:  Goals to be met by: 10/9/17     Patient will increase functional independence with ADLs by performing:    UE Dressing with Stand-by Assistance.  LE Dressing with Stand-by Assistance.  Grooming while EOB with Stand-by Assistance.  Toileting from bedside commode with Minimal Assistance for hygiene and clothing management.   Supine to sit with Stand-by Assistance.  Toilet transfer to bedside commode with Contact Guard Assistance.                      Plan:  Patient to be seen 4 x/week to address the above listed problems via self-care/home management, therapeutic activities, therapeutic exercises, community/work re-entry, neuromuscular re-education  Plan of Care expires: 10/25/17  Plan of Care reviewed with: patient         Tala MAT Morales, OT  09/28/2017

## 2017-09-28 NOTE — PT/OT/SLP PROGRESS
Physical Therapy      Paul E Sistrunk  MRN: [id number]    Patient not seen today secondary to Patient unwilling to participate.  Attempted to treat at 100 pm.  Pt stated that he already amb with OT, and was too tired to work with PT.  Discussed with OT, who stated that pt did not perf gait training with her.  Will follow-up with pt at the next scheduled tx session.    Joyce Ochoa, PT

## 2017-09-28 NOTE — ASSESSMENT & PLAN NOTE
- we are concerned about acute versus chronic qmjpx-fvbvbw-jugn disease given that he received donor lymphocytes in August 2017.   - s/p flex sig 9/15/17 showing erythematous mucosa in the recto-sigmoid colon (biopsied)  - serum CMV negative (repeat 9/25 negative), positive on GI biopsy stain - continue valganciclovir   - solumedrol increased to 80mg iv bid on 9/14/17, diarrhea improved 9/20 and steroids decreased, increased back to 80 mg bid 9/21 due to increased stool output (day 15 of high dose steroids)   - budesonide 9 mg 9/17 - stopped 9/28, started on Beclomethasone 9/28  - Remicade started 9/23/17 (400 mg weekly)  - accurate stool output measurements. Last 24 hours 3.6 L stool output   - continue scheduled lomotil   - Ibrutnib ordered, dose reduced to 280 mg daily and sent to [facility] 9/26; requested it be sent to [facility] - sent 9/28

## 2017-09-28 NOTE — PLAN OF CARE
Problem: Occupational Therapy Goal  Goal: Occupational Therapy Goal  Goals to be met by: 10/9/17     Patient will increase functional independence with ADLs by performing:    UE Dressing with Stand-by Assistance.  LE Dressing with Stand-by Assistance.  Grooming while EOB with Stand-by Assistance.  Toileting from bedside commode with Minimal Assistance for hygiene and clothing management.   Supine to sit with Stand-by Assistance.  Toilet transfer to bedside commode with Contact Guard Assistance.     Outcome: Ongoing (interventions implemented as appropriate)  No goals met this date. All goals remain appropriate. Continue POC.       Tala Morales OT  9/28/2017

## 2017-09-29 PROBLEM — D68.9 COAGULOPATHY: Status: RESOLVED | Noted: 2017-01-01 | Resolved: 2017-01-01

## 2017-09-29 NOTE — ASSESSMENT & PLAN NOTE
- Na improved to 136  - creatinine 0.9, likely volume depleted with high stool output  - bolus 1 L NS on 9/27 and started IVF at 125 cc/hr

## 2017-09-29 NOTE — ASSESSMENT & PLAN NOTE
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

## 2017-09-29 NOTE — PLAN OF CARE
Problem: Patient Care Overview  Goal: Plan of Care Review  Outcome: Ongoing (interventions implemented as appropriate)  Pt AAOx4, on bedrest, involved in plan of care and communicating, but at times confused. Bed alarm activated. Tachycardic, hypotensive, and afebrile throughout shift. Complaints of pain only minimally relieved with PRN Dilaudid. No complaints of N/V. Voiding green urine in bedside urinal. Fecal draining device in place draining dark brown fluid. Barrier cream applied to excoriation on buttocks. No acute events so far this shift. Pt remaining free from falls or injury. Bed in lowest locked position, side rails up x2, call light w/i reach, pt instructed to call for assistance if needed. Skid proof socks on. Care plan explained to patient. No additional complaints at this time. Q 2hr rounding performed. Will continue to monitor.

## 2017-09-29 NOTE — SUBJECTIVE & OBJECTIVE
Subjective:     Interval History:   - Day 47 post DLI  - rectal tube in place with 3.6  L of stool output past 24 hours  - afebrile, VSS  - On Zosyn.  - -120, ST and medications and followed by cardiology  - creatinine down to 0.9 on hydration and bilirubin down to 2.4     Objective:     Vital Signs (Most Recent):  Temp: 98.2 °F (36.8 °C) (09/29/17 1137)  Pulse: (!) 115 (09/29/17 1137)  Resp: 18 (09/29/17 1137)  BP: 112/63 (09/29/17 1137)  SpO2: 95 % (09/29/17 1137) Vital Signs (24h Range):  Temp:  [97.3 °F (36.3 °C)-98.8 °F (37.1 °C)] 98.2 °F (36.8 °C)  Pulse:  [104-120] 115  Resp:  [17-20] 18  SpO2:  [93 %-96 %] 95 %  BP: ()/(61-66) 112/63     Weight: 75.5 kg (166 lb 7.2 oz)  Body mass index is 21.96 kg/m².  Body surface area is 1.97 meters squared.    ECOG SCORE           Intake/Output - Last 3 Shifts       09/27 0700 - 09/28 0659 09/28 0700 - 09/29 0659 09/29 0700 - 09/30 0659    P.O. 225 710 240    I.V. (mL/kg) 2594.3 (34.4) 2829.6 (37.5)     IV Piggyback 1300 300     TPN 1107.2 923.9     Total Intake(mL/kg) 5226.5 (69.2) 4763.5 (63.1) 240 (3.2)    Urine (mL/kg/hr) 2115 (1.2) 2450 (1.4) 650 (1.5)    Stool 3500 (1.9) 3625 (2)     Total Output 5615 6071 650    Net -388.5 -1311.5 -410                 Physical Exam   Constitutional: He is oriented to person, place, and time. He appears well-developed. He has a sickly appearance. No distress.   HENT:   Head: Normocephalic.   Mouth/Throat: Oropharyngeal exudate present.   Thrush noted   Eyes: Conjunctivae are normal. Pupils are equal, round, and reactive to light. No scleral icterus.   Neck: Normal range of motion. Neck supple. Normal range of motion present. No thyromegaly present.   Cardiovascular: Normal rate, regular rhythm, normal heart sounds and intact distal pulses.    Pulmonary/Chest: Effort normal and breath sounds normal. No respiratory distress.   Abdominal: Soft. Bowel sounds are normal. He exhibits no distension. There is no tenderness.    Flexi-seal in place. Dark green liquid stool noted   Genitourinary:   Genitourinary Comments: Coke colored urine noted   Musculoskeletal: Normal range of motion. He exhibits edema. He exhibits no tenderness.   Neurological: He is alert and oriented to person, place, and time. No cranial nerve deficit. Coordination normal.   Skin: Skin is warm and dry. No petechiae and no rash noted. No pallor.   redness to sacrum    Psychiatric: He has a normal mood and affect. Thought content normal.   Vitals reviewed.      Significant Labs:   CBC:   Recent Labs  Lab 09/28/17  0434 09/29/17  0400   WBC 9.39 8.23   HGB 8.5* 7.7*   HCT 23.9* 21.5*   PLT 73* 59*    and CMP:   Recent Labs  Lab 09/27/17  1430 09/28/17  0434 09/29/17  0400   * 132* 136   K 5.2* 4.9 4.1    105 108   CO2 21* 20* 19*   * 190* 226*   BUN 59* 54* 45*   CREATININE 1.3 1.2 0.9   CALCIUM 7.1* 7.2* 6.9*   PROT 3.4* 4.4* 4.8*   ALBUMIN 1.4* 1.4* 1.4*   BILITOT 3.2* 3.3* 2.4*   ALKPHOS 192* 237* 194*   AST 62* 81* 66*   ALT 26 34 35   ANIONGAP 6* 7* 9   EGFRNONAA >60.0 >60.0 >60.0       Diagnostic Results:  None

## 2017-09-29 NOTE — PLAN OF CARE
Problem: Patient Care Overview  Goal: Plan of Care Review  Outcome: Ongoing (interventions implemented as appropriate)  Patient VSS, afebrile, and without injury. Fall precautions maintained. Patient instructed on how to contact the nurse. Medications reviewed. Patient received PRN dilautid to control pain. Patient received compazine IV for control nausea. Flexi-seal came out, new Flexi-seal was put in; draining dark brown and red liquid stool. Gluteal moisture associated dermatitis cleansed with baby wipes, barrier cream applied (black top). Patient turned every two hours. IV anitibiotic continued. Patient without complaints or questions at this time. Will continue to monitor.

## 2017-09-29 NOTE — PT/OT/SLP PROGRESS
"Physical Therapy      Paul E Sistrunk  MRN: [id number]    Patient not seen today secondary to Patient unwilling to participate.   Attempted to treat at 1045 am, pt stated "I don't feel like getting up".  Pt refused therex in the bed, transfer training, and gait training.  Was given multiple options to participate with PT.  Will follow-up with pt at the next tx session.    Joyce Ochoa, PT    "

## 2017-09-29 NOTE — PROGRESS NOTES
[facility] [address]  Hematology  Bone Marrow Transplant  Progress Note    Patient Name: Paul E Sistrunk  Admission Date: 9/13/2017  Hospital Length of Stay: 16 days  Code Status: Full Code    Subjective:     Interval History:   - Day 47 post DLI  - rectal tube in place with 3.6  L of stool output past 24 hours  - afebrile, VSS  - On Zosyn.  - -120, ST and medications and followed by cardiology  - creatinine down to 0.9 on hydration and bilirubin down to 2.4     Objective:     Vital Signs (Most Recent):  Temp: 98.2 °F (36.8 °C) (09/29/17 1137)  Pulse: (!) 115 (09/29/17 1137)  Resp: 18 (09/29/17 1137)  BP: 112/63 (09/29/17 1137)  SpO2: 95 % (09/29/17 1137) Vital Signs (24h Range):  Temp:  [97.3 °F (36.3 °C)-98.8 °F (37.1 °C)] 98.2 °F (36.8 °C)  Pulse:  [104-120] 115  Resp:  [17-20] 18  SpO2:  [93 %-96 %] 95 %  BP: ()/(61-66) 112/63     Weight: 75.5 kg (166 lb 7.2 oz)  Body mass index is 21.96 kg/m².  Body surface area is 1.97 meters squared.    ECOG SCORE           Intake/Output - Last 3 Shifts       09/27 0700 - 09/28 0659 09/28 0700 - 09/29 0659 09/29 0700 - 09/30 0659    P.O. 225 710 240    I.V. (mL/kg) 2594.3 (34.4) 2829.6 (37.5)     IV Piggyback 1300 300     TPN 1107.2 923.9     Total Intake(mL/kg) 5226.5 (69.2) 4763.5 (63.1) 240 (3.2)    Urine (mL/kg/hr) 2115 (1.2) 2450 (1.4) 650 (1.5)    Stool 3500 (1.9) 3625 (2)     Total Output 5615 6075 650    Net -388.5 -1311.5 -410                 Physical Exam   Constitutional: He is oriented to person, place, and time. He appears well-developed. He has a sickly appearance. No distress.   HENT:   Head: Normocephalic.   Mouth/Throat: Oropharyngeal exudate present.   Thrush noted   Eyes: Conjunctivae are normal. Pupils are equal, round, and reactive to light. No scleral icterus.   Neck: Normal range of motion. Neck supple. Normal range of motion present. No thyromegaly present.   Cardiovascular: Normal rate, regular rhythm, normal heart sounds and  "intact distal pulses.    Pulmonary/Chest: Effort normal and breath sounds normal. No respiratory distress.   Abdominal: Soft. Bowel sounds are normal. He exhibits no distension. There is no tenderness.   Flexi-seal in place. Dark green liquid stool noted   Genitourinary:   Genitourinary Comments: Coke colored urine noted   Musculoskeletal: Normal range of motion. He exhibits edema. He exhibits no tenderness.   Neurological: He is alert and oriented to person, place, and time. No cranial nerve deficit. Coordination normal.   Skin: Skin is warm and dry. No petechiae and no rash noted. No pallor.   redness to sacrum    Psychiatric: He has a normal mood and affect. Thought content normal.   Vitals reviewed.      Significant Labs:   CBC:   Recent Labs  Lab 09/28/17  0434 09/29/17  0400   WBC 9.39 8.23   HGB 8.5* 7.7*   HCT 23.9* 21.5*   PLT 73* 59*    and CMP:   Recent Labs  Lab 09/27/17  1430 09/28/17  0434 09/29/17  0400   * 132* 136   K 5.2* 4.9 4.1    105 108   CO2 21* 20* 19*   * 190* 226*   BUN 59* 54* 45*   CREATININE 1.3 1.2 0.9   CALCIUM 7.1* 7.2* 6.9*   PROT 3.4* 4.4* 4.8*   ALBUMIN 1.4* 1.4* 1.4*   BILITOT 3.2* 3.3* 2.4*   ALKPHOS 192* 237* 194*   AST 62* 81* 66*   ALT 26 34 35   ANIONGAP 6* 7* 9   EGFRNONAA >60.0 >60.0 >60.0       Diagnostic Results:  None    Assessment/Plan:     * Sepsis    - remains afebrile.  - c diff testing is negative from 9/13   - cultures are negative   - CT abdomen reveals "diffuse abnormal appearance of the small and large bowel demonstrating fluid filled mildly prominent loops with associated submucosal edema and mucosal enhancement."   - concern for acute tnmvq-wwugxb-weua disease given that he received donor lymphocytes in August 2017  - s/p flex sig 9/15/17 showing erythematous mucosa in the recto-sigmoid colon (biopsied)--GVHD and CMV  - discontinued vancomycin on 9/15/17  - discontinued cefepime 9/17/17   - sepsis work-up repeated 9/21 due to hypotension and " tachycardia, repeat cultures done, chest x-ray negative. Cefepime and Vancomycin restarted. Cefepime was switched to zosyn.  - Possible staph in blood from 9/21.   - continue zosyn for now and Vanc stopped 9/26.        Adivl-rnykgp-xsln disease    - we are concerned about acute versus chronic bczip-zksoqh-ytqv disease given that he received donor lymphocytes in August 2017.   - s/p flex sig 9/15/17 showing erythematous mucosa in the recto-sigmoid colon (biopsied)  - serum CMV negative (repeat 9/25 negative), positive on GI biopsy stain - continue valganciclovir   - solumedrol increased to 80mg iv bid on 9/14/17, diarrhea improved 9/20 and steroids decreased, increased back to 80 mg bid 9/21 due to increased stool output (day 15 of high dose steroids)   - budesonide 9 mg 9/17 - stopped 9/28, started on Beclomethasone 9/28  - Remicade started 9/23/17 (400 mg weekly)  - accurate stool output measurements. 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 Blast of 62%, 19 had a complex karyotype including 5q deletion, 7q deletion, 17p deletion, and one metaphase represented a near tetraploid subclone; FLT-3 negative     -- Had previous reaction to MEC (etoposide caused full body rash).   -- Re-induced with FLAG-BETZY   -- Day 14 bone marrow biopsy done 7/10 - hypocellular with no evidence of residual disease  -- Post DLI on 8/14/17  -- currently receiving Vidaza for 2 cycles given high risk disease per Dr. 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 Continue triumeq  - now on IV steroids with history of fungal infection, ID consulted. Histoplasma antigen done and continue current ppx. ID has signed off          Atrial fibrillation with rapid ventricular response    - flecainide decreased to 50 mg bid and metoprolol to 12.5 mg bid per cardiology 9/26  - repeat EKG done 9/26, sinus tachycardia. HR stable          Anemia due to chemotherapy    - hemoglobin today is 7.7 g/dL  - continue to monitor.        Chemotherapy-induced thrombocytopenia    - due to transplant and chemotherapy   - platelet count today is 59 k/uL  - continue to monitor.        CINV (chemotherapy-induced nausea and vomiting)    - continue as-need antiemetics and Zofran ATC          Oral candida    - clortrimazole humberto         Electrolyte abnormality    - Na improved to 136  - creatinine 0.9, likely volume depleted with high stool output  - bolus 1 L NS on 9/27 and started IVF at 125 cc/hr          Dermatitis associated with moisture    - followed by wound care        Left arm swelling    - ultrasound done and negative        Histoplasma capsulatum infection    - on voriconazole 300mg BID  - vori level in the am            VTE Risk Mitigation         Ordered     Place sequential compression device  Until discontinued      09/22/17 1532     High Risk of VTE  Once      09/13/17 0714     Place RANJIT hose  Until discontinued      09/13/17 0613          Disposition: 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

## 2017-09-29 NOTE — ASSESSMENT & PLAN NOTE
"- remains afebrile.  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 Cefepime and Vancomycin restarted. Cefepime was switched to zosyn.  - Possible staph in blood from 9/21.   - continue zosyn for now and Vanc stopped 9/26.  "

## 2017-09-29 NOTE — PLAN OF CARE
MDR's with Dr Payne.  Patient continues to have high volume stool OP.  Beclomethasone was started yesterday and awaiting delivery of Ibruitinib.  TPN/lipids and IVF's continued.  PO intake minimal.  Dietary following.  HR remains in SR.  Cards following.  D/c pending improvement of GVHD symptoms and PO intake.  Will continue to follow.

## 2017-09-30 PROBLEM — M79.89 LEFT ARM SWELLING: Status: RESOLVED | Noted: 2017-01-01 | Resolved: 2017-01-01

## 2017-09-30 NOTE — PLAN OF CARE
Problem: Patient Care Overview  Goal: Plan of Care Review  Outcome: Ongoing (interventions implemented as appropriate)  Plan of care reviewed with the patient at the beginning of the shift. Pt admitted with GVHD-GI. Currently on IV steroids, Beclamethasone. Flexiseal in place. 1L of liquid stool so far tonight. Pt with complaints of continuous nausea. Zofran scheduled, compazine given PRN. Cards following for irregular rhythms. ST tonight. Tele monitoring continued. -115. Dilaudid given prn for abdominal and oral pain. Pt currently on full liquid diet. IVF and TPN infusing. Dark green/coke colored urine. Fall precautions maintained. Pt on bedrest. Pt remained free from falls and injury this shift. Bed locked in lowest position, side rails up x2, call light within reach. Instructed pt to call for assistance as needed. Pt verbalized understanding. Vitals stable. Pt afebrile overnight. No acute issues overnight. Will continue to monitor.

## 2017-09-30 NOTE — PROGRESS NOTES
[facility]  Hematology  Bone Marrow Transplant  Progress Note    Patient Name: Paul E Sistrunk  Admission Date: 9/13/2017  Hospital Length of Stay: 17 days  Code Status: Full Code    Subjective:     Interval History: Denies any new complaints. Diarrhea slowing but has not completely ceased.     Objective:     Vital Signs (Most Recent):  Temp: 98.5 °F (36.9 °C) (09/30/17 1214)  Pulse: 109 (09/30/17 1214)  Resp: 17 (09/30/17 1214)  BP: (!) 93/54 (09/30/17 1214)  SpO2: 96 % (09/30/17 1214) Vital Signs (24h Range):  Temp:  [97.3 °F (36.3 °C)-98.5 °F (36.9 °C)] 98.5 °F (36.9 °C)  Pulse:  [104-125] 109  Resp:  [17-20] 17  SpO2:  [94 %-96 %] 96 %  BP: ()/(54-69) 93/54     Weight: 77 kg (169 lb 12.1 oz)  Body mass index is 22.4 kg/m².  Body surface area is 1.99 meters squared.    ECOG SCORE         [unfilled]    Intake/Output - Last 3 Shifts       09/28 0700 - 09/29 0659 09/29 0700 - 09/30 0659 09/30 0700 - 10/01 0659    P.O. 710 1580 360    I.V. (mL/kg) 2829.6 (37.5) 3045.8 (39.6) 677.1 (8.8)    IV Piggyback 300 300 250    .9 1504.7     Total Intake(mL/kg) 4763.5 (63.1) 6430.5 (83.5) 1287.1 (16.7)    Urine (mL/kg/hr) 2450 (1.4) 2100 (1.1) 300 (0.7)    Stool 3625 (2) 1300 (0.7)     Total Output 6075 3400 300    Net -1311.5 +3030.5 +987.1                 Physical Exam   Constitutional: He is oriented to person, place, and time.   Thin, cachectic male   HENT:   Head: Normocephalic and atraumatic.   Whitish film covering the posterior oropharynx   Eyes: EOM are normal. Pupils are equal, round, and reactive to light.   Neck: Normal range of motion.   Cardiovascular: Normal rate.    Pulmonary/Chest: Effort normal and breath sounds normal.   Abdominal: Soft. Bowel sounds are normal.   Musculoskeletal: Normal range of motion.   Neurological: He is alert and oriented to person, place, and time.   Skin: Skin is warm and dry.       Significant Labs:   CBC:   Recent Labs  Lab 09/29/17  0400  "09/30/17  0440   WBC 8.23 6.57   HGB 7.7* 6.5*   HCT 21.5* 19.5*   PLT 59* 41*    and CMP:   Recent Labs  Lab 09/29/17  0400 09/30/17  0440    136   K 4.1 3.8    109   CO2 19* 21*   * 269*   BUN 45* 48*   CREATININE 0.9 0.9   CALCIUM 6.9* 7.0*   PROT 4.8* 3.4*   ALBUMIN 1.4* 1.3*   BILITOT 2.4* 2.1*   ALKPHOS 194* 176*   AST 66* 66*   ALT 35 42   ANIONGAP 9 6*   EGFRNONAA >60.0 >60.0         Assessment/Plan:     * Sepsis    - remains afebrile.  - c diff testing is negative from 9/13   - cultures are negative   - CT abdomen reveals "diffuse abnormal appearance of the small and large bowel demonstrating fluid filled mildly prominent loops with associated submucosal edema and mucosal enhancement."   - concern for acute yvncd-fagqzr-ijnw disease given that he received donor lymphocytes in August 2017  - s/p flex sig 9/15/17 showing erythematous mucosa in the recto-sigmoid colon (biopsied)--GVHD and CMV  - discontinued vancomycin on 9/15/17  - discontinued cefepime 9/17/17   - sepsis work-up repeated 9/21 due to hypotension and tachycardia, repeat cultures done, chest x-ray negative. Cefepime and Vancomycin restarted. Cefepime was switched to zosyn.  - Possible staph in blood from 9/21.   - continue zosyn for now and Vanc stopped 9/26.        Wqupo-hmemhk-blzs disease    - we are concerned about acute versus chronic imabi-utfwao-sdyi disease given that he received donor lymphocytes in August 2017.   - s/p flex sig 9/15/17 showing erythematous mucosa in the recto-sigmoid colon (biopsied)  - serum CMV negative (repeat 9/25 negative), positive on GI biopsy stain - continue valganciclovir   - solumedrol increased to 80mg iv bid on 9/14/17, diarrhea improved 9/20 and steroids decreased, increased back to 80 mg bid 9/21 due to increased stool output (day 15 of high dose steroids)   - budesonide 9 mg 9/17 - stopped 9/28, started on Beclomethasone 9/28  - Remicade started 9/23/17 (400 mg weekly)  - accurate " stool output measurements. 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 Blast of 62%, 19 had a complex karyotype including 5q deletion, 7q deletion, 17p deletion, and one metaphase represented a near tetraploid subclone; FLT-3 negative     -- Had previous reaction to MEC (etoposide caused full body rash).   -- Re-induced with FLAG-BETZY   -- Day 14 bone marrow biopsy done 7/10 - hypocellular with no evidence of residual disease  -- Post DLI on 8/14/17  -- currently receiving Vidaza for 2 cycles given high risk disease per Dr. Atkinson note (has only received cycle 1)        S/P allogeneic bone marrow transplant    -- +381 days s/p Flu/Bu/ATG MUD allogeneic transplant for high risk AML after his second IDAC (6/20/16 - 6/24/16) after a prolonged hospitalization (2/5/16 - 3/21/16) for induction with 7&3 and reinduction with MEC to remission and IDAC (4/4/16 - 4/9/16)  -- Chimerics from marrow done 6/21 show CD3 of 90% donor and 10% recipient, but CD34 of 5% donor and 95% recipient   -- Chimers from 7/10 shows 70% recipient and 30% donor - NOT SORTED  -- Relpased 6/2017 and reinduced with FLAG BETZY  -- DLI 8/14/17, day +48        HIV disease    - no acute issues. Continue triumeq  - now on IV steroids with history of fungal infection, ID consulted. Histoplasma antigen done and continue current ppx. ID has signed off          Oral candida    - clortrimazole humberto         Electrolyte abnormality    - Na improved to 136  - creatinine 0.9, likely volume depleted with high stool output  - bolus 1 L NS on 9/27 and started IVF at 125 cc/hr           Dermatitis associated with moisture    - followed by wound care        Atrial fibrillation with rapid ventricular response    - flecainide decreased to 50 mg bid and metoprolol to 12.5 mg bid per cardiology 9/26  - repeat EKG done 9/26, sinus tachycardia. HR stable          Chemotherapy-induced thrombocytopenia    - due to transplant and chemotherapy   - platelet count today is 41 k/uL  - continue to monitor.        Anemia due to chemotherapy    - hemoglobin today is 6.5 g/dL  - Transfuse today. Consent obtained  - continue to monitor.        CINV (chemotherapy-induced nausea and vomiting)    - continue as-need antiemetics and Zofran ATC          Histoplasma capsulatum infection    - on voriconazole 300mg BID  - vori level in the am            VTE Risk Mitigation         Ordered     Place sequential compression device  Until discontinued      09/22/17 1532     High Risk of VTE  Once      09/13/17 0714     Place RANJIT hose  Until discontinued      09/13/17 0613          Disposition: Clinical improvement.    Santiago Cottrell, PGY-VI on 9/30/2017 12:44 PM  [facility] Hematology/Oncology Fellow  Pager: [phone]  [email or web address]

## 2017-09-30 NOTE — PROGRESS NOTES
Blood transfusion unit 1/1 started at this time through left chest Mazariegos catheter at 75 mL/hr, infusing w/o difficulty.  VSS.  Premeds Tylenol PO and Benadryl PO administered prior to transfusion.  Blood product consent signed and on chart.  Blood product double checked and verified by 2 nurses at bedside Sandra Briones RN and Tristin Mcfadden RN. Performed education on s/s of transfusion reaction (SOB, rash, chills, back pain, etc.) and pt and family verbalized understanding.  Will monitor pt at bedside for 15 mins.

## 2017-09-30 NOTE — PROGRESS NOTES
Remicade infusing for 30 minutes at this time.  VSS.  Pt tolerating well with s/s of allergic reaction.  Increased rate to 80 mL/hr.  Will monitor.

## 2017-09-30 NOTE — PT/OT/SLP PROGRESS
Occupational Therapy      Paul E Sistrunk  MRN: [id number]    Patient not seen today secondary to Other (Comment) (OT attempted to see pt. RN requested to hold this date. Will follow up at next scheduled visit.).    ZULY Farias  9/30/2017

## 2017-09-30 NOTE — ASSESSMENT & PLAN NOTE
"- remains afebrile.  - c diff testing is negative from 9/13   - cultures are negative   - CT abdomen reveals "diffuse abnormal appearance of the small and large bowel demonstrating fluid filled mildly prominent loops with associated submucosal edema and mucosal enhancement."   - concern for acute mpblw-mkxbzb-iszq disease given that he received donor lymphocytes in August 2017  - s/p flex sig 9/15/17 showing erythematous mucosa in the recto-sigmoid colon (biopsied)--GVHD and CMV  - discontinued vancomycin on 9/15/17  - discontinued cefepime 9/17/17   - sepsis work-up repeated 9/21 due to hypotension and tachycardia, repeat cultures done, chest x-ray negative. Cefepime and Vancomycin restarted. Cefepime was switched to zosyn.  - Possible staph in blood from 9/21.   - continue zosyn for now and Vanc stopped 9/26.  "

## 2017-09-30 NOTE — SUBJECTIVE & OBJECTIVE
Subjective:     Interval History: Denies any new complaints. Diarrhea slowing but has not completely ceased.     Objective:     Vital Signs (Most Recent):  Temp: 98.5 °F (36.9 °C) (09/30/17 1214)  Pulse: 109 (09/30/17 1214)  Resp: 17 (09/30/17 1214)  BP: (!) 93/54 (09/30/17 1214)  SpO2: 96 % (09/30/17 1214) Vital Signs (24h Range):  Temp:  [97.3 °F (36.3 °C)-98.5 °F (36.9 °C)] 98.5 °F (36.9 °C)  Pulse:  [104-125] 109  Resp:  [17-20] 17  SpO2:  [94 %-96 %] 96 %  BP: ()/(54-69) 93/54     Weight: 77 kg (169 lb 12.1 oz)  Body mass index is 22.4 kg/m².  Body surface area is 1.99 meters squared.    ECOG SCORE         [unfilled]    Intake/Output - Last 3 Shifts       09/28 0700 - 09/29 0659 09/29 0700 - 09/30 0659 09/30 0700 - 10/01 0659    P.O. 710 1580 360    I.V. (mL/kg) 2829.6 (37.5) 3045.8 (39.6) 677.1 (8.8)    IV Piggyback 300 300 250    .9 1504.7     Total Intake(mL/kg) 4763.5 (63.1) 6430.5 (83.5) 1287.1 (16.7)    Urine (mL/kg/hr) 2450 (1.4) 2100 (1.1) 300 (0.7)    Stool 3625 (2) 1300 (0.7)     Total Output 6075 3400 300    Net -1311.5 +3030.5 +987.1                 Physical Exam   Constitutional: He is oriented to person, place, and time.   Thin, cachectic male   HENT:   Head: Normocephalic and atraumatic.   Whitish film covering the posterior oropharynx   Eyes: EOM are normal. Pupils are equal, round, and reactive to light.   Neck: Normal range of motion.   Cardiovascular: Normal rate.    Pulmonary/Chest: Effort normal and breath sounds normal.   Abdominal: Soft. Bowel sounds are normal.   Musculoskeletal: Normal range of motion.   Neurological: He is alert and oriented to person, place, and time.   Skin: Skin is warm and dry.       Significant Labs:   CBC:   Recent Labs  Lab 09/29/17  0400 09/30/17  0440   WBC 8.23 6.57   HGB 7.7* 6.5*   HCT 21.5* 19.5*   PLT 59* 41*    and CMP:   Recent Labs  Lab 09/29/17  0400 09/30/17  0440    136   K 4.1 3.8    109   CO2 19* 21*   * 269*   BUN  45* 48*   CREATININE 0.9 0.9   CALCIUM 6.9* 7.0*   PROT 4.8* 3.4*   ALBUMIN 1.4* 1.3*   BILITOT 2.4* 2.1*   ALKPHOS 194* 176*   AST 66* 66*   ALT 35 42   ANIONGAP 9 6*   EGFRNONAA >60.0 >60.0

## 2017-09-30 NOTE — ASSESSMENT & PLAN NOTE
- we are concerned about acute versus chronic dcpzy-sxbejc-eozo disease given that he received donor lymphocytes in August 2017.   - s/p flex sig 9/15/17 showing erythematous mucosa in the recto-sigmoid colon (biopsied)  - serum CMV negative (repeat 9/25 negative), positive on GI biopsy stain - continue valganciclovir   - solumedrol increased to 80mg iv bid on 9/14/17, diarrhea improved 9/20 and steroids decreased, increased back to 80 mg bid 9/21 due to increased stool output (day 15 of high dose steroids)   - budesonide 9 mg 9/17 - stopped 9/28, started on Beclomethasone 9/28  - Remicade started 9/23/17 (400 mg weekly)  - accurate stool output measurements. Last 24 hours 3.6 L stool output   - continue scheduled lomotil   - Ibrutinib ordered, dose reduced to 280 mg daily and sent to [facility] 9/26; requested it be sent to [facility] - sent 9/28

## 2017-09-30 NOTE — ASSESSMENT & PLAN NOTE
-- +381 days s/p Flu/Bu/ATG MUD allogeneic transplant for high risk AML after his second IDAC (6/20/16 - 6/24/16) after a prolonged hospitalization (2/5/16 - 3/21/16) for induction with 7&3 and reinduction with MEC to remission and IDAC (4/4/16 - 4/9/16)  -- Chimerics from marrow done 6/21 show CD3 of 90% donor and 10% recipient, but CD34 of 5% donor and 95% recipient   -- Chimers from 7/10 shows 70% recipient and 30% donor - NOT SORTED  -- Relpased 6/2017 and reinduced with FLAG BETZY  -- DLI 8/14/17, day +48

## 2017-09-30 NOTE — PROGRESS NOTES
Blood transfusing for 15 mins at this time.  Pt tolerating well with no s/s of transfusion reaction.  VSS.  Turned rate of transfusion up to 120 mL/hr.  Will monitor.

## 2017-09-30 NOTE — PROGRESS NOTES
Pt tolerating Remicade infusion well without S/S of allergic or infusion reaction.  VSS.  Turned rate of infusion up to 125 mL/hr.  Will monitor closely.

## 2017-09-30 NOTE — PROGRESS NOTES
Administered infliximab (REMICADE) 400 mg in sodium chloride 0.9% 250 mL IVPB at this time starting at rate 40 mL/hr through left chest Mazariegos catheter noted for having positive blood return.  VSS.  Premedications acetaminophen 650 mg PO and benadryl 25 mg PO given prior to infusion.  S/S of allergic reaction reviewed with pt and he verbalized understanding.  Will monitor closely.  Will recheck VS in 15 minutes.

## 2017-10-01 NOTE — ASSESSMENT & PLAN NOTE
"- remains afebrile.  - c diff testing is negative from 9/13   - cultures are negative   - CT abdomen reveals "diffuse abnormal appearance of the small and large bowel demonstrating fluid filled mildly prominent loops with associated submucosal edema and mucosal enhancement."   - concern for acute unfym-qyoiae-zlyu disease given that he received donor lymphocytes in August 2017  - s/p flex sig 9/15/17 showing erythematous mucosa in the recto-sigmoid colon (biopsied)--GVHD and CMV  - discontinued vancomycin on 9/15/17  - discontinued cefepime 9/17/17   - sepsis work-up repeated 9/21 due to hypotension and tachycardia, repeat cultures done, chest x-ray negative. Cefepime and Vancomycin restarted. Cefepime was switched to zosyn.  - Possible staph in blood from 9/21.   - continue zosyn for now and Vanc stopped 9/26.  "

## 2017-10-01 NOTE — SUBJECTIVE & OBJECTIVE
Subjective:     Interval History: Reports abdominal pain/discomfort with spasms. Continues to have diarrhea.    Objective:     Vital Signs (Most Recent):  Temp: 98 °F (36.7 °C) (10/01/17 1115)  Pulse: (!) 127 (10/01/17 1115)  Resp: 20 (10/01/17 1115)  BP: 117/66 (10/01/17 1115)  SpO2: (!) 94 % (10/01/17 1115) Vital Signs (24h Range):  Temp:  [97.1 °F (36.2 °C)-98.7 °F (37.1 °C)] 98 °F (36.7 °C)  Pulse:  [106-132] 127  Resp:  [16-20] 20  SpO2:  [92 %-96 %] 94 %  BP: ()/(54-69) 117/66     Weight: 76.2 kg (167 lb 15.9 oz)  Body mass index is 22.16 kg/m².  Body surface area is 1.98 meters squared.    ECOG SCORE         [unfilled]    Intake/Output - Last 3 Shifts       09/29 0700 - 09/30 0659 09/30 0700 - 10/01 0659 10/01 0700 - 10/02 0659    P.O. 1580 1805 240    I.V. (mL/kg) 3045.8 (39.6) 3227.1 (42.4)     Blood  285 350    IV Piggyback 300 350     TPN 1504.7 1171     Total Intake(mL/kg) 6430.5 (83.5) 6838.1 (89.7) 590 (7.7)    Urine (mL/kg/hr) 2100 (1.1) 1500 (0.8) 350 (0.9)    Stool 1300 (0.7) 600 (0.3)     Total Output 3400 2100 350    Net +3030.5 +4738.1 +240           Stool Occurrence  8 x           Physical Exam   Constitutional: He is oriented to person, place, and time.   Thin, cachectic male   HENT:   Head: Normocephalic and atraumatic.   Eyes: EOM are normal. Pupils are equal, round, and reactive to light.   Neck: Normal range of motion.   Cardiovascular: Intact distal pulses.    Pulmonary/Chest: Effort normal.   Abdominal: Soft. There is tenderness.   Hyperactive bowel sounds   Musculoskeletal: Normal range of motion.   Neurological: He is alert and oriented to person, place, and time.   Skin: Skin is warm and dry.       Significant Labs:   CBC:   Recent Labs  Lab 09/30/17  0440 10/01/17  0416   WBC 6.57 4.26   HGB 6.5* 6.6*   HCT 19.5* 19.6*   PLT 41* 30*    and CMP:   Recent Labs  Lab 09/30/17  0440 10/01/17  0416    137   K 3.8 3.2*    109   CO2 21* 21*   * 256*   BUN 48* 39*    CREATININE 0.9 0.9   CALCIUM 7.0* 6.9*   PROT 3.4* 3.2*   ALBUMIN 1.3* 1.2*   BILITOT 2.1* 2.0*   ALKPHOS 176* 162*   AST 66* 57*   ALT 42 43   ANIONGAP 6* 7*   EGFRNONAA >60.0 >60.0

## 2017-10-01 NOTE — ASSESSMENT & PLAN NOTE
-- +381 days s/p Flu/Bu/ATG MUD allogeneic transplant for high risk AML after his second IDAC (6/20/16 - 6/24/16) after a prolonged hospitalization (2/5/16 - 3/21/16) for induction with 7&3 and reinduction with MEC to remission and IDAC (4/4/16 - 4/9/16)  -- Chimerics from marrow done 6/21 show CD3 of 90% donor and 10% recipient, but CD34 of 5% donor and 95% recipient   -- Chimers from 7/10 shows 70% recipient and 30% donor - NOT SORTED  -- Relpased 6/2017 and reinduced with FLAG BETZY  -- DLI 8/14/17, day +49

## 2017-10-01 NOTE — PLAN OF CARE
Problem: Patient Care Overview  Goal: Plan of Care Review  Plan of care reviewed with the patient at the beginning of the shift. Pt admitted with GVHD-GI. Currently on IV steroids, Beclamethasone. Flexiseal in place. Stool unmeasured tonight, flexiseal leaking but was replaced less than 12hrs ago. Pt with complaints of continuous nausea. Zofran scheduled, compazine given PRN. Cards following for irregular rhythms. ST tonight. Tele monitoring continued. -115. Dilaudid given prn for abdominal and oral pain. Pt currently on full liquid diet. IVF and TPN infusing. Dark green/coke colored urine. Pt continues to be on tele, ST on monitor. Excoriation to buttocks, barrier cream applied. Fall precautions maintained. Pt on bedrest. Pt remained free from falls and injury this shift. Bed locked in lowest position, side rails up x2, call light within reach. Instructed pt to call for assistance as needed. Pt verbalized understanding. Vitals stable. Pt afebrile overnight. No acute issues overnight. Will continue to monitor.

## 2017-10-01 NOTE — PLAN OF CARE
Problem: Patient Care Overview  Goal: Plan of Care Review  Outcome: Ongoing (interventions implemented as appropriate)  Patient VSS, afebrile, and without injury. Patient on cardiac monitoring remains sinus tachy (HR as high as 129). Fall precautions maintained. Patient received one unit of PRBCs today and tolerated it well. Patient initiated on a PCA pump. Moisture-associated dermatitis on gluteus assessed, cleansed with soap and water, and barrier cream applied. Frequent turning initiated as tolerated by the patient. TPN continued. Patient without complaints or questions at this time, will continue to monitor.

## 2017-10-01 NOTE — PROGRESS NOTES
[facility]  Hematology  Bone Marrow Transplant  Progress Note    Patient Name: Paul E Sistrunk  Admission Date: 9/13/2017  Hospital Length of Stay: 18 days  Code Status: Full Code    Subjective:     Interval History: Reports abdominal pain/discomfort with spasms. Continues to have diarrhea.    Objective:     Vital Signs (Most Recent):  Temp: 98 °F (36.7 °C) (10/01/17 1115)  Pulse: (!) 127 (10/01/17 1115)  Resp: 20 (10/01/17 1115)  BP: 117/66 (10/01/17 1115)  SpO2: (!) 94 % (10/01/17 1115) Vital Signs (24h Range):  Temp:  [97.1 °F (36.2 °C)-98.7 °F (37.1 °C)] 98 °F (36.7 °C)  Pulse:  [106-132] 127  Resp:  [16-20] 20  SpO2:  [92 %-96 %] 94 %  BP: ()/(54-69) 117/66     Weight: 76.2 kg (167 lb 15.9 oz)  Body mass index is 22.16 kg/m².  Body surface area is 1.98 meters squared.    ECOG SCORE         [unfilled]    Intake/Output - Last 3 Shifts       09/29 0700 - 09/30 0659 09/30 0700 - 10/01 0659 10/01 0700 - 10/02 0659    P.O. 1580 1805 240    I.V. (mL/kg) 3045.8 (39.6) 3227.1 (42.4)     Blood  285 350    IV Piggyback 300 350     TPN 1504.7 1171     Total Intake(mL/kg) 6430.5 (83.5) 6838.1 (89.7) 590 (7.7)    Urine (mL/kg/hr) 2100 (1.1) 1500 (0.8) 350 (0.9)    Stool 1300 (0.7) 600 (0.3)     Total Output 3400 2100 350    Net +3030.5 +4738.1 +240           Stool Occurrence  8 x           Physical Exam   Constitutional: He is oriented to person, place, and time.   Thin, cachectic male   HENT:   Head: Normocephalic and atraumatic.   Eyes: EOM are normal. Pupils are equal, round, and reactive to light.   Neck: Normal range of motion.   Cardiovascular: Intact distal pulses.    Pulmonary/Chest: Effort normal.   Abdominal: Soft. There is tenderness.   Hyperactive bowel sounds   Musculoskeletal: Normal range of motion.   Neurological: He is alert and oriented to person, place, and time.   Skin: Skin is warm and dry.       Significant Labs:   CBC:   Recent Labs  Lab 09/30/17  0440 10/01/17  0416   WBC 6.57  4.26   HGB 6.5* 6.6*   HCT 19.5* 19.6*   PLT 41* 30*    and CMP:   Recent Labs  Lab 09/30/17  0440 10/01/17  0416    137   K 3.8 3.2*    109   CO2 21* 21*   * 256*   BUN 48* 39*   CREATININE 0.9 0.9   CALCIUM 7.0* 6.9*   PROT 3.4* 3.2*   ALBUMIN 1.3* 1.2*   BILITOT 2.1* 2.0*   ALKPHOS 176* 162*   AST 66* 57*   ALT 42 43   ANIONGAP 6* 7*   EGFRNONAA >60.0 >60.0     Assessment/Plan:     * Xkzeu-npupgb-elcc disease    - we are concerned about acute versus chronic mgflw-nnawsc-wqxd disease given that he received donor lymphocytes in August 2017.   - s/p flex sig 9/15/17 showing erythematous mucosa in the recto-sigmoid colon (biopsied)  - serum CMV negative (repeat 9/25 negative), positive on GI biopsy stain - continue valganciclovir   - solumedrol increased to 80mg iv bid on 9/14/17, diarrhea improved 9/20 and steroids decreased, increased back to 80 mg bid 9/21 due to increased stool output (day 15 of high dose steroids)   - budesonide 9 mg 9/17 - stopped 9/28, started on Beclomethasone 9/28  - Remicade started 9/23/17 (400 mg weekly)  - accurate stool output measurements. Last 24 hours 3.6 L stool output   - continue scheduled lomotil   - Ibrutinib ordered, dose reduced to 280 mg daily and sent to [facility] 9/26; requested it be sent to [facility] - sent 9/28  - Started PCA pump for abdominal discomfort. Will titrate as needed.        S/P allogeneic bone marrow transplant    -- +381 days s/p Flu/Bu/ATG MUD allogeneic transplant for high risk AML after his second IDAC (6/20/16 - 6/24/16) after a prolonged hospitalization (2/5/16 - 3/21/16) for induction with 7&3 and reinduction with MEC to remission and IDAC (4/4/16 - 4/9/16)  -- Chimerics from marrow done 6/21 show CD3 of 90% donor and 10% recipient, but CD34 of 5% donor and 95% recipient   -- Chimers from 7/10 shows 70% recipient and 30% donor - NOT SORTED  -- Relpased 6/2017 and reinduced with FLAG BETZY  -- DLI 8/14/17, day +49       "  AML (acute myeloid leukemia) in remission    -- Relapsed AML - peripheral blood shows 30% blasts 6/2017   -- BM biopsy results - consistent with 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 Blast of 62%, 19 had a complex karyotype including 5q deletion, 7q deletion, 17p deletion, and one metaphase represented a near tetraploid subclone; FLT-3 negative     -- Had previous reaction to MEC (etoposide caused full body rash).   -- Re-induced with FLAG-BETZY   -- Day 14 bone marrow biopsy done 7/10 - hypocellular with no evidence of residual disease  -- Post DLI on 8/14/17  -- currently receiving Vidaza for 2 cycles given high risk disease per Dr. Atkinson note (has only received cycle 1)        HIV disease    - no acute issues. Continue triumeq  - now on IV steroids with history of fungal infection, ID consulted. Histoplasma antigen done and continue current ppx. ID has signed off          Oral candida    - clortrimazole humberto         Electrolyte abnormality    - replete as needed  - On TPN            Dermatitis associated with moisture    - followed by wound care        Atrial fibrillation with rapid ventricular response    - flecainide decreased to 50 mg bid and metoprolol to 12.5 mg bid per cardiology 9/26  - repeat EKG done 9/26, sinus tachycardia. HR stable          Chemotherapy-induced thrombocytopenia    - due to transplant and chemotherapy   - platelet count today is 30 k/uL  - continue to monitor.        Sepsis    - remains afebrile.  - c diff testing is negative from 9/13   - cultures are negative   - CT abdomen reveals "diffuse abnormal appearance of the small and large bowel demonstrating fluid filled mildly prominent loops with associated submucosal edema and mucosal enhancement."   - concern for acute ayiwk-dbqimp-pxwu disease given that he received donor lymphocytes in August 2017  - s/p flex sig 9/15/17 showing erythematous mucosa in the recto-sigmoid colon (biopsied)--GVHD and " CMV  - discontinued vancomycin on 9/15/17  - discontinued cefepime 9/17/17   - sepsis work-up repeated 9/21 due to hypotension and tachycardia, repeat cultures done, chest x-ray negative. Cefepime and Vancomycin restarted. Cefepime was switched to zosyn.  - Possible staph in blood from 9/21.   - continue zosyn for now and Vanc stopped 9/26.        Anemia due to chemotherapy    - hemoglobin today is 6.6 g/dL  - Will transfuse today.  - continue to monitor.        CINV (chemotherapy-induced nausea and vomiting)    - continue as-need antiemetics and Zofran ATC          Histoplasma capsulatum infection    - on voriconazole 300mg BID  - vori level in the am            VTE Risk Mitigation         Ordered     Place sequential compression device  Until discontinued      09/22/17 1532     High Risk of VTE  Once      09/13/17 0714     Place RANJIT hose  Until discontinued      09/13/17 0613          Disposition: Improvement of diarrhea/cGVHD    Santiago Cottrell, PGY-VI on 10/1/2017 12:15 PM  hospitals Hematology/Oncology Fellow  Pager: [phone]  [email or web address]

## 2017-10-01 NOTE — ASSESSMENT & PLAN NOTE
- we are concerned about acute versus chronic lcluk-thxzpd-jnol disease given that he received donor lymphocytes in August 2017.   - s/p flex sig 9/15/17 showing erythematous mucosa in the recto-sigmoid colon (biopsied)  - serum CMV negative (repeat 9/25 negative), positive on GI biopsy stain - continue valganciclovir   - solumedrol increased to 80mg iv bid on 9/14/17, diarrhea improved 9/20 and steroids decreased, increased back to 80 mg bid 9/21 due to increased stool output (day 15 of high dose steroids)   - budesonide 9 mg 9/17 - stopped 9/28, started on Beclomethasone 9/28  - Remicade started 9/23/17 (400 mg weekly)  - accurate stool output measurements. Last 24 hours 3.6 L stool output   - continue scheduled lomotil   - Ibrutinib ordered, dose reduced to 280 mg daily and sent to [facility] 9/26; requested it be sent to [facility] - sent 9/28  - Started PCA pump for abdominal discomfort. Will titrate as needed.

## 2017-10-02 PROBLEM — R05.9 COUGH: Status: ACTIVE | Noted: 2017-01-01

## 2017-10-02 NOTE — PLAN OF CARE
Problem: Occupational Therapy Goal  Goal: Occupational Therapy Goal  Goals to be met by: 10/9/17     Patient will increase functional independence with ADLs by performing:    UE Dressing with Stand-by Assistance.  LE Dressing with Stand-by Assistance.  Grooming while EOB with Stand-by Assistance.  Toileting from bedside commode with Minimal Assistance for hygiene and clothing management.   Supine to sit with Stand-by Assistance.  Toilet transfer to bedside commode with Contact Guard Assistance.     Outcome: Ongoing (interventions implemented as appropriate)  All goals remain appropriate.   Continue POC    Tala Morales OT  10/2/2017

## 2017-10-02 NOTE — PLAN OF CARE
Problem: Patient Care Overview  Goal: Plan of Care Review  Outcome: Ongoing (interventions implemented as appropriate)  Plan of care reviewed with the patient at the beginning of the shift. Pt admitted with GVHD-GI. Currently on IV steroids, Beclamethasone, Lomotil. Flexiseal in place. Stool unmeasured tonight, flexiseal leaking with bowel movements. Pt with complaints of continuous nausea. Zofran scheduled, compazine given PRN. Cards following for irregular rhythms. ST tonight. Tele monitoring continued. -125. Pt on Dilaudid PCA for abdominal, rectal, and oral pain. Pt currently on full liquid diet. IVF and TPN infusing. Dark green/coke colored urine. Excoriation to buttocks, barrier cream applied. Fall precautions maintained. Pt on bedrest. Pt remained free from falls and injury this shift. Bed locked in lowest position, side rails up x2, call light within reach. Instructed pt to call for assistance as needed. Pt verbalized understanding. Vitals stable. Pt afebrile overnight. Mg K and Ph replaced overnight. No acute issues overnight. Will continue to monitor.

## 2017-10-02 NOTE — PROGRESS NOTES
[facility]  Hematology  Bone Marrow Transplant  Progress Note    Patient Name: Paul E Sistrunk  Admission Date: 9/13/2017  Hospital Length of Stay: 19 days  Code Status: Full Code    Subjective:     Interval History: Improved pain control with PCA pump. Noted some coughing without any SOB. Some green sputum production as per nursing.    Objective:     Vital Signs (Most Recent):  Temp: 97.5 °F (36.4 °C) (10/02/17 0750)  Pulse: (!) 115 (10/02/17 1056)  Resp: 16 (10/02/17 0750)  BP: 122/62 (10/02/17 0750)  SpO2: (!) 91 % (10/02/17 0750) Vital Signs (24h Range):  Temp:  [97.5 °F (36.4 °C)-98.5 °F (36.9 °C)] 97.5 °F (36.4 °C)  Pulse:  [109-130] 115  Resp:  [14-18] 16  SpO2:  [88 %-95 %] 91 %  BP: (103-122)/(62-71) 122/62     Weight: 82.2 kg (181 lb 3.5 oz)  Body mass index is 23.91 kg/m².  Body surface area is 2.06 meters squared.    ECOG SCORE         [unfilled]    Intake/Output - Last 3 Shifts       09/30 0700 - 10/01 0659 10/01 0700 - 10/02 0659 10/02 0700 - 10/03 0659    P.O. 1805 1220 480    I.V. (mL/kg) 3227.1 (42.4) 2713.2 (33)     Blood 285 700     IV Piggyback 350 600     TPN 1171 1211     Total Intake(mL/kg) 6838.1 (89.7) 6444.2 (78.4) 480 (5.8)    Urine (mL/kg/hr) 1500 (0.8) 1150 (0.6) 325 (0.8)    Stool 600 (0.3) 0 (0)     Total Output 2100 1150 325    Net +4738.1 +5294.2 +155           Stool Occurrence 8 x 3 x           Physical Exam   Constitutional: He is oriented to person, place, and time.   Thin, cachectic male   HENT:   Head: Normocephalic and atraumatic.   Eyes: EOM are normal. Pupils are equal, round, and reactive to light.   Neck: Normal range of motion.   Cardiovascular: Intact distal pulses.    Pulmonary/Chest: Effort normal.   Abdominal: Soft. Bowel sounds are normal. There is no tenderness.   Hyperactive bowel sounds   Musculoskeletal: Normal range of motion.   Neurological: He is alert and oriented to person, place, and time.   Skin: Skin is warm and dry.       Significant  Labs:   CBC:     Recent Labs  Lab 10/01/17  0416 10/02/17  0459   WBC 4.26 3.92   HGB 6.6* 7.7*   HCT 19.6* 22.1*   PLT 30* 9*    and CMP:     Recent Labs  Lab 10/01/17  0416 10/02/17  0459    137   K 3.2* 3.2*    109   CO2 21* 20*   * 290*   BUN 39* 39*   CREATININE 0.9 0.8   CALCIUM 6.9* 7.3*   PROT 3.2* 3.3*   ALBUMIN 1.2* 1.1*   BILITOT 2.0* 2.0*   ALKPHOS 162* 161*   AST 57* 46*   ALT 43 45*   ANIONGAP 7* 8   EGFRNONAA >60.0 >60.0     Portable CXR: Stable chronic changes without evidence of acute intrathoracic abnormality identified on this single radiographic view of the chest.    Assessment/Plan:     * Pcdfv-akaxaz-eueu disease    - we are concerned about acute versus chronic vxfzd-qovnxe-wrmi disease given that he received donor lymphocytes in August 2017.   - s/p flex sig 9/15/17 showing erythematous mucosa in the recto-sigmoid colon (biopsied)  - serum CMV negative (repeat 9/25 negative), positive on GI biopsy stain - continue valganciclovir   - solumedrol increased to 80mg iv bid on 9/14/17, diarrhea improved 9/20 and steroids decreased, increased back to 80 mg bid 9/21 due to increased stool output (day 15 of high dose steroids)   - budesonide 9 mg 9/17 - stopped 9/28, started on Beclomethasone 9/28  - Remicade started 9/23/17 (400 mg weekly)  - accurate stool output measurements. Last 24 hours 3.6 L stool output   - continue scheduled lomotil   - Ibrutinib ordered, dose reduced to 280 mg daily and sent to [facility] 9/26; requested it be sent to [facility] - sent 9/28  - Started PCA pump for abdominal discomfort. Will titrate as needed.        S/P allogeneic bone marrow transplant    -- +382 days s/p Flu/Bu/ATG MUD allogeneic transplant for high risk AML after his second IDAC (6/20/16 - 6/24/16) after a prolonged hospitalization (2/5/16 - 3/21/16) for induction with 7&3 and reinduction with MEC to remission and IDAC (4/4/16 - 4/9/16)  -- Chimerics from marrow done 6/21  show CD3 of 90% donor and 10% recipient, but CD34 of 5% donor and 95% recipient   -- Chimers from 7/10 shows 70% recipient and 30% donor - NOT SORTED  -- Relapsed 6/2017 and reinduced with FLAG BETZY  -- DLI 8/14/17, day +50        Cough    - No obvious etiology on portable CXR  - Sat's 88-95%  - Will obtain PA and lateral  - Incentive spirometry        AML (acute myeloid leukemia) in remission    -- Relapsed AML - peripheral blood shows 30% blasts 6/2017   -- BM biopsy results - consistent with 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 Blast of 62%, 19 had a complex karyotype including 5q deletion, 7q deletion, 17p deletion, and one metaphase represented a near tetraploid subclone; FLT-3 negative     -- Had previous reaction to MEC (etoposide caused full body rash).   -- Re-induced with FLAG-BETZY   -- Day 14 bone marrow biopsy done 7/10 - hypocellular with no evidence of residual disease  -- Post DLI on 8/14/17  -- currently receiving Vidaza for 2 cycles given high risk disease per Dr. Atkinson note (has only received cycle 1)        HIV disease    - no acute issues. Continue triumeq  - now on IV steroids with history of fungal infection, ID consulted. Histoplasma antigen done and continue current ppx. ID has signed off          Oral candida    - clortrimazole humberto         Electrolyte abnormality    - replete as needed  - On TPN            Dermatitis associated with moisture    - followed by wound care        Atrial fibrillation with rapid ventricular response    - flecainide decreased to 50 mg bid and metoprolol to 12.5 mg bid per cardiology 9/26  - repeat EKG done 9/26, sinus tachycardia. HR stable          Chemotherapy-induced thrombocytopenia    - due to transplant and chemotherapy   - platelet count today is 9k/uL, will transfuse today  - continue to monitor.        Sepsis    - remains afebrile.  - c diff testing is negative from 9/13   - cultures are negative   - CT abdomen reveals  ""diffuse abnormal appearance of the small and large bowel demonstrating fluid filled mildly prominent loops with associated submucosal edema and mucosal enhancement."   - concern for acute vrnor-mwcubm-jofk disease given that he received donor lymphocytes in August 2017  - s/p flex sig 9/15/17 showing erythematous mucosa in the recto-sigmoid colon (biopsied)--GVHD and CMV  - discontinued vancomycin on 9/15/17  - discontinued cefepime 9/17/17   - sepsis work-up repeated 9/21 due to hypotension and tachycardia, repeat cultures done, chest x-ray negative. Cefepime and Vancomycin restarted. Cefepime was switched to zosyn.  - Possible staph in blood from 9/21.   - continue zosyn for now and Vanc stopped 9/26.        Anemia due to chemotherapy    - hemoglobin today is 6.6 g/dL  - Will transfuse today.  - continue to monitor.        CINV (chemotherapy-induced nausea and vomiting)    - continue as-need antiemetics and Zofran ATC          Histoplasma capsulatum infection    - on voriconazole 300mg BID  - vori level from 09.30 pending            VTE Risk Mitigation         Ordered     Place sequential compression device  Until discontinued      09/22/17 1532     High Risk of VTE  Once      09/13/17 0714     Place RANJIT hose  Until discontinued      09/13/17 0613          Disposition: Clinical improvement    Santiago Cottrell, PGY-VI on 10/2/2017 12:31 PM  [facility] Hematology/Oncology Fellow  Pager: [phone]  [email or web address]      "

## 2017-10-02 NOTE — ASSESSMENT & PLAN NOTE
-- +382 days s/p Flu/Bu/ATG MUD allogeneic transplant for high risk AML after his second IDAC (6/20/16 - 6/24/16) after a prolonged hospitalization (2/5/16 - 3/21/16) for induction with 7&3 and reinduction with MEC to remission and IDAC (4/4/16 - 4/9/16)  -- Chimerics from marrow done 6/21 show CD3 of 90% donor and 10% recipient, but CD34 of 5% donor and 95% recipient   -- Chimers from 7/10 shows 70% recipient and 30% donor - NOT SORTED  -- Relapsed 6/2017 and reinduced with FLAG BETZY  -- DLI 8/14/17, day +50

## 2017-10-02 NOTE — ASSESSMENT & PLAN NOTE
Nutrition Problem  inadequate nutrient intake    Related to (etiology):   Physiological needs- AML s/p BMT    Signs and Symptoms (as evidenced by):   NPO/FL >12 days, Pt exhibits signs of malnutrition, TPN +11 days    Interventions/Recommendations (treatment strategy):  Provide Pt with ONS boost  See recs above    Nutrition Diagnosis Status:   Worsening

## 2017-10-02 NOTE — ASSESSMENT & PLAN NOTE
- we are concerned about acute versus chronic ollgb-lajfup-jrap disease given that he received donor lymphocytes in August 2017.   - s/p flex sig 9/15/17 showing erythematous mucosa in the recto-sigmoid colon (biopsied)  - serum CMV negative (repeat 9/25 negative), positive on GI biopsy stain - continue valganciclovir   - solumedrol increased to 80mg iv bid on 9/14/17, diarrhea improved 9/20 and steroids decreased, increased back to 80 mg bid 9/21 due to increased stool output (day 15 of high dose steroids)   - budesonide 9 mg 9/17 - stopped 9/28, started on Beclomethasone 9/28  - Remicade started 9/23/17 (400 mg weekly)  - accurate stool output measurements. Last 24 hours 3.6 L stool output   - continue scheduled lomotil   - Ibrutinib ordered, dose reduced to 280 mg daily and sent to [facility] 9/26; requested it be sent to [facility] - sent 9/28  - Started PCA pump for abdominal discomfort. Will titrate as needed.

## 2017-10-02 NOTE — ASSESSMENT & PLAN NOTE
- due to transplant and chemotherapy   - platelet count today is 9k/uL, will transfuse today  - continue to monitor.

## 2017-10-02 NOTE — PROGRESS NOTES
[facility]  Adult Nutrition  Progress Note    SUMMARY     Recommendations    Recommendation/Intervention: ADAT to regular w/ ONS and Snacks. 2.Encourage PO intake. If unable to Adv. cont w/ TPN custom 8.5%AA/23%Dex at 50ml/hr   Goals: pt to consume nutrients >/= 85% EEN/EPN   Nutrition Goal Status: goal not met  Communication of RD Recs: discussed on rounds  Reason for Assessment    Reason for Assessment: NPO/clear liquids x 5 days  Diagnosis:  (sepsis)  Relevant Medical History: AML, HIV   Interdisciplinary Rounds: attended  General Information Comments: Pt is more alert than last visit, states still not consuming any significant amount po. c/o of sl N/D  Nutrition Discharge Planning: Regular diet w/ po intake 75% EEN/ EPN     Nutrition Prescription Ordered    Current Diet Order: NPO  Nutrition Order Comments: custom TPN 8.5%AA/23%Dex   Oral Nutrition Supplement: Wildberry     Evaluation of Received Nutrients/Fluid Intake     Parenteral Calories (kcal): 1112  Parenteral Protein (gm): 51  Parenteral Fluid (mL): 1200   Energy Calories Required: not meeting needs   Protein Required: meeting needs   IV Fluid (mL): 3000   GIR (Glucose Infusion Rate) (mg/kg/min): 1.07 mg/kg/min  Lipid Calories (kcals): 500 kcals  I/O: +7.5L Since Admit     Intake/Output Summary (Last 24 hours) at 10/02/17 1139  Last data filed at 10/02/17 0940   Gross per 24 hour   Intake          6334.15 ml   Output             1125 ml   Net          5209.15 ml       Fluid Required: not meeting needs  Comments: LBM:10/1     % Intake of Estimated Energy Needs: 75 - 100 %  % Meal Intake: NPO     Nutrition Risk Screen     Nutrition Risk Screen: Tube feed or Parental nutrition   Nutrition/Diet History    Patient Reported Diet/Restrictions/Preferences: general  Typical Food/Fluid Intake: decreased PTA  Food Preferences: No cultural or Voodoo preferences noted   Factors Affecting Nutritional Intake: diarrhea, NPO, decreased appetite     "  Labs/Tests/Procedures/Meds    Pertinent Labs Reviewed: reviewed  Pertinent Labs Comments: WBC-4.26, H/H-7.7/22.1, Plts-30, BUN-39, Glu-290, Alb-1.1  Pertinent Medications Reviewed: reviewed  Pertinent Medications Comments: IVF, Statin     Physical Findings    Overall Physical Appearance: generalized wasting, edematous, loss of muscle mass, loss of subcutaneous fat, underweight, weak  Tubes:  (Central Line)  Oral/Mouth Cavity: all teeth present (age appropriate)  Skin: intact    Anthropometrics    Temp: 97.5 °F (36.4 °C)  Height: 6' 1" (185.4 cm)  Weight Method: Bed Scale  Weight: 82.2 kg (181 lb 3.5 oz)  Ideal Body Weight (IBW), Male: 184 lb  % Ideal Body Weight, Male (lb): 92.39 lb  BMI (Calculated): 22.5  BMI Grade: 18.5-24.9 - normal     Estimated/Assessed Needs    Weight Used For Calorie Calculations: 84.2 kg (185 lb 10 oz)   Energy Calorie Requirements (kcal): 1956-2925  Energy Need Method: Kcal/kg   RMR ([place]-St. Jeor Equation): 1730.88  Weight Used For Protein Calculations: 84.2 kg (185 lb 10 oz)  Protein Requirements: 109-127g/d  Fluid Need Method: RDA Method (or per MD)   RDA Method (mL): 2526     Assessment and Plan    S/P allogeneic bone marrow transplant      Nutrition Problem  inadequate nutrient intake    Related to (etiology):   Physiological needs- AML s/p BMT    Signs and Symptoms (as evidenced by):   NPO/FL >12 days, Pt exhibits signs of malnutrition, TPN +11 days    Interventions/Recommendations (treatment strategy):  Provide Pt with ONS boost  See recs above    Nutrition Diagnosis Status:   Worsening                  Monitor and Evaluation    Food and Nutrient Intake: energy intake, food and beverage intake  Food and Nutrient Adminstration: diet order  Physical Activity and Function: nutrition-related ADLs and IADLs  Anthropometric Measurements: weight, weight change  Biochemical Data, Medical Tests and Procedures:  (All labs)  Nutrition-Focused Physical Findings: overall " appearance    Nutrition Risk    Level of Risk:  (f/u 1x wk)    Nutrition Follow-Up    RD Follow-up?: Yes

## 2017-10-02 NOTE — PLAN OF CARE
Problem: Patient Care Overview  Goal: Plan of Care Review  Outcome: Ongoing (interventions implemented as appropriate)  Patient VSS, afebrile and without injury. Patient hypotensive today, but asymptomatic (most recent 99/58). Patient 's heart-rate is sinus tachy (120s). MD notified of O2 sats running 89-90, and stated she would order O2 nasal canula titrated to stay 92 and above. IV maintenance fluids continued; TPN continued. Potassium phos administered IV. Patient received one unit of platelets and tolerated well. PRN compazine administered for nausea. Flexi-seal in, but leaking some around. Fall precautions maintained. Patient instructed on how to call nurse. Critical lab values of WBC: 1.97 and platelets: 9,000 received this morning and communicated to MD. Patient is without questions or complaints at this time, will continue to monitor.

## 2017-10-02 NOTE — ASSESSMENT & PLAN NOTE
- No obvious etiology on portable CXR  - Sat's 88-95%  - Will obtain PA and lateral  - Incentive spirometry

## 2017-10-02 NOTE — PT/OT/SLP PROGRESS
"Occupational Therapy  Treatment    Paul E Sistrunk   MRN: [id number]   Admitting Diagnosis: Vteet-[id number]-imwx disease    OT Date of Treatment: 10/02/17   OT Start Time: 1644  OT Stop Time: 1700  OT Total Time (min): 16 min    Billable Minutes:  Therapeutic Activity 15    General Precautions: Standard, fall  Orthopedic Precautions: N/A  Braces: N/A    Do you have any cultural, spiritual, Evangelical conflicts, given your current situation?: None stated     Subjective:  Communicated with RN prior to session.  Pt found with HOB elevated. Transport arrival. Therapist assisted with transferring pt from bed > transport stretcher.   Therapist encouraged pt to perform stand pivot transfer from EOB to transport stretcher but pt stated, " I can't do that. I would rather scoot over."   When asked if pt has been completing BUE HEP with theraband. Pt stated, " I have and I really fell like its been helping me."   Pain/Comfort  Pain Rating 1:  (Pt did not state numerical value)  Location - Side 1: Bilateral  Location - Orientation 1: posterior  Location 1:  (buttock )  Pain Addressed 1: Reposition, Distraction, Pre-medicate for activity  Pain Rating Post-Intervention 1:  (see comment above)    Objective:  Patient found with: bowel management system, PCA, peripheral IV, central line, oxygen     Functional Mobility:  Bed Mobility:  Scooting/Bridging: Maximum Assistance, Total Assistance, With assist of 2 (scoot from bed to transport bed. Pt attempted to assist with scooting by pusing through B UEs)    Transfers:   Sit <> Stand Assistance: Activity did not occur. Pt declined to perform stating he is still too weak to attempt.    Functional Ambulation: not performed this date.     Activities of Daily Living:     LE Dressing Level of Assistance: Set-up Assistance, Minimum assistance (Pt donned B  socks in long sitting with min A to bring L UE into crossed leg position. Noted increased edema in B LEs. Pt express c/o of tightness in B " "LEs)           Toileting Level of Assistance:  (pt on bowel management program )     Therapeutic Activities and Exercises:  Pt educated by therapist on:   - Pt educated on role of OT, POC, and goals for therapy.     - Importance of OOB ax's with staff member assistance and importance of sitting OOB majority of the day  - Pt completed ADLs and functional mobility for treatment session as noted above   - Therapist and pt reviewed BUE HEP theraband exercises. Pt  Reported that he has been performing HEP daily and has noticed improvements with UB strength.   -Pt verbalized understanding. Pt expressed no further concerns/questions.    AM-PAC 6 CLICK ADL   How much help from another person does this patient currently need?   1 = Unable, Total/Dependent Assistance  2 = A lot, Maximum/Moderate Assistance  3 = A little, Minimum/Contact Guard/Supervision  4 = None, Modified [place]/Independent    Putting on and taking off regular lower body clothing? : 3  Bathing (including washing, rinsing, drying)?: 2  Toileting, which includes using toilet, bedpan, or urinal? : 2  Putting on and taking off regular upper body clothing?: 3  Taking care of personal grooming such as brushing teeth?: 3  Eating meals?: 3  Total Score: 16     AM-PAC Raw Score CMS "G-Code Modifier Level of Impairment Assistance   6 % Total / Unable   7 - 8 CM 80 - 100% Maximal Assist   9-13 CL 60 - 80% Moderate Assist   14 - 19 CK 40 - 60% Moderate Assist   20 - 22 CJ 20 - 40% Minimal Assist   23 CI 1-20% SBA / CGA   24 CH 0% Independent/ Mod I       Patient left transporter and RN with all lines intact and RN and transporter present    ASSESSMENT:  Paul E Sistrunk is a 57 y.o. male with a medical diagnosis of Daaod-amweyt-fosu disease and presents with the below stated deficts. Pt demo increased motivation to participate  in therapy session. Session limited due to arrival of transporter. Pt will continue to benefit from skilled OT in order to maximize " pt's safety and (I) with functional mobility and ADLs.       Rehab identified problem list/impairments: Rehab identified problem list/impairments: weakness, impaired endurance, impaired self care skills, impaired functional mobilty, gait instability, impaired balance, decreased lower extremity function, decreased upper extremity function, pain, impaired cardiopulmonary response to activity, edema    Rehab potential is good.    Activity tolerance: Fair    Discharge recommendations: Discharge Facility/Level Of Care Needs: rehabilitation facility     Barriers to discharge: Barriers to Discharge: Decreased caregiver support (Pt requires increased assistance at this time )    Equipment recommendations: walker, rolling, bath bench, 3-in-1 commode     GOALS:    Occupational Therapy Goals        Problem: Occupational Therapy Goal    Goal Priority Disciplines Outcome Interventions   Occupational Therapy Goal     OT, PT/OT Ongoing (interventions implemented as appropriate)    Description:  Goals to be met by: 10/9/17     Patient will increase functional independence with ADLs by performing:    UE Dressing with Stand-by Assistance.  LE Dressing with Stand-by Assistance.  Grooming while EOB with Stand-by Assistance.  Toileting from bedside commode with Minimal Assistance for hygiene and clothing management.   Supine to sit with Stand-by Assistance.  Toilet transfer to bedside commode with Contact Guard Assistance.                      Plan:  Patient to be seen 4 x/week to address the above listed problems via self-care/home management, therapeutic activities, therapeutic exercises, neuromuscular re-education, community/work re-entry  Plan of Care expires: 10/25/17  Plan of Care reviewed with: patient         Tala Olmedosakshi, OT  10/02/2017

## 2017-10-02 NOTE — SUBJECTIVE & OBJECTIVE
Subjective:     Interval History: Improved pain control with PCA pump. Noted some coughing without any SOB. Some green sputum production as per nursing.    Objective:     Vital Signs (Most Recent):  Temp: 97.5 °F (36.4 °C) (10/02/17 0750)  Pulse: (!) 115 (10/02/17 1056)  Resp: 16 (10/02/17 0750)  BP: 122/62 (10/02/17 0750)  SpO2: (!) 91 % (10/02/17 0750) Vital Signs (24h Range):  Temp:  [97.5 °F (36.4 °C)-98.5 °F (36.9 °C)] 97.5 °F (36.4 °C)  Pulse:  [109-130] 115  Resp:  [14-18] 16  SpO2:  [88 %-95 %] 91 %  BP: (103-122)/(62-71) 122/62     Weight: 82.2 kg (181 lb 3.5 oz)  Body mass index is 23.91 kg/m².  Body surface area is 2.06 meters squared.    ECOG SCORE         [unfilled]    Intake/Output - Last 3 Shifts       09/30 0700 - 10/01 0659 10/01 0700 - 10/02 0659 10/02 0700 - 10/03 0659    P.O. 1805 1220 480    I.V. (mL/kg) 3227.1 (42.4) 2713.2 (33)     Blood 285 700     IV Piggyback 350 600     TPN 1171 1211     Total Intake(mL/kg) 6838.1 (89.7) 6444.2 (78.4) 480 (5.8)    Urine (mL/kg/hr) 1500 (0.8) 1150 (0.6) 325 (0.8)    Stool 600 (0.3) 0 (0)     Total Output 2100 1150 325    Net +4738.1 +5294.2 +155           Stool Occurrence 8 x 3 x           Physical Exam   Constitutional: He is oriented to person, place, and time.   Thin, cachectic male   HENT:   Head: Normocephalic and atraumatic.   Eyes: EOM are normal. Pupils are equal, round, and reactive to light.   Neck: Normal range of motion.   Cardiovascular: Intact distal pulses.    Pulmonary/Chest: Effort normal.   Abdominal: Soft. Bowel sounds are normal. There is no tenderness.   Hyperactive bowel sounds   Musculoskeletal: Normal range of motion.   Neurological: He is alert and oriented to person, place, and time.   Skin: Skin is warm and dry.       Significant Labs:   CBC:     Recent Labs  Lab 10/01/17  0416 10/02/17  0459   WBC 4.26 3.92   HGB 6.6* 7.7*   HCT 19.6* 22.1*   PLT 30* 9*    and CMP:     Recent Labs  Lab 10/01/17  0416 10/02/17  0459    137    K 3.2* 3.2*    109   CO2 21* 20*   * 290*   BUN 39* 39*   CREATININE 0.9 0.8   CALCIUM 6.9* 7.3*   PROT 3.2* 3.3*   ALBUMIN 1.2* 1.1*   BILITOT 2.0* 2.0*   ALKPHOS 162* 161*   AST 57* 46*   ALT 43 45*   ANIONGAP 7* 8   EGFRNONAA >60.0 >60.0     Portable CXR: Stable chronic changes without evidence of acute intrathoracic abnormality identified on this single radiographic view of the chest.

## 2017-10-02 NOTE — PT/OT/SLP PROGRESS
"Physical Therapy      Paul E Sistrunk  MRN: [id number]    Patient not seen today secondary to Patient unwilling to participate.  Pt refused PT services at 1250pm, stating "I'm very nauseated".  2nd attempt to treat, pt was in radiology at 500pm.  Will follow-up with pt at the next scheduled tx session.    Joyce Ochoa, PT    "

## 2017-10-03 PROBLEM — D61.818 PANCYTOPENIA: Status: ACTIVE | Noted: 2017-01-01

## 2017-10-03 PROBLEM — I46.9 CARDIAC ARREST: Status: ACTIVE | Noted: 2017-01-01

## 2017-10-03 PROBLEM — D69.6 THROMBOCYTOPENIA: Status: ACTIVE | Noted: 2017-01-01

## 2017-10-03 PROBLEM — J18.9 PNEUMONIA INVOLVING LEFT LUNG: Status: ACTIVE | Noted: 2017-01-01

## 2017-10-03 NOTE — SIGNIFICANT EVENT
PULM/CC Fellow Note    Patient becoming increasingly encephalopathic throughout the morning, frequently pulling off bipap mask -- causing desaturation into mid 70s. Attempted comfort flow O2 and NRB to maintain oxygenation, however he continued to remove mask. Due to concerns for patient's safety and worsening hypoxemic failure, decision was made to pursue elective intubation. Patient agreed and spoke with his mother briefly before the procedure. Dr Bolden from our team also spoke with the patient's mother to explain the situation. Patient was successfully intubated with RSI.  See procedure note for details.     Rodolfo Robin MD  [facility] Fellow  Pager: [phone]  Cell: [phone]

## 2017-10-03 NOTE — PROGRESS NOTES
10/03/17 0059   Vital Signs   Resp 16   SpO2 (!) 83 %   Pulse Oximetry Type Intermittent   Flow (L/min) 5   O2 Device (Oxygen Therapy) nasal cannula     Pt still is not in any respiratory distress. Sitting up in the bed performing IS at this time. Notified Dr. Wolfe. No new orders at this time. Will continue to monitor.

## 2017-10-03 NOTE — DISCHARGE SUMMARY
[facility]  Critical Care Medicine  Discharge Summary      Patient Name: Paul E Sistrunk  MRN: [id number]  Admission Date: 9/13/2017  Hospital Length of Stay: 20 days  Discharge Date and Time: 10/3/2017  Death at 14:13    Attending Physician: Tyrone Ruelas*   Discharging Provider: Tyrone Ruelas MD  Primary Care Provider: Edgar Pinon MD  Reason for Admission: GVHD, Afib with RVR and hypotension     HPI:   Mr. 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  On admission, he was started on empiric antibiotics for possible sepsis.  Blood cultures ngtd, urinalysis non-infectious.  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  He was started on high dose methylprednisolone.  CMV PCR undetected on 9/16.  Histoplasma antigen pending.  HSV pending (sent to [facility]).  ID evaluated on 9/18 and did not feel that there were any obvious uncontrolled infectious issues.  Empiric antibiotics discontinued.      MICU consulted 9/21 for atrial fibrillation with RVR (170-180) with SBP 80's.   Mr. Sistrunk was given metoprolol IV with some improvement.   Additionally, he was noted to be off flecanide since admission.  Cardiology was consulted by primary team.  He was asymptomatic and the decision was made for him to remain on the floor with BMT service.  Over the course of the morning, he was restarted on flecanide and oral metoprolol.  Intermittent episodes of hypotension noted, which improved with IVF boluses.    Mr. Sistrunk continues to have afib with RVR and borderline hypotension and was transferred to the ICU.  Prior to transfer, he  developed unstable Afib RVR and was subsequently cardioverted.  He required vasopressors after cardioversion.      Procedure(s) (LRB):  SIGMOIDOSCOPY-FLEXIBLE (N/A)    Indwelling Lines/Drains at Time of Discharge:   Lines/Drains/Airways     Central Venous Catheter Line                 Tunneled Central Line Insertion/Assessment - Double Lumen  07/24/17 1522 left subclavian 71 days          Drain                 Fecal Incontinence  09/29/17 1430 4 days         NG/OG Tube 10/03/17 1032 [place] sump 18 Fr. Center mouth less than 1 day          Airway                 Airway - Non-Surgical 10/03/17 1030 Endotracheal Tube less than 1 day              Hospital Course:   Mr. Sistrunk was transferred to the ICU on 9/21 for shock following atrial fibrillation with RVR requiring cardioversion.  He was pan cultures and started on empiric antibiotic (vancomycin and pip/tazo).  Overnight, he was weaned off vasopressors and remains in -ST.    10/3 Patient was progressively more hypoxic with increasing oxygen needs with transition that evening from 3L nasal cannula to 5L nasal cannula to Venti-Mask with FiO2 of 50%. CXR was attained which was demonstrable for left middle lobe pneumonia. Patient was started on IV cefepime and vancomycin and patient was given 500 ml NS bolus. Patient was visited later in the evening with noted worsening work of breathing and use of abdominal accessory muscles. Stated he was more short of breath and physical examination was demonstrable now for bilateral inspiratory crackles. Transferred to ICU for closer monitoring.      10/3   Patient coded 8 times, was resuscitated multiple times over the course of 1 hr.  Patient's family was made aware of the situation. Decision was made to make the patient DNR.   Eventually patient became hypotensive and lost his pulse.   Medical team called to bedside by patient's nurse.   No pulse felt, no heart sounds, pupils fixed and dilated, no corneal reflex.    Family and  notified.  Time of death - 14:13  Cause of death - Hypoxic respiratory failure.     Consults         Status Ordering Provider     Inpatient consult to Critical Care Medicine  Once     Provider:  (Not yet assigned)    Completed RADHA MELTON     Inpatient consult to Critical Care Medicine  Once     Provider:  (Not yet assigned)    Completed JAM HUANG     Inpatient consult to Dietary  Once     Provider:  (Not yet assigned)    Completed RANDALL SAWANT     Inpatient consult to Electrophysiology  Once     Provider:  (Not yet assigned)    Completed CHADD PERDOMO     Inpatient consult to Gastroenterology  Once     Provider:  (Not yet assigned)    Completed GELY ESTRELLA     Inpatient consult to Infectious Diseases  Once     Provider:  (Not yet assigned)    Completed NILDA TAMAYO        Significant Labs:  A1C: No results for input(s): HGBA1C in the last 48 hours.  ABGs:     Recent Labs  Lab 10/03/17  0808   PH 7.386   PCO2 30.3*   HCO3 18.1*   POCSATURATED 90*   BE -7     Bilirubin:     Recent Labs  Lab 10/01/17  0416 10/02/17  0459 10/03/17  0431 10/03/17  1107 10/03/17  1229   BILITOT 2.0* 2.0* 2.1* 2.3* 1.9*     Blood Culture:     Recent Labs  Lab 10/03/17  0243   LABBLOO No Growth to date  No Growth to date     BMP:     Recent Labs  Lab 10/03/17  1229   *   *   K 3.7      CO2 11*   BUN 51*   CREATININE 1.5*   CALCIUM 7.5*   MG 2.9*     CMP:     Recent Labs  Lab 10/03/17  0431 10/03/17  1107 10/03/17  1229   * 130* 133*   K 3.8 3.5 3.7    103 105   CO2 15* 13* 11*   * 863* 844*   BUN 44* 48* 51*   CREATININE 0.9 1.5* 1.5*   CALCIUM 7.2* 7.7* 7.5*   PROT 5.0* 5.9* 4.7*   ALBUMIN 1.1* 1.2* 1.0*   BILITOT 2.1* 2.3* 1.9*   ALKPHOS 204* 249* 258*   AST 61* 73* 104*   ALT 51* 60* 55*   ANIONGAP 12 14 17*   EGFRNONAA >60.0 51.7* 51.7*     Cardiac Markers: No results for input(s): CKMB, TROPONINT, MYOGLOBIN in the last 48  hours.  Coagulation:     Recent Labs  Lab 10/03/17  1229   INR 1.2   APTT 27.7     Lactic Acid:     Recent Labs  Lab 10/03/17  0242   LACTATE 3.4*     Lipid Panel: No results for input(s): CHOL, HDL, LDLCALC, TRIG, CHOLHDL in the last 48 hours.  Pathology Results  (Last 10 years)               07/21/17 1223  Tissue Specimen to Pathology Final result        POCT Glucose: No results for input(s): POCTGLUCOSE in the last 48 hours.  Prealbumin: No results for input(s): PREALBUMIN in the last 48 hours.  Respiratory Culture: No results for input(s): GSRESP, RESPIRATORYC in the last 48 hours.  Troponin:     Recent Labs  Lab 10/03/17  1229   TROPONINI 0.053*     Urine Culture: No results for input(s): LABURIN in the last 48 hours.  Urine Studies: No results for input(s): COLORU, APPEARANCEUA, PHUR, SPECGRAV, PROTEINUA, GLUCUA, KETONESU, BILIRUBINUA, OCCULTUA, NITRITE, UROBILINOGEN, LEUKOCYTESUR, RBCUA, WBCUA, BACTERIA, SQUAMEPITHEL, HYALINECASTS in the last 48 hours.    Invalid input(s): WRIGHTSUR  All pertinent labs within the past 24 hours have been reviewed.    Significant Imaging:  I have reviewed all pertinent imaging results/findings within the past 24 hours.    Pending Diagnostic Studies:     Procedure Component Value Units Date/Time    Lactic acid, plasma [[id number]] Collected:  09/21/17 2057    Order Status:  Sent Lab Status:  In process Updated:  09/21/17 2058    Specimen:  Blood from Blood     Platelet Count, Blue Top [[id number]] Collected:  09/22/17 1441    Order Status:  Sent Lab Status:  In process Updated:  09/22/17 1455    Specimen:  Blood from Blood     T-SPOT TB Screening Test [[id number]] Collected:  09/23/17 0500    Order Status:  Sent Lab Status:  No result     Specimen:  Blood from Blood         Final Active Diagnoses:    Diagnosis Date Noted POA    PRINCIPAL PROBLEM:  Acute respiratory failure with hypoxia [J96.01] 07/28/2017 Yes    Pneumonia involving left lung [J18.9] 10/03/2017 Yes     Pancytopenia [D61.818] 10/03/2017 No    Thrombocytopenia [D69.6] 10/03/2017 Yes    Cardiac arrest [I46.9] 10/03/2017 No    Acute GVHD [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]  Unknown    HCAP (healthcare-associated pneumonia) [J18.9]  Unknown    Hkxmz-psuwiq-wtaz disease [D89.813] 2017 Yes    Sepsis [A41.9] 2017 Yes    Anemia due to chemotherapy [D64.81, T45.1X5A] 2017 Yes    CINV (chemotherapy-induced nausea and vomiting) [R11.2, T45.1X5A] 2017 Yes    AML (acute myeloid leukemia) in remission [C92.01] 2017 Yes    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] 10/04/2016 Not Applicable    HIV disease [B20] 2015 Yes      Problems Resolved During this Admission:    Diagnosis Date Noted Date Resolved POA    Left arm swelling [M79.89] 2017 No    Coagulopathy [D68.9] 2017 No    Metabolic acidosis, normal anion gap (NAG) [E87.2] 2017 No    Atrial fibrillation with RVR [I48.91] 2017 Yes    Hypomagnesemia [E83.42] 2017 09/15/2017 Yes    Hypophosphatemia [E83.39] 2017 09/15/2017 Yes    Hypokalemia [E87.6] 2017 09/15/2017 Yes                                      Discharged Condition:     Disposition:       Patient Instructions:   No discharge procedures on file.  Medications:  None (patient  at medical facility)     Tyrone Ruelas MD  Critical Care Medicine  [facility]

## 2017-10-03 NOTE — ASSESSMENT & PLAN NOTE
- Likely secondary to lingular pneumonia (seen on CXR). Appreciable crackles on left middle lung fields.  - Patient has complained of baseline cough that is occasionally productive of thick green sputum, but has not increased in quantity today.  - Although patient is +12L here, physical examination and CXR not demonstrable for significant pulmonary edema.  - Otherwise, patient remains afebrile but tachycardic in 130-150s; very anxious.  - Pancytopenia progressively worsening - ANC ~800.   - Transitioned from 3L NC to 5L NC and now on VentiMask at FiO2 of 50%; in no distress and breathing comfortably without use of accessory muscles.   - Agree with addition of cefepime and vancomycin (first dose 0100 on 10/3). Addition of metronidazole for anaerobic coverage.    - Lactic acid and blood culture X2 ordered. Blood culture X1 from left subclavian port site.   - Given 500 ml NS bolus on 10/3 - physical examination and CXR now demonstrable for pulmonary edema- will diurese furosemide 60 mg IV once and continue PRN.  - Placed on BiPAP at 12/5 with FiO2 of 60%.

## 2017-10-03 NOTE — EICU
Called back into room electronically via the eICU system to remote document/assist in code blue proceedings. Again, despite efforts, Mr. Sistrunk is found to be in a rapidly deteriorating state with worsening hypoxia and subsequent PEA. He was taken off the vent and bagged as another round of ACLS PEA Algorithm was initiated as follows: ROSC was again achieved.     Time Event Details User             12:38 Code Start INITIAL RHYTHM: PEA KS     12:38 Staff Arrived Tyrone Ruelas MD; Ann Fernando, DI; Sandra Belle, RN; Joanie Lindsey RN KS     12:38 Info Prior To Code Info Prior To Code - Code Location: Inpatient Rapid Response Team: Responded To Code Witnessed: Yes Event Type: Respiratory Conscious at Onset: No Pulse Present at Onset: No Respirations Present at Onset: No Pre-Arrest Vent/Sp02/A-B/ECG: Vent; ECG KS     12:38 Condition on Arrival Code Condition on Arrival in ED Code - Code Location: Inpatient Pulses Present: Yes Resp Present: On Ventilator Mental Status: Unresponsive Cardiac Rhythm: PEA KS     12:38 Rhythms - ABCs Cardiac Rhythm - Ventricular Rhythm: PEA (pulseless electrical activity)  ABCs - Airway: Clear Breathing: Absent Pulses: Absent KS     12:38 Interventions Interventions - Compressions: Started Airway: Ambu bag (Taken off of vent and bagged) Oxygen Percent (% O2): 100 KS     12:38 EPINEPHrine injection Ordered and Given Dose: 1 mg Route: Intravenous  Line:  Tunneled Central Line Insertion/Assessment - Double Lumen 07/24/17 1522 left subclavian  Ordered by: Tyrone Ruelas MD AR     12:38 sodium bicarbonate 8.4 % (1 mEq/mL) injection Ordered and Given Dose: 50 mEq Route: Intravenous  Line:  Tunneled Central Line Insertion/Assessment - Double Lumen 07/24/17 1522 left subclavian  Ordered by: Tyrone Ruelas MD AR     12:40 Rhythms - ABCs Cardiac Rhythm - Junctional Rhythm: accelerated junctional rhythm  ABCs - Airway: Clear Breathing: Absent Pulses:  Carotid Present; Femoral Present KS     12:40 Code Outcome Outcome - Survival: Yes Code Termination Due to: Return of Spontaneous Circulation Transfer To Critical Care: Other (Comment) (Already in critical care) KS     12:40 Vitals Vitals - Pulse: 154 Resp: 31 BP: 188/90 Patient Position: Lying SpO2: 87 % Pulse Ox Source: Continuous KS     12:40 Oxygen Therapy Oxygen Therapy - SpO2: 87 % Pulse Oximetry Type: Continuous Oxygen Concentration (%): 100 O2 Device (Oxygen Therapy): ventilator KS     12:40 Notifications Notifications - Notified: Family KS     12:40 Code End ROSC KS     12:40 Staff Departed Tyrone Ruelas MD (Automatically marked out by Code End event); Ann Fernando CRT (Automatically marked out by Code End event); Sandra Belle, RN (Automatically marked out by Code End event); Joanie Lindsey RN (Automatically marked out by Code End event) KS

## 2017-10-03 NOTE — ASSESSMENT & PLAN NOTE
- we are concerned about acute versus chronic vimlc-yswwqd-lxyu disease given that he received donor lymphocytes in August 2017.   - s/p flex sig 9/15/17 showing erythematous mucosa in the recto-sigmoid colon (biopsied)  - serum CMV negative (repeat 9/25 negative), positive on GI biopsy stain - continue valganciclovir  - solumedrol increased to 80mg iv bid on 9/14/17, diarrhea improved 9/20 and steroids decreased, increased back to 80 mg bid 9/21 due to increased stool output. Currently 19 days of high dose steroids,  - budesonide 9 mg 9/17 - stopped 9/28, started on Beclomethasone 9/28  - Remicade started 9/23/17 (400 mg weekly)  - continue scheduled lomotil   - Ibrutinib ordered, dose reduced to 280 mg daily and sent to [facility] 9/26; requested it be sent to [facility] - sent 9/28  - Started PCA pump for abdominal discomfort. Will titrate as needed.

## 2017-10-03 NOTE — EICU
Called into the bedside electronically via the eICU system to remote document/assist in code blue proceedings. Again Mr. Sistrunk was found to be in a rapidly worsening hypoxic state when converted quickly to PEA. The ACLS PEA Asystole Algorithm was initiated and proceeded as follows:  ROSC was again achieved.     12:51 Code Start INITIAL RHYTHM PEA KS     12:51 Staff Arrived Tyrone Ruelas MD; Sandra Belle, RN; Ann Fernando, CRT; Joanie Lindsey, RN KS     12:51 Info Prior To Code Info Prior To Code - Code Location: Inpatient Rapid Response Team: Responded To Code Witnessed: Yes Event Type: Respiratory Conscious at Onset: No Pulse Present at Onset: No Respirations Present at Onset: No Ventilated Pre-Arrest: Yes Pre-Arrest Vent/Sp02/A-B/ECG: Vent; Pulse Ox; ECG KS     12:51 Condition on Arrival Code Condition on Arrival in ED Code - Code Location: Inpatient Pulses Present: Yes Resp Present: On Ventilator Mental Status: Unresponsive Cardiac Rhythm: PEA KS     12:51 Rhythms - ABCs Cardiac Rhythm - Ventricular Rhythm: PEA (pulseless electrical activity)  ABCs - Airway: Clear Breathing: Absent Pulses: Absent KS     12:51 Interventions Interventions - Compressions: Started Airway: Ambu bag (Taken off of vent and bagged) Oxygen Percent (% O2): 100 KS     12:51 EPINEPHrine injection Ordered and Given Dose: 1 mg Route: Intravenous  Line:  Tunneled Central Line Insertion/Assessment - Double Lumen 07/24/17 1522 left subclavian  Ordered by: Tyrone Ruelas MD AR     12:53 Rhythms - ABCs Cardiac Rhythm - Junctional Rhythm: accelerated junctional rhythm  ABCs - Airway: Clear Breathing: Absent Pulses: Carotid Present; Femoral Present KS     12:53 Code Outcome Outcome - Survival: Yes Code Termination Due to: Return of Spontaneous Circulation Transfer To Critical Care: Other (Comment) (Already in critical care) KS     12:53 Vitals Vitals - Pulse: 148 Heart Rate Source: Monitor Resp: 22 BP: 116/62  Patient Position: Lying SpO2: 74 % Pulse Ox Source: Continuous KS     12:53 Oxygen Therapy Oxygen Therapy - SpO2: 74 % Pulse Oximetry Type: Continuous Oxygen Concentration (%): 100 O2 Device (Oxygen Therapy): ventilator KS     12:53 Notifications Notifications - Notified: Family KS     12:54 Code End ROSC KS     12:54 Staff Departed Tyrone Ruelas MD (Automatically marked out by Code End event); Sandra Belle RN (Automatically marked out by Code End event); Ann Fernando CRT (Automatically marked out by Code End event); Joanie Lindsey RN (Automatically marked out by Code End event) KS

## 2017-10-03 NOTE — PROGRESS NOTES
10/03/2017  0130    Patient seen and evaluated at bedside. Gradually worsening oxygen saturations since ~2300; increase from prior 3L NC to 5L, now on Venti mask 8L FiO2 40%. Initial saturations in low 80s, now saturating ~90% on Venti mask. Patient states does not feel significantly different from prior, but is somewhat concerned about his lower oxygen saturations. Some cough productive of greenish sputum. Able to speak in full sentences.    On exam:  VS: T 98.3F  RR 16 /70 Spo2 83% 5L via NC  Gen: Cachectic, NAD  CV: Tachycardic, normal S1/S2  Pulm: No accessory muscle use, mild L-sided mid-lung crackles.    Placed on cough assist q8h, incentive spirometry q4h wake. Cefepime 2g IV q8h, vancomycin 1250mg IV q12hr initiated as per Dr. Brown. Placed on Venti-mask with goal O2 saturations >90%; continuous pulse oximetry initiated. With concern of developing L-sided pneumonia causing acute respiratory failure with hypoxia overlying patient's GVHD, will discuss with medical ICU regarding higher level of monitoring given steadily increasing oxygen requirements. Plan discussed with patient; he voiced his understanding and has no other concerns at this time.    Addendum 0400  ICU consulted and will hold off on taking the patient at this time. BP remains stable, still somewhat tachycardic, saturating >90% on 12L/50% on Venti-mask. Respiratory rate remains stable. LA returned as 3.4. Giving 30mL/kg NS bolus, will repeat lactic acid once completed. Low threshold for re-consult if respiratory status decompensates.    Addendum 0530  Respiratory status worsening; ICU made aware. Patient with some new R-sided crackles s/p ~500mL of bolus and some new accessory muscle use on exam. Will transfer patient to Medical ICU. Appreciate assistance.    JAM Escalona MD  IM PGY-3  [phone]

## 2017-10-03 NOTE — ASSESSMENT & PLAN NOTE
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

## 2017-10-03 NOTE — ASSESSMENT & PLAN NOTE
- Worsening of respiratory status overnight. Initially improved on CPAP but worsening with likely need for intubation as per ICU  - On vanc/cefepime for multilobar pneumonia  - Appreciate ICU help

## 2017-10-03 NOTE — PLAN OF CARE
Problem: Patient Care Overview  Goal: Plan of Care Review  Outcome: Ongoing (interventions implemented as appropriate)  S/P Allo BMT transplant +383. Patient is AAOx4. Bilateral sclera are yellow in pigmentation. Oral cavity is dry. Pt is calm and cooperative. Teaching and education performed. Patient remains free from falls and injury this shift. Bed in low, locked position, environment is free of clutter, with call bell in reach. Patient encouraged to call for assistance. Patient verbalized understanding. All belongings within reach. Full liquid Diet - additionally on TPN with lipids. No accuchecks ordered - morning glucose of 417. No N/V throughout shift. Afebrile - started on Cefe and vanc for new dx of pneumonia. Flexiseal in place - cleaned site with soap and water. Poor output and irrigated flexiseal with 120 cc. Patient C/O pain to bilateral generalized abdomen - on PCA pump going at 0.4mg/dose with max setting of 2mg/hr with max load of 12min. O2: increased from 3L NC to nonrebreather - admit to CMICU. Plan: continue treatment of GVHD with solumedrol and remicade - awaiting improvement of clinical symptoms. Will continue to monitor.

## 2017-10-03 NOTE — SUBJECTIVE & OBJECTIVE
Subjective:     Interval History: Overnight transferred to ICU for increased work of breathing. States that he feels improved with CPAP. At time of this note, patient experiencing increased work of breathing.    Objective:     Vital Signs (Most Recent):  Temp: 98.5 °F (36.9 °C) (10/03/17 0432)  Pulse: (!) 137 (10/03/17 0629)  Resp: (!) 34 (10/03/17 0629)  BP: 127/70 (10/03/17 0432)  SpO2: 99 % (10/03/17 0629) Vital Signs (24h Range):  Temp:  [97.5 °F (36.4 °C)-99.1 °F (37.3 °C)] 98.5 °F (36.9 °C)  Pulse:  [115-138] 137  Resp:  [16-35] 34  SpO2:  [83 %-99 %] 99 %  BP: ()/(52-70) 127/70     Weight: 83.6 kg (184 lb 4.9 oz)  Body mass index is 24.32 kg/m².  Body surface area is 2.08 meters squared.    ECOG SCORE         [unfilled]    Intake/Output - Last 3 Shifts       10/01 0700 - 10/02 0659 10/02 0700 - 10/03 0659 10/03 0700 - 10/04 0659    P.O. 1220 3000     I.V. (mL/kg) 2713.2 (33) 3027.1 (36.2)     Blood 700 283.1     Other  60     IV Piggyback 600 1300     TPN 1211 351.7     Total Intake(mL/kg) 6444.2 (78.4) 8021.8 (96)     Urine (mL/kg/hr) 1150 (0.6) 750 (0.4)     Stool 0 (0) 125 (0.1)     Total Output 1150 875      Net +5294.2 +7146.8             Stool Occurrence 3 x 2 x           Physical Exam   Constitutional: He is oriented to person, place, and time.   Thin, cachectic male   HENT:   Head: Normocephalic and atraumatic.   Eyes: EOM are normal. Pupils are equal, round, and reactive to light.   Neck: Normal range of motion.   Cardiovascular: Intact distal pulses.    Pulmonary/Chest: Effort normal.   Abdominal: Soft. Bowel sounds are normal. There is no tenderness.   Hyperactive bowel sounds   Musculoskeletal: Normal range of motion.   Neurological: He is alert and oriented to person, place, and time.   Skin: Skin is warm and dry.       Significant Labs:   CBC:     Recent Labs  Lab 10/02/17  0459 10/03/17  0431   WBC 3.92  --    HGB 7.7* 7.5*   HCT 22.1* 20.2*   PLT 9*  --     and CMP:     Recent Labs  Lab  10/02/17  0459 10/02/17  1920 10/03/17  0431    136 134*   K 3.2* 3.4* 3.8    108 107   CO2 20* 21* 15*   * 312* 417*   BUN 39* 43* 44*   CREATININE 0.8 0.9 0.9   CALCIUM 7.3* 7.5* 7.2*   PROT 3.3*  --  5.0*   ALBUMIN 1.1*  --  1.1*   BILITOT 2.0*  --  2.1*   ALKPHOS 161*  --  204*   AST 46*  --  61*   ALT 45*  --  51*   ANIONGAP 8 7* 12   EGFRNONAA >60.0 >60.0 >60.0     Reviewed CXR showing left sided infiltrate  Repeat portable from this morning show bilateral lung opacities.

## 2017-10-03 NOTE — SIGNIFICANT EVENT
Critical Care Medicine                                                                  Death Note        Called to bedside by patient's nurse.    Patient not responding to verbal or tactile stimuli.   No corneal reflex, pupils fixed and dilated.   No heart sounds or pulses.  Family and  notified.    Time of death: 14:13  Cause of death: Hypoxic respiratory failure.           Demetrius Swann MD  Pager: [phone]  Email: [email or web address]

## 2017-10-03 NOTE — PROGRESS NOTES
Shortly after intubation pt began to desaturate.  Dr. Kruse with [facility] to bedside.  Increased PEEP and ordered Stat chest x-ray.  Pt became more hypoxic and a rhythm change was noted on the monitor.  HR 80s, previously 130s,  Pt PEA, chest compressions started.  CCS team to bedside.  RT bagging pt with peep valve. This sequence of events happened multiple more times.  Family was near bedside for initial code.  Dr. Ruelas talked with family everyone was in congruence that pt would be placed on ventilator and that if he lost a pulse no chest compression would be started. Family time was facilitated.  Mother and good friend at bedside.  1413: pt pronounced by physician.   notified, Jasper coronrer notified that pt HIV positive.

## 2017-10-03 NOTE — ASSESSMENT & PLAN NOTE
- noted pancytopenia; patient on bactrim and valganciclovir both of which can cause BM suppression  - Consider changing TMP-SMX to pentamidine  - Patient still requires valganciclovir

## 2017-10-03 NOTE — CARE UPDATE
I attempted to call multiple times patient's emergency contact, Scooter, as well as patient's mother, Latoya, per numbers provided to inform them of the patient's critical condition. Was eventually able to reach the patient's mother. At the time, patient had agreed to intubation after adamantly refusing continued BiPAP and Venti-mask. Critical care fellow Dr. Rodolfo Robin was clear with patient that without compliance with non-invasive ventilation maneuvers, intubation would be needed to provide adequate oxygenation and to sustain life. I communicated to his mother over the phone that her son was in the process of intubation and considering significant co-morbidities including HIV and AML/neutropenia, prognosis was poor even with intubation and current therapies including antibiotics and diuretics, and the unlikelihood of extubation without appreciable clinical improvement within the next several days. Latoya expressed understanding and stated that she was her way to the hospital.    Bryce Bolden MD  PGY-2 Internal Medicine  [phone]

## 2017-10-03 NOTE — CONSULTS
[facility]  Critical Care Medicine  Consult Note    Patient Name: Paul E Sistrunk  MRN: [id number]  Admission Date: 9/13/2017  Hospital Length of Stay: 20 days  Code Status: Full Code  Attending Physician: Tyrone Ruelas*   Primary Care Provider: Edgar Pinon MD   Principal Problem: Acute respiratory failure with hypoxia    Inpatient consult to Critical Care Medicine  Consult performed by: CHENTE MINOR  Consult ordered by: JAM HUANG  Reason for consult: acute hypoxic respiratory failure with hypoxia        Subjective:     HPI:  Mr. 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  On admission, he was started on empiric antibiotics for possible sepsis.  Blood cultures ngtd, urinalysis non-infectious.  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  He was started on high dose methylprednisolone.  CMV PCR undetected on 9/16.  Histoplasma antigen pending.  HSV pending (sent to [facility]).  ID evaluated on 9/18 and did not feel that there were any obvious uncontrolled infectious issues.  Empiric antibiotics discontinued.      MICU consulted 9/21 for atrial fibrillation with RVR (170-180) with SBP 80's.   Mr. Sistrunk was given metoprolol IV with some improvement.   Additionally, he was noted to be off flecanide since admission.  Cardiology was consulted by primary team.  He was asymptomatic and the decision was made for him to remain on the floor with BMT service.  Over the course of the morning, he was restarted on flecanide and oral metoprolol.  Intermittent episodes of hypotension noted, which improved with IVF boluses.     Mr. Sistrunk continues to have afib with RVR and borderline hypotension and was transferred to the ICU.  Prior to transfer, he developed unstable Afib RVR and was subsequently cardioverted.  He required vasopressors after cardioversion.      Hospital/ICU Course:  Mr. Sistrunk was transferred to the ICU on 9/21 for shock following atrial fibrillation with RVR requiring cardioversion.  He was pan cultures and started on empiric antibiotic (vancomycin and pip/tazo).  Overnight, he was weaned off vasopressors and remains in -ST.    10/3 Patient was progressively more hypoxic with increasing oxygen needs with transition that evening from 3L nasal cannula to 5L nasal cannula to Venti-Mask with FiO2 of 50%. CXR was attained which was demonstrable for left middle lobe pneumonia. Patient was started on IV cefepime and vancomycin and patient was given 500 ml NS bolus. Patient was visited later in the evening with noted worsening work of breathing and use of abdominal accessory muscles. Stated he was more short of breath and physical examination was demonstrable now for bilateral inspiratory crackles. Transferred to ICU for closer monitoring.     Past Medical History:   Diagnosis Date    Cancer 2/2016    leukemia, AML    HIV infection     Pancytopenia due to antineoplastic chemotherapy 7/4/2016       Past Surgical History:   Procedure Laterality Date    CYSTOSCOPY         Review of patient's allergies indicates:   Allergen Reactions    Etoposide Rash       Family History     Problem Relation (Age of Onset)    Cancer Father        Social History Main Topics    Smoking status: Never Smoker    Smokeless tobacco: Never Used    Alcohol use No    Drug use: No    Sexual activity: Not on file      Review of Systems   Constitutional: Positive for fatigue. Negative for activity change, chills and fever.   HENT: Negative for congestion and facial swelling.    Eyes: Negative for discharge and itching.   Respiratory: Positive  for cough. Negative for chest tightness and shortness of breath.    Cardiovascular: Negative for chest pain and palpitations.   Gastrointestinal: Positive for abdominal pain (right and left lower quadrant abdominal discomfort) and diarrhea. Negative for abdominal distention, blood in stool, nausea and vomiting.   Genitourinary: Negative for difficulty urinating, dysuria and hematuria.   Skin: Positive for pallor. Negative for color change and rash.   Neurological: Positive for weakness (generalized). Negative for dizziness and headaches.     Objective:     Vital Signs (Most Recent):  Temp: 98.3 °F (36.8 °C) (10/02/17 2336)  Pulse: (!) 128 (10/02/17 2359)  Resp: 16 (10/03/17 0137)  BP: 118/70 (10/02/17 2336)  SpO2: (!) 90 % (10/03/17 0137) Vital Signs (24h Range):  Temp:  [97.5 °F (36.4 °C)-99.1 °F (37.3 °C)] 98.3 °F (36.8 °C)  Pulse:  [109-138] 128  Resp:  [14-18] 16  SpO2:  [83 %-95 %] 90 %  BP: ()/(52-71) 118/70   Weight: 82.2 kg (181 lb 3.5 oz)  Body mass index is 23.91 kg/m².      Intake/Output Summary (Last 24 hours) at 10/03/17 0234  Last data filed at 10/02/17 2337   Gross per 24 hour   Intake          7505.48 ml   Output              900 ml   Net          6605.48 ml       Physical Exam   Constitutional: He is oriented to person, place, and time.   Thin, cachectic male, anxious but in no acute distress.    HENT:   Head: Normocephalic and atraumatic.   Eyes: EOM are normal. Pupils are equal, round, and reactive to light.   Neck: Normal range of motion.   Cardiovascular: Intact distal pulses.    Pulmonary/Chest: Effort normal. No respiratory distress. He has no wheezes. He has rales (Bilateral inspiratory crackles. ).   Breathing comfortably on CPAP   Abdominal: Soft. Bowel sounds are normal. There is tenderness.   Hyperactive bowel sounds   Musculoskeletal: Normal range of motion. He exhibits edema (2+ pitting bilateral LE edema. ).   Neurological: He is alert and oriented to person, place, and time.    Skin: Skin is warm and dry.       Vents:  Oxygen Concentration (%): 50 (10/03/17 0137)  Lines/Drains/Airways     Central Venous Catheter Line                 Tunneled Central Line Insertion/Assessment - Double Lumen  07/24/17 1522 left subclavian 70 days          Drain                 Fecal Incontinence  09/29/17 1430 3 days              Significant Labs:    CBC/Anemia Profile:    Recent Labs  Lab 10/01/17  0416 10/02/17  0459   WBC 4.26 3.92   HGB 6.6* 7.7*   HCT 19.6* 22.1*   PLT 30* 9*   MCV 90 86   RDW 22.3* 22.9*        Chemistries:    Recent Labs  Lab 10/01/17  0416 10/02/17  0459 10/02/17  1920    137 136   K 3.2* 3.2* 3.4*    109 108   CO2 21* 20* 21*   BUN 39* 39* 43*   CREATININE 0.9 0.8 0.9   CALCIUM 6.9* 7.3* 7.5*   ALBUMIN 1.2* 1.1*  --    PROT 3.2* 3.3*  --    BILITOT 2.0* 2.0*  --    ALKPHOS 162* 161*  --    ALT 43 45*  --    AST 57* 46*  --    MG 1.2* 1.9 1.6   PHOS 1.7* 2.2* 2.6*       Significant Imaging:   Imaging Results          X-Ray Chest 1 View (Final result)  Result time 10/03/17 06:13:34    Final result by Eugenia Dejesus MD (10/03/17 06:13:34)                 Impression:      As above.      Electronically signed by: EUGENIA DEJESUS  Date:     10/03/17  Time:    06:13              Narrative:    Comparison:10/1/2017.      Technique: Single AP portable chest radiograph.    Findings:   There is a left-sided central venous catheter in place, the tip of which projects at the cavoatrial junction.  The cardiomediastinal silhouette is within normal limits.  The visualized airway is unremarkable.  There has been interval development of increased opacity within the right upper and midlung zones as well as the left lower lung zone concerning for superimposed component of edema versus multifocal infection with probable left pleural fluid.  There is no evidence of pneumothorax.  Osseous structures are intact.                             X-Ray Chest PA And Lateral (In process)                 X-Ray Chest 1 View (Final result)  Result time 10/02/17 03:43:44   Procedure changed from X-Ray Chest PA And Lateral     Final result by Eugenia Dejesus MD (10/02/17 03:43:44)                 Impression:      Stable chronic changes without evidence of acute intrathoracic abnormality identified on this single radiographic view of the chest.      Electronically signed by: EUGENIA DEJESUS  Date:     10/02/17  Time:    03:43              Narrative:    Comparison: Chest radiograph 9/21/2017    Technique: Single frontal view of the chest.    Findings: Left-sided central venous catheter in place the tip projecting over the SVC.  The cardiomediastinal silhouette is within normal limits.  The visualized airway is unremarkable.  The lungs are symmetrically expanded There are bilateral bandlike opacities with an upper lobe predominance (right greater than left) likely representing atelectasis and/or scarring, unchanged from prior study.  There is no definite superimposed focal consolidation,  large volume of pleural fluid or pneumothorax.  Osseous structures demonstrate no acute abnormality.                                Assessment/Plan:     Pulmonary   * Acute respiratory failure with hypoxia    - Likely secondary to lingular pneumonia (seen on CXR). Appreciable crackles on left middle lung fields.  - Patient has complained of baseline cough that is occasionally productive of thick green sputum, but has not increased in quantity today.  - Although patient is +12L here, physical examination and CXR not demonstrable for significant pulmonary edema.  - Otherwise, patient remains afebrile but tachycardic in 130-150s; very anxious.  - Pancytopenia progressively worsening - ANC ~800.   - Transitioned from 3L NC to 5L NC and now on VentiMask at FiO2 of 50%; in no distress and breathing comfortably without use of accessory muscles.   - Agree with addition of cefepime and vancomycin (first dose 0100 on 10/3). Addition of  metronidazole for anaerobic coverage.    - Lactic acid and blood culture X2 ordered. Blood culture X1 from left subclavian port site.   - Given 500 ml NS bolus on 10/3 - physical examination and CXR now demonstrable for pulmonary edema- will diurese furosemide 60 mg IV once and continue PRN.  - Placed on BiPAP at 12/5 with FiO2 of 60%.           Pneumonia involving left lung    - See acute respiratory failure with hypoxia.         Pulmonary infiltrates on CXR    - See acute respiratory failure with hypoxia.        Cardiac/Vascular   Atrial fibrillation with rapid ventricular response    Patient with history of SVT s/p cardioversion in 08/2017 who has sustained HR in the 130s-140s who acutely became hypotensive found to be in afib with RVR in the 180s  -Given 5mg Lopressor x2 and rate came down to around 140 which is baseline for this patient  -SBP remained low-normotensive in the 110s-120s; SpO2 95%  -Patient was cardioverted in the ICU on 9/21.  -Currently on flexainide 50 mg PO BID and metoprolol 12.5 mg PO BID.         ID   Sepsis    --Previously in shock - currently resolved, weaned off vasopressors at 0100, 9/22.   --repeat blood cultures ngtd, UA pending.  Chest xray from 9/21 without focal consolidation or infiltrate.  Currently on room air.  --been off of zosyn 9/30 and vanc 9/26.  --continue valganciclovir + voriconazole + bactrim prophylaxis.         HIV disease    --continue HAART regimen          Immunology/Multi System   Szreb-xiqbcw-shew disease    --concern for acute nvrit-uzteuk-hhdn disease given that he received donor lymphocytes in August 2017.   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   --continue  to monitor  --transfuse for plt level <10,000        Oncology   Anemia due to chemotherapy    --no signs of active bleeding.    --monitor cbc  --hemoglobin stable at 7.5         AML (acute myeloid leukemia) in remission    -- Relapsed AML - peripheral blood shows 30% blasts 6/2017   -- BM biopsy results - consistent with 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 Blast of 62%, 19 had a complex karyotype including 5q deletion, 7q deletion, 17p deletion, and one metaphase represented a near tetraploid subclone; FLT-3 negative     -- Had previous reaction to MEC (etoposide caused full body rash).   -- Re-induced with FLAG-BETZY   -- Day 14 bone marrow biopsy done 7/10 - hypocellular with no evidence of residual disease  -- Post DLI on 8/14/17  -- currently receiving Vidaza for 2 cycles given high risk disease per Dr. Atkinson note  -- Followed by BMT service.        S/P allogeneic bone marrow transplant    --s/p Flu/Bu/ATG MUD allogeneic transplant for high risk AML after his second IDAC (6/20/16 - 6/24/16) after a prolonged hospitalization (2/5/16 - 3/21/16) for induction with 7&3 and reinduction with MEC to remission and IDAC (4/4/16 - 4/9/16)  -- Chimerics from marrow done 6/21 show CD3 of 90% donor and 10% recipient, but CD34 of 5% donor and 95% recipient   -- Chimers from 7/10 shows 70% recipient and 30% donor - NOT SORTED  -- Relpased 6/2017 and reinduced with FLAG BETZY  -- DLI 8/14/17        GI   CINV (chemotherapy-induced nausea and vomiting)    Zofran PRN           Critical secondary to Patient has a condition that poses threat to life and bodily function: Severe Respiratory Distress     Critical care was time spent personally by me on the following activities: development of treatment plan with patient or surrogate and bedside caregivers, discussions with consultants, evaluation of patient's response to treatment, examination of patient, ordering and performing treatments and  interventions, ordering and review of laboratory studies, ordering and review of radiographic studies, pulse oximetry, re-evaluation of patient's condition. This critical care time did not overlap with that of any other provider or involve time for any procedures.    Thank you for your consult. I will follow-up with patient. Please contact us if you have any additional questions.     Bryce Bolden MD  PGY-2 Internal Medicine  [phone]    Critical Care Medicine  [facility]

## 2017-10-03 NOTE — PROGRESS NOTES
10/03/17 0137   Vital Signs   Resp 16   SpO2 (!) 90 %   Pulse Oximetry Type Intermittent   Flow (L/min) 12   Oxygen Concentration (%) 50   O2 Device (Oxygen Therapy) venti mask     Increased venti mask to 50% and 12L. Pt stayed on 90-91% SPO2. Will continue to monitor.

## 2017-10-03 NOTE — ASSESSMENT & PLAN NOTE
--concern for acute phncu-aovtai-kgdx disease given that he received donor lymphocytes in August 2017.   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

## 2017-10-03 NOTE — ASSESSMENT & PLAN NOTE
- Continue triumeq  - now on IV steroids with history of fungal infection, ID consulted. Histoplasma antigen done and continue current ppx. ID has signed off

## 2017-10-03 NOTE — SUBJECTIVE & OBJECTIVE
Past Medical History:   Diagnosis Date    Cancer 2/2016    leukemia, AML    HIV infection     Pancytopenia due to antineoplastic chemotherapy 7/4/2016       Past Surgical History:   Procedure Laterality Date    CYSTOSCOPY         Review of patient's allergies indicates:   Allergen Reactions    Etoposide Rash       Family History     Problem Relation (Age of Onset)    Cancer Father        Social History Main Topics    Smoking status: Never Smoker    Smokeless tobacco: Never Used    Alcohol use No    Drug use: No    Sexual activity: Not on file      Review of Systems   Constitutional: Positive for fatigue. Negative for activity change, chills and fever.   HENT: Negative for congestion and facial swelling.    Eyes: Negative for discharge and itching.   Respiratory: Positive for cough. Negative for chest tightness and shortness of breath.    Cardiovascular: Negative for chest pain and palpitations.   Gastrointestinal: Positive for abdominal pain (right and left lower quadrant abdominal discomfort) and diarrhea. Negative for abdominal distention, blood in stool, nausea and vomiting.   Genitourinary: Negative for difficulty urinating, dysuria and hematuria.   Skin: Positive for pallor. Negative for color change and rash.   Neurological: Positive for weakness (generalized). Negative for dizziness and headaches.     Objective:     Vital Signs (Most Recent):  Temp: 98.3 °F (36.8 °C) (10/02/17 2336)  Pulse: (!) 128 (10/02/17 2359)  Resp: 16 (10/03/17 0137)  BP: 118/70 (10/02/17 2336)  SpO2: (!) 90 % (10/03/17 0137) Vital Signs (24h Range):  Temp:  [97.5 °F (36.4 °C)-99.1 °F (37.3 °C)] 98.3 °F (36.8 °C)  Pulse:  [109-138] 128  Resp:  [14-18] 16  SpO2:  [83 %-95 %] 90 %  BP: ()/(52-71) 118/70   Weight: 82.2 kg (181 lb 3.5 oz)  Body mass index is 23.91 kg/m².      Intake/Output Summary (Last 24 hours) at 10/03/17 0234  Last data filed at 10/02/17 2337   Gross per 24 hour   Intake          7505.48 ml   Output               900 ml   Net          6605.48 ml       Physical Exam   Constitutional: He is oriented to person, place, and time.   Thin, cachectic male, anxious but in no acute distress.    HENT:   Head: Normocephalic and atraumatic.   Eyes: EOM are normal. Pupils are equal, round, and reactive to light.   Neck: Normal range of motion.   Cardiovascular: Intact distal pulses.    Pulmonary/Chest: Effort normal. No respiratory distress. He has no wheezes. He has rales (Bilateral inspiratory crackles. ).   Breathing comfortably on CPAP   Abdominal: Soft. Bowel sounds are normal. There is tenderness.   Hyperactive bowel sounds   Musculoskeletal: Normal range of motion. He exhibits edema (2+ pitting bilateral LE edema. ).   Neurological: He is alert and oriented to person, place, and time.   Skin: Skin is warm and dry.       Vents:  Oxygen Concentration (%): 50 (10/03/17 0137)  Lines/Drains/Airways     Central Venous Catheter Line                 Tunneled Central Line Insertion/Assessment - Double Lumen  07/24/17 1522 left subclavian 70 days          Drain                 Fecal Incontinence  09/29/17 1430 3 days              Significant Labs:    CBC/Anemia Profile:    Recent Labs  Lab 10/01/17  0416 10/02/17  0459   WBC 4.26 3.92   HGB 6.6* 7.7*   HCT 19.6* 22.1*   PLT 30* 9*   MCV 90 86   RDW 22.3* 22.9*        Chemistries:    Recent Labs  Lab 10/01/17  0416 10/02/17  0459 10/02/17  1920    137 136   K 3.2* 3.2* 3.4*    109 108   CO2 21* 20* 21*   BUN 39* 39* 43*   CREATININE 0.9 0.8 0.9   CALCIUM 6.9* 7.3* 7.5*   ALBUMIN 1.2* 1.1*  --    PROT 3.2* 3.3*  --    BILITOT 2.0* 2.0*  --    ALKPHOS 162* 161*  --    ALT 43 45*  --    AST 57* 46*  --    MG 1.2* 1.9 1.6   PHOS 1.7* 2.2* 2.6*       Significant Imaging:   Imaging Results          X-Ray Chest 1 View (Final result)  Result time 10/03/17 06:13:34    Final result by Eugenia R. Irwine, MD (10/03/17 06:13:34)                 Impression:      As  above.      Electronically signed by: EUGENIA DEJESUS  Date:     10/03/17  Time:    06:13              Narrative:    Comparison:10/1/2017.      Technique: Single AP portable chest radiograph.    Findings:   There is a left-sided central venous catheter in place, the tip of which projects at the cavoatrial junction.  The cardiomediastinal silhouette is within normal limits.  The visualized airway is unremarkable.  There has been interval development of increased opacity within the right upper and midlung zones as well as the left lower lung zone concerning for superimposed component of edema versus multifocal infection with probable left pleural fluid.  There is no evidence of pneumothorax.  Osseous structures are intact.                             X-Ray Chest PA And Lateral (In process)                X-Ray Chest 1 View (Final result)  Result time 10/02/17 03:43:44   Procedure changed from X-Ray Chest PA And Lateral     Final result by Eugenia Dejesus MD (10/02/17 03:43:44)                 Impression:      Stable chronic changes without evidence of acute intrathoracic abnormality identified on this single radiographic view of the chest.      Electronically signed by: EUGENIA DEJESUS  Date:     10/02/17  Time:    03:43              Narrative:    Comparison: Chest radiograph 9/21/2017    Technique: Single frontal view of the chest.    Findings: Left-sided central venous catheter in place the tip projecting over the SVC.  The cardiomediastinal silhouette is within normal limits.  The visualized airway is unremarkable.  The lungs are symmetrically expanded There are bilateral bandlike opacities with an upper lobe predominance (right greater than left) likely representing atelectasis and/or scarring, unchanged from prior study.  There is no definite superimposed focal consolidation,  large volume of pleural fluid or pneumothorax.  Osseous structures demonstrate no acute abnormality.

## 2017-10-03 NOTE — ASSESSMENT & PLAN NOTE
--s/p Flu/Bu/ATG MUD allogeneic transplant for high risk AML after his second IDAC (6/20/16 - 6/24/16) after a prolonged hospitalization (2/5/16 - 3/21/16) for induction with 7&3 and reinduction with MEC to remission and IDAC (4/4/16 - 4/9/16)  -- Chimerics from marrow done 6/21 show CD3 of 90% donor and 10% recipient, but CD34 of 5% donor and 95% recipient   -- Chimers from 7/10 shows 70% recipient and 30% donor - NOT SORTED  -- Relpased 6/2017 and reinduced with FLAG BETZY  -- DLI 8/14/17

## 2017-10-03 NOTE — PT/OT/SLP DISCHARGE
Physical Therapy Discharge Summary    Paul E Sistrunk  MRN: [id number]   Twvlo-qytqyz-ipql disease   Patient Discharged from acute Physical Therapy on 10/3/2017.  Please refer to prior PT noted date on 17 for functional status.     Assessment:   Patient has not met goals.  GOALS:    Physical Therapy Goals        Problem: Physical Therapy Goal    Goal Priority Disciplines Outcome Goal Variances Interventions   Physical Therapy Goal     PT/OT, PT Ongoing (interventions implemented as appropriate)     Description:  Goals to be met by: 10/7/17     Patient will increase functional independence with mobility by performin. Supine to sit with MInimal Assistance.  2. Sit to supine with MInimal Assistance.  3. Sit to stand transfer with Minimal Assistance. - GOAL MET  4. Bed to chair transfer with Minimal Assistance using Rolling Walker.  5. Gait  x 50 feet with Minimal Assistance using Rolling Walker.   6. Lower extremity exercise program x 20 reps per handout, with assistance as needed.                     Reasons for Discontinuation of Therapy Services  Patient is unable to continue work toward goals because of medical or psychosocial complications.      Plan:  Patient Discharged to: ICU.    Deloris Suero DPT, PT  10/3/2017

## 2017-10-03 NOTE — SIGNIFICANT EVENT
Called for worsening hypoxemia following intubation. Initial O2 sat was 92-93 on setting of 405/1.0/14 peep however sats began to drop to mid 80s. Acutely became hypotensive and change in his cardiac rhythm. Immediately identified as PEA and chest compression started. 2 min chest compressions, Bag valve ventilation and 1 mg epinephrine given. ROSC at first pulse check with MAP > 65. Placed back on ventilator. Family informed.     Rodolfo Robin MD  [employer] Pulmonary and Critical Care Fellow  Pager: [phone]  Cell: [phone]

## 2017-10-03 NOTE — ASSESSMENT & PLAN NOTE
--Previously in shock - currently resolved, weaned off vasopressors at 0100, 9/22.   --repeat blood cultures ngtd, UA pending.  Chest xray from 9/21 without focal consolidation or infiltrate.  Currently on room air.  --been off of zosyn 9/30 and vanc 9/26.  --continue valganciclovir + voriconazole + bactrim prophylaxis.

## 2017-10-03 NOTE — ASSESSMENT & PLAN NOTE
Patient with history of SVT s/p cardioversion in 08/2017 who has sustained HR in the 130s-140s who acutely became hypotensive found to be in afib with RVR in the 180s  -Given 5mg Lopressor x2 and rate came down to around 140 which is baseline for this patient  -SBP remained low-normotensive in the 110s-120s; SpO2 95%  -Patient was cardioverted in the ICU on 9/21.  -Currently on flexainide 50 mg PO BID and metoprolol 12.5 mg PO BID.

## 2017-10-03 NOTE — ASSESSMENT & PLAN NOTE
--platelet count 74 on admission, platelets dropped to 9 on 10/2 likely secondary to sepsis - transfused 1U platelets.   --continue to monitor  --transfuse for plt level <10,000

## 2017-10-03 NOTE — ASSESSMENT & PLAN NOTE
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 Blast of 62%, 19 had a complex karyotype including 5q deletion, 7q deletion, 17p deletion, and one metaphase represented a near tetraploid subclone; FLT-3 negative     -- Had previous reaction to MEC (etoposide caused full body rash).   -- Re-induced with FLAG-BETZY   -- Day 14 bone marrow biopsy done 7/10 - hypocellular with no evidence of residual disease  -- Post DLI on 8/14/17  -- currently receiving Vidaza for 2 cycles given high risk disease per Dr. Atkinson note  -- Followed by BMT service.

## 2017-10-03 NOTE — PROGRESS NOTES
Checked Pt O2 sat of 84% while asleep. Rechecked with 5 L NC with an SPO2 of 86%. Patient is in no distress or in any complaint. CXR performed earlier revealing atelectasis. Notified Dr. Wolfe. Doctor ordered to have IS performed and have a mechanical cough machine. Will rechecked SPO2. Will continue to monitor.

## 2017-10-03 NOTE — PROGRESS NOTES
Pt transferred from oncology on NRB sats 78%. Orders to place pt on CPAP. sats up to 95%. Pt on cardiac montitor.

## 2017-10-03 NOTE — PLAN OF CARE
Patient transferred to ICU requiring BiPAP and intubation watch.  Patient inappropriate for d/c planning at this time and CM will continue to follow.     10/03/17 1047   Right Care Assessment   Can the patient answer the patient profile reliably? Yes, cognitively intact   How often would a person be available to care for the patient? Whenever needed   Describe the patient's ability to walk at the present time. Major restrictions/daily assistance from another person   How does the patient rate their overall health at the present time? Poor   Number of comorbid conditions (as recorded on the chart) Three   During the past month, has the patient often been bothered by feeling down, depressed or hopeless? No   During the past month, has the patient often been bothered by little interest or pleasure in doing things? No   Camila Valencia RN, BSN  Case Management  [facility]  Ext. [phone]

## 2017-10-03 NOTE — EICU
Called into room electronically via the eICU system to remote document/assist in code blue proceedings. Mr. Sistrunk was found to be in a rapidly deteriorating hypoxic state that led into PEA. A code blue was called and the ACLS PEA Algorithm was initiated.  Rosc was achieved.    11:50 Code Start INITIAL RHYTHM: HYPOXIC PEA KS      11:50 Staff Arrived Tyrone Ruelas MD; Ann Fernando, CRT; Joanie Lindsey, RN; Sandra Belle RN KS     11:50 Info Prior To Code Info Prior To Code - Code Location: Inpatient Rapid Response Team: Responded To Code Witnessed: Yes Event Type: Respiratory (Became increasingly hypoxic on vent, then PEA) Conscious at Onset: No Pulse Present at Onset: No Respirations Present at Onset: No Ventilated Pre-Arrest: Yes Pre-Arrest Vent/Sp02/A-B/ECG: Vent; Pulse Ox; ECG KS     11:50 Condition on Arrival Code Condition on Arrival in ED Code - Code Location: Inpatient Pulses Present: No Resp Present: On Ventilator Mental Status: Unresponsive Cardiac Rhythm: PEA KS     11:50 Rhythms - ABCs Cardiac Rhythm - Ventricular Rhythm: PEA (pulseless electrical activity)  ABCs - Airway: Clear Breathing: Agonal Pulses: Absent KS     11:50 Interventions Interventions - Compressions: Started Airway: Ambu bag (Already intubated prior to code. Taken off vent & bagged) Oxygen Percent (% O2): 100 KS     11:51 EPINEPHrine injection Ordered and Given Dose: 1 mg Route: Intravenous  Line:  Tunneled Central Line Insertion/Assessment - Double Lumen 07/24/17 1522 left subclavian  Ordered by: Tyrone Ruelas MD AR     11:52 Rhythms - ABCs Cardiac Rhythm - Atrial Rhythm: atrial tachycardia  ABCs - Airway: Clear Breathing: Agonal Pulses: Carotid Present; Femoral Present KS     11:52 Interventions Interventions - Compressions: Stopped (Due to ROSC) Airway: Ambu bag (Via ETT) Oxygen Percent (% O2): 100 KS     11:53 Vitals Vitals - Pulse: 145 Resp: 35 BP: 98/59 SpO2: 85 % KS     11:54 Oxygen Therapy  Oxygen Therapy - SpO2: 85 % Pulse Oximetry Type: Continuous Oxygen Concentration (%): 100 O2 Device (Oxygen Therapy): ventilator KS     11:54 Notifications Notifications - Notified: Family KS     11:55 Code Outcome Outcome - Survival: Yes Code Termination Due to: Return of Spontaneous Circulation Transfer To Critical Care: Other (Comment) (Already in critical care) KS     11:55 Orders Placed X-Ray Chest 1 View; POCT Arterial Blood Gas-Resp Once; EKG 12-lead JS     11:56 Code End Sussessful ROSC KS     11:56 Staff Departed Tyrone Ruelas MD (Automatically marked out by Code End event); Ann Fernando CRT (Automatically marked out by Code End event); Joanie Lindsey RN (Automatically marked out by Code End event); Sandra Belle RN (Automatically marked out by Code End event) KS      Results for SISTRUNK, PAUL E (MRN [id number]) as of 10/3/2017 13:16   Ref. Range 10/3/2017 04:31 10/3/2017 12:29   WBC Latest Ref Range: 3.90 - 12.70 K/uL 4.13    WBC-Corrected for NRBC's Latest Ref Range: 3.90 - 12.70 K/uL 1.99 (LL)    RBC Latest Ref Range: 4.60 - 6.20 M/uL 2.39 (L) 2.49 (L)   Hemoglobin Latest Ref Range: 14.0 - 18.0 g/dL 7.5 (L) 7.8 (L)   Hematocrit Latest Ref Range: 40.0 - 54.0 % 20.2 (L) 22.0 (L)   MCV Latest Ref Range: 82 - 98 fL 85 88   MCH Latest Ref Range: 27.0 - 31.0 pg 31.4 (H) 31.3 (H)   MCHC Latest Ref Range: 32.0 - 36.0 g/dL 37.1 (H) 35.5   RDW Latest Ref Range: 11.5 - 14.5 % 23.9 (H) 25.9 (H)   Platelets Latest Ref Range: 150 - 350 K/uL 11 (LL)    MPV Latest Ref Range: 9.2 - 12.9 fL SEE COMMENT SEE COMMENT   Gran% Latest Ref Range: 38.0 - 73.0 % 54.0    Lymph% Latest Ref Range: 18.0 - 48.0 % 10.0 (L)    Lymph # Latest Ref Range: 1.0 - 4.8 K/uL CANCELED    Mono% Latest Ref Range: 4.0 - 15.0 % 0.0 (L)    Mono # Latest Ref Range: 0.3 - 1.0 K/uL CANCELED    Eosinophil% Latest Ref Range: 0.0 - 8.0 % 0.0    Eos # Latest Ref Range: 0.0 - 0.5 K/uL CANCELED    Basophil% Latest Ref Range: 0.0 - 1.9  % 0.0    Baso # Latest Ref Range: 0.00 - 0.20 K/uL CANCELED    nRBC Latest Ref Range: 0 /100 @L=50 (A)    Other Latest Units: % 36    Ovalocytes Unknown Occasional    Aniso Unknown Moderate    Poik Unknown Slight    Poly Unknown Occasional    Hypo Unknown Occasional    Platelet Estimate Unknown Decreased (A)    Lobo Cells Unknown Occasional    Smudge Cells Unknown Present    Protime Latest Ref Range: 9.0 - 12.5 sec 12.4    Coumadin Monitoring INR Latest Ref Range: 0.8 - 1.2  1.2      Results for SISTRUNK, PAUL E (MRN [id number]) as of 10/3/2017 13:16   Ref. Range 10/3/2017 02:42 10/3/2017 04:31 10/3/2017 12:29   Sodium Latest Ref Range: 136 - 145 mmol/L  134 (L)    Potassium Latest Ref Range: 3.5 - 5.1 mmol/L  3.8    Chloride Latest Ref Range: 95 - 110 mmol/L  107    CO2 Latest Ref Range: 23 - 29 mmol/L  15 (L)    Anion Gap Latest Ref Range: 8 - 16 mmol/L  12    BUN, Bld Latest Ref Range: 6 - 20 mg/dL  44 (H)    Creatinine Latest Ref Range: 0.5 - 1.4 mg/dL  0.9    eGFR if non African American Latest Ref Range: >60 mL/min/1.73 m^2  >60.0    eGFR if African American Latest Ref Range: >60 mL/min/1.73 m^2  >60.0    Glucose Latest Ref Range: 70 - 110 mg/dL  417 (H)    Calcium Latest Ref Range: 8.7 - 10.5 mg/dL  7.2 (L)    Phosphorus Latest Ref Range: 2.7 - 4.5 mg/dL  3.7    Magnesium Latest Ref Range: 1.6 - 2.6 mg/dL  2.5    Alkaline Phosphatase Latest Ref Range: 55 - 135 U/L  204 (H)    Total Protein Latest Ref Range: 6.0 - 8.4 g/dL  5.0 (L)    Albumin Latest Ref Range: 3.5 - 5.2 g/dL  1.1 (L)    Total Bilirubin Latest Ref Range: 0.1 - 1.0 mg/dL  2.1 (H)    AST Latest Ref Range: 10 - 40 U/L  61 (H)    ALT Latest Ref Range: 10 - 44 U/L  51 (H)    Troponin I Latest Ref Range: 0.000 - 0.026 ng/mL   0.053 (H)   Lactate, Brandon Latest Ref Range: 0.5 - 2.2 mmol/L 3.4 (H)       Results for SISTRUNK, PAUL E (MRN [id number]) as of 10/3/2017 13:16   Ref. Range 10/3/2017 02:43 10/3/2017 02:43 10/3/2017 08:50   Blood Culture, Routine  Unknown No Growth to date No Growth to date    CULTURE, BLOOD Unknown Rpt Rpt Rpt     Results for SISTRUNK, PAUL E (MRN [id number]) as of 10/3/2017 13:16   Ref. Range 10/3/2017 08:08   POC PH Latest Ref Range: 7.35 - 7.45  7.386   POC PCO2 Latest Ref Range: 35 - 45 mmHg 30.3 (L)   POC PO2 Latest Ref Range: 80 - 100 mmHg 58 (LL)   POC BE Latest Ref Range: -2 to 2 mmol/L -7   POC HCO3 Latest Ref Range: 24 - 28 mmol/L 18.1 (L)   POC SATURATED O2 Latest Ref Range: 95 - 100 % 90 (L)   POC TCO2 Latest Ref Range: 23 - 27 mmol/L 19 (L)   FiO2 Unknown 60   Sample Unknown ARTERIAL   DelSys Unknown CPAP/BiPAP   Allens Test Unknown Pass   Site Unknown RR   Mode Unknown BiPAP   POC Lactate Latest Ref Range: 0.36 - 1.25 mmol/L 2.96 (H)     Chest one view    Indication:Endotracheal tube repositioning    Comparison:10/3/2017    Findings: Endotracheal tube tip now lies approximately 8.1 cm above the louise.  Defibrillator pads have been placed in the interval.  Existing support devices are stable as is the remainder of the exam noting somewhat improved aeration bilaterally.  No pneumothorax.   Impression       As above      Electronically signed by: LISSETH BANDA MD  Date: 10/03/17  Time: 12:55        SISTRUNK, PAUL ID:[id number] 03-OCT-2017 06:44:38 EKG  System-CMICU ROUTINE RETRIEVAL  Sinus tachycardia  Intraventricular conduction delay  Nonspecific T wave abnormality  Abnormal ECG  When compared with ECG of 28-SEP-2017 05:07,  ST no longer elevated in Lateral leads  T wave inversion no longer evident in Anterior leads  Confirmed by AHMET DE OLIVEIRA MD ([id number]) on 10/3/2017 12:41:13 PM  25mm/s 10mm/mV 150Hz 9.0.4 12SL 241 LENNY: 0  Referred by: DAVID MACKENZIE Electronically signed by: AHMET DE OLIVEIRA MD  Vent. rate 138 BPM  MT interval 180 ms  QRS duration 102 ms  QT/QTc 270/409 ms  P-R-T axes 32 -13 80  /99 mmHg  1960 (57 yr)  Male Unknown  0in 0lb  Room:3069  Loc:100  Technician:System System  Test ind:I46.9

## 2017-10-03 NOTE — PROGRESS NOTES
10/03/17 0120   Vital Signs   Resp 16   SpO2 (!) 91 %   Pulse Oximetry Type Intermittent   Flow (L/min) 8   Oxygen Concentration (%) 40   O2 Device (Oxygen Therapy) venti mask     Pt SPO2 84-88%. Increased to 8L and 40% on venti mask. Pt O2 sat of 90%. Will continue to monitor.

## 2017-10-03 NOTE — PROGRESS NOTES
Pt was intubated with a size 8ETT and is secured at the 24 cm marianne at the lips. Pt is currently sating 95% on 100% FIO2 and 8 of PEEP. Will continue to monitor.

## 2017-10-03 NOTE — PT/OT/SLP DISCHARGE
Occupational Therapy Discharge Summary    Paul E Sistrunk  MRN: [id number]   Thfbd-zzbgyp-dxox disease   Patient Discharged from acute Occupational Therapy on 10/3/17.  Please refer to prior OT note dated on 10/2/17 for functional status.     Assessment:   Patient was discharge unexpectedly.  Information required to complete and accurate discharge summary is unknown.  Refer to therapy initial evaluation and last progress note for initial and most recent functional status and goal achievement.  Recommendations made may be found in medical record.  GOALS:    Occupational Therapy Goals        Problem: Occupational Therapy Goal    Goal Priority Disciplines Outcome Interventions   Occupational Therapy Goal     OT, PT/OT Ongoing (interventions implemented as appropriate)    Description:  Goals to be met by: 10/9/17     Patient will increase functional independence with ADLs by performing:    UE Dressing with Stand-by Assistance.  LE Dressing with Stand-by Assistance.  Grooming while EOB with Stand-by Assistance.  Toileting from bedside commode with Minimal Assistance for hygiene and clothing management.   Supine to sit with Stand-by Assistance.  Toilet transfer to bedside commode with Contact Guard Assistance.                    Reasons for Discontinuation of Therapy Services  Transfer to alternate level of care.      Plan:  Patient Discharged to: ICU .

## 2017-10-03 NOTE — PROGRESS NOTES
Patient received 500 cc bolus. Checked breath sounds. [place] crackles and expiratory wheezing more prominently than beginning of shift. Patient exhibits  Abdominal breathing, exerts labored breathing and resp of 28. Notified Dr. Wolfe. Doctor will contact ICU team. Will continue to monitor.

## 2017-10-03 NOTE — PROCEDURES
[facility] ICU Fellow Endotracheal Intubation Procedure Note    10/03/2017    Paul E Sistrunk    3069/3069 A    Attending present in the room: Pulm/CC    This procedure has been fully reviewed with the patient and written informed consent has been obtained. no. Verbal consent provided     Mallampati Score: I    Indication for endotracheal intubation: respiratory failure.    Specific indication for intubation: hypoxic respiratory failure    Urgency of intubation: urgent    Pre-oxygenation device used: bipap    Sedative medications used   Etomidate: 20    Paralytic medication used    Succinylcholine:120    Other medications used none    Ventilation between medication administration and laryngoscopy: bag valve mask    Position of patient during intubation: supine    Laryngoscopy device used on initial intubation attempt:   Direct   Type and size of blade used on initial intubation attempt:    Curved or Straight: curved   Size: 3    Size of endotracheal tube: 8.0      Endotracheal tube location:     At the teeth: 24   At the lips: 26      ETT location confirmed by by auscultation, by CXR and ETCO2 monitor.      Cormack-Lehane Glottic view on first Attempt: II    Airway description: easy    Complications: none    Number of attempts: 1.  If multiple attempts, what additional devices were used:    Number of assistants : 1.    Rodolfo Robin MD  [facility] Pulmonary and Critical Care Fellow  Pager: [phone]  Cell: [phone]

## 2017-10-03 NOTE — CONSULTS
[facility]  Critical Care Medicine  Consult Note    Patient Name: Paul E Sistrunk  MRN: [id number]  Admission Date: 9/13/2017  Hospital Length of Stay: 20 days  Code Status: Full Code  Attending Physician: Kevin Payne MD   Primary Care Provider: Edgar Pinon MD   Principal Problem: Lrabk-ccsxob-mdqz disease    Inpatient consult to Critical Care Medicine  Consult performed by: CHENTE MINOR  Consult ordered by: JAM HUANG  Reason for consult: acute hypoxic respiratory failure   Assessment/Recommendations: At this time, patient can be monitored safely on floor and not an imminent intubation watch, but considering worsening pancytopenia and increasing supplemental oxygen needs,  please do not hesitate to contact us at [phone] with further decompensation. Would anticipate some clinical improvement after antibiotics have taken effect.         Subjective:     HPI:  Mr. 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  On admission, he was started on empiric antibiotics for possible sepsis.  Blood cultures ngtd, urinalysis non-infectious.  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  He was started on high dose methylprednisolone.  CMV PCR undetected on 9/16.  Histoplasma antigen pending.  HSV pending (sent to [facility]).  ID evaluated on 9/18 and did not feel that there were any obvious uncontrolled infectious issues.  Empiric antibiotics discontinued.      MICU consulted 9/21 for atrial fibrillation with RVR (170-180) with SBP 80's.   Mr. Sistrunk was given metoprolol IV with some improvement.   Additionally, he was noted to be off  flecanide since admission.  Cardiology was consulted by primary team.  He was asymptomatic and the decision was made for him to remain on the floor with BMT service.  Over the course of the morning, he was restarted on flecanide and oral metoprolol.  Intermittent episodes of hypotension noted, which improved with IVF boluses.    Mr. Sistrunk continues to have afib with RVR and borderline hypotension and was transferred to the ICU.  Prior to transfer, he developed unstable Afib RVR and was subsequently cardioverted.  He required vasopressors after cardioversion.      Hospital/ICU Course:  Mr. Sistrunk was transferred to the ICU on 9/21 for shock following atrial fibrillation with RVR requiring cardioversion.  He was pan cultures and started on empiric antibiotic (vancomycin and pip/tazo).  Overnight, he was weaned off vasopressors and remains in -ST.    10/3 Patient was progressively more hypoxic with increasing oxygen needs with transition that evening from 3L nasal cannula to 5L nasal cannula to Venti-Mask with FiO2 of 50%. CXR was attained which was demonstrable for left middle lobe pneumonia. Patient was started on IV cefepime and vancomycin.     Past Medical History:   Diagnosis Date    Cancer 2/2016    leukemia, AML    HIV infection     Pancytopenia due to antineoplastic chemotherapy 7/4/2016       Past Surgical History:   Procedure Laterality Date    CYSTOSCOPY         Review of patient's allergies indicates:   Allergen Reactions    Etoposide Rash       Family History     Problem Relation (Age of Onset)    Cancer Father        Social History Main Topics    Smoking status: Never Smoker    Smokeless tobacco: Never Used    Alcohol use No    Drug use: No    Sexual activity: Not on file      Review of Systems   Constitutional: Positive for fatigue. Negative for activity change, chills and fever.   HENT: Negative for congestion and facial swelling.    Eyes: Negative for discharge and itching.    Respiratory: Positive for cough. Negative for chest tightness and shortness of breath.    Cardiovascular: Negative for chest pain and palpitations.   Gastrointestinal: Positive for abdominal pain (right and left lower quadrant abdominal discomfort) and diarrhea. Negative for abdominal distention, blood in stool, nausea and vomiting.   Genitourinary: Negative for difficulty urinating, dysuria and hematuria.   Skin: Positive for pallor. Negative for color change and rash.   Neurological: Positive for weakness (generalized). Negative for dizziness and headaches.     Objective:     Vital Signs (Most Recent):  Temp: 98.3 °F (36.8 °C) (10/02/17 2336)  Pulse: (!) 128 (10/02/17 2359)  Resp: 16 (10/03/17 0137)  BP: 118/70 (10/02/17 2336)  SpO2: (!) 90 % (10/03/17 0137) Vital Signs (24h Range):  Temp:  [97.5 °F (36.4 °C)-99.1 °F (37.3 °C)] 98.3 °F (36.8 °C)  Pulse:  [109-138] 128  Resp:  [14-18] 16  SpO2:  [83 %-95 %] 90 %  BP: ()/(52-71) 118/70   Weight: 82.2 kg (181 lb 3.5 oz)  Body mass index is 23.91 kg/m².      Intake/Output Summary (Last 24 hours) at 10/03/17 0234  Last data filed at 10/02/17 2337   Gross per 24 hour   Intake          7505.48 ml   Output              900 ml   Net          6605.48 ml       Physical Exam   Constitutional: He is oriented to person, place, and time.   Thin, cachectic male, anxious but in no acute distress.    HENT:   Head: Normocephalic and atraumatic.   Eyes: EOM are normal. Pupils are equal, round, and reactive to light.   Neck: Normal range of motion.   Cardiovascular: Intact distal pulses.    Pulmonary/Chest: Effort normal. No respiratory distress. He has no wheezes. He has rales (Inspiratory crackles on LLL and LML).   Breathing comfortably on VentiMask   Abdominal: Soft. Bowel sounds are normal. There is tenderness.   Hyperactive bowel sounds   Musculoskeletal: Normal range of motion. He exhibits edema (2+ pitting bilateral LE edema. ).   Neurological: He is alert and oriented  to person, place, and time.   Skin: Skin is warm and dry.       Vents:  Oxygen Concentration (%): 50 (10/03/17 0137)  Lines/Drains/Airways     Central Venous Catheter Line                 Tunneled Central Line Insertion/Assessment - Double Lumen  07/24/17 1522 left subclavian 70 days          Drain                 Fecal Incontinence  09/29/17 1430 3 days              Significant Labs:    CBC/Anemia Profile:    Recent Labs  Lab 10/01/17  0416 10/02/17  0459   WBC 4.26 3.92   HGB 6.6* 7.7*   HCT 19.6* 22.1*   PLT 30* 9*   MCV 90 86   RDW 22.3* 22.9*        Chemistries:    Recent Labs  Lab 10/01/17  0416 10/02/17  0459 10/02/17  1920    137 136   K 3.2* 3.2* 3.4*    109 108   CO2 21* 20* 21*   BUN 39* 39* 43*   CREATININE 0.9 0.8 0.9   CALCIUM 6.9* 7.3* 7.5*   ALBUMIN 1.2* 1.1*  --    PROT 3.2* 3.3*  --    BILITOT 2.0* 2.0*  --    ALKPHOS 162* 161*  --    ALT 43 45*  --    AST 57* 46*  --    MG 1.2* 1.9 1.6   PHOS 1.7* 2.2* 2.6*       Significant Imaging:   CXR 10/3 - Development of opacity at lingula concerning for focal consolidation. Band like opacities commented on 10/2 (R>L) resolved.     Assessment/Plan:     Pulmonary   Pneumonia involving left lung    - See acute respiratory failure with hypoxia.         Acute respiratory failure with hypoxia    - Likely secondary to lingular pneumonia (seen on CXR). Appreciable crackles on left middle lung fields.  - Patient has complained of baseline cough that is occasionally productive of thick green sputum, but has not increased in quantity today.  - Although patient is +12L here, physical examination and CXR not demonstrable for significant pulmonary edema.  - Otherwise, patient remains afebrile but tachycardic in 130-150s; very anxious.  - Pancytopenia progressively worsening - ANC ~800.   - Transitioned from 3L NC to 5L NC and now on VentiMask at FiO2 of 50%; in no distress and breathing comfortably without use of accessory muscles.   - Agree with  addition of cefepime and vancomycin (first dose 0100 on 10/3).  - Lactic acid and blood cultures X2 ordered.        Pulmonary infiltrates on CXR    - See acute respiratory failure with hypoxia.            Thank you for your consult. I will sign off. Please contact us if you have any additional questions.     Bryce Bolden MD  PGY-2 Internal Medicine  [phone]    Critical Care Medicine  [facility]

## 2017-10-04 LAB
POCT GLUCOSE: 250 MG/DL (ref 70–110)
POCT GLUCOSE: 371 MG/DL (ref 70–110)
POCT GLUCOSE: 409 MG/DL (ref 70–110)
POCT GLUCOSE: 421 MG/DL (ref 70–110)

## 2017-10-04 NOTE — PLAN OF CARE
10/04/17 0909   Final Note   Assessment Type Final Discharge Note   Discharge Disposition Exp Medical   Hospital Follow Up  Appt(s) scheduled? No   Discharge plans and expectations educations in teach back method with documentation complete? No   Right Care Referral Info   Post Acute Recommendation Other   Referral Type ( in medical facility)   Camila Valencia RN, BSN  Case Management  [facility]  Ext. [phone]

## 2017-10-08 LAB
BACTERIA BLD CULT: NORMAL

## 2017-10-09 PROBLEM — D89.810 ACUTE GVHD: Status: ACTIVE | Noted: 2017-10-09

## 2017-10-09 PROBLEM — J18.9 HCAP (HEALTHCARE-ASSOCIATED PNEUMONIA): Status: ACTIVE | Noted: 2017-10-09

## 2017-10-10 NOTE — PHYSICIAN QUERY
"PT Name: Paul E Sistrunk  MR #: [id number]     Physician Query Form - Diagnosis Clarification      CDS/: Mary Davison RN, CDS               Contact information: [email or web address]    This form is a permanent document in the medical record.     Query Date: October 10, 2017    By submitting this query, we are merely seeking further clarification of documentation.  Please utilize your independent clinical judgment when addressing the question(s) below.     The medical record contains the following:      Findings Supporting Clinical Information Location in Medical Record   Sepsis vs GVHD   --complaint of fever of 102 degrees that onset today after having one week of vomiting and diarrhea. Associated sx include rapid HR, low BP, and feeling extremely weak.    --Thought to have sepsis vs GVHD.    --At this point, infection seems less likely. We can discontinue cefepime after today if he remains afebrile as he was not neutropenic on admission. This seems to be more probably acute GVHD.     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      - c diff testing is negative     - cultures are negative so far.     - CT abdomen reveals "diffuse abnormal appearance of the small and large bowel demonstrating fluid filled mildly prominent loops with associated submucosal edema and mucosal enhancement."      - concern for acute hopsh-axtqrf-zgwt disease given that he received donor lymphocytes in August 2017     - s/p flex sig 9/15/17 showing erythematous mucosa in the recto-sigmoid colon (biopsied)    -discontinued vancomycin on 9/15/17    - discontinue cefepime today 9/17/17     --s/p flex sig 9/15/17 showing erythematous mucosa in the recto-sigmoid colon (biopsied)--GVHD and CMV     --The [facility] for sepsis in the setting of 102F temperature  --AF after recently having flecainide d/c'd, and in setting of " sepsis/acute illness.   Restart flecainide.     --ICU on 9/21 for shock following atrial fibrillation --concern for possible new infection given leukopenia and shock overnight.  Shock currently resolved, weaned off vasopressors at 0100, 9/22    --Gut GVHD     --- sepsis work-up repeated 9/21 due to hypotension and tachycardia, repeat cultures done, chest x-ray negative. Cefepime and Vancomycin restarted. Cefepime was switched to zosyn.   - Possible staph in blood from 9/21.   - continue zosyn and vancomycin for now.    --afib was in the setting of septic shock.     --who was admitted  to BMT for GI - GVHD.  --# Pneumonia - Cont broad abx. Add antifungals (did not have opportunity to do so as he coded prior)    Blood Culture: NGTD    Blood Culture: COAGULASE-NEGATIVE STAPHYLOCOCCUS SPECIES (Organism is a probable contaminant)     Blood Culture: NGTD    --Reason for Admission: GVHD, Afib with RVR and hypotension      --On admission, he was started on empiric antibiotics for possible sepsis.  Blood cultures ngtd, urinalysis non-infectious.  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  He was started on high dose methylprednisolone.  Empiric antibiotics discontinued.  --Active Diagnosis List:     -Sepsis                          POA-Y     -Graft vs host disease   POA-Y     ED Provider Note 9/13        CCM Event Note 9/14    BMT Progress Note 9/16          BMT Progress Note 9/17                                  BMT Progress Note 9/21      Arrhythmia C/S 9/21            CCM Progress Note 9/22          BMT Progress Note 9/24                Cards Progress Note 9/27    CCM Progress Note 10/3        Cultures 9/13, 9/21 @1909    Culture 9/21 @2057        Culture 10/3    Discharge Summary       Please clarify if the __sepsis___ diagnosis has been:    [  ] Ruled In, POA  [  ] Ruled In, not POA  [  ]  Ruled Out  [  ] Clinically insignificant  [  ] Clinically undetermined  [X  ] Other/Clarification of findings (please specify)___Complex situation. Initially, his hospitalization was caused by GVHD. He did not have sepsis on admission; however, he developed septic shock during admission, and this was very likely the cause of his death. ____________________________    Please document in your progress notes daily for the duration of treatment, until resolved, and include in your discharge summary.

## 2017-10-10 NOTE — PHYSICIAN QUERY
PT Name: Paul E Sistrunk  MR #: [id number]     Physician Query Form - Diagnosis Clarification      CDS/: Mary Davison RN, CDS               Contact information: [email or web address]    This form is a permanent document in the medical record.     Query Date: October 10, 2017    By submitting this query, we are merely seeking further clarification of documentation.  Please utilize your independent clinical judgment when addressing the question(s) below.     The medical record contains the following:      Findings Supporting Clinical Information Location in Medical Record     Acute kidney injury   -- Concern is for sepsis, acute kidney injury, electrolyte derangement. Significant for hypokalemia    --If develops worsened renal failure with CrCl <35     --NICOLE - bedside US shows plethoric IVC - hold on further volume, match Is/Os.    --- creatinine down to 1.2 after hydration yesterday     Scr: 1.3-> 1.0->1.3->1.6  BUN: 21->36->41->55  GFR: >60-->47.8     ED Provider Note        Arrhythmia Progress Note 9/22      CCM Progress Note 9/22      BMT Progress Note 9/28      Labs: 9/13->9/20->9/22->9/24     Please clarify if the _Acute kidney injury__ diagnosis has been:    [ X ] Ruled In  [  ] Ruled In, Now Resolved  [  ] Resolved Prior to My Assessment  [  ] Ruled Out  [  ] Clinically insignificant  [  ] Clinically undetermined  [  ] Other/Clarification of findings (please specify)_______________________________    Please document in your progress notes daily for the duration of treatment, until resolved, and include in your discharge summary.

## 2018-10-09 NOTE — SUBJECTIVE & OBJECTIVE
Subjective:     Interval History:   - patient underwent CT chest/abdomen/pelvis on 7/8/17.  - patient had a febrile episode this morning at 0500 hrs: 101.3  - today, he states he is feeling better. His diffuse pain has improved. He denies shortness of breath, chest pain, nausea, vomiting, diarrhea, constipation.    Objective:     Vital Signs (Most Recent):  Temp: 98.5 °F (36.9 °C) (07/09/17 1047)  Pulse: 95 (07/09/17 1047)  Resp: 18 (07/09/17 1047)  BP: 118/69 (07/09/17 1047)  SpO2: 97 % (07/09/17 1047) Vital Signs (24h Range):  Temp:  [97.8 °F (36.6 °C)-101.3 °F (38.5 °C)] 98.5 °F (36.9 °C)  Pulse:  [] 95  Resp:  [18-20] 18  SpO2:  [92 %-97 %] 97 %  BP: (107-152)/(62-70) 118/69     Weight: 84 kg (185 lb 1.2 oz)  Body mass index is 24.42 kg/m².  Body surface area is 2.08 meters squared.    ECOG SCORE         [unfilled]    Intake/Output - Last 3 Shifts       07/07 0700 - 07/08 0659 07/08 0700 - 07/09 0659 07/09 0700 - 07/10 0659    P.O. 2340 1800     I.V. (mL/kg) 2958.3 (34.9) 2982.8 (35.6)     Blood 651 650 812    IV Piggyback 650 900     Total Intake(mL/kg) 6599.3 (77.8) 6332.8 (75.5) 812 (9.7)    Urine (mL/kg/hr) 3275 (1.6) 4800 (2.4) 325 (0.6)    Stool 0 (0) 0 (0)     Total Output 3275 4800 325    Net +3324.3 +1532.8 +487           Urine Occurrence 1 x      Stool Occurrence 2 x 1 x           Physical Exam   Constitutional: He is oriented to person, place, and time. He appears well-developed.   HENT:   Head: Normocephalic.   Mouth/Throat: Mucous membranes are normal. No oropharyngeal exudate.   Eyes: Conjunctivae and EOM are normal. Pupils are equal, round, and reactive to light. No scleral icterus.   Neck: Normal range of motion. Neck supple.   Cardiovascular: Regular rhythm and normal heart sounds.    Tachycardia   Pulmonary/Chest: Effort normal.   Coarse breath sounds in left lower lobe   Abdominal: Soft. Normal appearance and bowel sounds are normal. He exhibits no distension. There is no tenderness.    Musculoskeletal: Normal range of motion. He exhibits no edema.   Neurological: He is alert and oriented to person, place, and time.   Skin: Skin is warm, dry and intact. No cyanosis. Nails show no clubbing.   Psychiatric: His mood appears not anxious.   Vitals reviewed.      Significant Labs:   Labs have been reviewed.   Statement Selected

## 2019-05-03 NOTE — SIGNIFICANT EVENT
Critical Care Outreach (CORE)  Critical Care Medicine  Consult Note      CORE Metrics:     Admit Date: 2017  LOS: 8  Code Status: Full Code   CC: Epic Alert  Date of Consult: 2017  : 1960  Age: 57 y.o.  Weight:   Wt Readings from Last 1 Encounters:   17 76.3 kg (168 lb 4.8 oz)     Race:   Sex: male  Bed: 822/822 A:   MRN: [id number]  RRT Indication(s): Epic Alert  Was the patient discharged from an ICU this admission? no  Was the patient discharged from a PACU within last 24 hours? no  Did the patient receive conscious sedation/general anesthesia within last 24 hours? no  Was the patient in the ED within the past 24 hours? no  Was the patient started on NIPPV within the past 24 hours? no  Did this progress into an ARC or CPA:   Attending Physician: Gabriela Howe MD  Primary Service: [facility] HEMATOLOGY BMT  Illness Category: Medical Oncology  (Medical Cardiac, Neurologic, Surgical Cardiac, Medical Non-Cardiac, Surgical Non-Cardiac, [place], Obstetric, Ambulatory/Outpatient, Trauma, Other i.e. Visitor/Employee)  Consult Requested By: Gabriela Howe MD   Disposition:     SUBJECTIVE:     HPI:  Mr. 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  On admission, he was started on empiric antibiotics for possible sepsis.  Blood cultures ngtd, urinalysis non-infectious.  CT abdomen on  was concerning for acute graft vs host disease.  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  He was started on high dose methylprednisolone.  CMV PCR undetected on .  Histoplasma antigen pending.  HSV pending (sent to [place]).  ID evaluated on  and did not feel that there were any obvious uncontrolled infectious  issues.  Empiric antibiotics discontinued.     MICU consulted this morning for atrial fibrillation with RVR (170-180) with SBP 80's.   Mr. Sistrunk was given metoprolol IV with some improvement.   Additionally, he was noted to be off flecanide since admission.  Cardiology was consulted by primary team.  He was asymptomatic and the decision was made for him to remain on the floor with BMT service.      Over the course of the morning, he was restarted on flecanide and oral metoprolol.  Intermittent episodes of hypotension noted, which improved with IVF boluses.       Evaluated patient following epic alert.  On arrival to patient's room, he was found to be sitting in bed, alert, without acute signs of distress.  He reportedly had just gotten back in bed from the bedside commode chair.  He denies dizziness, headache, shortness of breath, and chest discomfort.  Telemetry showed Afib 's-160's.  SBP .    Past Medical History:   Diagnosis Date    Cancer 2/2016    leukemia, AML    HIV infection     Pancytopenia due to antineoplastic chemotherapy 7/4/2016      Past Surgical History:   Procedure Laterality Date    CYSTOSCOPY        Review of patient's allergies indicates:   Allergen Reactions    Etoposide Rash       Family History   Problem Relation Age of Onset    Cancer Father       Social History   Substance Use Topics    Smoking status: Never Smoker    Smokeless tobacco: Never Used    Alcohol use No         Inpatient Medications:  Continuous Infusions:    Scheduled Meds:   abacavir  600 mg Oral Daily    And    dolutegravir  50 mg Oral Daily    And    lamivudine  300 mg Oral Daily    atorvastatin  20 mg Oral Daily    budesonide  9 mg Oral Daily    flecainide  100 mg Oral Q12H    methylPREDNISolone sodium succinate  80 mg Intravenous Q12H    metoprolol tartrate  25 mg Oral BID    morphine  30 mg Oral BID    pantoprazole  40 mg Oral Daily    saliva substitute combo no.2  30 mL Mucous Membrane  QID    sulfamethoxazole-trimethoprim 800-160mg  1 tablet Oral Every Mon, Wed, Fri    valganciclovir  900 mg Oral Daily    voriconazole  300 mg Oral BID      PRN Meds:.HYDROmorphone, loperamide, lorazepam, ondansetron, prochlorperazine     Review of Systems:  Constitutional: + Generalized weakness. Denies fevers.  ENT: Denies dysphagia, nasal discharge, ear pain or discharge.  Cardiovascular: Denies chest pain, palpitations.  Respiratory: Denies shortness of breath, cough.  GI: + nausea, diarrhea,  right and left lower quadrant abdominal discomfort.  Denies hematochezia, melena.  : Denies dysuria and hematuria.  Skin/breast: Denies rashes  Neurologic: Denies headache, dizziness  .     OBJECTIVE:     Vital Signs (Most Recent):  Temp: 97.6 °F (36.4 °C) (09/21/17 0817)  Pulse: (!) 185 (09/21/17 1100)  Resp: 16 (09/21/17 0817)  BP: 113/60 (09/21/17 0924)  SpO2: (!) 94 % (09/21/17 0934) Vital Signs (24h Range):  Temp:  [97.6 °F (36.4 °C)-98.1 °F (36.7 °C)] 97.6 °F (36.4 °C)  Pulse:  [105-185] 185  Resp:  [16-17] 16  SpO2:  [87 %-95 %] 94 %  BP: ()/(56-81) 113/60     I & O (24h):    Intake/Output Summary (Last 24 hours) at 09/21/17 1200  Last data filed at 09/21/17 1143   Gross per 24 hour   Intake             2350 ml   Output             4600 ml   Net            -2250 ml        Physical Exam:  GA: Alert, generalized weakness, no acute distress.   HEENT: No visible oropharyngeal abnormalities. No scleral icterus. No visible thyromegally.   Pulmonary: Chest wall symmetric. No accessory muscle use. No tenderness to palpation. Clear to auscultation bilaterally. No wheezing, crackles, or rhonchi.  On room air.  Cardiac: Irregularly, irregular. Tachycardia  S1 & S2 w/o rubs/murmurs/gallops. + lower extremity edema.   Abdominal: Bowel sounds present x 4. No appreciable hepatosplenomegaly.  Skin: No rashes.  Musculoskeletal: Moves all extremities equally and symmetrically.  Extremities: No clubbing or  cyanosis.  Neuro:  --GCS: E4 V5 M6  --Mental Status:  Alert, oriented x 4, following request and moving all extremities  --RASS: 0  --CAM-ICU: negative  --Pupils 3 mm, PERRL.    Lines/Drains/Airway:       Tunneled Central Line Insertion/Assessment - Double Lumen  07/24/17 1522 left subclavian (Active)   Dressing biopatch in place;dressing dry and intact 9/21/2017  8:17 AM   IV Device Securement sutures 9/21/2017  8:17 AM   Additional Site Signs no drainage;no streak formation;no palpable cord;no edema;no warmth;no pain;no erythema 9/21/2017  8:17 AM   Distal Patency/Care flushed w/o difficulty;normal saline locked 9/21/2017  8:17 AM   Proximal Patency/Care flushed w/o difficulty;normal saline locked 9/21/2017  8:17 AM   Line Interventions blood specimen obtained and sent to lab 9/14/2017  4:43 AM   Dressing Change Due 09/20/17 9/20/2017  7:34 PM   Daily Line Review Performed 9/21/2017  8:17 AM           Nutrition:  Current Diet Order   Procedures    Diet full liquid         Labs:  CBC/Anemia Profile:     Recent Labs  Lab 09/19/17  0504 09/20/17  0455 09/21/17 0222   WBC 4.98 6.57 4.36   HGB 10.0* 10.4* 10.4*   HCT 28.5* 30.1* 31.1*   PLT 42* 58* 60*   MCV 93 93 95   RDW 18.7* 18.6* 18.5*        Coags:   No results for input(s): INR, APTT in the last 168 hours.    Invalid input(s): PT     Chemistries:     Recent Labs  Lab 09/19/17  0504 09/20/17  0455 09/21/17 0222    137 136   K 3.4* 3.6 3.8   * 106 107   CO2 21* 23 22*   BUN 36* 40* 49*   CREATININE 1.0 1.0 1.4   CALCIUM 7.5* 7.3* 7.7*   PROT 3.4* 3.5* 3.4*   BILITOT 2.4* 2.3* 2.5*   ALKPHOS 104 107 115   ALT 13 15 14   AST 19 21 26   MG 1.7 1.9 2.0   PHOS 2.6* 2.8 3.5        Urine Studies:   Lab Results   Component Value Date    COLORU Vanita 09/14/2017    APPEARANCEUA Hazy (A) 09/14/2017    PHUR 5.0 09/14/2017    SPECGRAV >=1.030 (A) 09/14/2017    PROTEINUA 1+ (A) 09/14/2017    GLUCUA Negative 09/14/2017    KETONESU Trace (A) 09/14/2017     BILIRUBINUA Negative 09/14/2017    OCCULTUA 1+ (A) 09/14/2017    NITRITE Negative 09/14/2017    UROBILINOGEN Negative 09/14/2017    LEUKOCYTESUR Negative 09/14/2017    RBCUA 1 09/14/2017    WBCUA 1 09/14/2017    BACTERIA Rare 09/14/2017    SQUAMEPITHEL 1 09/14/2017    HYALINECASTS 0 09/14/2017        Lactic Acid:   Lab Results   Component Value Date    LACTATE 0.9 09/17/2017    LACTATE 1.5 09/13/2017    LACTATE 2.2 09/13/2017        Cardiac Enzymes:   No results for input(s): CPK, CPKMB, MB, TROPONINI in the last 72 hours.     Arterial Blood Gas:   No results for input(s): PH, PCO2, HCO3, POCSATURATED, BE in the last 24 hours.    Invalid input(s):  PO2     Microbiology Results (last 7 days)     Procedure Component Value Units Date/Time    Stool culture [[id number]] Collected:  09/13/17 1230    Order Status:  Completed Specimen:  Stool from Stool Updated:  09/18/17 1307     Stool Culture No Salmonella,Shigella,Vibrio,Campylobacter,Yersinia isolated.    Blood culture x two cultures. Draw prior to antibiotics. [[id number]] Collected:  09/13/17 0330    Order Status:  Completed Specimen:  Blood from Peripheral, Antecubital, Right Updated:  09/18/17 0622     Blood Culture, Routine No growth after 5 days.    Narrative:       Aerobic and anaerobic    Blood culture x two cultures. Draw prior to antibiotics. [[id number]] Collected:  09/13/17 0347    Order Status:  Completed Specimen:  Blood from Peripheral, Forearm, Right Updated:  09/18/17 0622     Blood Culture, Routine No growth after 5 days.    Narrative:       Aerobic and anaerobic          Imaging:  Chest xray reviewed     Diagnostics/Interventions by CORE:     1. EKG  2. CBC, CMP, Mg, Phos    ASSESSMENT/PLAN:     Active Hospital Problems    Diagnosis    *Sepsis    Atrial fibrillation with rapid ventricular response    Metabolic acidosis, normal anion gap (NAG)    Sodui-udymiu-qijy disease    Chemotherapy-induced thrombocytopenia    Anemia due to chemotherapy     CINV (chemotherapy-induced nausea and vomiting)    AML (acute myeloid leukemia) in relapse    S/P allogeneic bone marrow transplant    Histoplasma capsulatum infection    HIV disease        Atrial Fibrillation with RVR  --restarted on flecainide and metoprolol today by primary team.    --'s, asymptomatic.  Discussed with Dr. Alberts (EP) and Primary Team, Elise BENOIT.  EP evaluating at bedside and considering starting amiodarone infusion and heparin infusion.  Please call if Critical Care assistance is needed.     Discussed with Dr. Vyas, Critical Care Attending.       Follow up assessment at 1800, remains in Afib 's with borderline hypotension.  Discussed with EP, Dr. Alberts, and Oncology Fellow.  Plan to transfer to ICU for closer monitoring given high risk for decompensation.  Plan for cardioversion in am; however, if decompensates overnight, plan will be for emergent cardioversion.     Discussed with Dr. Myles, Critical Care Fellow.       I spent >70 minutes reviewing patient records, examining, and counseling the patient with greater than 50% of the time spent with direct patient care and coordination.     CAROL RICKS, ACNP-BC  Critical Care Medicine  [phone]     No

## 2019-09-04 NOTE — PROGRESS NOTES
"[facility]  Adult Nutrition  Progress Note    SUMMARY     Recommendations    Recommendation/Intervention: Continue with current diet  2. encourage HPV with each meal. 3.Encourage po intake >/= 75% 4. RD will continue to follow  Goals: PO intake =/> 75% EEN/EPN   Nutrition Goal Status: new     Reason for Assessment    Reason for Assessment: length of stay  Diagnosis: cancer diagnosis/related complications (AML (acute myeloid leukemia) in relapse )  Relevant Medical History: Non-M3 AML   Interdisciplinary Rounds: did not attend  General Information Comments: pt appetite is fair ~50% PO intake, no c/o N/V      Nutrition Prescription Ordered     Regular      Evaluation of Received Nutrients/Fluid Intake           Gross per 24 hour   Intake          4277.42 ml   Output             4700 ml   Net          -422.58 ml     % Intake of Estimated Energy Needs: 50 - 75 %  % Meal Intake: 50%     Nutrition Risk Screen     Nutrition Risk Screen:  (d/c on regular diet w/ PO intake meeting =/> 75% EEN/EPN)    Nutrition/Diet History    Patient Reported Diet/Restrictions/Preferences: general  Typical Food/Fluid Intake:  (Adequate PTA)  Food Preferences: No cultural or [employer] preferences     Supplemental Drinks or Food Habits: Boost Plus  Factors Affecting Nutritional Intake:  (none)     Labs/Tests/Procedures/Meds       Pertinent Labs Reviewed: reviewed  Pertinent Labs Comments: WBC-18.31, BUN-33, Cr-1.4, Glu-131, Alb-2.6  Pertinent Medications Reviewed: reviewed  Pertinent Medications Comments: Acyclovir, IVF@ 125,ml/hr, Fludarabine, heparin,     Physical Findings    Overall Physical Appearance: generalized wasting  Tubes:  (central line)  Oral/Mouth Cavity: all teeth present (age appropriate)  Skin: intact    Anthropometrics    Temp: 100.1 °F (37.8 °C)     Height: 6' 1" (185.4 cm)  Weight Method: Standard Scale  Weight: 88.9 kg (195 lb 14.1 oz)  Ideal Body Weight (IBW), Male: 184 lb     % Ideal Body Weight, Male " Maryanne from [facility] called regarding the Prior Authorization - has Dr Baca received the Fax for this?   (lb): 105.44 lb     BMI (Calculated): 25.6      Estimated/Assessed Needs    Weight Used For Calorie Calculations: 88.9 kg (195 lb 15.8 oz)   Height (cm): 185.4 cm  Energy Calorie Requirements (kcal): 3237-5327  Energy Need Method: Kcal/kg (30-35kcal/kg (2667-3112kcal/day))   RMR (Hoyt-St. Jeor Equation): 1782.88  Weight Used For Protein Calculations: 88.9 kg (195 lb 15.8 oz)  Protein Requirements: 106-133 (1.2-1.5g/kg)  Fluid Need Method: RDA Method (Or per MD)  RDA Method (mL): 2667      Assessment and Plan    * AML (acute myeloid leukemia) in relapse    Nutrition Problem  increased nutrient needs    Related to (etiology):   Physiological Needs    Signs and Symptoms (as evidenced by):   AML (acute myeloid leukemia)     Interventions/Recommendations (treatment strategy):  1.Addition of ONS  2. If PO intake does not improve by next visit, consider appetite stimulant    Nutrition Diagnosis Status:   Continues                  Monitor and Evaluation    Food and Nutrient Intake: energy intake, food and beverage intake  Food and Nutrient Administration: diet order     Physical Activity and Function: nutrition-related ADLs and IADLs  Anthropometric Measurements: weight, weight change  Biochemical Data, Medical Tests and Procedures:  (All  labs)  Nutrition-Focused Physical Findings: overall appearance, extremities, muscles and bones, skin    Nutrition Risk    Level of Risk:  (f/u 2x week)    Nutrition Follow-Up    RD Follow-up?: Yes

## 2021-01-01 NOTE — HOSPITAL COURSE
- Patient requiring 4L of O2 and has tachypnea when removed, has SVT; primary team to ordered CTA PE protocol.   - Upon completion patient was found to have moderate bilateral pleural effusions. The study was limited 2/2 suboptimal contrast bolus opacification of the pulmonary arteries resulting in limited assessment; no acute central pulmonary thromboembolism identified the level of the proximal segmental arteries; however the assessment was limited so peripheral pulmonary emboli could not be excluded; interval progression of bilateral airspace disease with more extensive areas of consolidation bilaterally per radiology read.  - Patient experiencing increasing BLE edema; previous echo 6/21 EF 60%, no diastolic dysfunction, trivial AR/MR. Most recent BNP 7/19 is 60. Repeat echo ordered overnight.    7/20 Echo unremarkable, thoracentesis today to investigate etiology of bilateral pleural effusions    
8/1- Day 36 Flag BETZY. Episode of asymptomatic SVT overnight, evaluated by cardiology. Continues on Flecanide and Lopressor. On steroid taper per pulmonology. Counts recovering and afebrile  8/2- weaned O2 to 1 Liter, in NSR since yesterday, no complaints this am, will discharge home today  
Patient with 2 episodes of recurrent aflutter with aberrancy last night.  Symptomatic with light fluttering sensation.  
warm

## 2021-03-24 NOTE — SIGNIFICANT EVENT
"Was called to bedside for patient threatening to leave against medical advice. Assessed patient at bedside with security (AAO to place and year, RRR, lungs largely clear to auscultation), patient stated that he desired to leave because he "did not know what was going on" and that he "has vivid dreams of being taken to CT scans" and not be told why. I explained to the patient that he is critically ill with pancytopenia and that leaving against medical advice would be detrimental to his health. Pt stated that he has not been sleeping well and that he felt locked up. After a thorough discussion with the patient, he stated that he will remain in the hospital. Answered all questions to the patient's satisfaction.   " No

## 2021-03-24 NOTE — PROGRESS NOTES
[facility]  Hematology  Bone Marrow Transplant  Progress Note    Patient Name: Paul E Sistrunk  Admission Date: 9/13/2017  Hospital Length of Stay: 20 days  Code Status: Full Code    Subjective:     Interval History: Overnight transferred to ICU for increased work of breathing. States that he feels improved with CPAP. At time of this note, patient experiencing increased work of breathing.    Objective:     Vital Signs (Most Recent):  Temp: 98.5 °F (36.9 °C) (10/03/17 0432)  Pulse: (!) 137 (10/03/17 0629)  Resp: (!) 34 (10/03/17 0629)  BP: 127/70 (10/03/17 0432)  SpO2: 99 % (10/03/17 0629) Vital Signs (24h Range):  Temp:  [97.5 °F (36.4 °C)-99.1 °F (37.3 °C)] 98.5 °F (36.9 °C)  Pulse:  [115-138] 137  Resp:  [16-35] 34  SpO2:  [83 %-99 %] 99 %  BP: ()/(52-70) 127/70     Weight: 83.6 kg (184 lb 4.9 oz)  Body mass index is 24.32 kg/m².  Body surface area is 2.08 meters squared.    ECOG SCORE         [unfilled]    Intake/Output - Last 3 Shifts       10/01 0700 - 10/02 0659 10/02 0700 - 10/03 0659 10/03 0700 - 10/04 0659    P.O. 1220 3000     I.V. (mL/kg) 2713.2 (33) 3027.1 (36.2)     Blood 700 283.1     Other  60     IV Piggyback 600 1300     TPN 1211 351.7     Total Intake(mL/kg) 6444.2 (78.4) 8021.8 (96)     Urine (mL/kg/hr) 1150 (0.6) 750 (0.4)     Stool 0 (0) 125 (0.1)     Total Output 1150 875      Net +5294.2 +7146.8             Stool Occurrence 3 x 2 x           Physical Exam   Constitutional: He is oriented to person, place, and time.   Thin, cachectic male on CPAP. Able to answer questions by nodding/shaking his head.  HENT:   Head: Normocephalic and atraumatic.   Eyes: EOM are normal. Pupils are equal, round, and reactive to light.   Neck: Normal range of motion.   Cardiovascular: Intact distal pulses.    Pulmonary/Chest: Effort normal. Crackles noted right anterior lung fields. Coarse breath sounds over left lung field  Abdominal: Soft. Bowel sounds are normal. There is no tenderness.  PREVIOUS SHEET OF EXERCISES:    2x/day.   Butterfly(laying down knees bent):  Bring the knee inward as well.       MODIFIED Stand tall with the feet facing forward and  walking side to side for 1 minute.     Bridge: Lay on your back with knees bent.  Engage the abdominal muscles and lift the buttock off the bed.  10 slow reps(2 second holds).  2x/day.    ADDED Forward lunge:  Standing with both feet directed forward.  Step forward with the left foot and touch a waist height target with boths hands as both knees bend, then return step.  10 reps with each leg.  1x/day.    Standing hip flexion:  Standing holding a counter with 1 hand.  Slowly alternate knees marching upward.  10 reps on each side.   1-2x/day.     ADDED Forward step up: The left foot starts on a 3 inch book in front of you.  The right foot steps forward/up onto the book as the left knee straightens.  The the right foot steps down/backward as the left knee bends.   12  reps.  2 sets.   1x/day.    ADDED Seated hip hinge hip flexion stretch:  Seated in a chair bend forward at the waist thereby stretching the right hip.  10 seconds to the point of a stretch.  3-5 reps.   2x/day.      Stairs:   1 at a time with the Right to lead up and the left to lead down.   Desensitization with a towel around the incision 2-3 minutes gently.  1x/day.    Left stocking until 3-.     Hyperactive bowel sounds   Musculoskeletal: Normal range of motion.   Neurological: He is alert and oriented to person, place, and time.   Skin: Skin is warm and dry.       Significant Labs:   CBC:     Recent Labs  Lab 10/02/17  0459 10/03/17  0431   WBC 3.92  --    HGB 7.7* 7.5*   HCT 22.1* 20.2*   PLT 9*  --     and CMP:     Recent Labs  Lab 10/02/17  0459 10/02/17  1920 10/03/17  0431    136 134*   K 3.2* 3.4* 3.8    108 107   CO2 20* 21* 15*   * 312* 417*   BUN 39* 43* 44*   CREATININE 0.8 0.9 0.9   CALCIUM 7.3* 7.5* 7.2*   PROT 3.3*  --  5.0*   ALBUMIN 1.1*  --  1.1*   BILITOT 2.0*  --  2.1*   ALKPHOS 161*  --  204*   AST 46*  --  61*   ALT 45*  --  51*   ANIONGAP 8 7* 12   EGFRNONAA >60.0 >60.0 >60.0     Reviewed CXR showing left sided infiltrate  Repeat portable from this morning show bilateral lung opacities.    Assessment/Plan:     * Acute respiratory failure with hypoxia    - Worsening of respiratory status overnight. Initially improved on CPAP but worsening with likely need for intubation as per ICU  - On vanc/cefepime for multilobar pneumonia  - Appreciate ICU help        Pancytopenia    - noted pancytopenia; patient on bactrim and valganciclovir both of which can cause BM suppression  - Consider changing TMP-SMX to pentamidine  - Patient still requires valganciclovir        Xvunq-hlgozz-trlj disease    - we are concerned about acute versus chronic fyres-yfayrw-wnfw disease given that he received donor lymphocytes in August 2017.   - s/p flex sig 9/15/17 showing erythematous mucosa in the recto-sigmoid colon (biopsied)  - serum CMV negative (repeat 9/25 negative), positive on GI biopsy stain - continue valganciclovir  - solumedrol increased to 80mg iv bid on 9/14/17, diarrhea improved 9/20 and steroids decreased, increased back to 80 mg bid 9/21 due to increased stool output. Currently 19 days of high dose steroids,  - budesonide 9 mg 9/17 - stopped 9/28, started on Beclomethasone  9/28  - Remicade started 9/23/17 (400 mg weekly)  - continue scheduled lomotil   - Ibrutinib ordered, dose reduced to 280 mg daily and sent to [facility] 9/26; requested it be sent to [facility] - sent 9/28  - Started PCA pump for abdominal discomfort. Will titrate as needed.        Cough    - No obvious etiology on portable CXR  - Sat's 88-95%  - Will obtain PA and lateral  - Incentive spirometry        AML (acute myeloid leukemia) in remission    -- Relapsed AML - peripheral blood shows 30% blasts 6/2017   -- BM biopsy results - consistent with 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 Blast of 62%, 19 had a complex karyotype including 5q deletion, 7q deletion, 17p deletion, and one metaphase represented a near tetraploid subclone; FLT-3 negative     -- Had previous reaction to MEC (etoposide caused full body rash).   -- Re-induced with FLAG-BETZY   -- Day 14 bone marrow biopsy done 7/10 - hypocellular with no evidence of residual disease  -- Post DLI on 8/14/17  -- currently receiving Vidaza for 2 cycles given high risk disease per Dr. Atkinson note (has only received cycle 1)        S/P allogeneic bone marrow transplant    -- +383 days s/p Flu/Bu/ATG MUD allogeneic transplant for high risk AML after his second IDAC (6/20/16 - 6/24/16) after a prolonged hospitalization (2/5/16 - 3/21/16) for induction with 7&3 and reinduction with MEC to remission and IDAC (4/4/16 - 4/9/16)  -- Chimerics from marrow done 6/21 show CD3 of 90% donor and 10% recipient, but CD34 of 5% donor and 95% recipient   -- Chimers from 7/10 shows 70% recipient and 30% donor - NOT SORTED  -- Relapsed 6/2017 and reinduced with FLAG BETZY  -- DLI 8/14/17, day +51        HIV disease    - Continue triumeq  - now on IV steroids with history of fungal infection, ID consulted. Histoplasma antigen done and continue current ppx. ID has signed off          Pneumonia involving left lung    - See above        Oral  "rené    - clortrimazole humberto         Electrolyte abnormality    - replete as needed  - On TPN            Dermatitis associated with moisture    - followed by wound care        Atrial fibrillation with rapid ventricular response    - flecainide decreased to 50 mg bid and metoprolol to 12.5 mg bid per cardiology 9/26  - repeat EKG done 9/26, sinus tachycardia. HR stable          Chemotherapy-induced thrombocytopenia    - due to transplant and chemotherapy   - platelet count today is 9k/uL, will transfuse today  - continue to monitor.        Sepsis    - remains afebrile.  - c diff testing is negative from 9/13   - cultures are negative   - CT abdomen reveals "diffuse abnormal appearance of the small and large bowel demonstrating fluid filled mildly prominent loops with associated submucosal edema and mucosal enhancement."   - concern for acute rxppy-blzkju-qiof disease given that he received donor lymphocytes in August 2017  - s/p flex sig 9/15/17 showing erythematous mucosa in the recto-sigmoid colon (biopsied)--GVHD and CMV  - discontinued vancomycin on 9/15/17  - discontinued cefepime 9/17/17   - sepsis work-up repeated 9/21 due to hypotension and tachycardia, repeat cultures done, chest x-ray negative. Cefepime and Vancomycin restarted. Cefepime was switched to zosyn.  - Possible staph in blood from 9/21.   - continue zosyn for now and Vanc stopped 9/26.        Anemia due to chemotherapy    - hemoglobin today is 6.6 g/dL  - Will transfuse today.  - continue to monitor.        CINV (chemotherapy-induced nausea and vomiting)    - continue as-need antiemetics and Zofran ATC          Histoplasma capsulatum infection    - on voriconazole 300mg BID  - vori level from 09.30 1.7            VTE Risk Mitigation         Ordered     Place sequential compression device  Until discontinued      09/22/17 1532     High Risk of VTE  Once      09/13/17 0714     Place RANJIT hose  Until discontinued      09/13/17 0613    "       Disposition: pending clinical improvement    Santiago Cottrell, PGY-VI on 10/3/2017 10:35 AM  [facility] Hematology/Oncology Fellow  Pager: [phone]  [email or web address]

## 2021-10-20 NOTE — ASSESSMENT & PLAN NOTE
- Malnourished, possible VitK deficient. Will replace and reassess.   - Start PPN   Saw the patient in the office.  It was due to her radial immobilizer which she kept on for more than 48 hours.  I removed the immobilizer.  There is no hematoma or swelling in the forearm.  Cath site has healed well.

## 2022-10-13 NOTE — SUBJECTIVE & OBJECTIVE
Subjective:     Interval History:   - No acute issues overnight. 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 Complains of intermittent numbness on the chin and cheek.   - Continues on cefepime.     Objective:     Vital Signs (Most Recent):  Temp: 98.2 °F (36.8 °C) (06/27/17 0744)  Pulse: 88 (06/27/17 0744)  Resp: 16 (06/27/17 0744)  BP: 125/72 (06/27/17 0744)  SpO2: 96 % (06/27/17 0744) Vital Signs (24h Range):  Temp:  [97.9 °F (36.6 °C)-98.5 °F (36.9 °C)] 98.2 °F (36.8 °C)  Pulse:  [84-96] 88  Resp:  [16-18] 16  SpO2:  [93 %-96 %] 96 %  BP: (122-133)/(68-79) 125/72     Weight: 88 kg (194 lb 0.1 oz)  Body mass index is 25.6 kg/m².  Body surface area is 2.13 meters squared.    ECOG SCORE         [unfilled]    Intake/Output - Last 3 Shifts       06/25 0700 - 06/26 0659 06/26 0700 - 06/27 0659 06/27 0700 - 06/28 0659    P.O. 680 1230     I.V. (mL/kg) 1208.8 (13.6) 2293.8 (26.1)     Blood       IV Piggyback 100 50     Total Intake(mL/kg) 1988.8 (22.3) 3573.8 (40.6)     Urine (mL/kg/hr) 2350 (1.1) 3725 (1.8)     Emesis/NG output  0 (0)     Stool 0 (0)      Total Output 2350 3725      Net -361.3 -151.3             Urine Occurrence 1 x      Stool Occurrence 2 x      Emesis Occurrence  1 x           Physical Exam   Constitutional: He is oriented to person, place, and time. He appears well-developed and well-nourished. No distress.   HENT:   Head: Normocephalic.   Right Ear: External ear normal.   Left Ear: External ear normal.   Nose: Nose normal.   Mouth/Throat: Oropharynx is clear and moist and mucous membranes are normal. No oropharyngeal exudate.   Eyes: Conjunctivae and EOM are normal. Pupils are equal, round, and reactive to light. No scleral icterus.   Neck: Normal range of motion. Neck supple.   Cardiovascular: Normal rate, regular rhythm and normal heart sounds.    Pulmonary/Chest: Effort normal and breath sounds normal.   Abdominal: Soft.  [facility]  [address]  Phone: [phone]  Fax: [phone], APRN - CNP         October 13, 2022     Patient: Ailyn Muñoz   YOB: 1953   Date of Visit: 10/13/2022       To Whom It May Concern: It is my medical opinion that Ailyn Muñoz requires a disability parking placard for the following reasons:  He has limited walking ability due to an orthopedic condition. Duration of need: 6 months    If you have any questions or concerns, please don't hesitate to call.     Sincerely,        CELINE Bustamante CNP Normal appearance and bowel sounds are normal. He exhibits no distension. There is no tenderness.   Musculoskeletal: Normal range of motion. He exhibits no edema.   Lymphadenopathy:     He has no cervical adenopathy.   Neurological: He is alert and oriented to person, place, and time.   Skin: Skin is warm, dry and intact. No cyanosis. Nails show no clubbing.   Psychiatric: He has a normal mood and affect. His behavior is normal. Thought content normal. His mood appears not anxious.   Vitals reviewed.      Significant Labs:   CBC:   Recent Labs  Lab 06/26/17 0423 06/27/17 0425   WBC 4.03 3.36*   HGB 8.1* 7.9*   HCT 23.7* 23.1*   PLT 42*  --     and CMP:   Recent Labs  Lab 06/26/17 0423 06/27/17 0425    139   K 3.5 4.3    108   CO2 23 26   GLU 89 180*   BUN 19 17   CREATININE 1.1 1.1   CALCIUM 8.5* 8.7   PROT 5.8* 6.0   ALBUMIN 2.6* 2.6*   BILITOT 0.7 0.4   ALKPHOS 149* 147*   AST 35 21   ALT 25 23   ANIONGAP 9 5*   EGFRNONAA >60.0 >60.0       Diagnostic Results:  I have reviewed all pertinent imaging results/findings within the past 24 hours.

## 2022-11-28 NOTE — CONSULTS
[facility]  Infectious Disease  Consult Note    Patient Name: Paul E Sistrunk  MRN: [id number]  Admission Date: 6/20/2017  Hospital Length of Stay: 19 days  Attending Physician: Enriqueta Saleem MD  Primary Care Provider: Edgar Pinon MD     Isolation Status: No active isolations    Patient information was obtained from patient and past medical records.      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 Differential for pneumonia includes: IFI (including breakthrough Aspergillosis along with other molds and previously diagnosed histoplasmosis), bacterial pneumonia (including atypical pathogens), community-acquired viral infections, and PCP (prophylaxis stopped in 1/2017 but his CD4 percentage was quite low recently despite a high absolute count). He is stable despite these findings.    - would continue empiric vanc/cefepime for now for bacterial coverage -- have sent urine legionella  Problem: PAIN - ADULT  Goal: Verbalizes/displays adequate comfort level or patient's stated pain goal  Description: INTERVENTIONS:  - Encourage pt to monitor pain and request assistance  - Assess pain using appropriate pain scale  - Administer analgesics based on type and severity of pain and evaluate response  - Implement non-pharmacological measures as appropriate and evaluate response  - Consider cultural and social influences on pain and pain management  - Manage/alleviate anxiety  - Utilize distraction and/or relaxation techniques  - Monitor for opioid side effects  - Notify MD/LIP if interventions unsuccessful or patient reports new pain  - Anticipate increased pain with activity and pre-medicate as appropriate  Outcome: Progressing     Problem: ANXIETY  Goal: Will report anxiety at manageable levels  Description: INTERVENTIONS:  - Administer medication as ordered  - Teach and rehearse alternative coping skills  - Provide emotional support with 1:1 interaction with staff  Outcome: Progressing     Problem: Patient/Family Goals  Goal: Patient/Family Long Term Goal  Description: Patient's Long Term Goal:     Goal: Long Term Goal:Experiences normal postpartum course  Description: INTERVENTIONS:  - Assess and monitor vital signs and lab values. - Assess fundus and lochia. - Provide ice/sitz baths for perineum discomfort. - Monitor healing of incision/episiotomy/laceration, and assess for signs and symptoms of infection and hematoma. - Assess bladder function and monitor for bladder distention.  - Provide/instruct/assist with pericare as needed. - Provide VTE prophylaxis as needed. - Monitor bowel function.  - Encourage ambulation and provide assistance as needed. - Assess and monitor emotional status and provide social service/psych resources as needed. - Utilize standard precautions and use personal protective equipment as indicated.  Ensure aseptic care of all intravenous lines and invasive antigen  - would broaden itraconazole to ambisome 5mg/kg IV q24h (with associated timed saline boluses and premedications) for possible IFI and closely monitor electrolytes (K, Mg) and CrCl on therapy; have sent aspergillus antigen, fungitell, urine histo/blasto (crypto ag already negative)  - will also add RVP and quantiferon gold tomorrow for pulmonary infiltrates/fevers  - would ask pulmonary for BAL for lavage cultures and cytology for GMS to exclude PCP since he is not producing sputum tomorrow (and would make NPO tonight in event this study can be done tomorrow)  - will f/u pending cultures with you            Thank you for your consult. I will follow-up with patient. Please contact us if you have any additional questions.     Temi Akhtar MD  Transplant ID Attending  [id number]    Temi Akhtar MD  Infectious Disease  [facility]    Subjective:     Principal Problem: AML (acute myeloid leukemia) in relapse    HPI: Mr. 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 ID has been consulted today for neutropenic fevers that have persisted the last few days despite empiric vanc, cefepime, chronic itraconazole, and acyclovir prophylaxis. Patient notes that prior to diagnosis of relapse in clinic, he was  tubes/drains.  - Obtain immunization and exposure to communicable diseases history. Outcome: Progressing  Goal: Optimize infant feeding at the breast  Description: INTERVENTIONS:  - Initiate breast feeding within first hour after birth. - Monitor effectiveness of current breast feeding efforts. - Assess support systems available to mother/family.  - Identify cultural beliefs/practices regarding lactation, letdown techniques, maternal food preferences. - Assess mother's knowledge and previous experience with breast feeding.  - Provide information as needed about early infant feeding cues (e.g., rooting, lip smacking, sucking fingers/hand) versus late cue of crying.  - Discuss/demonstrate breast feeding aids (e.g., infant sling, nursing footstool/pillows, and breast pumps). - Encourage mother/other family members to express feelings/concerns, and actively listen. - Educate father/SO about benefits of breast feeding and how to manage common lactation challenges. - Recommend avoidance of specific medications or substances incompatible with breast feeding.  - Assess and monitor for signs of nipple pain/trauma. - Instruct and provide assistance with proper latch. - Review techniques for milk expression (breast pumping) and storage of breast milk. Provide pumping equipment/supplies, instructions and assistance, as needed. - Encourage rooming-in and breast feeding on demand.  - Encourage skin-to-skin contact. - Provide LC support as needed. - Assess for and manage engorgement. - Provide breast feeding education handouts and information on community breast feeding support. Outcome: Progressing  Goal: Establishment of adequate milk supply with medication/procedure interruptions  Description: INTERVENTIONS:  - Review techniques for milk expression (breast pumping). - Provide pumping equipment/supplies, instructions, and assistance until it is safe to breastfeed infant.   Outcome: Progressing  Goal: Experiences normal breast weaning course  Description: INTERVENTIONS:  - Assess for and manage engorgement. - Instruct on breast care. - Provide comfort measures. Outcome: Progressing  Goal: Appropriate maternal -  bonding  Description: INTERVENTIONS:  - Assess caregiver- interactions. - Assess caregiver's emotional status and coping mechanisms. - Encourage caregiver to participate in  daily care. - Assess support systems available to mother/family.  - Provide /case management support as needed.   Outcome: Progressing not having F/C/S or other localizing infectious symptoms -- only malaise and acute onset BLE body aches just prior to diagnosis. Since admission he notes neutropenic F/C/S and dry cough but denies HA, URI symptoms, sore throat, dysphagia, sputum production, SOB, N/V, abdominal pain, diarrhea, dysuria, genital lesions, line pain/drainage, or rash. Exposure history is largely unremarkable: he is currently single, lives alone, recently visited [place] but denies other travel (including international), no pets, no new sexual contacts, and no new hobbies (does garden). Workup today is notable for negative blood cultures but multifocal pneumonia on CT CAP as a likely source.    Past Medical History:   Diagnosis Date    Cancer 2/2016    ALL    HIV infection     Pancytopenia due to antineoplastic chemotherapy 7/4/2016       Past Surgical History:   Procedure Laterality Date    CYSTOSCOPY         Review of patient's allergies indicates:   Allergen Reactions    Etoposide Rash       Medications:  Facility-Administered Medications Prior to Admission   Medication    pentamidine inhalation solution 300 mg     Prescriptions Prior to Admission   Medication Sig    abacavir-dolutegravir-lamivud (TRIUMEQ) 600- mg Tab Take 1 tablet by mouth once daily.    acyclovir (ZOVIRAX) 800 MG Tab Take 1 tablet (800 mg total) by mouth 2 (two) times daily.    alprazolam (XANAX) 0.5 MG tablet Take 1 tablet (0.5 mg total) by mouth 2 (two) times daily as needed for Insomnia or Anxiety.    butalbital-acetaminophen-caffeine -40 mg (FIORICET, ESGIC) -40 mg per tablet     finasteride (PROSCAR) 5 mg tablet Take 1 tablet (5 mg total) by mouth once daily.    HYDROmorphone (DILAUDID) 2 MG tablet Take 1 tablet (2 mg total) by mouth every 4 (four) hours as needed for Pain.    itraconazole (SPORANOX) 10 mg/mL Soln Take 2 mLs (20 mg total) by mouth 3 (three) times daily.    loperamide (IMODIUM) 2 mg capsule Take 2 mg by mouth daily  as needed for Diarrhea.    magnesium oxide (MAG-OX) 400 mg tablet TAKE THREE TABLETS BY MOUTH THREE TIMES A DAY    ondansetron (ZOFRAN) 8 MG tablet Take 1 tablet (8 mg total) by mouth every 8 (eight) hours as needed for Nausea.    pantoprazole (PROTONIX) 40 MG tablet Take 1 tablet (40 mg total) by mouth once daily.    promethazine (PHENERGAN) 12.5 MG Tab Take 1 tablet (12.5 mg total) by mouth every 6 (six) hours as needed (nausea).    tacrolimus (PROGRAF) 0.5 MG Cap Take 1 capsule (0.5 mg total) by mouth once daily.    tamsulosin (FLOMAX) 0.4 mg Cp24 Take 1 capsule (0.4 mg total) by mouth every other day.    zolpidem (AMBIEN) 5 MG Tab Take 1 tablet (5 mg total) by mouth nightly as needed.     Antibiotics     Start     Stop Route Frequency Ordered    07/09/17 0545  vancomycin (VANCOCIN) 1,500 mg in dextrose 5 % 250 mL IVPB  (Vancomycin IVPB with levels panel)      -- IV Every 12 hours (non-standard times) 07/09/17 0304    07/04/17 0400  ceFEPIme in dextrose 5% 2 gram/50 mL IVPB 2 g      -- IV Every 8 hours (non-standard times) 07/04/17 0245    06/26/17 0900  sulfamethoxazole-trimethoprim 800-160mg per tablet 1 tablet      -- Oral Every Mon, Wed, Fri 06/23/17 1609        Antifungals     Start     Stop Route Frequency Ordered    07/09/17 1530  amphotericin B liposome (AMBISOME) 400 mg in dextrose 5 % 400 mL IVPB  (amphotericin B liposome (AMBISOME) IVPB panel)      -- IV Every 24 hours (non-standard times) 07/09/17 1330        Antivirals         Stop Route Frequency     abacavir      -- Oral Daily     dolutegravir      -- Oral Daily     lamivudine      -- Oral Daily     acyclovir      -- Oral 2 times daily             There is no immunization history on file for this patient.    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 Negative for appetite change, chills, fatigue and fever.   HENT: Negative for congestion, dental problem, mouth sores and sinus pressure.    Eyes: Negative for pain, redness and visual disturbance.   Respiratory: Positive for cough. Negative for shortness of breath and wheezing.    Cardiovascular: Negative for chest pain and leg swelling.   Gastrointestinal: Negative for abdominal distention, abdominal pain, diarrhea, nausea and vomiting.   Endocrine: Negative for polyuria.   Genitourinary: Negative for decreased urine volume, dysuria and frequency.   Musculoskeletal: Negative for joint swelling.   Skin: Negative for color change.   Allergic/Immunologic: Negative for food allergies.   Neurological: Negative for dizziness, weakness and headaches.   Hematological: Negative for adenopathy.   Psychiatric/Behavioral: Negative for agitation and confusion. The patient is not nervous/anxious.      Objective:     Vital Signs (Most Recent):  Temp: 98.6 °F (37 °C) (17 1605)  Pulse: 106 (17 1605)  Resp: 16 (17 1605)  BP: (!) 112/55 (17 1605)  SpO2: 95 % (17 1605) Vital Signs (24h Range):  Temp:  [97.9 °F (36.6 °C)-101.3 °F (38.5 °C)] 98.6 °F (37 °C)  Pulse:  [] 106  Resp:  [16-20] 16  SpO2:  [92 %-97 %] 95 %  BP: (107-152)/(55-71) 112/55     Weight: 84 kg (185 lb 1.2 oz)  Body mass index is 24.42 kg/m².    Estimated Creatinine Clearance: 95.4 mL/min (based on Cr of  1).    Physical Exam   Constitutional: He is oriented to person, place, and time. He appears well-developed and well-nourished.   HENT:   Head: Normocephalic and atraumatic.   Mouth/Throat: Oropharynx is clear and moist.   Eyes: Conjunctivae are normal.   Neck: Neck supple.   Cardiovascular: Normal rate, regular rhythm and normal heart sounds.    No murmur heard.  Pulmonary/Chest: Effort normal and breath sounds normal. No respiratory distress. He has no wheezes.   Abdominal: Soft. Bowel sounds are normal. He exhibits no distension. There is no tenderness.   Musculoskeletal: Normal range of motion. He exhibits no edema or tenderness.   Lymphadenopathy:     He has no cervical adenopathy.   Neurological: He is alert and oriented to person, place, and time. Coordination normal.   Skin: Skin is warm and dry. No rash noted.   Psychiatric: He has a normal mood and affect. His behavior is normal.       Significant Labs:   CBC:   Recent Labs  Lab 07/08/17  0639 07/09/17  0524   WBC 0.04* 0.04*   HGB 6.9* 6.7*   HCT 19.3* 19.4*   PLT 11* 3*     CMP:   Recent Labs  Lab 07/08/17  0639 07/09/17  0524   * 136   K 4.0 3.9    109   CO2 19* 20*    94   BUN 25* 19   CREATININE 1.1 1.0   CALCIUM 7.6* 7.8*   PROT 5.2* 5.1*   ALBUMIN 2.0* 1.9*   BILITOT 2.0* 2.0*   ALKPHOS 137* 116   AST 20 17   ALT 12 13   ANIONGAP 8 7*   EGFRNONAA >60.0 >60.0       Significant Imaging: I have reviewed all pertinent imaging results/findings within the past 24 hours.     CT CAP:  1.  Diffuse bilateral patchy groundglass opacities, with more confluent regions of groundglass opacity in the upper lobes with peripheral sparing as well as superimposed regions of more consolidative change.  Overall, findings highly concerning for underlying infectious/atypical infectious process.  Additional differential consideration including possible noninfectious inflammatory etiology or pulmonary edema.  Clinical correlation recommended.    2.  Slight  colonic wall thickening of the sigmoid and descending colon, likely relating to nondistention.  However clinical correlation for symptoms of colitis recommended.    3.  Subcentimeter hepatic hypodensities too small to definitively characterize.  Please note the patient's previously identified enhancing left hepatic lobe lesion is not definitively visualized on today's examination, which could relate to differences in contrast bolus timing.    4.  Calcified left hilar lymph nodes likely reflecting sequela of prior granulomatous disease.    5.  Mild nonspecific perinephric fat stranding.  Correlation with urinalysis recommended.     6. Multiple additional findings as above.     Microbiology:  6/24 blood cx: negative  6/24 urine cx: negative  7/4 blood cx: negative  7/4 urine cx: negative  7/7 blood cx: negative  7/7 urine cx: negative  7/9 blood cx: negative  7/9 urine cx: negative

## 2022-12-05 NOTE — ASSESSMENT & PLAN NOTE
-- +317 days s/p Flu/Bu/ATG MUD allogeneic transplant for high risk AML after his second IDAC (6/20/16 - 6/24/16) after a prolonged hospitalization (2/5/16 - 3/21/16) for induction with 7&3 and reinduction with MEC to remission and IDAC (4/4/16 - 4/9/16)  -- Continue ppx acyclovir, renally dosed. We have discussed antifungal coverage with infectious disease. Discontinued itraconzaole (7/09) was on ambisome, now on voriconazole  -- Chimerics from marrow done 6/21 show CD3 of 90% donor and 10% recipient, but CD34 of 5% donor and 95% recipient   -- Chimers from 7/10 shows 70% recipient and 30% donor - NOT SORTED  -- Plans for DLI in future   patella fx sp fall  rt  pat  fx

## 2023-01-16 NOTE — ASSESSMENT & PLAN NOTE
-On RA  -chest xray shows increased interstitial markings in the middle and upper lung zones  -no signs of volume overload  -Bactrim for PCP coverage.     no

## 2023-05-15 NOTE — ASSESSMENT & PLAN NOTE
- Continue home flomax   Bed/Stretcher in lowest position, wheels locked, appropriate side rails in place/Call bell, personal items and telephone in reach/Instruct patient to call for assistance before getting out of bed/chair/stretcher/Non-slip footwear applied when patient is off stretcher/[place] to call system/Physically safe environment - no spills, clutter or unnecessary equipment/Purposeful proactive rounding/Room/bathroom lighting operational, light cord in reach

## 2025-06-29 NOTE — ASSESSMENT & PLAN NOTE
- related to Neupogen  - started on zyrtec and lidocaine patch  - decreased interval of IV pain medicine   Female

## (undated) DEVICE — SYR SLIP TIP 10ML SHIELD

## (undated) DEVICE — SEE MEDLINE ITEM 157128

## (undated) DEVICE — TRAY MINOR GEN SURG

## (undated) DEVICE — SEE MEDLINE ITEM 157117

## (undated) DEVICE — SUT MCRYL PLUS 4-0 PS2 27IN

## (undated) DEVICE — SYR SLIP TIP 5CC

## (undated) DEVICE — GOWN SURGICAL X-LARGE

## (undated) DEVICE — SET DECANTER MEDICHOICE

## (undated) DEVICE — SOL NACL 0.9% INJ PF/50151

## (undated) DEVICE — DRAPE THYROID WITH ARMBOARD

## (undated) DEVICE — DRESSING LEUKOPLAST FLEX 1X3IN

## (undated) DEVICE — SEE MEDLINE ITEM 157131

## (undated) DEVICE — SYR ONLY LUER LOCK 20CC

## (undated) DEVICE — DRESSING TRANS 6X8 TEGADERM

## (undated) DEVICE — BLADE SURG CARBON STEEL SZ11

## (undated) DEVICE — NDL SPINAL 20GX3.5 HUB

## (undated) DEVICE — DRESSING TRANS 4X4 TEGADERM

## (undated) DEVICE — SUT PROLENE 2-0 30 SH

## (undated) DEVICE — ELECTRODE REM PLYHSV RETURN 9

## (undated) DEVICE — SEE MEDLINE ITEM 146417

## (undated) DEVICE — DRESSING TELFA STRL 4X3 LF

## (undated) DEVICE — INTRODUCER PERMANENT 10.5

## (undated) DEVICE — NDL 18GA X1 1/2 REG BEVEL

## (undated) DEVICE — APPLICATOR CHLORAPREP ORN 26ML

## (undated) DEVICE — DRAPE C ARM 42 X 120 10/BX